# Patient Record
Sex: MALE | Race: WHITE | Employment: FULL TIME | URBAN - METROPOLITAN AREA
[De-identification: names, ages, dates, MRNs, and addresses within clinical notes are randomized per-mention and may not be internally consistent; named-entity substitution may affect disease eponyms.]

---

## 2017-01-10 ENCOUNTER — TELEPHONE (OUTPATIENT)
Dept: INTERNAL MEDICINE CLINIC | Age: 62
End: 2017-01-10

## 2017-01-10 DIAGNOSIS — Z12.11 SCREEN FOR COLON CANCER: Primary | ICD-10-CM

## 2017-07-20 ENCOUNTER — OFFICE VISIT (OUTPATIENT)
Dept: DERMATOLOGY | Age: 62
End: 2017-07-20

## 2017-07-20 DIAGNOSIS — Z85.820 HISTORY OF MALIGNANT MELANOMA: Primary | ICD-10-CM

## 2017-07-20 DIAGNOSIS — Z12.83 SCREENING EXAM FOR SKIN CANCER: ICD-10-CM

## 2017-07-20 PROCEDURE — 99243 OFF/OP CNSLTJ NEW/EST LOW 30: CPT | Performed by: DERMATOLOGY

## 2018-03-16 ENCOUNTER — OFFICE VISIT (OUTPATIENT)
Dept: INTERNAL MEDICINE CLINIC | Age: 63
End: 2018-03-16

## 2018-03-16 VITALS
BODY MASS INDEX: 25.84 KG/M2 | TEMPERATURE: 98.9 F | SYSTOLIC BLOOD PRESSURE: 130 MMHG | WEIGHT: 184.6 LBS | DIASTOLIC BLOOD PRESSURE: 74 MMHG | HEIGHT: 71 IN | OXYGEN SATURATION: 97 % | HEART RATE: 57 BPM

## 2018-03-16 DIAGNOSIS — B96.89 ACUTE BACTERIAL SINUSITIS: Primary | ICD-10-CM

## 2018-03-16 DIAGNOSIS — J01.90 ACUTE BACTERIAL SINUSITIS: Primary | ICD-10-CM

## 2018-03-16 PROCEDURE — 99213 OFFICE O/P EST LOW 20 MIN: CPT | Performed by: FAMILY MEDICINE

## 2018-03-16 RX ORDER — AMOXICILLIN AND CLAVULANATE POTASSIUM 875; 125 MG/1; MG/1
1 TABLET, FILM COATED ORAL 2 TIMES DAILY
Qty: 20 TABLET | Refills: 0 | Status: SHIPPED | OUTPATIENT
Start: 2018-03-16 | End: 2018-03-26

## 2018-03-16 NOTE — PROGRESS NOTES
and time  Psychiatric:  behavior, cognition and memory grossly normal    ASSESSMENT:   Clinical picture is most consistent with a diagnosis of acute sinusitis. PLAN:   · Symptomatic therapy recommended/prescribed: rest, fluids, acetaminophen, NSAID and multi-symptom product- OTC prn  · Oral antibiotic prescribed for 10 days:  Augmentin-  875 bid x10 days  · Patient education materials given in AVS.     Return if symptoms worsen or fail to improve.

## 2018-05-16 ENCOUNTER — HOSPITAL ENCOUNTER (OUTPATIENT)
Dept: GENERAL RADIOLOGY | Age: 63
Discharge: HOME | End: 2018-05-16
Attending: FAMILY MEDICINE | Admitting: FAMILY MEDICINE

## 2018-05-16 ENCOUNTER — OFFICE VISIT (OUTPATIENT)
Dept: INTERNAL MEDICINE CLINIC | Age: 63
End: 2018-05-16

## 2018-05-16 VITALS
SYSTOLIC BLOOD PRESSURE: 118 MMHG | OXYGEN SATURATION: 98 % | WEIGHT: 179 LBS | DIASTOLIC BLOOD PRESSURE: 80 MMHG | BODY MASS INDEX: 24.97 KG/M2 | HEART RATE: 74 BPM

## 2018-05-16 DIAGNOSIS — Z23 NEED FOR VACCINATION FOR STREP PNEUMONIAE: ICD-10-CM

## 2018-05-16 DIAGNOSIS — R73.02 IGT (IMPAIRED GLUCOSE TOLERANCE): ICD-10-CM

## 2018-05-16 DIAGNOSIS — Z12.5 SCREENING FOR PROSTATE CANCER: ICD-10-CM

## 2018-05-16 DIAGNOSIS — Z00.00 ROUTINE PHYSICAL EXAMINATION: ICD-10-CM

## 2018-05-16 DIAGNOSIS — Z85.820 HISTORY OF MALIGNANT MELANOMA: ICD-10-CM

## 2018-05-16 DIAGNOSIS — Z23 NEED FOR DIPHTHERIA-TETANUS-PERTUSSIS (TDAP) VACCINE: ICD-10-CM

## 2018-05-16 DIAGNOSIS — Z11.59 NEED FOR HEPATITIS C SCREENING TEST: ICD-10-CM

## 2018-05-16 DIAGNOSIS — Z76.89 ENCOUNTER TO ESTABLISH CARE: ICD-10-CM

## 2018-05-16 DIAGNOSIS — Z23 NEED FOR SHINGLES VACCINE: ICD-10-CM

## 2018-05-16 DIAGNOSIS — B02.9 HERPES ZOSTER WITHOUT COMPLICATION: Primary | ICD-10-CM

## 2018-05-16 LAB
A/G RATIO: 1.6 (ref 1.1–2.2)
ALBUMIN SERPL-MCNC: 4.6 G/DL (ref 3.4–5)
ALP BLD-CCNC: 64 U/L (ref 40–129)
ALT SERPL-CCNC: 23 U/L (ref 10–40)
ANION GAP SERPL CALCULATED.3IONS-SCNC: 12 MMOL/L (ref 3–16)
AST SERPL-CCNC: 21 U/L (ref 15–37)
BASOPHILS ABSOLUTE: 0 K/UL (ref 0–0.2)
BASOPHILS RELATIVE PERCENT: 0.6 %
BILIRUB SERPL-MCNC: 0.4 MG/DL (ref 0–1)
BUN BLDV-MCNC: 18 MG/DL (ref 7–20)
CALCIUM SERPL-MCNC: 9.6 MG/DL (ref 8.3–10.6)
CHLORIDE BLD-SCNC: 102 MMOL/L (ref 99–110)
CHOLESTEROL, TOTAL: 220 MG/DL (ref 0–199)
CO2: 25 MMOL/L (ref 21–32)
CREAT SERPL-MCNC: 1.2 MG/DL (ref 0.8–1.3)
EOSINOPHILS ABSOLUTE: 0.1 K/UL (ref 0–0.6)
EOSINOPHILS RELATIVE PERCENT: 1.9 %
GFR AFRICAN AMERICAN: >60
GFR NON-AFRICAN AMERICAN: >60
GLOBULIN: 2.9 G/DL
GLUCOSE BLD-MCNC: 96 MG/DL (ref 70–99)
HCT VFR BLD CALC: 43 % (ref 40.5–52.5)
HDLC SERPL-MCNC: 113 MG/DL (ref 40–60)
HEMOGLOBIN: 14.8 G/DL (ref 13.5–17.5)
HEPATITIS C ANTIBODY INTERPRETATION: NORMAL
LDL CHOLESTEROL CALCULATED: 100 MG/DL
LYMPHOCYTES ABSOLUTE: 1.4 K/UL (ref 1–5.1)
LYMPHOCYTES RELATIVE PERCENT: 25.1 %
MCH RBC QN AUTO: 32.1 PG (ref 26–34)
MCHC RBC AUTO-ENTMCNC: 34.5 G/DL (ref 31–36)
MCV RBC AUTO: 92.9 FL (ref 80–100)
MONOCYTES ABSOLUTE: 0.5 K/UL (ref 0–1.3)
MONOCYTES RELATIVE PERCENT: 8 %
NEUTROPHILS ABSOLUTE: 3.7 K/UL (ref 1.7–7.7)
NEUTROPHILS RELATIVE PERCENT: 64.4 %
PDW BLD-RTO: 13.5 % (ref 12.4–15.4)
PLATELET # BLD: 268 K/UL (ref 135–450)
PMV BLD AUTO: 9.8 FL (ref 5–10.5)
POTASSIUM SERPL-SCNC: 5.3 MMOL/L (ref 3.5–5.1)
PROSTATE SPECIFIC ANTIGEN: 1.38 NG/ML (ref 0–4)
RBC # BLD: 4.63 M/UL (ref 4.2–5.9)
SODIUM BLD-SCNC: 139 MMOL/L (ref 136–145)
TOTAL PROTEIN: 7.5 G/DL (ref 6.4–8.2)
TRIGL SERPL-MCNC: 37 MG/DL (ref 0–150)
VLDLC SERPL CALC-MCNC: 7 MG/DL
WBC # BLD: 5.7 K/UL (ref 4–11)

## 2018-05-16 PROCEDURE — 99215 OFFICE O/P EST HI 40 MIN: CPT | Performed by: FAMILY MEDICINE

## 2018-05-16 RX ORDER — PREDNISONE 20 MG/1
50 TABLET ORAL DAILY
Qty: 13 TABLET | Refills: 0 | Status: SHIPPED | OUTPATIENT
Start: 2018-05-16 | End: 2018-05-21

## 2018-05-16 RX ORDER — LIDOCAINE 50 MG/G
OINTMENT TOPICAL
Qty: 50 G | Refills: 0 | Status: SHIPPED | OUTPATIENT
Start: 2018-05-16 | End: 2019-04-01 | Stop reason: CLARIF

## 2018-05-16 ASSESSMENT — ENCOUNTER SYMPTOMS
CONSTIPATION: 0
SHORTNESS OF BREATH: 0
COUGH: 0
VOMITING: 0
WHEEZING: 0
DIARRHEA: 0
BACK PAIN: 0
RHINORRHEA: 0
ABDOMINAL PAIN: 0
NAUSEA: 0
TROUBLE SWALLOWING: 0
SORE THROAT: 0

## 2018-05-16 ASSESSMENT — PATIENT HEALTH QUESTIONNAIRE - PHQ9
SUM OF ALL RESPONSES TO PHQ QUESTIONS 1-9: 0
2. FEELING DOWN, DEPRESSED OR HOPELESS: 0
1. LITTLE INTEREST OR PLEASURE IN DOING THINGS: 0
SUM OF ALL RESPONSES TO PHQ9 QUESTIONS 1 & 2: 0

## 2018-05-17 LAB
ESTIMATED AVERAGE GLUCOSE: 114 MG/DL
HBA1C MFR BLD: 5.6 %

## 2019-04-01 ENCOUNTER — OFFICE VISIT (OUTPATIENT)
Dept: INTERNAL MEDICINE CLINIC | Age: 64
End: 2019-04-01
Payer: MEDICARE

## 2019-04-01 VITALS
WEIGHT: 191 LBS | SYSTOLIC BLOOD PRESSURE: 122 MMHG | OXYGEN SATURATION: 99 % | DIASTOLIC BLOOD PRESSURE: 70 MMHG | BODY MASS INDEX: 26.64 KG/M2 | HEART RATE: 76 BPM

## 2019-04-01 DIAGNOSIS — Z00.00 ROUTINE GENERAL MEDICAL EXAMINATION AT A HEALTH CARE FACILITY: ICD-10-CM

## 2019-04-01 DIAGNOSIS — Z23 NEED FOR PROPHYLACTIC VACCINATION AGAINST STREPTOCOCCUS PNEUMONIAE (PNEUMOCOCCUS): ICD-10-CM

## 2019-04-01 DIAGNOSIS — Z23 NEED FOR PROPHYLACTIC VACCINATION AGAINST DIPHTHERIA-TETANUS-PERTUSSIS (DTP): ICD-10-CM

## 2019-04-01 DIAGNOSIS — Z13.1 SCREENING FOR DIABETES MELLITUS: ICD-10-CM

## 2019-04-01 DIAGNOSIS — R35.1 NOCTURIA: Primary | ICD-10-CM

## 2019-04-01 DIAGNOSIS — E78.5 DYSLIPIDEMIA: ICD-10-CM

## 2019-04-01 PROCEDURE — 90670 PCV13 VACCINE IM: CPT | Performed by: NURSE PRACTITIONER

## 2019-04-01 PROCEDURE — G0438 PPPS, INITIAL VISIT: HCPCS | Performed by: NURSE PRACTITIONER

## 2019-04-01 PROCEDURE — G0009 ADMIN PNEUMOCOCCAL VACCINE: HCPCS | Performed by: NURSE PRACTITIONER

## 2019-04-01 ASSESSMENT — LIFESTYLE VARIABLES
HAS A RELATIVE, FRIEND, DOCTOR, OR ANOTHER HEALTH PROFESSIONAL EXPRESSED CONCERN ABOUT YOUR DRINKING OR SUGGESTED YOU CUT DOWN: 0
HOW OFTEN DURING THE LAST YEAR HAVE YOU HAD A FEELING OF GUILT OR REMORSE AFTER DRINKING: 0
HOW OFTEN DURING THE LAST YEAR HAVE YOU BEEN UNABLE TO REMEMBER WHAT HAPPENED THE NIGHT BEFORE BECAUSE YOU HAD BEEN DRINKING: 0
AUDIT TOTAL SCORE: 5
HOW MANY STANDARD DRINKS CONTAINING ALCOHOL DO YOU HAVE ON A TYPICAL DAY: 1
AUDIT-C TOTAL SCORE: 5
HOW OFTEN DURING THE LAST YEAR HAVE YOU FOUND THAT YOU WERE NOT ABLE TO STOP DRINKING ONCE YOU HAD STARTED: 0
HOW OFTEN DURING THE LAST YEAR HAVE YOU FAILED TO DO WHAT WAS NORMALLY EXPECTED FROM YOU BECAUSE OF DRINKING: 0
HOW OFTEN DURING THE LAST YEAR HAVE YOU NEEDED AN ALCOHOLIC DRINK FIRST THING IN THE MORNING TO GET YOURSELF GOING AFTER A NIGHT OF HEAVY DRINKING: 0
HAVE YOU OR SOMEONE ELSE BEEN INJURED AS A RESULT OF YOUR DRINKING: 0
HOW OFTEN DO YOU HAVE A DRINK CONTAINING ALCOHOL: 4
HOW OFTEN DO YOU HAVE SIX OR MORE DRINKS ON ONE OCCASION: 0

## 2019-04-01 ASSESSMENT — PATIENT HEALTH QUESTIONNAIRE - PHQ9
SUM OF ALL RESPONSES TO PHQ QUESTIONS 1-9: 0
SUM OF ALL RESPONSES TO PHQ QUESTIONS 1-9: 0

## 2019-04-01 ASSESSMENT — ANXIETY QUESTIONNAIRES: GAD7 TOTAL SCORE: 0
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
## 2019-04-01 NOTE — PATIENT INSTRUCTIONS
Update Eye exam.    Update Tetanus vaccine at pharmacy. Discuss Rt throat/neck discomfort with dentist.       Personalized Preventive Plan for Jeffrey Gautam - 4/1/2019  Medicare offers a range of preventive health benefits. Some of the tests and screenings are paid in full while other may be subject to a deductible, co-insurance, and/or copay. Some of these benefits include a comprehensive review of your medical history including lifestyle, illnesses that may run in your family, and various assessments and screenings as appropriate. After reviewing your medical record and screening and assessments performed today your provider may have ordered immunizations, labs, imaging, and/or referrals for you. A list of these orders (if applicable) as well as your Preventive Care list are included within your After Visit Summary for your review. Other Preventive Recommendations:    · A preventive eye exam performed by an eye specialist is recommended every 1-2 years to screen for glaucoma; cataracts, macular degeneration, and other eye disorders. · A preventive dental visit is recommended every 6 months. · Try to get at least 150 minutes of exercise per week or 10,000 steps per day on a pedometer . · Order or download the FREE \"Exercise & Physical Activity: Your Everyday Guide\" from [employer]. Call 1-799.405.2467 or search [employer] online. · You need 9422-7877 mg of calcium and 7350-4968 IU of vitamin D per day. It is possible to meet your calcium requirement with diet alone, but a vitamin D supplement is usually necessary to meet this goal.  · When exposed to the sun, use a sunscreen that protects against both UVA and UVB radiation with an SPF of 30 or greater. Reapply every 2 to 3 hours or after sweating, drying off with a towel, or swimming. · Always wear a seat belt when traveling in a car. Always wear a helmet when riding a bicycle or motorcycle.

## 2020-05-11 ENCOUNTER — TELEMEDICINE (OUTPATIENT)
Dept: INTERNAL MEDICINE CLINIC | Age: 65
End: 2020-05-11
Payer: MEDICARE

## 2020-05-11 PROCEDURE — G0439 PPPS, SUBSEQ VISIT: HCPCS | Performed by: NURSE PRACTITIONER

## 2020-05-11 RX ORDER — SILDENAFIL 100 MG/1
100 TABLET, FILM COATED ORAL PRN
Qty: 8 TABLET | Refills: 0 | Status: SHIPPED | OUTPATIENT
Start: 2020-05-11 | End: 2021-05-06 | Stop reason: ALTCHOICE

## 2020-05-11 ASSESSMENT — LIFESTYLE VARIABLES
HOW OFTEN DURING THE LAST YEAR HAVE YOU HAD A FEELING OF GUILT OR REMORSE AFTER DRINKING: 0
HOW OFTEN DO YOU HAVE A DRINK CONTAINING ALCOHOL: 4
HAVE YOU OR SOMEONE ELSE BEEN INJURED AS A RESULT OF YOUR DRINKING: 0
HOW MANY STANDARD DRINKS CONTAINING ALCOHOL DO YOU HAVE ON A TYPICAL DAY: 0
HOW OFTEN DURING THE LAST YEAR HAVE YOU FAILED TO DO WHAT WAS NORMALLY EXPECTED FROM YOU BECAUSE OF DRINKING: 0
AUDIT-C TOTAL SCORE: 5
HOW OFTEN DO YOU HAVE SIX OR MORE DRINKS ON ONE OCCASION: 1
HAS A RELATIVE, FRIEND, DOCTOR, OR ANOTHER HEALTH PROFESSIONAL EXPRESSED CONCERN ABOUT YOUR DRINKING OR SUGGESTED YOU CUT DOWN: 4
AUDIT TOTAL SCORE: 10
HOW OFTEN DURING THE LAST YEAR HAVE YOU NEEDED AN ALCOHOLIC DRINK FIRST THING IN THE MORNING TO GET YOURSELF GOING AFTER A NIGHT OF HEAVY DRINKING: 0
HOW OFTEN DURING THE LAST YEAR HAVE YOU BEEN UNABLE TO REMEMBER WHAT HAPPENED THE NIGHT BEFORE BECAUSE YOU HAD BEEN DRINKING: 1
HOW OFTEN DURING THE LAST YEAR HAVE YOU FOUND THAT YOU WERE NOT ABLE TO STOP DRINKING ONCE YOU HAD STARTED: 0

## 2020-05-11 ASSESSMENT — PATIENT HEALTH QUESTIONNAIRE - PHQ9
SUM OF ALL RESPONSES TO PHQ QUESTIONS 1-9: 0
SUM OF ALL RESPONSES TO PHQ QUESTIONS 1-9: 0
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
## 2020-05-14 ENCOUNTER — CLINICAL DOCUMENTATION (OUTPATIENT)
Dept: SPIRITUAL SERVICES | Age: 65
End: 2020-05-14

## 2020-05-15 ENCOUNTER — CLINICAL DOCUMENTATION (OUTPATIENT)
Dept: SPIRITUAL SERVICES | Age: 65
End: 2020-05-15

## 2020-05-18 ENCOUNTER — CLINICAL DOCUMENTATION (OUTPATIENT)
Dept: SPIRITUAL SERVICES | Age: 65
End: 2020-05-18

## 2020-05-19 ENCOUNTER — CLINICAL DOCUMENTATION (OUTPATIENT)
Dept: SPIRITUAL SERVICES | Age: 65
End: 2020-05-19

## 2020-05-19 NOTE — FLOWSHEET NOTE
Met with Pt and wife for Advance Directives appointment. Provided copy of AD forms for both and went over forms with them, explaining and answering questions. Pt and wife did not want to complete forms at this time, but stated they would call Spiritual Care office when ready to complete. [address] [facility]       05/19/20 3429   Encounter Summary   Services provided to: Patient and family together   Referral/Consult From: Patient;Physician   Support System Family members   Continue Visiting   (5/19 follow up OP, went over ADs)   Complexity of Encounter Moderate   Length of Encounter 45 minutes   Advance Care Planning Yes   Routine   Type Follow up   Assessment Calm; Approachable   Intervention Active listening   Outcome Expressed gratitude   Advance Directives (For Healthcare)   Healthcare Directive No, patient does not have an advance directive for healthcare treatment   Advance Directives Documents given;Documents explained

## 2020-11-24 ENCOUNTER — OFFICE VISIT (OUTPATIENT)
Dept: PRIMARY CARE CLINIC | Age: 65
End: 2020-11-24
Payer: MEDICARE

## 2020-11-24 PROCEDURE — 99211 OFF/OP EST MAY X REQ PHY/QHP: CPT | Performed by: NURSE PRACTITIONER

## 2020-11-24 NOTE — PATIENT INSTRUCTIONS

## 2020-11-24 NOTE — PROGRESS NOTES
David Rucker received a viral test for COVID-19. They were educated on isolation and quarantine as appropriate. For any symptoms, they were directed to seek care from their PCP, given contact information to establish with a doctor, directed to an urgent care or the emergency room.

## 2020-11-27 LAB — SARS-COV-2, NAA: ABNORMAL

## 2020-12-08 ENCOUNTER — TELEPHONE (OUTPATIENT)
Dept: ADMINISTRATIVE | Age: 65
End: 2020-12-08

## 2021-02-03 ENCOUNTER — OFFICE VISIT (OUTPATIENT)
Dept: INTERNAL MEDICINE CLINIC | Age: 66
End: 2021-02-03
Payer: MEDICARE

## 2021-02-03 VITALS
SYSTOLIC BLOOD PRESSURE: 128 MMHG | WEIGHT: 177 LBS | HEART RATE: 81 BPM | OXYGEN SATURATION: 100 % | DIASTOLIC BLOOD PRESSURE: 72 MMHG | TEMPERATURE: 97.8 F | BODY MASS INDEX: 24.69 KG/M2

## 2021-02-03 DIAGNOSIS — R59.1 LYMPHADENOPATHY: ICD-10-CM

## 2021-02-03 DIAGNOSIS — R51.9 NONINTRACTABLE HEADACHE, UNSPECIFIED CHRONICITY PATTERN, UNSPECIFIED HEADACHE TYPE: ICD-10-CM

## 2021-02-03 DIAGNOSIS — R00.2 PALPITATION: Primary | ICD-10-CM

## 2021-02-03 PROCEDURE — 99214 OFFICE O/P EST MOD 30 MIN: CPT | Performed by: NURSE PRACTITIONER

## 2021-02-03 RX ORDER — AMOXICILLIN 500 MG
CAPSULE ORAL
COMMUNITY
End: 2021-03-24

## 2021-02-03 ASSESSMENT — ENCOUNTER SYMPTOMS
NAUSEA: 0
COUGH: 0
SORE THROAT: 0
VOMITING: 0
SINUS PRESSURE: 0
FACIAL SWELLING: 0
DIARRHEA: 0

## 2021-02-03 ASSESSMENT — PATIENT HEALTH QUESTIONNAIRE - PHQ9
SUM OF ALL RESPONSES TO PHQ9 QUESTIONS 1 & 2: 0
2. FEELING DOWN, DEPRESSED OR HOPELESS: 0
SUM OF ALL RESPONSES TO PHQ QUESTIONS 1-9: 0

## 2021-02-03 NOTE — PROGRESS NOTES
Patrick Boyle  1955      HPI:  Chief Complaint   Patient presents with    Medication Check    Jaw Pain     Right sided. sharp pain. sx for several years. Recent oral work - was not dental related.  Hypertension    Headache       Blood pressure Well controlled today. No medication. Headaches x a few weeks and does not occur daily. Pulsating pain, frontal. Does not stop him from working or any ADLs. Stress is ok per patient. Jaw pain/neck pain under R side of chin - tenderness intermittent. No imaging performed in the past as this has occured for several years. Oral surgeon does not believe related to tooth pain or oral surgery. Pain is getting more severe and more frequent. No sore throat. No difficulty swallowing. Intermittent R ear pain. Palpitation intermittently every few days. Denies CP/SOB/KENNEDY. Last for seconds to minutes. /72 (Site: Left Upper Arm, Position: Sitting, Cuff Size: Large Adult)   Pulse 81   Temp 97.8 °F (36.6 °C) (Temporal)   Wt 177 lb (80.3 kg)   SpO2 100%   BMI 24.69 kg/m²     Prior to Visit Medications    Medication Sig Taking?  Authorizing Provider   Omega-3 Fatty Acids (FISH OIL) 1200 MG CAPS Take by mouth Yes Historical Provider, MD   sildenafil (VIAGRA) 100 MG tablet Take 1 tablet by mouth as needed for Erectile Dysfunction Yes MEI Bo CNP   Probiotic Product (PROBIOTIC DAILY PO) Take by mouth Yes Historical Provider, MD     Family History   Problem Relation Age of Onset    Stroke Mother     Heart Disease Father     Alzheimer's Disease Father      Social History     Socioeconomic History    Marital status:      Spouse name: Not on file    Number of children: Not on file    Years of education: Not on file    Highest education level: Not on file   Occupational History    Not on file   Social Needs    Financial resource strain: Not on file    Food insecurity     Worry: Not on file     Inability: Not on file   Georgianna Olszewski Transportation needs     Medical: Not on file     Non-medical: Not on file   Tobacco Use    Smoking status: Never Smoker    Smokeless tobacco: Never Used   Substance and Sexual Activity    Alcohol use: Yes     Alcohol/week: 2.0 - 3.0 standard drinks     Types: 2 - 3 Cans of beer per week     Comment: every other day    Drug use: No    Sexual activity: Yes     Partners: Female   Lifestyle    Physical activity     Days per week: Not on file     Minutes per session: Not on file    Stress: Not on file   Relationships    Social connections     Talks on phone: Not on file     Gets together: Not on file     Attends Zoroastrian service: Not on file     Active member of club or organization: Not on file     Attends meetings of clubs or organizations: Not on file     Relationship status: Not on file    Intimate partner violence     Fear of current or ex partner: Not on file     Emotionally abused: Not on file     Physically abused: Not on file     Forced sexual activity: Not on file   Other Topics Concern    Not on file   Social History Narrative    Not on file       Review of Systems   Constitutional: Negative for appetite change, chills, fatigue, fever and unexpected weight change. HENT: Negative for congestion, ear discharge, ear pain, facial swelling, hearing loss, sinus pressure, sneezing and sore throat. Respiratory: Negative for cough. Cardiovascular: Positive for palpitations. Negative for chest pain. Gastrointestinal: Negative for diarrhea, nausea and vomiting. Genitourinary: Negative for difficulty urinating, dysuria, hematuria and urgency. Musculoskeletal: Positive for arthralgias (jaw/neck pain). Negative for gait problem. Neurological: Positive for headaches (intermittent). Negative for dizziness and weakness. Hematological: Negative for adenopathy. Psychiatric/Behavioral: Negative for sleep disturbance and suicidal ideas. Physical Exam  Vitals signs reviewed.    Constitutional: Appearance: He is normal weight. HENT:      Head: Normocephalic. Right Ear: Tympanic membrane, ear canal and external ear normal.      Left Ear: Tympanic membrane, ear canal and external ear normal.      Nose: Nose normal.      Mouth/Throat:      Mouth: Mucous membranes are moist.      Pharynx: Oropharynx is clear. Eyes:      Extraocular Movements: Extraocular movements intact. Conjunctiva/sclera: Conjunctivae normal.      Pupils: Pupils are equal, round, and reactive to light. Comments: Corrective lenses   Neck:      Vascular: No carotid bruit. Cardiovascular:      Rate and Rhythm: Normal rate and regular rhythm. Pulses: Normal pulses. Heart sounds: Normal heart sounds. Pulmonary:      Effort: Pulmonary effort is normal.      Breath sounds: Normal breath sounds. Lymphadenopathy:      Cervical: Cervical adenopathy (R < dime size lymphadenopathy, tenderness, no erythema, no drainage) present. Neurological:      General: No focal deficit present. Mental Status: He is alert and oriented to person, place, and time. Psychiatric:         Mood and Affect: Mood normal.         Thought Content: Thought content normal.         Judgment: Judgment normal.         Assessment:     1. Palpitation  Order lab to review. Monitor palpitations and enc to check HR when occur. If any pain, SOB - call office    - TSH with Reflex; Future    2. Lymphadenopathy  Order imaging to evaluate chronic pain and lymphadenopathy palpated. Stable    - CT SOFT TISSUE NECK W WO CONTRAST; Future    3. Nonintractable headache, unspecified chronicity pattern, unspecified headache type  Monitor BP at home, get cuff and check daily BP. Monitor jaw clenching and tooth pain. Plan:    -Pt will return for [id number]  -Call COVID vaccine hotline next week     See above plan.      Return for May, wellness exam.    Gracie Laura, MEI - CNP

## 2021-02-10 ENCOUNTER — TELEPHONE (OUTPATIENT)
Dept: INTERNAL MEDICINE CLINIC | Age: 66
End: 2021-02-10

## 2021-02-10 DIAGNOSIS — M54.50 LEFT LOW BACK PAIN, UNSPECIFIED CHRONICITY, UNSPECIFIED WHETHER SCIATICA PRESENT: Primary | ICD-10-CM

## 2021-02-11 ENCOUNTER — TELEPHONE (OUTPATIENT)
Dept: INTERNAL MEDICINE CLINIC | Age: 66
End: 2021-02-11

## 2021-02-11 ENCOUNTER — HOSPITAL ENCOUNTER (OUTPATIENT)
Age: 66
Discharge: HOME | End: 2021-02-11
Payer: MEDICARE

## 2021-02-11 ENCOUNTER — HOSPITAL ENCOUNTER (OUTPATIENT)
Dept: CT IMAGING | Age: 66
Discharge: HOME | End: 2021-02-11
Payer: MEDICARE

## 2021-02-11 DIAGNOSIS — M54.50 LEFT LOW BACK PAIN, UNSPECIFIED CHRONICITY, UNSPECIFIED WHETHER SCIATICA PRESENT: ICD-10-CM

## 2021-02-11 DIAGNOSIS — R59.1 LYMPHADENOPATHY: ICD-10-CM

## 2021-02-11 DIAGNOSIS — R59.1 LYMPHADENOPATHY: Primary | ICD-10-CM

## 2021-02-11 LAB
A/G RATIO: 1.4 (ref 1.1–2.2)
ALBUMIN SERPL-MCNC: 4.2 G/DL (ref 3.4–5)
ALP BLD-CCNC: 85 U/L (ref 40–129)
ALT SERPL-CCNC: 19 U/L (ref 10–40)
ANION GAP SERPL CALCULATED.3IONS-SCNC: 6 MMOL/L (ref 3–16)
AST SERPL-CCNC: 19 U/L (ref 15–37)
BILIRUB SERPL-MCNC: <0.2 MG/DL (ref 0–1)
BUN BLDV-MCNC: 18 MG/DL (ref 7–20)
CALCIUM SERPL-MCNC: 9.5 MG/DL (ref 8.3–10.6)
CHLORIDE BLD-SCNC: 106 MMOL/L (ref 99–110)
CO2: 29 MMOL/L (ref 21–32)
CREAT SERPL-MCNC: 1.2 MG/DL (ref 0.8–1.3)
GFR AFRICAN AMERICAN: >60
GFR NON-AFRICAN AMERICAN: >60
GLOBULIN: 2.9 G/DL
GLUCOSE BLD-MCNC: 118 MG/DL (ref 70–99)
POTASSIUM SERPL-SCNC: 4.8 MMOL/L (ref 3.5–5.1)
SODIUM BLD-SCNC: 141 MMOL/L (ref 136–145)
TOTAL PROTEIN: 7.1 G/DL (ref 6.4–8.2)

## 2021-02-11 PROCEDURE — 6360000004 HC RX CONTRAST MEDICATION: Performed by: NURSE PRACTITIONER

## 2021-02-11 PROCEDURE — 36415 COLL VENOUS BLD VENIPUNCTURE: CPT

## 2021-02-11 PROCEDURE — 80053 COMPREHEN METABOLIC PANEL: CPT

## 2021-02-11 PROCEDURE — 70491 CT SOFT TISSUE NECK W/DYE: CPT

## 2021-02-11 RX ADMIN — IOPAMIDOL 75 ML: 755 INJECTION, SOLUTION INTRAVENOUS at 08:24

## 2021-02-12 DIAGNOSIS — R93.89 ABNORMAL CT SCAN, NECK: Primary | ICD-10-CM

## 2021-02-12 DIAGNOSIS — E04.1 THYROID NODULE: ICD-10-CM

## 2021-02-23 ENCOUNTER — HOSPITAL ENCOUNTER (OUTPATIENT)
Age: 66
Discharge: HOME | End: 2021-02-23
Payer: MEDICARE

## 2021-02-23 DIAGNOSIS — R00.2 PALPITATION: ICD-10-CM

## 2021-02-23 LAB — TSH REFLEX: 1.27 UIU/ML (ref 0.27–4.2)

## 2021-02-23 PROCEDURE — 36415 COLL VENOUS BLD VENIPUNCTURE: CPT

## 2021-02-23 PROCEDURE — 84443 ASSAY THYROID STIM HORMONE: CPT

## 2021-03-09 ENCOUNTER — TELEPHONE (OUTPATIENT)
Dept: INTERNAL MEDICINE CLINIC | Age: 66
End: 2021-03-09

## 2021-03-09 DIAGNOSIS — R93.89 ABNORMAL CT SCAN: Primary | ICD-10-CM

## 2021-03-09 NOTE — TELEPHONE ENCOUNTER
MRI ordered has to be with & without contrast. Need new order put in the [facility] system.  He is scheduled for 3/11/21

## 2021-03-11 ENCOUNTER — HOSPITAL ENCOUNTER (OUTPATIENT)
Dept: MRI IMAGING | Age: 66
Discharge: HOME | End: 2021-03-11
Payer: MEDICARE

## 2021-03-11 DIAGNOSIS — R93.89 ABNORMAL CT SCAN, NECK: ICD-10-CM

## 2021-03-11 DIAGNOSIS — R93.89 ABNORMAL CT SCAN: ICD-10-CM

## 2021-03-11 PROCEDURE — 70543 MRI ORBT/FAC/NCK W/O &W/DYE: CPT

## 2021-03-11 PROCEDURE — A9579 GAD-BASE MR CONTRAST NOS,1ML: HCPCS | Performed by: NURSE PRACTITIONER

## 2021-03-11 PROCEDURE — 6360000004 HC RX CONTRAST MEDICATION: Performed by: NURSE PRACTITIONER

## 2021-03-11 RX ADMIN — GADOTERIDOL 16 ML: 279.3 INJECTION, SOLUTION INTRAVENOUS at 16:24

## 2021-03-12 ENCOUNTER — PATIENT MESSAGE (OUTPATIENT)
Dept: INTERNAL MEDICINE CLINIC | Age: 66
End: 2021-03-12

## 2021-03-12 DIAGNOSIS — I48.91 ATRIAL FIBRILLATION, UNSPECIFIED TYPE (HCC): Primary | ICD-10-CM

## 2021-03-15 NOTE — TELEPHONE ENCOUNTER
Noted. Danita Gonzalez the MRI results are not finalized yet. Will you watch this and send them to Dr. Melva Briceño office when they are done.

## 2021-03-15 NOTE — TELEPHONE ENCOUNTER
Please have patient follow up with Cardiology (Isaac/Cj) and then follow up with me by end of month (Can be virtually if too many appts). Let him know I reviewed his hospital records.
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
## 2021-03-19 ENCOUNTER — TELEPHONE (OUTPATIENT)
Dept: INTERNAL MEDICINE CLINIC | Age: 66
End: 2021-03-19

## 2021-03-19 NOTE — TELEPHONE ENCOUNTER
Yes! We can do together. Tell him I already have been updated by Dr Jesse Avila. Does pt have any further questions?

## 2021-03-19 NOTE — TELEPHONE ENCOUNTER
----- Message from [address] [facility] sent at 3/19/2021  9:12 AM EDT -----  Subject: Message to Provider    QUESTIONS  Information for Provider? Patient is needing a pre-op physical for surgery   on 4/5/21. He has a f/u appt. scheduled with Aury Cook on 3/24/21 and   wants to know if she can do his pre-op physical that day too. If not   we can schedule something else.   ---------------------------------------------------------------------------  --------------  CALL BACK INFO  What is the best way for the office to contact you? OK to leave message on   voicemail   OK to respond with electronic message via [employer] portal (only for   patients who have registered [employer] account)  Preferred Call Back Phone Number? [phone]  ---------------------------------------------------------------------------  --------------  SCRIPT ANSWERS  Relationship to Patient?  Self

## 2021-03-22 ENCOUNTER — OFFICE VISIT (OUTPATIENT)
Dept: CARDIOLOGY CLINIC | Age: 66
End: 2021-03-22
Payer: MEDICARE

## 2021-03-22 VITALS
HEIGHT: 71 IN | DIASTOLIC BLOOD PRESSURE: 70 MMHG | WEIGHT: 173 LBS | SYSTOLIC BLOOD PRESSURE: 110 MMHG | HEART RATE: 70 BPM | OXYGEN SATURATION: 98 % | BODY MASS INDEX: 24.22 KG/M2

## 2021-03-22 DIAGNOSIS — R07.9 CHEST PAIN, UNSPECIFIED TYPE: Primary | ICD-10-CM

## 2021-03-22 DIAGNOSIS — Z82.3 FAMILY HISTORY OF STROKE OR TRANSIENT ISCHEMIC ATTACK IN MOTHER: ICD-10-CM

## 2021-03-22 DIAGNOSIS — I48.0 PAF (PAROXYSMAL ATRIAL FIBRILLATION) (HCC): ICD-10-CM

## 2021-03-22 DIAGNOSIS — I31.9 PERICARDITIS, UNSPECIFIED CHRONICITY, UNSPECIFIED TYPE: ICD-10-CM

## 2021-03-22 PROCEDURE — 99205 OFFICE O/P NEW HI 60 MIN: CPT | Performed by: INTERNAL MEDICINE

## 2021-03-22 PROCEDURE — 93000 ELECTROCARDIOGRAM COMPLETE: CPT | Performed by: INTERNAL MEDICINE

## 2021-03-22 RX ORDER — DILTIAZEM HYDROCHLORIDE 120 MG/1
120 CAPSULE, COATED, EXTENDED RELEASE ORAL DAILY
Qty: 90 CAPSULE | Refills: 3 | Status: ON HOLD
Start: 2021-03-22 | End: 2021-03-28 | Stop reason: HOSPADM

## 2021-03-22 RX ORDER — ASPIRIN 81 MG/1
81 TABLET ORAL DAILY
Qty: 90 TABLET | Refills: 3 | Status: SHIPPED | OUTPATIENT
Start: 2021-03-22 | End: 2021-04-08 | Stop reason: ALTCHOICE

## 2021-03-22 RX ORDER — COLCHICINE 0.6 MG/1
0.6 TABLET ORAL 2 TIMES DAILY
COMMUNITY
End: 2021-05-06

## 2021-03-22 RX ORDER — DILTIAZEM HYDROCHLORIDE 120 MG/1
120 CAPSULE, COATED, EXTENDED RELEASE ORAL DAILY
COMMUNITY
End: 2021-03-22 | Stop reason: SDUPTHER

## 2021-03-22 RX ORDER — FAMOTIDINE 20 MG/1
20 TABLET, FILM COATED ORAL DAILY
Status: ON HOLD | COMMUNITY
End: 2021-03-28 | Stop reason: HOSPADM

## 2021-03-22 NOTE — LETTER
[address] Cardiology - [address] [facility] [address] [facility]  Phone: [phone]  Fax: [phone]    Annie Núñez MD        March 22, 2021      Samantha José Miguel 1955    The patient is low risk for major adverse cardiac events for noncardiac surgery. May proceed to the operating room at the discretion of the attending surgeon pending records we obtain from hospital.  Please call with any questions or concerns that may arise.       Sincerely,          Annie Núñez MD

## 2021-03-22 NOTE — PATIENT INSTRUCTIONS
Patient Education        Atrial Fibrillation: Care Instructions  Your Care Instructions     Atrial fibrillation is an irregular and often fast heartbeat. Treating this condition is important for several reasons. It can cause blood clots, which can travel from your heart to your brain and cause a stroke. If you have a fast heartbeat, you may feel lightheaded, dizzy, and weak. An irregular heartbeat can also increase your risk for heart failure. Atrial fibrillation is often the result of another heart condition, such as high blood pressure or coronary artery disease. Making changes to improve your heart condition will help you stay healthy and active. Follow-up care is a key part of your treatment and safety. Be sure to make and go to all appointments, and call your doctor if you are having problems. It's also a good idea to know your test results and keep a list of the medicines you take. How can you care for yourself at home? Medicines    · Take your medicines exactly as prescribed. Call your doctor if you think you are having a problem with your medicine. You will get more details on the specific medicines your doctor prescribes.     · If your doctor has given you a blood thinner to prevent a stroke, be sure you get instructions about how to take your medicine safely. Blood thinners can cause serious bleeding problems.     · Do not take any vitamins, over-the-counter drugs, or herbal products without talking to your doctor first.   Lifestyle changes    · Do not smoke. Smoking can increase your chance of a stroke and heart attack. If you need help quitting, talk to your doctor about stop-smoking programs and medicines. These can increase your chances of quitting for good.     · Eat a heart-healthy diet.     · Stay at a healthy weight. Lose weight if you need to.     · Limit alcohol to 2 drinks a day for men and 1 drink a day for women. Too much alcohol can cause health problems.     · Avoid colds and flu.  Get a pneumococcal vaccine shot. If you have had one before, ask your doctor whether you need another dose. Get a flu shot every year. If you must be around people with colds or flu, wash your hands often. Activity    · If your doctor recommends it, get more exercise. Walking is a good choice. Bit by bit, increase the amount you walk every day. Try for at least 30 minutes on most days of the week. You also may want to swim, bike, or do other activities. Your doctor may suggest that you join a cardiac rehabilitation program so that you can have help increasing your physical activity safely.     · Start light exercise if your doctor says it is okay. Even a small amount will help you get stronger, have more energy, and manage stress. Walking is an easy way to get exercise. Start out by walking a little more than you did in the hospital. Gradually increase the amount you walk.     · When you exercise, watch for signs that your heart is working too hard. You are pushing too hard if you cannot talk while you are exercising. If you become short of breath or dizzy or have chest pain, sit down and rest immediately.     · Check your pulse regularly. Place two fingers on the artery at the palm side of your wrist, in line with your thumb. If your heartbeat seems uneven or fast, talk to your doctor. When should you call for help? Call 911 anytime you think you may need emergency care. For example, call if:    · You have symptoms of a heart attack. These may include:  ? Chest pain or pressure, or a strange feeling in the chest.  ? Sweating. ? Shortness of breath. ? Nausea or vomiting. ? Pain, pressure, or a strange feeling in the back, neck, jaw, or upper belly or in one or both shoulders or arms. ? Lightheadedness or sudden weakness. ? A fast or irregular heartbeat. After you call 911, the  may tell you to chew 1 adult-strength or 2 to 4 low-dose aspirin. Wait for an ambulance.  Do not try to drive yourself.     · You have symptoms of a stroke. These may include:  ? Sudden numbness, tingling, weakness, or loss of movement in your face, arm, or leg, especially on only one side of your body. ? Sudden vision changes. ? Sudden trouble speaking. ? Sudden confusion or trouble understanding simple statements. ? Sudden problems with walking or balance. ? A sudden, severe headache that is different from past headaches.     · You passed out (lost consciousness). Call your doctor now or seek immediate medical care if:    · You have new or increased shortness of breath.     · You feel dizzy or lightheaded, or you feel like you may faint.     · Your heart rate becomes irregular.     · You can feel your heart flutter in your chest or skip heartbeats. Tell your doctor if these symptoms are new or worse. Watch closely for changes in your health, and be sure to contact your doctor if you have any problems. Where can you learn more? Go to https://[employer]. org and sign in to your [employer] account. Enter U020 in the [employer] box to learn more about \"Atrial Fibrillation: Care Instructions. \"     If you do not have an account, please click on the \"Sign Up Now\" link. Current as of: August 31, 2020               Content Version: 12.8  © 2006-2021 [employer]. Care instructions adapted under license by [facility] ([facility] [place] If you have questions about a medical condition or this instruction, always ask your healthcare professional. [place] [address] any warranty or liability for your use of this information. Patient Education        Pericarditis: Care Instructions  Your Care Instructions     Pericarditis occurs when the membrane that surrounds the heart and its major blood vessels becomes inflamed. In most cases, the cause is not known. It can be caused by a virus, a heart attack, or a chest injury. It also can be caused by another type of illness.  Pericarditis causes sharp chest pain. This pain gets worse when you lie down or take a deep breath. The pain gets better if you lean forward or sit up. Pericarditis often heals on its own. It usually does not cause any more problems. Most people get better within a couple of weeks. Follow-up care is a key part of your treatment and safety. Be sure to make and go to all appointments, and call your doctor if you are having problems. It's also a good idea to know your test results and keep a list of the medicines you take. How can you care for yourself at home? · Watch for the return of your symptoms. Sometimes pericarditis can come back after it has gone away. · Be safe with medicines. Take your medicines exactly as prescribed. Call your doctor if you think you are having a problem with your medicine. · Ask your doctor if you can take an over-the-counter pain medicine, such as acetaminophen (Tylenol), ibuprofen (Advil, Motrin), or naproxen (Aleve). Read and follow all instructions on the label. · Do not take two or more pain medicines at the same time unless the doctor told you to. Many pain medicines have acetaminophen, which is Tylenol. Too much acetaminophen (Tylenol) can be harmful. · Get plenty of rest until you feel better, especially if you have a fever. · Avoid exercise and strenuous activity that has not been approved by your doctor. Ask your doctor when you can be active again. When should you call for help? Call 911 anytime you think you may need emergency care. For example, call if:    · You have severe trouble breathing.     · You passed out (lost consciousness). Call your doctor now or seek immediate medical care if:    · You are dizzy or lightheaded, or you feel like you may faint.     · You have trouble breathing.     · You have a new or higher fever. Watch closely for changes in your health, and be sure to contact your doctor if:    · You do not get better as expected. Where can you learn more?   Go to

## 2021-03-23 ENCOUNTER — HOSPITAL ENCOUNTER (OUTPATIENT)
Age: 66
Discharge: HOME | End: 2021-03-23
Payer: MEDICARE

## 2021-03-23 DIAGNOSIS — I31.9 PERICARDITIS, UNSPECIFIED CHRONICITY, UNSPECIFIED TYPE: ICD-10-CM

## 2021-03-23 DIAGNOSIS — R07.9 CHEST PAIN, UNSPECIFIED TYPE: ICD-10-CM

## 2021-03-23 DIAGNOSIS — Z82.3 FAMILY HISTORY OF STROKE OR TRANSIENT ISCHEMIC ATTACK IN MOTHER: ICD-10-CM

## 2021-03-23 LAB
CHOLESTEROL, FASTING: 156 MG/DL (ref 0–199)
HDLC SERPL-MCNC: 66 MG/DL (ref 40–60)
LDL CHOLESTEROL CALCULATED: 83 MG/DL
TRIGLYCERIDE, FASTING: 35 MG/DL (ref 0–150)
VLDLC SERPL CALC-MCNC: 7 MG/DL

## 2021-03-23 PROCEDURE — 80061 LIPID PANEL: CPT

## 2021-03-23 PROCEDURE — 36415 COLL VENOUS BLD VENIPUNCTURE: CPT

## 2021-03-24 ENCOUNTER — TELEPHONE (OUTPATIENT)
Dept: INTERNAL MEDICINE CLINIC | Age: 66
End: 2021-03-24

## 2021-03-24 ENCOUNTER — OFFICE VISIT (OUTPATIENT)
Dept: INTERNAL MEDICINE CLINIC | Age: 66
End: 2021-03-24
Payer: MEDICARE

## 2021-03-24 VITALS
BODY MASS INDEX: 23.99 KG/M2 | HEART RATE: 74 BPM | SYSTOLIC BLOOD PRESSURE: 124 MMHG | DIASTOLIC BLOOD PRESSURE: 68 MMHG | TEMPERATURE: 97.4 F | OXYGEN SATURATION: 100 % | WEIGHT: 172 LBS

## 2021-03-24 DIAGNOSIS — R93.89 ABNORMAL CT SCAN, NECK: ICD-10-CM

## 2021-03-24 DIAGNOSIS — I48.91 ATRIAL FIBRILLATION, UNSPECIFIED TYPE (HCC): ICD-10-CM

## 2021-03-24 DIAGNOSIS — R51.9 NONINTRACTABLE HEADACHE, UNSPECIFIED CHRONICITY PATTERN, UNSPECIFIED HEADACHE TYPE: ICD-10-CM

## 2021-03-24 DIAGNOSIS — R93.89 ABNORMAL CT SCAN: ICD-10-CM

## 2021-03-24 DIAGNOSIS — Z01.818 PRE-OP EXAMINATION: Primary | ICD-10-CM

## 2021-03-24 DIAGNOSIS — Z01.812 PRE-OPERATIVE LABORATORY EXAMINATION: ICD-10-CM

## 2021-03-24 DIAGNOSIS — E04.1 THYROID NODULE: ICD-10-CM

## 2021-03-24 PROBLEM — D64.9 ANEMIA: Status: ACTIVE | Noted: 2021-02-28

## 2021-03-24 PROCEDURE — 99214 OFFICE O/P EST MOD 30 MIN: CPT | Performed by: NURSE PRACTITIONER

## 2021-03-24 NOTE — PROGRESS NOTES
Preoperative Consultation      Nando Donohue  YOB: 1955    Date of Service:  3/24/2021    Vitals:    03/24/21 0703   BP: 124/68   Site: Right Upper Arm   Position: Sitting   Cuff Size: Large Adult   Pulse: 74   Temp: 97.4 °F (36.3 °C)   TempSrc: Temporal   SpO2: 100%   Weight: 172 lb (78 kg)      Wt Readings from Last 2 Encounters:   03/28/21 174 lb 14.4 oz (79.3 kg)   03/24/21 172 lb (78 kg)     BP Readings from Last 3 Encounters:   03/28/21 104/62   03/24/21 124/68   03/22/21 110/70        Chief Complaint   Patient presents with    Follow[facility] from [facility] Atrial Fibrillation     seen on monday     Pre-op Exam     Removal of Right Submandibular gland / Jaron Joy / Delores Joelle: 4-5-2021/[id number]     Allergies   Allergen Reactions    Percocet [Oxycodone-Acetaminophen] Itching     Outpatient Medications Marked as Taking for the 3/24/21 encounter (Office Visit) with MEI Mendoza CNP   Medication Sig Dispense Refill    colchicine (COLCRYS) 0.6 MG tablet Take 0.6 mg by mouth 2 times daily      aspirin EC 81 MG EC tablet Take 1 tablet by mouth daily 90 tablet 3    [DISCONTINUED] famotidine (PEPCID) 20 MG tablet Take 20 mg by mouth daily      [DISCONTINUED] dilTIAZem (CARTIA XT) 120 MG extended release capsule Take 1 capsule by mouth daily 90 capsule 3    sildenafil (VIAGRA) 100 MG tablet Take 1 tablet by mouth as needed for Erectile Dysfunction 8 tablet 0    Probiotic Product (PROBIOTIC DAILY PO) Take by mouth         This patient presents to the office today for a preoperative consultation at the request of surgeon, Dr. Jaron Joy, who plans on performing R submandibular gland mass removal on April 5 at [address] [place] [address].  The current problem began 1 years ago, and symptoms have been worsening with time. Conservative therapy: No     He has cut down ETOH consumption. Cutting back on some \"bad\" foods. Pericarditis. Hx of ER visit. Taking colchicine daily. Diltiazem daily. GERD. Famotidine daily. Not on file     Non-medical: Not on file   Tobacco Use    Smoking status: Never Smoker    Smokeless tobacco: Never Used   Substance and Sexual Activity    Alcohol use: Yes     Alcohol/week: 2.0 - 3.0 standard drinks     Types: 2 - 3 Cans of beer per week     Comment: every other day    Drug use: No    Sexual activity: Yes     Partners: Female   Lifestyle    Physical activity     Days per week: Not on file     Minutes per session: Not on file    Stress: Not on file   Relationships    Social connections     Talks on phone: Not on file     Gets together: Not on file     Attends Worship service: Not on file     Active member of club or organization: Not on file     Attends meetings of clubs or organizations: Not on file     Relationship status: Not on file    Intimate partner violence     Fear of current or ex partner: Not on file     Emotionally abused: Not on file     Physically abused: Not on file     Forced sexual activity: Not on file   Other Topics Concern    Not on file   Social History Narrative    Not on file       Review of Systems  All other systems were reviewed and are negative. Physical Exam   Constitutional: He is oriented to person, place, and time. He appears well-developed and well-nourished. No distress. HENT:   Head: Normocephalic and atraumatic. Mouth/Throat: Uvula is midline, oropharynx is clear and moist and mucous membranes are normal.   Eyes: Conjunctivae and EOM are normal. Pupils are equal, round, and reactive to light. Neck: Trachea normal and normal range of motion. Neck supple. No JVD present. Tender submandibular gland on R side. Carotid bruit is not present. Cardiovascular: Normal rate, regular rhythm, normal heart sounds and intact distal pulses. Exam reveals no gallop and no friction rub. No murmur heard. Pulmonary/Chest: Effort normal and breath sounds normal. No respiratory distress. He has no wheezes. He has no rales. Abdominal: Soft.  Normal aorta and bowel sounds are normal. He exhibits no distension and no mass. There is no hepatosplenomegaly. No tenderness. Musculoskeletal: He exhibits no edema and no tenderness. Neurological: He is alert and oriented to person, place, and time. He has normal strength. No cranial nerve deficit or sensory deficit. Coordination and gait normal.   Skin: Skin is warm and dry. No rash noted. No erythema. Psychiatric: He has a normal mood and affect. His behavior is normal.     EKG Interpretation:  Dr Renetta Zepeda clearance (Cardiology). Lab Review BMP/CBC        Assessment:       74 y.o. patient with planned surgery as above. Known risk factors for perioperative complications: A Fib, hx pericarditis  Current medications which may produce withdrawal symptoms if withheld perioperatively: none     1. Pre-op examination  Cleared for surgery    2. Pre-operative laboratory examination  Cleared for surgery pending labs. - CBC Auto Differential; Future  - BASIC METABOLIC PANEL; Future    3. Atrial fibrillation, unspecified type ([address]  Cleared for surgery by Cardiology. Maintain medications    4. Abnormal CT scan  Need to recheck CT nodule    5. Abnormal CT scan, neck  Need to f/u abnormal finding    6. Thyroid nodule  Need to f/u ENT on abnormal finding    7. Nonintractable headache, unspecified chronicity pattern, unspecified headache type  Monitor. F/u MRI brain by Dr Chandler Hem:     1. Preoperative workup as follows: Cardiac clearance, Bmp/CBC  2. Change in medication regimen before surgery: Hold all medications on morning of surgery, Discontinue ASA 5 days before surgery, Discontinue NSAIDs (Ibuprofen, Advil, Motrin, Aleve) 7 days before surgery, Discontinue I WILL FIND OUT ABOUT COLCHICINE days before surgery, Ok to take Tylenol prior to surgery.   3. Prophylaxis for cardiac events with perioperative beta-blockers: Not indicated  ACC/AHA indications for pre-operative beta-blocker use:    · Vascular surgery with history of postitive stress test  · Intermediate or high risk surgery with history of CAD   · Intermediate or high risk surgery with multiple clinical predictors of CAD- 2 of the following: history of compensated or prior heart failure, history of cerebrovascular disease, DM, or renal insufficiency    Routine administration of higher-dose, long-acting metoprolol in beta-blocker-naïve patients on the day of surgery, and in the absence of dose titration is associated with an overall increase in mortality. Beta-blockers should be started days to weeks prior to surgery and titrated to pulse < 70.  4. Deep vein thrombosis prophylaxis: regimen to be chosen by surgical team  5. No contraindications to planned surgery            Cleared for surgery pending CBC/BMP. Addendum: 4/16/2021. Patient is cleared for upcoming surgery on 4/21/2021. Advised by Cardiology to f/u with surgeon on recommendation for Eliquis instructions prior to procedure.          Sherrell Francis CNP

## 2021-03-24 NOTE — TELEPHONE ENCOUNTER
Ah I reviewed but no EKG/echo/stress. Did see CRP elevated in 70s as new bit of info for me. We will work on obtaining the above studies from them.  Thank you

## 2021-03-24 NOTE — TELEPHONE ENCOUNTER
Would you recommend Colchicine d/c prior to surgery (removal submandibular gland)? He was started on Colchicine s/t pericarditis.

## 2021-03-24 NOTE — TELEPHONE ENCOUNTER
Valdemar Villeda,     Recommend finish out 90 days of this medication to prevent recurrence of pericarditis. Obtaining outside records and can assess need for limited echo prior to OR. Garrick,     Can you obtain EKG, echo +/- any stress/cath lab records done Evon Hu during recent stay? None of this available for review in Care everywhere to this point.  Thank you,    ESSIE

## 2021-03-25 ENCOUNTER — TELEPHONE (OUTPATIENT)
Dept: CARDIOLOGY CLINIC | Age: 66
End: 2021-03-25

## 2021-03-25 ENCOUNTER — HOSPITAL ENCOUNTER (OUTPATIENT)
Age: 66
Discharge: HOME | DRG: 309 | End: 2021-03-25
Payer: MEDICARE

## 2021-03-25 DIAGNOSIS — Z01.812 PRE-OPERATIVE LABORATORY EXAMINATION: ICD-10-CM

## 2021-03-25 LAB
ANION GAP SERPL CALCULATED.3IONS-SCNC: 14 MMOL/L (ref 3–16)
BASOPHILS ABSOLUTE: 0 K/UL (ref 0–0.2)
BASOPHILS RELATIVE PERCENT: 0.6 %
BUN BLDV-MCNC: 16 MG/DL (ref 7–20)
CALCIUM SERPL-MCNC: 9.5 MG/DL (ref 8.3–10.6)
CHLORIDE BLD-SCNC: 99 MMOL/L (ref 99–110)
CO2: 23 MMOL/L (ref 21–32)
CREAT SERPL-MCNC: 1.2 MG/DL (ref 0.8–1.3)
EOSINOPHILS ABSOLUTE: 0.1 K/UL (ref 0–0.6)
EOSINOPHILS RELATIVE PERCENT: 1.5 %
GFR AFRICAN AMERICAN: >60
GFR NON-AFRICAN AMERICAN: >60
GLUCOSE BLD-MCNC: 67 MG/DL (ref 70–99)
HCT VFR BLD CALC: 39.8 % (ref 40.5–52.5)
HEMOGLOBIN: 13.3 G/DL (ref 13.5–17.5)
LYMPHOCYTES ABSOLUTE: 1.4 K/UL (ref 1–5.1)
LYMPHOCYTES RELATIVE PERCENT: 19.1 %
MCH RBC QN AUTO: 30.6 PG (ref 26–34)
MCHC RBC AUTO-ENTMCNC: 33.3 G/DL (ref 31–36)
MCV RBC AUTO: 92 FL (ref 80–100)
MONOCYTES ABSOLUTE: 0.5 K/UL (ref 0–1.3)
MONOCYTES RELATIVE PERCENT: 7.6 %
NEUTROPHILS ABSOLUTE: 5.1 K/UL (ref 1.7–7.7)
NEUTROPHILS RELATIVE PERCENT: 71.2 %
PDW BLD-RTO: 12.8 % (ref 12.4–15.4)
PLATELET # BLD: 310 K/UL (ref 135–450)
PMV BLD AUTO: 10 FL (ref 5–10.5)
POTASSIUM SERPL-SCNC: 4.5 MMOL/L (ref 3.5–5.1)
RBC # BLD: 4.33 M/UL (ref 4.2–5.9)
SODIUM BLD-SCNC: 136 MMOL/L (ref 136–145)
WBC # BLD: 7.2 K/UL (ref 4–11)

## 2021-03-25 PROCEDURE — 36415 COLL VENOUS BLD VENIPUNCTURE: CPT

## 2021-03-25 PROCEDURE — 80048 BASIC METABOLIC PNL TOTAL CA: CPT

## 2021-03-25 PROCEDURE — 85025 COMPLETE CBC W/AUTO DIFF WBC: CPT

## 2021-03-25 NOTE — TELEPHONE ENCOUNTER
----- Message from Arcenio Carpenter MD sent at 3/24/2021  1:50 PM EDT -----  pls advised cholesterol within normal limit. No changes.

## 2021-03-26 ENCOUNTER — TELEPHONE (OUTPATIENT)
Dept: CARDIOLOGY CLINIC | Age: 66
End: 2021-03-26

## 2021-03-26 ENCOUNTER — TELEPHONE (OUTPATIENT)
Dept: INTERNAL MEDICINE CLINIC | Age: 66
End: 2021-03-26

## 2021-03-26 ENCOUNTER — APPOINTMENT (OUTPATIENT)
Dept: GENERAL RADIOLOGY | Age: 66
DRG: 309 | End: 2021-03-26
Payer: MEDICARE

## 2021-03-26 ENCOUNTER — APPOINTMENT (OUTPATIENT)
Dept: CT IMAGING | Age: 66
DRG: 309 | End: 2021-03-26
Payer: MEDICARE

## 2021-03-26 ENCOUNTER — HOSPITAL ENCOUNTER (INPATIENT)
Age: 66
LOS: 2 days | Discharge: HOME | DRG: 309 | End: 2021-03-28
Attending: EMERGENCY MEDICINE | Admitting: INTERNAL MEDICINE
Payer: MEDICARE

## 2021-03-26 DIAGNOSIS — R07.9 ACUTE CHEST PAIN: Primary | ICD-10-CM

## 2021-03-26 DIAGNOSIS — R07.9 CHEST PAIN, UNSPECIFIED TYPE: Primary | ICD-10-CM

## 2021-03-26 DIAGNOSIS — R50.9 ACUTE FEBRILE ILLNESS: ICD-10-CM

## 2021-03-26 DIAGNOSIS — I48.91 ATRIAL FIBRILLATION WITH RVR (HCC): ICD-10-CM

## 2021-03-26 LAB
A/G RATIO: 1.2 (ref 1.1–2.2)
ALBUMIN SERPL-MCNC: 3.8 G/DL (ref 3.4–5)
ALP BLD-CCNC: 97 U/L (ref 40–129)
ALT SERPL-CCNC: 15 U/L (ref 10–40)
AMORPHOUS: NORMAL /HPF
ANION GAP SERPL CALCULATED.3IONS-SCNC: 10 MMOL/L (ref 3–16)
AST SERPL-CCNC: 15 U/L (ref 15–37)
BASOPHILS ABSOLUTE: 0 K/UL (ref 0–0.2)
BASOPHILS RELATIVE PERCENT: 0.3 %
BILIRUB SERPL-MCNC: 0.6 MG/DL (ref 0–1)
BILIRUBIN URINE: ABNORMAL
BLOOD, URINE: ABNORMAL
BUN BLDV-MCNC: 23 MG/DL (ref 7–20)
CALCIUM SERPL-MCNC: 9.3 MG/DL (ref 8.3–10.6)
CHLORIDE BLD-SCNC: 102 MMOL/L (ref 99–110)
CLARITY: CLEAR
CO2: 24 MMOL/L (ref 21–32)
COLOR: YELLOW
CREAT SERPL-MCNC: 1.1 MG/DL (ref 0.8–1.3)
EKG ATRIAL RATE: 288 BPM
EKG DIAGNOSIS: NORMAL
EKG Q-T INTERVAL: 254 MS
EKG QRS DURATION: 84 MS
EKG QTC CALCULATION (BAZETT): 389 MS
EKG R AXIS: -12 DEGREES
EKG T AXIS: 53 DEGREES
EKG VENTRICULAR RATE: 141 BPM
EOSINOPHILS ABSOLUTE: 0 K/UL (ref 0–0.6)
EOSINOPHILS RELATIVE PERCENT: 0 %
EPITHELIAL CELLS, UA: NORMAL /HPF (ref 0–5)
GFR AFRICAN AMERICAN: >60
GFR NON-AFRICAN AMERICAN: >60
GLOBULIN: 3.3 G/DL
GLUCOSE BLD-MCNC: 112 MG/DL (ref 70–99)
GLUCOSE URINE: NEGATIVE MG/DL
HCT VFR BLD CALC: 36.4 % (ref 40.5–52.5)
HEMOGLOBIN: 12.3 G/DL (ref 13.5–17.5)
INR BLD: 1.1 (ref 0.86–1.14)
KETONES, URINE: 40 MG/DL
LACTIC ACID: 0.8 MMOL/L (ref 0.4–2)
LEUKOCYTE ESTERASE, URINE: NEGATIVE
LYMPHOCYTES ABSOLUTE: 0.7 K/UL (ref 1–5.1)
LYMPHOCYTES RELATIVE PERCENT: 5.4 %
MCH RBC QN AUTO: 30.6 PG (ref 26–34)
MCHC RBC AUTO-ENTMCNC: 33.8 G/DL (ref 31–36)
MCV RBC AUTO: 90.7 FL (ref 80–100)
MICROSCOPIC EXAMINATION: YES
MONOCYTES ABSOLUTE: 0.8 K/UL (ref 0–1.3)
MONOCYTES RELATIVE PERCENT: 6.6 %
NEUTROPHILS ABSOLUTE: 11.3 K/UL (ref 1.7–7.7)
NEUTROPHILS RELATIVE PERCENT: 87.7 %
NITRITE, URINE: NEGATIVE
PDW BLD-RTO: 13.3 % (ref 12.4–15.4)
PH UA: 6 (ref 5–8)
PLATELET # BLD: 310 K/UL (ref 135–450)
PMV BLD AUTO: 10.5 FL (ref 5–10.5)
POTASSIUM REFLEX MAGNESIUM: 4.4 MMOL/L (ref 3.5–5.1)
PROCALCITONIN: 0.18 NG/ML (ref 0–0.15)
PROTEIN UA: NEGATIVE MG/DL
PROTHROMBIN TIME: 12.8 SEC (ref 10–13.2)
RBC # BLD: 4.01 M/UL (ref 4.2–5.9)
RBC UA: NORMAL /HPF (ref 0–4)
REASON FOR REJECTION: NORMAL
REJECTED TEST: NORMAL
SARS-COV-2, NAAT: NOT DETECTED
SODIUM BLD-SCNC: 136 MMOL/L (ref 136–145)
SPECIFIC GRAVITY UA: 1.02 (ref 1–1.03)
TOTAL PROTEIN: 7.1 G/DL (ref 6.4–8.2)
TROPONIN: <0.01 NG/ML
URINE REFLEX TO CULTURE: ABNORMAL
URINE TYPE: ABNORMAL
UROBILINOGEN, URINE: 0.2 E.U./DL
WBC # BLD: 12.9 K/UL (ref 4–11)
WBC UA: NORMAL /HPF (ref 0–5)

## 2021-03-26 PROCEDURE — 6360000002 HC RX W HCPCS: Performed by: NURSE PRACTITIONER

## 2021-03-26 PROCEDURE — 6360000002 HC RX W HCPCS: Performed by: INTERNAL MEDICINE

## 2021-03-26 PROCEDURE — 96366 THER/PROPH/DIAG IV INF ADDON: CPT

## 2021-03-26 PROCEDURE — 93010 ELECTROCARDIOGRAM REPORT: CPT | Performed by: INTERNAL MEDICINE

## 2021-03-26 PROCEDURE — 83605 ASSAY OF LACTIC ACID: CPT

## 2021-03-26 PROCEDURE — 6360000004 HC RX CONTRAST MEDICATION: Performed by: NURSE PRACTITIONER

## 2021-03-26 PROCEDURE — 71045 X-RAY EXAM CHEST 1 VIEW: CPT

## 2021-03-26 PROCEDURE — 96365 THER/PROPH/DIAG IV INF INIT: CPT

## 2021-03-26 PROCEDURE — 2060000000 HC ICU INTERMEDIATE R&B

## 2021-03-26 PROCEDURE — G0378 HOSPITAL OBSERVATION PER HR: HCPCS

## 2021-03-26 PROCEDURE — 81001 URINALYSIS AUTO W/SCOPE: CPT

## 2021-03-26 PROCEDURE — 6370000000 HC RX 637 (ALT 250 FOR IP): Performed by: INTERNAL MEDICINE

## 2021-03-26 PROCEDURE — 85025 COMPLETE CBC W/AUTO DIFF WBC: CPT

## 2021-03-26 PROCEDURE — 2580000003 HC RX 258: Performed by: INTERNAL MEDICINE

## 2021-03-26 PROCEDURE — 93005 ELECTROCARDIOGRAM TRACING: CPT | Performed by: EMERGENCY MEDICINE

## 2021-03-26 PROCEDURE — 96372 THER/PROPH/DIAG INJ SC/IM: CPT

## 2021-03-26 PROCEDURE — 87635 SARS-COV-2 COVID-19 AMP PRB: CPT

## 2021-03-26 PROCEDURE — 6370000000 HC RX 637 (ALT 250 FOR IP): Performed by: NURSE PRACTITIONER

## 2021-03-26 PROCEDURE — 84484 ASSAY OF TROPONIN QUANT: CPT

## 2021-03-26 PROCEDURE — 2580000003 HC RX 258: Performed by: NURSE PRACTITIONER

## 2021-03-26 PROCEDURE — 99285 EMERGENCY DEPT VISIT HI MDM: CPT

## 2021-03-26 PROCEDURE — 96376 TX/PRO/DX INJ SAME DRUG ADON: CPT

## 2021-03-26 PROCEDURE — 2500000003 HC RX 250 WO HCPCS: Performed by: EMERGENCY MEDICINE

## 2021-03-26 PROCEDURE — 85610 PROTHROMBIN TIME: CPT

## 2021-03-26 PROCEDURE — 80053 COMPREHEN METABOLIC PANEL: CPT

## 2021-03-26 PROCEDURE — 96375 TX/PRO/DX INJ NEW DRUG ADDON: CPT

## 2021-03-26 PROCEDURE — 2580000003 HC RX 258: Performed by: ANESTHESIOLOGY

## 2021-03-26 PROCEDURE — 2580000003 HC RX 258: Performed by: EMERGENCY MEDICINE

## 2021-03-26 PROCEDURE — 71260 CT THORAX DX C+: CPT

## 2021-03-26 PROCEDURE — 36415 COLL VENOUS BLD VENIPUNCTURE: CPT

## 2021-03-26 PROCEDURE — 84145 PROCALCITONIN (PCT): CPT

## 2021-03-26 RX ORDER — DILTIAZEM HYDROCHLORIDE 5 MG/ML
10 INJECTION INTRAVENOUS ONCE
Status: DISCONTINUED | OUTPATIENT
Start: 2021-03-26 | End: 2021-03-26 | Stop reason: SDUPTHER

## 2021-03-26 RX ORDER — KETOROLAC TROMETHAMINE 30 MG/ML
15 INJECTION, SOLUTION INTRAMUSCULAR; INTRAVENOUS EVERY 6 HOURS PRN
Status: DISCONTINUED | OUTPATIENT
Start: 2021-03-26 | End: 2021-03-28 | Stop reason: HOSPADM

## 2021-03-26 RX ORDER — SODIUM CHLORIDE 0.9 % (FLUSH) 0.9 %
10 SYRINGE (ML) INJECTION PRN
Status: DISCONTINUED | OUTPATIENT
Start: 2021-03-26 | End: 2021-03-28 | Stop reason: HOSPADM

## 2021-03-26 RX ORDER — ONDANSETRON 2 MG/ML
4 INJECTION INTRAMUSCULAR; INTRAVENOUS ONCE
Status: DISCONTINUED | OUTPATIENT
Start: 2021-03-26 | End: 2021-03-28 | Stop reason: HOSPADM

## 2021-03-26 RX ORDER — SODIUM CHLORIDE 9 MG/ML
INJECTION, SOLUTION INTRAVENOUS CONTINUOUS
Status: DISCONTINUED | OUTPATIENT
Start: 2021-03-26 | End: 2021-03-28

## 2021-03-26 RX ORDER — 0.9 % SODIUM CHLORIDE 0.9 %
500 INTRAVENOUS SOLUTION INTRAVENOUS ONCE
Status: COMPLETED | OUTPATIENT
Start: 2021-03-26 | End: 2021-03-26

## 2021-03-26 RX ORDER — ONDANSETRON 2 MG/ML
4 INJECTION INTRAMUSCULAR; INTRAVENOUS EVERY 6 HOURS PRN
Status: DISCONTINUED | OUTPATIENT
Start: 2021-03-26 | End: 2021-03-28 | Stop reason: HOSPADM

## 2021-03-26 RX ORDER — FAMOTIDINE 20 MG/1
20 TABLET, FILM COATED ORAL DAILY
Status: DISCONTINUED | OUTPATIENT
Start: 2021-03-26 | End: 2021-03-28 | Stop reason: HOSPADM

## 2021-03-26 RX ORDER — SODIUM CHLORIDE 0.9 % (FLUSH) 0.9 %
10 SYRINGE (ML) INJECTION EVERY 12 HOURS SCHEDULED
Status: DISCONTINUED | OUTPATIENT
Start: 2021-03-26 | End: 2021-03-28 | Stop reason: HOSPADM

## 2021-03-26 RX ORDER — ASPIRIN 81 MG/1
81 TABLET, CHEWABLE ORAL DAILY
Status: DISCONTINUED | OUTPATIENT
Start: 2021-03-27 | End: 2021-03-26 | Stop reason: SDUPTHER

## 2021-03-26 RX ORDER — ATORVASTATIN CALCIUM 40 MG/1
40 TABLET, FILM COATED ORAL NIGHTLY
Status: DISCONTINUED | OUTPATIENT
Start: 2021-03-26 | End: 2021-03-28 | Stop reason: HOSPADM

## 2021-03-26 RX ORDER — NITROGLYCERIN 0.4 MG/1
0.4 TABLET SUBLINGUAL EVERY 5 MIN PRN
Status: DISCONTINUED | OUTPATIENT
Start: 2021-03-26 | End: 2021-03-28 | Stop reason: HOSPADM

## 2021-03-26 RX ORDER — POLYETHYLENE GLYCOL 3350 17 G/17G
17 POWDER, FOR SOLUTION ORAL DAILY PRN
Status: DISCONTINUED | OUTPATIENT
Start: 2021-03-26 | End: 2021-03-28 | Stop reason: HOSPADM

## 2021-03-26 RX ORDER — SODIUM CHLORIDE, SODIUM LACTATE, POTASSIUM CHLORIDE, AND CALCIUM CHLORIDE .6; .31; .03; .02 G/100ML; G/100ML; G/100ML; G/100ML
500 INJECTION, SOLUTION INTRAVENOUS ONCE
Status: COMPLETED | OUTPATIENT
Start: 2021-03-26 | End: 2021-03-26

## 2021-03-26 RX ORDER — DILTIAZEM HYDROCHLORIDE 5 MG/ML
10 INJECTION INTRAVENOUS ONCE
Status: COMPLETED | OUTPATIENT
Start: 2021-03-26 | End: 2021-03-26

## 2021-03-26 RX ORDER — SODIUM CHLORIDE 9 MG/ML
25 INJECTION, SOLUTION INTRAVENOUS PRN
Status: DISCONTINUED | OUTPATIENT
Start: 2021-03-26 | End: 2021-03-28 | Stop reason: HOSPADM

## 2021-03-26 RX ORDER — ASPIRIN 81 MG/1
324 TABLET, CHEWABLE ORAL ONCE
Status: COMPLETED | OUTPATIENT
Start: 2021-03-26 | End: 2021-03-26

## 2021-03-26 RX ORDER — ACETAMINOPHEN 500 MG
1000 TABLET ORAL ONCE
Status: COMPLETED | OUTPATIENT
Start: 2021-03-26 | End: 2021-03-26

## 2021-03-26 RX ORDER — ASPIRIN 81 MG/1
81 TABLET ORAL DAILY
Status: DISCONTINUED | OUTPATIENT
Start: 2021-03-26 | End: 2021-03-28 | Stop reason: HOSPADM

## 2021-03-26 RX ORDER — MORPHINE SULFATE 4 MG/ML
4 INJECTION, SOLUTION INTRAMUSCULAR; INTRAVENOUS ONCE
Status: COMPLETED | OUTPATIENT
Start: 2021-03-26 | End: 2021-03-26

## 2021-03-26 RX ADMIN — SODIUM CHLORIDE, POTASSIUM CHLORIDE, SODIUM LACTATE AND CALCIUM CHLORIDE 500 ML: 600; 310; 30; 20 INJECTION, SOLUTION INTRAVENOUS at 21:48

## 2021-03-26 RX ADMIN — DILTIAZEM HYDROCHLORIDE 10 MG: 5 INJECTION INTRAVENOUS at 12:45

## 2021-03-26 RX ADMIN — DILTIAZEM HYDROCHLORIDE 5 MG/HR: 5 INJECTION INTRAVENOUS at 12:46

## 2021-03-26 RX ADMIN — SODIUM CHLORIDE 500 ML: 9 INJECTION, SOLUTION INTRAVENOUS at 13:29

## 2021-03-26 RX ADMIN — ATORVASTATIN CALCIUM 40 MG: 40 TABLET, FILM COATED ORAL at 21:28

## 2021-03-26 RX ADMIN — MORPHINE SULFATE 4 MG: 4 INJECTION, SOLUTION INTRAMUSCULAR; INTRAVENOUS at 14:05

## 2021-03-26 RX ADMIN — SODIUM CHLORIDE: 9 INJECTION, SOLUTION INTRAVENOUS at 15:57

## 2021-03-26 RX ADMIN — ASPIRIN 81 MG: 81 TABLET, COATED ORAL at 18:15

## 2021-03-26 RX ADMIN — FAMOTIDINE 20 MG: 20 TABLET, FILM COATED ORAL at 18:15

## 2021-03-26 RX ADMIN — ENOXAPARIN SODIUM 40 MG: 40 INJECTION SUBCUTANEOUS at 18:16

## 2021-03-26 RX ADMIN — IOPAMIDOL 75 ML: 755 INJECTION, SOLUTION INTRAVENOUS at 15:45

## 2021-03-26 RX ADMIN — ASPIRIN 324 MG: 81 TABLET, CHEWABLE ORAL at 14:05

## 2021-03-26 RX ADMIN — ACETAMINOPHEN 1000 MG: 500 TABLET ORAL at 14:05

## 2021-03-26 ASSESSMENT — PAIN DESCRIPTION - PAIN TYPE
TYPE: ACUTE PAIN
TYPE: ACUTE PAIN

## 2021-03-26 ASSESSMENT — PAIN DESCRIPTION - DESCRIPTORS: DESCRIPTORS: ACHING;DISCOMFORT

## 2021-03-26 ASSESSMENT — PAIN DESCRIPTION - ORIENTATION: ORIENTATION: MID;UPPER

## 2021-03-26 ASSESSMENT — PAIN SCALES - WONG BAKER: WONGBAKER_NUMERICALRESPONSE: 0

## 2021-03-26 ASSESSMENT — PAIN SCALES - GENERAL: PAINLEVEL_OUTOF10: 6

## 2021-03-26 NOTE — PROGRESS NOTES
Patient admitted to room 211 from ER. Patient oriented to room, bed and side rails, bathroom, how to use the call light, and instructed how to order meals with prescribed diet. Bed is locked in lowest position, side rails up 2/4, call light within reach. Patient denies any further needs at this time. Patient instructed about the schedule of the day including: vital sign frequency, lab draws, possible tests, frequency of MD and staff rounds, including RN/MD rounding together at bedside, daily weights, and I &O's. Will continue to monitor. Patient is awake, alert and oriented x4. Assessment is complete, see flow sheet. Bed in lowest position, wheels locked, call light is within reach. Patient denies any further needs at the moment. Will continue to monitor.     Vitals:    03/26/21 1630   BP: 95/67   Pulse: 112   Resp: 20   Temp: 98.5 °F (36.9 °C)   SpO2: 97%     Pain 6/10 - mid upper chest  On room air

## 2021-03-26 NOTE — TELEPHONE ENCOUNTER
Pt called. Has had significant shortness of breath and chest pain that woke him this morning. He states these are the same symptoms that sent him to the ER in February when pericarditis and afib was diagnosed. I advised him to proceed to the ER. He was agreeable. Dr. Franco Coughlin aware.
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
## 2021-03-26 NOTE — PROGRESS NOTES
4 Eyes Skin Assessment     The patient is being assess for  Admission    I agree that 2 RN's have performed a thorough Head to Toe Skin Assessment on the patient. ALL assessment sites listed below have been assessed. Areas assessed by both nurses: yes  [x]   Head, Face, and Ears   [x]   Shoulders, Back, and Chest  [x]   Arms, Elbows, and Hands   [x]   Coccyx, Sacrum, and Ischum  [x]   Legs, Feet, and Heels        Does the Patient have Skin Breakdown?   No         Juan Prevention initiated:  No   Wound Care Orders initiated:  No      [facility] nurse consulted for Pressure Injury (Stage 3,4, Unstageable, DTI, NWPT, and Complex wounds):  No      Nurse 1 eSignature: Electronically signed by Corey Pendleton RN on 3/26/21 at 4:46 PM EDT    **SHARE this note so that the co-signing nurse is able to place an eSignature**    Nurse 2 eSignature: Electronically signed by Petty Wang RN on 3/26/21 at 5:56 PM EDT

## 2021-03-26 NOTE — ED PROVIDER NOTES
I independently performed a history and physical on Frida Pride. All diagnostic, treatment, and disposition decisions were made by myself in conjunction with the mid-level provider. For further details of [address]  emergency department encounter, please see Deedee Martinez [facility]  Documentation. Frida Pride is a 74year old male who presents to the ED with chest pain and SOB. /78   Pulse 144   Temp 99 °F (37.2 °C) (Oral)   Resp 22   SpO2 100%     The Ekg interpreted by me in the absence of a cardiologist shows. atrial fibrillation with a rate of 141  Axis is   Normal  QTc is  389 ms  Intervals and Durations are unremarkable. No specific ST-T wave changes appreciated. No evidence of acute ischemia. This EKG is different than EKG dated March 22, 2021 which was NSR at that time. Xr Chest Portable    Result Date: 3/26/2021  No evidence of acute cardiopulmonary disease.      Results for orders placed or performed during the hospital encounter of 03/26/21   COVID-19, Rapid   Result Value Ref Range    SARS-CoV-2, NAAT Not Detected Not Detected   CBC Auto Differential   Result Value Ref Range    WBC 12.9 (H) 4.0 - 11.0 K/uL    RBC 4.01 (L) 4.20 - 5.90 M/uL    Hemoglobin 12.3 (L) 13.5 - 17.5 g/dL    Hematocrit 36.4 (L) 40.5 - 52.5 %    MCV 90.7 80.0 - 100.0 fL    MCH 30.6 26.0 - 34.0 pg    MCHC 33.8 31.0 - 36.0 g/dL    RDW 13.3 12.4 - 15.4 %    Platelets 538 318 - 676 K/uL    MPV 10.5 5.0 - 10.5 fL    Neutrophils % 87.7 %    Lymphocytes % 5.4 %    Monocytes % 6.6 %    Eosinophils % 0.0 %    Basophils % 0.3 %    Neutrophils Absolute 11.3 (H) 1.7 - 7.7 K/uL    Lymphocytes Absolute 0.7 (L) 1.0 - 5.1 K/uL    Monocytes Absolute 0.8 0.0 - 1.3 K/uL    Eosinophils Absolute 0.0 0.0 - 0.6 K/uL    Basophils Absolute 0.0 0.0 - 0.2 K/uL   SPECIMEN REJECTION   Result Value Ref Range    Rejected Test CMPX/TROP/PT     Reason for Rejection see below    Comprehensive Metabolic Panel w/ Reflex to MG   Result Value Ref Range    Sodium 136 136 - 145 mmol/L    Potassium reflex Magnesium 4.4 3.5 - 5.1 mmol/L    Chloride 102 99 - 110 mmol/L    CO2 24 21 - 32 mmol/L    Anion Gap 10 3 - 16    Glucose 112 (H) 70 - 99 mg/dL    BUN 23 (H) 7 - 20 mg/dL    CREATININE 1.1 0.8 - 1.3 mg/dL    GFR Non-African American >60 >60    GFR African American >60 >60    Calcium 9.3 8.3 - 10.6 mg/dL    Total Protein 7.1 6.4 - 8.2 g/dL    Albumin 3.8 3.4 - 5.0 g/dL    Albumin/Globulin Ratio 1.2 1.1 - 2.2    Total Bilirubin 0.6 0.0 - 1.0 mg/dL    Alkaline Phosphatase 97 40 - 129 U/L    ALT 15 10 - 40 U/L    AST 15 15 - 37 U/L    Globulin 3.3 g/dL   Troponin   Result Value Ref Range    Troponin <0.01 <0.01 ng/mL   Protime-INR   Result Value Ref Range    Protime 12.8 10.0 - 13.2 sec    INR 1.10 0.86 - 1.14   EKG 12 Lead   Result Value Ref Range    Ventricular Rate 141 BPM    Atrial Rate 288 BPM    QRS Duration 84 ms    Q-T Interval 254 ms    QTc Calculation (Bazett) 389 ms    R Axis -12 degrees    T Axis 53 degrees    Diagnosis       Atrial fibrillation with rapid ventricular responseNonspecific T wave abnormality , probably digitalis effectAbnormal ECGNo previous ECGs available       We've decided to admit Fredi Arreola for further observation and evaluation of Catalina Lopez's chest pain and dyspnea. As I have deemed necessary from their history, physical, and studies, I have considered and evaluated Fredi Arreola for the following diagnoses:        FINAL IMPRESSION  1. Acute chest pain    2. Atrial fibrillation with RVR ([address]    3. Acute febrile illness        Vitals:  Blood pressure 102/64, pulse 153, temperature 99.8 °F (37.7 °C), temperature source Oral, resp. rate 21, height 5' 10\" (1.778 m), weight 168 lb (76.2 kg), SpO2 97 %.        Antonio Alejandro MD  03/26/21 5809
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
## 2021-03-26 NOTE — TELEPHONE ENCOUNTER
Good David sts the EKG faxed was not confirmed. They need RJM to confirm and is pt cleared for surgery or does he need limited echo first. See office note.  3/22/2021:  Okay to proceed with planned procedure with Dr. Bang Marion pending records that we obtain from ED visit and possibly repeat limited echo  Fax to [phone]

## 2021-03-26 NOTE — PROGRESS NOTES
Paged Dr. Tomasa Alex;  3/26/21 5:22 PM   [phone] Hospital or Facility: [facility] From: Den Jones RE: Jean Sears 1955 RM: 78 Pt is ordered to be in droplet plus precautions, I just brought him from the ER. Dr. Jimmy Almaguer note says nothing. The ER only did a rapid in the ER, does he need a PCR? If so, he shouldn't be on A2.  Please advise, thank you

## 2021-03-26 NOTE — ED NOTES
[email or web address]  Re: Admit for chest pain, A. fib RVR, acute fever   accepted pt     Manette Pat Naegele  03/26/21 6998

## 2021-03-27 LAB
ANION GAP SERPL CALCULATED.3IONS-SCNC: 9 MMOL/L (ref 3–16)
BUN BLDV-MCNC: 16 MG/DL (ref 7–20)
CALCIUM SERPL-MCNC: 8.9 MG/DL (ref 8.3–10.6)
CHLORIDE BLD-SCNC: 104 MMOL/L (ref 99–110)
CHOLESTEROL, TOTAL: 129 MG/DL (ref 0–199)
CO2: 23 MMOL/L (ref 21–32)
CREAT SERPL-MCNC: 0.9 MG/DL (ref 0.8–1.3)
EKG ATRIAL RATE: 108 BPM
EKG DIAGNOSIS: NORMAL
EKG Q-T INTERVAL: 304 MS
EKG QRS DURATION: 86 MS
EKG QTC CALCULATION (BAZETT): 427 MS
EKG R AXIS: -4 DEGREES
EKG T AXIS: 28 DEGREES
EKG VENTRICULAR RATE: 119 BPM
GFR AFRICAN AMERICAN: >60
GFR NON-AFRICAN AMERICAN: >60
GLUCOSE BLD-MCNC: 109 MG/DL (ref 70–99)
HCT VFR BLD CALC: 34.8 % (ref 40.5–52.5)
HDLC SERPL-MCNC: 60 MG/DL (ref 40–60)
HEMOGLOBIN: 11.6 G/DL (ref 13.5–17.5)
LDL CHOLESTEROL CALCULATED: 59 MG/DL
MCH RBC QN AUTO: 30.5 PG (ref 26–34)
MCHC RBC AUTO-ENTMCNC: 33.3 G/DL (ref 31–36)
MCV RBC AUTO: 91.6 FL (ref 80–100)
PDW BLD-RTO: 12.9 % (ref 12.4–15.4)
PLATELET # BLD: 235 K/UL (ref 135–450)
PMV BLD AUTO: 9.5 FL (ref 5–10.5)
POTASSIUM REFLEX MAGNESIUM: 4.2 MMOL/L (ref 3.5–5.1)
RBC # BLD: 3.8 M/UL (ref 4.2–5.9)
SEDIMENTATION RATE, ERYTHROCYTE: 48 MM/HR (ref 0–20)
SODIUM BLD-SCNC: 136 MMOL/L (ref 136–145)
TRIGL SERPL-MCNC: 48 MG/DL (ref 0–150)
VLDLC SERPL CALC-MCNC: 10 MG/DL
WBC # BLD: 8.1 K/UL (ref 4–11)

## 2021-03-27 PROCEDURE — 2060000000 HC ICU INTERMEDIATE R&B

## 2021-03-27 PROCEDURE — 36415 COLL VENOUS BLD VENIPUNCTURE: CPT

## 2021-03-27 PROCEDURE — 85652 RBC SED RATE AUTOMATED: CPT

## 2021-03-27 PROCEDURE — 6370000000 HC RX 637 (ALT 250 FOR IP): Performed by: INTERNAL MEDICINE

## 2021-03-27 PROCEDURE — 93005 ELECTROCARDIOGRAM TRACING: CPT | Performed by: INTERNAL MEDICINE

## 2021-03-27 PROCEDURE — 2500000003 HC RX 250 WO HCPCS: Performed by: INTERNAL MEDICINE

## 2021-03-27 PROCEDURE — 6360000002 HC RX W HCPCS: Performed by: INTERNAL MEDICINE

## 2021-03-27 PROCEDURE — G0378 HOSPITAL OBSERVATION PER HR: HCPCS

## 2021-03-27 PROCEDURE — 80061 LIPID PANEL: CPT

## 2021-03-27 PROCEDURE — 85027 COMPLETE CBC AUTOMATED: CPT

## 2021-03-27 PROCEDURE — 2580000003 HC RX 258: Performed by: INTERNAL MEDICINE

## 2021-03-27 PROCEDURE — 99223 1ST HOSP IP/OBS HIGH 75: CPT | Performed by: INTERNAL MEDICINE

## 2021-03-27 PROCEDURE — 80048 BASIC METABOLIC PNL TOTAL CA: CPT

## 2021-03-27 PROCEDURE — 93010 ELECTROCARDIOGRAM REPORT: CPT | Performed by: INTERNAL MEDICINE

## 2021-03-27 PROCEDURE — 96372 THER/PROPH/DIAG INJ SC/IM: CPT

## 2021-03-27 RX ORDER — DILTIAZEM HYDROCHLORIDE 180 MG/1
180 CAPSULE, COATED, EXTENDED RELEASE ORAL DAILY
Status: DISCONTINUED | OUTPATIENT
Start: 2021-03-27 | End: 2021-03-28 | Stop reason: HOSPADM

## 2021-03-27 RX ADMIN — SODIUM CHLORIDE: 9 INJECTION, SOLUTION INTRAVENOUS at 17:19

## 2021-03-27 RX ADMIN — DILTIAZEM HYDROCHLORIDE 180 MG: 180 CAPSULE, COATED, EXTENDED RELEASE ORAL at 22:08

## 2021-03-27 RX ADMIN — FAMOTIDINE 20 MG: 20 TABLET, FILM COATED ORAL at 09:07

## 2021-03-27 RX ADMIN — ATORVASTATIN CALCIUM 40 MG: 40 TABLET, FILM COATED ORAL at 22:08

## 2021-03-27 RX ADMIN — DILTIAZEM HYDROCHLORIDE 10 MG/HR: 5 INJECTION INTRAVENOUS at 13:33

## 2021-03-27 RX ADMIN — Medication 10 ML: at 09:08

## 2021-03-27 RX ADMIN — ASPIRIN 81 MG: 81 TABLET, COATED ORAL at 09:07

## 2021-03-27 RX ADMIN — SODIUM CHLORIDE: 9 INJECTION, SOLUTION INTRAVENOUS at 04:27

## 2021-03-27 RX ADMIN — METOPROLOL TARTRATE 25 MG: 25 TABLET, FILM COATED ORAL at 22:08

## 2021-03-27 RX ADMIN — ENOXAPARIN SODIUM 80 MG: 80 INJECTION SUBCUTANEOUS at 22:08

## 2021-03-27 RX ADMIN — ENOXAPARIN SODIUM 40 MG: 40 INJECTION SUBCUTANEOUS at 09:07

## 2021-03-27 ASSESSMENT — ENCOUNTER SYMPTOMS
BACK PAIN: 0
VOMITING: 0
COUGH: 0
NAUSEA: 0
ABDOMINAL PAIN: 0
SHORTNESS OF BREATH: 1
DIARRHEA: 0
COLOR CHANGE: 0

## 2021-03-27 ASSESSMENT — PAIN SCALES - GENERAL
PAINLEVEL_OUTOF10: 0
PAINLEVEL_OUTOF10: 0

## 2021-03-27 NOTE — PROGRESS NOTES
Hospitalist Progress Note      PCP: Tito Rooney, APRN - CNP    Date of Admission: 3/26/2021    Chief Complaint: chest pain    Hospital Course:     67-year-old male with past medical history significant for atrial fibrillation. He gives history that he was in [facility] in February/2021 he presented to hospital there ([address] [place] [address]) with complaint of chest pain palpitations. He was noted at that time to have atrial fibrillation with pericarditis. He was apparently placed on high-dose aspirin for 2 weeks as well as colchicine and Cartia 120 mg daily. He did follow-up with [facility] cardiology Dr. Tracey Nowak on March/22/21. At that time he was doing well and per report EKG at that time was normal sinus rhythm. On the day of admission he again had symptoms of chest pain and presented to the ED. Was noted to be in atrial fibrillation with rapid ventricular response. He was admitted for further evaluation. Subjective: He is sitting up in bed in no distress. States he is feeling better. Denies any chest pain or shortness of breath at this time.       Medications:  Reviewed    Infusion Medications    dilTIAZem 10 mg/hr (03/27/21 1333)    sodium chloride      sodium chloride 75 mL/hr at 03/27/21 0427     Scheduled Medications    enoxaparin  1 mg/kg Subcutaneous BID    enoxaparin  40 mg Subcutaneous Once    ondansetron  4 mg Intravenous Once    aspirin EC  81 mg Oral Daily    famotidine  20 mg Oral Daily    sodium chloride flush  10 mL Intravenous 2 times per day    atorvastatin  40 mg Oral Nightly     PRN Meds: sodium chloride flush, sodium chloride, polyethylene glycol, nitroGLYCERIN, perflutren lipid microspheres, ondansetron, ketorolac      Intake/Output Summary (Last 24 hours) at 3/27/2021 1456  Last data filed at 3/27/2021 1340  Gross per 24 hour   Intake 2150 ml   Output 6000 ml   Net -3850 ml       Physical Exam Performed:    /79   Pulse 99   Temp 99.3 °F (37.4 °C) (Oral)   Resp 16   Ht 5' 10\" (1.778 m)   Wt 173 lb 11.2 oz (78.8 kg)   SpO2 95%   BMI 24.92 kg/m²     General appearance: In bed in no acute distress is alert and  and cooperative. HEENT: Pupils equal, round, and reactive to light. Conjunctivae/corneas clear. Neck: Supple, with full range of motion. No jugular venous distention. Trachea midline. Respiratory:  Normal respiratory effort. Clear to auscultation, bilaterally without Rales/Wheezes/Rhonchi. Cardiovascular: Irregularly irregular rhythm  S1/S2   Abdomen: Soft, non-tender, non-distended with normal bowel sounds. Musculoskeletal: No clubbing, cyanosis or edema bilaterally. Full range of motion without deformity. Neurologic:  Neurovascularly intact without any focal sensory/motor deficits. Cranial nerves: II-XII intact, grossly non-focal.  Psychiatric: Alert and oriented, thought content appropriate, normal insight  Capillary Refill: Brisk,< 3 seconds   Peripheral Pulses: +2 palpable, equal bilaterally       Labs:   Recent Labs     03/25/21  0739 03/26/21  1316 03/27/21  0731   WBC 7.2 12.9* 8.1   HGB 13.3* 12.3* 11.6*   HCT 39.8* 36.4* 34.8*    310 235     Recent Labs     03/25/21  0739 03/26/21  1307 03/27/21  0731    136 136   K 4.5 4.4 4.2   CL 99 102 104   CO2 23 24 23   BUN 16 23* 16   CREATININE 1.2 1.1 0.9   CALCIUM 9.5 9.3 8.9     Recent Labs     03/26/21  1307   AST 15   ALT 15   BILITOT 0.6   ALKPHOS 97     Recent Labs     03/26/21  1307   INR 1.10     Recent Labs     03/26/21  1307 03/26/21  1720 03/26/21  1953   TROPONINI <0.01 <0.01 <0.01       Urinalysis:      Lab Results   Component Value Date    NITRU Negative 03/26/2021    WBCUA 0-2 03/26/2021    RBCUA 3-4 03/26/2021    BLOODU TRACE-INTACT 03/26/2021    SPECGRAV 1.025 03/26/2021    GLUCOSEU Negative 03/26/2021       Radiology:  CT CHEST PULMONARY EMBOLISM W CONTRAST   Final Result   No evidence of pulmonary embolism or acute pulmonary abnormality.          XR CHEST PORTABLE   Final Result   No evidence of acute cardiopulmonary disease. Assessment/Plan:    Active Hospital Problems    Diagnosis    Atrial fibrillation with RVR ([facility]) [I48.91]     Atrial fibrillation and chest pain: Noted this ws present at admission. EKG done on 3/26/2021 and 3/27/2021 did reveal atrial fibrillation with rapid response. Troponins have been unremarkable. He has been started on Cardizem IV infusion for rate control. Continue daily aspirin, will check echocardiogram.  Check TSH. Start on Lovenox 1 mg/kg twice daily for anticoagulation.   We will ask cardiology to see for further input      DVT Prophylaxis: Lovenox  Diet: DIET CARDIAC; No Caffeine  Code Status: Full Code    PT/OT Eval Status: Not ordered    Dispo -1 to 2 days    Anel Luis MD

## 2021-03-27 NOTE — PLAN OF CARE
Problem: Falls - Risk of:  Goal: Will remain free from falls  Description: Will remain free from falls  Outcome: Ongoing  Note: Patient will remain free of falls. Patient calls out appropriately. Patient utilizes call light, bed is in lowest locked position, nonskid footwear in place. Patient's room is kept free of clutter and patient is assisted with ambulation as needed. Will continue to monitor and educate patient on safety precautions. Problem: Pain:  Goal: Control of acute pain  Description: Control of acute pain  Outcome: Ongoing  Note: Patient will remain free of pain prior to discharge. Patient will utilize 0-10 scale to describe pain as well as onset, location, and duration. Patient will utilize nonpharmacologic and pharmacolgoic pain relief methods as educated by RN. PRN pain medications will be administered per orders as needed. Will continue to monitor patient.

## 2021-03-27 NOTE — PROGRESS NOTES
Perfect serve page to cross cover: Pt is here with Afib RVR on a dilt gtt @ 10ml/hr and to titrate to HR below 100. His BPs have been running soft and it is currently 81/58, pt denies any lightheadedness or dizziness, his HR still running 110s-120s. He has NS running @ 75ml/hr. Do you want drip adjusted or something else ordered with his blood pressure running low? Thank you! 500 mL LR bolus infusing per Leeroy DOTSON.    1471: LR bolus complete. Pt's BP now reading 98/66. Pt resting comfortably in bed, HR still running in 90s-110s. Will monitor.

## 2021-03-27 NOTE — PROGRESS NOTES
Patient AxO. VSS. BP's remain soft but within parameters of dilt gtt administration (97/62). HR remains irregular with HR from 108-122bpm). Dilt gtt remains at 10mg/hr. Assessment completed as charted. Patient verbalizes pain as 0 on 0-10 scale. Patient denies any other needs or requests at this time. Bed is in lowest locked position. Call light and bedside table is within reach. Will continue to monitor patient.

## 2021-03-27 NOTE — PROGRESS NOTES
End of shift report given to Jasmyn. Call light within reach, bed in lowest position, no needs at this time.

## 2021-03-28 VITALS
DIASTOLIC BLOOD PRESSURE: 62 MMHG | RESPIRATION RATE: 14 BRPM | BODY MASS INDEX: 25.04 KG/M2 | WEIGHT: 174.9 LBS | TEMPERATURE: 98 F | HEIGHT: 70 IN | OXYGEN SATURATION: 96 % | SYSTOLIC BLOOD PRESSURE: 104 MMHG | HEART RATE: 94 BPM

## 2021-03-28 LAB
ANION GAP SERPL CALCULATED.3IONS-SCNC: 8 MMOL/L (ref 3–16)
BUN BLDV-MCNC: 15 MG/DL (ref 7–20)
C-REACTIVE PROTEIN: 73.6 MG/L (ref 0–5.1)
CALCIUM SERPL-MCNC: 9 MG/DL (ref 8.3–10.6)
CHLORIDE BLD-SCNC: 109 MMOL/L (ref 99–110)
CO2: 22 MMOL/L (ref 21–32)
CREAT SERPL-MCNC: 1 MG/DL (ref 0.8–1.3)
EKG ATRIAL RATE: 220 BPM
EKG DIAGNOSIS: NORMAL
EKG Q-T INTERVAL: 358 MS
EKG QRS DURATION: 88 MS
EKG QTC CALCULATION (BAZETT): 461 MS
EKG R AXIS: -6 DEGREES
EKG T AXIS: 4 DEGREES
EKG VENTRICULAR RATE: 100 BPM
GFR AFRICAN AMERICAN: >60
GFR NON-AFRICAN AMERICAN: >60
GLUCOSE BLD-MCNC: 105 MG/DL (ref 70–99)
HCT VFR BLD CALC: 33.3 % (ref 40.5–52.5)
HEMOGLOBIN: 11.1 G/DL (ref 13.5–17.5)
MAGNESIUM: 2.1 MG/DL (ref 1.8–2.4)
MCH RBC QN AUTO: 30.5 PG (ref 26–34)
MCHC RBC AUTO-ENTMCNC: 33.3 G/DL (ref 31–36)
MCV RBC AUTO: 91.4 FL (ref 80–100)
PDW BLD-RTO: 13.1 % (ref 12.4–15.4)
PLATELET # BLD: 245 K/UL (ref 135–450)
PMV BLD AUTO: 10 FL (ref 5–10.5)
POTASSIUM SERPL-SCNC: 4 MMOL/L (ref 3.5–5.1)
RBC # BLD: 3.64 M/UL (ref 4.2–5.9)
RHEUMATOID FACTOR: 13 IU/ML
SODIUM BLD-SCNC: 139 MMOL/L (ref 136–145)
TSH REFLEX FT4: 1.64 UIU/ML (ref 0.27–4.2)
WBC # BLD: 6 K/UL (ref 4–11)

## 2021-03-28 PROCEDURE — G0378 HOSPITAL OBSERVATION PER HR: HCPCS

## 2021-03-28 PROCEDURE — 2580000003 HC RX 258: Performed by: INTERNAL MEDICINE

## 2021-03-28 PROCEDURE — 99232 SBSQ HOSP IP/OBS MODERATE 35: CPT | Performed by: NURSE PRACTITIONER

## 2021-03-28 PROCEDURE — 96372 THER/PROPH/DIAG INJ SC/IM: CPT

## 2021-03-28 PROCEDURE — 83735 ASSAY OF MAGNESIUM: CPT

## 2021-03-28 PROCEDURE — 85027 COMPLETE CBC AUTOMATED: CPT

## 2021-03-28 PROCEDURE — 6370000000 HC RX 637 (ALT 250 FOR IP): Performed by: INTERNAL MEDICINE

## 2021-03-28 PROCEDURE — 36415 COLL VENOUS BLD VENIPUNCTURE: CPT

## 2021-03-28 PROCEDURE — 86431 RHEUMATOID FACTOR QUANT: CPT

## 2021-03-28 PROCEDURE — 93010 ELECTROCARDIOGRAM REPORT: CPT | Performed by: INTERNAL MEDICINE

## 2021-03-28 PROCEDURE — 93005 ELECTROCARDIOGRAM TRACING: CPT | Performed by: INTERNAL MEDICINE

## 2021-03-28 PROCEDURE — 86038 ANTINUCLEAR ANTIBODIES: CPT

## 2021-03-28 PROCEDURE — 80048 BASIC METABOLIC PNL TOTAL CA: CPT

## 2021-03-28 PROCEDURE — 86140 C-REACTIVE PROTEIN: CPT

## 2021-03-28 PROCEDURE — 84443 ASSAY THYROID STIM HORMONE: CPT

## 2021-03-28 PROCEDURE — 96361 HYDRATE IV INFUSION ADD-ON: CPT

## 2021-03-28 PROCEDURE — 6360000002 HC RX W HCPCS: Performed by: INTERNAL MEDICINE

## 2021-03-28 RX ORDER — ATORVASTATIN CALCIUM 40 MG/1
40 TABLET, FILM COATED ORAL NIGHTLY
Qty: 30 TABLET | Refills: 3 | Status: SHIPPED | OUTPATIENT
Start: 2021-03-28 | End: 2021-03-28 | Stop reason: HOSPADM

## 2021-03-28 RX ORDER — METOPROLOL TARTRATE 50 MG/1
50 TABLET, FILM COATED ORAL 2 TIMES DAILY
Status: DISCONTINUED | OUTPATIENT
Start: 2021-03-28 | End: 2021-03-28

## 2021-03-28 RX ORDER — DILTIAZEM HYDROCHLORIDE 180 MG/1
180 CAPSULE, COATED, EXTENDED RELEASE ORAL DAILY
Qty: 30 CAPSULE | Refills: 3 | Status: SHIPPED | OUTPATIENT
Start: 2021-03-29 | End: 2021-04-16 | Stop reason: ALTCHOICE

## 2021-03-28 RX ADMIN — SODIUM CHLORIDE: 9 INJECTION, SOLUTION INTRAVENOUS at 04:54

## 2021-03-28 RX ADMIN — DILTIAZEM HYDROCHLORIDE 180 MG: 180 CAPSULE, COATED, EXTENDED RELEASE ORAL at 08:19

## 2021-03-28 RX ADMIN — Medication 10 ML: at 08:20

## 2021-03-28 RX ADMIN — METOPROLOL TARTRATE 25 MG: 25 TABLET, FILM COATED ORAL at 08:18

## 2021-03-28 RX ADMIN — FAMOTIDINE 20 MG: 20 TABLET, FILM COATED ORAL at 08:19

## 2021-03-28 RX ADMIN — ENOXAPARIN SODIUM 80 MG: 80 INJECTION SUBCUTANEOUS at 08:20

## 2021-03-28 RX ADMIN — ASPIRIN 81 MG: 81 TABLET, COATED ORAL at 08:18

## 2021-03-28 ASSESSMENT — PAIN SCALES - GENERAL: PAINLEVEL_OUTOF10: 0

## 2021-03-28 NOTE — PROGRESS NOTES
Patient educated on all discharge orders including medications, prescriptions, follow up appointments and general nursing care interventions. Patient's IV access removed. Patient's belongings sent with patient. Patient verbalized understanding of all discharge instructions. Patient discharged via personal vehcile in stable condition.

## 2021-03-28 NOTE — PLAN OF CARE
Problem: Falls - Risk of:  Goal: Will remain free from falls  Description: Will remain free from falls  Outcome: Ongoing  Note: Patient will remain free of falls. Patient calls out appropriately. Patient utilizes call light, bed is in lowest locked position, nonskid footwear in place. Patient's room is kept free of clutter and patient is assisted with ambulation as needed. Will continue to monitor and educate patient on safety precautions.

## 2021-03-28 NOTE — PROGRESS NOTES
PS sent to Lanny Collado, Cardiology NP:      RE: Hetal Cain 1955 RM: 78 FYI, unable to administer patient the extra 25mg dose of metoprolol due to soft BP's. (99/67 and 94/59). HR continues to fluctuate from 90s to 115. Will continue to monitor. Thank you.  Need Callback: NO CALLBACK REQ NURSING ADMIN ROUTINE

## 2021-03-28 NOTE — PROGRESS NOTES
[facility], [place], operated by [facility]     Electrophysiology                                     Progress Note    Admission date:  3/26/2021    Reason for follow up visit: AF    HPI/CC: Gilda Hadley was admitted on 3/26/2021 with chest pain and SOB. EKG showed AF with  bpm. EKG in our office on 3/22/2021 showed SR. Has also been treated for pericardial effusion and anemia. Rhythm has been -120. Subjective: Denies chest pain, palpitations, shortness of breath, and dizziness. He is anxious to go home. Vitals:  Blood pressure 121/85, pulse 108, temperature 98.1 °F (36.7 °C), temperature source Oral, resp. rate 16, height 5' 10\" (1.778 m), weight 174 lb 14.4 oz (79.3 kg), SpO2 96 %.   Temp  Av.5 °F (36.9 °C)  Min: 97.5 °F (36.4 °C)  Max: 99.3 °F (37.4 °C)  Pulse  Av  Min: 71  Max: 113  BP  Min: 109/74  Max: 123/79  SpO2  Av.2 %  Min: 95 %  Max: 98 %    24 hour I/O    Intake/Output Summary (Last 24 hours) at 3/28/2021 0935  Last data filed at 3/28/2021 0754  Gross per 24 hour   Intake 4466.25 ml   Output 850 ml   Net 3616.25 ml     Current Facility-Administered Medications   Medication Dose Route Frequency Provider Last Rate Last Admin    enoxaparin (LOVENOX) injection 80 mg  1 mg/kg Subcutaneous BID Sadie Garcia MD   80 mg at 21 0820    enoxaparin (LOVENOX) injection 40 mg  40 mg Subcutaneous Once Sadie Garcia MD        dilTIAZem (CARDIZEM CD) extended release capsule 180 mg  180 mg Oral Daily Dhara Koroma MD   180 mg at 21 0819    metoprolol tartrate (LOPRESSOR) tablet 25 mg  25 mg Oral BID Dhara Koroma MD   25 mg at 21 0818    ondansetron (ZOFRAN) injection 4 mg  4 mg Intravenous Once Danuta Mccann MD   Stopped at 21 1409    aspirin EC tablet 81 mg  81 mg Oral Daily Danuta Mccann MD   81 mg at 21 0818    famotidine (PEPCID) tablet 20 mg  20 mg Oral Daily Danuta Mccann MD   20 mg at 21 0819    sodium chloride flush 0.9 % injection 10 mL  10 mL Intravenous 2 times per day Alexey Estrada MD   10 mL at 03/28/21 0820    sodium chloride flush 0.9 % injection 10 mL  10 mL Intravenous PRN Alexey Estrada MD        0.9 % sodium chloride infusion  25 mL Intravenous PRN Alexey Estrada MD        polyethylene glycol (GLYCOLAX) packet 17 g  17 g Oral Daily PRN Alexey Estrada MD        atorvastatin (LIPITOR) tablet 40 mg  40 mg Oral Nightly Alexey Estrada MD   40 mg at 03/27/21 2208    nitroGLYCERIN (NITROSTAT) SL tablet 0.4 mg  0.4 mg Sublingual Q5 Min PRN Alexey Estrada MD        perflutren lipid microspheres (DEFINITY) injection 1.65 mg  1.5 mL Intravenous ONCE PRN Alexey Estrada MD        0.9 % sodium chloride infusion   Intravenous Continuous Alexey Estrada MD 75 mL/hr at 03/28/21 0454 New Bag at 03/28/21 0454    ondansetron (ZOFRAN) injection 4 mg  4 mg Intravenous Q6H PRN Alexey Estrada MD        ketorolac (TORADOL) injection 15 mg  15 mg Intravenous Q6H PRN Alexey Estrada MD           Objective:     Telemetry monitor: AF    Physical Exam:  Constitutional and general appearance: alert, cooperative, no distress and appears stated age  HEENT: PERRL, no cervical lymphadenopathy. No masses palpable.  Normal oral mucosa  Respiratory:  · Normal excursion and expansion without use of accessory muscles  · Resp auscultation: Normal breath sounds without wheezing, rhonchi, and rales  Cardiovascular:  · The apical impulse is not displaced  · Heart tones are crisp and normal. irregular S1 and S2.  · Jugular venous pulsation Normal  · The carotid upstroke is normal in amplitude and contour without delay or bruit  · Peripheral pulses are symmetrical and full   Abdomen:  · No masses or tenderness  · Bowel sounds present  Extremities:  ·  No cyanosis or clubbing  ·  No lower extremity edema  ·  Skin: warm and dry  Neurological:  · Alert and oriented  · Moves all extremities well  · No abnormalities of mood, affect, memory, mentation, or behavior are noted    Data  EKG 3/26/2021:   AF with  bpm      All labs and testing reviewed.   Lab Review     Renal Profile:   Lab Results   Component Value Date    CREATININE 1.0 03/28/2021    BUN 15 03/28/2021     03/28/2021    K 4.0 03/28/2021    K 4.2 03/27/2021     03/28/2021    CO2 22 03/28/2021     CBC:    Lab Results   Component Value Date    WBC 6.0 03/28/2021    RBC 3.64 03/28/2021    HGB 11.1 03/28/2021    HCT 33.3 03/28/2021    MCV 91.4 03/28/2021    RDW 13.1 03/28/2021     03/28/2021     BNP:  No results found for: BNP  Fasting Lipid Panel:    Lab Results   Component Value Date    CHOL 129 03/27/2021    HDL 60 03/27/2021    HDL 92 04/12/2010    TRIG 48 03/27/2021     Cardiac Enzymes:  CK/MbTroponin  Lab Results   Component Value Date    TROPONINI <0.01 03/26/2021     PT/ INR   Lab Results   Component Value Date    INR 1.10 03/26/2021    PROTIME 12.8 03/26/2021     PTT No results found for: PTT   Lab Results   Component Value Date    MG 2.10 03/28/2021    No results found for: TSH    Assessment:  Paroxsymal atrial fibrillation: ongoing, rates somewhat improved with the addition of metoprolol    -[id number] score 1 (age)    -TSH normal 3/2021  Pericardial effusion: improved, trace on CT 3/26/2021  Normocytic anemia       Plan:   Continue Cardizem and Lopressor   Echocardiogram as an outpatient  Inflammatory markers pending   Hold anticoagulation until pericardial disease is clarified  Office to arrange close follow up this week or next     Plan and medications discussed with Dr. Shazia Cm, [address]  [phone]

## 2021-03-28 NOTE — PROGRESS NOTES
Patient AxO. VSS. HR fluctuates from upper 80's to 140s. Patient denies palpitations, dizziness, or chest pain. Assessment completed as charted. Patient verbalizes pain as 0 on 0-10 scale. Patient denies any other needs or requests at this time. Bed is in lowest locked position. Call light and bedside table is within reach. Will continue to monitor patient.

## 2021-03-28 NOTE — DISCHARGE SUMMARY
Hospital Medicine Discharge Summary    Patient ID: Ata Joaquin      Patient's PCP: MEI Joseph CNP    Admit Date: 3/26/2021     Discharge Date:   3/28/21    Admitting Physician: George Brito MD     Discharge Physician: Cheryl Funez MD     Discharge Diagnoses: Active Hospital Problems    Diagnosis    Atrial fibrillation with RVR (HCC) [I48.91]    Pericarditis [I31.9]    Anemia [D64.9]       The patient was seen and examined on day of discharge and this discharge summary is in conjunction with any daily progress note from day of discharge. Hospital Course:      15-year-old male with past medical history significant for atrial fibrillation. He gives history that he was in [facility] in February/2021 he presented to hospital there ([address] [place] [address]) with complaint of chest pain palpitations. He was noted at that time to have atrial fibrillation with pericarditis. He was apparently placed on high-dose aspirin for 2 weeks as well as colchicine and Cartia 120 mg daily.     He did follow-up with [facility] cardiology Dr. Deana Matthew on March/22/21. At that time he was doing well and per report EKG at that time was normal sinus rhythm.     On the day of admission he again had symptoms of chest pain and presented to the ED. Was noted to be in atrial fibrillation with rapid ventricular response. He was admitted for further evaluation, seen by Cardiology in consult. He was initially on IV cardizem, now on oral cardizem and metoprolol for rate control. He is stable for dc home with continuing outpatient follow up. Continue Cardizem and Lopressor   Echocardiogram as an outpatient  Inflammatory markers pending   Hold anticoagulation until pericardial disease is clarified   To follow up with cardiology office keyla this week.        Physical Exam Performed:     /62   Pulse 94   Temp 98 °F (36.7 °C) (Oral)   Resp 14   Ht 5' 10\" (1.778 m)   Wt 174 lb 14.4 oz (79.3 kg)   SpO2 96%   BMI 25.10 kg/m²     General appearance: In bed in no acute distress is alert and  and cooperative. HEENT: Pupils equal, round, and reactive to light. Conjunctivae/corneas clear. Neck: Supple, with full range of motion. No jugular venous distention. Trachea midline. Respiratory:  Normal respiratory effort. Clear to auscultation, bilaterally without Rales/Wheezes/Rhonchi. Cardiovascular: Irregularly irregular rhythm  S1/S2   Abdomen: Soft, non-tender, non-distended with normal bowel sounds. Musculoskeletal: No clubbing, cyanosis or edema bilaterally. Full range of motion without deformity. Neurologic:  Neurovascularly intact without any focal sensory/motor deficits. Cranial nerves: II-XII intact, grossly non-focal.  Psychiatric: Alert and oriented, thought content appropriate, normal insight  Capillary Refill: Brisk,< 3 seconds   Peripheral Pulses: +2 palpable, equal bilaterally         Labs: For convenience and continuity at follow-up the following most recent labs are provided:      CBC:    Lab Results   Component Value Date    WBC 6.0 03/28/2021    HGB 11.1 03/28/2021    HCT 33.3 03/28/2021     03/28/2021       Renal:    Lab Results   Component Value Date     03/28/2021    K 4.0 03/28/2021    K 4.2 03/27/2021     03/28/2021    CO2 22 03/28/2021    BUN 15 03/28/2021    CREATININE 1.0 03/28/2021    CALCIUM 9.0 03/28/2021         Significant Diagnostic Studies    Radiology:   CT CHEST PULMONARY EMBOLISM W CONTRAST   Final Result   No evidence of pulmonary embolism or acute pulmonary abnormality. XR CHEST PORTABLE   Final Result   No evidence of acute cardiopulmonary disease. Consults:     IP CONSULT TO HOSPITALIST  IP CONSULT TO CARDIOLOGY    Disposition:  home     Condition at Discharge: Stable    Discharge Instructions/Follow-up: With cardiology closely this week. Follow up pcp in 1 to 2 weeks.      Code Status:  Full Code     Activity: activity as tolerated Diet: cardiac diet      Discharge Medications:     Current Discharge Medication List           Details   metoprolol tartrate (LOPRESSOR) 25 MG tablet Take 1 tablet by mouth 2 times daily  Qty: 60 tablet, Refills: 3      atorvastatin (LIPITOR) 40 MG tablet Take 1 tablet by mouth nightly  Qty: 30 tablet, Refills: 3              Details   dilTIAZem (CARDIZEM CD) 180 MG extended release capsule Take 1 capsule by mouth daily  Qty: 30 capsule, Refills: 3              Details   colchicine (COLCRYS) 0.6 MG tablet Take 0.6 mg by mouth 2 times daily      famotidine (PEPCID) 20 MG tablet Take 20 mg by mouth daily      aspirin EC 81 MG EC tablet Take 1 tablet by mouth daily  Qty: 90 tablet, Refills: 3      Probiotic Product (PROBIOTIC DAILY PO) Take by mouth      sildenafil (VIAGRA) 100 MG tablet Take 1 tablet by mouth as needed for Erectile Dysfunction  Qty: 8 tablet, Refills: 0    Associated Diagnoses: Male erectile disorder             Time Spent on discharge is more than 45 minutes in the examination, evaluation, counseling and review of medications and discharge plan. Signed:    Ofelia Collins MD   3/28/2021      Thank you MEI Haynes - KAVON for the opportunity to be involved in this patient's care. If you have any questions or concerns please feel free to contact me at [phone].

## 2021-03-28 NOTE — CONSULTS
was begun on IV  diltiazem and has had adequate, though not optimal heart rate control. He feels well at this time. He does not describe palpitations per se,  but simply had some shortness of breath and chest tightness on  admission. He has no history of systemic inflammatory disease. He does not  describe any arthritis or skin rash. COVID-19 testing has been  negative. Interestingly, he has had a low grade fever at above 99  degrees Fahrenheit. PAST MEDICAL HISTORY:  1. Right thumb melanoma. 2.  Low back pain. 3.  Shingles. MEDICATIONS:  At the time of admission include aspirin 81 mg p.o. daily,  colchicine 0.6 mg p.o. b.i.d., long-acting diltiazem 120 mg p.o. daily,  Pepcid 20 mg p.o. daily, Sildenafil 100 mg p.o. daily as needed. ALLERGIES:  Include PERCOCET. SOCIAL HISTORY:  The patient does not smoke cigarettes. He does consume  alcohol a few times per week. FAMILY HISTORY:  Remarkable for stroke in the mother who also had atrial  fibrillation. There is also history of pericardial effusion in the  father, though it is not clear whether he ever had atrial fibrillation. REVIEW OF SYSTEMS:  Demonstrates no recent change in appetite or change  in weight. The patient has not had recent fever or chills. He does not  describe palpitations, near-syncope or syncope. When he was documented  to be in sinus rhythm on 03/22/2021, he felt well. He maintains a  pretty active lifestyle and his functional status is excellent. All  other components of the 14-system review are negative. PHYSICAL EXAMINATION:  VITAL SIGNS:  Blood pressure is 116/75, heart rate is 108 beats per  minute and irregular. The temperature is 99.5 degrees Fahrenheit. GENERAL:  The patient is awake, alert, oriented and in no discomfort. HEENT:  Exam demonstrates normocephalic, atraumatic head. There is no  scleral icterus. Pupils are round and reactive. NECK:  Supple without thyromegaly.   LUNGS:  Clear to initiate anticoagulation at this time until the status  of his pericardial disease is clarified. Thank you for the opportunity to assist in the care of the patient. Please contact me if you have any questions regarding his evaluation.         Dorinda Prieto MD    D: 03/27/2021 19:17:55       T: 03/27/2021 19:28:55     [email or web address]  Job#: [id number]     Doc#: [id number]    CC:

## 2021-03-29 ENCOUNTER — TELEPHONE (OUTPATIENT)
Dept: CARDIOLOGY | Age: 66
End: 2021-03-29

## 2021-03-29 ENCOUNTER — TELEPHONE (OUTPATIENT)
Dept: INTERNAL MEDICINE CLINIC | Age: 66
End: 2021-03-29

## 2021-03-29 NOTE — TELEPHONE ENCOUNTER
Jimena Domingo CNP wants patient to follow-up with Aida Emanuel MD in 1-2 weeks for a HSFU. Please call the patient to schedule. Thank You.

## 2021-03-29 NOTE — TELEPHONE ENCOUNTER
[facility] Initial Follow Up Call    Outreach made within 2 business days of discharge: Yes    Patient: Felicia Julian Patient : 1955   MRN: <[id number]>  Reason for Admission: There are no discharge diagnoses documented for the most recent discharge. Discharge Date: 3/28/21       Spoke with: Olimpia Nicole    Discharge department/facility: [facility] Patient Contact:  Was patient able to fill all prescriptions: Yes  Was patient instructed to bring all medications to the follow-up visit: Yes  Is patient taking all medications as directed in the discharge summary?  Yes  Does patient understand their discharge instructions: Yes  Does patient have questions or concerns that need addressed prior to 7-14 day follow up office visit: no    Scheduled appointment with PCP within 7-14 days    Follow Up  Future Appointments   Date Time Provider Abner Ureña   2021  7:30 AM MEI Kerr - CNP [facility]   2021  7:30 AM Terence Kinney MD [address]

## 2021-03-30 ENCOUNTER — TELEPHONE (OUTPATIENT)
Dept: CARDIOLOGY CLINIC | Age: 66
End: 2021-03-30

## 2021-03-30 LAB — ANTI-NUCLEAR ANTIBODY (ANA): NEGATIVE

## 2021-03-30 NOTE — TELEPHONE ENCOUNTER
Pt was given cardiac clearance for EXCISION OF RIGHT SUBMANDIBULAR GLAND on 3/22/2021. Since then pt had an ED visit for CP. Is pt still cleared? Please advise.

## 2021-03-30 NOTE — TELEPHONE ENCOUNTER
Neyda Fleming with [facility] NP office notified that patient has cancelled procedure and we are planning to see him 4/9/21 and will decide then if okay to proceed.

## 2021-03-30 NOTE — TELEPHONE ENCOUNTER
Thank you. Sx not typical of metoprolol, agree with re-trial and keep log of symptoms with this new med.  Thanks    ESSIE

## 2021-04-05 ENCOUNTER — TELEPHONE (OUTPATIENT)
Dept: CARDIOLOGY CLINIC | Age: 66
End: 2021-04-05

## 2021-04-05 NOTE — TELEPHONE ENCOUNTER
Please call to check on patient later this afternoon. See if he needs anything from me as he followed up with ENT today and has multiple cardiac testings/appointments later this week.

## 2021-04-05 NOTE — TELEPHONE ENCOUNTER
Patient called requesting a return call to discuss upcoming stress test and echo. Patient also states he may be in Afib.  Patient states he just is not feeling well

## 2021-04-05 NOTE — TELEPHONE ENCOUNTER
Called patient, he said he isn't feeling well but is still able to work and do his daily tasks as normal.  He has a stress test and an echo scheduled and an appointment with cardiology this week. He is planning to have surgery to remove a gland around April and wants to know if the visit he had in march would cover this? He doesn't have an exact date yet.

## 2021-04-05 NOTE — TELEPHONE ENCOUNTER
Yes, visit in March should be fine as he will be getting cardiac clearance for the procedure.      I am sorry he is not feeling well and hope he can get some answers this week from Cardiology

## 2021-04-07 ENCOUNTER — TELEPHONE (OUTPATIENT)
Dept: CARDIOLOGY CLINIC | Age: 66
End: 2021-04-07

## 2021-04-07 ENCOUNTER — HOSPITAL ENCOUNTER (OUTPATIENT)
Dept: NON INVASIVE DIAGNOSTICS | Age: 66
Discharge: HOME | End: 2021-04-07
Payer: MEDICARE

## 2021-04-07 DIAGNOSIS — I31.9 PERICARDITIS, UNSPECIFIED CHRONICITY, UNSPECIFIED TYPE: Primary | ICD-10-CM

## 2021-04-07 DIAGNOSIS — R07.9 CHEST PAIN, UNSPECIFIED TYPE: ICD-10-CM

## 2021-04-07 DIAGNOSIS — R06.02 SOB (SHORTNESS OF BREATH): ICD-10-CM

## 2021-04-07 DIAGNOSIS — I30.0 ACUTE IDIOPATHIC PERICARDITIS: Primary | ICD-10-CM

## 2021-04-07 PROCEDURE — 3430000000 HC RX DIAGNOSTIC RADIOPHARMACEUTICAL: Performed by: INTERNAL MEDICINE

## 2021-04-07 PROCEDURE — A9502 TC99M TETROFOSMIN: HCPCS | Performed by: INTERNAL MEDICINE

## 2021-04-07 PROCEDURE — 78452 HT MUSCLE IMAGE SPECT MULT: CPT

## 2021-04-07 RX ADMIN — TETROFOSMIN 10 MILLICURIE: 1.38 INJECTION, POWDER, LYOPHILIZED, FOR SOLUTION INTRAVENOUS at 07:54

## 2021-04-07 NOTE — TELEPHONE ENCOUNTER
Central scheduling called needing new codes for the Echo that has been ordered. Says patients insurance rejects current codes.  New order placed in the system for an Echo with supporting diagnosis codes from patients current problem list.

## 2021-04-07 NOTE — TELEPHONE ENCOUNTER
Spoke to pt. He states he is going to have the second part of stress test on 04/08. He will wait for results. Did not go into detail about his symptoms at this time.

## 2021-04-08 ENCOUNTER — OFFICE VISIT (OUTPATIENT)
Dept: CARDIOLOGY CLINIC | Age: 66
End: 2021-04-08
Payer: MEDICARE

## 2021-04-08 ENCOUNTER — OFFICE VISIT (OUTPATIENT)
Dept: PRIMARY CARE CLINIC | Age: 66
End: 2021-04-08
Payer: MEDICARE

## 2021-04-08 ENCOUNTER — HOSPITAL ENCOUNTER (OUTPATIENT)
Dept: NON INVASIVE DIAGNOSTICS | Age: 66
Discharge: HOME | End: 2021-04-08
Payer: MEDICARE

## 2021-04-08 VITALS
SYSTOLIC BLOOD PRESSURE: 108 MMHG | WEIGHT: 168.2 LBS | HEIGHT: 70 IN | BODY MASS INDEX: 24.08 KG/M2 | DIASTOLIC BLOOD PRESSURE: 72 MMHG | OXYGEN SATURATION: 98 % | HEART RATE: 85 BPM | RESPIRATION RATE: 18 BRPM

## 2021-04-08 DIAGNOSIS — I30.0 ACUTE IDIOPATHIC PERICARDITIS: ICD-10-CM

## 2021-04-08 DIAGNOSIS — R06.02 SOB (SHORTNESS OF BREATH): ICD-10-CM

## 2021-04-08 DIAGNOSIS — I30.0 ACUTE IDIOPATHIC PERICARDITIS: Primary | ICD-10-CM

## 2021-04-08 DIAGNOSIS — Z01.818 PRE-OP TESTING: ICD-10-CM

## 2021-04-08 DIAGNOSIS — Z20.828 EXPOSURE TO SARS-ASSOCIATED CORONAVIRUS: Primary | ICD-10-CM

## 2021-04-08 DIAGNOSIS — I48.91 UNCONTROLLED ATRIAL FIBRILLATION (HCC): ICD-10-CM

## 2021-04-08 LAB
LV EF: 50 %
LVEF MODALITY: NORMAL

## 2021-04-08 PROCEDURE — 93306 TTE W/DOPPLER COMPLETE: CPT

## 2021-04-08 PROCEDURE — 99215 OFFICE O/P EST HI 40 MIN: CPT | Performed by: INTERNAL MEDICINE

## 2021-04-08 PROCEDURE — 99211 OFF/OP EST MAY X REQ PHY/QHP: CPT | Performed by: NURSE PRACTITIONER

## 2021-04-08 RX ORDER — AMIODARONE HYDROCHLORIDE 400 MG/1
400 TABLET ORAL 2 TIMES DAILY
Qty: 180 TABLET | Refills: 1 | Status: SHIPPED | OUTPATIENT
Start: 2021-04-08 | End: 2021-04-16

## 2021-04-08 RX ORDER — OMEPRAZOLE 20 MG/1
20 CAPSULE, DELAYED RELEASE ORAL
Qty: 90 CAPSULE | Refills: 1 | Status: SHIPPED | OUTPATIENT
Start: 2021-04-08 | End: 2022-03-30

## 2021-04-08 NOTE — PATIENT INSTRUCTIONS
Plan:  1. CASSIDY and Cardioversion on 4/12 (Monday). Nothing to eat but take your Eliquis, Amiodarone and Metoprolol in the morning with a sip of water. EP Dr. Mary Erickson. 2.  Start the Amiodarone, and Eliquis TODAY. (Samples gives of Eliquis)   3. Continue all other medications. 4.  Schedule Stress Echo the the  for 4/13 or 4/14. Needs for surgery clearance.

## 2021-04-08 NOTE — PROGRESS NOTES
Arina Chaidez received a viral test for COVID-19. They were educated on isolation and quarantine as appropriate. For any symptoms, they were directed to seek care from their PCP, given contact information to establish with a doctor, directed to an urgent care or the emergency room.
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
## 2021-04-08 NOTE — PATIENT INSTRUCTIONS

## 2021-04-09 LAB — SARS-COV-2: NOT DETECTED

## 2021-04-12 ENCOUNTER — TELEPHONE (OUTPATIENT)
Dept: CARDIOLOGY CLINIC | Age: 66
End: 2021-04-12

## 2021-04-12 ENCOUNTER — NURSE ONLY (OUTPATIENT)
Dept: CARDIOLOGY CLINIC | Age: 66
End: 2021-04-12
Payer: MEDICARE

## 2021-04-12 DIAGNOSIS — R07.9 CHEST PAIN, UNSPECIFIED TYPE: Primary | ICD-10-CM

## 2021-04-12 PROCEDURE — 93000 ELECTROCARDIOGRAM COMPLETE: CPT | Performed by: INTERNAL MEDICINE

## 2021-04-12 NOTE — TELEPHONE ENCOUNTER
Spoke with Dr. Emiliano Still. Updated him on patient's developments today. No need for CASSIDY/cardioversion tomorrow and this has been canceled. He will have stress echo as planned Thursday. Continues on diltiazem and metoprolol     Pls let the patient know the following:    rec from EP:   1. dose reduce amiodarone to 200 mg twice daily starting tomorrow and plan to reduce to 200 mg daily this weekend Saturday as per EP. 2.  Continue metoprolol. 3.  Continue diltiazem for now, with plan to wean from this medication if persistently bradycardic and/or low blood pressures. Dr. Dafne Santiago will likely be able to stop by when he has stress test Thursday he stated    Me know if the patient has questions/concerns about the plan.     ESSIE

## 2021-04-12 NOTE — TELEPHONE ENCOUNTER
Spoke with patient by phone just now. Feels he may be back in normal rhythm by pulse and by his apple watch. Advised him to come get EKG during his lunch hour. He will present to  in 20 to 30 minutes at which point we can bring him back and do a quick EKG. He can leave the clinic following this and I will call him following reviewing the EKG with advice. Suspect he may converted to normal sinus rhythm on amiodarone that was recently started.     ESSIE

## 2021-04-12 NOTE — TELEPHONE ENCOUNTER
Patient called stating he has been in normal rhythm since last Thursday and is inquiring if he should still have cardioversion

## 2021-04-13 ENCOUNTER — TELEPHONE (OUTPATIENT)
Dept: INTERNAL MEDICINE CLINIC | Age: 66
End: 2021-04-13

## 2021-04-13 NOTE — TELEPHONE ENCOUNTER
This can wait until tomorrow. Patient had a pre op on 3/24. He has another upcoming surgery on 4/21, and the surgery center is asking for the previous pre op to be addended to clear for the new surgery.  Please fax to [phone]

## 2021-04-14 NOTE — TELEPHONE ENCOUNTER
LVM with patient to call us back. I'd like to talk to him about Cardiology work-up as this is most important to be cleared for surgery on 4/21.

## 2021-04-15 ENCOUNTER — PROCEDURE VISIT (OUTPATIENT)
Dept: CARDIOLOGY CLINIC | Age: 66
End: 2021-04-15
Payer: MEDICARE

## 2021-04-15 DIAGNOSIS — I30.0 ACUTE IDIOPATHIC PERICARDITIS: ICD-10-CM

## 2021-04-15 DIAGNOSIS — R06.02 SOB (SHORTNESS OF BREATH): ICD-10-CM

## 2021-04-15 DIAGNOSIS — Z01.818 PRE-OP TESTING: ICD-10-CM

## 2021-04-15 DIAGNOSIS — R07.9 CHEST PAIN, UNSPECIFIED TYPE: ICD-10-CM

## 2021-04-15 DIAGNOSIS — I48.91 UNCONTROLLED ATRIAL FIBRILLATION (HCC): ICD-10-CM

## 2021-04-15 DIAGNOSIS — I31.9 PERICARDITIS, UNSPECIFIED CHRONICITY, UNSPECIFIED TYPE: ICD-10-CM

## 2021-04-15 LAB
LV EF: 70 %
LVEF MODALITY: NORMAL

## 2021-04-15 PROCEDURE — 93351 STRESS TTE COMPLETE: CPT | Performed by: INTERNAL MEDICINE
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
## 2021-04-16 ENCOUNTER — OFFICE VISIT (OUTPATIENT)
Dept: CARDIOLOGY CLINIC | Age: 66
End: 2021-04-16
Payer: MEDICARE

## 2021-04-16 ENCOUNTER — TELEPHONE (OUTPATIENT)
Dept: CARDIOLOGY CLINIC | Age: 66
End: 2021-04-16

## 2021-04-16 VITALS
DIASTOLIC BLOOD PRESSURE: 64 MMHG | HEIGHT: 70 IN | OXYGEN SATURATION: 98 % | BODY MASS INDEX: 23.84 KG/M2 | SYSTOLIC BLOOD PRESSURE: 114 MMHG | HEART RATE: 54 BPM | WEIGHT: 166.5 LBS

## 2021-04-16 DIAGNOSIS — F10.10 ALCOHOL ABUSE: ICD-10-CM

## 2021-04-16 DIAGNOSIS — I30.0 ACUTE IDIOPATHIC PERICARDITIS: ICD-10-CM

## 2021-04-16 DIAGNOSIS — I48.0 PAF (PAROXYSMAL ATRIAL FIBRILLATION) (HCC): Primary | ICD-10-CM

## 2021-04-16 PROCEDURE — 99214 OFFICE O/P EST MOD 30 MIN: CPT | Performed by: INTERNAL MEDICINE

## 2021-04-16 RX ORDER — AMIODARONE HYDROCHLORIDE 400 MG/1
200 TABLET ORAL DAILY
Qty: 3 TABLET | Refills: 0
Start: 2021-04-16 | End: 2021-05-06
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
## 2021-04-16 NOTE — PATIENT INSTRUCTIONS
1.  Avoid alcohol  2. Decrease amiodarone to 200 mg daily  3. Stop diltiazem  4. Continue metoprolol  5. Okay to Return to normal activities  4. Keep follow up with    5.  Clearance pending stress test results  7.   Follow up in one month

## 2021-04-16 NOTE — TELEPHONE ENCOUNTER
Verbal message from Dr. Nieves Siddiqui. Pt is okay to proceed with procedure. Letter created and faxed.  LM on VM that this was done

## 2021-04-16 NOTE — TELEPHONE ENCOUNTER
Pt was instructed to call our office if he had not heard back by 1430 stress test results ESSIE briefly discuss at OV this AM.

## 2021-04-16 NOTE — LETTER
[address] [facility] - [address]  Phone: [phone]  Fax: [phone]    Alex Edgar MD        April 16, 2021      Rosie Nowak 1955     The patient is low risk for major adverse cardiac events for noncardiac surgery. May proceed to the operating room at the discretion of the attending surgeon. Please call with any questions or concerns that may arise.     Sincerely,        Alxe Edgar MD

## 2021-04-20 ENCOUNTER — TELEPHONE (OUTPATIENT)
Dept: INTERNAL MEDICINE CLINIC | Age: 66
End: 2021-04-20

## 2021-04-20 NOTE — TELEPHONE ENCOUNTER
----- Message from Richie Modi MA sent at 4/20/2021 10:12 AM EDT -----  Subject: Message to Provider    QUESTIONS  Information for Provider? Patient wants to know if everything was taken   care of for his surgery on 4/21/21. Please call him on mobile number. Just   concerned. ---------------------------------------------------------------------------  --------------  Kyleigh SHETTY  What is the best way for the office to contact you? OK to leave message on   voicemail  Preferred Call Back Phone Number? [phone]  ---------------------------------------------------------------------------  --------------  SCRIPT ANSWERS  Relationship to Patient?  Self

## 2021-04-21 NOTE — TELEPHONE ENCOUNTER
Spoke with patient. He states procedure went well and he is home now. Preliminary report is benign. Will continue to monitor.

## 2021-04-26 NOTE — TELEPHONE ENCOUNTER
Call patient to find out when he does f/u surgeon or ENT. I do not recall from our conversation last week. How's he doing from procedure?

## 2021-05-03 NOTE — TELEPHONE ENCOUNTER
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 Has appointment scheduled with Radiation Oncologist next week in [place]. He would prefer a closer location to [facility] d/t potentially needing radiation for short time daily for several weeks. Can you call Dr Han Quintanilla office and find out if they have radiation oncologist they refer to on this side of [facility]? Are they [facility]? Do they care if pt switches to [facility] Radiation Oncologist in [facility]?

## 2021-05-05 ENCOUNTER — TELEPHONE (OUTPATIENT)
Dept: INTERNAL MEDICINE CLINIC | Age: 66
End: 2021-05-05
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
## 2021-05-05 NOTE — TELEPHONE ENCOUNTER
Dr Janet Antonio says that he can go anywhere for radiation if that is what works best for him. The attached phone number is a direct line to the MA if you have any other questions.

## 2021-05-05 NOTE — TELEPHONE ENCOUNTER
Nellie Freeman MA 11 minutes ago (9:23 AM)        Dr Caleb Mccabe says that he can go anywhere for radiation if that is what works best for him. The attached phone number is a direct line to the MA if you have any other questions.

## 2021-05-05 NOTE — TELEPHONE ENCOUNTER
[facility] radiation : PHONE: [phone]     Spoke with patient , he requested I send him the information through [facility].

## 2021-05-06 ENCOUNTER — OFFICE VISIT (OUTPATIENT)
Dept: CARDIOLOGY CLINIC | Age: 66
End: 2021-05-06
Payer: MEDICARE

## 2021-05-06 VITALS
HEIGHT: 71 IN | DIASTOLIC BLOOD PRESSURE: 64 MMHG | OXYGEN SATURATION: 99 % | BODY MASS INDEX: 23.15 KG/M2 | HEART RATE: 47 BPM | SYSTOLIC BLOOD PRESSURE: 122 MMHG | WEIGHT: 165.4 LBS

## 2021-05-06 DIAGNOSIS — I30.0 ACUTE IDIOPATHIC PERICARDITIS: ICD-10-CM

## 2021-05-06 DIAGNOSIS — R00.1 BRADYCARDIA: ICD-10-CM

## 2021-05-06 DIAGNOSIS — I48.0 PAF (PAROXYSMAL ATRIAL FIBRILLATION) (HCC): Primary | ICD-10-CM

## 2021-05-06 DIAGNOSIS — G62.0 AMIODARONE INDUCED NEUROPATHY (HCC): ICD-10-CM

## 2021-05-06 DIAGNOSIS — T46.2X5A AMIODARONE INDUCED NEUROPATHY (HCC): ICD-10-CM

## 2021-05-06 DIAGNOSIS — Z79.899 LONG TERM CURRENT USE OF AMIODARONE: ICD-10-CM

## 2021-05-06 PROCEDURE — 99214 OFFICE O/P EST MOD 30 MIN: CPT | Performed by: INTERNAL MEDICINE

## 2021-05-06 PROCEDURE — 93000 ELECTROCARDIOGRAM COMPLETE: CPT | Performed by: INTERNAL MEDICINE

## 2021-05-06 RX ORDER — AMIODARONE HYDROCHLORIDE 200 MG/1
100 TABLET ORAL DAILY
Qty: 45 TABLET | Refills: 1 | Status: SHIPPED | OUTPATIENT
Start: 2021-05-06 | End: 2021-06-30 | Stop reason: SDUPTHER

## 2021-05-07 ENCOUNTER — TELEPHONE (OUTPATIENT)
Dept: CARDIOLOGY CLINIC | Age: 66
End: 2021-05-07
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
## 2021-05-20 ENCOUNTER — OFFICE VISIT (OUTPATIENT)
Dept: CARDIOLOGY CLINIC | Age: 66
End: 2021-05-20
Payer: MEDICARE

## 2021-05-20 VITALS
WEIGHT: 164.5 LBS | OXYGEN SATURATION: 98 % | DIASTOLIC BLOOD PRESSURE: 66 MMHG | SYSTOLIC BLOOD PRESSURE: 126 MMHG | HEIGHT: 71 IN | HEART RATE: 97 BPM | BODY MASS INDEX: 23.03 KG/M2

## 2021-05-20 DIAGNOSIS — I48.0 PAF (PAROXYSMAL ATRIAL FIBRILLATION) (HCC): ICD-10-CM

## 2021-05-20 DIAGNOSIS — I30.0 ACUTE IDIOPATHIC PERICARDITIS: Primary | ICD-10-CM

## 2021-05-20 PROCEDURE — 99214 OFFICE O/P EST MOD 30 MIN: CPT | Performed by: INTERNAL MEDICINE
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
## 2021-05-20 NOTE — PATIENT INSTRUCTIONS
1. Monitor for black tarry stools, ie bleeding issues  2. We encouraged modest weight loss through implementing appropriate dietary measures as well as initiation of a graded exercise program with the ultimate goal of 150 minutes of aerobic exercise weekly. 3. Continue to monitor with Kardia device. 4. Goal rate with normal activity is below 110 with afib. 5. Plan to discuss with Dr. Mary Erickson to increase Amiodarone 200 mg due to increase in heart rate. Return to the office with me in 3 months.

## 2021-05-21 DIAGNOSIS — I48.0 PAF (PAROXYSMAL ATRIAL FIBRILLATION) (HCC): Primary | ICD-10-CM

## 2021-06-02 ENCOUNTER — TELEPHONE (OUTPATIENT)
Dept: CARDIOLOGY CLINIC | Age: 66
End: 2021-06-02

## 2021-06-02 NOTE — TELEPHONE ENCOUNTER
Called and spoke with the patient and informed him that we will place the monitor on him at his appt on Set 9th at 9:30. He is fine with it. He thought that he had missed the mail and the monitor was lost or something. He wanted to know if something happened. I told him again that we will place this on him when he comes in.

## 2021-06-02 NOTE — TELEPHONE ENCOUNTER
Pt calling he was told over a week and a half ago that someone was mailing a heart monitor to his home address on file. Pt has not received it yet. Was this mailed out?  Pls call to advise Thank you

## 2021-06-04 ENCOUNTER — TELEPHONE (OUTPATIENT)
Dept: CARDIOLOGY CLINIC | Age: 66
End: 2021-06-04

## 2021-06-24 ENCOUNTER — TELEPHONE (OUTPATIENT)
Dept: CARDIOLOGY CLINIC | Age: 66
End: 2021-06-24

## 2021-06-24 NOTE — TELEPHONE ENCOUNTER
Samples of eliquis 5 mg have been set aside for pt to . Called and left VM for pt informing him that samples are available and ready to be picked up.

## 2021-06-30 ENCOUNTER — TELEPHONE (OUTPATIENT)
Dept: CARDIOLOGY CLINIC | Age: 66
End: 2021-06-30

## 2021-06-30 RX ORDER — AMIODARONE HYDROCHLORIDE 200 MG/1
100 TABLET ORAL DAILY
Qty: 45 TABLET | Refills: 1 | Status: ON HOLD
Start: 2021-06-30 | End: 2021-08-26 | Stop reason: HOSPADM

## 2021-06-30 NOTE — TELEPHONE ENCOUNTER
Medication Refill    Medication needing refilled: amiodarone     Dosage of the medication:200mg     How are you taking this medication (QD, BID, TID, QID, PRN):    30 or 90 day supply called in:    When will you run out of your medication: 4 days     Which Pharmacy are we sending the medication to?:  [facility] (in chart)

## 2021-07-08 ENCOUNTER — TELEPHONE (OUTPATIENT)
Dept: CARDIOLOGY CLINIC | Age: 66
End: 2021-07-08

## 2021-07-08 NOTE — TELEPHONE ENCOUNTER
Called pharmacy and they stated that patient picked up 10 pills a couple of days ago,but they would fill it and notify patient to pick it up.

## 2021-07-08 NOTE — TELEPHONE ENCOUNTER
Medication Refill    Medication needing refilled: amiodarone (CORDARONE) 200 MG tablet     Dosage of the medication:     How are you taking this medication (QD, BID, TID, QID, PRN):     30 or 90 day supply called in:    When will you run out of your medication: pt is out of medication    Which Pharmacy are we sending the medication to?: [address] [place] [address] [address] [facility] [phone]

## 2021-08-12 ENCOUNTER — TELEPHONE (OUTPATIENT)
Dept: CARDIOLOGY CLINIC | Age: 66
End: 2021-08-12

## 2021-08-12 NOTE — TELEPHONE ENCOUNTER
Pt requesting samples of apixaban (ELIQUIS) 5 MG TABS tablet   Take 1 tablet by mouth 2 times daily  Please advise.

## 2021-08-20 ENCOUNTER — APPOINTMENT (OUTPATIENT)
Dept: GENERAL RADIOLOGY | Age: 66
DRG: 242 | End: 2021-08-20
Payer: MEDICARE

## 2021-08-20 ENCOUNTER — HOSPITAL ENCOUNTER (INPATIENT)
Age: 66
LOS: 6 days | Discharge: HOME | DRG: 242 | End: 2021-08-26
Attending: EMERGENCY MEDICINE | Admitting: INTERNAL MEDICINE
Payer: MEDICARE

## 2021-08-20 DIAGNOSIS — N17.9 AKI (ACUTE KIDNEY INJURY) (HCC): ICD-10-CM

## 2021-08-20 DIAGNOSIS — R11.2 NAUSEA VOMITING AND DIARRHEA: Primary | ICD-10-CM

## 2021-08-20 DIAGNOSIS — R19.7 NAUSEA VOMITING AND DIARRHEA: Primary | ICD-10-CM

## 2021-08-20 DIAGNOSIS — R00.1 BRADYCARDIA: ICD-10-CM

## 2021-08-20 PROBLEM — I44.2 COMPLETE HEART BLOCK (HCC): Status: ACTIVE | Noted: 2021-08-20

## 2021-08-20 LAB
A/G RATIO: 1 (ref 1.1–2.2)
ALBUMIN SERPL-MCNC: 4.1 G/DL (ref 3.4–5)
ALP BLD-CCNC: 127 U/L (ref 40–129)
ALT SERPL-CCNC: 78 U/L (ref 10–40)
ANION GAP SERPL CALCULATED.3IONS-SCNC: 24 MMOL/L (ref 3–16)
AST SERPL-CCNC: 83 U/L (ref 15–37)
BASOPHILS ABSOLUTE: 0 K/UL (ref 0–0.2)
BASOPHILS RELATIVE PERCENT: 0.2 %
BILIRUB SERPL-MCNC: 0.5 MG/DL (ref 0–1)
BUN BLDV-MCNC: 29 MG/DL (ref 7–20)
CALCIUM SERPL-MCNC: 9.8 MG/DL (ref 8.3–10.6)
CHLORIDE BLD-SCNC: 96 MMOL/L (ref 99–110)
CO2: 16 MMOL/L (ref 21–32)
CREAT SERPL-MCNC: 2.9 MG/DL (ref 0.8–1.3)
EOSINOPHILS ABSOLUTE: 0 K/UL (ref 0–0.6)
EOSINOPHILS RELATIVE PERCENT: 0.1 %
GFR AFRICAN AMERICAN: 26
GFR NON-AFRICAN AMERICAN: 22
GLOBULIN: 4 G/DL
GLUCOSE BLD-MCNC: 209 MG/DL (ref 70–99)
HCT VFR BLD CALC: 39.7 % (ref 40.5–52.5)
HEMOGLOBIN: 13 G/DL (ref 13.5–17.5)
LIPASE: 20 U/L (ref 13–60)
LYMPHOCYTES ABSOLUTE: 0.5 K/UL (ref 1–5.1)
LYMPHOCYTES RELATIVE PERCENT: 5.9 %
MAGNESIUM: 2.7 MG/DL (ref 1.8–2.4)
MCH RBC QN AUTO: 29.1 PG (ref 26–34)
MCHC RBC AUTO-ENTMCNC: 32.8 G/DL (ref 31–36)
MCV RBC AUTO: 88.8 FL (ref 80–100)
MONOCYTES ABSOLUTE: 0.4 K/UL (ref 0–1.3)
MONOCYTES RELATIVE PERCENT: 5.3 %
NEUTROPHILS ABSOLUTE: 7.2 K/UL (ref 1.7–7.7)
NEUTROPHILS RELATIVE PERCENT: 88.5 %
PDW BLD-RTO: 17.4 % (ref 12.4–15.4)
PLATELET # BLD: 335 K/UL (ref 135–450)
PMV BLD AUTO: 7.5 FL (ref 5–10.5)
POTASSIUM SERPL-SCNC: 5.3 MMOL/L (ref 3.5–5.1)
PRO-BNP: 6152 PG/ML (ref 0–124)
RBC # BLD: 4.48 M/UL (ref 4.2–5.9)
SARS-COV-2, NAAT: NOT DETECTED
SEDIMENTATION RATE, ERYTHROCYTE: 43 MM/HR (ref 0–20)
SODIUM BLD-SCNC: 136 MMOL/L (ref 136–145)
TOTAL PROTEIN: 8.1 G/DL (ref 6.4–8.2)
TROPONIN: 0.02 NG/ML
WBC # BLD: 8.2 K/UL (ref 4–11)

## 2021-08-20 PROCEDURE — 2580000003 HC RX 258: Performed by: INTERNAL MEDICINE

## 2021-08-20 PROCEDURE — 93005 ELECTROCARDIOGRAM TRACING: CPT | Performed by: EMERGENCY MEDICINE

## 2021-08-20 PROCEDURE — 86038 ANTINUCLEAR ANTIBODIES: CPT

## 2021-08-20 PROCEDURE — 99283 EMERGENCY DEPT VISIT LOW MDM: CPT

## 2021-08-20 PROCEDURE — 2709999900 HC NON-CHARGEABLE SUPPLY

## 2021-08-20 PROCEDURE — 2720000010 HC SURG SUPPLY STERILE

## 2021-08-20 PROCEDURE — 36415 COLL VENOUS BLD VENIPUNCTURE: CPT

## 2021-08-20 PROCEDURE — 2580000003 HC RX 258: Performed by: PHYSICIAN ASSISTANT

## 2021-08-20 PROCEDURE — 83880 ASSAY OF NATRIURETIC PEPTIDE: CPT

## 2021-08-20 PROCEDURE — 80074 ACUTE HEPATITIS PANEL: CPT

## 2021-08-20 PROCEDURE — 33210 INSERT ELECTRD/PM CATH SNGL: CPT | Performed by: INTERNAL MEDICINE

## 2021-08-20 PROCEDURE — 80053 COMPREHEN METABOLIC PANEL: CPT

## 2021-08-20 PROCEDURE — 85652 RBC SED RATE AUTOMATED: CPT

## 2021-08-20 PROCEDURE — 71045 X-RAY EXAM CHEST 1 VIEW: CPT

## 2021-08-20 PROCEDURE — 99223 1ST HOSP IP/OBS HIGH 75: CPT | Performed by: INTERNAL MEDICINE

## 2021-08-20 PROCEDURE — 33210 INSERT ELECTRD/PM CATH SNGL: CPT

## 2021-08-20 PROCEDURE — 84484 ASSAY OF TROPONIN QUANT: CPT

## 2021-08-20 PROCEDURE — 84439 ASSAY OF FREE THYROXINE: CPT

## 2021-08-20 PROCEDURE — 99152 MOD SED SAME PHYS/QHP 5/>YRS: CPT

## 2021-08-20 PROCEDURE — 86140 C-REACTIVE PROTEIN: CPT

## 2021-08-20 PROCEDURE — 2000000000 HC ICU R&B

## 2021-08-20 PROCEDURE — 6360000002 HC RX W HCPCS

## 2021-08-20 PROCEDURE — 96376 TX/PRO/DX INJ SAME DRUG ADON: CPT

## 2021-08-20 PROCEDURE — 85025 COMPLETE CBC W/AUTO DIFF WBC: CPT

## 2021-08-20 PROCEDURE — 96365 THER/PROPH/DIAG IV INF INIT: CPT

## 2021-08-20 PROCEDURE — 5A1223Z PERFORMANCE OF CARDIAC PACING, CONTINUOUS: ICD-10-PCS | Performed by: INTERNAL MEDICINE

## 2021-08-20 PROCEDURE — 84443 ASSAY THYROID STIM HORMONE: CPT

## 2021-08-20 PROCEDURE — 87635 SARS-COV-2 COVID-19 AMP PRB: CPT

## 2021-08-20 PROCEDURE — 83690 ASSAY OF LIPASE: CPT

## 2021-08-20 PROCEDURE — 2500000003 HC RX 250 WO HCPCS

## 2021-08-20 PROCEDURE — 96375 TX/PRO/DX INJ NEW DRUG ADDON: CPT

## 2021-08-20 PROCEDURE — 83735 ASSAY OF MAGNESIUM: CPT

## 2021-08-20 PROCEDURE — C1894 INTRO/SHEATH, NON-LASER: HCPCS

## 2021-08-20 PROCEDURE — 6360000002 HC RX W HCPCS: Performed by: EMERGENCY MEDICINE

## 2021-08-20 PROCEDURE — 6360000002 HC RX W HCPCS: Performed by: PHYSICIAN ASSISTANT

## 2021-08-20 RX ORDER — ONDANSETRON 2 MG/ML
4 INJECTION INTRAMUSCULAR; INTRAVENOUS EVERY 6 HOURS PRN
Status: DISCONTINUED | OUTPATIENT
Start: 2021-08-20 | End: 2021-08-23

## 2021-08-20 RX ORDER — SODIUM CHLORIDE 9 MG/ML
INJECTION, SOLUTION INTRAVENOUS CONTINUOUS
Status: DISCONTINUED | OUTPATIENT
Start: 2021-08-20 | End: 2021-08-20

## 2021-08-20 RX ORDER — FENTANYL CITRATE 50 UG/ML
INJECTION, SOLUTION INTRAMUSCULAR; INTRAVENOUS
Status: COMPLETED | OUTPATIENT
Start: 2021-08-20 | End: 2021-08-20

## 2021-08-20 RX ORDER — PANTOPRAZOLE SODIUM 40 MG/1
40 TABLET, DELAYED RELEASE ORAL
Status: DISCONTINUED | OUTPATIENT
Start: 2021-08-21 | End: 2021-08-26 | Stop reason: HOSPADM

## 2021-08-20 RX ORDER — MORPHINE SULFATE 4 MG/ML
4 INJECTION, SOLUTION INTRAMUSCULAR; INTRAVENOUS ONCE
Status: DISCONTINUED | OUTPATIENT
Start: 2021-08-20 | End: 2021-08-20

## 2021-08-20 RX ORDER — SODIUM CHLORIDE, SODIUM LACTATE, POTASSIUM CHLORIDE, CALCIUM CHLORIDE 600; 310; 30; 20 MG/100ML; MG/100ML; MG/100ML; MG/100ML
2000 INJECTION, SOLUTION INTRAVENOUS ONCE
Status: COMPLETED | OUTPATIENT
Start: 2021-08-20 | End: 2021-08-21

## 2021-08-20 RX ORDER — ATROPINE SULFATE 0.1 MG/ML
0.5 INJECTION INTRAVENOUS PRN
Status: COMPLETED | OUTPATIENT
Start: 2021-08-20 | End: 2021-08-20

## 2021-08-20 RX ORDER — HYDROCODONE BITARTRATE AND ACETAMINOPHEN 7.5; 325 MG/1; MG/1
1 TABLET ORAL EVERY 6 HOURS PRN
Status: DISCONTINUED | OUTPATIENT
Start: 2021-08-20 | End: 2021-08-26 | Stop reason: HOSPADM

## 2021-08-20 RX ORDER — EPINEPHRINE 0.1 MG/ML
1 SYRINGE (ML) INJECTION ONCE
Status: COMPLETED | OUTPATIENT
Start: 2021-08-20 | End: 2021-08-20

## 2021-08-20 RX ORDER — 0.9 % SODIUM CHLORIDE 0.9 %
1000 INTRAVENOUS SOLUTION INTRAVENOUS ONCE
Status: COMPLETED | OUTPATIENT
Start: 2021-08-20 | End: 2021-08-20

## 2021-08-20 RX ORDER — SODIUM CHLORIDE 0.9 % (FLUSH) 0.9 %
10 SYRINGE (ML) INJECTION PRN
Status: DISCONTINUED | OUTPATIENT
Start: 2021-08-20 | End: 2021-08-23 | Stop reason: SDUPTHER

## 2021-08-20 RX ORDER — SODIUM CHLORIDE 0.9 % (FLUSH) 0.9 %
5-40 SYRINGE (ML) INJECTION EVERY 12 HOURS SCHEDULED
Status: DISCONTINUED | OUTPATIENT
Start: 2021-08-20 | End: 2021-08-26 | Stop reason: HOSPADM

## 2021-08-20 RX ORDER — MAGNESIUM SULFATE 1 G/100ML
1000 INJECTION INTRAVENOUS PRN
Status: DISCONTINUED | OUTPATIENT
Start: 2021-08-20 | End: 2021-08-20

## 2021-08-20 RX ORDER — SODIUM CHLORIDE 9 MG/ML
25 INJECTION, SOLUTION INTRAVENOUS PRN
Status: DISCONTINUED | OUTPATIENT
Start: 2021-08-20 | End: 2021-08-23

## 2021-08-20 RX ORDER — SODIUM CHLORIDE 0.9 % (FLUSH) 0.9 %
5-40 SYRINGE (ML) INJECTION PRN
Status: DISCONTINUED | OUTPATIENT
Start: 2021-08-20 | End: 2021-08-26 | Stop reason: HOSPADM

## 2021-08-20 RX ORDER — MIDAZOLAM HYDROCHLORIDE 1 MG/ML
INJECTION INTRAMUSCULAR; INTRAVENOUS
Status: COMPLETED | OUTPATIENT
Start: 2021-08-20 | End: 2021-08-20

## 2021-08-20 RX ORDER — SODIUM CHLORIDE 9 MG/ML
25 INJECTION, SOLUTION INTRAVENOUS PRN
Status: DISCONTINUED | OUTPATIENT
Start: 2021-08-20 | End: 2021-08-26 | Stop reason: HOSPADM

## 2021-08-20 RX ORDER — LANOLIN ALCOHOL/MO/W.PET/CERES
3 CREAM (GRAM) TOPICAL NIGHTLY PRN
Status: DISCONTINUED | OUTPATIENT
Start: 2021-08-20 | End: 2021-08-26 | Stop reason: HOSPADM

## 2021-08-20 RX ORDER — DOPAMINE HYDROCHLORIDE 160 MG/100ML
2-20 INJECTION, SOLUTION INTRAVENOUS CONTINUOUS
Status: DISCONTINUED | OUTPATIENT
Start: 2021-08-20 | End: 2021-08-20

## 2021-08-20 RX ORDER — ATROPINE SULFATE 0.1 MG/ML
0.5 INJECTION INTRAVENOUS ONCE
Status: COMPLETED | OUTPATIENT
Start: 2021-08-20 | End: 2021-08-20

## 2021-08-20 RX ORDER — ACETAMINOPHEN 650 MG/1
650 SUPPOSITORY RECTAL EVERY 4 HOURS PRN
Status: DISCONTINUED | OUTPATIENT
Start: 2021-08-20 | End: 2021-08-26 | Stop reason: HOSPADM

## 2021-08-20 RX ORDER — ONDANSETRON 2 MG/ML
4 INJECTION INTRAMUSCULAR; INTRAVENOUS ONCE
Status: COMPLETED | OUTPATIENT
Start: 2021-08-20 | End: 2021-08-20

## 2021-08-20 RX ORDER — ACETAMINOPHEN 325 MG/1
650 TABLET ORAL EVERY 4 HOURS PRN
Status: DISCONTINUED | OUTPATIENT
Start: 2021-08-20 | End: 2021-08-26 | Stop reason: HOSPADM

## 2021-08-20 RX ADMIN — ATROPINE SULFATE 0.5 MG: 0.1 INJECTION INTRAVENOUS at 17:47

## 2021-08-20 RX ADMIN — DOPAMINE HYDROCHLORIDE 10 MCG/KG/MIN: 160 INJECTION, SOLUTION INTRAVENOUS at 17:54

## 2021-08-20 RX ADMIN — SODIUM CHLORIDE 1000 ML: 9 INJECTION, SOLUTION INTRAVENOUS at 17:24

## 2021-08-20 RX ADMIN — SODIUM CHLORIDE: 9 INJECTION, SOLUTION INTRAVENOUS at 19:34

## 2021-08-20 RX ADMIN — EPINEPHRINE 1 MG: 0.1 INJECTION, SOLUTION ENDOTRACHEAL; INTRACARDIAC; INTRAVENOUS at 18:09

## 2021-08-20 RX ADMIN — ATROPINE SULFATE 0.5 MG: 0.1 INJECTION INTRAVENOUS at 17:16

## 2021-08-20 RX ADMIN — SODIUM CHLORIDE, PRESERVATIVE FREE 10 ML: 5 INJECTION INTRAVENOUS at 20:34

## 2021-08-20 RX ADMIN — ONDANSETRON 4 MG: 2 INJECTION INTRAMUSCULAR; INTRAVENOUS at 17:28

## 2021-08-20 RX ADMIN — ATROPINE SULFATE 0.5 MG: 0.1 INJECTION INTRAVENOUS at 17:21

## 2021-08-20 RX ADMIN — ATROPINE SULFATE 0.5 MG: 0.1 INJECTION INTRAVENOUS at 17:26

## 2021-08-20 RX ADMIN — FENTANYL CITRATE 25 MCG: 50 INJECTION, SOLUTION INTRAMUSCULAR; INTRAVENOUS at 18:42

## 2021-08-20 RX ADMIN — MIDAZOLAM HYDROCHLORIDE 0.5 MG: 1 INJECTION INTRAMUSCULAR; INTRAVENOUS at 18:42

## 2021-08-20 RX ADMIN — SODIUM CHLORIDE, SODIUM LACTATE, POTASSIUM CHLORIDE, AND CALCIUM CHLORIDE 2000 ML: .6; .31; .03; .02 INJECTION, SOLUTION INTRAVENOUS at 22:21

## 2021-08-20 ASSESSMENT — ENCOUNTER SYMPTOMS
SHORTNESS OF BREATH: 0
COUGH: 0
PHOTOPHOBIA: 0
ABDOMINAL PAIN: 0
NAUSEA: 1
CONSTIPATION: 0
RHINORRHEA: 0
DIARRHEA: 0
VOMITING: 1
BLOOD IN STOOL: 0
SORE THROAT: 0

## 2021-08-20 ASSESSMENT — PAIN SCALES - GENERAL
PAINLEVEL_OUTOF10: 0
PAINLEVEL_OUTOF10: 0

## 2021-08-20 NOTE — PROCEDURES
CARDIAC CATHETERIZATION REPORT    Date of Procedure: 8/20/2021  : Trey Sheppard DO  Primary Indication: Third-degree AV block    Procedures Performed:  1. Ultrasound-guided right internal jugular vein access  2. Insertion of temporary transvenous pacemaker    Procedural Details: The patient was prepped and draped in the usual sterile fashion. Local anesthetic was administered to the right neck and access was obtained in the right internal jugular vein using a micropuncture technique and ultrasound guidance and a 5F sheath was placed without difficulty. Next, a 5F transvenous pacemaker was inserted through the sheath, advanced under fluoroscopic guidance into the SVC, right atrium, and then right ventricle. The pacemaker tip was gently advanced to the interventricular septum until appropriate capture was obtained. Back-up pacing was set to 60 bpm at 7 mA. The 5F sheath was then sutured in place with the transvenous pacemaker inserted to a depth of 37 cm. The patient tolerated the procedure well and was hemodynamically stable at the end of the case. Technical Factors:  Complications: None. Estimated blood loss: Minimal.  Radiation: Air kerma 11 mGy and 1.3 minutes of fluoroscopy  Sedation: Moderate conscious sedation was administered by qualified nursing personnel under continuous hemodynamic monitoring, starting at 6:39 PM and ending at 6:56 PM.  Medications: 0.5 mg IV Versed, 25 mcg IV Fentanyl  Contrast: 0 cc    Impression:  1. Third-degree AV block. 2. Successful temporary transvenous pacemaker insertion. Plan:  1. Admit to ICU. 2. Keep right IJ transvenous pacemaker in place with back-up pacing set to 60 bpm.  3. Discontinue dopamine infusion. 4. Obtain stat chest x-ray. 5. Will need limited TTE tomorrow. 6. Hold Eliquis and amiodarone. 7. Continue to hydrate with IVF given suspected prerenal JOEL. 8. Repeat BMP tomorrow morning.   9. Will need permanent transvenous pacemaker placed prior to discharge.       Ernestina Thomas, [facility]

## 2021-08-20 NOTE — H&P
Authorizing Provider   amiodarone (CORDARONE) 200 MG tablet Take 0.5 tablets by mouth daily 6/30/21   MEI Hodge - CNP   apixaban (ELIQUIS) 5 MG TABS tablet Take 1 tablet by mouth 2 times daily 5/6/21   Leonidas Young MD   omeprazole (PRILOSEC) 20 MG delayed release capsule Take 1 capsule by mouth every morning (before breakfast) 4/8/21   Rosa Patton MD       Allergies:   Percocet [oxycodone-acetaminophen]    Social:   reports that he has never smoked. He has never used smokeless tobacco.   reports current alcohol use of about 2.0 - 3.0 standard drinks of alcohol per week. Social History     Substance and Sexual Activity   Drug Use No       Family History   Problem Relation Age of Onset    Stroke Mother     Heart Disease Father     Alzheimer's Disease Father        PHYSICAL EXAM:  I performed this physical examination. Vitals:  Patient Vitals for the past 24 hrs:   BP Temp Temp src Pulse Resp SpO2 Height Weight   08/20/21 1824 (!) 115/41 -- -- (!) 37 17 94 % -- --   08/20/21 1819 (!) 259/237 -- -- (!) 47 20 98 % -- --   08/20/21 1801 (!) 86/29 -- -- (!) 24 18 93 % -- --   08/20/21 1750 (!) 86/51 -- -- (!) 46 18 -- -- --   08/20/21 1738 (!) 72/45 -- -- (!) 22 18 94 % -- --   08/20/21 1729 (!) 140/119 -- -- (!) 21 18 92 % -- --   08/20/21 1719 (!) 57/31 -- -- (!) 24 18 -- -- --   08/20/21 1626 -- 96.2 °F (35.7 °C) Oral -- 20 -- 5' 11\" (1.803 m) 155 lb (70.3 kg)     No intake or output data in the 24 hours ending 08/20/21 1919    Room air    GEN:  Appearance:  Age appropriate male in NAD . Level of Consciousness:  alert . Orientation:  full    HEENT: Sclera anicteric.  no conjunctival chemosis. moist mucus membranes. no specific or diagnostic oral lesions. NECK:  no signs of meningismus. Jugular veins not distended. Carotid pulses  2+.  no cervical lymphadenopathy. no thyromegaly. R IJ vein sheath and pacing lead in situ. CV:  regular rhythm. normal S1 & S2.    no murmur.   no rub.  no gallop. PULM:  Chest excursion is symmetric. Breath sounds are vesicular. Adventitious sounds:  none    AB:  Abdominal shape is normal.  Bowel sounds are active. Generally soft to palpation. no tenderness is present. no involuntary guarding. no rebound guarding. EXTR:  Skin is warm. Capillary refill brisk. no specific or pathognomic rash. no clubbing. no pitting edema. no active wound or ulcer. LABS:  Lab Results   Component Value Date    WBC 8.2 08/20/2021    HGB 13.0 (L) 08/20/2021    HCT 39.7 (L) 08/20/2021    MCV 88.8 08/20/2021     08/20/2021     Lab Results   Component Value Date    CREATININE 2.9 (H) 08/20/2021    BUN 29 (H) 08/20/2021     08/20/2021    K 5.3 (H) 08/20/2021    CL 96 (L) 08/20/2021    CO2 16 (L) 08/20/2021     Lab Results   Component Value Date    ALT 78 (H) 08/20/2021    AST 83 (H) 08/20/2021    ALKPHOS 127 08/20/2021    BILITOT 0.5 08/20/2021     Lab Results   Component Value Date    TROPONINI 0.02 (H) 08/20/2021     No results for input(s): PHART, HBC6QIJ, PO2ART in the last 72 hours. IMAGING:  XR CHEST PORTABLE    Result Date: 8/20/2021  EXAMINATION: ONE XRAY VIEW OF THE CHEST 8/20/2021 5:19 pm COMPARISON: None. HISTORY: ORDERING SYSTEM PROVIDED HISTORY: other TECHNOLOGIST PROVIDED HISTORY: Reason for exam:->other Reason for Exam: low hr Acuity: Acute Type of Exam: Initial FINDINGS: The cardiac silhouette and mediastinal contours are normal.  The lungs are clear. No parenchymal lung infiltrate. No pleural effusion. The visualized osseous structures are unremarkable. 1. No acute cardiopulmonary disease. I directly reviewed all recent imaging studies as well as pertinent prior studies. Radiology reports may or may not be available at the time of my review. EKG:  New and pertinent prior tracings were directly reviewed. My interpretation is as follows:  Multiple tracings reviewed.   Underling sinus rhythm with complete heart block. Active Hospital Problems    Diagnosis Date Noted    Chronic anticoagulation [Z79.01] 08/21/2021    Acquired hypothyroidism [E03.9] 08/21/2021    JOEL (acute kidney injury) ([facility] [N17.9] 08/20/2021    [facility] [I44.2]     PAF (paroxysmal atrial fibrillation) ([facility] [I48.0] 03/22/2021       ASSESSMENT & PLAN  Complete heart block, PAF, Anticoagulation  -  Stable s/p right IJ pacing lead insertion. Set for demand pacing w/ back up HR 60bpm.  Doing well. Chronotrope infusions all stopped. -  Limited TTE tomorrow. -  Eliquis and amiodarone on hold per cardiology recommendations. JOEL  -  Likely d/t hypoperfusion.  -  Check U/A and urine creat, sodium, urea, osm.  -  Has received 1L NS. Continue overnight w/ LR @ 250mL/hr for a max of 8 hours (2L). 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 He has also been taking amiodarone. TSH tonight is 10. Taken altogether it appears patient will require long-term treatment with levothyroxine. Will start with 25 mcg daily. He'll need a follow up TSH in 8 weeks.     DVT prophylaxis: SCDs only  Code Status:  Full  Disposition:  Inpatient    Azul Chauhan MD MD
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
## 2021-08-21 PROBLEM — E03.9 ACQUIRED HYPOTHYROIDISM: Status: ACTIVE | Noted: 2021-08-21

## 2021-08-21 PROBLEM — Z79.01 CHRONIC ANTICOAGULATION: Status: ACTIVE | Noted: 2021-08-21

## 2021-08-21 LAB
A/G RATIO: 1.1 (ref 1.1–2.2)
ALBUMIN SERPL-MCNC: 3.3 G/DL (ref 3.4–5)
ALP BLD-CCNC: 102 U/L (ref 40–129)
ALT SERPL-CCNC: 89 U/L (ref 10–40)
AMPHETAMINE SCREEN, URINE: ABNORMAL
ANION GAP SERPL CALCULATED.3IONS-SCNC: 11 MMOL/L (ref 3–16)
ANION GAP SERPL CALCULATED.3IONS-SCNC: 12 MMOL/L (ref 3–16)
AST SERPL-CCNC: 102 U/L (ref 15–37)
BARBITURATE SCREEN URINE: ABNORMAL
BASOPHILS ABSOLUTE: 0 K/UL (ref 0–0.2)
BASOPHILS RELATIVE PERCENT: 0.4 %
BENZODIAZEPINE SCREEN, URINE: POSITIVE
BILIRUB SERPL-MCNC: 0.4 MG/DL (ref 0–1)
BUN BLDV-MCNC: 29 MG/DL (ref 7–20)
BUN BLDV-MCNC: 29 MG/DL (ref 7–20)
C-REACTIVE PROTEIN: 12.4 MG/L (ref 0–5.1)
CALCIUM SERPL-MCNC: 8.8 MG/DL (ref 8.3–10.6)
CALCIUM SERPL-MCNC: 8.9 MG/DL (ref 8.3–10.6)
CANNABINOID SCREEN URINE: ABNORMAL
CHLORIDE BLD-SCNC: 103 MMOL/L (ref 99–110)
CHLORIDE BLD-SCNC: 103 MMOL/L (ref 99–110)
CO2: 21 MMOL/L (ref 21–32)
CO2: 23 MMOL/L (ref 21–32)
COCAINE METABOLITE SCREEN URINE: ABNORMAL
CREAT SERPL-MCNC: 1.7 MG/DL (ref 0.8–1.3)
CREAT SERPL-MCNC: 2 MG/DL (ref 0.8–1.3)
CREATININE URINE: 141.9 MG/DL (ref 39–259)
EOSINOPHILS ABSOLUTE: 0 K/UL (ref 0–0.6)
EOSINOPHILS RELATIVE PERCENT: 0 %
GFR AFRICAN AMERICAN: 40
GFR AFRICAN AMERICAN: 49
GFR NON-AFRICAN AMERICAN: 33
GFR NON-AFRICAN AMERICAN: 40
GLOBULIN: 3.1 G/DL
GLUCOSE BLD-MCNC: 115 MG/DL (ref 70–99)
GLUCOSE BLD-MCNC: 123 MG/DL (ref 70–99)
HCT VFR BLD CALC: 33.3 % (ref 40.5–52.5)
HEMOGLOBIN: 11.2 G/DL (ref 13.5–17.5)
INR BLD: 1.11 (ref 0.88–1.12)
LYMPHOCYTES ABSOLUTE: 0.4 K/UL (ref 1–5.1)
LYMPHOCYTES RELATIVE PERCENT: 4.5 %
Lab: ABNORMAL
MAGNESIUM: 2.1 MG/DL (ref 1.8–2.4)
MCH RBC QN AUTO: 28.8 PG (ref 26–34)
MCHC RBC AUTO-ENTMCNC: 33.6 G/DL (ref 31–36)
MCV RBC AUTO: 85.6 FL (ref 80–100)
METHADONE SCREEN, URINE: ABNORMAL
MONOCYTES ABSOLUTE: 0.7 K/UL (ref 0–1.3)
MONOCYTES RELATIVE PERCENT: 7.5 %
NEUTROPHILS ABSOLUTE: 8.6 K/UL (ref 1.7–7.7)
NEUTROPHILS RELATIVE PERCENT: 87.6 %
OPIATE SCREEN URINE: ABNORMAL
OSMOLALITY URINE: 563 MOSM/KG (ref 390–1070)
OXYCODONE URINE: ABNORMAL
PDW BLD-RTO: 17.4 % (ref 12.4–15.4)
PH UA: 6
PHENCYCLIDINE SCREEN URINE: ABNORMAL
PHOSPHORUS: 4.1 MG/DL (ref 2.5–4.9)
PLATELET # BLD: 262 K/UL (ref 135–450)
PMV BLD AUTO: 8.1 FL (ref 5–10.5)
POTASSIUM SERPL-SCNC: 4.6 MMOL/L (ref 3.5–5.1)
POTASSIUM SERPL-SCNC: 5.7 MMOL/L (ref 3.5–5.1)
PROPOXYPHENE SCREEN: ABNORMAL
PROTHROMBIN TIME: 12.6 SEC (ref 9.9–12.7)
RBC # BLD: 3.89 M/UL (ref 4.2–5.9)
SODIUM BLD-SCNC: 135 MMOL/L (ref 136–145)
SODIUM BLD-SCNC: 138 MMOL/L (ref 136–145)
SODIUM URINE: 49 MMOL/L
T4 FREE: 1.5 NG/DL (ref 0.9–1.8)
TOTAL PROTEIN: 6.4 G/DL (ref 6.4–8.2)
TROPONIN: 0.22 NG/ML
TSH REFLEX: 10.35 UIU/ML (ref 0.27–4.2)
UREA NITROGEN, UR: 661.8 MG/DL (ref 800–1666)
WBC # BLD: 9.8 K/UL (ref 4–11)

## 2021-08-21 PROCEDURE — 93306 TTE W/DOPPLER COMPLETE: CPT

## 2021-08-21 PROCEDURE — 36415 COLL VENOUS BLD VENIPUNCTURE: CPT

## 2021-08-21 PROCEDURE — 84300 ASSAY OF URINE SODIUM: CPT

## 2021-08-21 PROCEDURE — 82570 ASSAY OF URINE CREATININE: CPT

## 2021-08-21 PROCEDURE — 80053 COMPREHEN METABOLIC PANEL: CPT

## 2021-08-21 PROCEDURE — 84540 ASSAY OF URINE/UREA-N: CPT

## 2021-08-21 PROCEDURE — 6370000000 HC RX 637 (ALT 250 FOR IP): Performed by: INTERNAL MEDICINE

## 2021-08-21 PROCEDURE — 80307 DRUG TEST PRSMV CHEM ANLYZR: CPT

## 2021-08-21 PROCEDURE — 85610 PROTHROMBIN TIME: CPT

## 2021-08-21 PROCEDURE — 85025 COMPLETE CBC W/AUTO DIFF WBC: CPT

## 2021-08-21 PROCEDURE — 99291 CRITICAL CARE FIRST HOUR: CPT | Performed by: INTERNAL MEDICINE

## 2021-08-21 PROCEDURE — 83735 ASSAY OF MAGNESIUM: CPT

## 2021-08-21 PROCEDURE — 93308 TTE F-UP OR LMTD: CPT

## 2021-08-21 PROCEDURE — 84100 ASSAY OF PHOSPHORUS: CPT

## 2021-08-21 PROCEDURE — 2000000000 HC ICU R&B

## 2021-08-21 PROCEDURE — 84484 ASSAY OF TROPONIN QUANT: CPT

## 2021-08-21 PROCEDURE — 83935 ASSAY OF URINE OSMOLALITY: CPT

## 2021-08-21 PROCEDURE — 2580000003 HC RX 258: Performed by: INTERNAL MEDICINE

## 2021-08-21 PROCEDURE — 93005 ELECTROCARDIOGRAM TRACING: CPT | Performed by: INTERNAL MEDICINE

## 2021-08-21 RX ORDER — LEVOTHYROXINE SODIUM 0.03 MG/1
25 TABLET ORAL DAILY
Status: DISCONTINUED | OUTPATIENT
Start: 2021-08-21 | End: 2021-08-26 | Stop reason: HOSPADM

## 2021-08-21 RX ADMIN — SODIUM CHLORIDE, PRESERVATIVE FREE 20 ML: 5 INJECTION INTRAVENOUS at 08:48

## 2021-08-21 RX ADMIN — PANTOPRAZOLE SODIUM 40 MG: 40 TABLET, DELAYED RELEASE ORAL at 08:48

## 2021-08-21 RX ADMIN — SODIUM CHLORIDE, PRESERVATIVE FREE 10 ML: 5 INJECTION INTRAVENOUS at 22:17

## 2021-08-21 ASSESSMENT — PAIN SCALES - GENERAL
PAINLEVEL_OUTOF10: 0

## 2021-08-21 NOTE — ED PROVIDER NOTES
I independently examined and evaluated Myla Parker. 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 Patient reports his symptoms started last night. I was called to bedside due to heart rate at 17. Stat EKG obtained which is concerning for third-degree heart block. Patient moved to a critical resus room. He was placed on defibrillator pads. He was given atropine 0.5 mg times total of 4 doses. Heart rate initially improved from 17 to 24. He was started on 2 L IV fluid bolus. Blood pressure improved from 80 systolic initially to [phone]. Cardiology consulted. Awaiting cardiology consult, patient appeared to go into asystole for several seconds before regaining consciousness. Interventional cardiology, Dr. Cirilo Frazier to bedside. Patient started on dopamine drip and isoproterenol per his recommendation. Further EKGs obtained and appears to be third-degree heart block. Cath Lab activated. Patient taken to Cath Lab for pacemaker placement. I provided at least 30 minutes of critical care excluding separately billable procedures. All diagnostic, treatment, and disposition decisions were made by myself in conjunction with the advanced practice provider. For all further details of the patient's emergency department visit, please see the advanced practice provider's documentation. Comment: Please note this report has been produced using speech recognition software and may contain errors related to that system including errors in grammar, punctuation, and spelling, as well as words and phrases that may be inappropriate. If there are any questions or concerns please feel free to contact the dictating provider for clarification.         Brandon Kwon MD  08/20/21 2040

## 2021-08-21 NOTE — PROGRESS NOTES
Hospitalist Progress Note  8/21/2021 9:43 AM  Subjective:   Admit Date: 8/20/2021  PCP: MEI Graham CNP Status: Inpatient [101]  Interval History: Hospital Day: 2, admitted with bradycardia and hypotension 57/31 with third degree AV block requiring emergent insertion of temporary transvenous pacemaker and dopamine infusion. Eliquis and amiodarone held. Acute kidney injury secondary to hypoperfusion. History of present illness:  Patient has a h/o PAF. He also had an episode of pericarditis early this year. He has been following up with Dr. Senthil Ambriz, and Dr. Isrrael Ball. Patient also has a h/o right submandibular gland adenoid cystic carcinoma. He underwent adenoid cystic carcinoma of there right submandibular gland excision and right hemithyroidectomy 4/21/21. This was followed by local radiation therapy in July. ADULT DIET; Regular    Medications:     levothyroxine  25 mcg Oral Daily   pantoprazole  40 mg Oral QAM AC     Recent Labs     08/20/21 1718 08/21/21  0431   WBC 8.2 9.8   HGB 13.0* 11.2*    262   MCV 88.8 85.6     Recent Labs     08/20/21 1718 08/20/21  2342 08/21/21  0431    138 135*   K 5.3* 5.7* 4.6   CL 96* 103 103   CO2 16* 23 21   BUN 29* 29* 29*   CREATININE 2.9* 2.0* 1.7*   GLUCOSE 209* 123* 115*     Recent Labs     08/20/21 1718 08/21/21  0431   AST 83* 102*   ALT 78* 89*   BILITOT 0.5 0.4   ALKPHOS 127 102     Troponin T:   08/20/21 1718 08/21/21  0431   0.02* 0.22*     Pro-BNP:  6,152 pg/mL  INR: 1.11  ESR (8/20) 43 mm/hr  CRP (8/20) 12.4 mg/L  TSH (8/20) 10.35 uIU/mL  Free T4 (8/20) 1.5 ng/dL  Magnesium (8/20) 2.70, (8/21) 2.10 mg/dL    SARS-CoV-2 NAAT not detected     Urine drug screen (8/21) positive benzodiazepine     Portable CXR (8/20) No acute cardiopulmonary disease. Echocardiogram (8/21) Limited echo for LV function and pericardial effusion/EF. Normal left ventricle size and wall thickness. The left ventricular systolic function is normal at 55%. Abnormal (paradoxical) septal motion is present likely due to right ventricular pacing. Grade I diastolic dysfunction with normal filing pressure. The right ventricle is dilated with normal systolic function. Temp Pacer wire is visualized in the right ventricle. Bi-atrial enlargement. There is a well circumscribed echodensity that appears adherent to inter-atrial septum, adjacent to the anterior/septal tricuspid valve leaflets (images 19-22, 39). Findings consistent with intra-cardiac mass. Moderate tricuspid regurgitation. Systolic pulmonary artery pressure (SPAP) is elevated and estimated at 51  mmHg (right atrial pressure mmHg) consistent with moderate pulmonary hypertension. No significant pericardial effusion noted. Compared with the prior study performed 4/8/21, EF appears normalized with septal dyssynchrony in setting of TVP, no significant pericardial effusion, new finding of possible right atrial mass. Further cardiac imaging is  recommended to define intra-cardiac mass. Objective:   Vitals:  /56   Pulse 60   Temp 98.9 °F (oral)   Resp 16   Ht 5' 11\"  Wt 70.3 kg  SpO2 99%   BMI 21.62 kg/m²   General appearance: alert and cooperative with exam  HEENT:  Right IJ in place  Lungs: clear to auscultation bilaterally  Heart:  Regular rate and rhythm, HR 60, no appreciable murmur  Abdomen: soft, non-tender; bowel sounds normal; no masses,  no organomegaly  Extremities:  Right thumb mid amputation for melanoma, no edema  Neurologic: No obvious focal neurologic deficits. Assessment and Plan:   1. Sick sinus syndrome with third-degree AV block s/p right IJ transvenous pacemaker insertion (8/20, Dr Severo Koh) with back-up pacing st for 60 bpm.  Will need permanent transvenous pacemaker placed prior to discharge. 2. Paroxysmal atrial fibrillation:  Eliquis and amiodarone held admission. 3. Troponin T elevation secondary to demand ischemia and acute kidney injury.    4. Hypotension requiring dopamine infusion, resolved. 5. Acute kidney injury secondary to hypotension and hypoperfusion. Volume expansion with lactated ringers. 6. 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 He has also been taking amiodarone. Initiated on low dose levothyroxine 25 mcg daily.      Advance Directive: Full Code  DVT prophylaxis with intermittent compression  Discharge planning: pending transvenous pacemaker placement on Monday      Dmitry Kitchen MD, MD  [facility]

## 2021-08-21 NOTE — PROGRESS NOTES
CARDIOLOGY PROGRESS NOTE      Patient Name: Myla Parker  Date of admission: 8/20/2021  4:24 PM  Admission Dx: Complete heart block ([address] [I44.2]  Reason for Consult:  [facility]  Requesting Physician: Alphonso Winter MD  Primary Care physician: Lul Dickinson, MEI - KAVON    Subjective:     Myla Parker is a 74 y.o. 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 The patient reports that he went to bed feeling fine last night, but then felt very weak and nauseous when he woke up this morning. He also felt lightheaded and dizzy and says that he had multiple episodes of emesis. He denies any chest pain or shortness of breath. He is on amiodarone and Eliquis for his history of atrial fibrillation. He does not take any other AV pallavi blocking agents. He had a stress echocardiogram in 4/2021 that showed normal LV systolic function and no evidence of ischemia.     Patient was evaluated with Dr. Cirilo Frazier of interventional last evening. I did discuss the case with him by phone this morning. On review of chart and from discussion with interventional, on arrival to the ED, the patient's was profoundly bradycardic and hypotensive with BP of 57/31. Initial ECG showed third-degree AV block with ventricular escape rate in the 20s. He was bolused with IV fluids and reportedly received multiple doses of atropine with no improvement in heart rate. He was subsequently started on an IV dopamine infusion at 10 mcg/kg/min, but remained profoundly bradycardic and had multiple  episodes of asystole during which he would briefly lose consciousness. During one of these episodes, he was given 1 mg of epinephrine and his ventricular rate transiently increased to the 120s but this was transient.   Ultimately the patient was taken to the Cath Lab and a TVP was inserted with good result. Hemodynamics recovered. Weaned from dopamine. Discussed with nurse this morning, patient currently pacing at base rate 60. Echo was performed and reviewed. Patient feels well this AM. TVP site c/d/i. No chest pain. No dyspnea. No dizziness/recurrent syncope. No orthopnea/PND/LE edema. Home Medications:  Were reviewed and are listed in nursing record and/or below  Prior to Admission medications    Medication Sig Start Date End Date Taking? Authorizing Provider   amiodarone (CORDARONE) 200 MG tablet Take 0.5 tablets by mouth daily 6/30/21   MEI Boyer - CNP   apixaban (ELIQUIS) 5 MG TABS tablet Take 1 tablet by mouth 2 times daily 5/6/21   Derek Sawyer MD   omeprazole (PRILOSEC) 20 MG delayed release capsule Take 1 capsule by mouth every morning (before breakfast) 4/8/21   Cb Culver MD        CURRENT Medications:  levothyroxine (SYNTHROID) tablet 25 mcg, Daily  sodium chloride flush 0.9 % injection 5-40 mL, 2 times per day  sodium chloride flush 0.9 % injection 5-40 mL, PRN  0.9 % sodium chloride infusion, PRN  pantoprazole (PROTONIX) tablet 40 mg, QAM AC  sodium chloride flush 0.9 % injection 10 mL, PRN  0.9 % sodium chloride infusion, PRN  ondansetron (ZOFRAN) injection 4 mg, Q6H PRN  acetaminophen (TYLENOL) tablet 650 mg, Q4H PRN   Or  acetaminophen (TYLENOL) suppository 650 mg, Q4H PRN  melatonin tablet 3 mg, Nightly PRN  HYDROcodone-acetaminophen (NORCO) 7.5-325 MG per tablet 1 tablet, Q6H PRN  perflutren lipid microspheres (DEFINITY) injection 1.65 mg, ONCE PRN        Allergies:  Percocet [oxycodone-acetaminophen]     Review of Systems:   A 14 point review of symptoms completed. Pertinent positives identified in the HPI, all other review of symptoms negative.        Objective:     Vitals:    08/21/21 0800 08/21/21 0900 08/21/21 1000 08/21/21 1100   BP: 135/85 (!) 119/56 (!) 87/52 100/64   Pulse: 60 60 60 60   Resp: 16      Temp: 98.9 °F (37.2 °C)      TempSrc: Oral      SpO2: 99%   99%   Weight:       Height:            Weight: 155 lb (70.3 kg)       PHYSICAL EXAM:    General:  Alert, cooperative, no distress, appears stated age   Head:  Normocephalic, atraumatic   Eyes:  Conjunctiva/corneas clear, anicteric sclerae    Nose: Nares normal, no drainage or sinus tenderness   Throat: No abnormalities of the lips, oral mucosa or tongue. Neck: Trachea midline. Neck supple with no lymphadenopathy, thyroid not enlarged, symmetric, no tenderness/mass/nodules, TVP RIJ   Lungs:   Clear to auscultation bilaterally, no wheezes, no rales, no respiratory distress   Chest Wall:  No deformity or tenderness to palpation   Heart:  Regular rate and rhythm paced at 60 by monitor, normal S1, normal S2, no murmur, no rub, no S3/S4, PMI non-displaced. Abdomen:   Soft, non-tender, with normoactive bowel sounds. No masses, no hepatosplenomegaly   Extremities: No cyanosis, clubbing or pitting edema. Vascular: 2+ radial,  dorsalis pedis and posterior tibial pulses bilaterally. Brisk carotid upstrokes without carotid bruit. Skin: Skin color, texture, turgor are normal with no rashes or ulceration. Pysch: Euthymic mood, appropriate affect   Neurologic: Oriented to person, place and time. No slurred speech or facial asymmetry. No motor or sensory deficits on gross examination.          Labs:   CBC:   Lab Results   Component Value Date    WBC 9.8 08/21/2021    RBC 3.89 08/21/2021    HGB 11.2 08/21/2021    HCT 33.3 08/21/2021    MCV 85.6 08/21/2021    RDW 17.4 08/21/2021     08/21/2021     CMP:  Lab Results   Component Value Date     08/21/2021    K 4.6 08/21/2021    K 4.2 03/27/2021     08/21/2021    CO2 21 08/21/2021    BUN 29 08/21/2021    CREATININE 1.7 08/21/2021    GFRAA 49 08/21/2021    GFRAA >60 05/16/2011    AGRATIO 1.1 08/21/2021    LABGLOM 40 08/21/2021    GLUCOSE 115 08/21/2021    PROT 6.4 08/21/2021    PROT 7.4 05/16/2011    CALCIUM 8.8 08/21/2021    BILITOT 0.4 08/21/2021    ALKPHOS 102 08/21/2021     08/21/2021    ALT 89 08/21/2021     PT/INR:  No results found for: PTINR  HgBA1c:  Lab Results   Component Value Date    LABA1C 5.6 05/16/2018     Lab Results   Component Value Date    TROPONINI 0.22 (H) 08/21/2021         Interval Testing/Data:     Telemetry personally reviewed: V paced 60. EKG today personally reviewed, my interpretation: ventricular paced rhythm 60 beats per minute. All EKG in emergency department were personally reviewed and are reflective of: Third-degree AV block with ventricular escape. EKG from 1805 particularly worrisome with evidence of sinus tachycardia with failed conduction.     Echo:    TTE 8/21/21:   Summary   Limited echo for LV function and pericardial effusion/EF   Normal left ventricle size and wall thickness. The left ventricular systolic function is normal at 55%. Abnormal (paradoxical) septal motion is present likely due to right   ventricular pacing. Grade I diastolic dysfunction with normal filing pressure. The right   ventricle is dilated with normal systolic function. Temp Pacer wire is visualized in the right ventricle. Bi-atrial enlargement. There is a well circumscribed echodensity that appears adherent to   inter-atrial septum, adjacent to the anterior/septal tricuspid valve   leaflets (images 19-22, 39). Findings consistent with intra-cardiac mass. Moderate tricuspid regurgitation. Systolic pulmonary artery pressure (SPAP) is elevated and estimated at 51   mmHg (right atrial pressure mmHg) consistent with moderate pulmonary   hypertension. No significant pericardial effusion noted. Compared with the prior study performed 4/8/21, EF appears normalized with   septal dyssynchrony in setting of TVP, no significant pericardial effusion,   new finding of possible right atrial mass. Further cardiac imaging is   recommended to define intra-cardiac mass.            TTE 4/8/21:  Conclusions    Summary   -Technically difficult exam due to atrial fibrillation and rapid heart rate.   -Borderline global systolic function with an ejection fraction estimated at   50%.  -Left ventricular function and wall motion assessment was very limited due   to atrial fibrillation and rapid heart rate.   -Mild biatrial enlargement.   -Mild mitral regurgitation.   -The right ventricle is borderline enlarged with borderline function.   -Moderate tricuspid regurgitation with a right ventricular systolic pressure   estimated at 39 mmHg assuming a right atrial pressure of 15 mmHg.   -Mild pulmonic regurgitation present.   -IVC size is dilated (>2.1 cm) and collapses < 50% with respiration   consistent with elevated RA pressure (15 mmHg).  -Small size pericardial effusion.   -Indeterminate diastolic function due to atrial fibrillation.     Stress Test:  Exercise stress echo 4/15/21:  Conclusions      Summary   Normal stress ECHO.      Signature      ------------------------------------------------------------------   Electronically signed by Kendrick Mullins MD, [facility] (Interpreting   physician) on 04/16/2021 at 02:49 PM   ------------------------------------------------------------------      Rest      ECG   Normal sinus rhythm.      Standing HR:49 bpmStanding BP:118/90 mmHg      Stress      Stress Type: Exercise      Rest HR: 49 bpm                 HR Response: Normal   Rest BP: 118/90 mmHg            BP Response: Normal   Stress Peak HR: 114 bpm         HR BP Product: [id number]   Stress Peak BP: 140/60 mmHg   Predicted HR: 152 bpm           Stress EF: 70 %   % of predicted HR: 75   Reason for Termination: Dyspnea      Results      Echo (rest): Normal (LVEF >50%)      Echo (stress): Normal (LVEF >50%)      Echo   Baseline resting echocardiogram shows normal global LV systolic function   with an ejection fraction of 55-60% and uniform myocardial segmental wall   motion.  Following stress there was uniform augmentation of all myocardial   segments with appropriate hyperdynamic LV systolic response to stress and an   ejection fraction of 70%. Impression and Plan      1. Symptomatic third-degree AV block,  2. Syncope  3. Hypotension in setting of #1  4. Paroxysmal atrial fibrillation  5. Normal LV function by echocardiogram with finding of possible right atrial mass. Unclear at the given time how this may be related to #1  6. Acute kidney injury secondary to hypoperfusion  7. Troponin elevation secondary to type 2 demand ischemia and further exacerbated by delayed cardiac enzyme clearance in setting of acute kidney injury  8. History of pericarditis  9. 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 Continue TVP at present settings - allow renal recovery, BP stable, mentating. Noted CXR read, but appears pacing intact/location appropriate. 2. Plan for CASSIDY Monday to further evaluate possible cardiac mass inter-atrial septum, ? Relation to presentation given proximity to AVN potentially  3. Subclinical hypothyroidism in setting of partial thyroidectomy, agree with treatment and did reinforce this with the patient. 4. Continue to hold amiodarone  5. Holding anti-coagulation at this time due to need for permanent pacemaker implantation; plan for eval of possible intra-cardiac mass as above but no AC for now. 6. Oral rehydration       Will follow.      > 30 min crit care

## 2021-08-21 NOTE — PROGRESS NOTES
Physician Progress Note      PATIENT:               Spenser Wynn  CSN #:                  [id number]  :                       1955  ADMIT DATE:       2021 4:24 PM  [facility] DATE:  RESPONDING  PROVIDER #:        Kelle Mcgarry MD          QUERY TEXT:    Pt admitted with complete heart block requiring emergent pacing. Pt noted to   have hypotension on arrival requiring dopamine infusion and resulting in JOEL   and episodes LOC. If possible, please document in the progress notes and   discharge summary if you are evaluating and/or treating any of the following: The medical record reflects the following:  Risk Factors: complete heart block  Clinical Indicators: on arrival bp 59/31, hr 20s. Cardiology notes:    \"remained profoundly bradycardic and had multiple several second episodes of   asystole during which he would briefly lose consciousness. \"  assessment notes   pt is \"pale, diaphoretic, anxious\"  current notes: 1. Hypotension requiring dopamine infusion, resolved. 2. Acute kidney injury secondary to hypotension and hypoperfusion. Treatment: volume expansion, atropine, dopamine, emergent pacing, monitoring,   serial labs, supportive care    Thank you,  Alexi Harvey RN [facility]  [phone]  Options provided:  -- Cardiogenic Shock  -- Hypotension without Shock  -- Other - I will add my own diagnosis  -- Disagree - Not applicable / Not valid  -- Disagree - Clinically unable to determine / Unknown  -- Refer to Clinical Documentation Reviewer    PROVIDER RESPONSE TEXT:    This patient had cardiogenic shock. Query created by:  Mobileye on 2021 11:48 AM      Electronically signed by:  Kelle Mcgarry MD 2021 12:35 PM

## 2021-08-21 NOTE — CONSULTS
cuff repair (Bilateral); Colonoscopy; Thumb amputation; Ankle surgery (Left); hernia repair (Right, 06/18/15); and Dental surgery (2021). Social History:   reports that he has never smoked. He has never used smokeless tobacco. He reports current alcohol use of about 2.0 - 3.0 standard drinks of alcohol per week. He reports that he does not use drugs. Family History:  family history includes Alzheimer's Disease in his father; Heart Disease in his father; Stroke in his mother. Home Medications:  Were reviewed and are listed in nursing record and/or below  Prior to Admission medications    Medication Sig Start Date End Date Taking? Authorizing Provider   amiodarone (CORDARONE) 200 MG tablet Take 0.5 tablets by mouth daily 6/30/21   MEI Capone - CNP   apixaban (ELIQUIS) 5 MG TABS tablet Take 1 tablet by mouth 2 times daily 5/6/21   Lashaun Fuchs MD   omeprazole (PRILOSEC) 20 MG delayed release capsule Take 1 capsule by mouth every morning (before breakfast) 4/8/21   Adebayo Echevarria MD        CURRENT Medications:  sodium chloride flush 0.9 % injection 5-40 mL, 2 times per day  sodium chloride flush 0.9 % injection 5-40 mL, PRN  0.9 % sodium chloride infusion, PRN  [START ON 8/21/2021] pantoprazole (PROTONIX) tablet 40 mg, QAM AC  sodium chloride flush 0.9 % injection 10 mL, PRN  0.9 % sodium chloride infusion, PRN  ondansetron (ZOFRAN) injection 4 mg, Q6H PRN  acetaminophen (TYLENOL) tablet 650 mg, Q4H PRN   Or  acetaminophen (TYLENOL) suppository 650 mg, Q4H PRN  melatonin tablet 3 mg, Nightly PRN  HYDROcodone-acetaminophen (NORCO) 7.5-325 MG per tablet 1 tablet, Q6H PRN  perflutren lipid microspheres (DEFINITY) injection 1.65 mg, ONCE PRN  lactated ringers infusion 2,000 mL, Once        Allergies:  Percocet [oxycodone-acetaminophen]     Review of Systems:   Review of Systems   Constitutional: Negative for chills and fever. HENT: Negative for congestion, rhinorrhea and sore throat.     Eyes: Negative for photophobia and visual disturbance. Respiratory: Negative for cough and shortness of breath. Cardiovascular: Negative for chest pain, palpitations and leg swelling. Gastrointestinal: Positive for nausea and vomiting. Negative for abdominal pain, blood in stool, constipation and diarrhea. Endocrine: Negative for cold intolerance and heat intolerance. Genitourinary: Negative for difficulty urinating, dysuria and hematuria. Musculoskeletal: Negative for arthralgias, joint swelling and myalgias. Skin: Negative for rash and wound. Allergic/Immunologic: Negative for environmental allergies and food allergies. Neurological: Positive for dizziness, syncope and light-headedness. Hematological: Negative for adenopathy. Does not bruise/bleed easily. Psychiatric/Behavioral: Negative for dysphoric mood. The patient is not nervous/anxious. Objective   PHYSICAL EXAM:    Vitals:    08/20/21 1824   BP: (!) 115/41   Pulse: (!) 37   Resp: 17   Temp:    SpO2: 94%    Weight: 155 lb (70.3 kg)       General: Pale, diaphoretic, anxious adult male lying in bed. HEENT: Normocephalic, atraumatic, non-icteric, hearing intact, nares normal, mucous membranes moist.  Neck: Supple, trachea midline. No adenopathy. No thyromegaly. No JVD. Heart: Profound bradycardic with regular rhythm. Normal S1 and S2. No murmurs, gallops or rubs. Lungs: Normal respiratory effort. Clear to auscultation bilaterally. No wheezes, rales, or rhonchi. Abdomen: Soft, non-tender. Normoactive bowel sounds. No masses or organomegaly. Skin: No rashes, wounds, or lesions. Pulses: 2+ and symmetric. Extremities: No clubbing, cyanosis, or edema. Musculoskeletal: 5/5 strength in upper and lower extremities. Psych: Normal mood and affect. Neuro: Alert and oriented to person, place, and time. No focal deficits noted.       Labs   CBC:   Lab Results   Component Value Date    WBC 8.2 08/20/2021    RBC 4.48 08/20/2021    HGB 13.0 08/20/2021    HCT 39.7 08/20/2021    MCV 88.8 08/20/2021    RDW 17.4 08/20/2021     08/20/2021     CMP:  Lab Results   Component Value Date     08/20/2021    K 5.3 08/20/2021    K 4.2 03/27/2021    CL 96 08/20/2021    CO2 16 08/20/2021    BUN 29 08/20/2021    CREATININE 2.9 08/20/2021    GFRAA 26 08/20/2021    GFRAA >60 05/16/2011    AGRATIO 1.0 08/20/2021    LABGLOM 22 08/20/2021    GLUCOSE 209 08/20/2021    PROT 8.1 08/20/2021    PROT 7.4 05/16/2011    CALCIUM 9.8 08/20/2021    BILITOT 0.5 08/20/2021    ALKPHOS 127 08/20/2021    AST 83 08/20/2021    ALT 78 08/20/2021     PT/INR:  No results found for: PTINR  HgBA1c:  Lab Results   Component Value Date    LABA1C 5.6 05/16/2018     Lab Results   Component Value Date    TROPONINI 0.02 (H) 08/20/2021         Cardiac Data     EKG: Third-degree AV block with ventricular escape rate in 20s    Echo:  TTE 4/8/21:  Conclusions    Summary   -Technically difficult exam due to atrial fibrillation and rapid heart rate. -Borderline global systolic function with an ejection fraction estimated at   50%. -Left ventricular function and wall motion assessment was very limited due   to atrial fibrillation and rapid heart rate. -Mild biatrial enlargement. -Mild mitral regurgitation.   -The right ventricle is borderline enlarged with borderline function.   -Moderate tricuspid regurgitation with a right ventricular systolic pressure   estimated at 39 mmHg assuming a right atrial pressure of 15 mmHg. -Mild pulmonic regurgitation present. -IVC size is dilated (>2.1 cm) and collapses < 50% with respiration   consistent with elevated RA pressure (15 mmHg). -Small size pericardial effusion.   -Indeterminate diastolic function due to atrial fibrillation. Stress Test:  Exercise stress echo 4/15/21:  Conclusions      Summary   Normal stress ECHO.       Signature      ------------------------------------------------------------------   Electronically signed by Rona Dowell, Donell Simeon MD, [facility] (Interpreting   physician) on 04/16/2021 at 02:49 PM   ------------------------------------------------------------------      Rest      ECG   Normal sinus rhythm. Standing HR:49 bpmStanding BP:118/90 mmHg      Stress      Stress Type: Exercise      Rest HR: 49 bpm                 HR Response: Normal   Rest BP: 118/90 mmHg            BP Response: Normal   Stress Peak HR: 114 bpm         HR BP Product: [id number]   Stress Peak BP: 140/60 mmHg   Predicted HR: 152 bpm           Stress EF: 70 %   % of predicted HR: 75   Reason for Termination: Dyspnea      Results      Echo (rest): Normal (LVEF >50%)      Echo (stress): Normal (LVEF >50%)      Echo   Baseline resting echocardiogram shows normal global LV systolic function   with an ejection fraction of 55-60% and uniform myocardial segmental wall   motion. Following stress there was uniform augmentation of all myocardial   segments with appropriate hyperdynamic LV systolic response to stress and an   ejection fraction of 70%. Cath: N/A    Other Studies:   Chest X-ray 8/20/2021:  1. No acute cardiopulmonary disease. Assessment:      1. Symptomatic third-degree AV block  2. Sick sinus syndrome  3. Hypotension  4. Paroxysmal atrial fibrillation  5. Acute kidney injury  6. Mild troponin T elevation secondary to type 2 demand ischemia and further exacerbated by delayed cardiac enzyme clearance in setting of acute kidney injury  7. H/o pericarditis  8. 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 Proceed to cardiac cath lab for urgent temporary transvenous pacemaker placement. 2. Continue IV dopamine infusion en route to cath lab. Thank you for allowing us to participate in the care of Anastasia Salamanca. Please call me with any questions [phone].       Paige Paul, [address] [facility]  [phone] [facility]  [phone] [address] [place] [address]  8/20/2021 8:48 PM      I will address the patient's cardiac risk factors and adjusted pharmacologic treatment as needed. In addition, I have reinforced the need for patient directed risk factor modification. All questions and concerns were addressed to the patient/family. Alternatives to my treatment were discussed. The note was completed using EMR. Every effort was made to ensure accuracy; however, inadvertent computerized transcription errors may be present.

## 2021-08-22 LAB
A/G RATIO: 1 (ref 1.1–2.2)
ALBUMIN SERPL-MCNC: 3 G/DL (ref 3.4–5)
ALP BLD-CCNC: 87 U/L (ref 40–129)
ALT SERPL-CCNC: 90 U/L (ref 10–40)
ANION GAP SERPL CALCULATED.3IONS-SCNC: 9 MMOL/L (ref 3–16)
AST SERPL-CCNC: 65 U/L (ref 15–37)
BASOPHILS ABSOLUTE: 0 K/UL (ref 0–0.2)
BASOPHILS RELATIVE PERCENT: 0.5 %
BILIRUB SERPL-MCNC: 0.3 MG/DL (ref 0–1)
BUN BLDV-MCNC: 25 MG/DL (ref 7–20)
CALCIUM SERPL-MCNC: 8.6 MG/DL (ref 8.3–10.6)
CHLORIDE BLD-SCNC: 103 MMOL/L (ref 99–110)
CO2: 24 MMOL/L (ref 21–32)
CREAT SERPL-MCNC: 1.4 MG/DL (ref 0.8–1.3)
EKG ATRIAL RATE: 0 BPM
EKG ATRIAL RATE: 0 BPM
EKG ATRIAL RATE: 107 BPM
EKG ATRIAL RATE: 115 BPM
EKG ATRIAL RATE: 44 BPM
EKG ATRIAL RATE: 48 BPM
EKG ATRIAL RATE: 55 BPM
EKG ATRIAL RATE: 59 BPM
EKG ATRIAL RATE: 62 BPM
EKG ATRIAL RATE: 81 BPM
EKG ATRIAL RATE: 89 BPM
EKG ATRIAL RATE: 96 BPM
EKG DIAGNOSIS: NORMAL
EKG P AXIS: 62 DEGREES
EKG P AXIS: 72 DEGREES
EKG P AXIS: 99 DEGREES
EKG P-R INTERVAL: 106 MS
EKG Q-T INTERVAL: 224 MS
EKG Q-T INTERVAL: 252 MS
EKG Q-T INTERVAL: 304 MS
EKG Q-T INTERVAL: 448 MS
EKG Q-T INTERVAL: 522 MS
EKG Q-T INTERVAL: 548 MS
EKG Q-T INTERVAL: 568 MS
EKG Q-T INTERVAL: 592 MS
EKG Q-T INTERVAL: 600 MS
EKG Q-T INTERVAL: 686 MS
EKG Q-T INTERVAL: 688 MS
EKG Q-T INTERVAL: 694 MS
EKG QRS DURATION: 126 MS
EKG QRS DURATION: 132 MS
EKG QRS DURATION: 150 MS
EKG QRS DURATION: 160 MS
EKG QRS DURATION: 160 MS
EKG QRS DURATION: 170 MS
EKG QRS DURATION: 172 MS
EKG QRS DURATION: 18 MS
EKG QRS DURATION: 180 MS
EKG QRS DURATION: 180 MS
EKG QRS DURATION: 4 MS
EKG QRS DURATION: 4 MS
EKG QTC CALCULATION (BAZETT): 182 MS
EKG QTC CALCULATION (BAZETT): 316 MS
EKG QTC CALCULATION (BAZETT): 413 MS
EKG QTC CALCULATION (BAZETT): 416 MS
EKG QTC CALCULATION (BAZETT): 507 MS
EKG QTC CALCULATION (BAZETT): 524 MS
EKG QTC CALCULATION (BAZETT): 563 MS
EKG QTC CALCULATION (BAZETT): 565 MS
EKG QTC CALCULATION (BAZETT): 574 MS
EKG QTC CALCULATION (BAZETT): 586 MS
EKG QTC CALCULATION (BAZETT): 592 MS
EKG QTC CALCULATION (BAZETT): 619 MS
EKG R AXIS: -48 DEGREES
EKG R AXIS: -59 DEGREES
EKG R AXIS: -67 DEGREES
EKG R AXIS: -82 DEGREES
EKG R AXIS: 0 DEGREES
EKG R AXIS: 0 DEGREES
EKG R AXIS: 103 DEGREES
EKG R AXIS: 105 DEGREES
EKG R AXIS: 105 DEGREES
EKG R AXIS: 75 DEGREES
EKG R AXIS: 98 DEGREES
EKG R AXIS: 98 DEGREES
EKG T AXIS: -42 DEGREES
EKG T AXIS: -62 DEGREES
EKG T AXIS: -68 DEGREES
EKG T AXIS: -72 DEGREES
EKG T AXIS: -86 DEGREES
EKG T AXIS: 119 DEGREES
EKG T AXIS: 211 DEGREES
EKG T AXIS: 244 DEGREES
EKG T AXIS: 253 DEGREES
EKG T AXIS: 33 DEGREES
EKG T AXIS: 75 DEGREES
EKG T AXIS: 91 DEGREES
EKG VENTRICULAR RATE: 111 BPM
EKG VENTRICULAR RATE: 115 BPM
EKG VENTRICULAR RATE: 22 BPM
EKG VENTRICULAR RATE: 40 BPM
EKG VENTRICULAR RATE: 40 BPM
EKG VENTRICULAR RATE: 44 BPM
EKG VENTRICULAR RATE: 48 BPM
EKG VENTRICULAR RATE: 55 BPM
EKG VENTRICULAR RATE: 55 BPM
EKG VENTRICULAR RATE: 60 BPM
EKG VENTRICULAR RATE: 70 BPM
EKG VENTRICULAR RATE: 95 BPM
EOSINOPHILS ABSOLUTE: 0 K/UL (ref 0–0.6)
EOSINOPHILS RELATIVE PERCENT: 0.4 %
GFR AFRICAN AMERICAN: >60
GFR NON-AFRICAN AMERICAN: 50
GLOBULIN: 3.1 G/DL
GLUCOSE BLD-MCNC: 99 MG/DL (ref 70–99)
HCT VFR BLD CALC: 34.6 % (ref 40.5–52.5)
HEMOGLOBIN: 11.6 G/DL (ref 13.5–17.5)
INR BLD: 1.06 (ref 0.88–1.12)
LYMPHOCYTES ABSOLUTE: 0.7 K/UL (ref 1–5.1)
LYMPHOCYTES RELATIVE PERCENT: 8.1 %
MAGNESIUM: 2 MG/DL (ref 1.8–2.4)
MCH RBC QN AUTO: 28.8 PG (ref 26–34)
MCHC RBC AUTO-ENTMCNC: 33.4 G/DL (ref 31–36)
MCV RBC AUTO: 86.1 FL (ref 80–100)
MONOCYTES ABSOLUTE: 0.6 K/UL (ref 0–1.3)
MONOCYTES RELATIVE PERCENT: 6.9 %
NEUTROPHILS ABSOLUTE: 7.5 K/UL (ref 1.7–7.7)
NEUTROPHILS RELATIVE PERCENT: 84.1 %
PDW BLD-RTO: 17.8 % (ref 12.4–15.4)
PHOSPHORUS: 2.5 MG/DL (ref 2.5–4.9)
PLATELET # BLD: 222 K/UL (ref 135–450)
PMV BLD AUTO: 8.6 FL (ref 5–10.5)
POTASSIUM SERPL-SCNC: 4.3 MMOL/L (ref 3.5–5.1)
PROTHROMBIN TIME: 12 SEC (ref 9.9–12.7)
RBC # BLD: 4.02 M/UL (ref 4.2–5.9)
SEDIMENTATION RATE, ERYTHROCYTE: 41 MM/HR (ref 0–20)
SODIUM BLD-SCNC: 136 MMOL/L (ref 136–145)
TOTAL PROTEIN: 6.1 G/DL (ref 6.4–8.2)
WBC # BLD: 8.9 K/UL (ref 4–11)

## 2021-08-22 PROCEDURE — 2000000000 HC ICU R&B

## 2021-08-22 PROCEDURE — 85610 PROTHROMBIN TIME: CPT

## 2021-08-22 PROCEDURE — 86140 C-REACTIVE PROTEIN: CPT

## 2021-08-22 PROCEDURE — 6370000000 HC RX 637 (ALT 250 FOR IP): Performed by: INTERNAL MEDICINE

## 2021-08-22 PROCEDURE — 86431 RHEUMATOID FACTOR QUANT: CPT

## 2021-08-22 PROCEDURE — 99291 CRITICAL CARE FIRST HOUR: CPT | Performed by: INTERNAL MEDICINE

## 2021-08-22 PROCEDURE — 36415 COLL VENOUS BLD VENIPUNCTURE: CPT

## 2021-08-22 PROCEDURE — 2580000003 HC RX 258: Performed by: INTERNAL MEDICINE

## 2021-08-22 PROCEDURE — 80053 COMPREHEN METABOLIC PANEL: CPT

## 2021-08-22 PROCEDURE — 83735 ASSAY OF MAGNESIUM: CPT

## 2021-08-22 PROCEDURE — 85025 COMPLETE CBC W/AUTO DIFF WBC: CPT

## 2021-08-22 PROCEDURE — 84100 ASSAY OF PHOSPHORUS: CPT

## 2021-08-22 PROCEDURE — 85652 RBC SED RATE AUTOMATED: CPT

## 2021-08-22 RX ADMIN — SODIUM CHLORIDE, PRESERVATIVE FREE 10 ML: 5 INJECTION INTRAVENOUS at 08:38

## 2021-08-22 RX ADMIN — PANTOPRAZOLE SODIUM 40 MG: 40 TABLET, DELAYED RELEASE ORAL at 06:03

## 2021-08-22 RX ADMIN — SODIUM CHLORIDE, PRESERVATIVE FREE 10 ML: 5 INJECTION INTRAVENOUS at 20:53

## 2021-08-22 RX ADMIN — ACETAMINOPHEN 650 MG: 325 TABLET ORAL at 11:52

## 2021-08-22 RX ADMIN — LEVOTHYROXINE SODIUM 25 MCG: 0.03 TABLET ORAL at 06:04

## 2021-08-22 ASSESSMENT — PAIN SCALES - GENERAL
PAINLEVEL_OUTOF10: 0
PAINLEVEL_OUTOF10: 0
PAINLEVEL_OUTOF10: 8
PAINLEVEL_OUTOF10: 0
PAINLEVEL_OUTOF10: 8
PAINLEVEL_OUTOF10: 0

## 2021-08-22 NOTE — PROGRESS NOTES
CARDIOLOGY PROGRESS NOTE      Patient Name: Krish Potts  Date of admission: 8/20/2021  4:24 PM  Admission Dx: Complete heart block ([address] [I44.2]  Reason for Consult:  [facility]  Requesting Physician: Alan Flores MD  Primary Care physician: MEI Graham - KAVON    Subjective:     Krish Potts is a 74 y.o. 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 The patient reports that he went to bed feeling fine last night, but then felt very weak and nauseous when he woke up this morning. He also felt lightheaded and dizzy and says that he had multiple episodes of emesis. He denies any chest pain or shortness of breath. He is on amiodarone and Eliquis for his history of atrial fibrillation. He does not take any other AV pallavi blocking agents. He had a stress echocardiogram in 4/2021 that showed normal LV systolic function and no evidence of ischemia.     Patient was evaluated with Dr. Valentina Smith of interventional last evening. I did discuss the case with him by phone this morning. On review of chart and from discussion with interventional, on arrival to the ED, the patient's was profoundly bradycardic and hypotensive with BP of 57/31. Initial ECG showed third-degree AV block with ventricular escape rate in the 20s. He was bolused with IV fluids and reportedly received multiple doses of atropine with no improvement in heart rate. He was subsequently started on an IV dopamine infusion at 10 mcg/kg/min, but remained profoundly bradycardic and had multiple  episodes of asystole during which he would briefly lose consciousness. During one of these episodes, he was given 1 mg of epinephrine and his ventricular rate transiently increased to the 120s but this was transient.   Ultimately the patient was taken to the Cath Lab and a TVP was inserted with good result. Hemodynamics recovered. Weaned from dopamine. Patient feels well this AM. TVP site c/d/i. No chest pain. No dyspnea. No dizziness/recurrent syncope. No orthopnea/PND/LE edema. Continues to ride lower limit 60/paced without recovery. Home Medications:  Were reviewed and are listed in nursing record and/or below  Prior to Admission medications    Medication Sig Start Date End Date Taking? Authorizing Provider   amiodarone (CORDARONE) 200 MG tablet Take 0.5 tablets by mouth daily 6/30/21   MEI Jack - CNP   apixaban (ELIQUIS) 5 MG TABS tablet Take 1 tablet by mouth 2 times daily 5/6/21   Glendy Xavier MD   omeprazole (PRILOSEC) 20 MG delayed release capsule Take 1 capsule by mouth every morning (before breakfast) 4/8/21   Jl Morton MD        CURRENT Medications:  levothyroxine (SYNTHROID) tablet 25 mcg, Daily  sodium chloride flush 0.9 % injection 5-40 mL, 2 times per day  sodium chloride flush 0.9 % injection 5-40 mL, PRN  0.9 % sodium chloride infusion, PRN  pantoprazole (PROTONIX) tablet 40 mg, QAM AC  sodium chloride flush 0.9 % injection 10 mL, PRN  0.9 % sodium chloride infusion, PRN  ondansetron (ZOFRAN) injection 4 mg, Q6H PRN  acetaminophen (TYLENOL) tablet 650 mg, Q4H PRN   Or  acetaminophen (TYLENOL) suppository 650 mg, Q4H PRN  melatonin tablet 3 mg, Nightly PRN  HYDROcodone-acetaminophen (NORCO) 7.5-325 MG per tablet 1 tablet, Q6H PRN  perflutren lipid microspheres (DEFINITY) injection 1.65 mg, ONCE PRN        Allergies:  Percocet [oxycodone-acetaminophen]     Review of Systems:   A 14 point review of symptoms completed. Pertinent positives identified in the HPI, all other review of symptoms negative.        Objective:     Vitals:    08/22/21 0600 08/22/21 0800 08/22/21 0900 08/22/21 1000   BP: (!) 156/84 135/79 (!) 156/81 106/67   Pulse: 60 60 60 60   Resp:  16     Temp:  98.7 °F (37.1 °C)     TempSrc:  Oral     SpO2:  99%     Weight:       Height: Weight: 155 lb (70.3 kg)       PHYSICAL EXAM:    General:  Alert, cooperative, no distress, appears stated age   Head:  Normocephalic, atraumatic   Eyes:  Conjunctiva/corneas clear, anicteric sclerae    Nose: Nares normal, no drainage or sinus tenderness   Throat: No abnormalities of the lips, oral mucosa or tongue. Neck: Trachea midline. Neck supple with no lymphadenopathy, thyroid not enlarged, symmetric, no tenderness/mass/nodules, TVP RIJ   Lungs:   Clear to auscultation bilaterally, no wheezes, no rales, no respiratory distress   Chest Wall:  No deformity or tenderness to palpation   Heart:  Regular rate and rhythm paced at 60 by monitor, normal S1, normal S2, no murmur, no rub, no S3/S4, PMI non-displaced. Abdomen:   Soft, non-tender, with normoactive bowel sounds. No masses, no hepatosplenomegaly   Extremities: No cyanosis, clubbing or pitting edema. Vascular: 2+ radial,  dorsalis pedis and posterior tibial pulses bilaterally. Brisk carotid upstrokes without carotid bruit. Skin: Skin color, texture, turgor are normal with no rashes or ulceration. Pysch: Euthymic mood, appropriate affect   Neurologic: Oriented to person, place and time. No slurred speech or facial asymmetry. No motor or sensory deficits on gross examination.          Labs:   CBC:   Lab Results   Component Value Date    WBC 8.9 08/22/2021    RBC 4.02 08/22/2021    HGB 11.6 08/22/2021    HCT 34.6 08/22/2021    MCV 86.1 08/22/2021    RDW 17.8 08/22/2021     08/22/2021     CMP:  Lab Results   Component Value Date     08/22/2021    K 4.3 08/22/2021    K 4.2 03/27/2021     08/22/2021    CO2 24 08/22/2021    BUN 25 08/22/2021    CREATININE 1.4 08/22/2021    GFRAA >60 08/22/2021    GFRAA >60 05/16/2011    AGRATIO 1.0 08/22/2021    LABGLOM 50 08/22/2021    GLUCOSE 99 08/22/2021    PROT 6.1 08/22/2021    PROT 7.4 05/16/2011    CALCIUM 8.6 08/22/2021    BILITOT 0.3 08/22/2021    ALKPHOS 87 08/22/2021    AST 65 08/22/2021 ALT 90 08/22/2021     PT/INR:  No results found for: PTINR  HgBA1c:  Lab Results   Component Value Date    LABA1C 5.6 05/16/2018     Lab Results   Component Value Date    TROPONINI 0.22 (H) 08/21/2021         Interval Testing/Data:     Telemetry personally reviewed: V paced 60. EKG 8/21 personally reviewed, my interpretation: ventricular paced rhythm 60 beats per minute. All EKG in emergency department were personally reviewed and are reflective of: Third-degree AV block with ventricular escape. EKG from 1805 particularly worrisome with evidence of sinus tachycardia with failed conduction.     Echo:    TTE 8/21/21:   Summary   Limited echo for LV function and pericardial effusion/EF   Normal left ventricle size and wall thickness. The left ventricular systolic function is normal at 55%. Abnormal (paradoxical) septal motion is present likely due to right   ventricular pacing. Grade I diastolic dysfunction with normal filing pressure. The right   ventricle is dilated with normal systolic function. Temp Pacer wire is visualized in the right ventricle. Bi-atrial enlargement. There is a well circumscribed echodensity that appears adherent to   inter-atrial septum, adjacent to the anterior/septal tricuspid valve   leaflets (images 19-22, 39). Findings consistent with intra-cardiac mass. Moderate tricuspid regurgitation. Systolic pulmonary artery pressure (SPAP) is elevated and estimated at 51   mmHg (right atrial pressure mmHg) consistent with moderate pulmonary   hypertension. No significant pericardial effusion noted. Compared with the prior study performed 4/8/21, EF appears normalized with   septal dyssynchrony in setting of TVP, no significant pericardial effusion,   new finding of possible right atrial mass. Further cardiac imaging is   recommended to define intra-cardiac mass.            TTE 4/8/21:  Conclusions    Summary   -Technically difficult exam due to atrial fibrillation and rapid heart rate.   -Borderline global systolic function with an ejection fraction estimated at   50%.  -Left ventricular function and wall motion assessment was very limited due   to atrial fibrillation and rapid heart rate.   -Mild biatrial enlargement.   -Mild mitral regurgitation.   -The right ventricle is borderline enlarged with borderline function.   -Moderate tricuspid regurgitation with a right ventricular systolic pressure   estimated at 39 mmHg assuming a right atrial pressure of 15 mmHg.   -Mild pulmonic regurgitation present.   -IVC size is dilated (>2.1 cm) and collapses < 50% with respiration   consistent with elevated RA pressure (15 mmHg).  -Small size pericardial effusion.   -Indeterminate diastolic function due to atrial fibrillation.     Stress Test:  Exercise stress echo 4/15/21:  Conclusions      Summary   Normal stress ECHO.      Signature      ------------------------------------------------------------------   Electronically signed by Danielle Mccabe MD, [facility] (Interpreting   physician) on 04/16/2021 at 02:49 PM   ------------------------------------------------------------------      Rest      ECG   Normal sinus rhythm.      Standing HR:49 bpmStanding BP:118/90 mmHg      Stress      Stress Type: Exercise      Rest HR: 49 bpm                 HR Response: Normal   Rest BP: 118/90 mmHg            BP Response: Normal   Stress Peak HR: 114 bpm         HR BP Product: 34475   Stress Peak BP: 140/60 mmHg   Predicted HR: 152 bpm           Stress EF: 70 %   % of predicted HR: 75   Reason for Termination: Dyspnea      Results      Echo (rest): Normal (LVEF >50%)      Echo (stress): Normal (LVEF >50%)      Echo   Baseline resting echocardiogram shows normal global LV systolic function   with an ejection fraction of 55-60% and uniform myocardial segmental wall   motion.  Following stress there was uniform augmentation of all myocardial   segments with appropriate hyperdynamic LV systolic response to stress risks/benefits discussed with patient/family . Will follow. > 30 min crit care time spent in consultation with the patient, family, chart review, review of his echo imaging to date and evaluation and management. I will address the patient's cardiac risk factors and adjusted pharmacologic treatment as needed. In addition, I have reinforced the need for patient directed risk factor modification. All questions and concerns were addressed to the patient/family. Alternatives to my treatment were discussed. Thank you for allowing us to participate in the care of Ran Moon. Please call me with any questions [phone].     John Soria MD, [facility]  Cardiovascular Disease  [facility]  [phone] [facility]  [phone] [address] [place] [address]  8/22/2021 11:37 AM

## 2021-08-22 NOTE — PROGRESS NOTES
Hospitalist Progress Note  8/22/2021 8:16 AM  Subjective:   Admit Date: 8/20/2021  PCP: MEI Pond CNP Status: Inpatient [101]  Interval History: Hospital Day: 3     History of present illness:  Admitted with bradycardia and hypotension 57/31 with third degree AV block requiring emergent insertion of temporary transvenous pacemaker and dopamine infusion. Eliquis and amiodarone held. Acute kidney injury secondary to hypoperfusion. History of paroxysmal atrial fibrillation. He also had an episode of pericarditis early this year. Cristiano Newman has been following up with Dr. Kumar Ramires, and Dr. 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  This was followed by local radiation therapy in July 2021 at [facility].      Diet: Regular, NPO after midnight for permanent pacemaker placement  Right antecubital peripheral IV (8/20, day #3)  Medications:     levothyroxine  25 mcg Oral Daily   pantoprazole  40 mg Oral QAM AC     Recent Labs     08/20/21 1718 08/21/21  0431 08/22/21  0419   WBC 8.2 9.8 8.9   HGB 13.0* 11.2* 11.6*    262 222   MCV 88.8 85.6 86.1     Recent Labs     08/20/21  2342 08/21/21  0431 08/22/21  0419    135* 136   K 5.7* 4.6 4.3    103 103   CO2 23 21 24   BUN 29* 29* 25*   CREATININE 2.0* 1.7* 1.4*   GLUCOSE 123* 115* 99     Recent Labs     08/20/21  1718 08/21/21  0431 08/22/21  0419   AST 83* 102* 65*   ALT 78* 89* 90*   BILITOT 0.5 0.4 0.3   ALKPHOS 127 102 87     Troponin T:   08/20/21 1718 08/21/21  0431   0.02* 0.22*      Pro-BNP:  6,152 pg/mL  INR: 1.11  ESR (8/20) 43 mm/hr  CRP (8/20) 12.4 mg/L  TSH (8/20) 10.35 uIU/mL  Free T4 (8/20) 1.5 ng/dL  Magnesium (8/20) 2.70, (8/21) 2.10 mg/dL     SARS-CoV-2 NAAT not detected      Urine drug screen (8/21) positive benzodiazepine      Portable CXR (8/20) No acute cardiopulmonary disease.     Echocardiogram (8/21) Limited echo for LV function and pericardial effusion/EF. Normal left ventricle size and wall thickness. The left ventricular systolic function is normal at 55%. Abnormal (paradoxical) septal motion is present likely due to right ventricular pacing. Grade I diastolic dysfunction with normal filing pressure. The right ventricle is dilated with normal systolic function. Temp Pacer wire is visualized in the right ventricle. Bi-atrial enlargement. There is a well circumscribed echodensity that appears adherent to inter-atrial septum, adjacent to the anterior/septal tricuspid valve leaflets (images 19-22, 39). Findings consistent with intra-cardiac mass. Moderate tricuspid regurgitation. Systolic pulmonary artery pressure (SPAP) is elevated and estimated at 51  mmHg (right atrial pressure mmHg) consistent with moderate pulmonary hypertension. No significant pericardial effusion noted.       Compared with the prior study performed 4/8/21, EF appears normalized with septal dyssynchrony in setting of TVP, no significant pericardial effusion, new finding of possible right atrial mass. Further cardiac imaging is  recommended to define intra-cardiac mass. CASSIDY (8/23) pending to evaluate right atrial mass      Objective:   Vitals:  /84   Pulse 60   Temp 98.5 °F (oral)   Resp 18   Ht 5' 11\"  Wt 70.3 kg  SpO2 98% on RA  BMI 21.62 kg/m²   General appearance: alert and cooperative with exam  HEENT:  Right IJ in place  Lungs: clear to auscultation bilaterally  Heart:  Regular rate and rhythm, HR 60, no appreciable murmur  Abdomen: soft, non-tender; bowel sounds normal; no masses,  no organomegaly  Extremities:  Right thumb mid amputation for melanoma, no edema  Neurologic: No obvious focal neurologic deficits. Assessment and Plan:   1.  Sick sinus syndrome with third-degree AV block s/p right IJ transvenous pacemaker insertion (8/20, Dr Jose Hart) with back-up pacing st for 60 bpm. Will need permanent transvenous pacemaker placed prior to discharge. CASSIDY to evaluate right atrial mass which may require surgical excision. 2. History of pericarditis and pericardial fluid treated with oral colchicine (Feb 2021). Check inflammatory markers. 3. Paroxysmal atrial fibrillation:  Eliquis and amiodarone held admission. 4. Troponin T elevation secondary to demand ischemia and acute kidney injury. 5. Hypotension requiring dopamine infusion, resolved. 6. Elevated liver enzymes:  Check acute hepatitis panel. Minimal to moderate alcohol consumption. 7. Acute kidney injury secondary to hypotension and hypoperfusion. Volume expansion with lactated ringers. 8. 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  He has also been taking amiodarone.   Initiated on low dose levothyroxine 25 mcg daily.      Advance Directive: Full Code  DVT prophylaxis with intermittent compression  Discharge planning: pending CASSIDY and potential tansvenous pacemaker placement      Carl Zabala MD, MD  [facility]

## 2021-08-23 ENCOUNTER — ANESTHESIA EVENT (OUTPATIENT)
Dept: CARDIAC CATH/INVASIVE PROCEDURES | Age: 66
DRG: 242 | End: 2021-08-23
Payer: MEDICARE

## 2021-08-23 ENCOUNTER — APPOINTMENT (OUTPATIENT)
Dept: CT IMAGING | Age: 66
DRG: 242 | End: 2021-08-23
Payer: MEDICARE

## 2021-08-23 ENCOUNTER — ANESTHESIA (OUTPATIENT)
Dept: CARDIAC CATH/INVASIVE PROCEDURES | Age: 66
DRG: 242 | End: 2021-08-23
Payer: MEDICARE

## 2021-08-23 ENCOUNTER — APPOINTMENT (OUTPATIENT)
Dept: CARDIAC CATH/INVASIVE PROCEDURES | Age: 66
DRG: 242 | End: 2021-08-23
Payer: MEDICARE

## 2021-08-23 VITALS — SYSTOLIC BLOOD PRESSURE: 80 MMHG | OXYGEN SATURATION: 98 % | DIASTOLIC BLOOD PRESSURE: 54 MMHG

## 2021-08-23 LAB
A/G RATIO: 0.9 (ref 1.1–2.2)
ALBUMIN SERPL-MCNC: 3.1 G/DL (ref 3.4–5)
ALP BLD-CCNC: 106 U/L (ref 40–129)
ALT SERPL-CCNC: 82 U/L (ref 10–40)
ANION GAP SERPL CALCULATED.3IONS-SCNC: 8 MMOL/L (ref 3–16)
ANTI-NUCLEAR ANTIBODY (ANA): NEGATIVE
APTT: 28.3 SEC (ref 26.2–38.6)
APTT: 46.5 SEC (ref 26.2–38.6)
AST SERPL-CCNC: 43 U/L (ref 15–37)
BASOPHILS ABSOLUTE: 0 K/UL (ref 0–0.2)
BASOPHILS RELATIVE PERCENT: 0.6 %
BILIRUB SERPL-MCNC: 0.4 MG/DL (ref 0–1)
BUN BLDV-MCNC: 17 MG/DL (ref 7–20)
C-REACTIVE PROTEIN: 13.4 MG/L (ref 0–5.1)
CALCIUM SERPL-MCNC: 8.6 MG/DL (ref 8.3–10.6)
CHLORIDE BLD-SCNC: 103 MMOL/L (ref 99–110)
CO2: 24 MMOL/L (ref 21–32)
CREAT SERPL-MCNC: 1.1 MG/DL (ref 0.8–1.3)
EOSINOPHILS ABSOLUTE: 0.1 K/UL (ref 0–0.6)
EOSINOPHILS RELATIVE PERCENT: 1.2 %
GFR AFRICAN AMERICAN: >60
GFR NON-AFRICAN AMERICAN: >60
GLOBULIN: 3.3 G/DL
GLUCOSE BLD-MCNC: 103 MG/DL (ref 70–99)
HAV IGM SER IA-ACNC: NORMAL
HCT VFR BLD CALC: 37 % (ref 40.5–52.5)
HCT VFR BLD CALC: 37.8 % (ref 40.5–52.5)
HEMOGLOBIN: 12.4 G/DL (ref 13.5–17.5)
HEMOGLOBIN: 12.7 G/DL (ref 13.5–17.5)
HEPATITIS B CORE IGM ANTIBODY: NORMAL
HEPATITIS B SURFACE ANTIGEN INTERPRETATION: NORMAL
HEPATITIS C ANTIBODY INTERPRETATION: NORMAL
INR BLD: 1.03 (ref 0.88–1.12)
LV EF: 58 %
LVEF MODALITY: NORMAL
LYMPHOCYTES ABSOLUTE: 0.8 K/UL (ref 1–5.1)
LYMPHOCYTES RELATIVE PERCENT: 9.2 %
MAGNESIUM: 1.9 MG/DL (ref 1.8–2.4)
MCH RBC QN AUTO: 29.3 PG (ref 26–34)
MCH RBC QN AUTO: 29.4 PG (ref 26–34)
MCHC RBC AUTO-ENTMCNC: 33.6 G/DL (ref 31–36)
MCHC RBC AUTO-ENTMCNC: 33.7 G/DL (ref 31–36)
MCV RBC AUTO: 87 FL (ref 80–100)
MCV RBC AUTO: 87.4 FL (ref 80–100)
MONOCYTES ABSOLUTE: 0.8 K/UL (ref 0–1.3)
MONOCYTES RELATIVE PERCENT: 9 %
NEUTROPHILS ABSOLUTE: 7 K/UL (ref 1.7–7.7)
NEUTROPHILS RELATIVE PERCENT: 80 %
PDW BLD-RTO: 16.6 % (ref 12.4–15.4)
PDW BLD-RTO: 17.3 % (ref 12.4–15.4)
PHOSPHORUS: 3.4 MG/DL (ref 2.5–4.9)
PLATELET # BLD: 237 K/UL (ref 135–450)
PLATELET # BLD: 244 K/UL (ref 135–450)
PMV BLD AUTO: 7.8 FL (ref 5–10.5)
PMV BLD AUTO: 8.1 FL (ref 5–10.5)
POTASSIUM SERPL-SCNC: 4.1 MMOL/L (ref 3.5–5.1)
PROTHROMBIN TIME: 11.6 SEC (ref 9.9–12.7)
RBC # BLD: 4.24 M/UL (ref 4.2–5.9)
RBC # BLD: 4.35 M/UL (ref 4.2–5.9)
RHEUMATOID FACTOR: <10 IU/ML
SODIUM BLD-SCNC: 135 MMOL/L (ref 136–145)
TOTAL PROTEIN: 6.4 G/DL (ref 6.4–8.2)
WBC # BLD: 7.4 K/UL (ref 4–11)
WBC # BLD: 8.7 K/UL (ref 4–11)

## 2021-08-23 PROCEDURE — 99999 PR OFFICE/OUTPT VISIT,PROCEDURE ONLY: CPT | Performed by: INTERNAL MEDICINE

## 2021-08-23 PROCEDURE — 84100 ASSAY OF PHOSPHORUS: CPT

## 2021-08-23 PROCEDURE — 2000000000 HC ICU R&B

## 2021-08-23 PROCEDURE — 6360000002 HC RX W HCPCS: Performed by: NURSE ANESTHETIST, CERTIFIED REGISTERED

## 2021-08-23 PROCEDURE — B24BZZ4 ULTRASONOGRAPHY OF HEART WITH AORTA, TRANSESOPHAGEAL: ICD-10-PCS | Performed by: INTERNAL MEDICINE

## 2021-08-23 PROCEDURE — 99233 SBSQ HOSP IP/OBS HIGH 50: CPT | Performed by: INTERNAL MEDICINE

## 2021-08-23 PROCEDURE — 2500000003 HC RX 250 WO HCPCS: Performed by: NURSE ANESTHETIST, CERTIFIED REGISTERED

## 2021-08-23 PROCEDURE — 2580000003 HC RX 258: Performed by: NURSE ANESTHETIST, CERTIFIED REGISTERED

## 2021-08-23 PROCEDURE — 6360000002 HC RX W HCPCS: Performed by: INTERNAL MEDICINE

## 2021-08-23 PROCEDURE — 6360000004 HC RX CONTRAST MEDICATION: Performed by: INTERNAL MEDICINE

## 2021-08-23 PROCEDURE — 6370000000 HC RX 637 (ALT 250 FOR IP): Performed by: INTERNAL MEDICINE

## 2021-08-23 PROCEDURE — 93312 ECHO TRANSESOPHAGEAL: CPT

## 2021-08-23 PROCEDURE — 85610 PROTHROMBIN TIME: CPT

## 2021-08-23 PROCEDURE — 2580000003 HC RX 258: Performed by: INTERNAL MEDICINE

## 2021-08-23 PROCEDURE — 99223 1ST HOSP IP/OBS HIGH 75: CPT | Performed by: NURSE PRACTITIONER

## 2021-08-23 PROCEDURE — 3700000001 HC ADD 15 MINUTES (ANESTHESIA)

## 2021-08-23 PROCEDURE — 80053 COMPREHEN METABOLIC PANEL: CPT

## 2021-08-23 PROCEDURE — 99223 1ST HOSP IP/OBS HIGH 75: CPT | Performed by: INTERNAL MEDICINE

## 2021-08-23 PROCEDURE — 71260 CT THORAX DX C+: CPT

## 2021-08-23 PROCEDURE — 85730 THROMBOPLASTIN TIME PARTIAL: CPT

## 2021-08-23 PROCEDURE — 36415 COLL VENOUS BLD VENIPUNCTURE: CPT

## 2021-08-23 PROCEDURE — 85025 COMPLETE CBC W/AUTO DIFF WBC: CPT

## 2021-08-23 PROCEDURE — 85027 COMPLETE CBC AUTOMATED: CPT

## 2021-08-23 PROCEDURE — 3700000000 HC ANESTHESIA ATTENDED CARE

## 2021-08-23 PROCEDURE — 83735 ASSAY OF MAGNESIUM: CPT

## 2021-08-23 RX ORDER — PROPOFOL 10 MG/ML
INJECTION, EMULSION INTRAVENOUS PRN
Status: DISCONTINUED | OUTPATIENT
Start: 2021-08-23 | End: 2021-08-23 | Stop reason: SDUPTHER

## 2021-08-23 RX ORDER — PHENYLEPHRINE HCL IN 0.9% NACL 1 MG/10 ML
SYRINGE (ML) INTRAVENOUS PRN
Status: DISCONTINUED | OUTPATIENT
Start: 2021-08-23 | End: 2021-08-23 | Stop reason: SDUPTHER

## 2021-08-23 RX ORDER — SODIUM CHLORIDE 9 MG/ML
INJECTION, SOLUTION INTRAVENOUS CONTINUOUS PRN
Status: DISCONTINUED | OUTPATIENT
Start: 2021-08-23 | End: 2021-08-23 | Stop reason: SDUPTHER

## 2021-08-23 RX ORDER — LIDOCAINE HYDROCHLORIDE 20 MG/ML
INJECTION, SOLUTION EPIDURAL; INFILTRATION; INTRACAUDAL; PERINEURAL PRN
Status: DISCONTINUED | OUTPATIENT
Start: 2021-08-23 | End: 2021-08-23 | Stop reason: SDUPTHER

## 2021-08-23 RX ORDER — HEPARIN SODIUM 1000 [USP'U]/ML
4000 INJECTION, SOLUTION INTRAVENOUS; SUBCUTANEOUS ONCE
Status: COMPLETED | OUTPATIENT
Start: 2021-08-23 | End: 2021-08-23

## 2021-08-23 RX ORDER — SODIUM CHLORIDE 0.9 % (FLUSH) 0.9 %
5-40 SYRINGE (ML) INJECTION EVERY 12 HOURS SCHEDULED
Status: DISCONTINUED | OUTPATIENT
Start: 2021-08-23 | End: 2021-08-24 | Stop reason: SDUPTHER

## 2021-08-23 RX ORDER — SODIUM CHLORIDE 0.9 % (FLUSH) 0.9 %
5-40 SYRINGE (ML) INJECTION PRN
Status: DISCONTINUED | OUTPATIENT
Start: 2021-08-23 | End: 2021-08-24 | Stop reason: SDUPTHER

## 2021-08-23 RX ORDER — HEPARIN SODIUM 1000 [USP'U]/ML
2000 INJECTION, SOLUTION INTRAVENOUS; SUBCUTANEOUS PRN
Status: DISCONTINUED | OUTPATIENT
Start: 2021-08-23 | End: 2021-08-25

## 2021-08-23 RX ORDER — HEPARIN SODIUM 1000 [USP'U]/ML
4000 INJECTION, SOLUTION INTRAVENOUS; SUBCUTANEOUS PRN
Status: DISCONTINUED | OUTPATIENT
Start: 2021-08-23 | End: 2021-08-25

## 2021-08-23 RX ORDER — SODIUM CHLORIDE 9 MG/ML
25 INJECTION, SOLUTION INTRAVENOUS PRN
Status: DISCONTINUED | OUTPATIENT
Start: 2021-08-23 | End: 2021-08-24 | Stop reason: SDUPTHER

## 2021-08-23 RX ORDER — HEPARIN SODIUM 10000 [USP'U]/100ML
1120 INJECTION, SOLUTION INTRAVENOUS CONTINUOUS
Status: DISCONTINUED | OUTPATIENT
Start: 2021-08-23 | End: 2021-08-24

## 2021-08-23 RX ORDER — HEPARIN SODIUM 1000 [USP'U]/ML
2000 INJECTION, SOLUTION INTRAVENOUS; SUBCUTANEOUS ONCE
Status: COMPLETED | OUTPATIENT
Start: 2021-08-23 | End: 2021-08-23

## 2021-08-23 RX ADMIN — MUPIROCIN: 20 OINTMENT TOPICAL at 20:01

## 2021-08-23 RX ADMIN — HEPARIN SODIUM 4000 UNITS: 1000 INJECTION INTRAVENOUS; SUBCUTANEOUS at 14:04

## 2021-08-23 RX ADMIN — IOPAMIDOL 75 ML: 755 INJECTION, SOLUTION INTRAVENOUS at 17:37

## 2021-08-23 RX ADMIN — HEPARIN SODIUM 840 UNITS/HR: 10000 INJECTION, SOLUTION INTRAVENOUS at 14:08

## 2021-08-23 RX ADMIN — SODIUM CHLORIDE, PRESERVATIVE FREE 10 ML: 5 INJECTION INTRAVENOUS at 20:01

## 2021-08-23 RX ADMIN — SODIUM CHLORIDE, PRESERVATIVE FREE 10 ML: 5 INJECTION INTRAVENOUS at 14:54

## 2021-08-23 RX ADMIN — LIDOCAINE HYDROCHLORIDE 100 MG: 20 INJECTION, SOLUTION EPIDURAL; INFILTRATION; INTRACAUDAL; PERINEURAL at 12:08

## 2021-08-23 RX ADMIN — IOHEXOL 50 ML: 240 INJECTION, SOLUTION INTRATHECAL; INTRAVASCULAR; INTRAVENOUS; ORAL at 14:51

## 2021-08-23 RX ADMIN — HEPARIN SODIUM 2000 UNITS: 1000 INJECTION INTRAVENOUS; SUBCUTANEOUS at 21:10

## 2021-08-23 RX ADMIN — SODIUM CHLORIDE: 9 INJECTION, SOLUTION INTRAVENOUS at 12:05

## 2021-08-23 RX ADMIN — LEVOTHYROXINE SODIUM 25 MCG: 0.03 TABLET ORAL at 06:31

## 2021-08-23 RX ADMIN — Medication 100 MCG: at 12:42

## 2021-08-23 RX ADMIN — SODIUM CHLORIDE, PRESERVATIVE FREE 10 ML: 5 INJECTION INTRAVENOUS at 20:00

## 2021-08-23 RX ADMIN — PROPOFOL 500 MG: 10 INJECTION, EMULSION INTRAVENOUS at 12:08

## 2021-08-23 RX ADMIN — MUPIROCIN: 20 OINTMENT TOPICAL at 14:53

## 2021-08-23 RX ADMIN — PANTOPRAZOLE SODIUM 40 MG: 40 TABLET, DELAYED RELEASE ORAL at 06:31

## 2021-08-23 ASSESSMENT — PAIN SCALES - GENERAL: PAINLEVEL_OUTOF10: 0
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
## 2021-08-23 NOTE — PLAN OF CARE
Problem: Cardiac:  Goal: Ability to maintain an adequate cardiac output will improve  Description: Ability to maintain an adequate cardiac output will improve  Outcome: Ongoing   Transvenous pacing, CHB underlying rhythm, CASSIDY planned for today, possible PPM

## 2021-08-23 NOTE — PROGRESS NOTES
Hospitalist Progress Note      PCP: MEI Alejandre - CNP    Date of Admission: 8/20/2021    Chief Complaint: Emesis/Sweats. Found to have CHB. Subjective: no new c/o. Medications:  Reviewed    Infusion Medications    sodium chloride       Scheduled Medications    mupirocin   Nasal BID    levothyroxine  25 mcg Oral Daily    sodium chloride flush  5-40 mL Intravenous 2 times per day    pantoprazole  40 mg Oral QAM AC     PRN Meds: sodium chloride flush, sodium chloride, acetaminophen **OR** acetaminophen, melatonin, HYDROcodone-acetaminophen, perflutren lipid microspheres      Intake/Output Summary (Last 24 hours) at 8/23/2021 1048  Last data filed at 8/23/2021 0600  Gross per 24 hour   Intake 480 ml   Output 2725 ml   Net -2245 ml       Physical Exam Performed:    /80   Pulse 50   Temp 98.3 °F (36.8 °C) (Oral)   Resp 16   Ht 5' 11\" (1.803 m)   Wt 155 lb (70.3 kg)   SpO2 100%   BMI 21.62 kg/m²     General appearance: No apparent distress, appears stated age and cooperative. HEENT: Pupils equal, round, and reactive to light. Conjunctivae/corneas clear. Neck: Supple, with full range of motion. No jugular venous distention. Trachea midline. Respiratory:  Normal respiratory effort. Clear to auscultation, bilaterally without Rales/Wheezes/Rhonchi. Cardiovascular: Regular rate and rhythm with normal S1/S2 without murmurs, rubs or gallops. Abdomen: Soft, non-tender, non-distended with normal bowel sounds. Musculoskeletal: No clubbing, cyanosis or edema bilaterally. Full range of motion without deformity. Skin: Skin color, texture, turgor normal.  No rashes or lesions. Neurologic:  Neurovascularly intact without any focal sensory/motor deficits.  Cranial nerves: II-XII intact, grossly non-focal.  Psychiatric: Alert and oriented, thought content appropriate, normal insight  Capillary Refill: Brisk,< 3 seconds   Peripheral Pulses: +2 palpable, equal bilaterally       Labs:   Recent Labs     08/21/21  0431 08/22/21  0419 08/23/21  0456   WBC 9.8 8.9 8.7   HGB 11.2* 11.6* 12.7*   HCT 33.3* 34.6* 37.8*    222 244     Recent Labs     08/21/21  0431 08/22/21  0419 08/23/21  0456   * 136 135*   K 4.6 4.3 4.1    103 103   CO2 21 24 24   BUN 29* 25* 17   CREATININE 1.7* 1.4* 1.1   CALCIUM 8.8 8.6 8.6   PHOS 4.1 2.5 3.4     Recent Labs     08/21/21  0431 08/22/21  0419 08/23/21  0456   * 65* 43*   ALT 89* 90* 82*   BILITOT 0.4 0.3 0.4   ALKPHOS 102 87 106     Recent Labs     08/21/21  0431 08/22/21  0419 08/23/21  0456   INR 1.11 1.06 1.03     Recent Labs     08/20/21  1718 08/21/21  0431   TROPONINI 0.02* 0.22*       Urinalysis:      Lab Results   Component Value Date    NITRU Negative 03/26/2021    WBCUA 0-2 03/26/2021    RBCUA 3-4 03/26/2021    BLOODU TRACE-INTACT 03/26/2021    SPECGRAV 1.025 03/26/2021    GLUCOSEU Negative 03/26/2021       Consults:    IP CONSULT TO CARDIOLOGY  IP CONSULT TO HOSPITALIST      Assessment/Plan:    Active Hospital Problems    Diagnosis     Chronic anticoagulation [Z79.01]     Acquired hypothyroidism [E03.9]     JOEL (acute kidney injury) ([facility] [address] [N17.9]     Third degree AV block (HCC) [I44.2]     PAF (paroxysmal atrial fibrillation) ([facility]) [I48.0]          Sick sinus syndrome with third-degree AV block s/p right IJ transvenous pacemaker insertion (8/20, Dr Lisset Vines) with back-up pacing st for 60 bpm.  Will need permanent transvenous pacemaker placed prior to discharge.  CASSIDY to evaluate right atrial mass which may require surgical excision. History of pericarditis and pericardial fluid treated with oral colchicine (Feb 2021). Check inflammatory markers. Paroxysmal atrial fibrillation:  Eliquis and amiodarone held admission.      Troponin T elevation secondary to demand ischemia and acute kidney injury. Hypotension requiring dopamine infusion, resolved. Elevated liver enzymes:  Check acute hepatitis panel.   Minimal to moderate alcohol consumption. Acute kidney injury secondary to hypotension and hypoperfusion.  Volume expansion with lactated ringers.      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  He has also been taking amiodarone.  Initiated on low dose levothyroxine 25 mcg daily.        DVT Prophylaxis: IPC    Recent Labs     08/21/21  0431 08/22/21  0419 08/23/21  0456    222 244     Diet: Diet NPO  Code Status: Full Code      PT/OT Eval Status: not yet ordered. Dispo - Remains in ICU. Here for the foreseeable future pending clinical course and subspecialty recs.      Catia Elliott MD

## 2021-08-23 NOTE — PROGRESS NOTES
At bedside with Dr. Monae Chiu. MD discussing the risks and benefits with pt for a CASSIDY. Patient agrees to proceed. Consent signed by patient and MD and writer.  Kiran Israel RN

## 2021-08-23 NOTE — CONSULTS
Pharmacy to Manage Heparin Infusion per [facility]    Dx: AFib   Pt wt = 70.3 kg  Baseline aPTT = 28.3 seconds at 1313. Apixaban is home med. Low Dose Heparin Infusion  Heparin 60 units/kg (maximum 4000 units) IVP bolus followed by Heparin infusion at 12 units/kg/hr (recommended initial max dose 1000 units./hr). Recheck aPTT in 6 hours. Goal aPTT = 60-90 seconds. aPTT < 45    Heparin 60 units/kg bolus  Increase infusion by 4 units/kg/hr  aPTT 45.1 - 59.9 Heparin 30 units/kg bolus Increase infusion by 2 units/kg/hr  aPTT 60 - 90    No bolus No change   aPTT 90.1 - 97.5 No bolus Decrease infusion by 1 units/kg/hr  aPTT 97.6 - 105 Hold heparin for 1 hour Decrease infusion by 2 units/kg/hr  aPTT > 105    Hold heparin for 1 hour Decrease infusion by 3 units/kg/hr    Obtain aPTT 6 hours after bolus and 6 hours after any dose change until two consecutive therapeutic aPTT are achieved- then daily. Jesús DallasD, BCPS   8/23/2021 2:42 PM    8/23  2100  Low dose Heparin:  APTT at 1850 is 46.5 seconds. Give a 2,000 unit IV Bolus dose and increase the infusion to 980 units/hr. Recheck the aPTT at 0330 8/24. Desired aPTT range is 60-90 seconds. JOSE EDUARDO [facility] PharmD 8/23/2021 9:03 PM    8/24 0337  aptt = 47.5 sec  2000 bolus; rate = 11.2 ml/hr  Next aptt 1100  Zain Mcqueen Pharm D.8/24/2021 5:24 AM    aPTT 65 seconds at 1108  No change to Heparin at this time, continue Heparin infusion at 1120 units/hr (11.2 mL/hr). Recheck aPTT in 6 hours. Jesús Teran PharmD, BCPS   8/24/2021 at 12:33 PM

## 2021-08-23 NOTE — PROCEDURES
8/23/2021    PROCEDURE PERFORMED:     1. Transesophageal echocardiogram.    INDICATIONS: Cardiac mass     PROCEDURE: The patient was brought to the lab in fasting state. The patient received adequate sedation with the assistance of anesthesia for the case. After ensuring adequate sedation, the esophagus was intubated and a complete transesophageal echocardiogram was completed. There were no complications related to the study. Summary:  1. Presence of a right atrial mass confirmed, see formal report for full details. 2. Initiate systemic anti-coagulation with heparin gtt  3. Engaging with CT surgery for evaluation  4. Will discuss need for pan CT with treating teams to evaluate for extra-cardiac disease given history of melanoma/salivary gland malignancies   5. Oncology consultation   6. EP evaluating - remains stable on TVP    If patient is ultimately offered surgery, will need cardiac catheterization for pre-operative evaluation. Family updated on plan of care.      Marissa Orourke MD, [address] [place] [address]   [phone] [facility] office   [phone] [facility]

## 2021-08-23 NOTE — PLAN OF CARE
Problem: Infection - Central Venous Catheter-Associated Bloodstream Infection:  Goal: Will show no infection signs and symptoms  Description: Will show no infection signs and symptoms  Outcome: Ongoing     Problem: Infection:  Goal: Will remain free from infection  Description: Will remain free from infection  Outcome: Ongoing     Problem: Safety:  Goal: Free from accidental physical injury  Description: Free from accidental physical injury  Outcome: Ongoing  Goal: Free from intentional harm  Description: Free from intentional harm  Outcome: Ongoing     Problem: Daily Care:  Goal: Daily care needs are met  Description: Daily care needs are met  Outcome: Ongoing     Problem: Pain:  Goal: Patient's pain/discomfort is manageable  Description: Patient's pain/discomfort is manageable  Outcome: Ongoing     Problem: Skin Integrity:  Goal: Skin integrity will stabilize  Description: Skin integrity will stabilize  Outcome: Ongoing     Problem: Discharge Planning:  Goal: Patients continuum of care needs are met  Description: Patients continuum of care needs are met  Outcome: Ongoing

## 2021-08-23 NOTE — PROGRESS NOTES
CARDIOLOGY PROGRESS NOTE      Patient Name: Jocy Harris  Date of admission: 8/20/2021  4:24 PM  Admission Dx: Complete heart block ([address] [I44.2]  Reason for Consult:  [facility]  Requesting Physician: Yuly Dickens MD  Primary Care physician: MEI Pond CNP    Subjective:     Jocy Harris is a 74 y.o. 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 The patient reports that he went to bed feeling fine last night, but then felt very weak and nauseous when he woke up this morning. He also felt lightheaded and dizzy and says that he had multiple episodes of emesis. He denies any chest pain or shortness of breath. He is on amiodarone and Eliquis for his history of atrial fibrillation. He does not take any other AV pallavi blocking agents. He had a stress echocardiogram in 4/2021 that showed normal LV systolic function and no evidence of ischemia.     Patient was evaluated with Dr. Fanny Winters of interventional last evening. I did discuss the case with him by phone this morning. On review of chart and from discussion with interventional, on arrival to the ED, the patient's was profoundly bradycardic and hypotensive with BP of 57/31. Initial ECG showed third-degree AV block with ventricular escape rate in the 20s. He was bolused with IV fluids and reportedly received multiple doses of atropine with no improvement in heart rate. He was subsequently started on an IV dopamine infusion at 10 mcg/kg/min, but remained profoundly bradycardic and had multiple  episodes of asystole during which he would briefly lose consciousness. During one of these episodes, he was given 1 mg of epinephrine and his ventricular rate transiently increased to the 120s but this was transient.   Ultimately the patient was taken to the Cath Lab and a TVP was inserted with good result. Hemodynamics recovered. Weaned from dopamine. Patient feels well this AM. TVP site c/d/i. No chest pain. No dyspnea. No dizziness/recurrent syncope. No orthopnea/PND/LE edema. Continues to ride lower limit 50/paced without recovery. NPO since MN for CASSIDY today. Inflammatory markers elevated. Home Medications:  Were reviewed and are listed in nursing record and/or below  Prior to Admission medications    Medication Sig Start Date End Date Taking? Authorizing Provider   amiodarone (CORDARONE) 200 MG tablet Take 0.5 tablets by mouth daily 6/30/21   MEI Michele - CNP   apixaban (ELIQUIS) 5 MG TABS tablet Take 1 tablet by mouth 2 times daily 5/6/21   Judith Neri MD   omeprazole (PRILOSEC) 20 MG delayed release capsule Take 1 capsule by mouth every morning (before breakfast) 4/8/21   Monika Lopez MD        CURRENT Medications:  mupirocin (BACTROBAN) 2 % ointment, BID  levothyroxine (SYNTHROID) tablet 25 mcg, Daily  sodium chloride flush 0.9 % injection 5-40 mL, 2 times per day  sodium chloride flush 0.9 % injection 5-40 mL, PRN  pantoprazole (PROTONIX) tablet 40 mg, QAM AC  0.9 % sodium chloride infusion, PRN  acetaminophen (TYLENOL) tablet 650 mg, Q4H PRN   Or  acetaminophen (TYLENOL) suppository 650 mg, Q4H PRN  melatonin tablet 3 mg, Nightly PRN  HYDROcodone-acetaminophen (NORCO) 7.5-325 MG per tablet 1 tablet, Q6H PRN  perflutren lipid microspheres (DEFINITY) injection 1.65 mg, ONCE PRN        Allergies:  Percocet [oxycodone-acetaminophen]     Review of Systems:   A 14 point review of symptoms completed. Pertinent positives identified in the HPI, all other review of symptoms negative.        Objective:     Vitals:    08/23/21 0600 08/23/21 0700 08/23/21 0800 08/23/21 0842   BP: 136/83 135/78 (!) 140/78 137/80   Pulse: 50 50 50 50   Resp:   16 16   Temp:   98.3 °F (36.8 °C)    TempSrc:   Oral    SpO2:  97% 100% 100%   Weight:       Height:            Weight: 155 lb (70.3 kg) PHYSICAL EXAM:    General:  Alert, cooperative, no distress, appears stated age   Head:  Normocephalic, atraumatic   Eyes:  Conjunctiva/corneas clear, anicteric sclerae    Nose: Nares normal, no drainage or sinus tenderness   Throat: No abnormalities of the lips, oral mucosa or tongue. Neck: Trachea midline. Neck supple with no lymphadenopathy, thyroid not enlarged, symmetric, no tenderness/mass/nodules, TVP RIJ   Lungs:   Clear to auscultation bilaterally, no wheezes, no rales, no respiratory distress   Chest Wall:  No deformity or tenderness to palpation   Heart:  Regular rate and rhythm paced at 50 by monitor, normal S1, normal S2, no murmur, no rub, no S3/S4, PMI non-displaced. Abdomen:   Soft, non-tender, with normoactive bowel sounds. No masses, no hepatosplenomegaly   Extremities: No cyanosis, clubbing or pitting edema. Vascular: 2+ radial,  dorsalis pedis and posterior tibial pulses bilaterally. Brisk carotid upstrokes without carotid bruit. Skin: Skin color, texture, turgor are normal with no rashes or ulceration. Pysch: Euthymic mood, appropriate affect   Neurologic: Oriented to person, place and time. No slurred speech or facial asymmetry. No motor or sensory deficits on gross examination.          Labs:   CBC:   Lab Results   Component Value Date    WBC 8.7 08/23/2021    RBC 4.35 08/23/2021    HGB 12.7 08/23/2021    HCT 37.8 08/23/2021    MCV 87.0 08/23/2021    RDW 17.3 08/23/2021     08/23/2021     CMP:  Lab Results   Component Value Date     08/23/2021    K 4.1 08/23/2021    K 4.2 03/27/2021     08/23/2021    CO2 24 08/23/2021    BUN 17 08/23/2021    CREATININE 1.1 08/23/2021    GFRAA >60 08/23/2021    GFRAA >60 05/16/2011    AGRATIO 0.9 08/23/2021    LABGLOM >60 08/23/2021    GLUCOSE 103 08/23/2021    PROT 6.4 08/23/2021    PROT 7.4 05/16/2011    CALCIUM 8.6 08/23/2021    BILITOT 0.4 08/23/2021    ALKPHOS 106 08/23/2021    AST 43 08/23/2021    ALT 82 08/23/2021 PT/INR:  No results found for: PTINR  HgBA1c:  Lab Results   Component Value Date    LABA1C 5.6 05/16/2018     Lab Results   Component Value Date    TROPONINI 0.22 (H) 08/21/2021         Interval Testing/Data:     Telemetry personally reviewed: V paced 60. EKG 8/21 personally reviewed, my interpretation: ventricular paced rhythm 60 beats per minute. All EKG in emergency department were personally reviewed and are reflective of: Third-degree AV block with ventricular escape. EKG from 1805 particularly worrisome with evidence of sinus tachycardia with failed conduction.     Echo:    TTE 8/21/21:   Summary   Limited echo for LV function and pericardial effusion/EF   Normal left ventricle size and wall thickness. The left ventricular systolic function is normal at 55%. Abnormal (paradoxical) septal motion is present likely due to right   ventricular pacing. Grade I diastolic dysfunction with normal filing pressure. The right   ventricle is dilated with normal systolic function. Temp Pacer wire is visualized in the right ventricle. Bi-atrial enlargement. There is a well circumscribed echodensity that appears adherent to   inter-atrial septum, adjacent to the anterior/septal tricuspid valve   leaflets (images 19-22, 39). Findings consistent with intra-cardiac mass. Moderate tricuspid regurgitation. Systolic pulmonary artery pressure (SPAP) is elevated and estimated at 51   mmHg (right atrial pressure mmHg) consistent with moderate pulmonary   hypertension. No significant pericardial effusion noted. Compared with the prior study performed 4/8/21, EF appears normalized with   septal dyssynchrony in setting of TVP, no significant pericardial effusion,   new finding of possible right atrial mass. Further cardiac imaging is   recommended to define intra-cardiac mass.            TTE 4/8/21:  Conclusions    Summary   -Technically difficult exam due to atrial fibrillation and rapid heart rate.   -Borderline global systolic function with an ejection fraction estimated at   50%.  -Left ventricular function and wall motion assessment was very limited due   to atrial fibrillation and rapid heart rate.   -Mild biatrial enlargement.   -Mild mitral regurgitation.   -The right ventricle is borderline enlarged with borderline function.   -Moderate tricuspid regurgitation with a right ventricular systolic pressure   estimated at 39 mmHg assuming a right atrial pressure of 15 mmHg.   -Mild pulmonic regurgitation present.   -IVC size is dilated (>2.1 cm) and collapses < 50% with respiration   consistent with elevated RA pressure (15 mmHg).  -Small size pericardial effusion.   -Indeterminate diastolic function due to atrial fibrillation.     Stress Test:  Exercise stress echo 4/15/21:  Conclusions      Summary   Normal stress ECHO.      Signature      ------------------------------------------------------------------   Electronically signed by Genna Cai MD, [facility] (Interpreting   physician) on 04/16/2021 at 02:49 PM   ------------------------------------------------------------------      Rest      ECG   Normal sinus rhythm.      Standing HR:49 bpmStanding BP:118/90 mmHg      Stress      Stress Type: Exercise      Rest HR: 49 bpm                 HR Response: Normal   Rest BP: 118/90 mmHg            BP Response: Normal   Stress Peak HR: 114 bpm         HR BP Product: 01928   Stress Peak BP: 140/60 mmHg   Predicted HR: 152 bpm           Stress EF: 70 %   % of predicted HR: 75   Reason for Termination: Dyspnea      Results      Echo (rest): Normal (LVEF >50%)      Echo (stress): Normal (LVEF >50%)      Echo   Baseline resting echocardiogram shows normal global LV systolic function   with an ejection fraction of 55-60% and uniform myocardial segmental wall   motion.  Following stress there was uniform augmentation of all myocardial   segments with appropriate hyperdynamic LV systolic response to stress and Inflammatory markers noted elevated; non-specific. Pending CASSIDY results, further imaging warranted. Will follow. I will address the patient's cardiac risk factors and adjusted pharmacologic treatment as needed. In addition, I have reinforced the need for patient directed risk factor modification. All questions and concerns were addressed to the patient/family. Alternatives to my treatment were discussed. Thank you for allowing us to participate in the care of Reena Mcdonald. Please call me with any questions [phone].     Mukesh De Jesus MD, [facility]  Cardiovascular Disease  [facility]  [phone] [facility]  [phone] [address] [place] [address]  8/23/2021 11:58 AM

## 2021-08-23 NOTE — CARE COORDINATION
CASE MANAGEMENT INITIAL ASSESSMENT      Reviewed chart and completed assessment at bedside with patient    Explained Case Management role/services. Primary contact information as below    Health Care Decision Maker :   Primary Decision Maker: Gi Maravilla Spouse - [phone]          Can this person be reached and be able to respond quickly, such as within a few minutes or hours? Yes     Admit date/status:8/20 IPA    Diagnosis:CHB  Is this a Readmission?:  No      Insurance:[employer]     Precert required for SNF: Yes       3 night stay required: No    Living arrangements, Adls, care needs, prior to admission:IPTA. Still works    [address] at home:  Walker__Cane__RTS__ BSC__Shower Chair__  02__ HHN__ CPAP__  BiPap__  Hospital Bed__ W/C___ Other___none_______    Services in the home and/or outpatient, prior to [facility] Notification (HEN):NA    Barriers to discharge none    Plan/comments:Plans dc home.  Denies needs    ECOC on chart for MD cori Chavez RN
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
## 2021-08-23 NOTE — ANESTHESIA POSTPROCEDURE EVALUATION
Department of Anesthesiology  Postprocedure Note    Patient: Joe Sarabia  MRN: [id number]  YOB: 1955  Date of evaluation: 8/23/2021  Time:  3:25 PM     Procedure Summary     Date: 08/23/21 Room / Location: [facility] Cardiac Cath Lab    Anesthesia Start: 1205 Anesthesia Stop: 6077    Procedure: TRANSESOPHAGEAL ECHO Diagnosis:     Scheduled Providers:  Responsible Provider: Salma Prajapati MD    Anesthesia Type: MAC ASA Status: 3          Anesthesia Type: MAC    Aly Phase I:      Aly Phase II:      Last vitals: Reviewed and per EMR flowsheets.        Anesthesia Post Evaluation    Patient location during evaluation: ICU  Patient participation: complete - patient participated  Level of consciousness: awake and alert  Pain score: 0  Airway patency: patent  Nausea & Vomiting: no nausea and no vomiting  Complications: no  Cardiovascular status: blood pressure returned to baseline  Respiratory status: acceptable  Hydration status: stable

## 2021-08-23 NOTE — PLAN OF CARE
Problem: Infection - Central Venous Catheter-Associated Bloodstream Infection:  Goal: Will show no infection signs and symptoms  Description: Will show no infection signs and symptoms  8/23/2021 1158 by Bang Espino RN  Outcome: Ongoing  8/23/2021 0056 by [place] Wang RN  Outcome: Ongoing     Problem: Infection:  Goal: Will remain free from infection  Description: Will remain free from infection  8/23/2021 1158 by Bang Espino RN  Outcome: Ongoing  8/23/2021 0056 by Florida Wang RN  Outcome: Ongoing     Problem: Safety:  Goal: Free from accidental physical injury  Description: Free from accidental physical injury  8/23/2021 1158 by Bang Espino RN  Outcome: Ongoing  8/23/2021 0056 by Florida Wang RN  Outcome: Ongoing  Goal: Free from intentional harm  Description: Free from intentional harm  8/23/2021 1158 by Bang Espino RN  Outcome: Ongoing  8/23/2021 0056 by Florida Wang RN  Outcome: Ongoing     Problem: Daily Care:  Goal: Daily care needs are met  Description: Daily care needs are met  8/23/2021 1158 by Bang Espino RN  Outcome: Ongoing  8/23/2021 0056 by Florida Wang RN  Outcome: Ongoing     Problem: Pain:  Goal: Patient's pain/discomfort is manageable  Description: Patient's pain/discomfort is manageable  8/23/2021 1158 by Bang Espino RN  Outcome: Ongoing  8/23/2021 0056 by Florida Wang RN  Outcome: Ongoing     Problem: Skin Integrity:  Goal: Skin integrity will stabilize  Description: Skin integrity will stabilize  8/23/2021 1158 by Bang sEpino RN  Outcome: Ongoing  8/23/2021 0056 by Florida Wang RN  Outcome: Ongoing     Problem: Discharge Planning:  Goal: Patients continuum of care needs are met  Description: Patients continuum of care needs are met  8/23/2021 1158 by Bang Espino RN  Outcome: Ongoing  8/23/2021 0056 by Florida Wang RN  Outcome: Ongoing     Problem: Falls - Risk of:  Goal: Will remain free from falls  Description: Will remain free from falls  Outcome: Ongoing  Goal: Absence of physical injury  Description: Absence of physical injury  Outcome: Ongoing

## 2021-08-23 NOTE — PROGRESS NOTES
Dr. Leon Search at bedside for consult. Spoke with patient and family about potential interventions.  Antwan Hampton RN

## 2021-08-24 ENCOUNTER — APPOINTMENT (OUTPATIENT)
Dept: CARDIAC CATH/INVASIVE PROCEDURES | Age: 66
DRG: 242 | End: 2021-08-24
Payer: MEDICARE

## 2021-08-24 PROBLEM — I48.91 ATRIAL FIBRILLATION WITH RVR (HCC): Status: RESOLVED | Noted: 2021-03-26 | Resolved: 2021-08-24

## 2021-08-24 LAB
A/G RATIO: 0.8 (ref 1.1–2.2)
ABO/RH: NORMAL
ALBUMIN SERPL-MCNC: 3 G/DL (ref 3.4–5)
ALP BLD-CCNC: 106 U/L (ref 40–129)
ALT SERPL-CCNC: 62 U/L (ref 10–40)
ANION GAP SERPL CALCULATED.3IONS-SCNC: 9 MMOL/L (ref 3–16)
ANTIBODY SCREEN: NORMAL
APTT: 28.9 SEC (ref 26.2–38.6)
APTT: 47.5 SEC (ref 26.2–38.6)
APTT: 65 SEC (ref 26.2–38.6)
AST SERPL-CCNC: 26 U/L (ref 15–37)
BASOPHILS ABSOLUTE: 0.1 K/UL (ref 0–0.2)
BASOPHILS RELATIVE PERCENT: 0.9 %
BILIRUB SERPL-MCNC: 0.3 MG/DL (ref 0–1)
BUN BLDV-MCNC: 15 MG/DL (ref 7–20)
CALCIUM SERPL-MCNC: 8.9 MG/DL (ref 8.3–10.6)
CHLORIDE BLD-SCNC: 99 MMOL/L (ref 99–110)
CO2: 28 MMOL/L (ref 21–32)
CREAT SERPL-MCNC: 1.3 MG/DL (ref 0.8–1.3)
EOSINOPHILS ABSOLUTE: 0.2 K/UL (ref 0–0.6)
EOSINOPHILS RELATIVE PERCENT: 2.4 %
GFR AFRICAN AMERICAN: >60
GFR NON-AFRICAN AMERICAN: 55
GLOBULIN: 3.8 G/DL
GLUCOSE BLD-MCNC: 105 MG/DL (ref 70–99)
HCT VFR BLD CALC: 40.4 % (ref 40.5–52.5)
HEMOGLOBIN: 13.5 G/DL (ref 13.5–17.5)
INR BLD: 1.03 (ref 0.88–1.12)
LYMPHOCYTES ABSOLUTE: 0.9 K/UL (ref 1–5.1)
LYMPHOCYTES RELATIVE PERCENT: 12.6 %
MAGNESIUM: 2 MG/DL (ref 1.8–2.4)
MCH RBC QN AUTO: 29.2 PG (ref 26–34)
MCHC RBC AUTO-ENTMCNC: 33.3 G/DL (ref 31–36)
MCV RBC AUTO: 87.8 FL (ref 80–100)
MONOCYTES ABSOLUTE: 0.7 K/UL (ref 0–1.3)
MONOCYTES RELATIVE PERCENT: 10.1 %
NEUTROPHILS ABSOLUTE: 5.4 K/UL (ref 1.7–7.7)
NEUTROPHILS RELATIVE PERCENT: 74 %
PDW BLD-RTO: 17.2 % (ref 12.4–15.4)
PHOSPHORUS: 4.1 MG/DL (ref 2.5–4.9)
PLATELET # BLD: 259 K/UL (ref 135–450)
PMV BLD AUTO: 8.3 FL (ref 5–10.5)
POTASSIUM SERPL-SCNC: 4.5 MMOL/L (ref 3.5–5.1)
PROTHROMBIN TIME: 11.6 SEC (ref 9.9–12.7)
RBC # BLD: 4.6 M/UL (ref 4.2–5.9)
SODIUM BLD-SCNC: 136 MMOL/L (ref 136–145)
TOTAL PROTEIN: 6.8 G/DL (ref 6.4–8.2)
WBC # BLD: 7.2 K/UL (ref 4–11)

## 2021-08-24 PROCEDURE — 84100 ASSAY OF PHOSPHORUS: CPT

## 2021-08-24 PROCEDURE — 2709999900 HC NON-CHARGEABLE SUPPLY

## 2021-08-24 PROCEDURE — 33208 INSRT HEART PM ATRIAL & VENT: CPT | Performed by: INTERNAL MEDICINE

## 2021-08-24 PROCEDURE — 2580000003 HC RX 258: Performed by: INTERNAL MEDICINE

## 2021-08-24 PROCEDURE — 99233 SBSQ HOSP IP/OBS HIGH 50: CPT | Performed by: NURSE PRACTITIONER

## 2021-08-24 PROCEDURE — 6360000002 HC RX W HCPCS

## 2021-08-24 PROCEDURE — C1898 LEAD, PMKR, OTHER THAN TRANS: HCPCS

## 2021-08-24 PROCEDURE — 02HK3JZ INSERTION OF PACEMAKER LEAD INTO RIGHT VENTRICLE, PERCUTANEOUS APPROACH: ICD-10-PCS | Performed by: INTERNAL MEDICINE

## 2021-08-24 PROCEDURE — 6370000000 HC RX 637 (ALT 250 FOR IP): Performed by: INTERNAL MEDICINE

## 2021-08-24 PROCEDURE — 99232 SBSQ HOSP IP/OBS MODERATE 35: CPT | Performed by: NURSE PRACTITIONER

## 2021-08-24 PROCEDURE — 85610 PROTHROMBIN TIME: CPT

## 2021-08-24 PROCEDURE — 99152 MOD SED SAME PHYS/QHP 5/>YRS: CPT | Performed by: INTERNAL MEDICINE

## 2021-08-24 PROCEDURE — 6360000002 HC RX W HCPCS: Performed by: INTERNAL MEDICINE

## 2021-08-24 PROCEDURE — 86850 RBC ANTIBODY SCREEN: CPT

## 2021-08-24 PROCEDURE — 2780000010 HC IMPLANT OTHER

## 2021-08-24 PROCEDURE — 02H63JZ INSERTION OF PACEMAKER LEAD INTO RIGHT ATRIUM, PERCUTANEOUS APPROACH: ICD-10-PCS | Performed by: INTERNAL MEDICINE

## 2021-08-24 PROCEDURE — 33208 INSRT HEART PM ATRIAL & VENT: CPT

## 2021-08-24 PROCEDURE — 2500000003 HC RX 250 WO HCPCS

## 2021-08-24 PROCEDURE — 83735 ASSAY OF MAGNESIUM: CPT

## 2021-08-24 PROCEDURE — 36415 COLL VENOUS BLD VENIPUNCTURE: CPT

## 2021-08-24 PROCEDURE — 85730 THROMBOPLASTIN TIME PARTIAL: CPT

## 2021-08-24 PROCEDURE — 2580000003 HC RX 258

## 2021-08-24 PROCEDURE — 85025 COMPLETE CBC W/AUTO DIFF WBC: CPT

## 2021-08-24 PROCEDURE — 86900 BLOOD TYPING SEROLOGIC ABO: CPT

## 2021-08-24 PROCEDURE — 2000000000 HC ICU R&B

## 2021-08-24 PROCEDURE — C1785 PMKR, DUAL, RATE-RESP: HCPCS

## 2021-08-24 PROCEDURE — 86901 BLOOD TYPING SEROLOGIC RH(D): CPT

## 2021-08-24 PROCEDURE — 99233 SBSQ HOSP IP/OBS HIGH 50: CPT | Performed by: INTERNAL MEDICINE

## 2021-08-24 PROCEDURE — 80053 COMPREHEN METABOLIC PANEL: CPT

## 2021-08-24 PROCEDURE — 0JH606Z INSERTION OF PACEMAKER, DUAL CHAMBER INTO CHEST SUBCUTANEOUS TISSUE AND FASCIA, OPEN APPROACH: ICD-10-PCS | Performed by: INTERNAL MEDICINE

## 2021-08-24 RX ORDER — HEPARIN SODIUM 1000 [USP'U]/ML
2000 INJECTION, SOLUTION INTRAVENOUS; SUBCUTANEOUS ONCE
Status: COMPLETED | OUTPATIENT
Start: 2021-08-24 | End: 2021-08-24

## 2021-08-24 RX ORDER — FENTANYL CITRATE 50 UG/ML
INJECTION, SOLUTION INTRAMUSCULAR; INTRAVENOUS
Status: COMPLETED | OUTPATIENT
Start: 2021-08-24 | End: 2021-08-24

## 2021-08-24 RX ORDER — MIDAZOLAM HYDROCHLORIDE 5 MG/ML
INJECTION INTRAMUSCULAR; INTRAVENOUS
Status: COMPLETED | OUTPATIENT
Start: 2021-08-24 | End: 2021-08-24

## 2021-08-24 RX ADMIN — LEVOTHYROXINE SODIUM 25 MCG: 0.03 TABLET ORAL at 06:32

## 2021-08-24 RX ADMIN — HEPARIN SODIUM 1120 UNITS/HR: 10000 INJECTION, SOLUTION INTRAVENOUS at 11:56

## 2021-08-24 RX ADMIN — MIDAZOLAM HYDROCHLORIDE 1 MG: 5 INJECTION, SOLUTION INTRAMUSCULAR; INTRAVENOUS at 17:40

## 2021-08-24 RX ADMIN — HEPARIN SODIUM 2000 UNITS: 1000 INJECTION INTRAVENOUS; SUBCUTANEOUS at 05:41

## 2021-08-24 RX ADMIN — MIDAZOLAM HYDROCHLORIDE 1 MG: 5 INJECTION, SOLUTION INTRAMUSCULAR; INTRAVENOUS at 17:49

## 2021-08-24 RX ADMIN — PANTOPRAZOLE SODIUM 40 MG: 40 TABLET, DELAYED RELEASE ORAL at 07:31

## 2021-08-24 RX ADMIN — MUPIROCIN: 20 OINTMENT TOPICAL at 08:57

## 2021-08-24 RX ADMIN — FENTANYL CITRATE 50 MCG: 50 INJECTION INTRAMUSCULAR; INTRAVENOUS at 17:49

## 2021-08-24 RX ADMIN — SODIUM CHLORIDE, PRESERVATIVE FREE 10 ML: 5 INJECTION INTRAVENOUS at 21:00

## 2021-08-24 RX ADMIN — FENTANYL CITRATE 50 MCG: 50 INJECTION INTRAMUSCULAR; INTRAVENOUS at 17:31

## 2021-08-24 RX ADMIN — VANCOMYCIN HYDROCHLORIDE 1000 MG: 1 INJECTION, POWDER, LYOPHILIZED, FOR SOLUTION INTRAVENOUS at 17:24

## 2021-08-24 RX ADMIN — FENTANYL CITRATE 25 MCG: 50 INJECTION INTRAMUSCULAR; INTRAVENOUS at 17:46

## 2021-08-24 RX ADMIN — MIDAZOLAM HYDROCHLORIDE 2 MG: 5 INJECTION, SOLUTION INTRAMUSCULAR; INTRAVENOUS at 17:31

## 2021-08-24 RX ADMIN — MIDAZOLAM HYDROCHLORIDE 1 MG: 5 INJECTION, SOLUTION INTRAMUSCULAR; INTRAVENOUS at 17:47

## 2021-08-24 RX ADMIN — MUPIROCIN: 20 OINTMENT TOPICAL at 21:00

## 2021-08-24 RX ADMIN — FENTANYL CITRATE 25 MCG: 50 INJECTION INTRAMUSCULAR; INTRAVENOUS at 17:40

## 2021-08-24 RX ADMIN — SODIUM CHLORIDE, PRESERVATIVE FREE 10 ML: 5 INJECTION INTRAVENOUS at 08:57

## 2021-08-24 NOTE — CARE COORDINATION
Care being managed by [address] [place] [address], , [facility]. Work up in progress and plan of care under development for R atrial mass. Pt was IPTA- following for poss needs at [place].   Omid Blair RN

## 2021-08-24 NOTE — PROGRESS NOTES
Hospitalist Progress Note      PCP: Norma Bonner, APRN - CNP    Date of Admission: 8/20/2021    Chief Complaint: Emesis/Sweats. Found to have CHB. Subjective: no new c/o. Medications:  Reviewed    Infusion Medications    heparin (PORCINE) Infusion 1,120 Units/hr (08/24/21 0733)    sodium chloride       Scheduled Medications    mupirocin   Nasal BID    levothyroxine  25 mcg Oral Daily    sodium chloride flush  5-40 mL Intravenous 2 times per day    pantoprazole  40 mg Oral QAM AC     PRN Meds: heparin (porcine), heparin (porcine), sodium chloride flush, sodium chloride, acetaminophen **OR** acetaminophen, melatonin, HYDROcodone-acetaminophen, perflutren lipid microspheres      Intake/Output Summary (Last 24 hours) at 8/24/2021 0928  Last data filed at 8/24/2021 3971  Gross per 24 hour   Intake 1200 ml   Output 4105 ml   Net -2905 ml       Physical Exam Performed:    /83   Pulse 50   Temp 98.4 °F (36.9 °C) (Oral)   Resp 20   Ht 5' 11\" (1.803 m)   Wt 164 lb 0.4 oz (74.4 kg)   SpO2 98%   BMI 22.88 kg/m²     General appearance: No apparent distress, appears stated age and cooperative. HEENT: Pupils equal, round, and reactive to light. Conjunctivae/corneas clear. Neck: Supple, with full range of motion. No jugular venous distention. Trachea midline. Respiratory:  Normal respiratory effort. Clear to auscultation, bilaterally without Rales/Wheezes/Rhonchi. Cardiovascular: Regular rate and rhythm with normal S1/S2 without murmurs, rubs or gallops. Abdomen: Soft, non-tender, non-distended with normal bowel sounds. Musculoskeletal: No clubbing, cyanosis or edema bilaterally. Full range of motion without deformity. Skin: Skin color, texture, turgor normal.  No rashes or lesions. Neurologic:  Neurovascularly intact without any focal sensory/motor deficits.  Cranial nerves: II-XII intact, grossly non-focal.  Psychiatric: Alert and oriented, thought content appropriate, normal insight  Capillary Refill: Brisk,< 3 seconds   Peripheral Pulses: +2 palpable, equal bilaterally       Labs:   Recent Labs     08/23/21  0456 08/23/21  1313 08/24/21  0337   WBC 8.7 7.4 7.2   HGB 12.7* 12.4* 13.5   HCT 37.8* 37.0* 40.4*    237 259     Recent Labs     08/22/21  0419 08/23/21  0456 08/24/21  0337    135* 136   K 4.3 4.1 4.5    103 99   CO2 24 24 28   BUN 25* 17 15   CREATININE 1.4* 1.1 1.3   CALCIUM 8.6 8.6 8.9   PHOS 2.5 3.4 4.1     Recent Labs     08/22/21 0419 08/23/21  0456 08/24/21  0337   AST 65* 43* 26   ALT 90* 82* 62*   BILITOT 0.3 0.4 0.3   ALKPHOS 87 106 106     Recent Labs     08/22/21 0419 08/23/21  0456 08/24/21  0337   INR 1.06 1.03 1.03     No results for input(s): Ilda Mi in the last 72 hours. Urinalysis:      Lab Results   Component Value Date    NITRU Negative 03/26/2021    WBCUA 0-2 03/26/2021    RBCUA 3-4 03/26/2021    BLOODU TRACE-INTACT 03/26/2021    SPECGRAV 1.025 03/26/2021    GLUCOSEU Negative 03/26/2021       Consults:    IP CONSULT TO CARDIOLOGY  IP CONSULT TO HOSPITALIST  IP CONSULT TO ONCOLOGY      Assessment/Plan:    Active Hospital Problems    Diagnosis     Nausea vomiting and diarrhea [R11.2, R19.7]     Chronic anticoagulation [Z79.01]     Acquired hypothyroidism [E03.9]     JOEL (acute kidney injury) ([facility] [address] [N17.9]     Third degree AV block (HCC) [I44.2]     PAF (paroxysmal atrial fibrillation) (HCC) [I48.0]          Sick sinus syndrome with third-degree AV block s/p right IJ transvenous pacemaker insertion (8/20, Dr Jose Hart) with back-up pacing st for 60 bpm.  Will need permanent transvenous pacemaker placed at some point but CASSIDY demonstrated right atrial mass which may require surgical excision - CT Surgery consulted and appreciated. CT scan Chest/Abd/Pelvis c/w possible metastatic disease. Hx of Pericarditis - treated with oral Colchicine (Feb 2021).     Paroxysmal atrial fibrillation:  Eliquis and Amiodarone held admission - currently on Heparin gtt.      Troponin T elevation secondary to demand ischemia and acute kidney injury. Hypotension - POArrival, requiring pressor support - now resolved and weaned off. Elevated liver enzymes:  Likely related to hypotension. Minimal to moderate alcohol consumption. Acute kidney injury -  secondary to hypotension and hypoperfusion.  Volume expansion with IVF and resolved.     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  He has also been taking amiodarone.  Initiated on low dose levothyroxine 25 mcg daily.        DVT Prophylaxis: IPC    Recent Labs     08/23/21  0456 08/23/21  1313 08/24/21  0337    237 259     Diet: Diet NPO  Code Status: Full Code      PT/OT Eval Status: not yet ordered. Dispo - Remains in ICU. Here for the foreseeable future pending clinical course and subspecialty recs.  Wife Juan louie present at bedside when seen 23/24 August.     Isadore Rinne, MD

## 2021-08-24 NOTE — PLAN OF CARE
Discussed case with CT surgery. Open heart surgery is not anticipated. Requesting permanent pacemaker implant today in order to proceed with EBUS. Discussed with Dr. Regulo Ugalde. Will proceed with pacer tonight at 5:30pm. The risks, benefits, and alternative therapies were discussed at length with the patient and his wife.      MEI Sanchez-KAVON  [facility]  [phone]

## 2021-08-24 NOTE — PROGRESS NOTES
CARDIOLOGY PROGRESS NOTE      Patient Name: Leo Herring  Date of admission: 8/20/2021  4:24 PM  Admission Dx: Complete heart block ([address] [I44.2]  Reason for Consult:  [facility]  Requesting Physician: Freddy Milner MD  Primary Care physician: MEI Wright - CNP    Subjective:     Leo Herring is a 74 y.o. 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 The patient reports that he went to bed feeling fine last night, but then felt very weak and nauseous when he woke up this morning. He also felt lightheaded and dizzy and says that he had multiple episodes of emesis. He denies any chest pain or shortness of breath. He is on amiodarone and Eliquis for his history of atrial fibrillation. He does not take any other AV pallavi blocking agents. He had a stress echocardiogram in 4/2021 that showed normal LV systolic function and no evidence of ischemia.     Patient was evaluated with Dr. Prince Samano of interventional last evening. I did discuss the case with him by phone this morning. On review of chart and from discussion with interventional, on arrival to the ED, the patient's was profoundly bradycardic and hypotensive with BP of 57/31. Initial ECG showed third-degree AV block with ventricular escape rate in the 20s. He was bolused with IV fluids and reportedly received multiple doses of atropine with no improvement in heart rate. He was subsequently started on an IV dopamine infusion at 10 mcg/kg/min, but remained profoundly bradycardic and had multiple  episodes of asystole during which he would briefly lose consciousness. During one of these episodes, he was given 1 mg of epinephrine and his ventricular rate transiently increased to the 120s but this was transient.   Ultimately the patient was taken to the Cath Lab and a TVP was inserted with good result. Hemodynamics recovered. Weaned from dopamine. Patient feels well this AM. TVP site c/d/i. No chest pain. No dyspnea. No dizziness/recurrent syncope. No orthopnea/PND/LE edema. Kept in bed since admission. CT shows concern for lung and adrenal masses. Home Medications:  Were reviewed and are listed in nursing record and/or below  Prior to Admission medications    Medication Sig Start Date End Date Taking? Authorizing Provider   amiodarone (CORDARONE) 200 MG tablet Take 0.5 tablets by mouth daily 6/30/21   MEI Akbar - CNP   apixaban (ELIQUIS) 5 MG TABS tablet Take 1 tablet by mouth 2 times daily 5/6/21   Venus Ambrocio MD   omeprazole (PRILOSEC) 20 MG delayed release capsule Take 1 capsule by mouth every morning (before breakfast) 4/8/21   Alireza Dumont MD        CURRENT Medications:  mupirocin (BACTROBAN) 2 % ointment, BID  heparin (porcine) injection 4,000 Units, PRN  heparin (porcine) injection 2,000 Units, PRN  heparin 25,000 units in dextrose 5% 250 mL (premix) infusion, Continuous  levothyroxine (SYNTHROID) tablet 25 mcg, Daily  sodium chloride flush 0.9 % injection 5-40 mL, 2 times per day  sodium chloride flush 0.9 % injection 5-40 mL, PRN  pantoprazole (PROTONIX) tablet 40 mg, QAM AC  0.9 % sodium chloride infusion, PRN  acetaminophen (TYLENOL) tablet 650 mg, Q4H PRN   Or  acetaminophen (TYLENOL) suppository 650 mg, Q4H PRN  melatonin tablet 3 mg, Nightly PRN  HYDROcodone-acetaminophen (NORCO) 7.5-325 MG per tablet 1 tablet, Q6H PRN  perflutren lipid microspheres (DEFINITY) injection 1.65 mg, ONCE PRN        Allergies:  Percocet [oxycodone-acetaminophen]     Review of Systems:   A 14 point review of symptoms completed. Pertinent positives identified in the HPI, all other review of symptoms negative.        Objective:     Vitals:    08/24/21 0902 08/24/21 1002 08/24/21 1102 08/24/21 1202   BP: 130/79 136/85 129/78 134/83   Pulse: 50 50 50 50   Resp: 20 20 20 18 Temp:       TempSrc:       SpO2:  96% 100% 100%   Weight:       Height:            Weight: 164 lb 0.4 oz (74.4 kg)       PHYSICAL EXAM:    General:  Alert, cooperative, no distress, appears stated age   Head:  Normocephalic, atraumatic   Eyes:  Conjunctiva/corneas clear, anicteric sclerae    Nose: Nares normal, no drainage or sinus tenderness   Throat: No abnormalities of the lips, oral mucosa or tongue. Neck: Trachea midline. Neck supple with no lymphadenopathy, thyroid not enlarged, symmetric, no tenderness/mass/nodules, TVP RIJ   Lungs:   Clear to auscultation bilaterally, no wheezes, no rales, no respiratory distress   Chest Wall:  No deformity or tenderness to palpation   Heart:  Regular rate and rhythm paced at 50 by monitor, normal S1, normal S2, no murmur, no rub, no S3/S4, PMI non-displaced. Abdomen:   Soft, non-tender, with normoactive bowel sounds. No masses, no hepatosplenomegaly   Extremities: No cyanosis, clubbing or pitting edema. Vascular: 2+ radial,  dorsalis pedis and posterior tibial pulses bilaterally. Brisk carotid upstrokes without carotid bruit. Skin: Skin color, texture, turgor are normal with no rashes or ulceration. Pysch: Euthymic mood, appropriate affect   Neurologic: Oriented to person, place and time. No slurred speech or facial asymmetry. No motor or sensory deficits on gross examination.          Labs:   CBC:   Lab Results   Component Value Date    WBC 7.2 08/24/2021    RBC 4.60 08/24/2021    HGB 13.5 08/24/2021    HCT 40.4 08/24/2021    MCV 87.8 08/24/2021    RDW 17.2 08/24/2021     08/24/2021     CMP:  Lab Results   Component Value Date     08/24/2021    K 4.5 08/24/2021    K 4.2 03/27/2021    CL 99 08/24/2021    CO2 28 08/24/2021    BUN 15 08/24/2021    CREATININE 1.3 08/24/2021    GFRAA >60 08/24/2021    GFRAA >60 05/16/2011    AGRATIO 0.8 08/24/2021    LABGLOM 55 08/24/2021    GLUCOSE 105 08/24/2021    PROT 6.8 08/24/2021    PROT 7.4 05/16/2011 CALCIUM 8.9 08/24/2021    BILITOT 0.3 08/24/2021    ALKPHOS 106 08/24/2021    AST 26 08/24/2021    ALT 62 08/24/2021     PT/INR:  No results found for: PTINR  HgBA1c:  Lab Results   Component Value Date    LABA1C 5.6 05/16/2018     Lab Results   Component Value Date    TROPONINI 0.22 (H) 08/21/2021         Interval Testing/Data:     Telemetry personally reviewed: V paced 60. EKG 8/21 personally reviewed, my interpretation: ventricular paced rhythm 60 beats per minute. All EKG in emergency department were personally reviewed and are reflective of: Third-degree AV block with ventricular escape. EKG from 1805 particularly worrisome with evidence of sinus tachycardia with failed conduction.     Echo:    TTE 8/21/21:   Summary   Limited echo for LV function and pericardial effusion/EF   Normal left ventricle size and wall thickness. The left ventricular systolic function is normal at 55%. Abnormal (paradoxical) septal motion is present likely due to right   ventricular pacing. Grade I diastolic dysfunction with normal filing pressure. The right   ventricle is dilated with normal systolic function. Temp Pacer wire is visualized in the right ventricle. Bi-atrial enlargement. There is a well circumscribed echodensity that appears adherent to   inter-atrial septum, adjacent to the anterior/septal tricuspid valve   leaflets (images 19-22, 39). Findings consistent with intra-cardiac mass. Moderate tricuspid regurgitation. Systolic pulmonary artery pressure (SPAP) is elevated and estimated at 51   mmHg (right atrial pressure mmHg) consistent with moderate pulmonary   hypertension. No significant pericardial effusion noted. Compared with the prior study performed 4/8/21, EF appears normalized with   septal dyssynchrony in setting of TVP, no significant pericardial effusion,   new finding of possible right atrial mass. Further cardiac imaging is   recommended to define intra-cardiac mass. TTE 4/8/21:  Conclusions    Summary   -Technically difficult exam due to atrial fibrillation and rapid heart rate.   -Borderline global systolic function with an ejection fraction estimated at   50%.  -Left ventricular function and wall motion assessment was very limited due   to atrial fibrillation and rapid heart rate.   -Mild biatrial enlargement.   -Mild mitral regurgitation.   -The right ventricle is borderline enlarged with borderline function.   -Moderate tricuspid regurgitation with a right ventricular systolic pressure   estimated at 39 mmHg assuming a right atrial pressure of 15 mmHg.   -Mild pulmonic regurgitation present.   -IVC size is dilated (>2.1 cm) and collapses < 50% with respiration   consistent with elevated RA pressure (15 mmHg).  -Small size pericardial effusion.   -Indeterminate diastolic function due to atrial fibrillation.     Stress Test:  Exercise stress echo 4/15/21:  Conclusions      Summary   Normal stress ECHO.      Signature      ------------------------------------------------------------------   Electronically signed by Kike Truong MD, [facility] (Interpreting   physician) on 04/16/2021 at 02:49 PM   ------------------------------------------------------------------      Rest      ECG   Normal sinus rhythm.      Standing HR:49 bpmStanding BP:118/90 mmHg      Stress      Stress Type: Exercise      Rest HR: 49 bpm                 HR Response: Normal   Rest BP: 118/90 mmHg            BP Response: Normal   Stress Peak HR: 114 bpm         HR BP Product: 63627   Stress Peak BP: 140/60 mmHg   Predicted HR: 152 bpm           Stress EF: 70 %   % of predicted HR: 75   Reason for Termination: Dyspnea      Results      Echo (rest): Normal (LVEF >50%)      Echo (stress): Normal (LVEF >50%)      Echo   Baseline resting echocardiogram shows normal global LV systolic function   with an ejection fraction of 55-60% and uniform myocardial segmental wall   motion.  Following stress there was uniform augmentation of all myocardial   segments with appropriate hyperdynamic LV systolic response to stress and an   ejection fraction of 70%. CT chest/abd/pel 8/23  Chest-       Left infrahilar peribronchial thickening and adjacent       24 mm nodule in the left lower lobe, suspicious for metastasis in the setting.       Centrilobular nodules in the left lower lobe are also suspicious for   endobronchial spread of tumor. Broncho pneumonia remains a differential   possibility.       Borderline enlargement of mediastinal nodes, nonspecific.       Trace bilateral pleural effusions and mild bibasilar atelectasis.       ---       Abdomen pelvis-       2 cm right adrenal nodule, new in the interval, suspicious for metastasis.       Small amount of free fluid in the pelvis, nonspecific.       Moderate enlargement of the prostate gland. Impression and Plan      1. Symptomatic third-degree AV block manifest with syncope/hypoperfusion, status post TVP and paced at 50 bpm low rate limit  2. Intra-cardiac mass, Right atrium, close proximity to AVN  3. Lung/adrenal masses by CT, concerning for metastasis, unknown primary  4. Paroxysmal atrial fibrillation  5. Normal LV function by echocardiogram with finding of possible right atrial mass. Unclear at the given time how this may be related to #1  6. Troponin elevation secondary to type 2 demand ischemia and further exacerbated by delayed cardiac enzyme clearance in setting of acute kidney injury which has resolved   7. History of pericarditis/pericardial effusion   8. 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 Remote history of melanoma thumb status post resection/amputation 2003, again 2008 for local recurrence.      Patient Active Problem List   Diagnosis    Left low back pain    IGT (impaired glucose tolerance)    Inguinal hernia unilateral, non-recurrent    History of malignant melanoma    Chest pain  PAF (paroxysmal atrial fibrillation) (HCC)    Pericarditis    Anemia    Alcohol abuse    Complete heart block (HCC)    JOEL [facility]acute [facility] injury) ([facility] [address]    Third degree AV block (HCC)    Chronic anticoagulation    Acquired hypothyroidism    Nausea vomiting and diarrhea       PLAN:  1. CT surgery, oncology evaluating for next best steps; possible EBUS for tissue diagnosis  2. EP following; TVP in place, stable   3. Remains anti-coagulated with heparin     Will follow. I will address the patient's cardiac risk factors and adjusted pharmacologic treatment as needed. In addition, I have reinforced the need for patient directed risk factor modification. All questions and concerns were addressed to the patient/family. Alternatives to my treatment were discussed. Thank you for allowing us to participate in the care of Edelmira Rangel. Please call me with any questions [phone].     Vipul Pappas MD, [facility]  Cardiovascular Disease  [facility]  [phone] [facility]  [phone] [address] [place] [address]  8/24/2021 1:30 PM

## 2021-08-24 NOTE — PROGRESS NOTES
CVTS Thoracic Progress Note:          CC: Right atrial mass    Subj: awake, alert and talkative, sitting up in bed. His wife and son are visiting at bedside. Obj:    Blood pressure 129/78, pulse 50, temperature 98.4 °F (36.9 °C), temperature source Oral, resp. rate 20, height 5' 11\" (1.803 m), weight 164 lb 0.4 oz (74.4 kg), SpO2 100 %. Lungs CTAB   Abdomen flat, soft, non-tender   -[id number]= 3225 ml in 24 hrs; Cr 1.3    Diagnostics:   Recent Labs     08/23/21  0456 08/23/21  1313 08/24/21  0337   WBC 8.7 7.4 7.2   HGB 12.7* 12.4* 13.5   HCT 37.8* 37.0* 40.4*    237 259                                                                  Recent Labs     08/22/21  0419 08/23/21  0456 08/24/21  0337    135* 136   K 4.3 4.1 4.5    103 99   CO2 24 24 28   BUN 25* 17 15   CREATININE 1.4* 1.1 1.3   GLUCOSE 99 103* 105*            Recent Labs     08/22/21  0419 08/23/21  0456 08/24/21  0337   MG 2.00 1.90 2.00      Recent Labs     08/22/21  0419 08/23/21  0456 08/24/21  0337   INR 1.06 1.03 1.03     CT Chest: 8/23/21   Impression   Chest-       Left infrahilar peribronchial thickening and adjacent       24 mm nodule in the left lower lobe, suspicious for metastasis in the setting.       Centrilobular nodules in the left lower lobe are also suspicious for   endobronchial spread of tumor. Broncho pneumonia remains a differential   possibility.       Borderline enlargement of mediastinal nodes, nonspecific.       Trace bilateral pleural effusions and mild bibasilar atelectasis.       ---       Abdomen pelvis-       2 cm right adrenal nodule, new in the interval, suspicious for metastasis.       Small amount of free fluid in the pelvis, nonspecific.       Moderate enlargement of the prostate gland. CASSIDY: 8/23/21 with Dr. Sam Rico    Summary:    Normal left ventricular size and function. Ejection fraction is visually estimated at 55-60%. Normal right ventricular size and function.  Temporary Pacemaker wire is visualized in the right ventricle. The right atrium is dilated. Well-circumscribed 2.0 cm x 1.9 cm mass located in the right atrium that appears attached to the low inter-atrial septum. Relatively immobile with no clear discrete stalk is noted. Atrial myxoma as well as secondary tumor remain considerations. Mild mitral regurgitation     Assess/Plan:   Care per primary team, Cardiology and Hem/Onc    Rt atrial mass: Will review CT and CASSIDY with team.   Have consulted Hem/Onc, given pt's history of cancer - appreciate assistance. Discussed with Hem/Onc NP - pt may need biopsy of the lesion in his LLL found on CT.    Surgical intervention still pending metastatic work-up.   ______________________________________________________    Estrella Eller, MEI - CNP  8/24/2021  11:49 AM
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
## 2021-08-24 NOTE — PROCEDURES
Janiya Alex  Electrophysiology Report      Attending MD Marlene Frost MD    Procedures   Dual chamber pacemaker implantation    Indications   Complete heart block    Complication None  EBL  <40YC    Sedation: Fentanyl - 150 mcg and Versed - 5 mg    Mallampati: II  ASA: II    Start: 1128  Stop: 0273    Conclusion   Successful dual chamber pacemaker implantation    Description of Procedure  The patient was brought to the electrophysiology laboratory in the fasting state after informed consent was obtained. The patient was placed in the supine position and the left pectoral area was prepared and draped in a sterile fashion. The electrocardiogram, blood pressure, and oxygenation were monitored intra- and post-procedure. Intravenous antibiotic was given prior to the procedure for general antibiotic prophylaxis. IV sedation was provided by me via nursing staff. After using buffered lidocaine 1% with epinephrine (1/100,000) for local anesthesia to the left pectoral area, venous access was obtained in the axillary vein at the level of the first rib via the modified Seldinger technique under ultrasound guidance x 2. A 4-5 cm incision was made inferior to the left clavicle. The subcutaneous tissues were dissected to the level of the pre-pectoral fascia and a pocket was fashioned via blunt dissection. A 7 F sheath was inserted into the left subclavian over a J-wire. The right ventricular lead was inserted and the lead tip positioned at the RV apex under fluoroscopic guidance. Once appropriate placement was obtained and threshold measurements made to 's specifications, the lead was anchored to the pectoralis fascia using 2-0 silk. A 7 F sheath was then inserted into the left subclavian vein over a J-wire. The right atrial lead was inserted and the lead tip positioned in the RA appendage under fluoroscopic guidance.   Threshold measurements were obtained to the 's specifications, and the lead was anchored to the pectoralis fascia using 2-0 silk. After hemostasis was assured, the pulse generator was connected to the atrial and ventricular leads and appropriate lead pacing/impedance was confirmed. The device was anchored to the pocket using a 2-0 silk and then placed into the pocket with care to ensure the leads were positioned beneath the device. The pocket was irrigated with antibiotic solution. The fascia was closed using interrupted vicryl sutures. The skin and subcutaneous tissues were approximated using running 3-0 Vicryl sutures, and the wound was sealed with poly-cyanoacrylate solution. A sterile dressing was applied to the site. The patient tolerated the procedure well and there were no complications. At the conclusion of the procedure, all sponges and sharps were accounted for by two counts. The attending physician, Beatriz Degroot MD was present for the entire procedure and for interpretation of data. Device and Lead Information  Pulse Generator Model # Manfacturer Serial # Location   [id number] Medtronic [id number] left pectoral, subcutaneous     Lead Model Number Manfacturer Serial Number Lead position   RA 5076 Medtronic [id number] RA appendage   RV 5076 Medtronic [id number] RV apex     Lead Sensing and Thresholds  Lead R/P sensing (mV) Threshold (V) Threshold PW (msec) Impedance (?) Final Voltage (V) Final PW (msec)   RA 2.5 1.0 0.4 551 3.5 0.4   RV 8.9 0.75 0.4 589 3.5 0.4     Bradycardia Settings  Paresh Mode LRL URL Pace AVD (ms) Sense AVD (ms) Mode Switching Mode SW Rate   DDDR 60 130 180 150      Proceduralist Notes:  - Successful DC pacemaker implant.  - TyRx pouch utilized given temporary wire and plan further surgical intervention.  - Doxycycline x 5 days.  - Hold heparin for now.   If atrial fibrillation then start heparin.  - Chest radiograph in am.

## 2021-08-24 NOTE — PROGRESS NOTES
[address]     Electrophysiology                                     Progress Note    Admission date:  2021    Reason for follow up visit: CHB    HPI/CC: Joyc Harris was admitted on 2021 with weakness and N/V. EKG showed CHB and a temporary pacemaker was placed. TTE showed possible RA mass, confirmed on CASSIDY 2021. CT scan showed lung and adrenal nodules. Workup is ongoing. Rhythm has been CHB with ventricular pacing set to 50. Subjective: He denies chest pain, palpitations, shortness of breath, and dizziness. Vitals:  Blood pressure 136/85, pulse 50, temperature 98.4 °F (36.9 °C), temperature source Oral, resp. rate 20, height 5' 11\" (1.803 m), weight 164 lb 0.4 oz (74.4 kg), SpO2 96 %.   Temp  Av.4 °F (36.9 °C)  Min: 98 °F (36.7 °C)  Max: 98.6 °F (37 °C)  Pulse  Av  Min: 50  Max: 50  BP  Min: 80/54  Max: 154/84  SpO2  Av.9 %  Min: 96 %  Max: 100 %    24 hour I/O    Intake/Output Summary (Last 24 hours) at 2021 1116  Last data filed at 2021 4263  Gross per 24 hour   Intake 1200 ml   Output 4105 ml   Net -2905 ml     Current Facility-Administered Medications   Medication Dose Route Frequency Provider Last Rate Last Admin    mupirocin (BACTROBAN) 2 % ointment   Nasal BID Marissa Omer MD   Given at 21 0857    heparin (porcine) injection 4,000 Units  4,000 Units Intravenous PRN Melanie Plascencia MD        heparin (porcine) injection 2,000 Units  2,000 Units Intravenous PRN Melanie Plascencia MD        heparin 25,000 units in dextrose 5% 250 mL (premix) infusion  1,120 Units/hr Intravenous Continuous Melanie Plascencia MD 11.2 mL/hr at 21 0733 1,120 Units/hr at 21 0733    levothyroxine (SYNTHROID) tablet 25 mcg  25 mcg Oral Daily Yuly Dickens MD   25 mcg at 21 4037    sodium chloride flush 0.9 % injection 5-40 mL  5-40 mL Intravenous 2 times per day Yuly Dickens MD   10 mL at 21 0830    sodium chloride flush 0.9 % injection 5-40 mL  5-40 mL Intravenous PRN Jerardo Neff MD        pantoprazole (PROTONIX) tablet 40 mg  40 mg Oral QAM AC Jerardo Neff MD   40 mg at 08/24/21 0731    0.9 % sodium chloride infusion  25 mL Intravenous PRN Jerardo Neff MD        acetaminophen (TYLENOL) tablet 650 mg  650 mg Oral Q4H PRN Jerardo Neff MD   650 mg at 08/22/21 1152    Or    acetaminophen (TYLENOL) suppository 650 mg  650 mg Rectal Q4H PRN Jerardo Neff MD        melatonin tablet 3 mg  3 mg Oral Nightly PRN Jerardo Neff MD        HYDROcodone-acetaminophen (NORCO) 7.5-325 MG per tablet 1 tablet  1 tablet Oral Q6H PRN Jerardo Neff MD        perflutren lipid microspheres (DEFINITY) injection 1.65 mg  1.5 mL Intravenous ONCE PRN Jerardo Neff MD           Objective:     Telemetry monitor: AP-    Physical Exam:  Constitutional and general appearance: alert, cooperative, no distress and appears stated age  HEENT: PERRL, no cervical lymphadenopathy. No masses palpable. Normal oral mucosa  Respiratory:  · Normal excursion and expansion without use of accessory muscles  · Resp auscultation: Normal breath sounds without wheezing, rhonchi, and rales  Cardiovascular:  · The apical impulse is not displaced  · Heart tones are crisp and normal. regular S1 and S2.  · Jugular venous pulsation Normal  · The carotid upstroke is normal in amplitude and contour without delay or bruit  · Peripheral pulses are symmetrical and full   Abdomen:  · No masses or tenderness  · Bowel sounds present  Extremities:  ·  No cyanosis or clubbing  ·  No lower extremity edema  ·  Skin: warm and dry  Neurological:  · Alert and oriented  · Moves all extremities well  · No abnormalities of mood, affect, memory, mentation, or behavior are noted    Data  CASSIDY 8/23/2021:  Normal left ventricular size and function. Ejection fraction is visually estimated at 55-60%. Normal right ventricular size and function.  Temporary Pacemaker wire is visualized in the right ventricle. The right atrium is dilated. Well-circumscribed 2.0 cm x 1.9 cm mass located in the right atrium that appears attached to the low inter-atrial septum. Relatively immobile with no clear discrete stalk is noted. Atrial myxoma as well as secondary tumor remain considerations. Mild mitral regurgitation. Limited echo 8/21/2021:   Limited echo for LV function and pericardial effusion/EF   Normal left ventricle size and wall thickness. The left ventricular systolic function is normal at 55%. Abnormal (paradoxical) septal motion is present likely due to right   ventricular pacing. Grade I diastolic dysfunction with normal filing pressure. The right   ventricle is dilated with normal systolic function. Temp Pacer wire is visualized in the right ventricle. Bi-atrial enlargement. There is a well circumscribed echodensity that appears adherent to   inter-atrial septum, adjacent to the anterior/septal tricuspid valve   leaflets (images 19-22, 39). Findings consistent with intra-cardiac mass. Moderate tricuspid regurgitation. Systolic pulmonary artery pressure (SPAP) is elevated and estimated at 51   mmHg (right atrial pressure mmHg) consistent with moderate pulmonary   hypertension. No significant pericardial effusion noted. Compared with the prior study performed 4/8/21, EF appears normalized with septal dyssynchrony in setting of TVP, no significant pericardial effusion,   new finding of possible right atrial mass. Further cardiac imaging is   recommended to define intra-cardiac mass. Stress test 4/8/2021:  Study cancelled due to rapid afib    Resting perfusion appears normal.           All labs and testing reviewed.   Lab Review     Renal Profile:   Lab Results   Component Value Date    CREATININE 1.3 08/24/2021    BUN 15 08/24/2021     08/24/2021    K 4.5 08/24/2021    K 4.2 03/27/2021    CL 99 08/24/2021    CO2 28 08/24/2021     CBC:    Lab Results Component Value Date    WBC 7.2 08/24/2021    RBC 4.60 08/24/2021    HGB 13.5 08/24/2021    HCT 40.4 08/24/2021    MCV 87.8 08/24/2021    RDW 17.2 08/24/2021     08/24/2021     BNP:  No results found for: BNP  Fasting Lipid Panel:    Lab Results   Component Value Date    CHOL 129 03/27/2021    HDL 60 03/27/2021    HDL 92 04/12/2010    TRIG 48 03/27/2021     Cardiac Enzymes:  CK/MbTroponin  Lab Results   Component Value Date    TROPONINI 0.22 08/21/2021     PT/ INR   Lab Results   Component Value Date    INR 1.03 08/24/2021    INR 1.03 08/23/2021    INR 1.06 08/22/2021    PROTIME 11.6 08/24/2021    PROTIME 11.6 08/23/2021    PROTIME 12.0 08/22/2021     PTT No results found for: PTT   Lab Results   Component Value Date    MG 2.00 08/24/2021    No results found for: TSH    Assessment:   Complete heart block: ongoing but stable                    -s/p temporary pacemaker placement 8/20/2021  Paroxysmal atrial fibrillation: stable               -noted 2/2021               -followed by Dr. 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 Continue temporary pacemaker at current settings   2. Heme-onc evaluation pending   3. CT surgery to make final recommendations after heme-onc evaluation      Discussed plan with Dr. Margret Terry.  Patient to remain on bedrest per MD. Trupti Madsen on 8/25/2021 at 2:25pm: Note from 8/24/2021 was inadvertently addended instead of copied. Was reverted back to original note.       Juli Olivera, APRN-CNP  [address]  [phone]

## 2021-08-25 ENCOUNTER — NURSE ONLY (OUTPATIENT)
Dept: CARDIOLOGY CLINIC | Age: 66
End: 2021-08-25
Payer: MEDICARE

## 2021-08-25 ENCOUNTER — ANESTHESIA (OUTPATIENT)
Dept: OPERATING ROOM | Age: 66
DRG: 242 | End: 2021-08-25
Payer: MEDICARE

## 2021-08-25 ENCOUNTER — ANESTHESIA EVENT (OUTPATIENT)
Dept: OPERATING ROOM | Age: 66
DRG: 242 | End: 2021-08-25
Payer: MEDICARE

## 2021-08-25 ENCOUNTER — APPOINTMENT (OUTPATIENT)
Dept: GENERAL RADIOLOGY | Age: 66
DRG: 242 | End: 2021-08-25
Payer: MEDICARE

## 2021-08-25 VITALS — TEMPERATURE: 98.2 F | SYSTOLIC BLOOD PRESSURE: 107 MMHG | OXYGEN SATURATION: 100 % | DIASTOLIC BLOOD PRESSURE: 74 MMHG

## 2021-08-25 DIAGNOSIS — Z95.0 PACEMAKER: ICD-10-CM

## 2021-08-25 DIAGNOSIS — I44.2 COMPLETE HEART BLOCK (HCC): ICD-10-CM

## 2021-08-25 DIAGNOSIS — Z95.0 PACEMAKER: Primary | ICD-10-CM

## 2021-08-25 LAB
A/G RATIO: 1 (ref 1.1–2.2)
ALBUMIN SERPL-MCNC: 3.3 G/DL (ref 3.4–5)
ALP BLD-CCNC: 98 U/L (ref 40–129)
ALT SERPL-CCNC: 45 U/L (ref 10–40)
ANION GAP SERPL CALCULATED.3IONS-SCNC: 9 MMOL/L (ref 3–16)
AST SERPL-CCNC: 17 U/L (ref 15–37)
BASOPHILS ABSOLUTE: 0 K/UL (ref 0–0.2)
BASOPHILS RELATIVE PERCENT: 0.4 %
BILIRUB SERPL-MCNC: 0.4 MG/DL (ref 0–1)
BUN BLDV-MCNC: 20 MG/DL (ref 7–20)
CALCIUM SERPL-MCNC: 8.6 MG/DL (ref 8.3–10.6)
CHLORIDE BLD-SCNC: 101 MMOL/L (ref 99–110)
CO2: 25 MMOL/L (ref 21–32)
CREAT SERPL-MCNC: 1.2 MG/DL (ref 0.8–1.3)
EOSINOPHILS ABSOLUTE: 0.2 K/UL (ref 0–0.6)
EOSINOPHILS RELATIVE PERCENT: 2 %
GFR AFRICAN AMERICAN: >60
GFR NON-AFRICAN AMERICAN: >60
GLOBULIN: 3.4 G/DL
GLUCOSE BLD-MCNC: 104 MG/DL (ref 70–99)
HCT VFR BLD CALC: 39 % (ref 40.5–52.5)
HEMOGLOBIN: 13 G/DL (ref 13.5–17.5)
INR BLD: 1.03 (ref 0.88–1.12)
LYMPHOCYTES ABSOLUTE: 0.5 K/UL (ref 1–5.1)
LYMPHOCYTES RELATIVE PERCENT: 6.8 %
MAGNESIUM: 1.9 MG/DL (ref 1.8–2.4)
MCH RBC QN AUTO: 28.7 PG (ref 26–34)
MCHC RBC AUTO-ENTMCNC: 33.3 G/DL (ref 31–36)
MCV RBC AUTO: 86.3 FL (ref 80–100)
MONOCYTES ABSOLUTE: 0.8 K/UL (ref 0–1.3)
MONOCYTES RELATIVE PERCENT: 9.8 %
NEUTROPHILS ABSOLUTE: 6.5 K/UL (ref 1.7–7.7)
NEUTROPHILS RELATIVE PERCENT: 81 %
PDW BLD-RTO: 17.2 % (ref 12.4–15.4)
PHOSPHORUS: 4.1 MG/DL (ref 2.5–4.9)
PLATELET # BLD: 236 K/UL (ref 135–450)
PMV BLD AUTO: 8.6 FL (ref 5–10.5)
POTASSIUM SERPL-SCNC: 4.3 MMOL/L (ref 3.5–5.1)
PROSTATE SPECIFIC ANTIGEN: 1.12 NG/ML (ref 0–4)
PROTHROMBIN TIME: 11.7 SEC (ref 9.9–12.7)
RBC # BLD: 4.52 M/UL (ref 4.2–5.9)
SODIUM BLD-SCNC: 135 MMOL/L (ref 136–145)
TOTAL PROTEIN: 6.7 G/DL (ref 6.4–8.2)
WBC # BLD: 8 K/UL (ref 4–11)

## 2021-08-25 PROCEDURE — 07D78ZX EXTRACTION OF THORAX LYMPHATIC, VIA NATURAL OR ARTIFICIAL OPENING ENDOSCOPIC, DIAGNOSTIC: ICD-10-PCS | Performed by: THORACIC SURGERY (CARDIOTHORACIC VASCULAR SURGERY)

## 2021-08-25 PROCEDURE — 6360000002 HC RX W HCPCS: Performed by: NURSE ANESTHETIST, CERTIFIED REGISTERED

## 2021-08-25 PROCEDURE — 88173 CYTOPATH EVAL FNA REPORT: CPT

## 2021-08-25 PROCEDURE — 84100 ASSAY OF PHOSPHORUS: CPT

## 2021-08-25 PROCEDURE — 2580000003 HC RX 258: Performed by: INTERNAL MEDICINE

## 2021-08-25 PROCEDURE — 36415 COLL VENOUS BLD VENIPUNCTURE: CPT

## 2021-08-25 PROCEDURE — 85025 COMPLETE CBC W/AUTO DIFF WBC: CPT

## 2021-08-25 PROCEDURE — 88184 FLOWCYTOMETRY/ TC 1 MARKER: CPT

## 2021-08-25 PROCEDURE — 3600007512: Performed by: THORACIC SURGERY (CARDIOTHORACIC VASCULAR SURGERY)

## 2021-08-25 PROCEDURE — 84153 ASSAY OF PSA TOTAL: CPT

## 2021-08-25 PROCEDURE — 2709999900 HC NON-CHARGEABLE SUPPLY: Performed by: THORACIC SURGERY (CARDIOTHORACIC VASCULAR SURGERY)

## 2021-08-25 PROCEDURE — 31652 BRONCH EBUS SAMPLNG 1/2 NODE: CPT | Performed by: THORACIC SURGERY (CARDIOTHORACIC VASCULAR SURGERY)

## 2021-08-25 PROCEDURE — 6370000000 HC RX 637 (ALT 250 FOR IP): Performed by: INTERNAL MEDICINE

## 2021-08-25 PROCEDURE — 3600007502: Performed by: THORACIC SURGERY (CARDIOTHORACIC VASCULAR SURGERY)

## 2021-08-25 PROCEDURE — 88185 FLOWCYTOMETRY/TC ADD-ON: CPT

## 2021-08-25 PROCEDURE — 3700000001 HC ADD 15 MINUTES (ANESTHESIA): Performed by: THORACIC SURGERY (CARDIOTHORACIC VASCULAR SURGERY)

## 2021-08-25 PROCEDURE — 71045 X-RAY EXAM CHEST 1 VIEW: CPT

## 2021-08-25 PROCEDURE — 85610 PROTHROMBIN TIME: CPT

## 2021-08-25 PROCEDURE — 99232 SBSQ HOSP IP/OBS MODERATE 35: CPT | Performed by: NURSE PRACTITIONER

## 2021-08-25 PROCEDURE — 2580000003 HC RX 258: Performed by: ANESTHESIOLOGY

## 2021-08-25 PROCEDURE — 88172 CYTP DX EVAL FNA 1ST EA SITE: CPT

## 2021-08-25 PROCEDURE — 2000000000 HC ICU R&B

## 2021-08-25 PROCEDURE — 2500000003 HC RX 250 WO HCPCS: Performed by: NURSE ANESTHETIST, CERTIFIED REGISTERED

## 2021-08-25 PROCEDURE — 88341 IMHCHEM/IMCYTCHM EA ADD ANTB: CPT

## 2021-08-25 PROCEDURE — 99233 SBSQ HOSP IP/OBS HIGH 50: CPT | Performed by: NURSE PRACTITIONER

## 2021-08-25 PROCEDURE — 80053 COMPREHEN METABOLIC PANEL: CPT

## 2021-08-25 PROCEDURE — 88305 TISSUE EXAM BY PATHOLOGIST: CPT

## 2021-08-25 PROCEDURE — 2720000010 HC SURG SUPPLY STERILE: Performed by: THORACIC SURGERY (CARDIOTHORACIC VASCULAR SURGERY)

## 2021-08-25 PROCEDURE — 93280 PM DEVICE PROGR EVAL DUAL: CPT | Performed by: INTERNAL MEDICINE

## 2021-08-25 PROCEDURE — 3700000000 HC ANESTHESIA ATTENDED CARE: Performed by: THORACIC SURGERY (CARDIOTHORACIC VASCULAR SURGERY)

## 2021-08-25 PROCEDURE — 83735 ASSAY OF MAGNESIUM: CPT

## 2021-08-25 PROCEDURE — 88342 IMHCHEM/IMCYTCHM 1ST ANTB: CPT

## 2021-08-25 RX ORDER — CEFAZOLIN SODIUM 1 G/3ML
INJECTION, POWDER, FOR SOLUTION INTRAMUSCULAR; INTRAVENOUS PRN
Status: DISCONTINUED | OUTPATIENT
Start: 2021-08-25 | End: 2021-08-25 | Stop reason: SDUPTHER

## 2021-08-25 RX ORDER — HYDROMORPHONE HCL 110MG/55ML
0.25 PATIENT CONTROLLED ANALGESIA SYRINGE INTRAVENOUS EVERY 5 MIN PRN
Status: DISCONTINUED | OUTPATIENT
Start: 2021-08-25 | End: 2021-08-26

## 2021-08-25 RX ORDER — POLYETHYLENE GLYCOL 3350 17 G/17G
17 POWDER, FOR SOLUTION ORAL DAILY
Status: DISCONTINUED | OUTPATIENT
Start: 2021-08-25 | End: 2021-08-26 | Stop reason: HOSPADM

## 2021-08-25 RX ORDER — ONDANSETRON 2 MG/ML
4 INJECTION INTRAMUSCULAR; INTRAVENOUS EVERY 10 MIN PRN
Status: DISCONTINUED | OUTPATIENT
Start: 2021-08-25 | End: 2021-08-26 | Stop reason: HOSPADM

## 2021-08-25 RX ORDER — HYDROMORPHONE HCL 110MG/55ML
0.5 PATIENT CONTROLLED ANALGESIA SYRINGE INTRAVENOUS EVERY 5 MIN PRN
Status: DISCONTINUED | OUTPATIENT
Start: 2021-08-25 | End: 2021-08-26

## 2021-08-25 RX ORDER — PROPOFOL 10 MG/ML
INJECTION, EMULSION INTRAVENOUS CONTINUOUS PRN
Status: DISCONTINUED | OUTPATIENT
Start: 2021-08-25 | End: 2021-08-25 | Stop reason: SDUPTHER

## 2021-08-25 RX ORDER — LABETALOL HYDROCHLORIDE 5 MG/ML
5 INJECTION, SOLUTION INTRAVENOUS EVERY 10 MIN PRN
Status: DISCONTINUED | OUTPATIENT
Start: 2021-08-25 | End: 2021-08-26

## 2021-08-25 RX ORDER — PROPOFOL 10 MG/ML
INJECTION, EMULSION INTRAVENOUS PRN
Status: DISCONTINUED | OUTPATIENT
Start: 2021-08-25 | End: 2021-08-25 | Stop reason: SDUPTHER

## 2021-08-25 RX ORDER — ROCURONIUM BROMIDE 10 MG/ML
INJECTION, SOLUTION INTRAVENOUS PRN
Status: DISCONTINUED | OUTPATIENT
Start: 2021-08-25 | End: 2021-08-25 | Stop reason: SDUPTHER

## 2021-08-25 RX ORDER — SODIUM CHLORIDE 9 MG/ML
25 INJECTION, SOLUTION INTRAVENOUS PRN
Status: DISCONTINUED | OUTPATIENT
Start: 2021-08-25 | End: 2021-08-26

## 2021-08-25 RX ORDER — LIDOCAINE HYDROCHLORIDE 20 MG/ML
INJECTION, SOLUTION INFILTRATION; PERINEURAL PRN
Status: DISCONTINUED | OUTPATIENT
Start: 2021-08-25 | End: 2021-08-25 | Stop reason: SDUPTHER

## 2021-08-25 RX ORDER — DEXAMETHASONE SODIUM PHOSPHATE 4 MG/ML
INJECTION, SOLUTION INTRA-ARTICULAR; INTRALESIONAL; INTRAMUSCULAR; INTRAVENOUS; SOFT TISSUE PRN
Status: DISCONTINUED | OUTPATIENT
Start: 2021-08-25 | End: 2021-08-25 | Stop reason: SDUPTHER

## 2021-08-25 RX ORDER — HYDRALAZINE HYDROCHLORIDE 20 MG/ML
5 INJECTION INTRAMUSCULAR; INTRAVENOUS EVERY 10 MIN PRN
Status: DISCONTINUED | OUTPATIENT
Start: 2021-08-25 | End: 2021-08-26 | Stop reason: HOSPADM

## 2021-08-25 RX ORDER — MEPERIDINE HYDROCHLORIDE 50 MG/ML
12.5 INJECTION INTRAMUSCULAR; INTRAVENOUS; SUBCUTANEOUS EVERY 5 MIN PRN
Status: DISCONTINUED | OUTPATIENT
Start: 2021-08-25 | End: 2021-08-26

## 2021-08-25 RX ORDER — PHENYLEPHRINE HCL IN 0.9% NACL 1 MG/10 ML
SYRINGE (ML) INTRAVENOUS PRN
Status: DISCONTINUED | OUTPATIENT
Start: 2021-08-25 | End: 2021-08-25 | Stop reason: SDUPTHER

## 2021-08-25 RX ORDER — SODIUM CHLORIDE, SODIUM LACTATE, POTASSIUM CHLORIDE, CALCIUM CHLORIDE 600; 310; 30; 20 MG/100ML; MG/100ML; MG/100ML; MG/100ML
INJECTION, SOLUTION INTRAVENOUS CONTINUOUS
Status: DISCONTINUED | OUTPATIENT
Start: 2021-08-25 | End: 2021-08-25

## 2021-08-25 RX ORDER — ONDANSETRON 2 MG/ML
INJECTION INTRAMUSCULAR; INTRAVENOUS PRN
Status: DISCONTINUED | OUTPATIENT
Start: 2021-08-25 | End: 2021-08-25 | Stop reason: SDUPTHER

## 2021-08-25 RX ORDER — LIDOCAINE HYDROCHLORIDE 10 MG/ML
0.3 INJECTION, SOLUTION EPIDURAL; INFILTRATION; INTRACAUDAL; PERINEURAL
Status: ACTIVE | OUTPATIENT
Start: 2021-08-25 | End: 2021-08-25

## 2021-08-25 RX ORDER — DOXYCYCLINE HYCLATE 100 MG
100 TABLET ORAL EVERY 12 HOURS SCHEDULED
Status: DISCONTINUED | OUTPATIENT
Start: 2021-08-25 | End: 2021-08-26 | Stop reason: HOSPADM

## 2021-08-25 RX ORDER — FENTANYL CITRATE 50 UG/ML
INJECTION, SOLUTION INTRAMUSCULAR; INTRAVENOUS PRN
Status: DISCONTINUED | OUTPATIENT
Start: 2021-08-25 | End: 2021-08-25 | Stop reason: SDUPTHER

## 2021-08-25 RX ORDER — SODIUM CHLORIDE 0.9 % (FLUSH) 0.9 %
5-40 SYRINGE (ML) INJECTION PRN
Status: DISCONTINUED | OUTPATIENT
Start: 2021-08-25 | End: 2021-08-26

## 2021-08-25 RX ORDER — SODIUM CHLORIDE 0.9 % (FLUSH) 0.9 %
5-40 SYRINGE (ML) INJECTION EVERY 12 HOURS SCHEDULED
Status: DISCONTINUED | OUTPATIENT
Start: 2021-08-25 | End: 2021-08-26 | Stop reason: HOSPADM

## 2021-08-25 RX ADMIN — Medication 10 ML: at 21:11

## 2021-08-25 RX ADMIN — PANTOPRAZOLE SODIUM 40 MG: 40 TABLET, DELAYED RELEASE ORAL at 05:58

## 2021-08-25 RX ADMIN — CEFAZOLIN 2000 MG: 1 INJECTION, POWDER, FOR SOLUTION INTRAMUSCULAR; INTRAVENOUS at 10:45

## 2021-08-25 RX ADMIN — ROCURONIUM BROMIDE 50 MG: 10 SOLUTION INTRAVENOUS at 10:50

## 2021-08-25 RX ADMIN — PROPOFOL 200 MCG/KG/MIN: 10 INJECTION, EMULSION INTRAVENOUS at 10:50

## 2021-08-25 RX ADMIN — DOXYCYCLINE HYCLATE 100 MG: 100 TABLET, COATED ORAL at 21:10

## 2021-08-25 RX ADMIN — MUPIROCIN: 20 OINTMENT TOPICAL at 21:36

## 2021-08-25 RX ADMIN — FENTANYL CITRATE 100 MCG: 50 INJECTION INTRAMUSCULAR; INTRAVENOUS at 10:50

## 2021-08-25 RX ADMIN — DOXYCYCLINE HYCLATE 100 MG: 100 TABLET, COATED ORAL at 13:23

## 2021-08-25 RX ADMIN — SODIUM CHLORIDE, POTASSIUM CHLORIDE, SODIUM LACTATE AND CALCIUM CHLORIDE: 600; 310; 30; 20 INJECTION, SOLUTION INTRAVENOUS at 12:51

## 2021-08-25 RX ADMIN — DEXAMETHASONE SODIUM PHOSPHATE 8 MG: 4 INJECTION, SOLUTION INTRAMUSCULAR; INTRAVENOUS at 10:55

## 2021-08-25 RX ADMIN — SODIUM CHLORIDE, PRESERVATIVE FREE 10 ML: 5 INJECTION INTRAVENOUS at 21:36

## 2021-08-25 RX ADMIN — HYDROCODONE BITARTRATE AND ACETAMINOPHEN 1 TABLET: 7.5; 325 TABLET ORAL at 04:12

## 2021-08-25 RX ADMIN — POLYETHYLENE GLYCOL 3350 17 G: 17 POWDER, FOR SOLUTION ORAL at 16:23

## 2021-08-25 RX ADMIN — MUPIROCIN: 20 OINTMENT TOPICAL at 09:00

## 2021-08-25 RX ADMIN — LIDOCAINE HYDROCHLORIDE 80 MG: 20 INJECTION, SOLUTION INFILTRATION; PERINEURAL at 10:50

## 2021-08-25 RX ADMIN — LEVOTHYROXINE SODIUM 25 MCG: 0.03 TABLET ORAL at 05:58

## 2021-08-25 RX ADMIN — SODIUM CHLORIDE, POTASSIUM CHLORIDE, SODIUM LACTATE AND CALCIUM CHLORIDE: 600; 310; 30; 20 INJECTION, SOLUTION INTRAVENOUS at 04:05

## 2021-08-25 RX ADMIN — PROPOFOL 200 MG: 10 INJECTION, EMULSION INTRAVENOUS at 10:50

## 2021-08-25 RX ADMIN — Medication 50 MCG: at 11:25

## 2021-08-25 RX ADMIN — SUGAMMADEX 200 MG: 100 INJECTION, SOLUTION INTRAVENOUS at 12:01

## 2021-08-25 RX ADMIN — ONDANSETRON 4 MG: 2 INJECTION INTRAMUSCULAR; INTRAVENOUS at 11:01

## 2021-08-25 ASSESSMENT — PULMONARY FUNCTION TESTS
PIF_VALUE: 21
PIF_VALUE: 1
PIF_VALUE: 20
PIF_VALUE: 3
PIF_VALUE: 30
PIF_VALUE: 3
PIF_VALUE: 18
PIF_VALUE: 18
PIF_VALUE: 30
PIF_VALUE: 25
PIF_VALUE: 22
PIF_VALUE: 25
PIF_VALUE: 21
PIF_VALUE: 40
PIF_VALUE: 20
PIF_VALUE: 40
PIF_VALUE: 20
PIF_VALUE: 20
PIF_VALUE: 30
PIF_VALUE: 22
PIF_VALUE: 17
PIF_VALUE: 20
PIF_VALUE: 30
PIF_VALUE: 29
PIF_VALUE: 18
PIF_VALUE: 20
PIF_VALUE: 30
PIF_VALUE: 16
PIF_VALUE: 22
PIF_VALUE: 2
PIF_VALUE: 1
PIF_VALUE: 22
PIF_VALUE: 30
PIF_VALUE: 20
PIF_VALUE: 30
PIF_VALUE: 16
PIF_VALUE: 30
PIF_VALUE: 30
PIF_VALUE: 28
PIF_VALUE: 30
PIF_VALUE: 30
PIF_VALUE: 25
PIF_VALUE: 20
PIF_VALUE: 18
PIF_VALUE: 29
PIF_VALUE: 30
PIF_VALUE: 20
PIF_VALUE: 20
PIF_VALUE: 22
PIF_VALUE: 1
PIF_VALUE: 20
PIF_VALUE: 3
PIF_VALUE: 7
PIF_VALUE: 30
PIF_VALUE: 1
PIF_VALUE: 29
PIF_VALUE: 30
PIF_VALUE: 14
PIF_VALUE: 18
PIF_VALUE: 20
PIF_VALUE: 18
PIF_VALUE: 30
PIF_VALUE: 2
PIF_VALUE: 1
PIF_VALUE: 30
PIF_VALUE: 18
PIF_VALUE: 2
PIF_VALUE: 6
PIF_VALUE: 30
PIF_VALUE: 20
PIF_VALUE: 5
PIF_VALUE: 14
PIF_VALUE: 30
PIF_VALUE: 30
PIF_VALUE: 18
PIF_VALUE: 30
PIF_VALUE: 25
PIF_VALUE: 3
PIF_VALUE: 16
PIF_VALUE: 18
PIF_VALUE: 30
PIF_VALUE: 20
PIF_VALUE: 20
PIF_VALUE: 30

## 2021-08-25 ASSESSMENT — PAIN SCALES - GENERAL
PAINLEVEL_OUTOF10: 0
PAINLEVEL_OUTOF10: 7

## 2021-08-25 NOTE — PROGRESS NOTES
Device interrogation by company representative. See interrogation for further details. 8/20/2021 - present (5 days)  [facility]    Interrogation and programming evaluation of the device shows normal function, with stable sensing and pacing thresholds. DC PPM implant 8/24/21 for CHB. Hx PAF. Pacing (% of Time Since 24-Aug-2021)   100.0 % (MVP Off)  AP 17.9 %   No  arrhythmias recorded. See PACEART report under Cardiology tab.
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
## 2021-08-25 NOTE — BRIEF OP NOTE
Brief Postoperative Note      Patient: Ana Luisa Eaton  YOB: 1955  MRN: [id number]    Date of Procedure: 8/25/2021    Pre-Op Diagnosis: -    Post-Op Diagnosis: Same       Procedure(s):  ENDOBRONCHIAL ULTRASOUND WITH EUS    Surgeon(s):  Neelam Collins MD    Assistant:  Surgical Assistant: Melanie Chen    Anesthesia: General    Estimated Blood Loss (mL): Minimal    Complications: None    Specimens:   ID Type Source Tests Collected by Time Destination   A : LEVEL 7 LYMPH NODE Tissue Lymph Node SURGICAL PATHOLOGY Neelam Collins MD 8/25/2021 1100    B : LEVEL 10 LEFT LYMPH NODE Tissue Lymph Node SURGICAL PATHOLOGY Neelam Collins MD 8/25/2021 1133        Findings: atypical cell.     Electronically signed by Neelam Collins MD on 8/25/2021 at 12:43 PM

## 2021-08-25 NOTE — PROGRESS NOTES
[facility]     Electrophysiology                                     Progress Note    Admission date:  2021    Reason for follow up visit: CHB    HPI/CC: Andres Sanford was admitted on 2021 with weakness and N/V. EKG showed CHB and a temporary pacemaker was placed. TTE showed possible RA mass, confirmed on CASSIDY 2021. CT scan showed lung and adrenal nodules. Workup is ongoing (EBUS done ). On 2021 he had a dual chamber pacemaker implanted. Rhythm has been AP-. Subjective: He denies chest pain, palpitations, shortness of breath, and dizziness. Vitals:  Blood pressure (!) 140/82, pulse 60, temperature 98.6 °F (37 °C), temperature source Oral, resp. rate 16, height 5' 11\" (1.803 m), weight 157 lb 9.6 oz (71.5 kg), SpO2 100 %.   Temp  Av.3 °F (36.8 °C)  Min: 98.1 °F (36.7 °C)  Max: 98.6 °F (37 °C)  Pulse  Av.4  Min: 50  Max: 69  BP  Min: 83/61  Max: 166/99  SpO2  Av.7 %  Min: 97 %  Max: 100 %  FiO2   Av.5 %  Min: 95 %  Max: 99 %    24 hour I/O    Intake/Output Summary (Last 24 hours) at 2021 1420  Last data filed at 2021 1221  Gross per 24 hour   Intake 240 ml   Output 480 ml   Net -240 ml     Current Facility-Administered Medications   Medication Dose Route Frequency Provider Last Rate Last Admin    lactated ringers infusion   IntraVENous Continuous Marcelle Hill  mL/hr at 21 1251 New Bag at 21 1251    sodium chloride flush 0.9 % injection 5-40 mL  5-40 mL IntraVENous 2 times per day Marcelle Hill MD        sodium chloride flush 0.9 % injection 5-40 mL  5-40 mL IntraVENous PRN Marcelle Hill MD        0.9 % sodium chloride infusion  25 mL IntraVENous PRN Marcelle Hill MD        lidocaine PF 1 % injection 0.3 mL  0.3 mL Intradermal Once PRN Marcelle Hill MD        doxycycline hyclate (VIBRA-TABS) tablet 100 mg  100 mg Oral 2 times per day Mary Ann Pickard MD   100 mg at 21 1327    meperidine (DEMEROL) injection 12.5 mg  12.5 mg IntraVENous Q5 Min PRN Roc Andrade MD        HYDROmorphone (DILAUDID) injection 0.25 mg  0.25 mg IntraVENous Q5 Min PRN Roc Andrade MD        HYDROmorphone (DILAUDID) injection 0.5 mg  0.5 mg IntraVENous Q5 Min PRN Roc Andrade MD        HYDROmorphone (DILAUDID) injection 0.25 mg  0.25 mg IntraVENous Q5 Min PRN Roc Andrade MD        HYDROmorphone (DILAUDID) injection 0.5 mg  0.5 mg IntraVENous Q5 Min PRN Roc Andrade MD        ondansetron [facility]) injection 4 mg  4 mg IntraVENous Q10 Min PRN Roc Andrade MD        labetalol (NORMODYNE;TRANDATE) injection 5 mg  5 mg IntraVENous Q10 Min PRN Roc Andrade MD        hydrALAZINE (APRESOLINE) injection 5 mg  5 mg IntraVENous Q10 Min PRN Roc Andrade MD        [facility]) 2 % ointment   Nasal BID Astrid Samuels MD   Given at 08/25/21 0900    levothyroxine (SYNTHROID) tablet 25 mcg  25 mcg Oral Daily Steve Lainez MD   25 mcg at 08/25/21 0558    sodium chloride flush 0.9 % injection 5-40 mL  5-40 mL IntraVENous 2 times per day Steve Lainez MD   10 mL at 08/24/21 2100    sodium chloride flush 0.9 % injection 5-40 mL  5-40 mL IntraVENous PRN Steve Lainez MD        pantoprazole (PROTONIX) tablet 40 mg  40 mg Oral QAM AC Steve Lainez MD   40 mg at 08/25/21 0558    0.9 % sodium chloride infusion  25 mL IntraVENous PRN Steve Lainez MD        acetaminophen (TYLENOL) tablet 650 mg  650 mg Oral Q4H PRN Steve Lainez MD   650 mg at 08/22/21 1152    Or    acetaminophen (TYLENOL) suppository 650 mg  650 mg Rectal Q4H PRN Steve Lainez MD        melatonin tablet 3 mg  3 mg Oral Nightly PRN Steve Lainez MD        HYDROcodone-acetaminophen [facility]) 7.5-325 MG per tablet 1 tablet  1 tablet Oral Q6H PRN Steve Lainez MD   1 tablet at 08/25/21 0412    perflutren lipid microspheres (DEFINITY) injection 1.65 mg  1.5 mL IntraVENous ONCE PRN Gianluca Rubalcava MD           Objective:     Telemetry monitor: AP-    Physical Exam:  Constitutional and general appearance: alert, cooperative, no distress and appears stated age  HEENT: PERRL, no cervical lymphadenopathy. No masses palpable. Normal oral mucosa  Respiratory:  · Normal excursion and expansion without use of accessory muscles  · Resp auscultation: Normal breath sounds without wheezing, rhonchi, and rales  Cardiovascular:  · The apical impulse is not displaced  · Heart tones are crisp and normal. regular S1 and S2.  · Jugular venous pulsation Normal  · The carotid upstroke is normal in amplitude and contour without delay or bruit  · Peripheral pulses are symmetrical and full   Abdomen:  · No masses or tenderness  · Bowel sounds present  Extremities:  ·  No cyanosis or clubbing  ·  No lower extremity edema  ·  Skin: warm and dry; left upper chest incision is dry and intact; small ecchymosis, no hematoma   Neurological:  · Alert and oriented  · Moves all extremities well  · No abnormalities of mood, affect, memory, mentation, or behavior are noted    Data  CASSIDY 8/23/2021:  Normal left ventricular size and function. Ejection fraction is visually estimated at 55-60%. Normal right ventricular size and function. Temporary Pacemaker wire is visualized in the right ventricle. The right atrium is dilated. Well-circumscribed 2.0 cm x 1.9 cm mass located in the right atrium that appears attached to the low inter-atrial septum. Relatively immobile with no clear discrete stalk is noted. Atrial myxoma as well as secondary tumor remain considerations. Mild mitral regurgitation. Limited echo 8/21/2021:   Limited echo for LV function and pericardial effusion/EF   Normal left ventricle size and wall thickness. The left ventricular systolic function is normal at 55%. Abnormal (paradoxical) septal motion is present likely due to right   ventricular pacing.    Grade I diastolic dysfunction with normal filing results found for: PTT   Lab Results   Component Value Date    MG 1.90 08/25/2021    No results found for: TSH    Assessment:  Complete heart block: ongoing but stable     -s/p temporary pacemaker placement 8/20/2021   -s/p dual chamber pacemaker implant 8/24/2021 ([employer])   -device check today shows normal function   Paroxysmal atrial fibrillation: stable    -noted 2/2021    -followed by Dr. 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 Restart anticoagulation (IV Heparin or Eliquis) when OK with CT surgery   2. Doxycycline 100mg po BID x5 days (stop 8/30)  3. CXR recommended by Dr. Elijio Claude. Will defer to CT surgery. 4. Post procedure instructions reviewed     EP will sign off but remains available if needed. Device check is scheduled for 9/2/2021 (please call if he remains in house)  Follow up in office on 10/5/2021. Patient was seen outside of global device window for afib.      MEI Sanderson-CNP  [facility]  [phone]

## 2021-08-25 NOTE — ANESTHESIA POSTPROCEDURE EVALUATION
Department of Anesthesiology  Postprocedure Note    Patient: Juan Pablo Pemberton  MRN: [id number]  YOB: 1955  Date of evaluation: 8/25/2021  Time:  2:49 PM     Procedure Summary     Date: 08/25/21 Room / Location: [address]    Anesthesia Start: 8408 Anesthesia Stop: 1226    Procedure: ENDOBRONCHIAL ULTRASOUND WITH EUS (N/A ) Diagnosis: (-)    Surgeons: Renetta Santos MD Responsible Provider: Karime Bowles MD    Anesthesia Type: general ASA Status: 3          Anesthesia Type: general    Aly Phase I:      Aly Phase II:      Last vitals: Reviewed and per EMR flowsheets.        Anesthesia Post Evaluation    Patient location during evaluation: PACU  Level of consciousness: awake  Airway patency: patent  Nausea & Vomiting: no nausea  Complications: no  Cardiovascular status: blood pressure returned to baseline  Respiratory status: acceptable  Hydration status: euvolemic

## 2021-08-25 NOTE — ONCOLOGY
ONCOLOGY HEMATOLOGY CARE PROGRESS NOTE      SUBJECTIVE:     Afebrile and on O2 by NC.    NPO.    ROS:   The remaining 10 point review of symptoms is unremarkable. OBJECTIVE        Physical    VITALS:  BP (!) 141/82   Pulse 64   Temp 98.4 °F (36.9 °C) (Oral)   Resp 16   Ht 5' 11\" (1.803 m)   Wt 157 lb 9.6 oz (71.5 kg)   SpO2 100%   BMI 21.98 kg/m²   TEMPERATURE:  Current - Temp: 98.4 °F (36.9 °C); Max - Temp  Av.3 °F (36.8 °C)  Min: 98.1 °F (36.7 °C)  Max: 98.4 °F (36.9 °C)  PULSE OXIMETRY RANGE: SpO2  Av.3 %  Min: 96 %  Max: 100 %  24HR INTAKE/OUTPUT:    Intake/Output Summary (Last 24 hours) at 2021 0701  Last data filed at 2021 1942  Gross per 24 hour   Intake 477.9 ml   Output 1640 ml   Net -1162.1 ml       CONSTITUTIONAL:  awake, alert, cooperative, no apparent distress, HEENT oral pharynx , no scleral icterus  HEMATOLOGIC/LYMPHATICS:  no cervical lymphadenopathy, no supraclavicular lymphadenopathy, no axillary lymphadenopathy and no inguinal lymphadenopathy  LUNGS:  No increased work of breathing, good air exchange, clear to auscultation bilaterally, no crackles or wheezing  CARDIOVASCULAR:  , regular rate and rhythm, normal S1 and S2, no S3 or S4, and no murmur noted  ABDOMEN:  No scars, normal bowel sounds, soft, non-distended, non-tender, no masses palpated, no hepatosplenomegally  MUSCULOSKELETAL:  There is no redness, warmth, or swelling of the joints. EXTREMETIES: No clubbing cynosis or edema  NEUROLOGIC:  Awake, alert, oriented to name, place and time. Cranial nerves II-XII are grossly intact. Motor is 5 out of 5 bilaterally.    SKIN:  no bruising or bleeding      Data      Recent Labs     21  1313 21  0337 21  0417   WBC 7.4 7.2 8.0   HGB 12.4* 13.5 13.0*   HCT 37.0* 40.4* 39.0*    259 236   MCV 87.4 87.8 86.3        Recent Labs     21  0456 21  0337 21  0417   * 136 135*   K 4.1 4.5 4.3   CL 103 99 101   CO2 24 28 25   PHOS 3.4 4.1 4.1   BUN 17 15 20   CREATININE 1.1 1.3 1.2     Recent Labs     08/23/21  0456 08/24/21  0337 08/25/21  0417   AST 43* 26 17   ALT 82* 62* 45*   BILITOT 0.4 0.3 0.4   ALKPHOS 106 106 98       Magnesium:    Lab Results   Component Value Date    MG 1.90 08/25/2021    MG 2.00 08/24/2021    MG 1.90 08/23/2021         Problem List  Patient Active Problem List   Diagnosis    Left low back pain    IGT (impaired glucose tolerance)    Inguinal hernia unilateral, non-recurrent    History of malignant melanoma    Chest pain    PAF (paroxysmal atrial fibrillation) (HCC)    Pericarditis    Anemia    Alcohol abuse    Complete heart block (HCC)    [facility]) [facility] [address]    Third degree AV block (HCC)    Chronic anticoagulation    Acquired hypothyroidism    Nausea vomiting and diarrhea    Pacemaker-medtronic       ASSESSMENT AND PLAN    Atrial Mass/ AFIBB and heart block   - potential for benign myxoma - defer to CT surgery; will likely need eval and removal due to structural empedement on the electrical system; pace maker to be placed this admit as well   - Dual chamber pacer placed, 8/24/2021 with Dr. Mikaela Henderson.     Hx of melanoma 2008   - R thumb amputation      Hx of submandibular CA  - S/P excision in 4/2021. 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   - treated at [facility] with adjuvant XRT- completed July 2021  - PET scan ordered for October   - tends to be more locally aggressive and less likely to metastasize (but can occur)     LLL 2.4 cm mass  - needs EBUS with CT surgery for eval and possible bx- Dr. Lauren Becerril to perform today.     Adrenal mass  - await work up above to determine if this is a metastatic process      Enlarged Prostate  - get PSA         ONCOLOGIC DISPOSITION:    Michael Dawkins MD  Please contact through [facility]

## 2021-08-25 NOTE — PROGRESS NOTES
[facility]  Cardiology  Progress Note    Admission date:  2021    Reason for follow up visit: RA mass    HPI/CC: Sandi Aragon is a 74 y.o. male who was admitted 2021 for weakness. EKG showed complete heart block and he had temporary pacemaker placed. Echo showed EF 55%, possible right atrial mass. CASSIDY  confirmed RA mass. CT showed lung and adrenal nodules. He had dual chamber ppm placed 2021. He had EBUS today. Rhythm has been paced. Subjective: Denies chest pain, shortness of breath, palpitations and dizziness. Vitals:  Blood pressure (!) 140/82, pulse 60, temperature 98.6 °F (37 °C), temperature source Oral, resp. rate 16, height 5' 11\" (1.803 m), weight 157 lb 9.6 oz (71.5 kg), SpO2 97 %.   Temp  Av.3 °F (36.8 °C)  Min: 98.1 °F (36.7 °C)  Max: 98.6 °F (37 °C)  Pulse  Av.3  Min: 50  Max: 69  BP  Min: 83/61  Max: 166/99  SpO2  Av.7 %  Min: 97 %  Max: 100 %  FiO2   Av.5 %  Min: 95 %  Max: 99 %    24 hour I/O    Intake/Output Summary (Last 24 hours) at 2021 1510  Last data filed at 2021 1458  Gross per 24 hour   Intake 240 ml   Output 1480 ml   Net -1240 ml     Current Facility-Administered Medications   Medication Dose Route Frequency Provider Last Rate Last Admin    lactated ringers infusion   IntraVENous Continuous Marv Cleaning  mL/hr at 21 1251 New Bag at 21 1251    sodium chloride flush 0.9 % injection 5-40 mL  5-40 mL IntraVENous 2 times per day Marv Cleaning MD        sodium chloride flush 0.9 % injection 5-40 mL  5-40 mL IntraVENous PRN Marv Cleaning MD        0.9 % sodium chloride infusion  25 mL IntraVENous PRN Marv Cleaning MD        lidocaine PF 1 % injection 0.3 mL  0.3 mL Intradermal Once PRN Marv Cleaning MD        doxycycline hyclate (VIBRA-TABS) tablet 100 mg  100 mg Oral 2 times per day Sharmila Lugo MD   100 mg at 21 1323    meperidine (DEMEROL) injection 12.5 mg  12.5 mg IntraVENous Q5 Min PRN Romi Aceves MD        HYDROmorphone (DILAUDID) injection 0.25 mg  0.25 mg IntraVENous Q5 Min PRN Romi Aceves MD        HYDROmorphone (DILAUDID) injection 0.5 mg  0.5 mg IntraVENous Q5 Min PRN Romi Aceves MD        HYDROmorphone (DILAUDID) injection 0.25 mg  0.25 mg IntraVENous Q5 Min PRN Romi Aceves MD        HYDROmorphone (DILAUDID) injection 0.5 mg  0.5 mg IntraVENous Q5 Min PRN Romi Aceves MD        ondansetron [facility]) injection 4 mg  4 mg IntraVENous Q10 Min PRN Romi Aceves MD        labetalol (NORMODYNE;TRANDATE) injection 5 mg  5 mg IntraVENous Q10 Min PRN Romi Aceves MD        hydrALAZINE (APRESOLINE) injection 5 mg  5 mg IntraVENous Q10 Min PRN Romi Aceves MD        mupirocin [facility]) 2 % ointment   Nasal BID Isadore Rinne, MD   Given at 08/25/21 0900    levothyroxine (SYNTHROID) tablet 25 mcg  25 mcg Oral Daily Wilson Gonzalez MD   25 mcg at 08/25/21 0558    sodium chloride flush 0.9 % injection 5-40 mL  5-40 mL IntraVENous 2 times per day Wilson Gonzalez MD   10 mL at 08/24/21 2100    sodium chloride flush 0.9 % injection 5-40 mL  5-40 mL IntraVENous PRN Wilson Gonzalez MD        pantoprazole (PROTONIX) tablet 40 mg  40 mg Oral QAM AC Wilson Gonzalez MD   40 mg at 08/25/21 0558    0.9 % sodium chloride infusion  25 mL IntraVENous PRN Wilson Gonzalez MD        acetaminophen (TYLENOL) tablet 650 mg  650 mg Oral Q4H PRN Wilson Gonzalez MD   650 mg at 08/22/21 1152    Or    acetaminophen (TYLENOL) suppository 650 mg  650 mg Rectal Q4H PRN Wilson Gonzalez MD        melatonin tablet 3 mg  3 mg Oral Nightly PRN Wilson Gonzalez MD        HYDROcodone-acetaminophen [facility]) 7.5-325 MG per tablet 1 tablet  1 tablet Oral Q6H PRN Wilson Gonzalez MD   1 tablet at 08/25/21 0743    perflutren lipid microspheres (DEFINITY) injection 1.65 mg  1.5 mL IntraVENous ONCE PRN Wilson Gonzalez MD           Review of Systems    Objective:     Telemetry monitor:     Physical Exam:  Constitutional:  Comfortable and alert, NAD, appears stated age  Eyes: PERRL, sclera nonicteric  Neck:  Supple, no masses, no thyroidmegaly, no JVD  Skin:  Warm and dry; no rash or lesions; left chest site without erythema, drainage or hematoma  Heart: Regular, normal apex, S1 and S2 normal, no M/G/R  Lungs:  Normal respiratory effort; clear; no wheezing/rhonchi/rales  Abdomen: soft, non tender, + bowel sounds  Extremities:  No edema or cyanosis; no clubbing  Neuro: alert and oriented, moves legs and arms equally, normal mood and affect    Data Reviewed:    CASSIDY 8/23/2021:  1. Transesophageal echocardiogram.  INDICATIONS: Cardiac mass   PROCEDURE: The patient was brought to the lab in fasting state. The patient received adequate sedation with the assistance of anesthesia for the case. After ensuring adequate sedation, the esophagus was intubated and a complete transesophageal echocardiogram was completed. There were no complications related to the study. Summary:  1. Presence of a right atrial mass confirmed, see formal report for full details. 2. Initiate systemic anti-coagulation with heparin gtt  3. Engaging with CT surgery for evaluation  4. Will discuss need for pan CT with treating teams to evaluate for extra-cardiac disease given history of melanoma/salivary gland malignancies   5. Oncology consultation   6. EP evaluating - remains stable on TVP    Echo 8/21/2021:  Limited echo for LV function and pericardial effusion/EF   Normal left ventricle size and wall thickness.   The left ventricular systolic function is normal at 55%.   Abnormal (paradoxical) septal motion is present likely due to right   ventricular pacing.   Grade I diastolic dysfunction with normal filing pressure. The right   ventricle is dilated with normal systolic function.   Temp Pacer wire is visualized in the right ventricle.   Bi-atrial enlargement.  Juan Ramírez is a well circumscribed echodensity that appears adherent to   inter-atrial septum, adjacent to the anterior/septal tricuspid valve   leaflets (images 19-22, 39). Findings consistent with intra-cardiac mass.   Moderate tricuspid regurgitation.   Systolic pulmonary artery pressure (SPAP) is elevated and estimated at 51   mmHg (right atrial pressure mmHg) consistent with moderate pulmonary   hypertension.   No significant pericardial effusion noted. Stress echo 4/2021:   Normal stress ECHO.   Rest   ECG   Normal sinus rhythm.   Standing HR:49 bpmStanding BP:118/90 mmHg   Stress   Stress Type: Exercise   Rest HR: 49 bpm                 HR Response: Normal   Rest BP: 118/90 mmHg            BP Response: Normal   Stress Peak HR: 114 bpm         HR BP Product: [id number]   Stress Peak BP: 140/60 mmHg   Predicted HR: 152 bpm           Stress EF: 70 %   % of predicted HR: 75   Reason for Termination: Dyspnea   Results   Echo (rest): Normal (LVEF >50%)   Echo (stress): Normal (LVEF >50%)   Echo   Baseline resting echocardiogram shows normal global LV systolic function   with an ejection fraction of 55-60% and uniform myocardial segmental wall   motion. Following stress there was uniform augmentation of all myocardial   segments with appropriate hyperdynamic LV systolic response to stress and an   ejection fraction of 70%.     Lab Reviewed:     Renal Profile:  Lab Results   Component Value Date    CREATININE 1.2 08/25/2021    BUN 20 08/25/2021     08/25/2021    K 4.3 08/25/2021    K 4.2 03/27/2021     08/25/2021    CO2 25 08/25/2021     CBC:    Lab Results   Component Value Date    WBC 8.0 08/25/2021    RBC 4.52 08/25/2021    HGB 13.0 08/25/2021    HCT 39.0 08/25/2021    MCV 86.3 08/25/2021    RDW 17.2 08/25/2021     08/25/2021     BNP:    Lab Results   Component Value Date    PROBNP 6,152 08/20/2021     Fasting Lipid Panel:    Lab Results   Component Value Date    CHOL 129 03/27/2021    HDL 60 03/27/2021    HDL 92 04/12/2010    TRIG 48 03/27/2021     Cardiac Enzymes:  CK/MbTroponin  Lab Results   Component Value Date    TROPONINI 0.22 08/21/2021     PT/ INR   Lab Results   Component Value Date    INR 1.03 08/25/2021    INR 1.03 08/24/2021    INR 1.03 08/23/2021    PROTIME 11.7 08/25/2021    PROTIME 11.6 08/24/2021    PROTIME 11.6 08/23/2021     PTT No results found for: PTT   Lab Results   Component Value Date    MG 1.90 08/25/2021    No results found for: TSH    All labs and imaging reviewed today    Assessment:  Right atrial mass: evaluated by CT surgery, no surgery plans until metastatic disease workup complete  Complete heart block: s/p dual chamber ppm 8/24/2021  Paroxysmal atrial fibrillation: stable   - [id number] score 1  Lung and adrenal nodule: noted on CT   - s/p EBUS 8/25/2021  History of melanoma  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 Await pathology from biopsy todat  2. Restart anticoagulation when ok with CT surgery  3.  Possible discharge tomorrow    Dk Christie, APRN-CNP  [address]  [phone]

## 2021-08-25 NOTE — PROGRESS NOTES
Hospitalist Progress Note      PCP: Kerrie Doran APRN - CNP    Date of Admission: 8/20/2021    Chief Complaint: Emesis/Sweats. Found to have CHB. Subjective: no new c/o. Seen shortly after EBUS. Medications:  Reviewed    Infusion Medications    lactated ringers 125 mL/hr at 08/25/21 0405    sodium chloride      sodium chloride       Scheduled Medications    sodium chloride flush  5-40 mL IntraVENous 2 times per day    doxycycline hyclate  100 mg Oral 2 times per day    mupirocin   Nasal BID    levothyroxine  25 mcg Oral Daily    sodium chloride flush  5-40 mL IntraVENous 2 times per day    pantoprazole  40 mg Oral QAM AC     PRN Meds: sodium chloride flush, sodium chloride, lidocaine PF, sodium chloride flush, sodium chloride, acetaminophen **OR** acetaminophen, melatonin, HYDROcodone-acetaminophen, perflutren lipid microspheres      Intake/Output Summary (Last 24 hours) at 8/25/2021 0859  Last data filed at 8/25/2021 0758  Gross per 24 hour   Intake 477.9 ml   Output 1010 ml   Net -532.1 ml       Physical Exam Performed:    BP (!) 140/82   Pulse 60   Temp 98.6 °F (37 °C) (Oral)   Resp 16   Ht 5' 11\" (1.803 m)   Wt 157 lb 9.6 oz (71.5 kg)   SpO2 100%   BMI 21.98 kg/m²     General appearance: No apparent distress, appears stated age and cooperative. HEENT: Pupils equal, round, and reactive to light. Conjunctivae/corneas clear. Neck: Supple, with full range of motion. No jugular venous distention. Trachea midline. Respiratory:  Normal respiratory effort. Clear to auscultation, bilaterally without Rales/Wheezes/Rhonchi. Cardiovascular: Regular rate and rhythm with normal S1/S2 without murmurs, rubs or gallops. Abdomen: Soft, non-tender, non-distended with normal bowel sounds. Musculoskeletal: No clubbing, cyanosis or edema bilaterally. Full range of motion without deformity. Skin: Skin color, texture, turgor normal.  No rashes or lesions.   Neurologic:  Neurovascularly intact without any focal sensory/motor deficits. Cranial nerves: II-XII intact, grossly non-focal.  Psychiatric: Alert and oriented, thought content appropriate, normal insight  Capillary Refill: Brisk,< 3 seconds   Peripheral Pulses: +2 palpable, equal bilaterally       Labs:   Recent Labs     08/23/21  1313 08/24/21  0337 08/25/21  0417   WBC 7.4 7.2 8.0   HGB 12.4* 13.5 13.0*   HCT 37.0* 40.4* 39.0*    259 236     Recent Labs     08/23/21  0456 08/24/21  0337 08/25/21  0417   * 136 135*   K 4.1 4.5 4.3    99 101   CO2 24 28 25   BUN 17 15 20   CREATININE 1.1 1.3 1.2   CALCIUM 8.6 8.9 8.6   PHOS 3.4 4.1 4.1     Recent Labs     08/23/21  0456 08/24/21  0337 08/25/21  0417   AST 43* 26 17   ALT 82* 62* 45*   BILITOT 0.4 0.3 0.4   ALKPHOS 106 106 98     Recent Labs     08/23/21  0456 08/24/21  0337 08/25/21  0417   INR 1.03 1.03 1.03     No results for input(s): Alyse Knox in the last 72 hours. Urinalysis:      Lab Results   Component Value Date    NITRU Negative 03/26/2021    WBCUA 0-2 03/26/2021    RBCUA 3-4 03/26/2021    BLOODU TRACE-INTACT 03/26/2021    SPECGRAV 1.025 03/26/2021    GLUCOSEU Negative 03/26/2021       Consults:    IP CONSULT TO CARDIOLOGY  IP CONSULT TO HOSPITALIST  IP CONSULT TO ONCOLOGY      Assessment/Plan:    Active Hospital Problems    Diagnosis     Nausea vomiting and diarrhea [R11.2, R19.7]     Chronic anticoagulation [Z79.01]     Acquired hypothyroidism [E03.9]     JOEL (acute kidney injury) ([facility] [address] [N17.9]     Complete heart block (HCC) [I44.2]     Third degree AV block (HCC) [I44.2]     PAF (paroxysmal atrial fibrillation) (HCC) [I48.0]          Sick sinus syndrome with third-degree AV block s/p right IJ transvenous pacemaker insertion (8/20, Dr Myer Gilford) with back-up pacing st for 60 bpm.  Pacer placed 24 August.  CASSIDY demonstrated right atrial mass which may require surgical excision - CT Surgery consulted and appreciated.   CT scan Chest/Abd/Pelvis c/w possible metastatic disease - Heme/Onc consulted and appreciated. Hx of Pericarditis - treated with oral Colchicine (Feb 2021). Paroxysmal atrial fibrillation:  Eliquis and Amiodarone held admission. Was on Heparin gtt - now d/c'd.      Troponin Elevation - likely  secondary to demand ischemia and acute kidney injury. Hypotension - POArrival, requiring pressor support - now resolved and weaned off. Elevated liver enzymes:  Likely related to hypotension. Minimal to moderate alcohol consumption. Acute kidney injury -  secondary to hypotension and hypoperfusion.  Volume expansion with IVF and resolved.     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  He has also been taking Amiodarone.  Initiated on low dose Levothyroxine 25 mcg daily.        DVT Prophylaxis: IPC    Recent Labs     08/23/21  1313 08/24/21  0337 08/25/21  0417    259 236     Diet: Diet NPO Exceptions are: Sips of Water with Meds  Code Status: Full Code      PT/OT Eval Status: not yet ordered. Dispo - Remains in ICU. Here for the foreseeable future pending clinical course and subspecialty recs.  Wife Catalina Mantilla present at bedside when seen 23/24/25 August.     Leon Wilson MD

## 2021-08-26 VITALS
HEIGHT: 71 IN | SYSTOLIC BLOOD PRESSURE: 154 MMHG | RESPIRATION RATE: 18 BRPM | DIASTOLIC BLOOD PRESSURE: 90 MMHG | WEIGHT: 157.6 LBS | BODY MASS INDEX: 22.06 KG/M2 | TEMPERATURE: 97.5 F | OXYGEN SATURATION: 97 % | HEART RATE: 70 BPM

## 2021-08-26 LAB
A/G RATIO: 0.8 (ref 1.1–2.2)
ALBUMIN SERPL-MCNC: 3.8 G/DL (ref 3.4–5)
ALP BLD-CCNC: 120 U/L (ref 40–129)
ALT SERPL-CCNC: 40 U/L (ref 10–40)
ANION GAP SERPL CALCULATED.3IONS-SCNC: 13 MMOL/L (ref 3–16)
AST SERPL-CCNC: 20 U/L (ref 15–37)
BASOPHILS ABSOLUTE: 0 K/UL (ref 0–0.2)
BASOPHILS RELATIVE PERCENT: 0.2 %
BILIRUB SERPL-MCNC: 0.5 MG/DL (ref 0–1)
BUN BLDV-MCNC: 18 MG/DL (ref 7–20)
CALCIUM SERPL-MCNC: 9.7 MG/DL (ref 8.3–10.6)
CHLORIDE BLD-SCNC: 98 MMOL/L (ref 99–110)
CO2: 26 MMOL/L (ref 21–32)
CREAT SERPL-MCNC: 0.9 MG/DL (ref 0.8–1.3)
EOSINOPHILS ABSOLUTE: 0 K/UL (ref 0–0.6)
EOSINOPHILS RELATIVE PERCENT: 0 %
GFR AFRICAN AMERICAN: >60
GFR NON-AFRICAN AMERICAN: >60
GLOBULIN: 4.7 G/DL
GLUCOSE BLD-MCNC: 128 MG/DL (ref 70–99)
HCT VFR BLD CALC: 44.5 % (ref 40.5–52.5)
HEMOGLOBIN: 14.8 G/DL (ref 13.5–17.5)
INR BLD: 0.97 (ref 0.88–1.12)
LYMPHOCYTES ABSOLUTE: 0.4 K/UL (ref 1–5.1)
LYMPHOCYTES RELATIVE PERCENT: 4.1 %
MAGNESIUM: 2.1 MG/DL (ref 1.8–2.4)
MCH RBC QN AUTO: 29.3 PG (ref 26–34)
MCHC RBC AUTO-ENTMCNC: 33.3 G/DL (ref 31–36)
MCV RBC AUTO: 87.8 FL (ref 80–100)
MONOCYTES ABSOLUTE: 0.5 K/UL (ref 0–1.3)
MONOCYTES RELATIVE PERCENT: 4.9 %
NEUTROPHILS ABSOLUTE: 9.6 K/UL (ref 1.7–7.7)
NEUTROPHILS RELATIVE PERCENT: 90.8 %
PDW BLD-RTO: 17.1 % (ref 12.4–15.4)
PHOSPHORUS: 3 MG/DL (ref 2.5–4.9)
PLATELET # BLD: 271 K/UL (ref 135–450)
PMV BLD AUTO: 8.7 FL (ref 5–10.5)
POTASSIUM SERPL-SCNC: 4.2 MMOL/L (ref 3.5–5.1)
PROTHROMBIN TIME: 10.9 SEC (ref 9.9–12.7)
RBC # BLD: 5.07 M/UL (ref 4.2–5.9)
SODIUM BLD-SCNC: 137 MMOL/L (ref 136–145)
TOTAL PROTEIN: 8.5 G/DL (ref 6.4–8.2)
WBC # BLD: 10.6 K/UL (ref 4–11)

## 2021-08-26 PROCEDURE — 85025 COMPLETE CBC W/AUTO DIFF WBC: CPT

## 2021-08-26 PROCEDURE — 36415 COLL VENOUS BLD VENIPUNCTURE: CPT

## 2021-08-26 PROCEDURE — 6370000000 HC RX 637 (ALT 250 FOR IP): Performed by: INTERNAL MEDICINE

## 2021-08-26 PROCEDURE — 80053 COMPREHEN METABOLIC PANEL: CPT

## 2021-08-26 PROCEDURE — 99024 POSTOP FOLLOW-UP VISIT: CPT | Performed by: THORACIC SURGERY (CARDIOTHORACIC VASCULAR SURGERY)

## 2021-08-26 PROCEDURE — 2580000003 HC RX 258: Performed by: ANESTHESIOLOGY

## 2021-08-26 PROCEDURE — 84100 ASSAY OF PHOSPHORUS: CPT

## 2021-08-26 PROCEDURE — 83735 ASSAY OF MAGNESIUM: CPT

## 2021-08-26 PROCEDURE — 99024 POSTOP FOLLOW-UP VISIT: CPT | Performed by: INTERNAL MEDICINE

## 2021-08-26 PROCEDURE — 85610 PROTHROMBIN TIME: CPT

## 2021-08-26 RX ORDER — DOXYCYCLINE HYCLATE 100 MG
100 TABLET ORAL 2 TIMES DAILY
Qty: 10 TABLET | Refills: 0 | Status: SHIPPED | OUTPATIENT
Start: 2021-08-26 | End: 2021-08-31

## 2021-08-26 RX ORDER — LEVOTHYROXINE SODIUM 0.03 MG/1
25 TABLET ORAL DAILY
Qty: 30 TABLET | Refills: 0 | Status: SHIPPED | OUTPATIENT
Start: 2021-08-27 | End: 2021-10-08 | Stop reason: SDUPTHER

## 2021-08-26 RX ADMIN — Medication 10 ML: at 08:49

## 2021-08-26 RX ADMIN — DOXYCYCLINE HYCLATE 100 MG: 100 TABLET, COATED ORAL at 08:49

## 2021-08-26 RX ADMIN — LEVOTHYROXINE SODIUM 25 MCG: 0.03 TABLET ORAL at 05:59

## 2021-08-26 RX ADMIN — POLYETHYLENE GLYCOL 3350 17 G: 17 POWDER, FOR SOLUTION ORAL at 08:49

## 2021-08-26 RX ADMIN — PANTOPRAZOLE SODIUM 40 MG: 40 TABLET, DELAYED RELEASE ORAL at 05:59

## 2021-08-26 ASSESSMENT — PAIN SCALES - GENERAL
PAINLEVEL_OUTOF10: 0

## 2021-08-26 NOTE — PROGRESS NOTES
Hospitalist Progress Note      PCP: Michael Nowak APRN - CNP    Date of Admission: 8/20/2021    Chief Complaint: Emesis/Sweats. Found to have CHB. Subjective: no new c/o. Medications:  Reviewed    Infusion Medications    sodium chloride       Scheduled Medications    sodium chloride flush  5-40 mL IntraVENous 2 times per day    doxycycline hyclate  100 mg Oral 2 times per day    polyethylene glycol  17 g Oral Daily    levothyroxine  25 mcg Oral Daily    sodium chloride flush  5-40 mL IntraVENous 2 times per day    pantoprazole  40 mg Oral QAM AC     PRN Meds: ondansetron, hydrALAZINE, sodium chloride flush, sodium chloride, acetaminophen **OR** acetaminophen, melatonin, HYDROcodone-acetaminophen, perflutren lipid microspheres      Intake/Output Summary (Last 24 hours) at 8/26/2021 1001  Last data filed at 8/26/2021 0849  Gross per 24 hour   Intake 1305.82 ml   Output 1010 ml   Net 295.82 ml       Physical Exam Performed:    BP (!) 156/88   Pulse 66   Temp 97.6 °F (36.4 °C) (Oral)   Resp 18   Ht 5' 11\" (1.803 m)   Wt 157 lb 9.6 oz (71.5 kg)   SpO2 98%   BMI 21.98 kg/m²     General appearance: No apparent distress, appears stated age and cooperative. HEENT: Pupils equal, round, and reactive to light. Conjunctivae/corneas clear. Neck: Supple, with full range of motion. No jugular venous distention. Trachea midline. Respiratory:  Normal respiratory effort. Clear to auscultation, bilaterally without Rales/Wheezes/Rhonchi. Cardiovascular: Regular rate and rhythm with normal S1/S2 without murmurs, rubs or gallops. Abdomen: Soft, non-tender, non-distended with normal bowel sounds. Musculoskeletal: No clubbing, cyanosis or edema bilaterally. Full range of motion without deformity. Skin: Skin color, texture, turgor normal.  No rashes or lesions. Neurologic:  Neurovascularly intact without any focal sensory/motor deficits.  Cranial nerves: II-XII intact, grossly non-focal.  Psychiatric: Alert and oriented, thought content appropriate, normal insight  Capillary Refill: Brisk,< 3 seconds   Peripheral Pulses: +2 palpable, equal bilaterally       Labs:   Recent Labs     08/24/21 0337 08/25/21 0417 08/26/21  0630   WBC 7.2 8.0 10.6   HGB 13.5 13.0* 14.8   HCT 40.4* 39.0* 44.5    236 271     Recent Labs     08/24/21 0337 08/25/21 0417 08/26/21  0630    135* 137   K 4.5 4.3 4.2   CL 99 101 98*   CO2 28 25 26   BUN 15 20 18   CREATININE 1.3 1.2 0.9   CALCIUM 8.9 8.6 9.7   PHOS 4.1 4.1 3.0     Recent Labs     08/24/21 0337 08/25/21 0417 08/26/21  0630   AST 26 17 20   ALT 62* 45* 40   BILITOT 0.3 0.4 0.5   ALKPHOS 106 98 120     Recent Labs     08/24/21 0337 08/25/21 0417 08/26/21  0630   INR 1.03 1.03 0.97     No results for input(s): Miguelangel Olvera in the last 72 hours. Urinalysis:      Lab Results   Component Value Date    NITRU Negative 03/26/2021    WBCUA 0-2 03/26/2021    RBCUA 3-4 03/26/2021    BLOODU TRACE-INTACT 03/26/2021    SPECGRAV 1.025 03/26/2021    GLUCOSEU Negative 03/26/2021       Consults:    IP CONSULT TO CARDIOLOGY  IP CONSULT TO HOSPITALIST  IP CONSULT TO ONCOLOGY      Assessment/Plan:    Active Hospital Problems    Diagnosis     Nausea vomiting and diarrhea [R11.2, R19.7]     Chronic anticoagulation [Z79.01]     Acquired hypothyroidism [E03.9]     JOEL (acute kidney injury) ([facility] [address] [N17.9]     Complete heart block (HCC) [I44.2]     Third degree AV block (HCC) [I44.2]     PAF (paroxysmal atrial fibrillation) (HCC) [I48.0]          Sick sinus syndrome with third-degree AV block s/p right IJ transvenous pacemaker insertion (8/20, Dr Fanny Winters) with back-up pacing st for 60 bpm.  Pacer placed 24 August.  CASSIDY demonstrated right atrial mass which may require surgical excision - CT Surgery consulted and appreciated. CT scan Chest/Abd/Pelvis c/w possible metastatic disease - Heme/Onc consulted and appreciated.   S/P EBUS 25 August w/out complications    Hx of Pericarditis - treated with oral Colchicine (Feb 2021). Paroxysmal atrial fibrillation:  Eliquis and Amiodarone held admission. Was on Heparin gtt - now d/c'd.      Troponin Elevation - likely  secondary to demand ischemia and acute kidney injury. Hypotension - POArrival, requiring pressor support - now resolved and weaned off. Elevated liver enzymes:  Likely related to hypotension. Minimal to moderate alcohol consumption. Acute kidney injury -  secondary to hypotension and hypoperfusion.  Volume expansion with IVF and resolved.     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  He has also been taking Amiodarone.  Initiated on low dose Levothyroxine 25 mcg daily.        DVT Prophylaxis: IPC    Recent Labs     08/24/21  0337 08/25/21  0417 08/26/21  0630    236 271     Diet: ADULT DIET; Regular  Code Status: Full Code      PT/OT Eval Status: not yet ordered. Dispo - Possibly to home Thurs/Friday 26/27 August pending clinical course and subspecialty recs.  Wife Bernice Starkey present at bedside when seen 23/24/25/26 August.     George Carreno MD

## 2021-08-26 NOTE — PROGRESS NOTES
ONCOLOGY HEMATOLOGY CARE PROGRESS NOTE      SUBJECTIVE:       Feels better, wants to go home today. ROS:     Constitutional:  No weight loss, No fever, No chills, No night sweats. Energy level good. Eyes:  No impairment or change in vision  ENT / Mouth:  No pain, abnormal ulceration, bleeding, nasal drip or change in voice or hearing  Cardiovascular:  No chest pain, palpitations, new edema, or calf discomfort  Respiratory:  No pain, hemoptysis, change to breathing  Breast:  No pain, discharge, change in appearance or texture  Gastrointestinal:  No pain, cramping, jaundice, change to eating and bowel habits  Urinary:  No pain, bleeding or change in continence  Genitalia: No pain, bleeding or discharge  Musculoskeletal:  No redness, pain, edema or weakness  Skin:  No pruritus, rash, change to nodules or lesions  Neurologic:  No discomfort, change in mental status, speech, sensory or motor activity  Psychiatric:  No change in concentration or change to affect or mood  Endocrine:  No hot flashes, increased thirst, or change to urine production  Hematologic: No petechiae, ecchymosis or bleeding  Lymphatic:  No lymphadenopathy or lymphedema  Allergy / Immunologic:  No eczema, hives, frequent or recurrent infections    OBJECTIVE        Physical    VITALS:  BP (!) 154/90   Pulse 70   Temp 97.5 °F (36.4 °C) (Oral)   Resp 18   Ht 5' 11\" (1.803 m)   Wt 157 lb 9.6 oz (71.5 kg)   SpO2 97%   BMI 21.98 kg/m²   TEMPERATURE:  Current - Temp: 97.5 °F (36.4 °C);  Max - Temp  Av.9 °F (36.6 °C)  Min: 97.5 °F (36.4 °C)  Max: 98.4 °F (36.9 °C)  PULSE OXIMETRY RANGE: SpO2  Av.8 %  Min: 94 %  Max: 100 %  24HR INTAKE/OUTPUT:    Intake/Output Summary (Last 24 hours) at 2021 1147  Last data filed at 2021 1038  Gross per 24 hour   Intake 1545.82 ml   Output 1010 ml   Net 535.82 ml       CONSTITUTIONAL: awake, alert, cooperative, no apparent distress   EYES: pupils equal, round and reactive to light, sclera clear and conjunctiva normal  ENT: Normocephalic, without obvious abnormality, atraumatic  NECK: supple, symmetrical, no jugular venous distension and no carotid bruits   HEMATOLOGIC/LYMPHATIC: no cervical, supraclavicular or axillary lymphadenopathy   LUNGS: no increased work of breathing and clear to auscultation   CARDIOVASCULAR: regular rate and rhythm, normal S1 and S2, no murmur noted  ABDOMEN: normal bowel sounds x 4, soft, non-distended, non-tender, no masses palpated, no hepatosplenomgaly   MUSCULOSKELETAL: full range of motion noted, tone is normal  NEUROLOGIC: awake, alert, oriented to name, place and time. Motor skills grossly intact. SKIN: Normal skin color, texture, turgor and no jaundice.  appears intact   EXTREMITIES: no LE edema       Data      Recent Labs     08/24/21 0337 08/25/21 0417 08/26/21  0630   WBC 7.2 8.0 10.6   HGB 13.5 13.0* 14.8   HCT 40.4* 39.0* 44.5    236 271   MCV 87.8 86.3 87.8        Recent Labs     08/24/21 0337 08/25/21  0417 08/26/21  0630    135* 137   K 4.5 4.3 4.2   CL 99 101 98*   CO2 28 25 26   PHOS 4.1 4.1 3.0   BUN 15 20 18   CREATININE 1.3 1.2 0.9     Recent Labs     08/24/21 0337 08/25/21  0417 08/26/21  0630   AST 26 17 20   ALT 62* 45* 40   BILITOT 0.3 0.4 0.5   ALKPHOS 106 98 120       Magnesium:    Lab Results   Component Value Date    MG 2.10 08/26/2021    MG 1.90 08/25/2021    MG 2.00 08/24/2021         Problem List  Patient Active Problem List   Diagnosis    Left low back pain    IGT (impaired glucose tolerance)    Inguinal hernia unilateral, non-recurrent    History of malignant melanoma    Chest pain    PAF (paroxysmal atrial fibrillation) (HCC)    Pericarditis    Anemia    Alcohol abuse    Complete heart block (HCC)    JOEL (acute kidney injury) ([address]    Third degree AV block (HCC)    Chronic anticoagulation    Acquired hypothyroidism    Nausea vomiting and diarrhea    Pacemaker-medtronic ASSESSMENT AND PLAN:    Atrial Mass/ AFIBB and heart block   - potential for benign myxoma - defer to CT surgery; will likely need eval and removal due to structural empedement on the electrical system; pace maker placed. - Dual chamber pacer placed, 8/24/2021 with Dr. Ariadna Lee. - s/p EBUS 8/25 path pending      Hx of melanoma 2008   - R thumb amputation      Hx of submandibular CA  - S/P excision in 4/2021. 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 - treated at [facility] with adjuvant XRT- completed July 2021  - PET scan ordered for October   - tends to be more locally aggressive and less likely to metastasize (but can occur)     LLL 2.4 cm mass  - needs EBUS with CT surgery for eval and possible bx- Dr. Coffey See completed 8/25 with path pending      Adrenal mass  - await work up above to determine if this is a metastatic process      Enlarged Prostate  - PSA normal at 1.12              ONCOLOGIC DISPOSITION: can dc today and follow up with [facility] next week for path results.  Will have [facility] office arrange follow up       Cleve Chino CNP   Please contact through [address]

## 2021-08-26 NOTE — PROGRESS NOTES
Comprehensive Nutrition Assessment    Type and Reason for Visit:  Initial, RD Nutrition Re-Screen/LOS    Nutrition Recommendations/Plan:   1. Continue general diet to promote adequate PO intakes   2. Added Ensure to dinner this evening, if pt not discharged, will order tomorrow   3. Monitor further diet education needs   4. Monitor nutrition adequacy, pertinent labs, bowel habits, wt changes, and clinical progress    Nutrition Assessment:  73 yo male admitted with atrial mass. S/p endobrachial ultrasound with EUS, awaiting pathology. Pt nutritionally compromised AEB weight loss over 5 months. Per EMR 9.8% weight loss in 5 months. Pt reports good appetite today. Previously not eating well 2/2 radiation therapy. Encouraged continued good PO intakes. Provided pt with higher protein and healthy snacking information to increase calories to prevent further weight loss. No fruther nutrition questions or concerns at this time. Malnutrition Assessment:  Malnutrition Status: At risk for malnutrition (Comment)    Context:  Chronic Illness       Estimated Daily Nutrient Needs:  Energy (kcal):  6605-5300 kcal; Weight Used for Energy Requirements:  Ideal (78 kg)     Protein (g):   g; Weight Used for Protein Requirements:  Ideal (1.2-1.5 g/kg)        Fluid (ml/day):   ; Method Used for Fluid Requirements:  1 ml/kcal      Nutrition Related Findings:  Labs reviewed. Wounds:  Surgical Incision       Current Nutrition Therapies:    ADULT DIET;  Regular    Anthropometric Measures:  · Height: 5' 11\" (180.3 cm)  · Current Body Weight: 157 lb (71.2 kg)   · Usual Body Weight: 174 lb (78.9 kg)     · Ideal Body Weight: 172 lbs; % Ideal Body Weight 91.3 %   · BMI: 21.9  · BMI Categories: Underweight (BMI less than 22) age over 72       Nutrition Diagnosis:   · Increased nutrient needs related to increase demand for energy/nutrients, catabolic illness as evidenced by weight loss      Nutrition Interventions:   Food and/or Nutrient Delivery:  Continue Current Diet, Start Oral Nutrition Supplement  Nutrition Education/Counseling:  Education completed   Coordination of Nutrition Care:  Continue to monitor while inpatient    Goals:  Pt will consume greater than 50% of meals and ONS without further weight loss       Nutrition Monitoring and Evaluation:   Behavioral-Environmental Outcomes:  None Identified   Food/Nutrient Intake Outcomes:  Food and Nutrient Intake, Supplement Intake  Physical Signs/Symptoms Outcomes:  Biochemical Data, Nutrition Focused Physical Findings, Weight     Discharge Planning:    Continue current diet     Electronically signed by Cherylene Juba, MS, RD, LD on 8/26/21 at 2:02 PM EDT    Contact: [phone]

## 2021-08-26 NOTE — CARE COORDINATION
Discharge order noted. Per nursing, pt does not have needs at discharge. Family providing transport home.

## 2021-08-26 NOTE — PROGRESS NOTES
CVTS Thoracic Progress Note:          CC: Right atrial mass    Subj: Awake, and alert. Sitting up in bed comfortably. No complaints at this time. Obj:    Blood pressure (!) 156/88, pulse 66, temperature 97.6 °F (36.4 °C), temperature source Oral, resp. rate 18, height 5' 11\" (1.803 m), weight 157 lb 9.6 oz (71.5 kg), SpO2 98 %. Lungs CTAB   Abdomen soft, non-tender   UOP 1,250 mL/24 hrs + 1 x occurrence; Cr 0.9     Diagnostics:   Recent Labs     08/24/21  0337 08/25/21  0417 08/26/21  0630   WBC 7.2 8.0 10.6   HGB 13.5 13.0* 14.8   HCT 40.4* 39.0* 44.5    236 271                                                                  Recent Labs     08/24/21  0337 08/25/21  0417 08/26/21  0630    135* 137   K 4.5 4.3 4.2   CL 99 101 98*   CO2 28 25 26   BUN 15 20 18   CREATININE 1.3 1.2 0.9   GLUCOSE 105* 104* 128*            Recent Labs     08/24/21  0337 08/25/21  0417 08/26/21  0630   MG 2.00 1.90 2.10      Recent Labs     08/24/21  0337 08/25/21  0417 08/26/21  0630   INR 1.03 1.03 0.97     CT Chest: 8/23/21   Impression   Chest-       Left infrahilar peribronchial thickening and adjacent       24 mm nodule in the left lower lobe, suspicious for metastasis in the setting.       Centrilobular nodules in the left lower lobe are also suspicious for   endobronchial spread of tumor. Broncho pneumonia remains a differential   possibility.       Borderline enlargement of mediastinal nodes, nonspecific.       Trace bilateral pleural effusions and mild bibasilar atelectasis.       ---       Abdomen pelvis-       2 cm right adrenal nodule, new in the interval, suspicious for metastasis.       Small amount of free fluid in the pelvis, nonspecific.       Moderate enlargement of the prostate gland. CASSIDY: 8/23/21 with Dr. Elias Arroyo    Summary:    Normal left ventricular size and function. Ejection fraction is visually estimated at 55-60%. Normal right ventricular size and function.  Temporary Pacemaker wire is visualized in the right ventricle. The right atrium is dilated. Well-circumscribed 2.0 cm x 1.9 cm mass located in the right atrium that appears attached to the low inter-atrial septum. Relatively immobile with no clear discrete stalk is noted. Atrial myxoma as well as secondary tumor remain considerations. Mild mitral regurgitation     CXR: 8/25/21  Impression   Transvenous pacer in place, no pneumothorax       Probable subsegmental atelectasis left lung base       Assess/Plan:   Care per primary team, Cardiology, and Hem/Onc    1. Right atrial mass and LLL lung nodule   - S/p EBUS yesterday with biopsy of LLL lesion; surgical intervention still pending path results for metastatic workup   - Pacemaker placed by Dr. Danie Redding 8/24/21 for complete heart block  - Consulted Hem/Onc, given hx of cancer. Appreciate recs. Erick Kaur PA-C  8/26/2021  11:12 AM   Note reviewed, events of last 24 hours reviewed along with vital signs and I/Os and patient examined. Assessment and plans discussed and are as outlined above.      Penelope Phillip MD, FACS, [facility], FACCP, [place]

## 2021-08-26 NOTE — PROGRESS NOTES
Assumed care of pt. [facility] [place] by [facility], RN. VSS. Pt resting comfortably in the chair with no needs at this time. 4 eye skin assessment complete. Left upper chest surgical incision clean, dry, and intact. Lines verified. MAR handoff complete, no continuous infusions. Shift assessment to follow. Will continue to monitor.

## 2021-08-26 NOTE — PROGRESS NOTES
Patient transferred to room 354. Call light and belongings are within reach. Denies any needs at this time.

## 2021-08-26 NOTE — PROGRESS NOTES
CARDIOLOGY PROGRESS NOTE      Patient Name: Maria Eugenia Ortega  Date of admission: 8/20/2021  4:24 PM  Admission Dx: Complete heart block ([address] [I44.2]  Reason for Consult:  [facility]  Requesting Physician: Maged Ramirez MD  Primary Care physician: MEI Ellsworth CNP    Subjective:     Maria Eugenia Ortega is a 74 y.o. 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 The patient reports that he went to bed feeling fine last night, but then felt very weak and nauseous when he woke up this morning. He also felt lightheaded and dizzy and says that he had multiple episodes of emesis. He denies any chest pain or shortness of breath. He is on amiodarone and Eliquis for his history of atrial fibrillation. He does not take any other AV pallavi blocking agents. He had a stress echocardiogram in 4/2021 that showed normal LV systolic function and no evidence of ischemia.     Patient was found to be in complete heart block on presentation. TVP was placed. Found to have intracardiac mass by echocardiogram and confirmed by CASSIDY. Subsequently found to have lung and adrenal masses. Oncology and CT surgery consulted. Patient underwent EBUS/bronchoscopy yesterday with samples and biopsy taken. Awaiting path. Patient to be discharged today. States pocket site feels good. Clean/dry/intact. No fevers. Still has raspy voice status post bron. No longer coughing up blood-tinged sputum. No chest pain or palpitations. Back on blood thinner. Home Medications:  Were reviewed and are listed in nursing record and/or below  Prior to Admission medications    Medication Sig Start Date End Date Taking?  Authorizing Provider   amiodarone (CORDARONE) 200 MG tablet Take 0.5 tablets by mouth daily 6/30/21   MEI Lynch CNP   apixaban (ELIQUIS) 5 MG TABS tablet Take 1 tablet by mouth 2 times daily 5/6/21   Yves Jeans, MD   omeprazole (PRILOSEC) 20 MG delayed release capsule Take 1 capsule by mouth every morning (before breakfast) 4/8/21   Vangie Eldridge MD        CURRENT Medications:  sodium chloride flush 0.9 % injection 5-40 mL, 2 times per day  doxycycline hyclate (VIBRA-TABS) tablet 100 mg, 2 times per day  ondansetron (ZOFRAN) injection 4 mg, Q10 Min PRN  hydrALAZINE (APRESOLINE) injection 5 mg, Q10 Min PRN  polyethylene glycol (GLYCOLAX) packet 17 g, Daily  levothyroxine (SYNTHROID) tablet 25 mcg, Daily  sodium chloride flush 0.9 % injection 5-40 mL, 2 times per day  sodium chloride flush 0.9 % injection 5-40 mL, PRN  pantoprazole (PROTONIX) tablet 40 mg, QAM AC  0.9 % sodium chloride infusion, PRN  acetaminophen (TYLENOL) tablet 650 mg, Q4H PRN   Or  acetaminophen (TYLENOL) suppository 650 mg, Q4H PRN  melatonin tablet 3 mg, Nightly PRN  HYDROcodone-acetaminophen (NORCO) 7.5-325 MG per tablet 1 tablet, Q6H PRN  perflutren lipid microspheres (DEFINITY) injection 1.65 mg, ONCE PRN        Allergies:  Percocet [oxycodone-acetaminophen]     Review of Systems:   A 14 point review of symptoms completed. Pertinent positives identified in the HPI, all other review of symptoms negative. Objective:     Vitals:    08/26/21 0200 08/26/21 0245 08/26/21 0845 08/26/21 1126   BP: 129/89 (!) 157/94 (!) 156/88 (!) 154/90   Pulse: 60 75 66 70   Resp:  18 18 18   Temp:  97.5 °F (36.4 °C) 97.6 °F (36.4 °C) 97.5 °F (36.4 °C)   TempSrc:  Oral Oral Oral   SpO2: 95% 98% 98% 97%   Weight:       Height:            Weight: 157 lb 9.6 oz (71.5 kg)       PHYSICAL EXAM:    General:  Alert, cooperative, no distress, appears stated age   Head:  Normocephalic, atraumatic   Eyes:  Conjunctiva/corneas clear, anicteric sclerae    Nose: Nares normal, no drainage or sinus tenderness   Throat: No abnormalities of the lips, oral mucosa or tongue. Neck: Trachea midline.  Neck supple with no lymphadenopathy, thyroid not enlarged, symmetric, no tenderness/mass/nodules    Lungs:   Clear to auscultation bilaterally, no wheezes, no rales, no respiratory distress   Chest Wall:   Pocket site C/D/I with no erythema, hematoma or drainage. Heart:  Regular rate and rhythm, normal S1, normal S2, no murmur, no rub, no S3/S4, PMI non-displaced. Abdomen:   Soft, non-tender, with normoactive bowel sounds. No masses, no hepatosplenomegaly   Extremities: No cyanosis, clubbing or pitting edema. Vascular: 2+ radial,  dorsalis pedis and posterior tibial pulses bilaterally. Brisk carotid upstrokes without carotid bruit. Skin: Skin color, texture, turgor are normal with no rashes or ulceration. Pysch: Euthymic mood, appropriate affect   Neurologic: Oriented to person, place and time. No slurred speech or facial asymmetry. No motor or sensory deficits on gross examination. Labs:   CBC:   Lab Results   Component Value Date    WBC 10.6 08/26/2021    RBC 5.07 08/26/2021    HGB 14.8 08/26/2021    HCT 44.5 08/26/2021    MCV 87.8 08/26/2021    RDW 17.1 08/26/2021     08/26/2021     CMP:  Lab Results   Component Value Date     08/26/2021    K 4.2 08/26/2021    K 4.2 03/27/2021    CL 98 08/26/2021    CO2 26 08/26/2021    BUN 18 08/26/2021    CREATININE 0.9 08/26/2021    GFRAA >60 08/26/2021    GFRAA >60 05/16/2011    AGRATIO 0.8 08/26/2021    LABGLOM >60 08/26/2021    GLUCOSE 128 08/26/2021    PROT 8.5 08/26/2021    PROT 7.4 05/16/2011    CALCIUM 9.7 08/26/2021    BILITOT 0.5 08/26/2021    ALKPHOS 120 08/26/2021    AST 20 08/26/2021    ALT 40 08/26/2021     PT/INR:  No results found for: PTINR  HgBA1c:  Lab Results   Component Value Date    LABA1C 5.6 05/16/2018     Lab Results   Component Value Date    TROPONINI 0.22 (H) 08/21/2021         Interval Testing/Data:     Telemetry personally reviewed.     EKG 8/21 personally reviewed, my interpretation: ventricular paced rhythm 60 beats per tricuspid regurgitation with a right ventricular systolic pressure   estimated at 39 mmHg assuming a right atrial pressure of 15 mmHg.   -Mild pulmonic regurgitation present.   -IVC size is dilated (>2.1 cm) and collapses < 50% with respiration   consistent with elevated RA pressure (15 mmHg).  -Small size pericardial effusion.   -Indeterminate diastolic function due to atrial fibrillation.     Stress Test:  Exercise stress echo 4/15/21:  Conclusions      Summary   Normal stress ECHO.      Signature      ------------------------------------------------------------------   Electronically signed by Lisa Roberto MD, [facility] (Interpreting   physician) on 04/16/2021 at 02:49 PM   ------------------------------------------------------------------      Rest      ECG   Normal sinus rhythm.      Standing HR:49 bpmStanding BP:118/90 mmHg      Stress      Stress Type: Exercise      Rest HR: 49 bpm                 HR Response: Normal   Rest BP: 118/90 mmHg            BP Response: Normal   Stress Peak HR: 114 bpm         HR BP Product: 15904   Stress Peak BP: 140/60 mmHg   Predicted HR: 152 bpm           Stress EF: 70 %   % of predicted HR: 75   Reason for Termination: Dyspnea      Results      Echo (rest): Normal (LVEF >50%)      Echo (stress): Normal (LVEF >50%)      Echo   Baseline resting echocardiogram shows normal global LV systolic function   with an ejection fraction of 55-60% and uniform myocardial segmental wall   motion. Following stress there was uniform augmentation of all myocardial   segments with appropriate hyperdynamic LV systolic response to stress and an   ejection fraction of 70%. CT chest/abd/pel 8/23  Chest-       Left infrahilar peribronchial thickening and adjacent       24 mm nodule in the left lower lobe, suspicious for metastasis in the setting.       Centrilobular nodules in the left lower lobe are also suspicious for   endobronchial spread of tumor.  Broncho pneumonia remains a differential possibility.       Borderline enlargement of mediastinal nodes, nonspecific.       Trace bilateral pleural effusions and mild bibasilar atelectasis.       ---       Abdomen pelvis-       2 cm right adrenal nodule, new in the interval, suspicious for metastasis.       Small amount of free fluid in the pelvis, nonspecific.       Moderate enlargement of the prostate gland. Impression and Plan      1. Symptomatic third-degree AV block manifest with syncope/hypoperfusion, status post permanent pacemaker placement  2. Intra-cardiac mass, Right atrium, close proximity to AVN  3. Lung/adrenal masses by CT, concerning for metastasis, unknown primary  4. Paroxysmal atrial fibrillation  5. Troponin elevation secondary to type 2 demand ischemia and further exacerbated by delayed cardiac enzyme clearance in setting of acute kidney injury which has resolved   6. History of pericarditis/pericardial effusion   7. 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 Remote history of melanoma thumb status post resection/amputation 2003, again 2008 for local recurrence. Patient Active Problem List   Diagnosis    Left low back pain    IGT (impaired glucose tolerance)    Inguinal hernia unilateral, non-recurrent    History of malignant melanoma    Chest pain    PAF (paroxysmal atrial fibrillation) (HCC)    Pericarditis    Anemia    Alcohol abuse    Complete heart block (HCC)    JOEL (acute kidney injury) ([address]    Third degree AV block (HCC)    Chronic anticoagulation    Acquired hypothyroidism    Nausea vomiting and diarrhea    Pacemaker-medtronic       PLAN:  1. Patient has follow-up established with electrophysiology and for device check in 1 week and will continue follow-up in [facility]. 2.  Follow-up with me as scheduled  3. Continue Eliquis twice daily given intracardiac mass and atrial fibrillation history is.   Presently off his amiodarone with thyroid complication and will have EP follow-up. 4.  We will follow path and he is scheduled to see oncology on an outpatient basis moving forward with further evaluation and treatment as appropriate. Patient stable for discharge today. I will address the patient's cardiac risk factors and adjusted pharmacologic treatment as needed. In addition, I have reinforced the need for patient directed risk factor modification. All questions and concerns were addressed to the patient/family. Alternatives to my treatment were discussed. Thank you for allowing us to participate in the care of David Rucker. Please call me with any questions [phone].     Levi Hawthorne MD, [facility]  Cardiovascular Disease  [address]  [phone] [facility]  [phone] [address]  8/26/2021 1:29 PM

## 2021-08-27 NOTE — DISCHARGE SUMMARY
Hospital Medicine Discharge Summary    Patient ID: Leo Herring      Patient's PCP: Marija Landeros, APRN - CNP    Admit Date: 8/20/2021     Discharge Date: 8/26/2021      Admitting Physician: Freddy Milner MD     Discharge Physician: Catia Elliott MD     Discharge Diagnoses: Active Hospital Problems    Diagnosis     Nausea vomiting and diarrhea [R11.2, R19.7]     Chronic anticoagulation [Z79.01]     Acquired hypothyroidism [E03.9]     JOEL (acute kidney injury) ([facility] [address] [N17.9]     Complete heart block (HCC) [I44.2]     Third degree AV block (HCC) [I44.2]     PAF (paroxysmal atrial fibrillation) ([facility] [address] [I48.0]        The patient was seen and examined on day of discharge and this discharge summary is in conjunction with any daily progress note from day of discharge. Hospital Course:       Sick sinus syndrome with third-degree AV block s/p right IJ transvenous pacemaker insertion (8/20, Dr Prince Samano) with back-up pacing st for 60 bpm.  Pacer placed 24 August.  CASSIDY demonstrated right atrial mass which may require surgical excision - CT Surgery consulted and appreciated. CT scan Chest/Abd/Pelvis c/w possible metastatic disease - Heme/Onc consulted and appreciated. S/P EBUS 25 August w/out complications - to f/u Path outpt.      Hx of Pericarditis - treated with oral Colchicine (Feb 2021).    Paroxysmal atrial fibrillation:  Eliquis and Amiodarone held admission.   Was on Heparin gtt - now d/c'd.  Eliquis resumed.      Troponin Elevation - likely  secondary to demand ischemia and acute kidney injury.     Hypotension - POArrival, requiring pressor support - now resolved and weaned off.      Elevated liver enzymes:  Likely related to hypotension.  Minimal to moderate alcohol consumption.       Acute kidney injury -  secondary to hypotension and hypoperfusion.  Volume expansion with IVF and resolved.      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  He has also been taking Amiodarone - now d/c'd.  Initiated on low dose Levothyroxine 25 mcg daily.        Labs: For convenience and continuity at follow-up the following most recent labs are provided:      CBC:    Lab Results   Component Value Date    WBC 10.6 08/26/2021    HGB 14.8 08/26/2021    HCT 44.5 08/26/2021     08/26/2021       Renal:    Lab Results   Component Value Date     08/26/2021    K 4.2 08/26/2021    K 4.2 03/27/2021    CL 98 08/26/2021    CO2 26 08/26/2021    BUN 18 08/26/2021    CREATININE 0.9 08/26/2021    CALCIUM 9.7 08/26/2021    PHOS 3.0 08/26/2021         Significant Diagnostic Studies    Radiology:   XR CHEST PORTABLE   Final Result   Transvenous pacer in place, no pneumothorax      Probable subsegmental atelectasis left lung base         CT CHEST ABDOMEN PELVIS W CONTRAST   Final Result   Chest-      Left infrahilar peribronchial thickening and adjacent      24 mm nodule in the left lower lobe, suspicious for metastasis in the setting. Centrilobular nodules in the left lower lobe are also suspicious for   endobronchial spread of tumor. Broncho pneumonia remains a differential   possibility. Borderline enlargement of mediastinal nodes, nonspecific. Trace bilateral pleural effusions and mild bibasilar atelectasis. ---      Abdomen pelvis-      2 cm right adrenal nodule, new in the interval, suspicious for metastasis. Small amount of free fluid in the pelvis, nonspecific. Moderate enlargement of the prostate gland. XR CHEST PORTABLE   Final Result   Temporary transvenous pacer lead extends into the right atrium but the distal   tip is difficult to confirm due to several overlying leads external to the   patient. There is no pneumothorax. XR CHEST PORTABLE   Final Result   1. No acute cardiopulmonary disease.                 Consults:     IP CONSULT TO CARDIOLOGY  IP CONSULT TO HOSPITALIST  IP

## 2021-08-30 ENCOUNTER — TELEPHONE (OUTPATIENT)
Dept: INTERNAL MEDICINE CLINIC | Age: 66
End: 2021-08-30

## 2021-08-30 NOTE — TELEPHONE ENCOUNTER
[facility] Transitions Initial Follow Up Call    Outreach made within 2 business days of discharge: Yes    Patient: Teddy Geiger Patient : 1955   MRN: [id number]  Reason for Admission: There are no discharge diagnoses documented for the most recent discharge. Discharge Date: 21       Spoke with: patient    Discharge department/facility: [facility] Interactive Patient Contact:  Was patient able to fill all prescriptions:   Was patient instructed to bring all medications to the follow-up visit:   Is patient taking all medications as directed in the discharge summary?    Does patient understand their discharge instructions:   Does patient have questions or concerns that need addressed prior to 7-14 day follow up office visit:     Scheduled appointment with PCP within 7-14 days    Follow Up  Future Appointments   Date Time Provider Abner Ureña   2021  8:00 AM SCHEDULE, [facility] Antonino Koenig [facility]   2021  9:45 AM MD Antonino Rucker [facility]   10/5/2021  7:00 AM SCHEDULE, Irvin Rodney [facility] Eveo [facility]   10/8/2021  9:15 AM MEI Ambrosio - CNP Jon[place]an Arya [facility]   2021  9:30 AM SCHEDULE, [facility] Antonino Koenig [facility]   2021  9:30 AM Otilia Halsted, APRN - CNP [place] Edouard [facility]       [place] [address], [address]

## 2021-08-30 NOTE — OP NOTE
Operative Note      Patient: Ashu Britton  YOB: 1955  MRN: [id number]    Date of Procedure: 8/25/2021    Pre-Op Diagnosis: -Mediastinal lymphadenopathy. Post-Op Diagnosis: Same   Procedure  #1 bronchoscopy with endobronchial ultrasound with transbronchial lymph node sampling x2  #2 endoscopic ultrasound      Surgeon(s):  Bassam Dueñas MD    Assistant:   Surgical Assistant: Ness Moore    Anesthesia: General    Estimated Blood Loss (mL): less than 563     Complications: None    Specimens:   ID Type Source Tests Collected by Time Destination   A : LEVEL 7 LYMPH NODE Tissue Lymph Node SURGICAL PATHOLOGY Bassam Dueñas MD 8/25/2021 1100    B : LEVEL 10 LEFT LYMPH NODE Tissue Lymph Node SURGICAL PATHOLOGY Bassam Dueñas MD 8/25/2021 1133      Findings: Mediastinal lymph node soft lost structure    Detailed Description of Procedure:   Patient was taken to the operating room after informative consent was obtained lying supine under general esthesia ET tube was placed with no difficulty. Endobronchial ultrasound was inserted level 7 and 10 lymph node was identified. Transbronchial lymph node aspirate was obtained from level 10 and level 7 multiple passes atypical cell with the present of lymphocytes was seen. Scope was inserted through the esophagus advanced we spent about 30 minutes looking for the lesion to correspond with the CT scan we could not identify it clearly. Scope was removed reinserted through the ET tube advance to the thickening lesion around the main left lower lobe bronchus multiple passes was obtained sent for pathology. At this time I elected to wait for the permanent.     Electronically signed by Bassam Dueñas MD on 8/30/2021 at 8:54 AM

## 2021-09-07 ENCOUNTER — TELEPHONE (OUTPATIENT)
Dept: CARDIOTHORACIC SURGERY | Age: 66
End: 2021-09-07

## 2021-09-07 ENCOUNTER — NURSE ONLY (OUTPATIENT)
Dept: CARDIOLOGY CLINIC | Age: 66
End: 2021-09-07
Payer: MEDICARE

## 2021-09-07 DIAGNOSIS — I44.2 COMPLETE HEART BLOCK (HCC): ICD-10-CM

## 2021-09-07 DIAGNOSIS — Z95.0 PACEMAKER: ICD-10-CM

## 2021-09-07 PROCEDURE — 93280 PM DEVICE PROGR EVAL DUAL: CPT | Performed by: INTERNAL MEDICINE

## 2021-09-07 NOTE — PROGRESS NOTES
Patient presents to the device clinic today for a programming evaluation for his pacemaker. Patient has a history of pAF and CHB. Takes Eliquis. Patient's device was implanted by Dr. Laney Bains on 8/24. Since then, no arrhythmias recorded. AP 37.1%  100%    All sensing and pacing parameters are within normal range. No changes need to be made at this time. Incision is closed, clean, and dry with all dressings/steri strips removed. Site left open to the air. Incision well approximated. No s/s of infection. Patient education was provided about site care, device functionality, in home monitoring, and any other patient questions and/or concerns were addressed. Aftercare and remote monitoring literature was provided. Patient voices understanding. Please see interrogation for more detail. Patient will follow up in 3 months in office or remotely. See Paceart report under the Cardiology tab.

## 2021-09-10 ENCOUNTER — TELEPHONE (OUTPATIENT)
Dept: CARDIOTHORACIC SURGERY | Age: 66
End: 2021-09-10

## 2021-09-10 ENCOUNTER — TELEPHONE (OUTPATIENT)
Dept: INTERVENTIONAL RADIOLOGY/VASCULAR | Age: 66
End: 2021-09-10

## 2021-09-10 NOTE — TELEPHONE ENCOUNTER
Called and spoke to patient about upcoming procedure. Date of procedure: 9/17/21  Arrival time of procedure: 0800  Time of procedure: 0930  Check in at main entrance and will go to same day surgery to prior to procedure. On any blood thinners? Yes. If yes: Eliquis  Instructed to hold thinners for 5 days prior to test    Blood pressure medication? Yes. If yes need to make sure take morning of procedure. Any issues with sedation in the past? no  Will receive versed and fentanyl for sedation will need a  day of procedure. After procedure will be here 2-4 hours after procedure for monitoring. Nothing to eat or drink at midnight.      Need COVID testing prior: No

## 2021-09-10 NOTE — TELEPHONE ENCOUNTER
Called patient again to f/u after recent office visit with Dr. Renata Bocanegra on 9/7. Patient was going to call our office after to let us know if he wants to be seen this upcoming Monday 9/13 based on what Dr. Renata Bocanegra told him. No answer, left VM to call back. Update 9/10/21 1050:    Patient returned call. Asked patient if he would like to come in Monday to be seen. Patient declining at this time. He states he has an appointment next Friday with Dr. London Trivedi for another biopsy procedure. He then plans to f/u again with Dr. Renata Bocanegra on 9/22 in office. Patient to call our office if he wants to be seen, otherwise we will follow along with ANNETIM BONDS office notes as well as biopsy results in the meantime.

## 2021-09-17 ENCOUNTER — HOSPITAL ENCOUNTER (OUTPATIENT)
Dept: GENERAL RADIOLOGY | Age: 66
Discharge: HOME | End: 2021-09-17
Payer: MEDICARE

## 2021-09-17 ENCOUNTER — HOSPITAL ENCOUNTER (OUTPATIENT)
Dept: CT IMAGING | Age: 66
Discharge: HOME | End: 2021-09-17
Payer: MEDICARE

## 2021-09-17 VITALS
SYSTOLIC BLOOD PRESSURE: 104 MMHG | TEMPERATURE: 97.3 F | DIASTOLIC BLOOD PRESSURE: 79 MMHG | OXYGEN SATURATION: 97 % | HEART RATE: 65 BPM | RESPIRATION RATE: 16 BRPM

## 2021-09-17 DIAGNOSIS — E27.8 ADRENAL MASS (HCC): ICD-10-CM

## 2021-09-17 DIAGNOSIS — C08.0 MALIGNANT NEOPLASM OF SUBMANDIBULAR GLAND (HCC): ICD-10-CM

## 2021-09-17 LAB
HCT VFR BLD CALC: 38.7 % (ref 40.5–52.5)
HEMOGLOBIN: 12.9 G/DL (ref 13.5–17.5)
INR BLD: 0.99 (ref 0.88–1.12)
MCH RBC QN AUTO: 29.2 PG (ref 26–34)
MCHC RBC AUTO-ENTMCNC: 33.3 G/DL (ref 31–36)
MCV RBC AUTO: 87.6 FL (ref 80–100)
PDW BLD-RTO: 16.5 % (ref 12.4–15.4)
PLATELET # BLD: 329 K/UL (ref 135–450)
PMV BLD AUTO: 7.8 FL (ref 5–10.5)
PROTHROMBIN TIME: 11.2 SEC (ref 9.9–12.7)
RBC # BLD: 4.41 M/UL (ref 4.2–5.9)
WBC # BLD: 7.4 K/UL (ref 4–11)

## 2021-09-17 PROCEDURE — 36415 COLL VENOUS BLD VENIPUNCTURE: CPT

## 2021-09-17 PROCEDURE — 85610 PROTHROMBIN TIME: CPT

## 2021-09-17 PROCEDURE — 99152 MOD SED SAME PHYS/QHP 5/>YRS: CPT

## 2021-09-17 PROCEDURE — 85027 COMPLETE CBC AUTOMATED: CPT

## 2021-09-17 PROCEDURE — 88341 IMHCHEM/IMCYTCHM EA ADD ANTB: CPT

## 2021-09-17 PROCEDURE — 7100000011 HC PHASE II RECOVERY - ADDTL 15 MIN

## 2021-09-17 PROCEDURE — 71045 X-RAY EXAM CHEST 1 VIEW: CPT

## 2021-09-17 PROCEDURE — 2709999900 CT BIOPSY ABDOMEN RETROPERITONEUM

## 2021-09-17 PROCEDURE — 77012 CT SCAN FOR NEEDLE BIOPSY: CPT

## 2021-09-17 PROCEDURE — 99153 MOD SED SAME PHYS/QHP EA: CPT

## 2021-09-17 PROCEDURE — 7100000010 HC PHASE II RECOVERY - FIRST 15 MIN

## 2021-09-17 PROCEDURE — 88305 TISSUE EXAM BY PATHOLOGIST: CPT

## 2021-09-17 PROCEDURE — 88342 IMHCHEM/IMCYTCHM 1ST ANTB: CPT

## 2021-09-17 RX ORDER — ONDANSETRON 2 MG/ML
4 INJECTION INTRAMUSCULAR; INTRAVENOUS EVERY 8 HOURS PRN
Status: DISCONTINUED | OUTPATIENT
Start: 2021-09-17 | End: 2021-09-18 | Stop reason: HOSPADM

## 2021-09-17 ASSESSMENT — PAIN DESCRIPTION - PAIN TYPE: TYPE: ACUTE PAIN

## 2021-09-17 ASSESSMENT — PAIN SCALES - GENERAL
PAINLEVEL_OUTOF10: 4
PAINLEVEL_OUTOF10: 0

## 2021-09-17 NOTE — SEDATION DOCUMENTATION
Image guided adrenal gland biopsy completed by Dr. Vamsi Gonzalez. Pt tolerated procedure without any signs or symptoms of distress. Vital signs stable. Report given  to [facility] RN. Pt transported back to [facility] in stable condition via stretcher. Total Biopsy: 3  Received: Versed: 1 mg       Fentanyl: 50 mcg  Bandage to middle back that is clean dry and intact. Patient to lay on flat for 1 hour. Chest xray in 1 hour can discharge in 2 hours.      Vital Signs  Vitals:    09/17/21 1136   BP: 120/66   Pulse: 66   Resp: 17   Temp:    SpO2: 100%    (vital signs in table format)    Post Aly  2 - Able to move 4 extremities voluntarily on command  2 - BP+/- 20mmHg of normal  2 - Fully awake  2 - Able to maintain oxygen saturation >92% on room air  2 - Able to breathe deeply and cough freely

## 2021-09-17 NOTE — PROGRESS NOTES
D: pt back to hospitals from  Interventional radiology department, VSS, denies pain or nausea at this time. Mid back dressing clean, dry and intact.

## 2021-09-17 NOTE — BRIEF OP NOTE
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 Post-operative Diagnosis: Same    Procedure: CT guided right adrenal mass biopsy.     Anesthesia: Moderate Sedation    Surgeons/Assistants: Dr. Joann Matson    Estimated Blood Loss: less than 5ml     Complications: None    Specimens: Was Obtained: right adrenal mass    Findings: three 18G core biopsies    Electronically signed by Huber Bess MD on 9/17/2021 at 11:41 AM
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
## 2021-09-17 NOTE — PROGRESS NOTES
D: Discharge instructions given to pt and family. Verbalized understanding. PIV removed. Pt dressed and wheeled out and discharged to the care of their family in stable condition.

## 2021-09-17 NOTE — SEDATION DOCUMENTATION
Pt arrived for image guided adrenal gland biopsy right. Procedure explained including the risk and benefits of the procedure. All questions answered. Pt verbalizes understanding of the of procedure and states no more questions. Consent signed. Vital signs stable, labs, allergies, medications, and code status reviewed. No contraindications noted. Time out completed prior to procedure. Pt was place right side on the CT table. Pt was placed on all cardiac monitoring. Pt placed on 2 L NC for sedation.        Vitals:    09/17/21 1121   BP: 120/83   Pulse: 69   Resp: 14   Temp:    SpO2: 100%    (vital signs in table format)    Aly Score  2 - Able to move 4 extremities voluntarily on command  2 - BP+/- 20mmHg of normal  2 - Fully awake  2 - Able to maintain oxygen saturation >92% on room air  2 - Able to breathe deeply and cough freely    Allergies  Percocet [oxycodone-acetaminophen]    Labs  Lab Results   Component Value Date    INR 0.99 09/17/2021    PROTIME 11.2 09/17/2021       Lab Results   Component Value Date    CREATININE 0.9 08/26/2021    BUN 18 08/26/2021    GFR >60 05/16/2011     08/26/2021    K 4.2 08/26/2021    CL 98 (L) 08/26/2021    CO2 26 08/26/2021       Lab Results   Component Value Date    WBC 7.4 09/17/2021    HGB 12.9 (L) 09/17/2021    HCT 38.7 (L) 09/17/2021    MCV 87.6 09/17/2021     09/17/2021

## 2021-09-23 ENCOUNTER — HOSPITAL ENCOUNTER (OUTPATIENT)
Age: 66
Discharge: HOME | End: 2021-09-23
Payer: MEDICARE

## 2021-09-23 ENCOUNTER — OFFICE VISIT (OUTPATIENT)
Dept: INTERNAL MEDICINE CLINIC | Age: 66
End: 2021-09-23
Payer: MEDICARE

## 2021-09-23 VITALS
SYSTOLIC BLOOD PRESSURE: 118 MMHG | DIASTOLIC BLOOD PRESSURE: 68 MMHG | TEMPERATURE: 97.6 F | BODY MASS INDEX: 22.32 KG/M2 | OXYGEN SATURATION: 98 % | WEIGHT: 160 LBS | HEART RATE: 83 BPM

## 2021-09-23 DIAGNOSIS — R10.13 EPIGASTRIC PAIN: ICD-10-CM

## 2021-09-23 DIAGNOSIS — K40.90 NON-RECURRENT UNILATERAL INGUINAL HERNIA WITHOUT OBSTRUCTION OR GANGRENE: ICD-10-CM

## 2021-09-23 DIAGNOSIS — R10.13 EPIGASTRIC PAIN: Primary | ICD-10-CM

## 2021-09-23 LAB
A/G RATIO: 1.2 (ref 1.1–2.2)
ALBUMIN SERPL-MCNC: 3.7 G/DL (ref 3.4–5)
ALP BLD-CCNC: 102 U/L (ref 40–129)
ALT SERPL-CCNC: 16 U/L (ref 10–40)
AMYLASE: 52 U/L (ref 25–115)
ANION GAP SERPL CALCULATED.3IONS-SCNC: 13 MMOL/L (ref 3–16)
AST SERPL-CCNC: 18 U/L (ref 15–37)
BILIRUB SERPL-MCNC: 0.3 MG/DL (ref 0–1)
BUN BLDV-MCNC: 15 MG/DL (ref 7–20)
CALCIUM SERPL-MCNC: 9.4 MG/DL (ref 8.3–10.6)
CHLORIDE BLD-SCNC: 101 MMOL/L (ref 99–110)
CO2: 26 MMOL/L (ref 21–32)
CREAT SERPL-MCNC: 1.1 MG/DL (ref 0.8–1.3)
GFR AFRICAN AMERICAN: >60
GFR NON-AFRICAN AMERICAN: >60
GLOBULIN: 3.1 G/DL
GLUCOSE BLD-MCNC: 85 MG/DL (ref 70–99)
LIPASE: 38 U/L (ref 13–60)
POTASSIUM SERPL-SCNC: 4.5 MMOL/L (ref 3.5–5.1)
SODIUM BLD-SCNC: 140 MMOL/L (ref 136–145)
TOTAL PROTEIN: 6.8 G/DL (ref 6.4–8.2)

## 2021-09-23 PROCEDURE — 83690 ASSAY OF LIPASE: CPT

## 2021-09-23 PROCEDURE — 80053 COMPREHEN METABOLIC PANEL: CPT

## 2021-09-23 PROCEDURE — 99214 OFFICE O/P EST MOD 30 MIN: CPT | Performed by: NURSE PRACTITIONER

## 2021-09-23 PROCEDURE — 36415 COLL VENOUS BLD VENIPUNCTURE: CPT

## 2021-09-23 PROCEDURE — 82150 ASSAY OF AMYLASE: CPT

## 2021-09-23 SDOH — ECONOMIC STABILITY: FOOD INSECURITY: WITHIN THE PAST 12 MONTHS, YOU WORRIED THAT YOUR FOOD WOULD RUN OUT BEFORE YOU GOT MONEY TO BUY MORE.: NEVER TRUE

## 2021-09-23 SDOH — ECONOMIC STABILITY: FOOD INSECURITY: WITHIN THE PAST 12 MONTHS, THE FOOD YOU BOUGHT JUST DIDN'T LAST AND YOU DIDN'T HAVE MONEY TO GET MORE.: NEVER TRUE

## 2021-09-23 ASSESSMENT — ENCOUNTER SYMPTOMS
CONSTIPATION: 0
SHORTNESS OF BREATH: 0
DIARRHEA: 0
WHEEZING: 0
ABDOMINAL PAIN: 1
NAUSEA: 0
VOMITING: 0
COUGH: 0
BLOOD IN STOOL: 0
EYE DISCHARGE: 0

## 2021-09-23 ASSESSMENT — SOCIAL DETERMINANTS OF HEALTH (SDOH): HOW HARD IS IT FOR YOU TO PAY FOR THE VERY BASICS LIKE FOOD, HOUSING, MEDICAL CARE, AND HEATING?: NOT HARD AT ALL
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
## 2021-09-23 NOTE — PATIENT INSTRUCTIONS
Update labs  Schedule CT of abdomen  Begin prilosec twice a day  Use altoids for comfort as needed  If hernia becomes larger, can not be reduced go to the ER

## 2021-09-28 ENCOUNTER — TELEPHONE (OUTPATIENT)
Dept: INTERNAL MEDICINE CLINIC | Age: 66
End: 2021-09-28

## 2021-09-28 ENCOUNTER — OFFICE VISIT (OUTPATIENT)
Dept: CARDIOLOGY CLINIC | Age: 66
End: 2021-09-28
Payer: MEDICARE

## 2021-09-28 ENCOUNTER — TELEPHONE (OUTPATIENT)
Dept: CARDIOLOGY | Age: 66
End: 2021-09-28

## 2021-09-28 VITALS
OXYGEN SATURATION: 99 % | HEIGHT: 71 IN | DIASTOLIC BLOOD PRESSURE: 58 MMHG | BODY MASS INDEX: 22.12 KG/M2 | SYSTOLIC BLOOD PRESSURE: 126 MMHG | WEIGHT: 158 LBS | HEART RATE: 83 BPM

## 2021-09-28 DIAGNOSIS — I44.2 COMPLETE HEART BLOCK (HCC): ICD-10-CM

## 2021-09-28 DIAGNOSIS — I51.89 CARDIAC MASS: Primary | ICD-10-CM

## 2021-09-28 DIAGNOSIS — I48.0 PAROXYSMAL ATRIAL FIBRILLATION (HCC): ICD-10-CM

## 2021-09-28 PROCEDURE — 99214 OFFICE O/P EST MOD 30 MIN: CPT | Performed by: INTERNAL MEDICINE

## 2021-09-28 NOTE — TELEPHONE ENCOUNTER
Patient saw Jhonny Shanks on 9/23/21 and he was given a CT abd/pelvis. He was given the number to schedule. He said his abdominal pain is worse and he needs something for pain.

## 2021-09-28 NOTE — TELEPHONE ENCOUNTER
Spoke with electrophysiology/Dr. Yenifer Ascencio who states we may do cardiac MRI with mass protocol at 6 weeks post device implant. Will order. Please give patient number to schedule. Device protocol will be taken care of through EP.     ESSIE

## 2021-09-28 NOTE — TELEPHONE ENCOUNTER
Would refer him back to Dr. Maggy Spear for treatment of pain and FU of CT scan.     Fatuma Julien, ANP-BC
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
## 2021-09-28 NOTE — PATIENT INSTRUCTIONS
Follow up with EP 10/8/21 RODDY Ledezma   Will plan future Cardiac MRI once device healed and pending PET results  Please forward results PET scan to me  Continue anti-coagulation with eliquis
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
## 2021-09-29 NOTE — TELEPHONE ENCOUNTER
Spoke with pt, he was driving and could not take the number for central scheduling for cardiac MRI, the number to schedule is [phone], the order is placed, it needs to be done on a Thursday or Friday at the [address] so dr. Rip Hernandez can read it.  PT states he will call back when he is not driving

## 2021-09-30 ENCOUNTER — TELEPHONE (OUTPATIENT)
Dept: CARDIOTHORACIC SURGERY | Age: 66
End: 2021-09-30

## 2021-09-30 NOTE — TELEPHONE ENCOUNTER
Our office , Sarah Jones., attempted to reach patient earlier this week to schedule a procedure with Dr. Marshall Kyle. Patient declined because he had some questions. After reviewing patient's recent CT guided biopsy results from 9/17 ordered by Dr. Steph Santiago, Dr. Marshall Kyel wants to proceed with mediastinoscopy since results were inconclusive. Called patient to inform him of this. Patient requested that I call his wife to go over it more in depth. Called patient's spouse, Irma Jj. She reported that patient has a PET scan scheduled on 10/12. She wants to know if we should plan to schedule the mediastinoscopy until after the PET scan, or if we should attempt to have the PET scan moved up. Informed her that I will discuss with Dr. Marshall Kyle on Monday 10/4. She is agreeable to wait to see what he decides.

## 2021-10-01 ENCOUNTER — HOSPITAL ENCOUNTER (OUTPATIENT)
Dept: CT IMAGING | Age: 66
Discharge: HOME | End: 2021-10-01
Payer: MEDICARE

## 2021-10-01 ENCOUNTER — TELEPHONE (OUTPATIENT)
Dept: INTERNAL MEDICINE CLINIC | Age: 66
End: 2021-10-01

## 2021-10-01 ENCOUNTER — NURSE TRIAGE (OUTPATIENT)
Dept: OTHER | Facility: CLINIC | Age: 66
End: 2021-10-01

## 2021-10-01 DIAGNOSIS — R10.13 EPIGASTRIC PAIN: ICD-10-CM

## 2021-10-01 PROCEDURE — 6360000004 HC RX CONTRAST MEDICATION: Performed by: NURSE PRACTITIONER

## 2021-10-01 PROCEDURE — 74177 CT ABD & PELVIS W/CONTRAST: CPT

## 2021-10-01 RX ADMIN — IOHEXOL 50 ML: 240 INJECTION, SOLUTION INTRATHECAL; INTRAVASCULAR; INTRAVENOUS; ORAL at 08:54

## 2021-10-01 RX ADMIN — IOPAMIDOL 75 ML: 755 INJECTION, SOLUTION INTRAVENOUS at 08:54

## 2021-10-01 NOTE — TELEPHONE ENCOUNTER
Reason for Disposition   Nursing judgment    Answer Assessment - Initial Assessment Questions  1. REASON FOR CALL or QUESTION: \"What is your reason for calling today? \" or \"How can I best help you? \" or \"What question do you have that I can help answer? \"      Calling to get his CT results. Protocols used: INFORMATION ONLY CALL - NO TRIAGE-ADULT-[place]    Received call from Lucy Carreon at [facility] with Red Flag Complaint. Brief description of triage: calling to get CT results and advice on how to treat the pain. Triage indicates for patient to speak with office. I connected caller with the office. Care advice provided, patient verbalizes understanding; denies any other questions or concerns; instructed to call back for any new or worsening symptoms. Attention Provider: Thank you for allowing me to participate in the care of your patient. The patient was connected to triage in response to information provided to the [facility]. Please do not respond through this encounter as the response is not directed to a shared pool.

## 2021-10-05 ENCOUNTER — TELEPHONE (OUTPATIENT)
Dept: CARDIOTHORACIC SURGERY | Age: 66
End: 2021-10-05

## 2021-10-05 NOTE — TELEPHONE ENCOUNTER
Spoke with patient's wife regarding schedule of out patient procedure on 10/6/2021 at 12:30 pm with arrival time of 10:30 am at [facility] with Dr. Lance Del Castillo to include: mediastinoscopy with lymph node biopsy. No pre-op labs are required and patient will complete a rapid COVID swab upon admission. Patient instructed not to eat or drink 8 hours prior to the procedure and to call our office with any questions or concerns.

## 2021-10-05 NOTE — PROGRESS NOTES
Preoperative Screening for Elective Surgery/Invasive Procedures While COVID-19 present in the community     Have you had any of the following symptoms?none  o Fever, chills  o Cough  o Shortness of breath  o Muscle aches/pain  o Diarrhea  o Abdominal pain, nausea, vomiting  o Loss or decrease in taste and / or smell   Risk of Exposure  o Have you recently been hospitalized for COVID-19 or flu-like illness, if so when?no  o Recently diagnosed with COVID-19, if so when?no  o Recently tested for COVID-19, if so when?no  o Have you been in close contact with a person or family member who currently has or recently had COVID-19? If yes, when and in what context?no  o Do you live with anybody who in the last 14 days has had fever, chills, shortness of breath, muscle aches, flu-like illness?no  o Do you have any close contacts or family members who are currently in the hospital for COVID-19 or flu-like illness? If yes, assess recent close contact with this person. no    Indicate if the patient has a positive screen by answering yes to one or more of the above questions. Patients who test positive or screen positive prior to surgery or on the day of surgery should be evaluated in conjunction with the surgeon/proceduralist/anesthesiologist to determine the urgency of the procedure.

## 2021-10-05 NOTE — PROGRESS NOTES
Dante Curlemuel    Age 74 y.o.    male    1955    MRN [id number]    10/6/2021  Arrival Time_____________  OR Time____________[address]     Procedure(s):  MEDIASTINOSCOPY [facility] NODE BIOPSY                      General     Surgeon(s):  Brionna Antonio, MD      DAY ADMIT ___  SDS/OP ___  OUTPT IN BED ___         Phone [phone] (home)    PCP _____________________ Phone_________________ Epic ( ) Epic CE ( ) Appt ________    ADDITIONAL INFO __________________________________ Cardio/Consult _____________    NOTES _____________________________________________________________________    ____________________________________________________________________________    PAT APPT DATE:________ TIME: ________  FAXED QAD: _______  (__) H&P w/ hospitalist  ____________________________________________________________________________    COVID TEST: Date/Location______________        NURSING HISTORY COMPLETE: _______  (__) CBC       (__) W/ DIFF ___________  (__)  ECHO    __________  (__) Hgb A1C    ___________  (__) CHEST X RAY   __________  (__) LIPID PROFILE  ___________  (__) EKG   __________  (__) PT/PTT   ___________  (__) PFT's   __________  (__) BMP   ___________  (__) CAROTIDS  __________  (__) CMP   ___________  (__) VEIN MAPPING  __________  (__) U/A   ___________  (__) HISTORY & PHYSICAL __________  (__) URINE C & S  ___________  (__) CARDIAC CLEARANCE __________  (__) U/A W/ FLEX  ___________  (__) PULM.  CLEARANCE __________  (__) SERUM PREGNANCY ___________  (__) Check Epic DOS orders __________  (__) TYPE & SCREEN ________ repeat ( ) (__)  __________________ __________  (__) ALBUMIN   ___________  (__)  __________________ __________  (__) TRANSFERRIN  ___________  (__)  __________________ __________  (__) LIVER PROFILE  ___________  (__)  __________________ __________  (__) CARBOXY HGB  ___________  (__) URINE PREG DOS __________  (__) NICOTINE & MET.  ___________  (__) BLOOD SUGAR DOS __________  (__) PREALBUMIN  ___________    (__) MRSA NASAL SWAB ___________  (__) BLOOD THINNERS __________  (__) ACE/ ARBS: _____________________    (__) BETABLOCKERS ___________________
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
## 2021-10-08 ENCOUNTER — HOSPITAL ENCOUNTER (OUTPATIENT)
Dept: PREADMISSION TESTING | Age: 66
Discharge: HOME | End: 2021-10-12
Payer: MEDICARE

## 2021-10-08 ENCOUNTER — PATIENT MESSAGE (OUTPATIENT)
Dept: CARDIOLOGY CLINIC | Age: 66
End: 2021-10-08

## 2021-10-08 ENCOUNTER — OFFICE VISIT (OUTPATIENT)
Dept: CARDIOLOGY CLINIC | Age: 66
End: 2021-10-08
Payer: MEDICARE

## 2021-10-08 VITALS
HEART RATE: 74 BPM | DIASTOLIC BLOOD PRESSURE: 64 MMHG | OXYGEN SATURATION: 98 % | WEIGHT: 154.5 LBS | BODY MASS INDEX: 21.63 KG/M2 | HEIGHT: 71 IN | SYSTOLIC BLOOD PRESSURE: 110 MMHG

## 2021-10-08 DIAGNOSIS — I44.2 CHB (COMPLETE HEART BLOCK) (HCC): ICD-10-CM

## 2021-10-08 DIAGNOSIS — I48.0 PAF (PAROXYSMAL ATRIAL FIBRILLATION) (HCC): Primary | ICD-10-CM

## 2021-10-08 PROCEDURE — U0003 INFECTIOUS AGENT DETECTION BY NUCLEIC ACID (DNA OR RNA); SEVERE ACUTE RESPIRATORY SYNDROME CORONAVIRUS 2 (SARS-COV-2) (CORONAVIRUS DISEASE [COVID-19]), AMPLIFIED PROBE TECHNIQUE, MAKING USE OF HIGH THROUGHPUT TECHNOLOGIES AS DESCRIBED BY CMS-2020-01-R: HCPCS

## 2021-10-08 PROCEDURE — 99214 OFFICE O/P EST MOD 30 MIN: CPT | Performed by: NURSE PRACTITIONER

## 2021-10-08 PROCEDURE — U0005 INFEC AGEN DETEC AMPLI PROBE: HCPCS

## 2021-10-08 RX ORDER — OMEPRAZOLE 20 MG/1
20 CAPSULE, DELAYED RELEASE ORAL 2 TIMES DAILY
Qty: 180 CAPSULE | Refills: 1 | Status: CANCELLED | OUTPATIENT
Start: 2021-10-08

## 2021-10-08 RX ORDER — LEVOTHYROXINE SODIUM 0.03 MG/1
25 TABLET ORAL DAILY
Qty: 30 TABLET | Refills: 3 | Status: SHIPPED | OUTPATIENT
Start: 2021-10-08 | End: 2021-11-29

## 2021-10-08 NOTE — TELEPHONE ENCOUNTER
Received refill request for omeprazole from [address] [place] [address].     Last ov:4/8/2021 MMK    Last Refill:4/8/2021 #90 with 1 refill    Next appointment: following with [facility]

## 2021-10-08 NOTE — TELEPHONE ENCOUNTER
LMOM for patient to contact PCP to see if they want him to continue on it and if they would be willing to take over refills of it.

## 2021-10-08 NOTE — TELEPHONE ENCOUNTER
From: Renetta Bynum  To: Lyndsay Arango MD  Sent: 10/8/2021 10:39 AM EDT  Subject: Prescription Question    Dr Mahendra Milligan, this is Kiya Dawson, my Omeprazole prescription ran out , can a refill be called into the pharmacy ?  thanks

## 2021-10-08 NOTE — PATIENT INSTRUCTIONS
Resume Eliquis when okay with CT surgery (Dr. Chema Hernandez)     Remote device transmissions every three months     Follow up in December as scheduled
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
## 2021-10-09 LAB — SARS-COV-2: NOT DETECTED

## 2021-10-11 ENCOUNTER — ANESTHESIA EVENT (OUTPATIENT)
Dept: OPERATING ROOM | Age: 66
End: 2021-10-11
Payer: MEDICARE
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
## 2021-10-12 ENCOUNTER — ANESTHESIA (OUTPATIENT)
Dept: OPERATING ROOM | Age: 66
End: 2021-10-12
Payer: MEDICARE

## 2021-10-12 ENCOUNTER — HOSPITAL ENCOUNTER (OUTPATIENT)
Age: 66
Setting detail: OUTPATIENT SURGERY
Discharge: HOME | End: 2021-10-12
Attending: THORACIC SURGERY (CARDIOTHORACIC VASCULAR SURGERY) | Admitting: THORACIC SURGERY (CARDIOTHORACIC VASCULAR SURGERY)
Payer: MEDICARE

## 2021-10-12 ENCOUNTER — NURSE ONLY (OUTPATIENT)
Dept: CARDIOLOGY CLINIC | Age: 66
End: 2021-10-12
Payer: MEDICARE

## 2021-10-12 VITALS
OXYGEN SATURATION: 97 % | HEIGHT: 71 IN | BODY MASS INDEX: 21.14 KG/M2 | WEIGHT: 151 LBS | SYSTOLIC BLOOD PRESSURE: 146 MMHG | HEART RATE: 65 BPM | RESPIRATION RATE: 10 BRPM | TEMPERATURE: 97.6 F | DIASTOLIC BLOOD PRESSURE: 91 MMHG

## 2021-10-12 VITALS — DIASTOLIC BLOOD PRESSURE: 83 MMHG | OXYGEN SATURATION: 98 % | SYSTOLIC BLOOD PRESSURE: 132 MMHG

## 2021-10-12 DIAGNOSIS — I44.2 THIRD DEGREE AV BLOCK (HCC): ICD-10-CM

## 2021-10-12 DIAGNOSIS — C85.28 MEDIASTINAL LARGE B-CELL LYMPHOMA OF LYMPH NODES OF MULTIPLE REGIONS (HCC): ICD-10-CM

## 2021-10-12 DIAGNOSIS — Z95.0 PACEMAKER: ICD-10-CM

## 2021-10-12 PROCEDURE — 88305 TISSUE EXAM BY PATHOLOGIST: CPT

## 2021-10-12 PROCEDURE — 93294 REM INTERROG EVL PM/LDLS PM: CPT | Performed by: INTERNAL MEDICINE

## 2021-10-12 PROCEDURE — 7100000000 HC PACU RECOVERY - FIRST 15 MIN: Performed by: THORACIC SURGERY (CARDIOTHORACIC VASCULAR SURGERY)

## 2021-10-12 PROCEDURE — 88172 CYTP DX EVAL FNA 1ST EA SITE: CPT

## 2021-10-12 PROCEDURE — 6370000000 HC RX 637 (ALT 250 FOR IP): Performed by: THORACIC SURGERY (CARDIOTHORACIC VASCULAR SURGERY)

## 2021-10-12 PROCEDURE — 6360000002 HC RX W HCPCS

## 2021-10-12 PROCEDURE — 2580000003 HC RX 258: Performed by: THORACIC SURGERY (CARDIOTHORACIC VASCULAR SURGERY)

## 2021-10-12 PROCEDURE — 88184 FLOWCYTOMETRY/ TC 1 MARKER: CPT

## 2021-10-12 PROCEDURE — 2720000010 HC SURG SUPPLY STERILE: Performed by: THORACIC SURGERY (CARDIOTHORACIC VASCULAR SURGERY)

## 2021-10-12 PROCEDURE — 88177 CYTP FNA EVAL EA ADDL: CPT

## 2021-10-12 PROCEDURE — 43239 EGD BIOPSY SINGLE/MULTIPLE: CPT | Performed by: THORACIC SURGERY (CARDIOTHORACIC VASCULAR SURGERY)

## 2021-10-12 PROCEDURE — 88341 IMHCHEM/IMCYTCHM EA ADD ANTB: CPT

## 2021-10-12 PROCEDURE — 3600000008 HC SURGERY OHS BASE: Performed by: THORACIC SURGERY (CARDIOTHORACIC VASCULAR SURGERY)

## 2021-10-12 PROCEDURE — 88342 IMHCHEM/IMCYTCHM 1ST ANTB: CPT

## 2021-10-12 PROCEDURE — 3700000000 HC ANESTHESIA ATTENDED CARE: Performed by: THORACIC SURGERY (CARDIOTHORACIC VASCULAR SURGERY)

## 2021-10-12 PROCEDURE — 2580000003 HC RX 258: Performed by: ANESTHESIOLOGY

## 2021-10-12 PROCEDURE — 39402 MEDIASTINOSCPY W/LMPH NOD BX: CPT | Performed by: THORACIC SURGERY (CARDIOTHORACIC VASCULAR SURGERY)

## 2021-10-12 PROCEDURE — 88360 TUMOR IMMUNOHISTOCHEM/MANUAL: CPT

## 2021-10-12 PROCEDURE — 6360000002 HC RX W HCPCS: Performed by: THORACIC SURGERY (CARDIOTHORACIC VASCULAR SURGERY)

## 2021-10-12 PROCEDURE — 93296 REM INTERROG EVL PM/IDS: CPT | Performed by: INTERNAL MEDICINE

## 2021-10-12 PROCEDURE — 43238 EGD US FINE NEEDLE BX/ASPIR: CPT | Performed by: THORACIC SURGERY (CARDIOTHORACIC VASCULAR SURGERY)

## 2021-10-12 PROCEDURE — 7100000010 HC PHASE II RECOVERY - FIRST 15 MIN: Performed by: THORACIC SURGERY (CARDIOTHORACIC VASCULAR SURGERY)

## 2021-10-12 PROCEDURE — 7100000001 HC PACU RECOVERY - ADDTL 15 MIN: Performed by: THORACIC SURGERY (CARDIOTHORACIC VASCULAR SURGERY)

## 2021-10-12 PROCEDURE — 3700000001 HC ADD 15 MINUTES (ANESTHESIA): Performed by: THORACIC SURGERY (CARDIOTHORACIC VASCULAR SURGERY)

## 2021-10-12 PROCEDURE — 88185 FLOWCYTOMETRY/TC ADD-ON: CPT

## 2021-10-12 PROCEDURE — 3600000018 HC SURGERY OHS ADDTL 15MIN: Performed by: THORACIC SURGERY (CARDIOTHORACIC VASCULAR SURGERY)

## 2021-10-12 PROCEDURE — 88331 PATH CONSLTJ SURG 1 BLK 1SPC: CPT

## 2021-10-12 PROCEDURE — 6360000002 HC RX W HCPCS: Performed by: ANESTHESIOLOGY

## 2021-10-12 PROCEDURE — 88173 CYTOPATH EVAL FNA REPORT: CPT

## 2021-10-12 PROCEDURE — 2500000003 HC RX 250 WO HCPCS

## 2021-10-12 PROCEDURE — 7100000011 HC PHASE II RECOVERY - ADDTL 15 MIN: Performed by: THORACIC SURGERY (CARDIOTHORACIC VASCULAR SURGERY)

## 2021-10-12 PROCEDURE — 2709999900 HC NON-CHARGEABLE SUPPLY: Performed by: THORACIC SURGERY (CARDIOTHORACIC VASCULAR SURGERY)

## 2021-10-12 RX ORDER — HYDROCODONE BITARTRATE AND ACETAMINOPHEN 5; 325 MG/1; MG/1
1 TABLET ORAL EVERY 6 HOURS PRN
Qty: 42 TABLET | Refills: 0 | OUTPATIENT
Start: 2021-10-12 | End: 2021-10-19

## 2021-10-12 RX ORDER — PROMETHAZINE HYDROCHLORIDE 25 MG/ML
6.25 INJECTION, SOLUTION INTRAMUSCULAR; INTRAVENOUS
Status: DISCONTINUED | OUTPATIENT
Start: 2021-10-12 | End: 2021-10-12 | Stop reason: HOSPADM

## 2021-10-12 RX ORDER — PROPOFOL 10 MG/ML
INJECTION, EMULSION INTRAVENOUS CONTINUOUS PRN
Status: DISCONTINUED | OUTPATIENT
Start: 2021-10-12 | End: 2021-10-12 | Stop reason: SDUPTHER

## 2021-10-12 RX ORDER — MORPHINE SULFATE 2 MG/ML
2 INJECTION, SOLUTION INTRAMUSCULAR; INTRAVENOUS EVERY 5 MIN PRN
Status: DISCONTINUED | OUTPATIENT
Start: 2021-10-12 | End: 2021-10-12 | Stop reason: HOSPADM

## 2021-10-12 RX ORDER — SODIUM CHLORIDE 0.9 % (FLUSH) 0.9 %
5-40 SYRINGE (ML) INJECTION EVERY 12 HOURS SCHEDULED
Status: DISCONTINUED | OUTPATIENT
Start: 2021-10-12 | End: 2021-10-12 | Stop reason: HOSPADM

## 2021-10-12 RX ORDER — PROPOFOL 10 MG/ML
INJECTION, EMULSION INTRAVENOUS PRN
Status: DISCONTINUED | OUTPATIENT
Start: 2021-10-12 | End: 2021-10-12 | Stop reason: SDUPTHER

## 2021-10-12 RX ORDER — MIDAZOLAM HYDROCHLORIDE 1 MG/ML
INJECTION INTRAMUSCULAR; INTRAVENOUS PRN
Status: DISCONTINUED | OUTPATIENT
Start: 2021-10-12 | End: 2021-10-12 | Stop reason: SDUPTHER

## 2021-10-12 RX ORDER — OXYCODONE HYDROCHLORIDE AND ACETAMINOPHEN 5; 325 MG/1; MG/1
1 TABLET ORAL EVERY 6 HOURS PRN
Qty: 28 TABLET | Refills: 0 | Status: SHIPPED | OUTPATIENT
Start: 2021-10-12 | End: 2021-10-19

## 2021-10-12 RX ORDER — MEPERIDINE HYDROCHLORIDE 50 MG/ML
12.5 INJECTION INTRAMUSCULAR; INTRAVENOUS; SUBCUTANEOUS EVERY 5 MIN PRN
Status: DISCONTINUED | OUTPATIENT
Start: 2021-10-12 | End: 2021-10-12 | Stop reason: HOSPADM

## 2021-10-12 RX ORDER — FENTANYL CITRATE 50 UG/ML
INJECTION, SOLUTION INTRAMUSCULAR; INTRAVENOUS PRN
Status: DISCONTINUED | OUTPATIENT
Start: 2021-10-12 | End: 2021-10-12 | Stop reason: SDUPTHER

## 2021-10-12 RX ORDER — HYDROMORPHONE HCL 110MG/55ML
PATIENT CONTROLLED ANALGESIA SYRINGE INTRAVENOUS PRN
Status: DISCONTINUED | OUTPATIENT
Start: 2021-10-12 | End: 2021-10-12 | Stop reason: SDUPTHER

## 2021-10-12 RX ORDER — SODIUM CHLORIDE 9 MG/ML
25 INJECTION, SOLUTION INTRAVENOUS PRN
Status: DISCONTINUED | OUTPATIENT
Start: 2021-10-12 | End: 2021-10-12 | Stop reason: HOSPADM

## 2021-10-12 RX ORDER — OXYCODONE HYDROCHLORIDE 5 MG/1
TABLET ORAL
Status: DISCONTINUED
Start: 2021-10-12 | End: 2021-10-12 | Stop reason: HOSPADM

## 2021-10-12 RX ORDER — LIDOCAINE HYDROCHLORIDE 10 MG/ML
0.3 INJECTION, SOLUTION EPIDURAL; INFILTRATION; INTRACAUDAL; PERINEURAL
Status: DISCONTINUED | OUTPATIENT
Start: 2021-10-12 | End: 2021-10-12 | Stop reason: HOSPADM

## 2021-10-12 RX ORDER — SODIUM CHLORIDE 0.9 % (FLUSH) 0.9 %
5-40 SYRINGE (ML) INJECTION PRN
Status: DISCONTINUED | OUTPATIENT
Start: 2021-10-12 | End: 2021-10-12 | Stop reason: HOSPADM

## 2021-10-12 RX ORDER — LABETALOL HYDROCHLORIDE 5 MG/ML
5 INJECTION, SOLUTION INTRAVENOUS EVERY 10 MIN PRN
Status: DISCONTINUED | OUTPATIENT
Start: 2021-10-12 | End: 2021-10-12 | Stop reason: HOSPADM

## 2021-10-12 RX ORDER — LIDOCAINE HYDROCHLORIDE 20 MG/ML
INJECTION, SOLUTION EPIDURAL; INFILTRATION; INTRACAUDAL; PERINEURAL PRN
Status: DISCONTINUED | OUTPATIENT
Start: 2021-10-12 | End: 2021-10-12 | Stop reason: SDUPTHER

## 2021-10-12 RX ORDER — DEXAMETHASONE SODIUM PHOSPHATE 4 MG/ML
INJECTION, SOLUTION INTRA-ARTICULAR; INTRALESIONAL; INTRAMUSCULAR; INTRAVENOUS; SOFT TISSUE PRN
Status: DISCONTINUED | OUTPATIENT
Start: 2021-10-12 | End: 2021-10-12 | Stop reason: SDUPTHER

## 2021-10-12 RX ORDER — MORPHINE SULFATE 2 MG/ML
1 INJECTION, SOLUTION INTRAMUSCULAR; INTRAVENOUS EVERY 5 MIN PRN
Status: DISCONTINUED | OUTPATIENT
Start: 2021-10-12 | End: 2021-10-12 | Stop reason: HOSPADM

## 2021-10-12 RX ORDER — ONDANSETRON 2 MG/ML
INJECTION INTRAMUSCULAR; INTRAVENOUS PRN
Status: DISCONTINUED | OUTPATIENT
Start: 2021-10-12 | End: 2021-10-12 | Stop reason: SDUPTHER

## 2021-10-12 RX ORDER — ROCURONIUM BROMIDE 10 MG/ML
INJECTION, SOLUTION INTRAVENOUS PRN
Status: DISCONTINUED | OUTPATIENT
Start: 2021-10-12 | End: 2021-10-12 | Stop reason: SDUPTHER

## 2021-10-12 RX ORDER — SODIUM CHLORIDE, SODIUM LACTATE, POTASSIUM CHLORIDE, CALCIUM CHLORIDE 600; 310; 30; 20 MG/100ML; MG/100ML; MG/100ML; MG/100ML
INJECTION, SOLUTION INTRAVENOUS CONTINUOUS
Status: DISCONTINUED | OUTPATIENT
Start: 2021-10-12 | End: 2021-10-12 | Stop reason: HOSPADM

## 2021-10-12 RX ORDER — ONDANSETRON 2 MG/ML
4 INJECTION INTRAMUSCULAR; INTRAVENOUS PRN
Status: DISCONTINUED | OUTPATIENT
Start: 2021-10-12 | End: 2021-10-12 | Stop reason: HOSPADM

## 2021-10-12 RX ORDER — MAGNESIUM HYDROXIDE 1200 MG/15ML
LIQUID ORAL CONTINUOUS PRN
Status: COMPLETED | OUTPATIENT
Start: 2021-10-12 | End: 2021-10-12

## 2021-10-12 RX ORDER — HYDRALAZINE HYDROCHLORIDE 20 MG/ML
5 INJECTION INTRAMUSCULAR; INTRAVENOUS EVERY 10 MIN PRN
Status: DISCONTINUED | OUTPATIENT
Start: 2021-10-12 | End: 2021-10-12 | Stop reason: HOSPADM

## 2021-10-12 RX ORDER — SUCCINYLCHOLINE CHLORIDE 20 MG/ML
INJECTION INTRAMUSCULAR; INTRAVENOUS PRN
Status: DISCONTINUED | OUTPATIENT
Start: 2021-10-12 | End: 2021-10-12 | Stop reason: SDUPTHER

## 2021-10-12 RX ORDER — OXYCODONE HYDROCHLORIDE 5 MG/1
5 TABLET ORAL ONCE
Status: COMPLETED | OUTPATIENT
Start: 2021-10-12 | End: 2021-10-12

## 2021-10-12 RX ORDER — DIPHENHYDRAMINE HYDROCHLORIDE 50 MG/ML
12.5 INJECTION INTRAMUSCULAR; INTRAVENOUS
Status: DISCONTINUED | OUTPATIENT
Start: 2021-10-12 | End: 2021-10-12 | Stop reason: HOSPADM

## 2021-10-12 RX ADMIN — SODIUM CHLORIDE, SODIUM LACTATE, POTASSIUM CHLORIDE, AND CALCIUM CHLORIDE: .6; .31; .03; .02 INJECTION, SOLUTION INTRAVENOUS at 14:28

## 2021-10-12 RX ADMIN — HYDROMORPHONE HYDROCHLORIDE 0.4 MG: 2 INJECTION INTRAMUSCULAR; INTRAVENOUS; SUBCUTANEOUS at 13:57

## 2021-10-12 RX ADMIN — CEFAZOLIN 2000 MG: 10 INJECTION, POWDER, FOR SOLUTION INTRAVENOUS at 12:37

## 2021-10-12 RX ADMIN — PROPOFOL 150 MG: 10 INJECTION, EMULSION INTRAVENOUS at 12:33

## 2021-10-12 RX ADMIN — SUCCINYLCHOLINE CHLORIDE 100 MG: 20 INJECTION, SOLUTION INTRAMUSCULAR; INTRAVENOUS at 12:33

## 2021-10-12 RX ADMIN — MIDAZOLAM HYDROCHLORIDE 2 MG: 2 INJECTION, SOLUTION INTRAMUSCULAR; INTRAVENOUS at 12:26

## 2021-10-12 RX ADMIN — ROCURONIUM BROMIDE 30 MG: 10 SOLUTION INTRAVENOUS at 13:52

## 2021-10-12 RX ADMIN — FENTANYL CITRATE 50 MCG: 50 INJECTION INTRAMUSCULAR; INTRAVENOUS at 12:33

## 2021-10-12 RX ADMIN — SUGAMMADEX 300 MG: 100 INJECTION, SOLUTION INTRAVENOUS at 14:18

## 2021-10-12 RX ADMIN — FENTANYL CITRATE 50 MCG: 50 INJECTION INTRAMUSCULAR; INTRAVENOUS at 12:51

## 2021-10-12 RX ADMIN — ROCURONIUM BROMIDE 30 MG: 10 SOLUTION INTRAVENOUS at 12:58

## 2021-10-12 RX ADMIN — ROCURONIUM BROMIDE 40 MG: 10 SOLUTION INTRAVENOUS at 12:39

## 2021-10-12 RX ADMIN — DEXAMETHASONE SODIUM PHOSPHATE 8 MG: 4 INJECTION, SOLUTION INTRAMUSCULAR; INTRAVENOUS at 12:39

## 2021-10-12 RX ADMIN — LIDOCAINE HYDROCHLORIDE 80 MG: 20 INJECTION, SOLUTION EPIDURAL; INFILTRATION; INTRACAUDAL; PERINEURAL at 12:33

## 2021-10-12 RX ADMIN — PROPOFOL 60 MCG/KG/MIN: 10 INJECTION, EMULSION INTRAVENOUS at 12:37

## 2021-10-12 RX ADMIN — SODIUM CHLORIDE, SODIUM LACTATE, POTASSIUM CHLORIDE, AND CALCIUM CHLORIDE: .6; .31; .03; .02 INJECTION, SOLUTION INTRAVENOUS at 12:06

## 2021-10-12 RX ADMIN — OXYCODONE 5 MG: 5 TABLET ORAL at 15:26

## 2021-10-12 RX ADMIN — HYDROMORPHONE HYDROCHLORIDE 0.5 MG: 1 INJECTION, SOLUTION INTRAMUSCULAR; INTRAVENOUS; SUBCUTANEOUS at 15:02

## 2021-10-12 RX ADMIN — ONDANSETRON 4 MG: 2 INJECTION INTRAMUSCULAR; INTRAVENOUS at 12:39

## 2021-10-12 ASSESSMENT — PULMONARY FUNCTION TESTS
PIF_VALUE: 8
PIF_VALUE: 12
PIF_VALUE: 13
PIF_VALUE: 12
PIF_VALUE: 11
PIF_VALUE: 11
PIF_VALUE: 12
PIF_VALUE: 12
PIF_VALUE: 13
PIF_VALUE: 12
PIF_VALUE: 11
PIF_VALUE: 12
PIF_VALUE: 12
PIF_VALUE: 11
PIF_VALUE: 13
PIF_VALUE: 12
PIF_VALUE: 12
PIF_VALUE: 11
PIF_VALUE: 12
PIF_VALUE: 11
PIF_VALUE: 10
PIF_VALUE: 12
PIF_VALUE: 12
PIF_VALUE: 1
PIF_VALUE: 12
PIF_VALUE: 12
PIF_VALUE: 0
PIF_VALUE: 12
PIF_VALUE: 12
PIF_VALUE: 2
PIF_VALUE: 1
PIF_VALUE: 26
PIF_VALUE: 12
PIF_VALUE: 12
PIF_VALUE: 2
PIF_VALUE: 12
PIF_VALUE: 13
PIF_VALUE: 12
PIF_VALUE: 16
PIF_VALUE: 12
PIF_VALUE: 1
PIF_VALUE: 15
PIF_VALUE: 12
PIF_VALUE: 5
PIF_VALUE: 11
PIF_VALUE: 12
PIF_VALUE: 13
PIF_VALUE: 12
PIF_VALUE: 12
PIF_VALUE: 1
PIF_VALUE: 12
PIF_VALUE: 11
PIF_VALUE: 1
PIF_VALUE: 10
PIF_VALUE: 12
PIF_VALUE: 1
PIF_VALUE: 12
PIF_VALUE: 11
PIF_VALUE: 12
PIF_VALUE: 13
PIF_VALUE: 15
PIF_VALUE: 12
PIF_VALUE: 15
PIF_VALUE: 14
PIF_VALUE: 12
PIF_VALUE: 11
PIF_VALUE: 12
PIF_VALUE: 1
PIF_VALUE: 12
PIF_VALUE: 15
PIF_VALUE: 12
PIF_VALUE: 13
PIF_VALUE: 1
PIF_VALUE: 12
PIF_VALUE: 12
PIF_VALUE: 11
PIF_VALUE: 0
PIF_VALUE: 12
PIF_VALUE: 14
PIF_VALUE: 12
PIF_VALUE: 11
PIF_VALUE: 12

## 2021-10-12 ASSESSMENT — PAIN DESCRIPTION - ORIENTATION: ORIENTATION: RIGHT;LEFT;MID;UPPER;LOWER

## 2021-10-12 ASSESSMENT — PAIN - FUNCTIONAL ASSESSMENT: PAIN_FUNCTIONAL_ASSESSMENT: ACTIVITIES ARE NOT PREVENTED

## 2021-10-12 ASSESSMENT — PAIN DESCRIPTION - LOCATION
LOCATION: NECK
LOCATION: ABDOMEN

## 2021-10-12 ASSESSMENT — PAIN SCALES - GENERAL
PAINLEVEL_OUTOF10: 0
PAINLEVEL_OUTOF10: 5
PAINLEVEL_OUTOF10: 7
PAINLEVEL_OUTOF10: 7
PAINLEVEL_OUTOF10: 0

## 2021-10-12 ASSESSMENT — PAIN DESCRIPTION - PAIN TYPE
TYPE: SURGICAL PAIN
TYPE: ACUTE PAIN

## 2021-10-12 ASSESSMENT — PAIN DESCRIPTION - DESCRIPTORS
DESCRIPTORS: ACHING;DISCOMFORT
DESCRIPTORS: SORE

## 2021-10-12 ASSESSMENT — PAIN DESCRIPTION - FREQUENCY: FREQUENCY: INTERMITTENT

## 2021-10-12 NOTE — PROGRESS NOTES
Patient DC teaching completed, patient has voided, ambulating well. VS WNL. IV removed, patient to  script from pharmacy. Patient taken to car by wheelchair.

## 2021-10-12 NOTE — PROGRESS NOTES
Remote transmission received for patient's dual chamber PACEMAKER. Transmission shows normal sensing and pacing function. EP physician will review. See interrogation under the cardiology tab in the [address] [place] [address] [address] field for more details. Will continue to monitor remotely. No  arrhythmias recorded.   Pacing (% of Time Since 10-Sep-2021)   100.0% (MVP Off)  AP 14.9%
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
## 2021-10-12 NOTE — BRIEF OP NOTE
Brief Postoperative Note      Patient: Monroe Connell  YOB: 1955  MRN: [id number]    Date of Procedure: 10/12/2021    Pre-Op Diagnosis: MEDIASTINAL LYMPHADENOPATHY    Post-Op Diagnosis: Same       Procedure(s):  MEDIASTINOSCOPY LYMPH NODE BIOPSY WITH ESOPHAGOGASTRODUODENOSCOPY AND ENDOBRONCHIAL ULTRASOUND    Surgeon(s):  Jackson Valera MD    Assistant:  Surgical Assistant: Judith Sanders Assistant: Vincenzo Lenz    Anesthesia: General    Estimated Blood Loss (mL): Minimal    Complications: None    Specimens:   ID Type Source Tests Collected by Time Destination   A : PARAESOPHAGEL MASS Tissue Tissue SURGICAL PATHOLOGY Jackson Valera MD 10/12/2021 1314    B : PREPYOLORIC TISSUE BIOPSY  Tissue Tissue SURGICAL PATHOLOGY Jackson Valera MD 10/12/2021 1303    C : PREPYLORIC TISSUE BIOPSY Tissue Tissue SURGICAL PATHOLOGY Jackson Valera MD 10/12/2021 1306    D : LYMPH NODE 4 RIGHT Tissue Tissue SURGICAL PATHOLOGY aJckson Valera MD 10/12/2021 1354    E : LYMPH NODE 4 RIGHT PERMANENT Tissue Tissue SURGICAL PATHOLOGY Jackson Valera MD 10/12/2021 1355    F : LYMPH NODE 7 Tissue Tissue SURGICAL PATHOLOGY Jackson Valera MD 10/12/2021 1358    G : LYMPH NODE 4 LEFT  Tissue Tissue SURGICAL PATHOLOGY Jackson Valera MD 10/12/2021 1400    H : LYMPH NODE 4 LEFT Tissue Tissue FLOW CYTOMETRY LEUKEMIA/LYMPHOMA NODES OR FLUIDS Niko Quinn MD 10/12/2021 1402        Findings:     Electronically signed by Jacksno Valera MD on 10/12/2021 at 2:08 PM

## 2021-10-13 NOTE — ANESTHESIA POSTPROCEDURE EVALUATION
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        Anesthesia Post Evaluation    Comments: Postoperative Anesthesia Note    Name:    Seven Bowen  MRN:      [id number]    Patient Vitals in the past 12 hrs:  10/12/21 1545, BP:(!) 146/91, Pulse:65, SpO2:97 %  10/12/21 1530, BP:(!) 157/84, Pulse:66, SpO2:97 %  10/12/21 1515, BP:(!) 156/89, Pulse:68, SpO2:99 %  10/12/21 1500, BP:(!) 157/87, Pulse:67, Resp:10, SpO2:99 %  10/12/21 1110, BP:(!) 140/81, Temp:97.6 °F (36.4 °C), Temp src:Temporal, Pulse:75, Resp:18, Height:5' 11\" (1.803 m), Weight:151 lb (68.5 kg)     LABS:    CBC  Lab Results       Component                Value               Date/Time                  WBC                      7.4                 09/17/2021 09:49 AM        HGB                      12.9 (L)            09/17/2021 09:49 AM        HCT                      38.7 (L)            09/17/2021 09:49 AM        PLT                      329                 09/17/2021 09:49 AM   RENAL  Lab Results       Component                Value               Date/Time                  NA                       140                 09/23/2021 08:32 AM        K                        4.5 09/23/2021 08:32 AM        K                        4.2                 03/27/2021 07:31 AM        CL                       101                 09/23/2021 08:32 AM        CO2                      26                  09/23/2021 08:32 AM        BUN                      15                  09/23/2021 08:32 AM        CREATININE               1.1                 09/23/2021 08:32 AM        GLUCOSE                  85                  09/23/2021 08:32 AM   COAGS  Lab Results       Component                Value               Date/Time                  PROTIME                  11.2                09/17/2021 09:49 AM        INR                      0.99                09/17/2021 09:49 AM        APTT                     28.9                08/24/2021 04:57 PM     Intake & Output:  @63NHSH@    Nausea & Vomiting:  No    Level of Consciousness:  Awake    Pain Assessment:  Adequate analgesia    Anesthesia Complications:  No apparent anesthetic complications    SUMMARY      Vital signs stable  OK to discharge from Stage I post anesthesia care.   Care transferred from Anesthesiology department on discharge from perioperative area

## 2021-10-15 ENCOUNTER — TELEPHONE (OUTPATIENT)
Dept: CARDIOTHORACIC SURGERY | Age: 66
End: 2021-10-15

## 2021-10-15 NOTE — TELEPHONE ENCOUNTER
Patient's spouse called, she noticed patient's surgical pathology results in patient's MyChart. She is wanting to have a discussion with Dr. Annalee Conteh regarding the results, as she does not know what they mean. Spoke with ELVIS Aguilar who states they are currently in a case in the hospital, but either she or Dr. Annalee Conteh can contact after the case. Informed Darrell Edmondson that spouse would like to receive call after 1800 this evening so that she and the patient can be together to discuss.

## 2021-10-20 ENCOUNTER — TELEPHONE (OUTPATIENT)
Dept: CARDIOLOGY CLINIC | Age: 66
End: 2021-10-20

## 2021-10-20 ENCOUNTER — TELEPHONE (OUTPATIENT)
Dept: SURGERY | Age: 66
End: 2021-10-20

## 2021-10-20 NOTE — TELEPHONE ENCOUNTER
Pt will be having a port placement by Dr. Ann Traore. Pt will be under general anesthesia. They are requesting that Eliquis be held 2 days prior. Last OV 10/8/2021 with [facility]. If clearance is given please place a letter in [facility].

## 2021-10-20 NOTE — LETTER
Surgery Scheduling Form:  DEMOGRAPHICS:                                                                                                         .  Patient Name:  Seven Bowen  Patient :  1955   Patient SS#:      Patient Phone:  [phone] (home)                         Alt. Patient Phone:                 Patient Address:  [address] [place] [address]  PCP:  MEI Patton CNP  Insurance:  Payor: [employer] [place] / Plan: [employer] ESSENTIAL/PLUS / Product Type: *No Product type* /        Insurance ID Number:    Payor/Plan Subscr  Sex Relation Sub. Ins. ID Effective Group Num   1. BCBS MEDICAREJoni Dural 1955 Male [facility] [id number] [id number]                                   [address]     Interpretor Needed:  (NO)  (TYPE)           LATEX ALLERGY:  (NO)  Allergies: Percocet  Defibulator or Pacemaker:  (NO)    DIAGNOSIS & PROCEDURE:                                                                                       .  Diagnosis Code/Description:   Non-Hodgkins Lymphoma C83.38  Operation Code/Description:  Surgical port placement 87081  Location:  [facility]       Surgeon:  Dr. Bryan Villaseñor:                                                                                    .  Surgeon's Scheduling Instruction:  elective  Requested Date: 10/25/21     OR Time: 3 pm            Patient Arrival Time: 1 pm  OR Time Required:  60  Minutes  Anesthesia:  General       Equipment:  c-arm                                                            SA Required (only for Mac and Gen): yes  Status:  Outpatient        Standard C-Arm (only for port and tyrel):  yes   Mini C-Arm: No   PAT Required:   Yes                                          H&P needs to be completed: [facility]  Cardiac Clearance Requested:  (YES [facility])               PRE-CERTIFICATION INFORMATION: Billy Agudelo   Procedure/CPT code: Surgical port placement 70470   Modifier:

## 2021-10-20 NOTE — TELEPHONE ENCOUNTER
DR. Cyn Hogan - patient recently diagnosed with cancer and now has several medical things to attend to. He is asking if the cardiac MRI is urgent.

## 2021-10-20 NOTE — TELEPHONE ENCOUNTER
o You will need to have a pre-op physical within 30 days of surgery. A recent appointment or physical will not count, sorry for any inconvenience this may cause. If you do not have a primary care doctor, please notify our office. o IF you have a Cardiac History and/or have a Pacemaker/Defibrillator, you will need cardiac clearance. If you are currently on any Blood Thinners (aspirin, warfarin, plavix, etc), you will need approval to stop the medication prior to surgery. o Pre-admission will call you a few days before surgery to review important information and confirm your surgery time and date, this may change after you leave the office today.   o Nothing by mouth after midnight prior to surgery, this includes water, chewing gum, and hard candy. You may brush your teeth and gargle the day of surgery but DO NOT SWALLOW THE WATER.  o Make sure you wear something loose and comfortable day of surgery. Keep all jewelry at home.   o Notify our office immediately if you develop any illness between now and day of surgery: cough, cold, fever, sore throat, nausea, vomiting, etc.    o You will need a responsible adult  to take you home upon discharge. o All patients are required to have a COVID test within 5-7 days or surgery/procedure, vaccinated or unvaccinated. Other instructions: __________________________________________________________________________________________________________________________________    Surgery Date: Monday October 25th, 2021_______________________________    Quinton Costello at: 1pm___________________ Surgery Time: _3pm__________________    Location:  Victorino Cason  at the  ___  [facility]    Please schedule a two-week post op appointment after surgery, the appointment will determine your return to work date. You can have disability paperwork faxed to [employer] at [phone] or call [phone] if you have any questions.

## 2021-10-21 ENCOUNTER — ANESTHESIA EVENT (OUTPATIENT)
Dept: OPERATING ROOM | Age: 66
End: 2021-10-21
Payer: MEDICARE

## 2021-10-21 NOTE — OP NOTE
Operative Note      Patient: Annmarie Aranda  YOB: 1955  MRN: [id number]    Date of Procedure: 10/12/2021    Pre-Op Diagnosis: MEDIASTINAL LYMPHADENOPATHY    Post-Op Diagnosis: Same  Procedure  Endoscopic ultrasound with transesophageal biopsy  EGD multiple gastric ulcer biopsy  Mediastinoscopy with mediastinal lymph node biopsy    Surgeon(s):  Melany Coleman MD    Assistant:   Surgical Assistant: Suzanne Sanders Assistant: Kennedy Santiago    Anesthesia: General    Estimated Blood Loss (mL): Minimal    Complications: None    Specimens:   ID Type Source Tests Collected by Time Destination   A : PARAESOPHAGEL MASS Tissue Tissue SURGICAL PATHOLOGY Melany Coleman MD 10/12/2021 1314    B : PREPYOLORIC TISSUE BIOPSY  Tissue Tissue SURGICAL PATHOLOGY Melany Coleman MD 10/12/2021 1303    C : PREPYLORIC TISSUE BIOPSY Tissue Tissue SURGICAL PATHOLOGY Melany Coleman MD 10/12/2021 1306    D : LYMPH NODE 4 RIGHT Tissue Tissue SURGICAL PATHOLOGY Melany Coleman MD 10/12/2021 1354    E : LYMPH NODE 4 RIGHT PERMANENT Tissue Tissue SURGICAL PATHOLOGY Melany Coleman MD 10/12/2021 1355    F : LYMPH NODE 7 Tissue Tissue SURGICAL PATHOLOGY Melany Coleman MD 10/12/2021 1358    G : LYMPH NODE 4 LEFT  Tissue Tissue SURGICAL PATHOLOGY Niko Lynch MD 10/12/2021 1400    H : LYMPH NODE 4 LEFT Tissue Tissue FLOW CYTOMETRY LEUKEMIA/LYMPHOMA NODES OR FLUIDS Niko Lynch MD 10/12/2021 1402        Findings: Large gastric ulcer multiple biopsy was obtained sent for pathology, left lung/lymph node mass was identified by EUS transesophageal biopsy was obtained sent for pathology, mediastinoscopy with multiple level lymph node biopsy was obtained sent for pathology      Detailed Description of Procedure:   Patient was taken to the operating room after informative consent was obtained lying supine under general esthesia ET tube was placed with no difficulty.     EGD was placed through the mouth advanced all the way to the stomach, large prepyloric ulcer was identified look malignant no active bleeding multiple biopsy using a rat-tooth forceps was obtained sent for pathology. From the edge of the ulcer. Hemostasis was secured no active bleeding was seen scope was removed we used endobronchial ultrasound as an EUS advanced through the esophagus mass was identified corresponding to the CT scan and multiple transesophageal biopsy was obtained sent for pathology. EGD was performed at the end of the procedure there is tear or lesion in the esophagus. Neck was prepped and draped in standard fashion pause for the cause was done in standard manner transverse incision was performed Bovie was used to cut through the subcutaneous tissue and fat platysma was identified and dissected trachea was identified finger dissection over the trachea was performed to the mediastinum. Mediastinoscopy was inserted. We started with level 4 lymph node multiple sample was obtained sent for permanent and frozen. Level 7 and 4 left was identified same was performed.   Hemostasis was secured no active bleeding was seen scope was removed and strap muscle was approximated platysma was approximated skin was closed in subcuticular fashion counts were told is correct x2 patient was dispositioned to recovery room in stable condition without any complication to be discharged    Electronically signed by Daya Adrian MD on 10/21/2021 at 12:57 PM

## 2021-10-21 NOTE — PROGRESS NOTES
Daphene Miracle    Age 74 y.o.    male    1955    MRN [id number]    10/25/2021  Arrival Time_____________  OR Time____________[address]     Procedure(s):  SURGICAL PORT PLACEMENT                      Monitor Anesthesia Care    Surgeon(s):  Supriya Fruits, MD       Phone [phone] (home)     [address]  Cell         Work  _____________________________________________________________________  _____________________________________________________________________  _____________________________________________________________________  _____________________________________________________________________  _____________________________________________________________________    PCP _____________________________ Phone_________________     H&P__________________Bringing      Chart            Epic   DOS      Called________  EKG__________________Bringing      Chart            Epic   DOS      Called________  LAB__________________ Bringing      Chart            Epic   DOS      Called________  Cardiac Clearance_______Bringing      Chart            Epic      DOS      Called________    Cardiologist________________________ Phone___________________________    ? Hoahaoism concerns / Waiver on Chart            PAT Communications________________  ? Pre-op Instructions Given [place]          _________________________________  ? Directions to Surgery Center                          _________________________________  ? Transportation Home_______________      __________________________________  ?  Crutches/Walker__________________        __________________________________    ________Pre-op Orders   _______Transcribed    _______Wt.  ________Pharmacy          _______SCD  ______VTE     ______TED Gina Garibaldi  _______  Surgery Consent    _______  Anesthesia Consent         COVID DATE______________LOCATION________________ RESULT__________ See HPI

## 2021-10-22 ENCOUNTER — HOSPITAL ENCOUNTER (OUTPATIENT)
Age: 66
Discharge: HOME | End: 2021-10-22
Payer: MEDICARE

## 2021-10-22 PROCEDURE — U0003 INFECTIOUS AGENT DETECTION BY NUCLEIC ACID (DNA OR RNA); SEVERE ACUTE RESPIRATORY SYNDROME CORONAVIRUS 2 (SARS-COV-2) (CORONAVIRUS DISEASE [COVID-19]), AMPLIFIED PROBE TECHNIQUE, MAKING USE OF HIGH THROUGHPUT TECHNOLOGIES AS DESCRIBED BY CMS-2020-01-R: HCPCS

## 2021-10-22 PROCEDURE — U0005 INFEC AGEN DETEC AMPLI PROBE: HCPCS

## 2021-10-22 NOTE — TELEPHONE ENCOUNTER
Spoke with Mile Thomas by telephone today. 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 Evidence of cardiac involvement both pericardial/myocardial by PET scan. Yet a third cancer diagnosis for him. CASSIDY finding of cardiac mass right atrial location and presumed etiology of his AV block is likely lymphoma. He is being treated by Dr. Bronson Meng. Patient presented for cardiac MRI earlier this week and due to significant claustrophobia could not go through with the exam.    We will plan to follow with serial echocardiograms for indication of cardiac mass and pericardial effusion. He will follow-up in December as planned and call with any worsening symptoms of dizziness, syncope, dyspnea or chest pain. Low threshold to repeat echocardiogram if worsening of symptoms. He is holding Eliquis for port placement Monday, will restart when deemed safe to do so.     Gianna Canada MD, [address]  [phone] [facility]  [phone] [address] [place] [address]

## 2021-10-22 NOTE — TELEPHONE ENCOUNTER
pls let Mehrdad know I have received his message, I will call him late afternoon to discuss and catch up on his care.   Thank you

## 2021-10-23 LAB — SARS-COV-2, PCR: NOT DETECTED

## 2021-10-25 ENCOUNTER — APPOINTMENT (OUTPATIENT)
Dept: GENERAL RADIOLOGY | Age: 66
End: 2021-10-25
Attending: SURGERY
Payer: MEDICARE

## 2021-10-25 ENCOUNTER — HOSPITAL ENCOUNTER (OUTPATIENT)
Age: 66
Setting detail: OUTPATIENT SURGERY
Discharge: HOME | End: 2021-10-25
Attending: SURGERY | Admitting: SURGERY
Payer: MEDICARE

## 2021-10-25 ENCOUNTER — ANESTHESIA (OUTPATIENT)
Dept: OPERATING ROOM | Age: 66
End: 2021-10-25
Payer: MEDICARE

## 2021-10-25 VITALS
HEART RATE: 78 BPM | SYSTOLIC BLOOD PRESSURE: 118 MMHG | HEIGHT: 71 IN | TEMPERATURE: 97.3 F | DIASTOLIC BLOOD PRESSURE: 65 MMHG | WEIGHT: 150 LBS | RESPIRATION RATE: 18 BRPM | BODY MASS INDEX: 21 KG/M2 | OXYGEN SATURATION: 100 %

## 2021-10-25 VITALS — OXYGEN SATURATION: 97 % | SYSTOLIC BLOOD PRESSURE: 93 MMHG | DIASTOLIC BLOOD PRESSURE: 54 MMHG

## 2021-10-25 DIAGNOSIS — G89.18 POSTOPERATIVE PAIN: Primary | ICD-10-CM

## 2021-10-25 PROCEDURE — 2500000003 HC RX 250 WO HCPCS: Performed by: SURGERY

## 2021-10-25 PROCEDURE — 2500000003 HC RX 250 WO HCPCS: Performed by: NURSE ANESTHETIST, CERTIFIED REGISTERED

## 2021-10-25 PROCEDURE — C1788 PORT, INDWELLING, IMP: HCPCS | Performed by: SURGERY

## 2021-10-25 PROCEDURE — 2709999900 HC NON-CHARGEABLE SUPPLY: Performed by: SURGERY

## 2021-10-25 PROCEDURE — 77001 FLUOROGUIDE FOR VEIN DEVICE: CPT

## 2021-10-25 PROCEDURE — 2580000003 HC RX 258: Performed by: ANESTHESIOLOGY

## 2021-10-25 PROCEDURE — 2580000003 HC RX 258: Performed by: SURGERY

## 2021-10-25 PROCEDURE — 6360000002 HC RX W HCPCS: Performed by: NURSE ANESTHETIST, CERTIFIED REGISTERED

## 2021-10-25 PROCEDURE — 3600000014 HC SURGERY LEVEL 4 ADDTL 15MIN: Performed by: SURGERY

## 2021-10-25 PROCEDURE — 36561 INSERT TUNNELED CV CATH: CPT | Performed by: SURGERY

## 2021-10-25 PROCEDURE — 7100000010 HC PHASE II RECOVERY - FIRST 15 MIN: Performed by: SURGERY

## 2021-10-25 PROCEDURE — 3600000004 HC SURGERY LEVEL 4 BASE: Performed by: SURGERY

## 2021-10-25 PROCEDURE — 76937 US GUIDE VASCULAR ACCESS: CPT | Performed by: SURGERY

## 2021-10-25 PROCEDURE — 7100000011 HC PHASE II RECOVERY - ADDTL 15 MIN: Performed by: SURGERY

## 2021-10-25 PROCEDURE — 3209999900 FLUORO FOR SURGICAL PROCEDURES

## 2021-10-25 PROCEDURE — 77001 FLUOROGUIDE FOR VEIN DEVICE: CPT | Performed by: SURGERY

## 2021-10-25 PROCEDURE — 3700000001 HC ADD 15 MINUTES (ANESTHESIA): Performed by: SURGERY

## 2021-10-25 PROCEDURE — 3700000000 HC ANESTHESIA ATTENDED CARE: Performed by: SURGERY

## 2021-10-25 PROCEDURE — 6360000002 HC RX W HCPCS: Performed by: SURGERY

## 2021-10-25 DEVICE — PORT INFUS OD2.7MM ID1.5MM INTRO 8FR TI POLYUR CATH DETACH CT80STPD] ANGIODYNAMICS INC]: Type: IMPLANTABLE DEVICE | Site: CHEST | Status: FUNCTIONAL

## 2021-10-25 RX ORDER — SODIUM CHLORIDE, SODIUM LACTATE, POTASSIUM CHLORIDE, CALCIUM CHLORIDE 600; 310; 30; 20 MG/100ML; MG/100ML; MG/100ML; MG/100ML
INJECTION, SOLUTION INTRAVENOUS CONTINUOUS
Status: DISCONTINUED | OUTPATIENT
Start: 2021-10-25 | End: 2021-10-25 | Stop reason: HOSPADM

## 2021-10-25 RX ORDER — LIDOCAINE HYDROCHLORIDE 10 MG/ML
1 INJECTION, SOLUTION EPIDURAL; INFILTRATION; INTRACAUDAL; PERINEURAL
Status: DISCONTINUED | OUTPATIENT
Start: 2021-10-25 | End: 2021-10-25 | Stop reason: HOSPADM

## 2021-10-25 RX ORDER — LIDOCAINE HYDROCHLORIDE 20 MG/ML
INJECTION, SOLUTION EPIDURAL; INFILTRATION; INTRACAUDAL; PERINEURAL PRN
Status: DISCONTINUED | OUTPATIENT
Start: 2021-10-25 | End: 2021-10-25 | Stop reason: SDUPTHER

## 2021-10-25 RX ORDER — FENTANYL CITRATE 50 UG/ML
INJECTION, SOLUTION INTRAMUSCULAR; INTRAVENOUS PRN
Status: DISCONTINUED | OUTPATIENT
Start: 2021-10-25 | End: 2021-10-25 | Stop reason: SDUPTHER

## 2021-10-25 RX ORDER — PROPOFOL 10 MG/ML
INJECTION, EMULSION INTRAVENOUS PRN
Status: DISCONTINUED | OUTPATIENT
Start: 2021-10-25 | End: 2021-10-25 | Stop reason: SDUPTHER

## 2021-10-25 RX ORDER — MEPERIDINE HYDROCHLORIDE 50 MG/ML
12.5 INJECTION INTRAMUSCULAR; INTRAVENOUS; SUBCUTANEOUS EVERY 5 MIN PRN
Status: DISCONTINUED | OUTPATIENT
Start: 2021-10-25 | End: 2021-10-25 | Stop reason: HOSPADM

## 2021-10-25 RX ORDER — MORPHINE SULFATE 2 MG/ML
1 INJECTION, SOLUTION INTRAMUSCULAR; INTRAVENOUS EVERY 5 MIN PRN
Status: DISCONTINUED | OUTPATIENT
Start: 2021-10-25 | End: 2021-10-25 | Stop reason: HOSPADM

## 2021-10-25 RX ORDER — MORPHINE SULFATE 2 MG/ML
2 INJECTION, SOLUTION INTRAMUSCULAR; INTRAVENOUS EVERY 5 MIN PRN
Status: DISCONTINUED | OUTPATIENT
Start: 2021-10-25 | End: 2021-10-25 | Stop reason: HOSPADM

## 2021-10-25 RX ORDER — MIDAZOLAM HYDROCHLORIDE 1 MG/ML
INJECTION INTRAMUSCULAR; INTRAVENOUS PRN
Status: DISCONTINUED | OUTPATIENT
Start: 2021-10-25 | End: 2021-10-25 | Stop reason: SDUPTHER

## 2021-10-25 RX ORDER — OXYCODONE HYDROCHLORIDE AND ACETAMINOPHEN 5; 325 MG/1; MG/1
2 TABLET ORAL PRN
Status: DISCONTINUED | OUTPATIENT
Start: 2021-10-25 | End: 2021-10-25 | Stop reason: HOSPADM

## 2021-10-25 RX ORDER — SODIUM CHLORIDE 0.9 % (FLUSH) 0.9 %
10 SYRINGE (ML) INJECTION PRN
Status: DISCONTINUED | OUTPATIENT
Start: 2021-10-25 | End: 2021-10-25 | Stop reason: HOSPADM

## 2021-10-25 RX ORDER — ONDANSETRON 2 MG/ML
4 INJECTION INTRAMUSCULAR; INTRAVENOUS
Status: DISCONTINUED | OUTPATIENT
Start: 2021-10-25 | End: 2021-10-25 | Stop reason: HOSPADM

## 2021-10-25 RX ORDER — OXYCODONE HYDROCHLORIDE 5 MG/1
5 TABLET ORAL EVERY 6 HOURS PRN
Qty: 8 TABLET | Refills: 0 | Status: SHIPPED | OUTPATIENT
Start: 2021-10-25 | End: 2021-10-27

## 2021-10-25 RX ORDER — SODIUM CHLORIDE 9 MG/ML
25 INJECTION, SOLUTION INTRAVENOUS PRN
Status: DISCONTINUED | OUTPATIENT
Start: 2021-10-25 | End: 2021-10-25 | Stop reason: HOSPADM

## 2021-10-25 RX ORDER — SODIUM CHLORIDE 0.9 % (FLUSH) 0.9 %
10 SYRINGE (ML) INJECTION EVERY 12 HOURS SCHEDULED
Status: DISCONTINUED | OUTPATIENT
Start: 2021-10-25 | End: 2021-10-25 | Stop reason: HOSPADM

## 2021-10-25 RX ORDER — OXYCODONE HYDROCHLORIDE AND ACETAMINOPHEN 5; 325 MG/1; MG/1
1 TABLET ORAL PRN
Status: DISCONTINUED | OUTPATIENT
Start: 2021-10-25 | End: 2021-10-25 | Stop reason: HOSPADM

## 2021-10-25 RX ORDER — BUPIVACAINE HYDROCHLORIDE AND EPINEPHRINE 2.5; 5 MG/ML; UG/ML
INJECTION, SOLUTION EPIDURAL; INFILTRATION; INTRACAUDAL; PERINEURAL PRN
Status: DISCONTINUED | OUTPATIENT
Start: 2021-10-25 | End: 2021-10-25 | Stop reason: ALTCHOICE

## 2021-10-25 RX ADMIN — LIDOCAINE HYDROCHLORIDE 60 MG: 20 INJECTION, SOLUTION EPIDURAL; INFILTRATION; INTRACAUDAL; PERINEURAL at 15:55

## 2021-10-25 RX ADMIN — FENTANYL CITRATE 50 MCG: 50 INJECTION INTRAMUSCULAR; INTRAVENOUS at 16:23

## 2021-10-25 RX ADMIN — MIDAZOLAM HYDROCHLORIDE 2 MG: 2 INJECTION, SOLUTION INTRAMUSCULAR; INTRAVENOUS at 15:52

## 2021-10-25 RX ADMIN — PROPOFOL 20 MG: 10 INJECTION, EMULSION INTRAVENOUS at 16:08

## 2021-10-25 RX ADMIN — SODIUM CHLORIDE, POTASSIUM CHLORIDE, SODIUM LACTATE AND CALCIUM CHLORIDE: 600; 310; 30; 20 INJECTION, SOLUTION INTRAVENOUS at 13:37

## 2021-10-25 RX ADMIN — CEFAZOLIN 2 G: 10 INJECTION, POWDER, FOR SOLUTION INTRAVENOUS at 16:05

## 2021-10-25 RX ADMIN — PROPOFOL 20 MG: 10 INJECTION, EMULSION INTRAVENOUS at 15:58

## 2021-10-25 RX ADMIN — PROPOFOL 20 MG: 10 INJECTION, EMULSION INTRAVENOUS at 16:06

## 2021-10-25 RX ADMIN — FENTANYL CITRATE 50 MCG: 50 INJECTION INTRAMUSCULAR; INTRAVENOUS at 15:58

## 2021-10-25 RX ADMIN — PROPOFOL 20 MG: 10 INJECTION, EMULSION INTRAVENOUS at 16:04

## 2021-10-25 ASSESSMENT — PULMONARY FUNCTION TESTS
PIF_VALUE: 1
PIF_VALUE: 0
PIF_VALUE: 0
PIF_VALUE: 1

## 2021-10-25 ASSESSMENT — PAIN SCALES - GENERAL
PAINLEVEL_OUTOF10: 0

## 2021-10-25 ASSESSMENT — PAIN DESCRIPTION - DESCRIPTORS: DESCRIPTORS: NAGGING

## 2021-10-25 ASSESSMENT — PAIN - FUNCTIONAL ASSESSMENT: PAIN_FUNCTIONAL_ASSESSMENT: 0-10

## 2021-10-25 ASSESSMENT — ENCOUNTER SYMPTOMS: SHORTNESS OF BREATH: 0

## 2021-10-25 ASSESSMENT — LIFESTYLE VARIABLES: SMOKING_STATUS: 0
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
## 2021-10-25 NOTE — H&P
I have reviewed the history and physical and examined the patient. I find no relevant changes. I have reviewed with the patient and/or family members, during the preoperative office visit the risks, benefits, and alternatives to the procedure.     Afsaneh Lui MD

## 2021-10-26 NOTE — OP NOTE
Date of Surgery: 10/25/21    Preop Dx:  B Cell Lymphoma    Postop Dx:  Same    Procedure: Insertion of right internal jugular Power Portacath    Surgeon:  Shari Bains    Assistant:  Noemi    Anesthesia:  MAc, local    EBL:   <50ml    Specimen:  none    Complications: none    Drains/Lines:  As above    Indications:  73 yo with need for access for chemotherapy    Description:  Patient was given adequate description of the risks and rewards of the procedure, including bleeding, infection, injury to structures such as the lung and freely consented. They were given appropriate antibiotics and brought to the OR where MAC anesthesia was induced. Pt was placed in supine position. Prepped and draped in usual sterile fashion. Patient placed in slight Trendelenberg position. Local anesthetic used to numb along right neck. Using ultrasound guidance the right internal jugular vein accessed with needle. Through this the wire was placed and under fluoroscopy found to be in SVC. Needle removed. Using local anesthetic, site of port pocket anesthetized and then created. Catheter tunneled from port pocket up to wire insertion site. Under fluoroscopic guidance the introducer/dilator was inserted over the wire and then the wire and dilator were removed. Through the introducer the catheter was inserted and the introducer stripped away. Under fluoroscopy the catheter was found to be in the distal SVC. It was cut at the appropriate length and connected to the port. The port was secured to the clavicopectoral fascia using 3-0 prolene suture. It was tested with heparinized saline and found to aspirate and flush freely. The port pocket was closed with interrupted 3-0 vicryl and running subcuticular 4-0 monocryl suture. Wire insertion site closed with 4-0 monocryl. Sterile dressing placed. All suture, sponge and instrument count correct times two at end of case. Transferred to PACU in stable condition.     Dane Cordova

## 2021-11-12 ENCOUNTER — PATIENT MESSAGE (OUTPATIENT)
Dept: INTERNAL MEDICINE CLINIC | Age: 66
End: 2021-11-12

## 2021-11-12 DIAGNOSIS — R30.0 DYSURIA: Primary | ICD-10-CM

## 2021-11-12 RX ORDER — TAMSULOSIN HYDROCHLORIDE 0.4 MG/1
0.4 CAPSULE ORAL DAILY
Qty: 30 CAPSULE | Refills: 2 | Status: SHIPPED
Start: 2021-11-12 | End: 2021-12-01 | Stop reason: CLARIF

## 2021-11-12 NOTE — TELEPHONE ENCOUNTER
From: Dez Hadley  To: Patsy Castillo  Sent: 11/12/2021 9:51 AM EST  Subject: Non-Urgent Medical Question    Joi, between my heart and cancer issues I believe I have from all symptoms an enlarged prostate. Is this something I should set up an appointment with you or medication, or maybe it fixes itself. To top that off I have a hernia that will need to be taken care of. My [facility] Dr. Olivia Ellis says to hold off. This is all crazy stuff I'm dealing with in 2021.  Thanks, Mariana Boo

## 2021-11-15 ENCOUNTER — HOSPITAL ENCOUNTER (OUTPATIENT)
Age: 66
Setting detail: SPECIMEN
Discharge: HOME | End: 2021-11-15
Payer: MEDICARE

## 2021-11-15 DIAGNOSIS — R30.0 DYSURIA: ICD-10-CM

## 2021-11-15 LAB
BILIRUBIN URINE: NEGATIVE
BLOOD, URINE: NEGATIVE
CLARITY: CLEAR
COLOR: YELLOW
GLUCOSE URINE: NEGATIVE MG/DL
KETONES, URINE: NEGATIVE MG/DL
LEUKOCYTE ESTERASE, URINE: NEGATIVE
MICROSCOPIC EXAMINATION: NORMAL
NITRITE, URINE: NEGATIVE
PH UA: 5.5 (ref 5–8)
PROTEIN UA: NEGATIVE MG/DL
SPECIFIC GRAVITY UA: >=1.03 (ref 1–1.03)
URINE TYPE: NORMAL
UROBILINOGEN, URINE: 0.2 E.U./DL

## 2021-11-15 PROCEDURE — 87086 URINE CULTURE/COLONY COUNT: CPT

## 2021-11-15 PROCEDURE — 81003 URINALYSIS AUTO W/O SCOPE: CPT

## 2021-11-16 LAB — URINE CULTURE, ROUTINE: NORMAL

## 2021-11-22 ENCOUNTER — PATIENT MESSAGE (OUTPATIENT)
Dept: INTERNAL MEDICINE CLINIC | Age: 66
End: 2021-11-22

## 2021-11-22 ENCOUNTER — TELEPHONE (OUTPATIENT)
Dept: INTERNAL MEDICINE CLINIC | Age: 66
End: 2021-11-22

## 2021-11-22 NOTE — TELEPHONE ENCOUNTER
From: Gricelda Salamanca  To: Jonathan Rolle  Sent: 11/22/2021 8:03 AM EST  Subject: Hernia    Ronal, I'm so sorry that I keep being your problem patient. This morning my hernia was very painful and noticeable. I don't know what to do, as they do not recommend surgery at this time and my chemo won't be completed until Feb. May I please have you thoughts or suggestions.  Niki Carrasco

## 2021-11-23 ENCOUNTER — OFFICE VISIT (OUTPATIENT)
Dept: SURGERY | Age: 66
End: 2021-11-23
Payer: MEDICARE

## 2021-11-23 VITALS
OXYGEN SATURATION: 98 % | TEMPERATURE: 97.2 F | WEIGHT: 148.8 LBS | HEART RATE: 82 BPM | DIASTOLIC BLOOD PRESSURE: 61 MMHG | HEIGHT: 71 IN | SYSTOLIC BLOOD PRESSURE: 118 MMHG | BODY MASS INDEX: 20.83 KG/M2

## 2021-11-23 DIAGNOSIS — K40.90 NON-RECURRENT UNILATERAL INGUINAL HERNIA WITHOUT OBSTRUCTION OR GANGRENE: Primary | ICD-10-CM

## 2021-11-23 PROCEDURE — 99214 OFFICE O/P EST MOD 30 MIN: CPT | Performed by: SURGERY

## 2021-11-23 RX ORDER — SODIUM CHLORIDE 0.9 % (FLUSH) 0.9 %
5-40 SYRINGE (ML) INJECTION EVERY 12 HOURS SCHEDULED
Status: CANCELLED | OUTPATIENT
Start: 2021-11-23

## 2021-11-23 RX ORDER — SODIUM CHLORIDE 9 MG/ML
25 INJECTION, SOLUTION INTRAVENOUS PRN
Status: CANCELLED | OUTPATIENT
Start: 2021-11-23

## 2021-11-23 RX ORDER — SODIUM CHLORIDE 0.9 % (FLUSH) 0.9 %
5-40 SYRINGE (ML) INJECTION PRN
Status: CANCELLED | OUTPATIENT
Start: 2021-11-23

## 2021-11-23 NOTE — PROGRESS NOTES
Cory Hollandudsen    Age 74 y.o.    male    1955    MRN [id number]    12/2/2021  Arrival Time_____________  OR Time____________[address]     Procedure(s):  ROBOTIC LEFT INGUINAL HERNIA REPAIR WITH MESH, POSSIBLE OPEN PROCEDURE                      General     Surgeon(s):  Quita Huertas, MD      DAY ADMIT ___  SDS/OP ___  OUTPT IN BED ___         Phone [phone] (home)    PCP _____________________ Phone_________________ Epic ( ) Epic CE ( ) Appt ________    ADDITIONAL INFO __________________________________ Cardio/Consult _____________    NOTES _____________________________________________________________________    ____________________________________________________________________________    PAT APPT DATE:________ TIME: ________  FAXED QAD: _______  (__) H&P w/ hospitalist  ____________________________________________________________________________    COVID TEST: Date/Location______________        NURSING HISTORY COMPLETE: _______  (__) CBC       (__) W/ DIFF ___________  (__)  ECHO    __________  (__) Hgb A1C    ___________  (__) CHEST X RAY   __________  (__) LIPID PROFILE  ___________  (__) EKG   __________  (__) PT/PTT   ___________  (__) PFT's   __________  (__) BMP   ___________  (__) CAROTIDS  __________  (__) CMP   ___________  (__) VEIN MAPPING  __________  (__) U/A   ___________  (__) HISTORY & PHYSICAL __________  (__) URINE C & S  ___________  (__) CARDIAC CLEARANCE __________  (__) U/A W/ FLEX  ___________  (__) PULM.  CLEARANCE __________  (__) SERUM PREGNANCY ___________  (__) Check Epic DOS orders __________  (__) TYPE & SCREEN ________ repeat ( ) (__)  __________________ __________  (__) ALBUMIN   ___________  (__)  __________________ __________  (__) TRANSFERRIN  ___________  (__)  __________________ __________  (__) LIVER PROFILE  ___________  (__)  __________________ __________  (__) CARBOXY HGB  ___________  (__) URINE PREG DOS __________  (__) NICOTINE & MET.  ___________  (__) BLOOD SUGAR DOS __________  (__) PREALBUMIN  ___________    (__) MRSA NASAL SWAB ___________  (__) BLOOD THINNERS __________  (__) ACE/ ARBS: _____________________    (__) BETABLOCKERS ___________________
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
## 2021-11-23 NOTE — PATIENT INSTRUCTIONS
[address] [address]  Phone: [phone]  Fax: [phone] will be scheduled for surgery with Dr. Matt Alba. · The office will call you with the date and time that surgery is scheduled. · Please take note of these instructions for surgery:  · You should have nothing by mouth after midnight the night before your surgery - this includes no food or water. · Your surgery will be cancelled if you have taken anything by mouth after midnight, NO exceptions. · You will need to have a history and physical prior to your surgery. This will need to be completed up to 30 days before your surgery. This H/P can be completed by your family doctor or the hospital.   · IF you take coumadin (warfarin), please stop taking this medication 5 days prior to your surgery. · IF you take plavix, please stop taking this 7 days prior to your surgery. · Please contact our office if you have a pacemaker or defibrillator. · IF you are allergic to latex, please tell our office prior to your surgery. This is important in know before scheduling your surgery. · IF you are having an out patient surgery, you will need someone available to drive you home after your surgery, and to also stay with you for the rest of the day. · IF you are having a surgery requiring an inpatient stay in the hospital, you will need someone to drive you home upon discharge from the hospital.  · Please contact Dr. Zain Celestin assistant Sandie Delgado if you have any questions or concerns. · Please call the office with any changes in your symptoms or further questions/concerns.  [phone]

## 2021-11-24 ENCOUNTER — TELEPHONE (OUTPATIENT)
Dept: SURGERY | Age: 66
End: 2021-11-24

## 2021-11-24 ENCOUNTER — TELEPHONE (OUTPATIENT)
Dept: CARDIOLOGY CLINIC | Age: 66
End: 2021-11-24

## 2021-11-24 ENCOUNTER — PATIENT MESSAGE (OUTPATIENT)
Dept: INTERNAL MEDICINE CLINIC | Age: 66
End: 2021-11-24

## 2021-11-24 DIAGNOSIS — I51.89 CARDIAC MASS: Primary | ICD-10-CM

## 2021-11-24 NOTE — TELEPHONE ENCOUNTER
Spoke with Cardiology  Echo moved to 11-30 Tuesday @ 7:30 . Spoke with pt will send information through [employer].

## 2021-11-24 NOTE — TELEPHONE ENCOUNTER
Patient is having problems with left sided hernia in the groin area. Its very plainful to touch, and he can't get it taken care of until February, due to his chemo treatments. He said he can't continue going on with this pain.   Please advise  [phone]
Patient was seen, has been scheduled for surgery
Please see zerobound message, have these symptoms changed from earlier today or this is an old message? If it's new and symptoms are severe, I will send message to Dr Shital Soares immediately.
Spoke with Dr Ryan Chiu who will get patient into schedule tomorrow. Spoke with patient, he will rest until he sees Dr Ryan Chiu tomorrow and will go to ER if any symptoms worsen. He states he was constipated and passed large bowel movement this morning after taking laxative as recommended by oncology.      Ovidio Mayers, please make sure he sees Ryan Chiu tomorrow)
This message was from a call this morning when his pain was excruciating. He had a noticeable bulge, and felt he couldn't go on with the pain as bad as it was     I just spoke with the patient and he indicated that he has been lying down, and the pain has eased up, and is not excruciating like it was this morning. The bulge of the hernia has gone down, and not noticeable right now. He is always aware its there because its still painful to touch. He can move around better now, and no longer has to lie down to ease the pain.
(2) assistive person

## 2021-11-24 NOTE — TELEPHONE ENCOUNTER
Called and spoke with pt and let him know that ESSIE, is not comfortable clearing him for surgery without having an echo done first to check on mass. Pt v/u and was given number to central scheduling.  Pt is aware he may have to push surgery back in order to get echo done first

## 2021-11-24 NOTE — TELEPHONE ENCOUNTER
Patient's course reviewed. Patient has cardiac mass diagnosed by transthoracic echocardiogram confirmed by CASSIDY in August.  May have been affecting AV node function, presented with complete heart block status post permanent pacemaker. Due to permanent pacemaker cardiac MRI was deferred, safe to proceed now months following pacemaker placement but we have deferred at patient request.  EP follows for atrial fibrillation and permanent pacemaker placement in our dictating his anticoagulation at this point. Most recent EKG shows ventricular pacing with underlying sinus rhythm. Hay score 1. It is reasonable to hold blood thinner x48 hours prior to surgery without bridging and with resumption of anticoagulant post procedure as soon as possible. I would request limited transthoracic echocardiogram be performed prior to the OR to ensure intracardiac mass has not grown in size and is posing no obstructive risk at this time that we would need to make anesthesia aware of. Pending results of this, will send letter to general surgery/Dr. Hickman. Given the holiday, is unclear to me whether limited echo can be done in time for 12/2 OR date. Would advise the patient to call straightaway and update Dr. Deejay Green on this. Limited echocardiogram ordered. Have the patient call to schedule.   May be performed anywhere within the [facility] system that we can fit in MD Liborio, [address] [facility]  [phone] [facility]  [phone] [facility]

## 2021-11-24 NOTE — TELEPHONE ENCOUNTER
Pt needs cardiac clearance for upcoming surgery on 12/02/21 , DV LIH Repair. Pt will be under general anesthesia. Requested that pt stop eliquis.  Cardiac clearance can be faxed to [phone], [email or web address]

## 2021-11-24 NOTE — TELEPHONE ENCOUNTER
Pt saw Dr Salomón Mercedes in the office 11/23/21 and was given surgery instructions for a Robotic Left Inguinal Hernia Repair with Mesh, Possible Open Procedure (General Anes) scheduled 12/2/21 @ 12:45pm arrival 10:45am [facility] - NPO after midnight quinn b/f surgery - Pt will need  day of surgery - Sent cardiac clearance request to Dr Bernell Heritage Charmayne Malta, CNP to complete pre-op physical - Pt instructed to get COVID testing 6-7 days prior to surgery - Pt understood and agreed w/ above noted

## 2021-11-24 NOTE — TELEPHONE ENCOUNTER
From: Lizbeth Stewart  To: Martita Farias  Sent: 11/24/2021 2:07 PM EST  Subject: Hernia    Ronal, I'm going to have cancel everything Dr Cuauhtemoc Cobian wants an Echo Cardiogram, I can't get any apps prior to surgery, looks like things will be delayed.

## 2021-11-29 ENCOUNTER — HOSPITAL ENCOUNTER (OUTPATIENT)
Age: 66
Discharge: HOME | End: 2021-11-29
Payer: MEDICARE

## 2021-11-29 LAB — SARS-COV-2: NOT DETECTED

## 2021-11-29 PROCEDURE — U0003 INFECTIOUS AGENT DETECTION BY NUCLEIC ACID (DNA OR RNA); SEVERE ACUTE RESPIRATORY SYNDROME CORONAVIRUS 2 (SARS-COV-2) (CORONAVIRUS DISEASE [COVID-19]), AMPLIFIED PROBE TECHNIQUE, MAKING USE OF HIGH THROUGHPUT TECHNOLOGIES AS DESCRIBED BY CMS-2020-01-R: HCPCS

## 2021-11-29 PROCEDURE — U0005 INFEC AGEN DETEC AMPLI PROBE: HCPCS

## 2021-11-29 NOTE — PROGRESS NOTES
Obstructive Sleep Apnea (CALIN) Screening     Patient:  Maureen Last    YOB: 1955      Medical Record #:  [id number]                     Date:  11/29/2021     1. Are you a loud and/or regular snorer? []  Yes       [x] No    2. Have you been observed to gasp or stop breathing during sleep? []  Yes       [x] No    3. Do you feel tired or groggy upon awakening or do you awaken with a headache?           []  Yes       [x] No    4. Are you often tired or fatigued during the wake time hours? [x]  Yes       [] No    5. Do you fall asleep sitting, reading, watching TV or driving? []  Yes       [x] No    6. Do you often have problems with memory or concentration? []  Yes       [x] No    If patient's CALIN score if greater than or equal to 3, they are considered high risk for CALIN. An anesthesia provider will evaluate the patient and develop a plan of care the day of surgery. Note:  If the patient's BMI is more than 35 kg m¯² , has neck circumference > 40 cm, and/or high blood pressure the risk is greater (© American Sleep Apnea Association, 2006).

## 2021-11-30 ENCOUNTER — HOSPITAL ENCOUNTER (OUTPATIENT)
Dept: CARDIOLOGY | Age: 66
Discharge: HOME | End: 2021-11-30
Payer: MEDICARE

## 2021-11-30 DIAGNOSIS — I51.89 CARDIAC MASS: ICD-10-CM

## 2021-11-30 PROCEDURE — 93308 TTE F-UP OR LMTD: CPT

## 2021-12-01 ENCOUNTER — TELEPHONE (OUTPATIENT)
Dept: CARDIOLOGY CLINIC | Age: 66
End: 2021-12-01

## 2021-12-01 ENCOUNTER — OFFICE VISIT (OUTPATIENT)
Dept: INTERNAL MEDICINE CLINIC | Age: 66
End: 2021-12-01
Payer: MEDICARE

## 2021-12-01 VITALS
BODY MASS INDEX: 21.76 KG/M2 | OXYGEN SATURATION: 98 % | DIASTOLIC BLOOD PRESSURE: 70 MMHG | HEART RATE: 92 BPM | WEIGHT: 156 LBS | SYSTOLIC BLOOD PRESSURE: 128 MMHG | TEMPERATURE: 97 F

## 2021-12-01 DIAGNOSIS — K40.90 UNILATERAL INGUINAL HERNIA WITHOUT OBSTRUCTION OR GANGRENE, RECURRENCE NOT SPECIFIED: Primary | ICD-10-CM

## 2021-12-01 DIAGNOSIS — Z01.818 PREOP EXAMINATION: ICD-10-CM

## 2021-12-01 PROCEDURE — 99214 OFFICE O/P EST MOD 30 MIN: CPT | Performed by: NURSE PRACTITIONER
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
## 2021-12-01 NOTE — PATIENT INSTRUCTIONS
Hold aspirin, ibuprofen, aleve, naprosyn, other NSAIDS, or products containing these medications for 7 days before surgery. Hold fish oil, and vitamin E  OK to take multiple vitamin  OK to take tylenol  Nothing to eat of drink after midnight before surgery.   Continue to hold eliquis

## 2021-12-02 ENCOUNTER — ANESTHESIA (OUTPATIENT)
Dept: OPERATING ROOM | Age: 66
End: 2021-12-02
Payer: MEDICARE

## 2021-12-02 ENCOUNTER — ANESTHESIA EVENT (OUTPATIENT)
Dept: OPERATING ROOM | Age: 66
End: 2021-12-02
Payer: MEDICARE

## 2021-12-02 ENCOUNTER — HOSPITAL ENCOUNTER (OUTPATIENT)
Age: 66
Setting detail: OUTPATIENT SURGERY
Discharge: HOME | End: 2021-12-02
Attending: SURGERY | Admitting: SURGERY
Payer: MEDICARE

## 2021-12-02 VITALS
WEIGHT: 150 LBS | RESPIRATION RATE: 16 BRPM | DIASTOLIC BLOOD PRESSURE: 77 MMHG | OXYGEN SATURATION: 99 % | TEMPERATURE: 96.2 F | SYSTOLIC BLOOD PRESSURE: 143 MMHG | HEART RATE: 80 BPM | HEIGHT: 71 IN | BODY MASS INDEX: 21 KG/M2

## 2021-12-02 VITALS
RESPIRATION RATE: 9 BRPM | OXYGEN SATURATION: 100 % | SYSTOLIC BLOOD PRESSURE: 127 MMHG | DIASTOLIC BLOOD PRESSURE: 63 MMHG | TEMPERATURE: 95.9 F

## 2021-12-02 DIAGNOSIS — G89.18 POSTOPERATIVE PAIN: Primary | ICD-10-CM

## 2021-12-02 LAB
ANION GAP SERPL CALCULATED.3IONS-SCNC: 10 MMOL/L (ref 3–16)
BUN BLDV-MCNC: 16 MG/DL (ref 7–20)
CALCIUM SERPL-MCNC: 9.1 MG/DL (ref 8.3–10.6)
CHLORIDE BLD-SCNC: 107 MMOL/L (ref 99–110)
CO2: 24 MMOL/L (ref 21–32)
CREAT SERPL-MCNC: 0.9 MG/DL (ref 0.8–1.3)
GFR AFRICAN AMERICAN: >60
GFR NON-AFRICAN AMERICAN: >60
GLUCOSE BLD-MCNC: 94 MG/DL (ref 70–99)
POTASSIUM REFLEX MAGNESIUM: 4.1 MMOL/L (ref 3.5–5.1)
SODIUM BLD-SCNC: 141 MMOL/L (ref 136–145)

## 2021-12-02 PROCEDURE — 2500000003 HC RX 250 WO HCPCS: Performed by: SURGERY

## 2021-12-02 PROCEDURE — 6360000002 HC RX W HCPCS: Performed by: ANESTHESIOLOGY

## 2021-12-02 PROCEDURE — 3700000001 HC ADD 15 MINUTES (ANESTHESIA): Performed by: SURGERY

## 2021-12-02 PROCEDURE — 3600000009 HC SURGERY ROBOT BASE: Performed by: SURGERY

## 2021-12-02 PROCEDURE — 7100000001 HC PACU RECOVERY - ADDTL 15 MIN: Performed by: SURGERY

## 2021-12-02 PROCEDURE — S2900 ROBOTIC SURGICAL SYSTEM: HCPCS | Performed by: SURGERY

## 2021-12-02 PROCEDURE — 2709999900 HC NON-CHARGEABLE SUPPLY: Performed by: SURGERY

## 2021-12-02 PROCEDURE — 3600000019 HC SURGERY ROBOT ADDTL 15MIN: Performed by: SURGERY

## 2021-12-02 PROCEDURE — 2500000003 HC RX 250 WO HCPCS: Performed by: NURSE ANESTHETIST, CERTIFIED REGISTERED

## 2021-12-02 PROCEDURE — 3700000000 HC ANESTHESIA ATTENDED CARE: Performed by: SURGERY

## 2021-12-02 PROCEDURE — 49650 LAP ING HERNIA REPAIR INIT: CPT | Performed by: SURGERY

## 2021-12-02 PROCEDURE — 80048 BASIC METABOLIC PNL TOTAL CA: CPT

## 2021-12-02 PROCEDURE — C1781 MESH (IMPLANTABLE): HCPCS | Performed by: SURGERY

## 2021-12-02 PROCEDURE — 6360000002 HC RX W HCPCS: Performed by: NURSE ANESTHETIST, CERTIFIED REGISTERED

## 2021-12-02 PROCEDURE — 6360000002 HC RX W HCPCS: Performed by: SURGERY

## 2021-12-02 PROCEDURE — 6370000000 HC RX 637 (ALT 250 FOR IP): Performed by: ANESTHESIOLOGY

## 2021-12-02 PROCEDURE — 2580000003 HC RX 258: Performed by: SURGERY

## 2021-12-02 PROCEDURE — 7100000010 HC PHASE II RECOVERY - FIRST 15 MIN: Performed by: SURGERY

## 2021-12-02 PROCEDURE — 7100000011 HC PHASE II RECOVERY - ADDTL 15 MIN: Performed by: SURGERY

## 2021-12-02 PROCEDURE — 7100000000 HC PACU RECOVERY - FIRST 15 MIN: Performed by: SURGERY

## 2021-12-02 DEVICE — 3DMAX MESH, 12.4 CM X 17.3 CM (4.9" X 6.8"), LEFT, EXTRA LARGE
Type: IMPLANTABLE DEVICE | Site: ABDOMEN | Status: FUNCTIONAL
Brand: 3DMAX

## 2021-12-02 RX ORDER — MIDAZOLAM HYDROCHLORIDE 1 MG/ML
INJECTION INTRAMUSCULAR; INTRAVENOUS PRN
Status: DISCONTINUED | OUTPATIENT
Start: 2021-12-02 | End: 2021-12-02 | Stop reason: SDUPTHER

## 2021-12-02 RX ORDER — DEXAMETHASONE SODIUM PHOSPHATE 4 MG/ML
INJECTION, SOLUTION INTRA-ARTICULAR; INTRALESIONAL; INTRAMUSCULAR; INTRAVENOUS; SOFT TISSUE PRN
Status: DISCONTINUED | OUTPATIENT
Start: 2021-12-02 | End: 2021-12-02 | Stop reason: SDUPTHER

## 2021-12-02 RX ORDER — ONDANSETRON 2 MG/ML
4 INJECTION INTRAMUSCULAR; INTRAVENOUS PRN
Status: DISCONTINUED | OUTPATIENT
Start: 2021-12-02 | End: 2021-12-02 | Stop reason: HOSPADM

## 2021-12-02 RX ORDER — DIPHENHYDRAMINE HYDROCHLORIDE 50 MG/ML
12.5 INJECTION INTRAMUSCULAR; INTRAVENOUS
Status: DISCONTINUED | OUTPATIENT
Start: 2021-12-02 | End: 2021-12-02 | Stop reason: HOSPADM

## 2021-12-02 RX ORDER — HYDROCODONE BITARTRATE AND ACETAMINOPHEN 5; 325 MG/1; MG/1
1 TABLET ORAL EVERY 6 HOURS PRN
Qty: 12 TABLET | Refills: 0 | Status: SHIPPED | OUTPATIENT
Start: 2021-12-02 | End: 2021-12-09

## 2021-12-02 RX ORDER — SODIUM CHLORIDE 0.9 % (FLUSH) 0.9 %
5-40 SYRINGE (ML) INJECTION EVERY 12 HOURS SCHEDULED
Status: DISCONTINUED | OUTPATIENT
Start: 2021-12-02 | End: 2021-12-02 | Stop reason: HOSPADM

## 2021-12-02 RX ORDER — LABETALOL HYDROCHLORIDE 5 MG/ML
5 INJECTION, SOLUTION INTRAVENOUS EVERY 10 MIN PRN
Status: DISCONTINUED | OUTPATIENT
Start: 2021-12-02 | End: 2021-12-02 | Stop reason: HOSPADM

## 2021-12-02 RX ORDER — HYDROCODONE BITARTRATE AND ACETAMINOPHEN 5; 325 MG/1; MG/1
2 TABLET ORAL PRN
Status: COMPLETED | OUTPATIENT
Start: 2021-12-02 | End: 2021-12-02

## 2021-12-02 RX ORDER — PROMETHAZINE HYDROCHLORIDE 25 MG/ML
6.25 INJECTION, SOLUTION INTRAMUSCULAR; INTRAVENOUS
Status: DISCONTINUED | OUTPATIENT
Start: 2021-12-02 | End: 2021-12-02 | Stop reason: HOSPADM

## 2021-12-02 RX ORDER — ONDANSETRON 2 MG/ML
INJECTION INTRAMUSCULAR; INTRAVENOUS PRN
Status: DISCONTINUED | OUTPATIENT
Start: 2021-12-02 | End: 2021-12-02 | Stop reason: SDUPTHER

## 2021-12-02 RX ORDER — HYDRALAZINE HYDROCHLORIDE 20 MG/ML
5 INJECTION INTRAMUSCULAR; INTRAVENOUS EVERY 10 MIN PRN
Status: DISCONTINUED | OUTPATIENT
Start: 2021-12-02 | End: 2021-12-02 | Stop reason: HOSPADM

## 2021-12-02 RX ORDER — MEPERIDINE HYDROCHLORIDE 50 MG/ML
12.5 INJECTION INTRAMUSCULAR; INTRAVENOUS; SUBCUTANEOUS EVERY 5 MIN PRN
Status: DISCONTINUED | OUTPATIENT
Start: 2021-12-02 | End: 2021-12-02 | Stop reason: HOSPADM

## 2021-12-02 RX ORDER — SODIUM CHLORIDE 0.9 % (FLUSH) 0.9 %
5-40 SYRINGE (ML) INJECTION PRN
Status: DISCONTINUED | OUTPATIENT
Start: 2021-12-02 | End: 2021-12-02 | Stop reason: HOSPADM

## 2021-12-02 RX ORDER — ROCURONIUM BROMIDE 10 MG/ML
INJECTION, SOLUTION INTRAVENOUS PRN
Status: DISCONTINUED | OUTPATIENT
Start: 2021-12-02 | End: 2021-12-02 | Stop reason: SDUPTHER

## 2021-12-02 RX ORDER — MORPHINE SULFATE 2 MG/ML
1 INJECTION, SOLUTION INTRAMUSCULAR; INTRAVENOUS EVERY 5 MIN PRN
Status: DISCONTINUED | OUTPATIENT
Start: 2021-12-02 | End: 2021-12-02 | Stop reason: HOSPADM

## 2021-12-02 RX ORDER — FENTANYL CITRATE 50 UG/ML
INJECTION, SOLUTION INTRAMUSCULAR; INTRAVENOUS PRN
Status: DISCONTINUED | OUTPATIENT
Start: 2021-12-02 | End: 2021-12-02 | Stop reason: SDUPTHER

## 2021-12-02 RX ORDER — LIDOCAINE HYDROCHLORIDE 20 MG/ML
INJECTION, SOLUTION INFILTRATION; PERINEURAL PRN
Status: DISCONTINUED | OUTPATIENT
Start: 2021-12-02 | End: 2021-12-02 | Stop reason: SDUPTHER

## 2021-12-02 RX ORDER — PHENYLEPHRINE HCL IN 0.9% NACL 1 MG/10 ML
SYRINGE (ML) INTRAVENOUS PRN
Status: DISCONTINUED | OUTPATIENT
Start: 2021-12-02 | End: 2021-12-02 | Stop reason: SDUPTHER

## 2021-12-02 RX ORDER — PROPOFOL 10 MG/ML
INJECTION, EMULSION INTRAVENOUS PRN
Status: DISCONTINUED | OUTPATIENT
Start: 2021-12-02 | End: 2021-12-02 | Stop reason: SDUPTHER

## 2021-12-02 RX ORDER — MORPHINE SULFATE 2 MG/ML
2 INJECTION, SOLUTION INTRAMUSCULAR; INTRAVENOUS EVERY 5 MIN PRN
Status: DISCONTINUED | OUTPATIENT
Start: 2021-12-02 | End: 2021-12-02 | Stop reason: HOSPADM

## 2021-12-02 RX ORDER — BUPIVACAINE HYDROCHLORIDE 5 MG/ML
INJECTION, SOLUTION EPIDURAL; INTRACAUDAL PRN
Status: DISCONTINUED | OUTPATIENT
Start: 2021-12-02 | End: 2021-12-02 | Stop reason: ALTCHOICE

## 2021-12-02 RX ORDER — SODIUM CHLORIDE 9 MG/ML
25 INJECTION, SOLUTION INTRAVENOUS PRN
Status: DISCONTINUED | OUTPATIENT
Start: 2021-12-02 | End: 2021-12-02 | Stop reason: HOSPADM

## 2021-12-02 RX ORDER — HYDROCODONE BITARTRATE AND ACETAMINOPHEN 5; 325 MG/1; MG/1
1 TABLET ORAL PRN
Status: COMPLETED | OUTPATIENT
Start: 2021-12-02 | End: 2021-12-02

## 2021-12-02 RX ADMIN — HYDROMORPHONE HYDROCHLORIDE 0.5 MG: 1 INJECTION, SOLUTION INTRAMUSCULAR; INTRAVENOUS; SUBCUTANEOUS at 14:00

## 2021-12-02 RX ADMIN — FENTANYL CITRATE 50 MCG: 50 INJECTION INTRAMUSCULAR; INTRAVENOUS at 12:11

## 2021-12-02 RX ADMIN — SUGAMMADEX 200 MG: 100 INJECTION, SOLUTION INTRAVENOUS at 13:02

## 2021-12-02 RX ADMIN — DEXAMETHASONE SODIUM PHOSPHATE 4 MG: 4 INJECTION, SOLUTION INTRAMUSCULAR; INTRAVENOUS at 11:35

## 2021-12-02 RX ADMIN — Medication 120 MCG: at 11:35

## 2021-12-02 RX ADMIN — SODIUM CHLORIDE: 9 INJECTION, SOLUTION INTRAVENOUS at 13:15

## 2021-12-02 RX ADMIN — ROCURONIUM BROMIDE 50 MG: 10 SOLUTION INTRAVENOUS at 11:30

## 2021-12-02 RX ADMIN — ROCURONIUM BROMIDE 10 MG: 10 SOLUTION INTRAVENOUS at 12:23

## 2021-12-02 RX ADMIN — FENTANYL CITRATE 100 MCG: 50 INJECTION INTRAMUSCULAR; INTRAVENOUS at 11:30

## 2021-12-02 RX ADMIN — SODIUM CHLORIDE: 9 INJECTION, SOLUTION INTRAVENOUS at 11:09

## 2021-12-02 RX ADMIN — HYDROMORPHONE HYDROCHLORIDE 0.5 MG: 1 INJECTION, SOLUTION INTRAMUSCULAR; INTRAVENOUS; SUBCUTANEOUS at 14:13

## 2021-12-02 RX ADMIN — HYDROMORPHONE HYDROCHLORIDE 0.5 MG: 1 INJECTION, SOLUTION INTRAMUSCULAR; INTRAVENOUS; SUBCUTANEOUS at 14:46

## 2021-12-02 RX ADMIN — MORPHINE SULFATE 2 MG: 2 INJECTION, SOLUTION INTRAMUSCULAR; INTRAVENOUS at 15:00

## 2021-12-02 RX ADMIN — Medication 100 MCG: at 11:51

## 2021-12-02 RX ADMIN — HYDROCODONE BITARTRATE AND ACETAMINOPHEN 2 TABLET: 5; 325 TABLET ORAL at 15:29

## 2021-12-02 RX ADMIN — HYDROMORPHONE HYDROCHLORIDE 0.5 MG: 1 INJECTION, SOLUTION INTRAMUSCULAR; INTRAVENOUS; SUBCUTANEOUS at 14:27

## 2021-12-02 RX ADMIN — PROPOFOL 120 MG: 10 INJECTION, EMULSION INTRAVENOUS at 11:30

## 2021-12-02 RX ADMIN — CEFAZOLIN 2000 MG: 10 INJECTION, POWDER, FOR SOLUTION INTRAVENOUS at 11:38

## 2021-12-02 RX ADMIN — FENTANYL CITRATE 50 MCG: 50 INJECTION INTRAMUSCULAR; INTRAVENOUS at 11:59

## 2021-12-02 RX ADMIN — LIDOCAINE HYDROCHLORIDE 40 MG: 20 INJECTION, SOLUTION INFILTRATION; PERINEURAL at 11:30

## 2021-12-02 RX ADMIN — ONDANSETRON 4 MG: 2 INJECTION INTRAMUSCULAR; INTRAVENOUS at 12:57

## 2021-12-02 RX ADMIN — MIDAZOLAM HYDROCHLORIDE 2 MG: 2 INJECTION, SOLUTION INTRAMUSCULAR; INTRAVENOUS at 11:18

## 2021-12-02 RX ADMIN — Medication 160 MCG: at 11:38

## 2021-12-02 RX ADMIN — Medication 40 MCG: at 11:30

## 2021-12-02 ASSESSMENT — PULMONARY FUNCTION TESTS
PIF_VALUE: 5
PIF_VALUE: 12
PIF_VALUE: 15
PIF_VALUE: 3
PIF_VALUE: 15
PIF_VALUE: 1
PIF_VALUE: 22
PIF_VALUE: 15
PIF_VALUE: 12
PIF_VALUE: 21
PIF_VALUE: 21
PIF_VALUE: 22
PIF_VALUE: 26
PIF_VALUE: 20
PIF_VALUE: 14
PIF_VALUE: 21
PIF_VALUE: 11
PIF_VALUE: 2
PIF_VALUE: 15
PIF_VALUE: 13
PIF_VALUE: 21
PIF_VALUE: 12
PIF_VALUE: 11
PIF_VALUE: 22
PIF_VALUE: 25
PIF_VALUE: 12
PIF_VALUE: 22
PIF_VALUE: 20
PIF_VALUE: 4
PIF_VALUE: 0
PIF_VALUE: 26
PIF_VALUE: 22
PIF_VALUE: 26
PIF_VALUE: 11
PIF_VALUE: 2
PIF_VALUE: 21
PIF_VALUE: 15
PIF_VALUE: 21
PIF_VALUE: 12
PIF_VALUE: 21
PIF_VALUE: 2
PIF_VALUE: 21
PIF_VALUE: 25
PIF_VALUE: 22
PIF_VALUE: 21
PIF_VALUE: 26
PIF_VALUE: 26
PIF_VALUE: 2
PIF_VALUE: 22
PIF_VALUE: 2
PIF_VALUE: 21
PIF_VALUE: 11
PIF_VALUE: 21
PIF_VALUE: 22
PIF_VALUE: 24
PIF_VALUE: 22
PIF_VALUE: 21
PIF_VALUE: 11
PIF_VALUE: 1
PIF_VALUE: 22
PIF_VALUE: 21
PIF_VALUE: 2
PIF_VALUE: 3
PIF_VALUE: 3
PIF_VALUE: 20
PIF_VALUE: 18
PIF_VALUE: 11
PIF_VALUE: 26
PIF_VALUE: 15
PIF_VALUE: 17
PIF_VALUE: 15
PIF_VALUE: 21
PIF_VALUE: 22
PIF_VALUE: 19
PIF_VALUE: 22
PIF_VALUE: 22
PIF_VALUE: 27
PIF_VALUE: 12
PIF_VALUE: 0
PIF_VALUE: 20
PIF_VALUE: 1
PIF_VALUE: 15
PIF_VALUE: 22
PIF_VALUE: 1
PIF_VALUE: 11
PIF_VALUE: 11
PIF_VALUE: 15
PIF_VALUE: 20
PIF_VALUE: 20
PIF_VALUE: 11
PIF_VALUE: 15
PIF_VALUE: 12
PIF_VALUE: 22
PIF_VALUE: 15
PIF_VALUE: 11
PIF_VALUE: 11
PIF_VALUE: 0
PIF_VALUE: 15
PIF_VALUE: 11
PIF_VALUE: 21
PIF_VALUE: 24
PIF_VALUE: 21
PIF_VALUE: 22
PIF_VALUE: 26
PIF_VALUE: 20
PIF_VALUE: 3
PIF_VALUE: 18
PIF_VALUE: 25
PIF_VALUE: 22
PIF_VALUE: 25
PIF_VALUE: 11
PIF_VALUE: 19
PIF_VALUE: 21

## 2021-12-02 ASSESSMENT — PAIN SCALES - GENERAL
PAINLEVEL_OUTOF10: 8
PAINLEVEL_OUTOF10: 8
PAINLEVEL_OUTOF10: 4
PAINLEVEL_OUTOF10: 7
PAINLEVEL_OUTOF10: 10
PAINLEVEL_OUTOF10: 4
PAINLEVEL_OUTOF10: 8
PAINLEVEL_OUTOF10: 7
PAINLEVEL_OUTOF10: 10

## 2021-12-02 ASSESSMENT — PAIN SCALES - WONG BAKER: WONGBAKER_NUMERICALRESPONSE: 0

## 2021-12-02 ASSESSMENT — PAIN - FUNCTIONAL ASSESSMENT: PAIN_FUNCTIONAL_ASSESSMENT: 0-10

## 2021-12-02 ASSESSMENT — PAIN DESCRIPTION - LOCATION
LOCATION: ABDOMEN

## 2021-12-02 ASSESSMENT — PAIN DESCRIPTION - PAIN TYPE
TYPE: SURGICAL PAIN
TYPE: SURGICAL PAIN
TYPE: ACUTE PAIN;SURGICAL PAIN
TYPE: SURGICAL PAIN
TYPE: SURGICAL PAIN

## 2021-12-02 NOTE — PROGRESS NOTES
Pt arrives in PACU bed 4, drowsy, but arousable to voice. VSS. Report received from OR team.  Will continue to monitor.
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
## 2021-12-02 NOTE — H&P
I have reviewed the history and physical and examined the patient. Also noted the assessment from Cardiology of his being intermediate risk. We will proceed with the plan for now. I find no relevant changes. I have reviewed with the patient and/or family members, during the preoperative office visit the risks, benefits, and alternatives to the procedure.     Osmar Santiago MD

## 2021-12-03 NOTE — ANESTHESIA POSTPROCEDURE EVALUATION
Department of Anesthesiology  Postprocedure Note    Patient: Tegan Lee  MRN: [id number]  YOB: 1955  Date of evaluation: 12/2/2021  Time:  7:30 PM     Procedure Summary     Date: 12/02/21 Room / Location: [place] [address] / Trishae Antoinette    Anesthesia Start: 6674 Anesthesia Stop: 4692    Procedure: ROBOTIC LEFT INGUINAL HERNIA REPAIR WITH MESH (Left Abdomen) Diagnosis:       Non-recurrent unilateral inguinal hernia without obstruction or gangrene      (NON RECURRENT UNILATERAL INGUINAL HERNIA WITHOUT OBSTRUCTION OR GANGRENE)    Surgeons: Archie Colbert MD Responsible Provider: Caprice Prabhakar MD    Anesthesia Type: general ASA Status: 3          Anesthesia Type: general    Aly Phase I: Aly Score: 9    Aly Phase II: Aly Score: 10    Last vitals: Reviewed and per EMR flowsheets.        Anesthesia Post Evaluation    Comments: Postoperative Anesthesia Note    Name:    Tegan Lee  MRN:      [id number]    Patient Vitals in the past 12 hrs:  12/02/21 1600, BP:(!) 143/77, Temp:96.2 °F (35.7 °C), Temp src:Temporal, Pulse:80, Resp:16, SpO2:99 %  12/02/21 1515, BP:137/77, Pulse:71, Resp:(!) 7, SpO2:94 %  12/02/21 1435, BP:136/77, Pulse:68, Resp:(!) 7, SpO2:94 %  12/02/21 1430, BP:130/73, Pulse:69, Resp:12, SpO2:96 %  12/02/21 1425, BP:135/70, SpO2:93 %  12/02/21 1420, BP:133/70, Pulse:70, Resp:10, SpO2:96 %  12/02/21 1415, BP:133/74, Pulse:71, Resp:14, SpO2:95 %  12/02/21 1410, BP:134/73, Pulse:70, Resp:9, SpO2:94 %  12/02/21 1350, BP:123/76, Pulse:70, Resp:11, SpO2:94 %  12/02/21 1345, BP:126/75, Pulse:73, Resp:13, SpO2:95 %  12/02/21 1330, BP:132/74, Pulse:73, Resp:16, SpO2:94 %  12/02/21 1325, Pulse:72  12/02/21 1321, BP:131/72, Temp src:Temporal, Pulse:73, Resp:12, SpO2:95 %  12/02/21 1104, BP:129/75, Temp:97.7 °F (36.5 °C), Temp src:Temporal, Pulse:80, Resp:16, SpO2:100 %     LABS:    CBC  Lab Results       Component                Value               Date/Time                  WBC 7.4                 09/17/2021 09:49 AM        HGB                      12.9 (L)            09/17/2021 09:49 AM        HCT                      38.7 (L)            09/17/2021 09:49 AM        PLT                      329                 09/17/2021 09:49 AM   RENAL  Lab Results       Component                Value               Date/Time                  NA                       141                 12/02/2021 11:15 AM        K                        4.1                 12/02/2021 11:15 AM        CL                       107                 12/02/2021 11:15 AM        CO2                      24                  12/02/2021 11:15 AM        BUN                      16                  12/02/2021 11:15 AM        CREATININE               0.9                 12/02/2021 11:15 AM        GLUCOSE                  94                  12/02/2021 11:15 AM   COAGS  Lab Results       Component                Value               Date/Time                  PROTIME                  11.2                09/17/2021 09:49 AM        INR                      0.99                09/17/2021 09:49 AM        APTT                     28.9                08/24/2021 04:57 PM     Intake & Output: In: 1100 (I.V.:1000)  Out: -     Nausea & Vomiting:  No    Level of Consciousness:  Awake    Pain Assessment:  Adequate analgesia    Anesthesia Complications:  No apparent anesthetic complications    SUMMARY      Vital signs stable  OK to discharge from Stage I post anesthesia care.   Care transferred from Anesthesiology department on discharge from perioperative area

## 2021-12-03 NOTE — OP NOTE
[address], [place] [address]                                OPERATIVE REPORT    PATIENT NAME: Belia Ugalde                     :        1955  MED REC NO:   [id number]                          ROOM:  ACCOUNT NO:   [de-identified]                           ADMIT DATE: 2021  PROVIDER:     Vinny Griffin MD    DATE OF PROCEDURE:  2021    PREOPERATIVE DIAGNOSIS:  Left inguinal hernia. POSTOPERATIVE DIAGNOSIS:  Left inguinal hernia. PROCEDURE PERFORMED:  Left inguinal hernia repair with mesh. SURGEON:  Vinny Griffin MD    ANESTHESIA:  General.    ESTIMATED BLOOD LOSS:  Less than 50 mL. SPECIMEN:  None. COUNTS:  Sponge and needle counts were correct. INDICATIONS:  The patient is a 68-year-old male with a symptomatic left  inguinal hernia. The patient is in the midst of receiving courses of  chemotherapy every two weeks and this surgery had been scheduled just  prior to the next scheduled treatment to ensure adequate immune levels  for healing. FINDINGS:  Moderate to large indirect left inguinal hernia, reduced and  repaired with a preperitoneal extra large 3DMax mesh. DESCRIPTION OF THE PROCEDURE:  After informed consent was obtained, the  patient was taken to the operating room and placed in the supine  position. Preoperative antibiotics were initiated in the holding area. Athrombic pumps were placed in the legs. General anesthesia was  administered without difficulty. The abdomen was prepped and draped in  a sterile fashion. We began with a trocar incision in the left  mid-abdomen after infiltrating with local anesthesia. The trocar was  then guided under direct vision into the abdominal cavity. Insufflation  was initiated. A contralateral trocar was placed under direct vision,  followed by a 64-PN supraumbilical trocar.   The bed was positioned to  expose the pelvis and the robot was brought in and docked. There was a  clearly visible indirect hernia on the left side. We began by incising  the peritoneum above the inguinal region in a transverse direction to  create a flap. The peritoneal flap was then dissected downwards without  difficulty, dissecting first medially to expose the pubic ramus. The  pubic ramus and tubercle were identified and dissected clear. We  dissected down below the ramus into the space of Retzius. Laterally,  the flap was dissected off the sidewall around the iliopubic tract and  then down onto the psoas muscles inferiorly. We now approached the  large indirect hernia sac. This was gradually mobilized out of the  inguinal canal and then gently peeled and dissected away from the cord  structures. Ultimately, the hernia sac was fully mobilized and released  from the cord structures down to the level of the psoas muscles as well. With the inguinal space adequately cleared, an extra large 3DMax mesh  patch was positioned into the preperitoneal space. It was secured in a  standard fashion to the pubic ramus and to its upper, inner, and outer  quadrants with 2-0 Vicryl sutures. This provided good coverage of the  entire inguinal region. The peritoneal flap was then closed with a  running 3-0 barbed suture. An Angiocath was passed by the assistant  through the abdominal wall into the preperitoneal space before closure  of the flap to allow for evacuation of the space when the flap was  closed. At this point, I was satisfied with the repair. Looking to the right  side, I noticed that there was in fact a very small approximately 1-cm  break in the peritoneum overlying the inguinal ring suggesting the early  development of an indirect hernia. I chose at this point to use  a figure-of-eight suture to reapproximate the peritoneum across the  inguinal ring. We then proceeded to undock the robot and the trocars  were all removed and the insufflation was released.   The umbilical  fascial defect was closed with a figure-of-eight 0 Vicryl suture. The  skin incisions were closed with 4-0 Monocryl subcuticular sutures and  Dermabond. The patient was then extubated and taken to the recovery  room in stable condition.         Yazan Luther MD    D: 12/02/2021 22:53:52       T: 12/03/2021 2:25:02     [id number]  Job#: [id number]     Doc#: [id number]    CC:

## 2022-01-11 ENCOUNTER — NURSE ONLY (OUTPATIENT)
Dept: CARDIOLOGY CLINIC | Age: 67
End: 2022-01-11
Payer: MEDICARE

## 2022-01-11 DIAGNOSIS — I44.2 THIRD DEGREE AV BLOCK (HCC): ICD-10-CM

## 2022-01-11 DIAGNOSIS — Z95.0 PACEMAKER: ICD-10-CM

## 2022-01-11 PROCEDURE — 93296 REM INTERROG EVL PM/IDS: CPT | Performed by: INTERNAL MEDICINE

## 2022-01-11 PROCEDURE — 93294 REM INTERROG EVL PM/LDLS PM: CPT | Performed by: INTERNAL MEDICINE

## 2022-01-11 NOTE — Clinical Note
Episodes Since: 12-Oct-2021  1 Monitored VT and 5 Non-sustained VT-EGMS show NSVT -longest 11 sec. VT-NS 2 15-Nov-2021 11:32 -? Termination-see egm. HE IS NOT ON AMIO AS OF 8/26/21.    8/26/21-Continue Eliquis twice daily given intracardiac mass and atrial fibrillation history is. Presently off his amiodarone with thyroid complication and will have EP follow-up.

## 2022-01-13 NOTE — PROGRESS NOTES
Remote transmission received for patient's dual chamber PACEMAKER. Transmission shows normal sensing and pacing function. EP physician will review. See interrogation under the cardiology tab in the [facility] [address] field for more details. Will continue to monitor remotely. Pacing (% of Time Since 12-Oct-2021)   98.9% (MVP Off)  AP 11.3%  Episodes Since: 12-Oct-2021  1 Monitored VT and 5 Non-sustained VT-EGMS show NSVT -longest 11 sec. VT-NS 2 15-Nov-2021 11:32 -? Termination-see egm. HE IS NOT ON AMIO AS OF 8/26/21.    8/26/21-Continue Eliquis twice daily given intracardiac mass and atrial fibrillation history is. Presently off his amiodarone with thyroid complication and will have EP follow-up. Echo:     TTE 8/21/21:   Summary   Limited echo for LV function and pericardial effusion/EF   Normal left ventricle size and wall thickness.   The left ventricular systolic function is normal at 55%.   Abnormal (paradoxical) septal motion is present likely due to right   ventricular pacing.   . Intra-cardiac mass, Right atrium, close proximity to AVN  Stress Test:  Exercise stress echo 4/15/21:  Conclusions      Summary   Normal stress ECHO.

## 2022-01-14 ENCOUNTER — HOSPITAL ENCOUNTER (OUTPATIENT)
Dept: CT IMAGING | Age: 67
Discharge: HOME | End: 2022-01-14
Payer: MEDICARE

## 2022-01-14 DIAGNOSIS — C83.38 DIFFUSE LARGE B-CELL LYMPHOMA OF LYMPH NODES OF MULTIPLE SITES (HCC): ICD-10-CM

## 2022-01-14 PROCEDURE — 74177 CT ABD & PELVIS W/CONTRAST: CPT

## 2022-01-14 PROCEDURE — 6360000004 HC RX CONTRAST MEDICATION: Performed by: INTERNAL MEDICINE

## 2022-01-14 RX ADMIN — IOPAMIDOL 75 ML: 755 INJECTION, SOLUTION INTRAVENOUS at 08:15

## 2022-01-14 RX ADMIN — IOHEXOL 50 ML: 240 INJECTION, SOLUTION INTRATHECAL; INTRAVASCULAR; INTRAVENOUS; ORAL at 08:14

## 2022-02-10 ENCOUNTER — NURSE ONLY (OUTPATIENT)
Dept: CARDIOLOGY CLINIC | Age: 67
End: 2022-02-10

## 2022-02-10 DIAGNOSIS — Z95.0 PACEMAKER: ICD-10-CM

## 2022-02-11 ENCOUNTER — OFFICE VISIT (OUTPATIENT)
Dept: CARDIOLOGY CLINIC | Age: 67
End: 2022-02-11
Payer: MEDICARE

## 2022-02-11 VITALS
DIASTOLIC BLOOD PRESSURE: 70 MMHG | SYSTOLIC BLOOD PRESSURE: 130 MMHG | BODY MASS INDEX: 23.52 KG/M2 | HEART RATE: 78 BPM | HEIGHT: 71 IN | OXYGEN SATURATION: 99 % | WEIGHT: 168 LBS

## 2022-02-11 DIAGNOSIS — I48.0 PAF (PAROXYSMAL ATRIAL FIBRILLATION) (HCC): Primary | ICD-10-CM

## 2022-02-11 DIAGNOSIS — I47.29 NSVT (NONSUSTAINED VENTRICULAR TACHYCARDIA): ICD-10-CM

## 2022-02-11 DIAGNOSIS — I44.2 CHB (COMPLETE HEART BLOCK) (HCC): ICD-10-CM

## 2022-02-11 PROCEDURE — 99214 OFFICE O/P EST MOD 30 MIN: CPT | Performed by: NURSE PRACTITIONER

## 2022-02-11 RX ORDER — METOPROLOL SUCCINATE 25 MG/1
25 TABLET, EXTENDED RELEASE ORAL DAILY
Qty: 30 TABLET | Refills: 3 | Status: SHIPPED | OUTPATIENT
Start: 2022-02-11 | End: 2022-07-18

## 2022-02-11 NOTE — PATIENT INSTRUCTIONS
Continue Eliquis     Start metoprolol     Remote device transmissions every three months      Follow up in 6 months
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
## 2022-02-21 ENCOUNTER — PATIENT MESSAGE (OUTPATIENT)
Dept: INTERNAL MEDICINE CLINIC | Age: 67
End: 2022-02-21

## 2022-02-21 RX ORDER — TAMSULOSIN HYDROCHLORIDE 0.4 MG/1
0.4 CAPSULE ORAL DAILY
Qty: 30 CAPSULE | Refills: 2 | Status: SHIPPED | OUTPATIENT
Start: 2022-02-21 | End: 2022-03-30

## 2022-02-21 NOTE — TELEPHONE ENCOUNTER
Refill request for flomax  medication. [facility]       Last visit - 12-1-2021     Pending visit - none     Last refill -11-      Medication Contract signed -   Last Oarrs ran-         Additional Comments   From: Gilda Hadley  To: Subhash Olvera  Sent: 2/21/2022  9:43 AM EST  Subject: Prostate pill prescription     Ronal, back in November you prescribed  this med. Can I get a refill ? Seems to work ok.   Thanks , Mile Thomas

## 2022-03-07 RX ORDER — OMEPRAZOLE 20 MG/1
20 CAPSULE, DELAYED RELEASE ORAL
Qty: 90 CAPSULE | Refills: 1 | OUTPATIENT
Start: 2022-03-07

## 2022-03-07 NOTE — TELEPHONE ENCOUNTER
Received refill request for Omeprazole from [facility].     Last ov:02/11/2022 NPAM    Last Refill:04/08/2021 #90 w/ 1 refill    Next appointment:none

## 2022-03-17 ENCOUNTER — HOSPITAL ENCOUNTER (OUTPATIENT)
Dept: PET IMAGING | Age: 67
Discharge: HOME | End: 2022-03-17
Payer: MEDICARE

## 2022-03-17 ENCOUNTER — HOSPITAL ENCOUNTER (OUTPATIENT)
Age: 67
Discharge: HOME | End: 2022-03-17
Payer: MEDICARE

## 2022-03-17 DIAGNOSIS — C83.38 DIFFUSE LARGE B-CELL LYMPHOMA OF LYMPH NODES OF MULTIPLE SITES (HCC): ICD-10-CM

## 2022-03-17 PROCEDURE — 78815 PET IMAGE W/CT SKULL-THIGH: CPT

## 2022-03-17 PROCEDURE — 3430000000 HC RX DIAGNOSTIC RADIOPHARMACEUTICAL: Performed by: INTERNAL MEDICINE

## 2022-03-17 PROCEDURE — A9552 F18 FDG: HCPCS | Performed by: INTERNAL MEDICINE

## 2022-03-17 RX ORDER — FLUDEOXYGLUCOSE F 18 200 MCI/ML
15 INJECTION, SOLUTION INTRAVENOUS
Status: COMPLETED | OUTPATIENT
Start: 2022-03-17 | End: 2022-03-17

## 2022-03-17 RX ADMIN — FLUDEOXYGLUCOSE F 18 15 MILLICURIE: 200 INJECTION, SOLUTION INTRAVENOUS at 15:52

## 2022-03-21 ENCOUNTER — TELEPHONE (OUTPATIENT)
Dept: PULMONOLOGY | Age: 67
End: 2022-03-21

## 2022-03-22 NOTE — TELEPHONE ENCOUNTER
Patient needs to be seen by a provider and may need bronchoscopy with EBUS -unfortunately I am on vacation -if Dr Stephanie Jang cannot see -may need to see if patient can be seen at [facility] and do the needful

## 2022-03-23 NOTE — TELEPHONE ENCOUNTER
Called pt. To schedule. He is not sure that he can take off of work. He will call me back. Tomorrow. If not we will have to have Dr. Demarcus Edward  send him to [facility] as we have no one else avil. Will keep Dr. Demarcus Edward in the loop.

## 2022-03-23 NOTE — TELEPHONE ENCOUNTER
I can see the patient on April 4 Monday afternoon at 1 PM however please have a bronchoscopy for EBUS set up for Tuesday at 10 AM with anesthesiology    However he cannot wait that long I agree that he could go to the [facility], [facility]. or one of the other [address] [place] [address] facilities

## 2022-03-25 NOTE — TELEPHONE ENCOUNTER
Spoke with patient and scheduled for 04/04/2022 1:00 pm with Dr. Aly Putnam. Patient not pleased with being told when the appointment would be as he states that he has a 40 minute drive from work. Advised patient to speak with Dr. Marce Peter regarding possible referral to another provider. Patient will keep this appointment and call back to cancel if decides to be seen elsewhere.     Message left at the [facility] to hold time for 04/05/2022 10:00 am

## 2022-03-30 NOTE — PROGRESS NOTES
Obstructive Sleep Apnea (CALIN) Screening     Patient:  Edward Harvey    YOB: 1955      Medical Record #:  [id number]                     Date:  3/30/2022     1. Are you a loud and/or regular snorer? []  Yes       [x] No    2. Have you been observed to gasp or stop breathing during sleep? []  Yes       [x] No    3. Do you feel tired or groggy upon awakening or do you awaken with a headache?           []  Yes       [] No    4. Are you often tired or fatigued during the wake time hours? []  Yes       [] No    5. Do you fall asleep sitting, reading, watching TV or driving? []  Yes       [] No    6. Do you often have problems with memory or concentration? []  Yes       [] No    **If patient's score is ? 3 they are considered high risk for CALIN. An Anesthesia provider will evaluate the patient and develop a plan of care the day of surgery. Note:  If the patient's BMI is more than 35 kg m¯² , has neck circumference > 40 cm, and/or high blood pressure the risk is greater (© American Sleep Apnea Association, 2006).

## 2022-04-04 ENCOUNTER — OFFICE VISIT (OUTPATIENT)
Dept: PULMONOLOGY | Age: 67
End: 2022-04-04
Payer: MEDICARE

## 2022-04-04 VITALS
BODY MASS INDEX: 23.94 KG/M2 | TEMPERATURE: 97.1 F | HEIGHT: 71 IN | HEART RATE: 81 BPM | RESPIRATION RATE: 16 BRPM | DIASTOLIC BLOOD PRESSURE: 79 MMHG | WEIGHT: 171 LBS | SYSTOLIC BLOOD PRESSURE: 133 MMHG | OXYGEN SATURATION: 98 %

## 2022-04-04 DIAGNOSIS — I30.0 ACUTE IDIOPATHIC PERICARDITIS: ICD-10-CM

## 2022-04-04 DIAGNOSIS — I48.0 PAF (PAROXYSMAL ATRIAL FIBRILLATION) (HCC): ICD-10-CM

## 2022-04-04 DIAGNOSIS — C85.28 MEDIASTINAL LARGE B-CELL LYMPHOMA OF LYMPH NODES OF MULTIPLE REGIONS (HCC): Primary | ICD-10-CM

## 2022-04-04 PROCEDURE — 99205 OFFICE O/P NEW HI 60 MIN: CPT | Performed by: INTERNAL MEDICINE

## 2022-04-04 ASSESSMENT — ENCOUNTER SYMPTOMS
RESPIRATORY NEGATIVE: 1
GASTROINTESTINAL NEGATIVE: 1
EYES NEGATIVE: 1
ALLERGIC/IMMUNOLOGIC NEGATIVE: 1

## 2022-04-04 NOTE — PROGRESS NOTES
Roxana Newberry (: 1955 ) is a 69 y.o. male here for an evaluation of   Chief Complaint   Patient presents with    New Patient     need bronch r/b Mumtaz Washburn          ASSESSMENT/PLAN:   Diagnosis Orders   1. 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 PAF (paroxysmal atrial fibrillation) ([facility] [address]     3. Acute idiopathic pericarditis         Biopsy taken 10/12/2021 by thoracic surgeon  FINAL DIAGNOSIS:     Paraesophageal Tissue, Endobronchial Ultrasound-Guided Fine Needle   Aspiration:   - Large B-cell lymphoma. PET scan done 3/17/2022    Impression   Focal hypermetabolism at bilateral marychuy and within a pretracheal lymph node,   for which lymphoma could be considered given patient history.  Granulomatous   or reactive lymph nodes can also have this distribution.       Resolution of previously demonstrated left lower lobe pulmonary nodule since   prior CT.  Additional smaller right-sided pulmonary nodules are not   significantly changed.       Relatively diffuse gastric activity is seen, which can be physiologic or post   treatment related.  No intense focal activity is seen with regard to the   previous history of gastric mass.  Continued attention on follow-up suggested.       RECOMMENDATIONS:   Unavailable     This is 4R                  Will need to stop the eliquis none from today and tomorrow am    Set up for 10 am EBUS          No follow-ups on file. SUBJECTIVE/OBJECTIVE:    Consult from Dr. Nadia June  For mediastinal adenopathy  First seen 2022        Afib for 1 year and on eliquis      Had work up for B Cell lymphoma this was 10/2021  And was on chemo and     xrt prior to chemo  Mass on salivary gland of 2021          No fevers, chills and night sweats    No hemoptysis        Review of Systems   Constitutional: Negative. HENT: Negative. Eyes: Negative. Respiratory: Negative. Cardiovascular: Negative. Gastrointestinal: Negative. Endocrine: Negative. Genitourinary: Negative. Musculoskeletal: Negative. Skin: Negative. Allergic/Immunologic: Negative. Neurological: Negative. Hematological: Negative. Psychiatric/Behavioral: Negative. Vitals:    04/04/22 1306   BP: 133/79   Pulse: 81   Resp: 16   Temp: 97.1 °F (36.2 °C)   TempSrc: Temporal   SpO2: 98%   Weight: 171 lb (77.6 kg)   Height: 5' 11\" (1.803 m)        Physical Exam  Vitals and nursing note reviewed. Constitutional:       General: He is not in acute distress. Appearance: Normal appearance. He is not ill-appearing. HENT:      Head: Normocephalic and atraumatic. Right Ear: External ear normal.      Left Ear: External ear normal.      Nose: Nose normal.      Mouth/Throat:      Mouth: Mucous membranes are moist.      Pharynx: Oropharynx is clear. Comments: Mallampati 2  Eyes:      General: No scleral icterus. Extraocular Movements: Extraocular movements intact. Conjunctiva/sclera: Conjunctivae normal.      Pupils: Pupils are equal, round, and reactive to light. Cardiovascular:      Rate and Rhythm: Normal rate and regular rhythm. Pulses: Normal pulses. Heart sounds: Normal heart sounds. No murmur heard. No friction rub. Pulmonary:      Effort: No respiratory distress. Breath sounds: No wheezing or rhonchi. Abdominal:      General: Abdomen is flat. Bowel sounds are normal. There is no distension. Tenderness: There is no abdominal tenderness. There is no guarding. Musculoskeletal:         General: No swelling or tenderness. Normal range of motion. Cervical back: Normal range of motion and neck supple. No rigidity. Skin:     General: Skin is warm and dry. Coloration: Skin is not jaundiced. Neurological:      General: No focal deficit present. Mental Status: He is alert and oriented to person, place, and time. Mental status is at baseline. Cranial Nerves: No cranial nerve deficit. Sensory: No sensory deficit. Motor: No weakness. Gait: Gait normal.   Psychiatric:         Mood and Affect: Mood normal.         Thought Content:  Thought content normal.         Judgment: Judgment normal.              Ying Tanner MD

## 2022-04-04 NOTE — LETTER
4/4/22        Shayy Ochoa      I have seen this patient in the office today and wanted to communicate my findings and recommendations. Patient Instructions     ASSESSMENT/PLAN:   Diagnosis Orders   1. 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 PAF (paroxysmal atrial fibrillation) ([facility] [address]     3. Acute idiopathic pericarditis         Biopsy taken 10/12/2021 by thoracic surgeon  FINAL DIAGNOSIS:     Paraesophageal Tissue, Endobronchial Ultrasound-Guided Fine Needle   Aspiration:   - Large B-cell lymphoma.      PET scan done 3/17/2022    Impression   Focal hypermetabolism at bilateral marychuy and within a pretracheal lymph node,   for which lymphoma could be considered given patient history.  Granulomatous   or reactive lymph nodes can also have this distribution.       Resolution of previously demonstrated left lower lobe pulmonary nodule since   prior CT.  Additional smaller right-sided pulmonary nodules are not   significantly changed.       Relatively diffuse gastric activity is seen, which can be physiologic or post   treatment related.  No intense focal activity is seen with regard to the   previous history of gastric mass.  Continued attention on follow-up suggested.       RECOMMENDATIONS:   Unavailable     This is 4R                  Will need to stop the eliquis none from today and tomorrow am    Set up for 10 am EBUS                       Thank you for allowing me to assist in the care of the Brandon Abad MD

## 2022-04-04 NOTE — PROGRESS NOTES
MA Communication:   The following orders are received by verbal communication from Torres Basurto MD    Orders include:  Bronch scheduled for tomorrow 4/5/22       FU depending on results from Gio Gandhi

## 2022-04-04 NOTE — PATIENT INSTRUCTIONS
ASSESSMENT/PLAN:   Diagnosis Orders   1. 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 PAF (paroxysmal atrial fibrillation) ([address]     3. Acute idiopathic pericarditis         Biopsy taken 10/12/2021 by thoracic surgeon  FINAL DIAGNOSIS:     Paraesophageal Tissue, Endobronchial Ultrasound-Guided Fine Needle   Aspiration:   - Large B-cell lymphoma.      PET scan done 3/17/2022    Impression   Focal hypermetabolism at bilateral marychuy and within a pretracheal lymph node,   for which lymphoma could be considered given patient history.  Granulomatous   or reactive lymph nodes can also have this distribution.       Resolution of previously demonstrated left lower lobe pulmonary nodule since   prior CT.  Additional smaller right-sided pulmonary nodules are not   significantly changed.       Relatively diffuse gastric activity is seen, which can be physiologic or post   treatment related.  No intense focal activity is seen with regard to the   previous history of gastric mass.  Continued attention on follow-up suggested.       RECOMMENDATIONS:   Unavailable     This is 4R                  Will need to stop the eliquis none from today and tomorrow am    Set up for 10 am EBUS

## 2022-04-05 ENCOUNTER — ANESTHESIA EVENT (OUTPATIENT)
Dept: ENDOSCOPY | Age: 67
End: 2022-04-05
Payer: MEDICARE

## 2022-04-05 ENCOUNTER — HOSPITAL ENCOUNTER (OUTPATIENT)
Age: 67
Setting detail: OUTPATIENT SURGERY
Discharge: HOME | End: 2022-04-05
Attending: INTERNAL MEDICINE | Admitting: INTERNAL MEDICINE
Payer: MEDICARE

## 2022-04-05 ENCOUNTER — ANESTHESIA (OUTPATIENT)
Dept: ENDOSCOPY | Age: 67
End: 2022-04-05
Payer: MEDICARE

## 2022-04-05 VITALS
DIASTOLIC BLOOD PRESSURE: 76 MMHG | TEMPERATURE: 97.6 F | WEIGHT: 165 LBS | HEART RATE: 64 BPM | SYSTOLIC BLOOD PRESSURE: 123 MMHG | HEIGHT: 71 IN | BODY MASS INDEX: 23.1 KG/M2 | RESPIRATION RATE: 20 BRPM | OXYGEN SATURATION: 97 %

## 2022-04-05 VITALS — OXYGEN SATURATION: 97 % | DIASTOLIC BLOOD PRESSURE: 79 MMHG | SYSTOLIC BLOOD PRESSURE: 158 MMHG

## 2022-04-05 PROBLEM — I25.10 CAD IN NATIVE ARTERY: Status: ACTIVE | Noted: 2022-04-05

## 2022-04-05 PROCEDURE — 2709999900 HC NON-CHARGEABLE SUPPLY: Performed by: INTERNAL MEDICINE

## 2022-04-05 PROCEDURE — 7100000000 HC PACU RECOVERY - FIRST 15 MIN: Performed by: INTERNAL MEDICINE

## 2022-04-05 PROCEDURE — 88312 SPECIAL STAINS GROUP 1: CPT

## 2022-04-05 PROCEDURE — 2500000003 HC RX 250 WO HCPCS: Performed by: NURSE ANESTHETIST, CERTIFIED REGISTERED

## 2022-04-05 PROCEDURE — 88177 CYTP FNA EVAL EA ADDL: CPT

## 2022-04-05 PROCEDURE — 6360000002 HC RX W HCPCS: Performed by: NURSE ANESTHETIST, CERTIFIED REGISTERED

## 2022-04-05 PROCEDURE — 3609011900 HC BRONCHOSCOPY NEEDLE BX TRACHEA MAIN STEM&/BRON: Performed by: INTERNAL MEDICINE

## 2022-04-05 PROCEDURE — 31629 BRONCHOSCOPY/NEEDLE BX EACH: CPT | Performed by: INTERNAL MEDICINE

## 2022-04-05 PROCEDURE — 88305 TISSUE EXAM BY PATHOLOGIST: CPT

## 2022-04-05 PROCEDURE — 31625 BRONCHOSCOPY W/BIOPSY(S): CPT | Performed by: INTERNAL MEDICINE

## 2022-04-05 PROCEDURE — 88185 FLOWCYTOMETRY/TC ADD-ON: CPT

## 2022-04-05 PROCEDURE — 7100000001 HC PACU RECOVERY - ADDTL 15 MIN: Performed by: INTERNAL MEDICINE

## 2022-04-05 PROCEDURE — 7100000010 HC PHASE II RECOVERY - FIRST 15 MIN: Performed by: INTERNAL MEDICINE

## 2022-04-05 PROCEDURE — 88184 FLOWCYTOMETRY/ TC 1 MARKER: CPT

## 2022-04-05 PROCEDURE — 2720000010 HC SURG SUPPLY STERILE: Performed by: INTERNAL MEDICINE

## 2022-04-05 PROCEDURE — 3700000000 HC ANESTHESIA ATTENDED CARE: Performed by: INTERNAL MEDICINE

## 2022-04-05 PROCEDURE — 88173 CYTOPATH EVAL FNA REPORT: CPT

## 2022-04-05 PROCEDURE — 2580000003 HC RX 258: Performed by: INTERNAL MEDICINE

## 2022-04-05 PROCEDURE — 31624 DX BRONCHOSCOPE/LAVAGE: CPT | Performed by: INTERNAL MEDICINE

## 2022-04-05 PROCEDURE — 88172 CYTP DX EVAL FNA 1ST EA SITE: CPT

## 2022-04-05 PROCEDURE — 3700000001 HC ADD 15 MINUTES (ANESTHESIA): Performed by: INTERNAL MEDICINE

## 2022-04-05 PROCEDURE — 2580000003 HC RX 258: Performed by: NURSE ANESTHETIST, CERTIFIED REGISTERED

## 2022-04-05 PROCEDURE — 88112 CYTOPATH CELL ENHANCE TECH: CPT

## 2022-04-05 PROCEDURE — 3609027000 HC BRONCHOSCOPY: Performed by: INTERNAL MEDICINE

## 2022-04-05 PROCEDURE — 3603165200 HC BRNCHSC EBUS GUIDED SAMPL 1/2 NODE STATION/STRUX: Performed by: INTERNAL MEDICINE

## 2022-04-05 PROCEDURE — 3609011200 HC BRONCHOSCOPY W/TRANSBRONCL NDL ASPIR BX EA ADDL LOBE: Performed by: INTERNAL MEDICINE

## 2022-04-05 RX ORDER — ONDANSETRON 2 MG/ML
INJECTION INTRAMUSCULAR; INTRAVENOUS PRN
Status: DISCONTINUED | OUTPATIENT
Start: 2022-04-05 | End: 2022-04-05 | Stop reason: SDUPTHER

## 2022-04-05 RX ORDER — LABETALOL HYDROCHLORIDE 5 MG/ML
5 INJECTION, SOLUTION INTRAVENOUS EVERY 10 MIN PRN
Status: DISCONTINUED | OUTPATIENT
Start: 2022-04-05 | End: 2022-04-05 | Stop reason: HOSPADM

## 2022-04-05 RX ORDER — SODIUM CHLORIDE, SODIUM LACTATE, POTASSIUM CHLORIDE, CALCIUM CHLORIDE 600; 310; 30; 20 MG/100ML; MG/100ML; MG/100ML; MG/100ML
INJECTION, SOLUTION INTRAVENOUS CONTINUOUS PRN
Status: DISCONTINUED | OUTPATIENT
Start: 2022-04-05 | End: 2022-04-05 | Stop reason: SDUPTHER

## 2022-04-05 RX ORDER — SODIUM CHLORIDE 0.9 % (FLUSH) 0.9 %
5-40 SYRINGE (ML) INJECTION EVERY 12 HOURS SCHEDULED
Status: DISCONTINUED | OUTPATIENT
Start: 2022-04-05 | End: 2022-04-05 | Stop reason: HOSPADM

## 2022-04-05 RX ORDER — DIPHENHYDRAMINE HYDROCHLORIDE 50 MG/ML
12.5 INJECTION INTRAMUSCULAR; INTRAVENOUS
Status: DISCONTINUED | OUTPATIENT
Start: 2022-04-05 | End: 2022-04-05 | Stop reason: HOSPADM

## 2022-04-05 RX ORDER — PROPOFOL 10 MG/ML
INJECTION, EMULSION INTRAVENOUS PRN
Status: DISCONTINUED | OUTPATIENT
Start: 2022-04-05 | End: 2022-04-05 | Stop reason: SDUPTHER

## 2022-04-05 RX ORDER — LIDOCAINE HYDROCHLORIDE 10 MG/ML
0.3 INJECTION, SOLUTION EPIDURAL; INFILTRATION; INTRACAUDAL; PERINEURAL
Status: DISCONTINUED | OUTPATIENT
Start: 2022-04-05 | End: 2022-04-05 | Stop reason: HOSPADM

## 2022-04-05 RX ORDER — ROCURONIUM BROMIDE 10 MG/ML
INJECTION, SOLUTION INTRAVENOUS PRN
Status: DISCONTINUED | OUTPATIENT
Start: 2022-04-05 | End: 2022-04-05 | Stop reason: SDUPTHER

## 2022-04-05 RX ORDER — SODIUM CHLORIDE, SODIUM LACTATE, POTASSIUM CHLORIDE, CALCIUM CHLORIDE 600; 310; 30; 20 MG/100ML; MG/100ML; MG/100ML; MG/100ML
INJECTION, SOLUTION INTRAVENOUS CONTINUOUS
Status: DISCONTINUED | OUTPATIENT
Start: 2022-04-05 | End: 2022-04-05 | Stop reason: HOSPADM

## 2022-04-05 RX ORDER — SODIUM CHLORIDE, SODIUM LACTATE, POTASSIUM CHLORIDE, CALCIUM CHLORIDE 600; 310; 30; 20 MG/100ML; MG/100ML; MG/100ML; MG/100ML
INJECTION, SOLUTION INTRAVENOUS ONCE
Status: COMPLETED | OUTPATIENT
Start: 2022-04-05 | End: 2022-04-05

## 2022-04-05 RX ORDER — MEPERIDINE HYDROCHLORIDE 50 MG/ML
12.5 INJECTION INTRAMUSCULAR; INTRAVENOUS; SUBCUTANEOUS EVERY 5 MIN PRN
Status: DISCONTINUED | OUTPATIENT
Start: 2022-04-05 | End: 2022-04-05 | Stop reason: HOSPADM

## 2022-04-05 RX ORDER — SODIUM CHLORIDE 0.9 % (FLUSH) 0.9 %
5-40 SYRINGE (ML) INJECTION PRN
Status: DISCONTINUED | OUTPATIENT
Start: 2022-04-05 | End: 2022-04-05 | Stop reason: HOSPADM

## 2022-04-05 RX ORDER — DEXAMETHASONE SODIUM PHOSPHATE 10 MG/ML
INJECTION INTRAMUSCULAR; INTRAVENOUS PRN
Status: DISCONTINUED | OUTPATIENT
Start: 2022-04-05 | End: 2022-04-05 | Stop reason: SDUPTHER

## 2022-04-05 RX ORDER — FENTANYL CITRATE 50 UG/ML
INJECTION, SOLUTION INTRAMUSCULAR; INTRAVENOUS PRN
Status: DISCONTINUED | OUTPATIENT
Start: 2022-04-05 | End: 2022-04-05 | Stop reason: SDUPTHER

## 2022-04-05 RX ORDER — ONDANSETRON 2 MG/ML
4 INJECTION INTRAMUSCULAR; INTRAVENOUS
Status: DISCONTINUED | OUTPATIENT
Start: 2022-04-05 | End: 2022-04-05 | Stop reason: HOSPADM

## 2022-04-05 RX ORDER — LIDOCAINE HYDROCHLORIDE 20 MG/ML
INJECTION, SOLUTION EPIDURAL; INFILTRATION; INTRACAUDAL; PERINEURAL PRN
Status: DISCONTINUED | OUTPATIENT
Start: 2022-04-05 | End: 2022-04-05 | Stop reason: SDUPTHER

## 2022-04-05 RX ORDER — SODIUM CHLORIDE 9 MG/ML
INJECTION, SOLUTION INTRAVENOUS PRN
Status: DISCONTINUED | OUTPATIENT
Start: 2022-04-05 | End: 2022-04-05 | Stop reason: HOSPADM

## 2022-04-05 RX ORDER — SODIUM CHLORIDE 9 MG/ML
25 INJECTION, SOLUTION INTRAVENOUS PRN
Status: DISCONTINUED | OUTPATIENT
Start: 2022-04-05 | End: 2022-04-05 | Stop reason: HOSPADM

## 2022-04-05 RX ADMIN — SODIUM CHLORIDE, SODIUM LACTATE, POTASSIUM CHLORIDE, AND CALCIUM CHLORIDE: .6; .31; .03; .02 INJECTION, SOLUTION INTRAVENOUS at 10:02

## 2022-04-05 RX ADMIN — SODIUM CHLORIDE, POTASSIUM CHLORIDE, SODIUM LACTATE AND CALCIUM CHLORIDE: 600; 310; 30; 20 INJECTION, SOLUTION INTRAVENOUS at 09:41

## 2022-04-05 RX ADMIN — PROPOFOL 200 MG: 10 INJECTION, EMULSION INTRAVENOUS at 10:05

## 2022-04-05 RX ADMIN — FENTANYL CITRATE 100 MCG: 50 INJECTION INTRAMUSCULAR; INTRAVENOUS at 10:02

## 2022-04-05 RX ADMIN — SUGAMMADEX 200 MG: 100 INJECTION, SOLUTION INTRAVENOUS at 10:43

## 2022-04-05 RX ADMIN — ROCURONIUM BROMIDE 50 MG: 10 SOLUTION INTRAVENOUS at 10:05

## 2022-04-05 RX ADMIN — DEXAMETHASONE SODIUM PHOSPHATE 10 MG: 10 INJECTION INTRAMUSCULAR; INTRAVENOUS at 10:05

## 2022-04-05 RX ADMIN — LIDOCAINE HYDROCHLORIDE 60 MG: 20 INJECTION, SOLUTION EPIDURAL; INFILTRATION; INTRACAUDAL; PERINEURAL at 10:05

## 2022-04-05 RX ADMIN — ONDANSETRON 4 MG: 2 INJECTION INTRAMUSCULAR; INTRAVENOUS at 10:02

## 2022-04-05 ASSESSMENT — PAIN SCALES - GENERAL
PAINLEVEL_OUTOF10: 0

## 2022-04-05 ASSESSMENT — ENCOUNTER SYMPTOMS
GASTROINTESTINAL NEGATIVE: 1
ALLERGIC/IMMUNOLOGIC NEGATIVE: 1
RESPIRATORY NEGATIVE: 1
EYES NEGATIVE: 1

## 2022-04-05 NOTE — PROGRESS NOTES
POCT      Blood Glucose:      (Normal Range 70-99)    PT:12.1      (Normal Range 9.8 - 13)    INR:1.0      (Normal Range 0.86-1.14)
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
## 2022-04-05 NOTE — OP NOTE
Operative Note      Patient: Roxana Newberry  YOB: 1955  MRN: [id number]    Date of Procedure: 4/5/2022    Pre-Op Diagnosis: Mediastinal adenopathy [R59.0]    Post-Op Diagnosis: Same       Procedure(s):  BRONCHOSCOPY,  ENDOBRONCHIAL ULTRASOUND WITH ANESTHESIA AND PATHOLOGY  BRONCHOSCOPY/TRANSBRONCHIAL NEEDLE BIOPSY  BRONCHOSCOPY/TRANSBRONCHIAL NEEDLE BIOPSY ADDL LOBE  BRONCHOSCOPY DIAGNOSTIC OR CELL [address] ONLY    Surgeon(s):  Killian Nelson MD    Assistant:   * No surgical staff found *    Anesthesia: General    Estimated Blood Loss (mL): Minimal    Complications: None    Specimens:   * No specimens in log *    Implants:  * No implants in log *      Drains: * No LDAs found *    Findings: Mediastinal enlargement at the 4R, 7, 11 L and the 11 R    Detailed Description of Procedure:   Consent was signed. Timeout was taken. Patient was intubated by anesthesiology. Once patient was intubated bronchoscope was introduced through a T-tube adapter right into the airways. Evaluated the airways preliminary there is no endobronchial lesions no mucosal abnormalities some widening of the of the main vance but most of the vance is sharp. Once this was evaluated bronchoscope was pulled back and ET tube was pulled back in order to access the 4R position. This then secured the ET tube. Then cut the ET tube at the [address] [place] [address]. Once this was done we established vent support. EBUS was introduced. Went to the 4R position. 3 biopsies were taken there. Large lymph node. Went to the 7 position 2 biopsies were taken there. Went to the 10 R position lymph node was identified but was behind to the blood vessels to do a safe biopsy. Went to the 11 R position was able to get 2 biopsies there. Then went to the 4L position cannot get a clear biopsy. Went to the 10 L position again no lymph node that was significant to biopsy. Went to the 11 L position large lymph node seen there. This was biopsied 3 times.   Sharath Suarez onsite was able to give any pulmonary that there was no site seen in this for more than 7 pallavi positions. Went back to the 4R position and did flow cytometry biopsy. Patient tolerate procedure well    Preliminary path report negative for malignancy. However the lymphocytes were seen. Flow was taken. Pathology pending  Discussed with wife.     No immediate complications    Electronically signed by Hector Arellano MD on 4/5/2022 at 10:51 AM
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
## 2022-04-05 NOTE — ANESTHESIA POSTPROCEDURE EVALUATION
Department of Anesthesiology  Postprocedure Note    Patient: Krish Potts  MRN: [id number]  YOB: 1955  Date of evaluation: 4/5/2022  Time:  1:19 PM     Procedure Summary     Date: 04/05/22 Room / Location: [address] [place] [address] / [facility]    Anesthesia Start: 1002 Anesthesia Stop: 3044    Procedures:       BRONCHOSCOPY,  ENDOBRONCHIAL ULTRASOUND WITH ANESTHESIA AND PATHOLOGY (N/A )      BRONCHOSCOPY/TRANSBRONCHIAL NEEDLE BIOPSY      BRONCHOSCOPY/TRANSBRONCHIAL NEEDLE BIOPSY ADDL LOBE      BRONCHOSCOPY DIAGNOSTIC OR CELL [address] ONLY Diagnosis:       Mediastinal adenopathy      (Mediastinal adenopathy [R59.0])    Surgeons: Tiffanie Bell MD Responsible Provider: Karolina Rosa MD    Anesthesia Type: general ASA Status: 3          Anesthesia Type: general    Aly Phase I: Aly Score: 9    Aly Phase II:      Last vitals: Reviewed and per EMR flowsheets.        Anesthesia Post Evaluation    Comments: Postoperative Anesthesia Note    Name:    Krish Potts  MRN:      [id number]    Patient Vitals in the past 12 hrs:  04/05/22 1130, BP:123/76, Temp:97.6 °F (36.4 °C), Temp src:Temporal, Pulse:64, Resp:20, SpO2:97 %  04/05/22 1115, BP:123/75, Pulse:64, Resp:20, SpO2:97 %  04/05/22 1101, BP:135/73, Pulse:64, Resp:22, SpO2:92 %  04/05/22 1056, BP:134/76, Pulse:68, Resp:22, SpO2:92 %  04/05/22 1051, BP:139/72, Temp:97.4 °F (36.3 °C), Temp src:Temporal, Pulse:69, Resp:22, SpO2:94 %  04/05/22 0933, BP:(!) 144/85, Temp:97.6 °F (36.4 °C), Pulse:81, Resp:16, SpO2:98 %     LABS:    CBC  Lab Results       Component                Value               Date/Time                  WBC                      7.4                 09/17/2021 09:49 AM        HGB                      12.9 (L)            09/17/2021 09:49 AM        HCT                      38.7 (L)            09/17/2021 09:49 AM        PLT                      329                 09/17/2021 09:49 AM   RENAL  Lab Results       Component Value               Date/Time                  NA                       141                 12/02/2021 11:15 AM        K                        4.1                 12/02/2021 11:15 AM        CL                       107                 12/02/2021 11:15 AM        CO2                      24                  12/02/2021 11:15 AM        BUN                      16                  12/02/2021 11:15 AM        CREATININE               0.9                 12/02/2021 11:15 AM        GLUCOSE                  94                  12/02/2021 11:15 AM   COAGS  Lab Results       Component                Value               Date/Time                  PROTIME                  11.2                09/17/2021 09:49 AM        INR                      0.99                09/17/2021 09:49 AM        APTT                     28.9                08/24/2021 04:57 PM     Intake & Output:  @[facility]@    Nausea & Vomiting:  No    Level of Consciousness:  Awake    Pain Assessment:  Adequate analgesia    Anesthesia Complications:  No apparent anesthetic complications    SUMMARY      Vital signs stable  OK to discharge from Stage I post anesthesia care.   Care transferred from Anesthesiology department on discharge from perioperative area

## 2022-04-11 ENCOUNTER — TELEPHONE (OUTPATIENT)
Dept: PULMONOLOGY | Age: 67
End: 2022-04-11

## 2022-04-11 NOTE — TELEPHONE ENCOUNTER
FINAL DIAGNOSIS:     A.  Lymph node, 4R, transbronchial ultrasound guided fine needle   aspiration:   - Granulomas present.   - No malignant cells identified. B. Lymph node, Station 7, transbronchial ultrasound guided fine needle   aspiration:   - Granulomas present.   - No malignant cells identified. C. Lymph node, 11R, transbronchial ultrasound guided fine needle   aspiration:   - Granulomas present.   - No malignant cells identified. D. Lymph node, 11L, transbronchial ultrasound guided fine needle   aspiration:   - Granulomas present.   - No malignant cells identified. E. Bronchial washing:   - No malignant cells identified. nonnecrotizing granulomas includes   sarcoidosis and infection (although the stains for microorganisms are   negative), among others, and clinical correlation is recommended. ,          Lymphocytes   No B-cell population identified.      ANTIBODIES USED/RESULTS:   CD5 [*], CD10 [*], CD19 [*], CD20 [*], Kappa light chain [*], Lambda   light chain [*]   * = No abnormal population identified           Called the patient over the phone  Went over the results that this is most likely consistent with sarcoid  Nothing really to do at this point  Can follow with periodic CT scans at a 6 to 12-month interval  I only suggested that the patient have a ophthalmology goal exam for sarcoid  Otherwise he is relatively asymptomatic  I can follow as needed

## 2022-04-12 ENCOUNTER — NURSE ONLY (OUTPATIENT)
Dept: CARDIOLOGY CLINIC | Age: 67
End: 2022-04-12
Payer: MEDICARE

## 2022-04-12 DIAGNOSIS — I44.2 COMPLETE HEART BLOCK (HCC): ICD-10-CM

## 2022-04-12 DIAGNOSIS — Z95.0 PACEMAKER: ICD-10-CM

## 2022-04-12 DIAGNOSIS — I44.2 THIRD DEGREE AV BLOCK (HCC): ICD-10-CM

## 2022-04-12 NOTE — PROGRESS NOTES
Remote transmission received for patient's dual chamber PACEMAKER. Transmission shows normal sensing and pacing function. EP physician will review. See interrogation under the cardiology tab in the [address] [facility] [address] field for more details. Will continue to monitor remotely. Hx NSVT/PAF/INTRACARDIAC MASS (OAC, TOPROL XL). Fouzia Bass HE IS NOT ON AMIO AS OF 8/26/21. Episodes Since: 10-Feb-2022  1 Non-sustained VT. Pacing (% of Time Since 10-Feb-2022)   100.0% (MVP Off)  AP 33.5%    8/26/21-Continue Eliquis twice daily given intracardiac mass and atrial fibrillation history is. Presently off his amiodarone with thyroid complication and will have EP follow-up. Echo:     TTE 8/21/21:   Summary   Limited echo for LV function and pericardial effusion/EF   Normal left ventricle size and wall thickness.   The left ventricular systolic function is normal at 55%.   Abnormal (paradoxical) septal motion is present likely due to right   ventricular pacing.   . Intra-cardiac mass, Right atrium, close proximity to AVN  Stress Test:  Exercise stress echo 4/15/21:  Conclusions     Summary   Normal stress ECHO.

## 2022-04-15 PROCEDURE — 93296 REM INTERROG EVL PM/IDS: CPT | Performed by: INTERNAL MEDICINE

## 2022-04-15 PROCEDURE — 93294 REM INTERROG EVL PM/LDLS PM: CPT | Performed by: INTERNAL MEDICINE

## 2022-05-31 NOTE — TELEPHONE ENCOUNTER
Refill request for tamsulosin  medication.      Name of [facility]      Last visit - 5-7-2021     Pending visit - none     Last refill -2-21-22      Medication Contract signed -   Mati arceo-         Additional Comments

## 2022-06-01 RX ORDER — TAMSULOSIN HYDROCHLORIDE 0.4 MG/1
CAPSULE ORAL
Qty: 30 CAPSULE | Refills: 0 | Status: SHIPPED | OUTPATIENT
Start: 2022-06-01 | End: 2022-07-28 | Stop reason: SDUPTHER

## 2022-06-03 ENCOUNTER — OFFICE VISIT (OUTPATIENT)
Dept: INTERNAL MEDICINE CLINIC | Age: 67
End: 2022-06-03
Payer: MEDICARE

## 2022-06-03 ENCOUNTER — TELEPHONE (OUTPATIENT)
Dept: INTERNAL MEDICINE CLINIC | Age: 67
End: 2022-06-03

## 2022-06-03 VITALS
OXYGEN SATURATION: 98 % | HEART RATE: 68 BPM | WEIGHT: 176 LBS | DIASTOLIC BLOOD PRESSURE: 74 MMHG | SYSTOLIC BLOOD PRESSURE: 128 MMHG | BODY MASS INDEX: 24.55 KG/M2

## 2022-06-03 DIAGNOSIS — I44.2 COMPLETE HEART BLOCK (HCC): ICD-10-CM

## 2022-06-03 DIAGNOSIS — Z23 NEED FOR PNEUMOCOCCAL VACCINATION: ICD-10-CM

## 2022-06-03 DIAGNOSIS — I48.0 PAF (PAROXYSMAL ATRIAL FIBRILLATION) (HCC): ICD-10-CM

## 2022-06-03 DIAGNOSIS — C85.28 MEDIASTINAL LARGE B-CELL LYMPHOMA OF LYMPH NODES OF MULTIPLE REGIONS (HCC): Primary | ICD-10-CM

## 2022-06-03 DIAGNOSIS — R73.02 IGT (IMPAIRED GLUCOSE TOLERANCE): ICD-10-CM

## 2022-06-03 DIAGNOSIS — C83.38: ICD-10-CM

## 2022-06-03 DIAGNOSIS — F12.90 MARIJUANA USE: ICD-10-CM

## 2022-06-03 PROBLEM — F10.10 ALCOHOL ABUSE: Status: RESOLVED | Noted: 2021-04-16 | Resolved: 2022-06-03

## 2022-06-03 PROBLEM — I31.9 PERICARDITIS: Status: RESOLVED | Noted: 2021-03-22 | Resolved: 2022-06-03

## 2022-06-03 PROBLEM — K40.90 NON-RECURRENT UNILATERAL INGUINAL HERNIA WITHOUT OBSTRUCTION OR GANGRENE: Status: RESOLVED | Noted: 2021-11-23 | Resolved: 2022-06-03

## 2022-06-03 PROBLEM — N17.9 AKI (ACUTE KIDNEY INJURY) (HCC): Status: RESOLVED | Noted: 2021-08-20 | Resolved: 2022-06-03

## 2022-06-03 PROCEDURE — 1123F ACP DISCUSS/DSCN MKR DOCD: CPT | Performed by: NURSE PRACTITIONER

## 2022-06-03 PROCEDURE — G0009 ADMIN PNEUMOCOCCAL VACCINE: HCPCS | Performed by: NURSE PRACTITIONER

## 2022-06-03 PROCEDURE — 90732 PPSV23 VACC 2 YRS+ SUBQ/IM: CPT | Performed by: NURSE PRACTITIONER

## 2022-06-03 PROCEDURE — 99214 OFFICE O/P EST MOD 30 MIN: CPT | Performed by: NURSE PRACTITIONER

## 2022-06-03 ASSESSMENT — ENCOUNTER SYMPTOMS
SINUS PRESSURE: 0
SORE THROAT: 0
VOMITING: 0
NAUSEA: 0
FACIAL SWELLING: 0
DIARRHEA: 0
COUGH: 0

## 2022-06-03 NOTE — TELEPHONE ENCOUNTER
Patient asked for Viagra s/t ED. Is there any contraindication from his cardiac standpoint for this medication?

## 2022-06-03 NOTE — PROGRESS NOTES
Maco Agee  1955        Chief Complaint   Patient presents with    Medication Check       Assessment/Plan:     1. 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 PAF (paroxysmal atrial fibrillation) (HCC)  Stable, f/u Cardiology. Maintain Eliquis. 3. 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 Schedule eye appt this summer. 4. Marijuana use  Stable. 5. IGT (impaired glucose tolerance)  Monitor    6. Complete heart block [facility])  Cardiology manages. 7. Need for pneumococcal vaccination  Pt tolerated well    - Pneumococcal, PPSV23, PNEUMOVAX 23, (age 2 yrs+), SC/IM    Enc COVID booster and Tdap at local pharmacy. Consider PCV20 in future    Return in about 6 months (around 12/3/2022) for Medicare wellness. HPI:      Dx Diffuse non Hodgkin's lymphoma, large cell. CT scan 8/2022, 6 month f/u scan. Dr Brijesh Howard manages. Finished radiation and chemo in 2021. Setting up appt for eye doctor. Dr Abbie Tripp recommended with sarcoid noted on EBUS. PAF. Complete heart block. Pacemaker placed. [facility] Cardiology manages. Eliquis monitor. Working full time. Energy level is good. Returned to cycling. Good family support. Traveling this summer. Due COVID booster #2. /74 (Site: Right Upper Arm, Position: Sitting, Cuff Size: Medium Adult)   Pulse 68   Wt 176 lb (79.8 kg)   SpO2 98%   BMI 24.55 kg/m²     Prior to Visit Medications    Medication Sig Taking?  Authorizing Provider   tamsulosin (FLOMAX) 0.4 mg capsule TAKE ONE CAPSULE BY MOUTH DAILY Yes Ronal Wahl APRN - CNP   metoprolol succinate (TOPROL XL) 25 MG extended release tablet Take 1 tablet by mouth daily Yes MEI Contreras - CNP   apixaban (ELIQUIS) 5 MG TABS tablet Take 1 tablet by mouth 2 times daily Yes El Maher MD     Family History   Problem Relation Age of Onset    Stroke Mother    [facility] Heart Disease Father     Alzheimer's Disease Father      Social History     Socioeconomic History    Marital status:      Spouse name: Not on file    Number of children: Not on file    Years of education: Not on file    Highest education level: Not on file   Occupational History    Not on file   Tobacco Use    Smoking status: Never Smoker    Smokeless tobacco: Never Used   Vaping Use    Vaping Use: Never used   Substance and Sexual Activity    Alcohol use: Yes     Alcohol/week: 7.0 standard drinks     Types: 7 Cans of beer per week    Drug use: No    Sexual activity: Yes     Partners: Female   Other Topics Concern    Not on file   Social History Narrative    Not on file     Social Determinants of Health     Financial Resource Strain: Low Risk     Difficulty of Paying Living Expenses: Not hard at all   Food Insecurity: No Food Insecurity    Worried About [address] in the Last Year: Never true    [address] [employer] in the Last Year: Never true   Transportation Needs:     Lack of Transportation (Medical): Not on file    Lack of Transportation (Non-Medical):  Not on file   Physical Activity:     Days of Exercise per Week: Not on file    Minutes of Exercise per Session: Not on file   Stress:     Feeling of Stress : Not on file   Social Connections:     Frequency of Communication with Friends and Family: Not on file    Frequency of Social Gatherings with Friends and Family: Not on file    Attends Oriental orthodox Services: Not on file    Active Member of Clubs or Organizations: Not on file    Attends Club or Organization Meetings: Not on file    Marital Status: Not on file   Intimate Partner Violence:     Fear of Current or Ex-Partner: Not on file    Emotionally Abused: Not on file    Physically Abused: Not on file    Sexually Abused: Not on file   Housing Stability:     Unable to Pay for Housing in the Last Year: Not on file    Number of [place] in the Last Year: Not on file  Unstable Housing in the Last Year: Not on file       Review of Systems   Constitutional: Negative for appetite change, chills, fatigue, fever and unexpected weight change. HENT: Negative for congestion, ear discharge, ear pain, facial swelling, hearing loss, sinus pressure, sneezing and sore throat. Respiratory: Negative for cough. Cardiovascular: Negative for chest pain. Gastrointestinal: Negative for diarrhea, nausea and vomiting. Genitourinary: Negative for difficulty urinating, dysuria, hematuria and urgency. Musculoskeletal: Negative for arthralgias and gait problem. Neurological: Negative for dizziness, weakness and headaches. Hematological: Negative for adenopathy. Psychiatric/Behavioral: Negative for sleep disturbance and suicidal ideas. Physical Exam  Vitals reviewed. Constitutional:       Appearance: He is normal weight. HENT:      Head: Normocephalic. Neck:      Vascular: No carotid bruit. Cardiovascular:      Rate and Rhythm: Normal rate and regular rhythm. Heart sounds: Normal heart sounds. Pulmonary:      Effort: Pulmonary effort is normal.      Breath sounds: Normal breath sounds. Musculoskeletal:      Right lower leg: No edema. Left lower leg: No edema. Neurological:      General: No focal deficit present. Mental Status: He is alert and oriented to person, place, and time. Psychiatric:         Mood and Affect: Mood normal.         Thought Content: Thought content normal.         Judgment: Judgment normal.             See above plan.          Kerrie Doran, MEI - CNP

## 2022-06-06 RX ORDER — SILDENAFIL 100 MG/1
100 TABLET, FILM COATED ORAL DAILY PRN
Qty: 10 TABLET | Refills: 0 | Status: SHIPPED | OUTPATIENT
Start: 2022-06-06

## 2022-07-12 ENCOUNTER — NURSE ONLY (OUTPATIENT)
Dept: CARDIOLOGY CLINIC | Age: 67
End: 2022-07-12
Payer: MEDICARE

## 2022-07-12 DIAGNOSIS — I44.2 COMPLETE HEART BLOCK (HCC): ICD-10-CM

## 2022-07-12 DIAGNOSIS — Z95.0 PACEMAKER: ICD-10-CM

## 2022-07-12 PROCEDURE — 93296 REM INTERROG EVL PM/IDS: CPT | Performed by: INTERNAL MEDICINE

## 2022-07-12 PROCEDURE — 93294 REM INTERROG EVL PM/LDLS PM: CPT | Performed by: INTERNAL MEDICINE

## 2022-07-13 NOTE — PROGRESS NOTES
Remote transmission received for patient's dual chamber PACEMAKER. Transmission shows normal sensing and pacing function. EP physician will review. See interrogation under the cardiology tab in the [address] [place] [address] [address] field for more details. Will continue to monitor remotely. No arrhythmia recordings. End of 91-day monitoring period 7-12-22.

## 2022-07-15 DIAGNOSIS — I48.0 PAF (PAROXYSMAL ATRIAL FIBRILLATION) (HCC): Primary | ICD-10-CM

## 2022-07-18 RX ORDER — METOPROLOL SUCCINATE 25 MG/1
TABLET, EXTENDED RELEASE ORAL
Qty: 30 TABLET | Refills: 3 | Status: SHIPPED | OUTPATIENT
Start: 2022-07-18

## 2022-10-07 ENCOUNTER — HOSPITAL ENCOUNTER (OUTPATIENT)
Dept: PET IMAGING | Age: 67
Discharge: HOME | End: 2022-10-07
Payer: MEDICARE

## 2022-10-07 DIAGNOSIS — C83.30 DIFFUSE LARGE B-CELL LYMPHOMA, UNSPECIFIED BODY REGION (HCC): ICD-10-CM

## 2022-10-07 PROCEDURE — A9552 F18 FDG: HCPCS | Performed by: INTERNAL MEDICINE

## 2022-10-07 PROCEDURE — 78815 PET IMAGE W/CT SKULL-THIGH: CPT

## 2022-10-07 PROCEDURE — 3430000000 HC RX DIAGNOSTIC RADIOPHARMACEUTICAL: Performed by: INTERNAL MEDICINE

## 2022-10-07 RX ORDER — FLUDEOXYGLUCOSE F 18 200 MCI/ML
15.43 INJECTION, SOLUTION INTRAVENOUS
Status: COMPLETED | OUTPATIENT
Start: 2022-10-07 | End: 2022-10-07

## 2022-10-07 RX ADMIN — FLUDEOXYGLUCOSE F 18 15.43 MILLICURIE: 200 INJECTION, SOLUTION INTRAVENOUS at 07:48

## 2022-10-11 ENCOUNTER — NURSE ONLY (OUTPATIENT)
Dept: CARDIOLOGY CLINIC | Age: 67
End: 2022-10-11
Payer: MEDICARE

## 2022-10-11 DIAGNOSIS — Z95.0 PACEMAKER: ICD-10-CM

## 2022-10-11 DIAGNOSIS — I44.2 COMPLETE HEART BLOCK (HCC): Primary | ICD-10-CM

## 2022-10-11 PROCEDURE — 93296 REM INTERROG EVL PM/IDS: CPT | Performed by: INTERNAL MEDICINE

## 2022-10-11 PROCEDURE — 93294 REM INTERROG EVL PM/LDLS PM: CPT | Performed by: INTERNAL MEDICINE

## 2022-10-18 NOTE — PROGRESS NOTES
Remote transmission received for patient's dual chamber PACEMAKER. Transmission shows normal sensing and pacing function. EP physician will review. See interrogation under the cardiology tab in the [address] [place] [address] [address] field for more details. Will continue to monitor remotely. AP 49.9%   100% (MVP off)  NSVT noted (toprol)    (End of 91-day monitoring period 10/11/22).

## 2022-10-31 ENCOUNTER — TELEMEDICINE (OUTPATIENT)
Dept: INTERNAL MEDICINE CLINIC | Age: 67
End: 2022-10-31
Payer: MEDICARE

## 2022-10-31 DIAGNOSIS — R05.1 ACUTE COUGH: Primary | ICD-10-CM

## 2022-10-31 DIAGNOSIS — R50.9 FEVER, UNSPECIFIED FEVER CAUSE: ICD-10-CM

## 2022-10-31 PROCEDURE — 99214 OFFICE O/P EST MOD 30 MIN: CPT | Performed by: STUDENT IN AN ORGANIZED HEALTH CARE EDUCATION/TRAINING PROGRAM

## 2022-10-31 PROCEDURE — 1123F ACP DISCUSS/DSCN MKR DOCD: CPT | Performed by: STUDENT IN AN ORGANIZED HEALTH CARE EDUCATION/TRAINING PROGRAM

## 2022-10-31 RX ORDER — AMOXICILLIN AND CLAVULANATE POTASSIUM 875; 125 MG/1; MG/1
1 TABLET, FILM COATED ORAL 2 TIMES DAILY
Qty: 10 TABLET | Refills: 0 | Status: SHIPPED | OUTPATIENT
Start: 2022-10-31 | End: 2022-11-05

## 2022-10-31 RX ORDER — AZITHROMYCIN 500 MG/1
500 TABLET, FILM COATED ORAL DAILY
Qty: 3 TABLET | Refills: 0 | Status: SHIPPED | OUTPATIENT
Start: 2022-10-31 | End: 2022-11-03

## 2022-10-31 SDOH — ECONOMIC STABILITY: FOOD INSECURITY: WITHIN THE PAST 12 MONTHS, YOU WORRIED THAT YOUR FOOD WOULD RUN OUT BEFORE YOU GOT MONEY TO BUY MORE.: NEVER TRUE

## 2022-10-31 SDOH — ECONOMIC STABILITY: FOOD INSECURITY: WITHIN THE PAST 12 MONTHS, THE FOOD YOU BOUGHT JUST DIDN'T LAST AND YOU DIDN'T HAVE MONEY TO GET MORE.: NEVER TRUE

## 2022-10-31 ASSESSMENT — PATIENT HEALTH QUESTIONNAIRE - PHQ9
1. LITTLE INTEREST OR PLEASURE IN DOING THINGS: 0
SUM OF ALL RESPONSES TO PHQ QUESTIONS 1-9: 0
2. FEELING DOWN, DEPRESSED OR HOPELESS: 0
SUM OF ALL RESPONSES TO PHQ QUESTIONS 1-9: 0
SUM OF ALL RESPONSES TO PHQ9 QUESTIONS 1 & 2: 0

## 2022-10-31 ASSESSMENT — SOCIAL DETERMINANTS OF HEALTH (SDOH): HOW HARD IS IT FOR YOU TO PAY FOR THE VERY BASICS LIKE FOOD, HOUSING, MEDICAL CARE, AND HEATING?: NOT HARD AT ALL

## 2022-10-31 NOTE — PROGRESS NOTES
Tyrese French (:  1955) is a Established patient, here for evaluation of the following:    Assessment & Plan   Below is the assessment and plan developed based on review of pertinent history, physical exam, labs, studies, and medications. 1. Acute cough  -     amoxicillin-clavulanate (AUGMENTIN) 875-125 MG per tablet; Take 1 tablet by mouth 2 times daily for 5 days, Disp-10 tablet, R-0Normal  -     azithromycin (ZITHROMAX) 500 MG tablet; Take 1 tablet by mouth daily for 3 days, Disp-3 tablet, R-0Normal  -Cough with fever of 101 °F and occasional shortness of breath likely bacterial pneumonia. Will start on Augmentin and Zithromax. Advised patient to get to the emergency room if he felt severely short of breath or if high fever continues. Also advised to get a home COVID test.  Advised plenty of warm liquids. Advised to take Tylenol for fever. 2. Fever, unspecified fever cause  -Likely secondary to bacterial pneumonia. Advised patient to seek immediate medical attention if he continues to have high fevers and shortness of breath. Antibiotics for now       Subjective   HPI  Patient is a 49-year-old male presenting with cough, fever for the last 2 to 3 days. He noted that his wife had similar symptoms and needed antibiotics last week. Does endorse occasional shortness of breath. Described the cough as dry with occasional expectoration of clear to yellowish sputum. He measured his temp temperature just before this call and was found to have a fever of 101 °F.  Denies any chest pain. Denies any history of recent travel. Review of Systems     14 point ROS negative except as above    Objective   Patient-Reported Vitals  No data recorded     Physical Exam             Tyrese French was evaluated through a synchronous (real-time) audio encounter. The patient (or guardian if applicable) is aware that this is a billable service, which includes applicable co-pays.  This Virtual Visit was conducted with patient's (and/or legal guardian's) consent. The visit was conducted pursuant to the emergency declaration under the [facility] [address] authority and the [employer] Act. Patient identification was verified, and a caregiver was present when appropriate. The patient was located at Home: [place] [address]. Provider was located at [facility] ([address] [place], [place] [address]t): [address] [address].         --Juan Jose Whitley MD

## 2022-11-04 ENCOUNTER — PATIENT MESSAGE (OUTPATIENT)
Dept: INTERNAL MEDICINE CLINIC | Age: 67
End: 2022-11-04

## 2022-11-04 RX ORDER — TAMSULOSIN HYDROCHLORIDE 0.4 MG/1
CAPSULE ORAL
Qty: 30 CAPSULE | Refills: 1 | Status: SHIPPED | OUTPATIENT
Start: 2022-11-04

## 2022-11-04 NOTE — TELEPHONE ENCOUNTER
Refill request for tamsulosin  medication.      Name of [facility]acy- [facility]      Last visit - 10-31-22     Pending visit - 2-    Last refill -7-      Medication Contract signed -   Last Ian arceo-         Additional Comments

## 2022-11-04 NOTE — TELEPHONE ENCOUNTER
From: Arabella Santos  To: Ashley Muñiz  Sent: 11/4/2022 12:02 PM EDT  Subject: Refill    Can I get a refill of Tamsulosin called into my pharmacy?  Thanks, Mile Thomas

## 2022-12-03 DIAGNOSIS — U07.1 COVID-19: Primary | ICD-10-CM

## 2022-12-03 NOTE — PROGRESS NOTES
Received page regarding positive covid test this AM. States he is experiencing mild symptoms including aches, fatigue however is doing fine.  Will send paxlovid for symptoms as have just begun

## 2023-01-10 ENCOUNTER — NURSE ONLY (OUTPATIENT)
Dept: CARDIOLOGY CLINIC | Age: 68
End: 2023-01-10
Payer: MEDICARE

## 2023-01-10 DIAGNOSIS — I44.2 THIRD DEGREE AV BLOCK (HCC): Primary | ICD-10-CM

## 2023-01-10 DIAGNOSIS — Z95.0 PACEMAKER: ICD-10-CM

## 2023-01-10 PROCEDURE — 93294 REM INTERROG EVL PM/LDLS PM: CPT | Performed by: INTERNAL MEDICINE

## 2023-01-10 PROCEDURE — 93296 REM INTERROG EVL PM/IDS: CPT | Performed by: INTERNAL MEDICINE

## 2023-01-18 NOTE — PROGRESS NOTES
We received remote transmission from patient's monitor at home. Transmission shows normal sensing and pacing function. Noted NSVT. Pt is on Toprol. EP physician will review. See interrogation under cardiology tab in the [address] [place] [address] [address] field for more details.  100.0% (MVP Off)  AP 41.7%    End of 91-day monitoring period 1-10-23.

## 2023-02-09 ENCOUNTER — HOSPITAL ENCOUNTER (OUTPATIENT)
Dept: MRI IMAGING | Age: 68
Discharge: HOME | End: 2023-02-09
Payer: MEDICARE

## 2023-02-09 DIAGNOSIS — M79.642 PAIN IN LEFT HAND: ICD-10-CM

## 2023-02-09 PROCEDURE — 73218 MRI UPPER EXTREMITY W/O DYE: CPT

## 2023-02-20 ENCOUNTER — OFFICE VISIT (OUTPATIENT)
Dept: INTERNAL MEDICINE CLINIC | Age: 68
End: 2023-02-20

## 2023-02-20 ENCOUNTER — HOSPITAL ENCOUNTER (OUTPATIENT)
Age: 68
Discharge: HOME | End: 2023-02-20
Payer: MEDICARE

## 2023-02-20 VITALS
TEMPERATURE: 97.5 F | SYSTOLIC BLOOD PRESSURE: 122 MMHG | WEIGHT: 172.2 LBS | HEIGHT: 70 IN | OXYGEN SATURATION: 97 % | BODY MASS INDEX: 24.65 KG/M2 | DIASTOLIC BLOOD PRESSURE: 80 MMHG | HEART RATE: 76 BPM

## 2023-02-20 DIAGNOSIS — C85.28 MEDIASTINAL LARGE B-CELL LYMPHOMA OF LYMPH NODES OF MULTIPLE REGIONS (HCC): ICD-10-CM

## 2023-02-20 DIAGNOSIS — F10.10 ETOH ABUSE: ICD-10-CM

## 2023-02-20 DIAGNOSIS — I25.10 CAD IN NATIVE ARTERY: ICD-10-CM

## 2023-02-20 DIAGNOSIS — E03.9 ACQUIRED HYPOTHYROIDISM: ICD-10-CM

## 2023-02-20 DIAGNOSIS — C83.38: ICD-10-CM

## 2023-02-20 DIAGNOSIS — N40.1 BENIGN PROSTATIC HYPERPLASIA WITH URINARY HESITANCY: ICD-10-CM

## 2023-02-20 DIAGNOSIS — R73.02 IGT (IMPAIRED GLUCOSE TOLERANCE): ICD-10-CM

## 2023-02-20 DIAGNOSIS — Z00.00 MEDICARE ANNUAL WELLNESS VISIT, SUBSEQUENT: Primary | ICD-10-CM

## 2023-02-20 DIAGNOSIS — Z85.820 HISTORY OF MALIGNANT MELANOMA: ICD-10-CM

## 2023-02-20 DIAGNOSIS — I44.2 COMPLETE HEART BLOCK (HCC): ICD-10-CM

## 2023-02-20 DIAGNOSIS — R39.11 BENIGN PROSTATIC HYPERPLASIA WITH URINARY HESITANCY: ICD-10-CM

## 2023-02-20 DIAGNOSIS — N52.9 ERECTILE DYSFUNCTION, UNSPECIFIED ERECTILE DYSFUNCTION TYPE: ICD-10-CM

## 2023-02-20 DIAGNOSIS — I48.0 PAF (PAROXYSMAL ATRIAL FIBRILLATION) (HCC): ICD-10-CM

## 2023-02-20 DIAGNOSIS — R53.83 OTHER FATIGUE: ICD-10-CM

## 2023-02-20 PROBLEM — R05.1 ACUTE COUGH: Status: RESOLVED | Noted: 2022-10-31 | Resolved: 2023-02-20

## 2023-02-20 LAB
A/G RATIO: 1.8 (ref 1.1–2.2)
ALBUMIN SERPL-MCNC: 4.4 G/DL (ref 3.4–5)
ALP BLD-CCNC: 76 U/L (ref 40–129)
ALT SERPL-CCNC: 21 U/L (ref 10–40)
ANION GAP SERPL CALCULATED.3IONS-SCNC: 14 MMOL/L (ref 3–16)
AST SERPL-CCNC: 24 U/L (ref 15–37)
BASOPHILS ABSOLUTE: 0 K/UL (ref 0–0.2)
BASOPHILS RELATIVE PERCENT: 0.9 %
BILIRUB SERPL-MCNC: 0.4 MG/DL (ref 0–1)
BUN BLDV-MCNC: 18 MG/DL (ref 7–20)
CALCIUM SERPL-MCNC: 9.5 MG/DL (ref 8.3–10.6)
CHLORIDE BLD-SCNC: 103 MMOL/L (ref 99–110)
CHOLESTEROL, TOTAL: 230 MG/DL (ref 0–199)
CO2: 23 MMOL/L (ref 21–32)
CREAT SERPL-MCNC: 1.1 MG/DL (ref 0.8–1.3)
EOSINOPHILS ABSOLUTE: 0.1 K/UL (ref 0–0.6)
EOSINOPHILS RELATIVE PERCENT: 2.9 %
GFR SERPL CREATININE-BSD FRML MDRD: >60 ML/MIN/{1.73_M2}
GLUCOSE BLD-MCNC: 96 MG/DL (ref 70–99)
HCT VFR BLD CALC: 42.4 % (ref 40.5–52.5)
HDLC SERPL-MCNC: 99 MG/DL (ref 40–60)
HEMOGLOBIN: 14.2 G/DL (ref 13.5–17.5)
LDL CHOLESTEROL CALCULATED: 121 MG/DL
LYMPHOCYTES ABSOLUTE: 0.6 K/UL (ref 1–5.1)
LYMPHOCYTES RELATIVE PERCENT: 13.8 %
MCH RBC QN AUTO: 31.4 PG (ref 26–34)
MCHC RBC AUTO-ENTMCNC: 33.4 G/DL (ref 31–36)
MCV RBC AUTO: 93.8 FL (ref 80–100)
MONOCYTES ABSOLUTE: 0.3 K/UL (ref 0–1.3)
MONOCYTES RELATIVE PERCENT: 7.6 %
NEUTROPHILS ABSOLUTE: 3.2 K/UL (ref 1.7–7.7)
NEUTROPHILS RELATIVE PERCENT: 74.8 %
PDW BLD-RTO: 14.1 % (ref 12.4–15.4)
PLATELET # BLD: 230 K/UL (ref 135–450)
PMV BLD AUTO: 9.5 FL (ref 5–10.5)
POTASSIUM SERPL-SCNC: 4.8 MMOL/L (ref 3.5–5.1)
PROSTATE SPECIFIC ANTIGEN: 2.08 NG/ML (ref 0–4)
RBC # BLD: 4.52 M/UL (ref 4.2–5.9)
SODIUM BLD-SCNC: 140 MMOL/L (ref 136–145)
TOTAL PROTEIN: 6.9 G/DL (ref 6.4–8.2)
TRIGL SERPL-MCNC: 51 MG/DL (ref 0–150)
TSH REFLEX: 3.09 UIU/ML (ref 0.27–4.2)
VLDLC SERPL CALC-MCNC: 10 MG/DL
WBC # BLD: 4.2 K/UL (ref 4–11)

## 2023-02-20 PROCEDURE — 84153 ASSAY OF PSA TOTAL: CPT

## 2023-02-20 PROCEDURE — 83036 HEMOGLOBIN GLYCOSYLATED A1C: CPT

## 2023-02-20 PROCEDURE — 80061 LIPID PANEL: CPT

## 2023-02-20 PROCEDURE — 36415 COLL VENOUS BLD VENIPUNCTURE: CPT

## 2023-02-20 PROCEDURE — 80053 COMPREHEN METABOLIC PANEL: CPT

## 2023-02-20 PROCEDURE — 85025 COMPLETE CBC W/AUTO DIFF WBC: CPT

## 2023-02-20 PROCEDURE — 84443 ASSAY THYROID STIM HORMONE: CPT

## 2023-02-20 RX ORDER — TAMSULOSIN HYDROCHLORIDE 0.4 MG/1
CAPSULE ORAL
Qty: 90 CAPSULE | Refills: 1 | Status: SHIPPED | OUTPATIENT
Start: 2023-02-20

## 2023-02-20 RX ORDER — TADALAFIL 20 MG/1
20 TABLET ORAL DAILY PRN
Qty: 10 TABLET | Refills: 0 | Status: SHIPPED | OUTPATIENT
Start: 2023-02-20

## 2023-02-20 SDOH — ECONOMIC STABILITY: FOOD INSECURITY: WITHIN THE PAST 12 MONTHS, YOU WORRIED THAT YOUR FOOD WOULD RUN OUT BEFORE YOU GOT MONEY TO BUY MORE.: NEVER TRUE

## 2023-02-20 SDOH — ECONOMIC STABILITY: INCOME INSECURITY: HOW HARD IS IT FOR YOU TO PAY FOR THE VERY BASICS LIKE FOOD, HOUSING, MEDICAL CARE, AND HEATING?: NOT HARD AT ALL

## 2023-02-20 SDOH — ECONOMIC STABILITY: HOUSING INSECURITY
IN THE LAST 12 MONTHS, WAS THERE A TIME WHEN YOU DID NOT HAVE A STEADY PLACE TO SLEEP OR SLEPT IN A SHELTER (INCLUDING NOW)?: NO

## 2023-02-20 SDOH — ECONOMIC STABILITY: FOOD INSECURITY: WITHIN THE PAST 12 MONTHS, THE FOOD YOU BOUGHT JUST DIDN'T LAST AND YOU DIDN'T HAVE MONEY TO GET MORE.: NEVER TRUE

## 2023-02-20 ASSESSMENT — LIFESTYLE VARIABLES
HOW OFTEN DURING THE LAST YEAR HAVE YOU HAD A FEELING OF GUILT OR REMORSE AFTER DRINKING: 1
HAS A RELATIVE, FRIEND, DOCTOR, OR ANOTHER HEALTH PROFESSIONAL EXPRESSED CONCERN ABOUT YOUR DRINKING OR SUGGESTED YOU CUT DOWN: 0
HOW OFTEN DURING THE LAST YEAR HAVE YOU FAILED TO DO WHAT WAS NORMALLY EXPECTED FROM YOU BECAUSE OF DRINKING: 0
HOW OFTEN DURING THE LAST YEAR HAVE YOU FOUND THAT YOU WERE NOT ABLE TO STOP DRINKING ONCE YOU HAD STARTED: 0
HOW OFTEN DURING THE LAST YEAR HAVE YOU BEEN UNABLE TO REMEMBER WHAT HAPPENED THE NIGHT BEFORE BECAUSE YOU HAD BEEN DRINKING: 0
HAVE YOU OR SOMEONE ELSE BEEN INJURED AS A RESULT OF YOUR DRINKING: 0
HOW MANY STANDARD DRINKS CONTAINING ALCOHOL DO YOU HAVE ON A TYPICAL DAY: 3 OR 4
HOW OFTEN DURING THE LAST YEAR HAVE YOU NEEDED AN ALCOHOLIC DRINK FIRST THING IN THE MORNING TO GET YOURSELF GOING AFTER A NIGHT OF HEAVY DRINKING: 0
HOW OFTEN DO YOU HAVE A DRINK CONTAINING ALCOHOL: 2-3 TIMES A WEEK

## 2023-02-20 ASSESSMENT — PATIENT HEALTH QUESTIONNAIRE - PHQ9
2. FEELING DOWN, DEPRESSED OR HOPELESS: 0
SUM OF ALL RESPONSES TO PHQ QUESTIONS 1-9: 0
SUM OF ALL RESPONSES TO PHQ9 QUESTIONS 1 & 2: 0
SUM OF ALL RESPONSES TO PHQ QUESTIONS 1-9: 0
1. LITTLE INTEREST OR PLEASURE IN DOING THINGS: 0
SUM OF ALL RESPONSES TO PHQ QUESTIONS 1-9: 0
SUM OF ALL RESPONSES TO PHQ QUESTIONS 1-9: 0

## 2023-02-20 NOTE — PATIENT INSTRUCTIONS
Fatigue: Care Instructions  Your Care Instructions     Fatigue is a feeling of tiredness, exhaustion, or lack of energy. You may feel fatigue because of too much or not enough activity. It can also come from stress, lack of sleep, boredom, and poor diet. Many medical problems, such as viral infections, can cause fatigue. Emotional problems, especially depression, are often the cause of fatigue. Fatigue is most often a symptom of another problem. Treatment for fatigue depends on the cause. For example, if you have fatigue because you have a certain health problem, treating this problem also treats your fatigue. If depression or anxiety is the cause, treatment may help. Follow-up care is a key part of your treatment and safety. Be sure to make and go to all appointments, and call your doctor if you are having problems. It's also a good idea to know your test results and keep a list of the medicines you take. How can you care for yourself at home? Get regular exercise. But don't overdo it. Go back and forth between rest and exercise. Get plenty of rest.  Eat a healthy diet. Do not skip meals, especially breakfast.  Reduce your use of caffeine, tobacco, and alcohol. Caffeine is most often found in coffee, tea, cola drinks, and chocolate. Limit medicines that can cause fatigue. This includes tranquilizers and cold and allergy medicines. When should you call for help? Watch closely for changes in your health, and be sure to contact your doctor if:    You have new symptoms such as fever or a rash. Your fatigue gets worse. You have been feeling down, depressed, or hopeless. Or you may have lost interest in things that you usually enjoy. You are not getting better as expected. Where can you learn more? Go to http://www.woods.com/ and enter T451 to learn more about \"Fatigue: Care Instructions. \"  Current as of: February 9, 2022               Content Version: 13.5  © 4507-4980 Healthwise, Incorporated. Care instructions adapted under license by [address] If you have questions about a medical condition or this instruction, always ask your healthcare professional. [place] [address] any warranty or liability for your use of this information. Learning About Vision Tests  What are vision tests? The four most common vision tests are visual acuity tests, refraction, visual field tests, and color vision tests. Visual acuity (sharpness) tests  These tests are used: To see if you need glasses or contact lenses. To monitor an eye problem. To check an eye injury. Visual acuity tests are done as part of routine exams. You may also have this test when you get your 's license or apply for some types of jobs. Visual field tests  These tests are used: To check for vision loss in any area of your range of vision. To screen for certain eye diseases. To look for nerve damage after a stroke, head injury, or other problem that could reduce blood flow to the brain. Refraction and color tests  A refraction test is done to find the right prescription for glasses and contact lenses. A color vision test is done to check for color blindness. Color vision is often tested as part of a routine exam. You may also have this test when you apply for a job where recognizing different colors is important, such as , electronics, or the [employer]. How are vision tests done? Visual acuity test   You cover one eye at a time. You read aloud from a wall chart across the room. You read aloud from a small card that you hold in your hand. Refraction   You look into a special device. The device puts lenses of different strengths in front of each eye to see how strong your glasses or contact lenses need to be. Visual field tests   Your doctor may have you look through special machines.   Or your doctor may simply have you stare straight ahead while they move a finger into and out of your field of vision. Color vision test   You look at pieces of printed test patterns in various colors. You say what number or symbol you see. Your doctor may have you trace the number or symbol using a pointer. How do these tests feel? There is very little chance of having a problem from this test. If dilating drops are used for a vision test, they may make the eyes sting and cause a medicine taste in the mouth. Follow-up care is a key part of your treatment and safety. Be sure to make and go to all appointments, and call your doctor if you are having problems. It's also a good idea to know your test results and keep a list of the medicines you take. Where can you learn more? Go to http://www.sinclair.com/ and enter G551 to learn more about \"Learning About Vision Tests. \"  Current as of: October 12, 2022               Content Version: 13.5  © 2006-2022 [employer]. Care instructions adapted under license by [facility] ([facility]). If you have questions about a medical condition or this instruction, always ask your healthcare professional. [place] [address] any warranty or liability for your use of this information. Advance Directives: Care Instructions  Overview  An advance directive is a legal way to state your wishes at the end of your life. It tells your family and your doctor what to do if you can't say what you want. There are two main types of advance directives. You can change them any time your wishes change. Living will. This form tells your family and your doctor your wishes about life support and other treatment. The form is also called a declaration. Medical power of . This form lets you name a person to make treatment decisions for you when you can't speak for yourself. This person is called a health care agent (health care proxy, health care surrogate).  The form is also called a durable power of  for health care.  If you do not have an advance directive, decisions about your medical care may be made by a family member, or by a doctor or a  who doesn't know you. It may help to think of an advance directive as a gift to the people who care for you. If you have one, they won't have to make tough decisions by themselves. For more information, including forms for your state, see the [address] website (www.caringinfo.org/planning/advance-directives/). Follow-up care is a key part of your treatment and safety. Be sure to make and go to all appointments, and call your doctor if you are having problems. It's also a good idea to know your test results and keep a list of the medicines you take. What should you include in an advance directive? Many states have a unique advance directive form. (It may ask you to address specific issues.) Or you might use a universal form that's approved by many states. If your form doesn't tell you what to address, it may be hard to know what to include in your advance directive. Use the questions below to help you get started. Who do you want to make decisions about your medical care if you are not able to? What life-support measures do you want if you have a serious illness that gets worse over time or can't be cured? What are you most afraid of that might happen? (Maybe you're afraid of having pain, losing your independence, or being kept alive by machines.)  Where would you prefer to die? (Your home? A hospital? A nursing home?)  Do you want to donate your organs when you die? Do you want certain Muslim practices performed before you die? When should you call for help? Be sure to contact your doctor if you have any questions. Where can you learn more? Go to http://www.CloudCover.com/ and enter R264 to learn more about \"Advance Directives: Care Instructions. \"  Current as of: June 16, 2022               Content Version: 13.5  © 2022-3730 Healthwise, Incorporated. Care instructions adapted under license by [facility] ([facility]). If you have questions about a medical condition or this instruction, always ask your healthcare professional. [address] any warranty or liability for your use of this information. A Healthy Heart: Care Instructions  Your Care Instructions     Coronary artery disease, also called heart disease, occurs when a substance called plaque builds up in the vessels that supply oxygen-rich blood to your heart muscle. This can narrow the blood vessels and reduce blood flow. A heart attack happens when blood flow is completely blocked. A high-fat diet, smoking, and other factors increase the risk of heart disease. Your doctor has found that you have a chance of having heart disease. You can do lots of things to keep your heart healthy. It may not be easy, but you can change your diet, exercise more, and quit smoking. These steps really work to lower your chance of heart disease. Follow-up care is a key part of your treatment and safety. Be sure to make and go to all appointments, and call your doctor if you are having problems. It's also a good idea to know your test results and keep a list of the medicines you take. How can you care for yourself at home? Diet    Use less salt when you cook and eat. This helps lower your blood pressure. Taste food before salting. Add only a little salt when you think you need it. With time, your taste buds will adjust to less salt. Eat fewer snack items, fast foods, canned soups, and other high-salt, high-fat, processed foods. Read food labels and try to avoid saturated and trans fats. They increase your risk of heart disease by raising cholesterol levels. Limit the amount of solid fat-butter, margarine, and shortening-you eat. Use olive, peanut, or canola oil when you cook. Bake, broil, and steam foods instead of frying them. Eat a variety of fruit and vegetables every day.  Dark green, deep orange, red, or yellow fruits and vegetables are especially good for you. Examples include spinach, carrots, peaches, and berries. Foods high in fiber can reduce your cholesterol and provide important vitamins and minerals. High-fiber foods include whole-grain cereals and breads, oatmeal, beans, brown rice, citrus fruits, and apples. Eat lean proteins. Heart-healthy proteins include seafood, lean meats and poultry, eggs, beans, peas, nuts, seeds, and soy products. Limit drinks and foods with added sugar. These include candy, desserts, and soda pop. Lifestyle changes    If your doctor recommends it, get more exercise. Walking is a good choice. Bit by bit, increase the amount you walk every day. Try for at least 30 minutes on most days of the week. You also may want to swim, bike, or do other activities. Do not smoke. If you need help quitting, talk to your doctor about stop-smoking programs and medicines. These can increase your chances of quitting for good. Quitting smoking may be the most important step you can take to protect your heart. It is never too late to quit. Limit alcohol to 2 drinks a day for men and 1 drink a day for women. Too much alcohol can cause health problems. Manage other health problems such as diabetes, high blood pressure, and high cholesterol. If you think you may have a problem with alcohol or drug use, talk to your doctor. Medicines    Take your medicines exactly as prescribed. Call your doctor if you think you are having a problem with your medicine. If your doctor recommends aspirin, take the amount directed each day. Make sure you take aspirin and not another kind of pain reliever, such as acetaminophen (Tylenol). When should you call for help? Call 911 if you have symptoms of a heart attack. These may include:    Chest pain or pressure, or a strange feeling in the chest.     Sweating. Shortness of breath.      Pain, pressure, or a strange feeling in the back, neck, jaw, or upper belly or in one or both shoulders or arms. Lightheadedness or sudden weakness. A fast or irregular heartbeat. After you call 911, the  may tell you to chew 1 adult-strength or 2 to 4 low-dose aspirin. Wait for an ambulance. Do not try to drive yourself. Watch closely for changes in your health, and be sure to contact your doctor if you have any problems. Where can you learn more? Go to http://www.sinclair.com/ and enter F075 to learn more about \"A Healthy Heart: Care Instructions. \"  Current as of: September 7, 2022               Content Version: 13.5  © 4310-7135 Healthwise, [employer]. Care instructions adapted under license by [facility] ([facility]). If you have questions about a medical condition or this instruction, always ask your healthcare professional. [place] [address] any warranty or liability for your use of this information. Personalized Preventive Plan for Heriberto Vernon - 2/20/2023  Medicare offers a range of preventive health benefits. Some of the tests and screenings are paid in full while other may be subject to a deductible, co-insurance, and/or copay. Some of these benefits include a comprehensive review of your medical history including lifestyle, illnesses that may run in your family, and various assessments and screenings as appropriate. After reviewing your medical record and screening and assessments performed today your provider may have ordered immunizations, labs, imaging, and/or referrals for you. A list of these orders (if applicable) as well as your Preventive Care list are included within your After Visit Summary for your review. Other Preventive Recommendations:    A preventive eye exam performed by an eye specialist is recommended every 1-2 years to screen for glaucoma; cataracts, macular degeneration, and other eye disorders.   A preventive dental visit is recommended every 6 months.  Try to get at least 150 minutes of exercise per week or 10,000 steps per day on a pedometer .  Order or download the FREE \"Exercise & Physical Activity: Your Everyday Guide\" from [employer] [place] [employer]. Call 1-100.747.1967 or search [employer] [place] [employer] online.  You need 2834-9335 mg of calcium and 1542-0209 IU of vitamin D per day. It is possible to meet your calcium requirement with diet alone, but a vitamin D supplement is usually necessary to meet this goal.  When exposed to the sun, use a sunscreen that protects against both UVA and UVB radiation with an SPF of 30 or greater. Reapply every 2 to 3 hours or after sweating, drying off with a towel, or swimming.  Always wear a seat belt when traveling in a car. Always wear a helmet when riding a bicycle or motorcycle.            - Perform tetanus vaccine at local pharmacy    - Schedule Eye exam    - Update Living Will and POA    - Perform blood test today

## 2023-02-20 NOTE — PROGRESS NOTES
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 - Perform blood test   - Consider sleep study in future    ETOH abuse    Benign prostatic hyperplasia with urinary hesitancy  -     PSA, Prostatic Specific Antigen; Future  -     tamsulosin (FLOMAX) 0.4 MG capsule; TAKE ONE CAPSULE BY MOUTH DAILY, Disp-90 capsule, R-1Normal    Erectile dysfunction, unspecified erectile dysfunction type  -     tadalafil (CIALIS) 20 MG tablet; Take 1 tablet by mouth daily as needed for Erectile Dysfunction, Disp-10 tablet, R-0Normal  - Consider Urology referral in future. - Enc decrease ETOH intake    History of malignant melanoma  - Maintain dermatology fu    Acquired hypothyroidism  -     TSH with Reflex; Future    CAD in native artery  -     Comprehensive Metabolic Panel; Future  -     Lipid Panel; Future      Recommendations for Preventive Services Due: see orders and patient instructions/AVS.  Recommended screening schedule for the next 5-10 years is provided to the patient in written form: see Patient Instructions/AVS.     Return for 6 month f/u 30 min appt, Medicare Annual Wellness Visit in 1 year. Subjective       B Cell Lymphoma - Has all [facility], Dr Pepe Amaya. Every 6 months. CT scans annually. Has been doing well weight mgmt. Working full-time. Changed diet. CAD. Pacemaker.   Ingris Buchanan. Has noticed he cannot \"drink\" the same amount of ETOH as in the past.     He is still working [employer] days\" for work. Thinks he will retire in ?1 year. Finds himself falling asleep on the couch at nighttime. Naps most weekends. Fatigue. BPH. Tamsulosin - works very well. No dysuria. ED. Viagra - works 80%. Patient's complete Health Risk Assessment and screening values have been reviewed and are found in Flowsheets. The following problems were reviewed today and where indicated follow up appointments were made and/or referrals ordered. Positive Risk Factor Screenings with Interventions:       Cognitive: Words recalled: 2 Words Recalled   Clock Drawing Test (CDT): (!) Abnormal   Total Score: (!) 2   Total Score Interpretation: Abnormal Mini-Cog      Interventions:  Reviewed importance of decreasing ETOH intake. Will consider nonalcoholic beverages     Alcohol Screening:  Alcohol Use: Heavy Drinker    Frequency of Alcohol Consumption: 2-3 times a week    Average Number of Drinks: 3 or 4    Frequency of Binge Drinking: Never      AUDIT-C Score: 4   AUDIT Total Score: 5    Interpretation of AUDIT-C score:   3-7 indicates potential alcohol risk. 8 or more is associated with harmful or hazardous drinking. 15 or more in women, and 15 or more in men, is likely to indicate alcohol dependence. General HRA Questions:  Select all that apply: (!) New or Increased Fatigue    Fatigue Interventions:  Perform blood test - consider sleep study in future. Decrease ETOH intake. Vision Screen:  Do you have difficulty driving, watching TV, or doing any of your daily activities because of your eyesight?: No  Have you had an eye exam within the past year?: (!) No  No results found.     Interventions:   Patient encouraged to make appointment with their eye specialist      Advanced Directives:  Do you have a Living Will?: (!) No    Intervention:  has NO advanced directive - information provided    Advance Care Planning   Advanced Care Planning: Discussed the patients choices for care and treatment in case of a health event that adversely affects decision-making abilities. Also discussed the patients long-term treatment options. Reviewed with the patient the appropriate state-specific advance directive documents. Reviewed the process of designating a competent adult as an Agent (or -in-fact) that could take make health care decisions for the patient if incompetent. Patient was asked to complete the declaration forms, if they have not already, either acknowledge the forms by a public notary or an eligible witness and provide a signed copy to the practice office. Time spent (minutes): 10 minutes                      Objective   Vitals:    02/20/23 0707   BP: 122/80   Site: Right Upper Arm   Position: Sitting   Cuff Size: Medium Adult   Pulse: 76   Temp: 97.5 °F (36.4 °C)   TempSrc: Temporal   SpO2: 97%   Weight: 172 lb 3.2 oz (78.1 kg)   Height: 5' 10.08\" (1.78 m)      Body mass index is 24.65 kg/m².       General Appearance: alert and oriented to person, place and time, well developed and well- nourished, in no acute distress  Skin: warm and dry, no rash or erythema  Head: normocephalic and atraumatic  Eyes: pupils equal, round, and reactive to light, extraocular eye movements intact, conjunctivae normal  ENT: tympanic membrane, external ear and ear canal normal bilaterally, nose without deformity, nasal mucosa and turbinates normal without polyps  Neck: supple and non-tender without mass, no thyromegaly or thyroid nodules, no cervical lymphadenopathy  Pulmonary/Chest: clear to auscultation bilaterally- no wheezes, rales or rhonchi, normal air movement, no respiratory distress  Cardiovascular: normal rate, regular rhythm, normal S1 and S2, no murmurs, rubs, clicks, or gallops, distal pulses intact, no carotid bruits  Abdomen: soft, non-tender, non-distended, normal bowel sounds, no masses or organomegaly  Extremities: no cyanosis, clubbing or edema  Musculoskeletal: normal range of motion, no joint swelling, deformity or tenderness  Neurologic: reflexes normal and symmetric, no cranial nerve deficit, gait, coordination and speech normal       Allergies   Allergen Reactions    Percocet [Oxycodone-Acetaminophen] Itching     Prior to Visit Medications    Medication Sig Taking?  Authorizing Provider   tamsulosin (FLOMAX) 0.4 MG capsule TAKE ONE CAPSULE BY MOUTH DAILY Yes MEI Duque CNP   tadalafil (CIALIS) 20 MG tablet Take 1 tablet by mouth daily as needed for Erectile Dysfunction Yes MEI Bangura CNP       CareTeam (Including outside providers/suppliers regularly involved in providing care):   Patient Care Team:  MEI Bangura CNP as PCP - General (Family Nurse Practitioner)  MEI Bangura CNP as PCP - Empaneled Provider     Reviewed and updated this visit:  Tobacco  Allergies  Meds  Problems  Med Hx  Surg Hx  Soc Hx  Fam Hx               MEI Bangura CNP

## 2023-02-21 LAB
ESTIMATED AVERAGE GLUCOSE: 111.2 MG/DL
HBA1C MFR BLD: 5.5 %

## 2023-02-27 ENCOUNTER — PATIENT MESSAGE (OUTPATIENT)
Dept: INTERNAL MEDICINE CLINIC | Age: 68
End: 2023-02-27

## 2023-02-27 DIAGNOSIS — E78.5 DYSLIPIDEMIA: Primary | ICD-10-CM

## 2023-02-27 RX ORDER — ROSUVASTATIN CALCIUM 10 MG/1
10 TABLET, COATED ORAL NIGHTLY
Qty: 90 TABLET | Refills: 1 | Status: SHIPPED | OUTPATIENT
Start: 2023-02-27

## 2023-02-27 NOTE — TELEPHONE ENCOUNTER
From: Vamsi   To: Darell Vail  Sent: 2/27/2023 8:55 AM EST  Subject: High cholesterol     Im good with starting Crestor or Lipitor.

## 2023-03-27 ENCOUNTER — PATIENT MESSAGE (OUTPATIENT)
Dept: INTERNAL MEDICINE CLINIC | Age: 68
End: 2023-03-27

## 2023-03-28 NOTE — TELEPHONE ENCOUNTER
Per request of PCP, Ivan Bello contacted the patient to make him aware that Dr. Carla Anderson office will be calling him to schedule a brain MRI. Patient said that he had just received a call and that he is working with them to schedule.

## 2023-04-21 ENCOUNTER — HOSPITAL ENCOUNTER (OUTPATIENT)
Dept: GENERAL RADIOLOGY | Age: 68
Discharge: HOME | End: 2023-04-21
Payer: MEDICARE

## 2023-04-21 DIAGNOSIS — C83.38 DIFFUSE LARGE B-CELL LYMPHOMA OF LYMPH NODES OF MULTIPLE REGIONS (HCC): ICD-10-CM

## 2023-04-21 LAB
APPEARANCE CSF: CLEAR
CLOT EVALUATION CSF: ABNORMAL
COLOR CSF: COLORLESS
GLUCOSE CSF-MCNC: 56 MG/DL (ref 40–80)
LYMPHOCYTES NFR CSF: 92 % (ref 40–80)
MANUAL DIF COMMENT CSF-IMP: ABNORMAL
MONONUC CELLS NFR CSF: 8 % (ref 15–45)
NUC CELL # FLD MANUAL: 4 /CUMM (ref 0–5)
PROT CSF-MCNC: 41 MG/DL (ref 15–45)
RBC # FLD MANUAL: 4 /CUMM
TOTAL CELLS COUNTED CSF: 100
TUBE # CSF: ABNORMAL

## 2023-04-21 PROCEDURE — 62328 DX LMBR SPI PNXR W/FLUOR/CT: CPT

## 2023-04-21 PROCEDURE — 2500000003 HC RX 250 WO HCPCS: Performed by: STUDENT IN AN ORGANIZED HEALTH CARE EDUCATION/TRAINING PROGRAM

## 2023-04-21 RX ORDER — LIDOCAINE HYDROCHLORIDE 10 MG/ML
5 INJECTION, SOLUTION EPIDURAL; INFILTRATION; INTRACAUDAL; PERINEURAL ONCE
Status: COMPLETED | OUTPATIENT
Start: 2023-04-21 | End: 2023-04-21

## 2023-04-21 RX ADMIN — LIDOCAINE HYDROCHLORIDE 5 ML: 10 INJECTION, SOLUTION EPIDURAL; INFILTRATION; INTRACAUDAL; PERINEURAL at 11:18

## 2023-04-25 ENCOUNTER — TELEPHONE (OUTPATIENT)
Dept: INTERNAL MEDICINE CLINIC | Age: 68
End: 2023-04-25

## 2023-04-25 NOTE — TELEPHONE ENCOUNTER
Patient called and scheduled a pre op for this Friday for upcoming brain surgery next week. Patient would like for Jonathon Poe to call him before Friday to discuss.  [phone] She's here today for 2.1 x 1.2 cm Mohs defect of the right medial canthal area. The defect extended just onto the lateral nasal sidewall at the level of the medial canthus, extended or is oculi approximately 1.2 cm just under the medial puncta and was most commonly involving the infraorbital area. We elected to use 2 flaps reconstruction to prevent significant tension on the area. I used a note flap from the nose and I used a sliding advancement flap from the eyelid skin. This somewhat caused her created 020 repair but allowed less tension to be placed on the thin eyelid skin as a portion of the defect extended onto the more vertical section of the lateral nasal sidewall of the right upper lateral nose.    The dimensions of the nasal flap area approximately 1.2 x 2.0 cm and the sliding advancement flap of the eyelid was 2.6 x 1.8 cm.    I explained the necessity to preserve her eyelid function and the need for 2 flaps, she said informed consent, she was injected with lidocaine with epinephrine and prepped and draped sterilely.    Using a #15 blade I first made the note flap on the lateral nasal sidewall. I made this slightly longer and new that I would need to build elevate over the lateral nasal sidewall to about the mid nose. Once I had elevated this, this allowed me to advance this over the lateral nasal sidewall portion the defect and the more inferomedial portion of the actual defect. This would give more support and provide more tissue directly under the medial puncta region. I used bipolar cautery for hemostasis.    I then made a subciliary incision and extended the flap and elevated this carefully over the thin orbicularis oculi muscle. Once this flap was elevated and bipolar cautery had been used on the muscle, I advanced this across to the most superior medial portion of the defect. I then approximated this to the nasal flap. I trim the flaps were appropriate or small dogears were needed. I try to preserve  as much skin as necessary though to that there is no shortening of the tissue and expected contraction. I inset the flap with 5-0 Monocryl sutures and a close the skin with running locking 6-0 fast absorbing gut sutures throughout all areas. Ointment and a dressing applied.    All questions were answered. Supplies were given.  All postoperative directions were reviewed.  I instructed the patient to call me with any questions and/or concerns.

## 2023-04-27 ENCOUNTER — OFFICE VISIT (OUTPATIENT)
Dept: INTERNAL MEDICINE CLINIC | Age: 68
End: 2023-04-27
Payer: MEDICARE

## 2023-04-27 VITALS
OXYGEN SATURATION: 98 % | RESPIRATION RATE: 12 BRPM | TEMPERATURE: 97.2 F | SYSTOLIC BLOOD PRESSURE: 124 MMHG | HEART RATE: 78 BPM | HEIGHT: 70 IN | BODY MASS INDEX: 23.48 KG/M2 | DIASTOLIC BLOOD PRESSURE: 74 MMHG | WEIGHT: 164 LBS

## 2023-04-27 DIAGNOSIS — C85.28 MEDIASTINAL LARGE B-CELL LYMPHOMA OF LYMPH NODES OF MULTIPLE REGIONS (HCC): ICD-10-CM

## 2023-04-27 DIAGNOSIS — Z01.818 PREOP EXAMINATION: ICD-10-CM

## 2023-04-27 DIAGNOSIS — G93.89 BRAIN MASS: Primary | ICD-10-CM

## 2023-04-27 PROCEDURE — 1123F ACP DISCUSS/DSCN MKR DOCD: CPT | Performed by: NURSE PRACTITIONER

## 2023-04-27 PROCEDURE — 93000 ELECTROCARDIOGRAM COMPLETE: CPT | Performed by: NURSE PRACTITIONER

## 2023-04-27 PROCEDURE — 99214 OFFICE O/P EST MOD 30 MIN: CPT | Performed by: NURSE PRACTITIONER

## 2023-04-27 RX ORDER — MAGNESIUM 30 MG
30 TABLET ORAL 2 TIMES DAILY
COMMUNITY

## 2023-04-27 RX ORDER — LEVETIRACETAM 500 MG/1
500 TABLET ORAL 2 TIMES DAILY
COMMUNITY

## 2023-04-27 RX ORDER — M-VIT,TX,IRON,MINS/CALC/FOLIC 27MG-0.4MG
1 TABLET ORAL DAILY
Status: ON HOLD | COMMUNITY
End: 2023-04-28 | Stop reason: SDUPTHER

## 2023-04-27 SDOH — ECONOMIC STABILITY: FOOD INSECURITY: WITHIN THE PAST 12 MONTHS, THE FOOD YOU BOUGHT JUST DIDN'T LAST AND YOU DIDN'T HAVE MONEY TO GET MORE.: NEVER TRUE

## 2023-04-27 SDOH — ECONOMIC STABILITY: INCOME INSECURITY: HOW HARD IS IT FOR YOU TO PAY FOR THE VERY BASICS LIKE FOOD, HOUSING, MEDICAL CARE, AND HEATING?: NOT HARD AT ALL

## 2023-04-27 SDOH — ECONOMIC STABILITY: FOOD INSECURITY: WITHIN THE PAST 12 MONTHS, YOU WORRIED THAT YOUR FOOD WOULD RUN OUT BEFORE YOU GOT MONEY TO BUY MORE.: NEVER TRUE

## 2023-04-27 ASSESSMENT — ENCOUNTER SYMPTOMS
VOMITING: 0
NAUSEA: 0
SINUS PAIN: 0
COUGH: 0
SHORTNESS OF BREATH: 0
CHEST TIGHTNESS: 0
SORE THROAT: 0
DIARRHEA: 0
CONSTIPATION: 0

## 2023-04-27 NOTE — PROGRESS NOTES
[facility]  [address] [address]  Dept: [phone]      Preoperative Consultation    Geoff Jones  YOB: 1955    Date of Service:  4/27/2023    Vitals:    04/27/23 0751   BP: 124/74   Site: Right Upper Arm   Position: Sitting   Cuff Size: Medium Adult   Pulse: 78   Resp: 12   Temp: 97.2 °F (36.2 °C)   TempSrc: Temporal   SpO2: 98%   Weight: 164 lb (74.4 kg)   Height: 5' 9.5\" (1.765 m)      Wt Readings from Last 2 Encounters:   04/27/23 164 lb (74.4 kg)   03/28/23 172 lb (78 kg)     BP Readings from Last 3 Encounters:   04/27/23 124/74   02/20/23 122/80   06/03/22 128/74        Chief Complaint   Patient presents with    Pre-op Exam     Brain Biopsy/[facility]/Sx. 4-/[facility]     Allergies   Allergen Reactions    Percocet [Oxycodone-Acetaminophen] Itching     Outpatient Medications Marked as Taking for the 4/27/23 encounter (Office Visit) with MEI Rodriguez CNP   Medication Sig Dispense Refill    rosuvastatin (CRESTOR) 10 MG tablet Take 1 tablet by mouth nightly 90 tablet 1    tadalafil (CIALIS) 20 MG tablet Take 1 tablet by mouth daily as needed for Erectile Dysfunction 10 tablet 0     This patient presents to the office today for a preoperative consultation at the request of surgeon, Dr. Jimena Pham, who plans on performing Brain Biopsy on April 28 at [place] [address].  The current problem began recently, and symptoms have been unchanged with time. Conservative therapy: N/A. Planned anesthesia: General   Known anesthesia problems: None   Bleeding risk: No recent or remote history of abnormal bleeding  Personal or FH of DVT/PE: No    Patient objection to receiving blood products: No     Revised Cardiac Risk Index +1  +0    1.) High-Risk Surgery                                                               ? Intraperitoneal   ? Intrathoracic   ?  Suprainguinal vascular      2.) History of ischemic heart disease

## 2023-04-27 NOTE — TELEPHONE ENCOUNTER
Spoke with patient. Aware of biopsy that is being performed on Friday. Pt is apprehensive and mood fluctuates but has good support with son and wife. Will call next week to check on patient.

## 2023-04-28 ENCOUNTER — APPOINTMENT (OUTPATIENT)
Dept: CT IMAGING | Age: 68
DRG: 027 | End: 2023-04-28
Attending: NEUROLOGICAL SURGERY
Payer: MEDICARE

## 2023-04-28 ENCOUNTER — ANESTHESIA EVENT (OUTPATIENT)
Dept: OPERATING ROOM | Age: 68
End: 2023-04-28
Payer: MEDICARE

## 2023-04-28 ENCOUNTER — ANESTHESIA (OUTPATIENT)
Dept: OPERATING ROOM | Age: 68
End: 2023-04-28
Payer: MEDICARE

## 2023-04-28 ENCOUNTER — HOSPITAL ENCOUNTER (INPATIENT)
Age: 68
LOS: 1 days | Discharge: HOME | DRG: 027 | End: 2023-04-29
Attending: NEUROLOGICAL SURGERY | Admitting: NEUROLOGICAL SURGERY
Payer: MEDICARE

## 2023-04-28 DIAGNOSIS — D49.6 NEOPLASM, BRAIN (HCC): ICD-10-CM

## 2023-04-28 DIAGNOSIS — Z98.890 S/P CRANIOTOMY: Primary | ICD-10-CM

## 2023-04-28 PROBLEM — C85.89 PRIMARY CNS LYMPHOMA (HCC): Status: ACTIVE | Noted: 2023-04-28

## 2023-04-28 LAB
ABO + RH BLD: NORMAL
APTT BLD: 27.2 SEC (ref 22.7–35.9)
BLD GP AB SCN SERPL QL: NORMAL
INR PPP: 0.98 (ref 0.84–1.16)
PROTHROMBIN TIME: 13 SEC (ref 11.5–14.8)

## 2023-04-28 PROCEDURE — 88331 PATH CONSLTJ SURG 1 BLK 1SPC: CPT

## 2023-04-28 PROCEDURE — 2580000003 HC RX 258: Performed by: NEUROLOGICAL SURGERY

## 2023-04-28 PROCEDURE — 85610 PROTHROMBIN TIME: CPT

## 2023-04-28 PROCEDURE — 2709999900 HC NON-CHARGEABLE SUPPLY: Performed by: NEUROLOGICAL SURGERY

## 2023-04-28 PROCEDURE — 3600000014 HC SURGERY LEVEL 4 ADDTL 15MIN: Performed by: NEUROLOGICAL SURGERY

## 2023-04-28 PROCEDURE — 2580000003 HC RX 258: Performed by: NURSE ANESTHETIST, CERTIFIED REGISTERED

## 2023-04-28 PROCEDURE — 88342 IMHCHEM/IMCYTCHM 1ST ANTB: CPT

## 2023-04-28 PROCEDURE — 86850 RBC ANTIBODY SCREEN: CPT

## 2023-04-28 PROCEDURE — 3600000004 HC SURGERY LEVEL 4 BASE: Performed by: NEUROLOGICAL SURGERY

## 2023-04-28 PROCEDURE — 6370000000 HC RX 637 (ALT 250 FOR IP): Performed by: NURSE PRACTITIONER

## 2023-04-28 PROCEDURE — 86901 BLOOD TYPING SEROLOGIC RH(D): CPT

## 2023-04-28 PROCEDURE — 70450 CT HEAD/BRAIN W/O DYE: CPT

## 2023-04-28 PROCEDURE — 1200000000 HC SEMI PRIVATE

## 2023-04-28 PROCEDURE — 6360000002 HC RX W HCPCS: Performed by: NEUROLOGICAL SURGERY

## 2023-04-28 PROCEDURE — 7100000001 HC PACU RECOVERY - ADDTL 15 MIN: Performed by: NEUROLOGICAL SURGERY

## 2023-04-28 PROCEDURE — 00B73ZX EXCISION OF CEREBRAL HEMISPHERE, PERCUTANEOUS APPROACH, DIAGNOSTIC: ICD-10-PCS | Performed by: NEUROLOGICAL SURGERY

## 2023-04-28 PROCEDURE — 2720000010 HC SURG SUPPLY STERILE: Performed by: NEUROLOGICAL SURGERY

## 2023-04-28 PROCEDURE — 7100000000 HC PACU RECOVERY - FIRST 15 MIN: Performed by: NEUROLOGICAL SURGERY

## 2023-04-28 PROCEDURE — 3700000000 HC ANESTHESIA ATTENDED CARE: Performed by: NEUROLOGICAL SURGERY

## 2023-04-28 PROCEDURE — 2500000003 HC RX 250 WO HCPCS: Performed by: NURSE ANESTHETIST, CERTIFIED REGISTERED

## 2023-04-28 PROCEDURE — 2500000003 HC RX 250 WO HCPCS: Performed by: NEUROLOGICAL SURGERY

## 2023-04-28 PROCEDURE — 85730 THROMBOPLASTIN TIME PARTIAL: CPT

## 2023-04-28 PROCEDURE — 88360 TUMOR IMMUNOHISTOCHEM/MANUAL: CPT

## 2023-04-28 PROCEDURE — 2580000003 HC RX 258: Performed by: ANESTHESIOLOGY

## 2023-04-28 PROCEDURE — 3700000001 HC ADD 15 MINUTES (ANESTHESIA): Performed by: NEUROLOGICAL SURGERY

## 2023-04-28 PROCEDURE — C1713 ANCHOR/SCREW BN/BN,TIS/BN: HCPCS | Performed by: NEUROLOGICAL SURGERY

## 2023-04-28 PROCEDURE — 88307 TISSUE EXAM BY PATHOLOGIST: CPT

## 2023-04-28 PROCEDURE — 88341 IMHCHEM/IMCYTCHM EA ADD ANTB: CPT

## 2023-04-28 PROCEDURE — 6360000002 HC RX W HCPCS: Performed by: NURSE ANESTHETIST, CERTIFIED REGISTERED

## 2023-04-28 PROCEDURE — 6360000002 HC RX W HCPCS: Performed by: ANESTHESIOLOGY

## 2023-04-28 PROCEDURE — 2580000003 HC RX 258: Performed by: FAMILY MEDICINE

## 2023-04-28 PROCEDURE — 6370000000 HC RX 637 (ALT 250 FOR IP): Performed by: NEUROLOGICAL SURGERY

## 2023-04-28 PROCEDURE — 86900 BLOOD TYPING SEROLOGIC ABO: CPT

## 2023-04-28 DEVICE — SCREW UN3 SLFTP 1.5X4MM: Type: IMPLANTABLE DEVICE | Site: BRAIN | Status: FUNCTIONAL

## 2023-04-28 DEVICE — LOW PROFILE BURR HOLE COVER, W/TAB, 10MM
Type: IMPLANTABLE DEVICE | Site: BRAIN | Status: FUNCTIONAL
Brand: UNIVERSAL NEURO 2

## 2023-04-28 RX ORDER — SODIUM CHLORIDE 9 MG/ML
INJECTION, SOLUTION INTRAVENOUS PRN
Status: DISCONTINUED | OUTPATIENT
Start: 2023-04-28 | End: 2023-04-28 | Stop reason: HOSPADM

## 2023-04-28 RX ORDER — PANTOPRAZOLE SODIUM 40 MG/1
40 TABLET, DELAYED RELEASE ORAL
Status: DISCONTINUED | OUTPATIENT
Start: 2023-04-29 | End: 2023-04-29 | Stop reason: HOSPADM

## 2023-04-28 RX ORDER — M-VIT,TX,IRON,MINS/CALC/FOLIC 27MG-0.4MG
1 TABLET ORAL DAILY
COMMUNITY
Start: 2023-05-08

## 2023-04-28 RX ORDER — LEVETIRACETAM 500 MG/5ML
1000 INJECTION, SOLUTION, CONCENTRATE INTRAVENOUS ONCE
Status: DISCONTINUED | OUTPATIENT
Start: 2023-04-28 | End: 2023-04-28

## 2023-04-28 RX ORDER — MAGNESIUM 30 MG
30 TABLET ORAL 2 TIMES DAILY
Status: DISCONTINUED | OUTPATIENT
Start: 2023-04-28 | End: 2023-04-28 | Stop reason: RX

## 2023-04-28 RX ORDER — DEXAMETHASONE SODIUM PHOSPHATE 4 MG/ML
INJECTION, SOLUTION INTRA-ARTICULAR; INTRALESIONAL; INTRAMUSCULAR; INTRAVENOUS; SOFT TISSUE PRN
Status: DISCONTINUED | OUTPATIENT
Start: 2023-04-28 | End: 2023-04-28 | Stop reason: SDUPTHER

## 2023-04-28 RX ORDER — LEVETIRACETAM 500 MG/1
500 TABLET ORAL 2 TIMES DAILY
Status: DISCONTINUED | OUTPATIENT
Start: 2023-04-28 | End: 2023-04-29 | Stop reason: HOSPADM

## 2023-04-28 RX ORDER — LEVETIRACETAM 500 MG/5ML
1000 INJECTION, SOLUTION, CONCENTRATE INTRAVENOUS ONCE
Status: COMPLETED | OUTPATIENT
Start: 2023-04-28 | End: 2023-04-28

## 2023-04-28 RX ORDER — FENTANYL CITRATE 50 UG/ML
INJECTION, SOLUTION INTRAMUSCULAR; INTRAVENOUS PRN
Status: DISCONTINUED | OUTPATIENT
Start: 2023-04-28 | End: 2023-04-28 | Stop reason: SDUPTHER

## 2023-04-28 RX ORDER — MAGNESIUM HYDROXIDE/ALUMINUM HYDROXICE/SIMETHICONE 120; 1200; 1200 MG/30ML; MG/30ML; MG/30ML
15 SUSPENSION ORAL EVERY 6 HOURS PRN
Status: DISCONTINUED | OUTPATIENT
Start: 2023-04-28 | End: 2023-04-29 | Stop reason: HOSPADM

## 2023-04-28 RX ORDER — POLYETHYLENE GLYCOL 3350 17 G/17G
17 POWDER, FOR SOLUTION ORAL DAILY
Status: DISCONTINUED | OUTPATIENT
Start: 2023-04-28 | End: 2023-04-29 | Stop reason: HOSPADM

## 2023-04-28 RX ORDER — LABETALOL HYDROCHLORIDE 5 MG/ML
10 INJECTION, SOLUTION INTRAVENOUS
Status: DISCONTINUED | OUTPATIENT
Start: 2023-04-28 | End: 2023-04-28 | Stop reason: HOSPADM

## 2023-04-28 RX ORDER — MEPERIDINE HYDROCHLORIDE 25 MG/ML
12.5 INJECTION INTRAMUSCULAR; INTRAVENOUS; SUBCUTANEOUS EVERY 5 MIN PRN
Status: DISCONTINUED | OUTPATIENT
Start: 2023-04-28 | End: 2023-04-28 | Stop reason: HOSPADM

## 2023-04-28 RX ORDER — MIDAZOLAM HYDROCHLORIDE 1 MG/ML
INJECTION INTRAMUSCULAR; INTRAVENOUS PRN
Status: DISCONTINUED | OUTPATIENT
Start: 2023-04-28 | End: 2023-04-28 | Stop reason: SDUPTHER

## 2023-04-28 RX ORDER — BUPIVACAINE HYDROCHLORIDE AND EPINEPHRINE 5; 5 MG/ML; UG/ML
INJECTION, SOLUTION EPIDURAL; INTRACAUDAL; PERINEURAL PRN
Status: DISCONTINUED | OUTPATIENT
Start: 2023-04-28 | End: 2023-04-28 | Stop reason: HOSPADM

## 2023-04-28 RX ORDER — PROMETHAZINE HYDROCHLORIDE 12.5 MG/1
12.5 TABLET ORAL EVERY 6 HOURS PRN
Status: DISCONTINUED | OUTPATIENT
Start: 2023-04-28 | End: 2023-04-29 | Stop reason: HOSPADM

## 2023-04-28 RX ORDER — PROPOFOL 10 MG/ML
INJECTION, EMULSION INTRAVENOUS PRN
Status: DISCONTINUED | OUTPATIENT
Start: 2023-04-28 | End: 2023-04-28 | Stop reason: SDUPTHER

## 2023-04-28 RX ORDER — ONDANSETRON 2 MG/ML
4 INJECTION INTRAMUSCULAR; INTRAVENOUS
Status: DISCONTINUED | OUTPATIENT
Start: 2023-04-28 | End: 2023-04-28 | Stop reason: HOSPADM

## 2023-04-28 RX ORDER — HEPARIN SODIUM 5000 [USP'U]/ML
5000 INJECTION, SOLUTION INTRAVENOUS; SUBCUTANEOUS EVERY 8 HOURS SCHEDULED
Status: DISCONTINUED | OUTPATIENT
Start: 2023-04-29 | End: 2023-04-29 | Stop reason: HOSPADM

## 2023-04-28 RX ORDER — SODIUM CHLORIDE 9 MG/ML
INJECTION, SOLUTION INTRAVENOUS PRN
Status: DISCONTINUED | OUTPATIENT
Start: 2023-04-28 | End: 2023-04-29 | Stop reason: HOSPADM

## 2023-04-28 RX ORDER — SODIUM CHLORIDE, SODIUM LACTATE, POTASSIUM CHLORIDE, CALCIUM CHLORIDE 600; 310; 30; 20 MG/100ML; MG/100ML; MG/100ML; MG/100ML
INJECTION, SOLUTION INTRAVENOUS CONTINUOUS
Status: DISCONTINUED | OUTPATIENT
Start: 2023-04-28 | End: 2023-04-28 | Stop reason: HOSPADM

## 2023-04-28 RX ORDER — DEXAMETHASONE SODIUM PHOSPHATE 4 MG/ML
4 INJECTION, SOLUTION INTRA-ARTICULAR; INTRALESIONAL; INTRAMUSCULAR; INTRAVENOUS; SOFT TISSUE EVERY 6 HOURS
Status: DISCONTINUED | OUTPATIENT
Start: 2023-04-28 | End: 2023-04-29 | Stop reason: HOSPADM

## 2023-04-28 RX ORDER — TAMSULOSIN HYDROCHLORIDE 0.4 MG/1
0.4 CAPSULE ORAL DAILY
COMMUNITY

## 2023-04-28 RX ORDER — LIDOCAINE HYDROCHLORIDE 20 MG/ML
INJECTION, SOLUTION EPIDURAL; INFILTRATION; INTRACAUDAL; PERINEURAL PRN
Status: DISCONTINUED | OUTPATIENT
Start: 2023-04-28 | End: 2023-04-28 | Stop reason: SDUPTHER

## 2023-04-28 RX ORDER — TRAMADOL HYDROCHLORIDE 50 MG/1
100 TABLET ORAL EVERY 6 HOURS PRN
Status: DISCONTINUED | OUTPATIENT
Start: 2023-04-28 | End: 2023-04-29 | Stop reason: HOSPADM

## 2023-04-28 RX ORDER — BISACODYL 10 MG
10 SUPPOSITORY, RECTAL RECTAL DAILY PRN
Status: DISCONTINUED | OUTPATIENT
Start: 2023-04-28 | End: 2023-04-29 | Stop reason: HOSPADM

## 2023-04-28 RX ORDER — TRAMADOL HYDROCHLORIDE 50 MG/1
50 TABLET ORAL EVERY 6 HOURS PRN
Qty: 28 TABLET | Refills: 0 | Status: SHIPPED | OUTPATIENT
Start: 2023-04-28 | End: 2023-05-05

## 2023-04-28 RX ORDER — SODIUM CHLORIDE 9 MG/ML
INJECTION, SOLUTION INTRAVENOUS CONTINUOUS
Status: DISCONTINUED | OUTPATIENT
Start: 2023-04-28 | End: 2023-04-29 | Stop reason: HOSPADM

## 2023-04-28 RX ORDER — SODIUM CHLORIDE 0.9 % (FLUSH) 0.9 %
5-40 SYRINGE (ML) INJECTION EVERY 12 HOURS SCHEDULED
Status: DISCONTINUED | OUTPATIENT
Start: 2023-04-28 | End: 2023-04-29 | Stop reason: HOSPADM

## 2023-04-28 RX ORDER — SODIUM CHLORIDE 0.9 % (FLUSH) 0.9 %
5-40 SYRINGE (ML) INJECTION EVERY 12 HOURS SCHEDULED
Status: DISCONTINUED | OUTPATIENT
Start: 2023-04-28 | End: 2023-04-28 | Stop reason: HOSPADM

## 2023-04-28 RX ORDER — TRAMADOL HYDROCHLORIDE 50 MG/1
50 TABLET ORAL EVERY 6 HOURS PRN
Status: DISCONTINUED | OUTPATIENT
Start: 2023-04-28 | End: 2023-04-29 | Stop reason: HOSPADM

## 2023-04-28 RX ORDER — ROCURONIUM BROMIDE 10 MG/ML
INJECTION, SOLUTION INTRAVENOUS PRN
Status: DISCONTINUED | OUTPATIENT
Start: 2023-04-28 | End: 2023-04-28 | Stop reason: SDUPTHER

## 2023-04-28 RX ORDER — SENNA AND DOCUSATE SODIUM 50; 8.6 MG/1; MG/1
1 TABLET, FILM COATED ORAL 2 TIMES DAILY
Qty: 20 TABLET | Refills: 0 | Status: SHIPPED | OUTPATIENT
Start: 2023-04-28 | End: 2023-05-08

## 2023-04-28 RX ORDER — PANTOPRAZOLE SODIUM 40 MG/1
40 TABLET, DELAYED RELEASE ORAL
Qty: 16 TABLET | Refills: 0 | Status: SHIPPED | OUTPATIENT
Start: 2023-04-29 | End: 2023-05-15

## 2023-04-28 RX ORDER — PROCHLORPERAZINE EDISYLATE 5 MG/ML
5 INJECTION INTRAMUSCULAR; INTRAVENOUS
Status: DISCONTINUED | OUTPATIENT
Start: 2023-04-28 | End: 2023-04-28 | Stop reason: HOSPADM

## 2023-04-28 RX ORDER — SODIUM CHLORIDE 0.9 % (FLUSH) 0.9 %
5-40 SYRINGE (ML) INJECTION PRN
Status: DISCONTINUED | OUTPATIENT
Start: 2023-04-28 | End: 2023-04-29 | Stop reason: HOSPADM

## 2023-04-28 RX ORDER — DEXAMETHASONE 4 MG/1
TABLET ORAL
Qty: 40 TABLET | Refills: 0 | Status: SHIPPED | OUTPATIENT
Start: 2023-04-28 | End: 2023-05-14

## 2023-04-28 RX ORDER — SODIUM CHLORIDE 0.9 % (FLUSH) 0.9 %
5-40 SYRINGE (ML) INJECTION PRN
Status: DISCONTINUED | OUTPATIENT
Start: 2023-04-28 | End: 2023-04-28 | Stop reason: HOSPADM

## 2023-04-28 RX ORDER — NALOXONE HYDROCHLORIDE 0.4 MG/ML
0.2 INJECTION, SOLUTION INTRAMUSCULAR; INTRAVENOUS; SUBCUTANEOUS PRN
Status: DISCONTINUED | OUTPATIENT
Start: 2023-04-28 | End: 2023-04-29 | Stop reason: HOSPADM

## 2023-04-28 RX ORDER — TAMSULOSIN HYDROCHLORIDE 0.4 MG/1
0.4 CAPSULE ORAL DAILY
Status: DISCONTINUED | OUTPATIENT
Start: 2023-04-28 | End: 2023-04-29 | Stop reason: HOSPADM

## 2023-04-28 RX ORDER — HYDRALAZINE HYDROCHLORIDE 20 MG/ML
10 INJECTION INTRAMUSCULAR; INTRAVENOUS
Status: DISCONTINUED | OUTPATIENT
Start: 2023-04-28 | End: 2023-04-28 | Stop reason: HOSPADM

## 2023-04-28 RX ORDER — SENNA AND DOCUSATE SODIUM 50; 8.6 MG/1; MG/1
1 TABLET, FILM COATED ORAL 2 TIMES DAILY
Status: DISCONTINUED | OUTPATIENT
Start: 2023-04-28 | End: 2023-04-29 | Stop reason: HOSPADM

## 2023-04-28 RX ORDER — ONDANSETRON 2 MG/ML
4 INJECTION INTRAMUSCULAR; INTRAVENOUS EVERY 6 HOURS PRN
Status: DISCONTINUED | OUTPATIENT
Start: 2023-04-28 | End: 2023-04-29 | Stop reason: HOSPADM

## 2023-04-28 RX ORDER — ONDANSETRON 2 MG/ML
INJECTION INTRAMUSCULAR; INTRAVENOUS PRN
Status: DISCONTINUED | OUTPATIENT
Start: 2023-04-28 | End: 2023-04-28 | Stop reason: SDUPTHER

## 2023-04-28 RX ORDER — SODIUM CHLORIDE, SODIUM LACTATE, POTASSIUM CHLORIDE, CALCIUM CHLORIDE 600; 310; 30; 20 MG/100ML; MG/100ML; MG/100ML; MG/100ML
INJECTION, SOLUTION INTRAVENOUS CONTINUOUS PRN
Status: DISCONTINUED | OUTPATIENT
Start: 2023-04-28 | End: 2023-04-28 | Stop reason: SDUPTHER

## 2023-04-28 RX ADMIN — DEXAMETHASONE SODIUM PHOSPHATE 4 MG: 4 INJECTION, SOLUTION INTRAMUSCULAR; INTRAVENOUS at 16:56

## 2023-04-28 RX ADMIN — FENTANYL CITRATE 50 MCG: 50 INJECTION, SOLUTION INTRAMUSCULAR; INTRAVENOUS at 07:51

## 2023-04-28 RX ADMIN — LIDOCAINE HYDROCHLORIDE 80 MG: 20 INJECTION, SOLUTION EPIDURAL; INFILTRATION; INTRACAUDAL; PERINEURAL at 07:23

## 2023-04-28 RX ADMIN — PROPOFOL 160 MG: 10 INJECTION, EMULSION INTRAVENOUS at 07:23

## 2023-04-28 RX ADMIN — ONDANSETRON 4 MG: 2 INJECTION INTRAMUSCULAR; INTRAVENOUS at 07:15

## 2023-04-28 RX ADMIN — CEFAZOLIN 2000 MG: 2 INJECTION, POWDER, FOR SOLUTION INTRAMUSCULAR; INTRAVENOUS at 17:10

## 2023-04-28 RX ADMIN — TAMSULOSIN HYDROCHLORIDE 0.4 MG: 0.4 CAPSULE ORAL at 16:56

## 2023-04-28 RX ADMIN — HYDROMORPHONE HYDROCHLORIDE 0.5 MG: 1 INJECTION, SOLUTION INTRAMUSCULAR; INTRAVENOUS; SUBCUTANEOUS at 09:45

## 2023-04-28 RX ADMIN — MIDAZOLAM HYDROCHLORIDE 2 MG: 2 INJECTION, SOLUTION INTRAMUSCULAR; INTRAVENOUS at 07:15

## 2023-04-28 RX ADMIN — LEVETIRACETAM 500 MG: 500 TABLET, FILM COATED ORAL at 20:32

## 2023-04-28 RX ADMIN — SODIUM CHLORIDE, SODIUM LACTATE, POTASSIUM CHLORIDE, AND CALCIUM CHLORIDE: .6; .31; .03; .02 INJECTION, SOLUTION INTRAVENOUS at 07:15

## 2023-04-28 RX ADMIN — FENTANYL CITRATE 50 MCG: 50 INJECTION, SOLUTION INTRAMUSCULAR; INTRAVENOUS at 07:23

## 2023-04-28 RX ADMIN — DEXAMETHASONE SODIUM PHOSPHATE 4 MG: 4 INJECTION, SOLUTION INTRAMUSCULAR; INTRAVENOUS at 20:32

## 2023-04-28 RX ADMIN — CEFAZOLIN 2000 MG: 2 INJECTION, POWDER, FOR SOLUTION INTRAMUSCULAR; INTRAVENOUS at 23:29

## 2023-04-28 RX ADMIN — ONDANSETRON 4 MG: 2 INJECTION INTRAMUSCULAR; INTRAVENOUS at 08:34

## 2023-04-28 RX ADMIN — SODIUM CHLORIDE: 9 INJECTION, SOLUTION INTRAVENOUS at 09:10

## 2023-04-28 RX ADMIN — TRAMADOL HYDROCHLORIDE 100 MG: 50 TABLET, COATED ORAL at 16:58

## 2023-04-28 RX ADMIN — SODIUM CHLORIDE, POTASSIUM CHLORIDE, SODIUM LACTATE AND CALCIUM CHLORIDE: 600; 310; 30; 20 INJECTION, SOLUTION INTRAVENOUS at 06:29

## 2023-04-28 RX ADMIN — SUGAMMADEX 200 MG: 100 INJECTION, SOLUTION INTRAVENOUS at 08:45

## 2023-04-28 RX ADMIN — METHOCARBAMOL 1000 MG: 100 INJECTION, SOLUTION INTRAMUSCULAR; INTRAVENOUS at 10:30

## 2023-04-28 RX ADMIN — HYDROMORPHONE HYDROCHLORIDE 0.5 MG: 1 INJECTION, SOLUTION INTRAMUSCULAR; INTRAVENOUS; SUBCUTANEOUS at 09:09

## 2023-04-28 RX ADMIN — CEFAZOLIN 2000 MG: 2 INJECTION, POWDER, FOR SOLUTION INTRAMUSCULAR; INTRAVENOUS at 07:25

## 2023-04-28 RX ADMIN — SODIUM CHLORIDE: 9 INJECTION, SOLUTION INTRAVENOUS at 17:09

## 2023-04-28 RX ADMIN — LEVETIRACETAM 500 MG: 500 INJECTION INTRAVENOUS at 07:25

## 2023-04-28 RX ADMIN — DEXAMETHASONE SODIUM PHOSPHATE 10 MG: 4 INJECTION, SOLUTION INTRAMUSCULAR; INTRAVENOUS at 07:25

## 2023-04-28 RX ADMIN — DOCUSATE SODIUM 50 MG AND SENNOSIDES 8.6 MG 1 TABLET: 8.6; 5 TABLET, FILM COATED ORAL at 20:32

## 2023-04-28 RX ADMIN — ROCURONIUM BROMIDE 100 MG: 10 INJECTION INTRAVENOUS at 07:24

## 2023-04-28 RX ADMIN — SODIUM CHLORIDE, PRESERVATIVE FREE 5 ML: 5 INJECTION INTRAVENOUS at 20:34

## 2023-04-28 ASSESSMENT — PAIN SCALES - GENERAL
PAINLEVEL_OUTOF10: 4
PAINLEVEL_OUTOF10: 7
PAINLEVEL_OUTOF10: 2
PAINLEVEL_OUTOF10: 0
PAINLEVEL_OUTOF10: 4
PAINLEVEL_OUTOF10: 2
PAINLEVEL_OUTOF10: 2
PAINLEVEL_OUTOF10: 0
PAINLEVEL_OUTOF10: 8

## 2023-04-28 ASSESSMENT — PAIN DESCRIPTION - DESCRIPTORS
DESCRIPTORS: OTHER (COMMENT)
DESCRIPTORS: ACHING;DISCOMFORT

## 2023-04-28 ASSESSMENT — PAIN DESCRIPTION - ORIENTATION
ORIENTATION: ANTERIOR;POSTERIOR
ORIENTATION: ANTERIOR

## 2023-04-28 ASSESSMENT — PAIN SCALES - WONG BAKER
WONGBAKER_NUMERICALRESPONSE: 2

## 2023-04-28 ASSESSMENT — PAIN - FUNCTIONAL ASSESSMENT
PAIN_FUNCTIONAL_ASSESSMENT: PREVENTS OR INTERFERES WITH MANY ACTIVE NOT PASSIVE ACTIVITIES
PAIN_FUNCTIONAL_ASSESSMENT: 0-10

## 2023-04-28 ASSESSMENT — PAIN DESCRIPTION - ONSET: ONSET: AWAKENED FROM SLEEP

## 2023-04-28 ASSESSMENT — PAIN DESCRIPTION - FREQUENCY: FREQUENCY: CONTINUOUS

## 2023-04-28 ASSESSMENT — PAIN DESCRIPTION - PAIN TYPE: TYPE: SURGICAL PAIN

## 2023-04-28 ASSESSMENT — PAIN DESCRIPTION - LOCATION
LOCATION: HEAD
LOCATION: HEAD

## 2023-04-28 NOTE — ANESTHESIA POSTPROCEDURE EVALUATION
Department of Anesthesiology  Postprocedure Note    Patient: Nica Amaya  MRN: [id number]  YOB: 1955  Date of evaluation: 4/28/2023      Procedure Summary     Date: 04/28/23 Room / Location: [facility]    Anesthesia Start: [facility] Anesthesia Stop: 4670    Procedure: RIGHT PARIETAL STEREOTACTIC BIOPSY OF BRAIN MASS (Right) Diagnosis:       Neoplasm, brain ([address]      (Neoplasm, brain ([address] [D49.6])    Surgeons: Mele Zimmerman MD Responsible Provider:     Anesthesia Type: general ASA Status: 3          Anesthesia Type: No value filed.     Aly Phase I: Aly Score: 7    Aly Phase II:        Anesthesia Post Evaluation    Patient location during evaluation: PACU  Patient participation: complete - patient participated  Level of consciousness: awake and alert  Airway patency: patent  Nausea & Vomiting: no nausea and no vomiting  Complications: no  Cardiovascular status: hemodynamically stable  Respiratory status: acceptable  Hydration status: euvolemic

## 2023-04-28 NOTE — BRIEF OP NOTE
Brief Postoperative Note      Patient: Lonny Christianson  YOB: 1955  MRN: [id number]    Date of Procedure: 4/28/2023    Pre-Op Diagnosis Codes:      * Neoplasm, brain ([facility] [D49.6]    Post-Op Diagnosis: Same       Procedure(s):  RIGHT PARIETAL STEREOTACTIC BIOPSY OF BRAIN MASS    Surgeon(s):  Edwin Ring MD    Assistant:  Physician Assistant: Dafne Reina PA-C    Anesthesia: General    Estimated Blood Loss (mL): Minimal    Complications: None    Specimens:   ID Type Source Tests Collected by Time Destination   A : RIGHT PERIVENTRICULAR MASS Tissue Brain Bright Gandhi MD 4/28/2023 2579    B : RIGHT PERIVENTRICULAR MASS Tissue Brain SURGICAL PATHOLOGY Edwin Ring MD 4/28/2023 0827        Implants:  * No implants in log *      Drains: * No LDAs found *    Findings: Lesional tissue concerning for lymphoma      Electronically signed by Edwin Ring MD on 4/28/2023 at 8:41 AM

## 2023-04-28 NOTE — ANESTHESIA PRE PROCEDURE
Department of Anesthesiology  Preprocedure Note       Name:  Charles Galeano   Age:  77 y.o.  :  1955                                          MRN:  [id number]         Date:  2023      Surgeon: Kia Pinzon):  Brown Mcmahon MD    Procedure: Procedure(s):  RIGHT PARIETAL STEREOTACTIC BIOPSY OF BRAIN MASS    Medications prior to admission:   Prior to Admission medications    Medication Sig Start Date End Date Taking? Authorizing Provider   levETIRAcetam (KEPPRA) 500 MG tablet Take 1 tablet by mouth 2 times daily   Yes Historical Provider, MD   Multiple Vitamins-Minerals (THERAPEUTIC MULTIVITAMIN-MINERALS) tablet Take 1 tablet by mouth daily   Yes Historical Provider, MD   magnesium 30 MG tablet Take 1 tablet by mouth 2 times daily   Yes Historical Provider, MD   tadalafil (CIALIS) 20 MG tablet Take 1 tablet by mouth daily as needed for Erectile Dysfunction  Patient not taking: Reported on 2023   Marga Luciano, APRN - CNP       Current medications:    Current Facility-Administered Medications   Medication Dose Route Frequency Provider Last Rate Last Admin    lactated ringers IV soln infusion   IntraVENous Continuous Kamran Brantley MD 50 mL/hr at 23 0629 New Bag at 23 0629    ceFAZolin (ANCEF) 2,000 mg in sodium chloride 0.9 % 50 mL IVPB (mini-bag)  2,000 mg IntraVENous Once Brown Mcmahon MD        levETIRAcetam (KEPPRA) injection 1,000 mg  1,000 mg IntraVENous Once Brown Mcmahon MD           Allergies:     Allergies   Allergen Reactions    Percocet [Oxycodone-Acetaminophen] Itching       Problem List:    Patient Active Problem List   Diagnosis Code    IGT (impaired glucose tolerance) R73.02    History of malignant melanoma Z85.820    PAF (paroxysmal atrial fibrillation) (HCC) I48.0    Anemia D64.9    Complete heart block (HCC) I44.2    Third degree AV block (HCC) I44.2    Acquired hypothyroidism E03.9    Pacemaker-medtronic Z95.0    Mediastinal large B-cell lymphoma of

## 2023-04-28 NOTE — H&P
Date of Surgery Update    Heath Roman was seen and examined. There have been no significant clinical changes since the completion of the History and Physical.    Patient identified by surgeon. Surgical site was confirmed by patient and surgeon. Surgical site marked.     Yeison Cee MD, PhD  [address], [address], [address] [phone] (c), [phone] (o)

## 2023-04-28 NOTE — DISCHARGE SUMMARY
Discharge Summary    Date of Admission: 4/28/2023  5:45 AM  Date of Discharge: 4/29/2023  Admission Diagnosis: Neoplasm, brain [facility]) [D49.6]  Discharge Diagnosis: Same   Condition on Discharge: good  Attending for Admission: Cristi De Jesus MD  Procedures: Procedure(s) (LRB):  RIGHT PARIETAL STEREOTACTIC BIOPSY OF BRAIN MASS (Right)  Consults: None    Reason for Admission:  Silvia Phelan is a 77 y.o. male patient with history of DLBCL in 2021, melanoma (15 years ago), and adenoid cystic carcinoma (2021) who presented with progressive confusion. Imaging demonstrated an enhancing abnormality in the corpus callosum and periventricular region. Given the absence of a diagnosis and his symptoms, he elected to proceed with a stereotactic biopsy . He underwent the procedure listed above on 4/28/2023. Hospital Course:  After surgery, he had mild incisional pain. Post op CT head was stable, pathology results were pending. The pain was well-controlled on oral medications. The incision was clean, dry and intact. There was no erythema or edema around the surgical site. Prior to discharge He was eating well, urinating and ambulating with a steady gait.     Discharge Vitals/Labs:  /80   Pulse 66   Temp 97.4 °F (36.3 °C) (Oral)   Resp 18   Ht 5' 10\" (1.778 m)   Wt 163 lb (73.9 kg)   SpO2 98%   BMI 23.39 kg/m²   CBC with Differential:    Lab Results   Component Value Date/Time    WBC 4.2 02/20/2023 08:04 AM    RBC 4.52 02/20/2023 08:04 AM    HGB 14.2 02/20/2023 08:04 AM    HCT 42.4 02/20/2023 08:04 AM     02/20/2023 08:04 AM    MCV 93.8 02/20/2023 08:04 AM    MCH 31.4 02/20/2023 08:04 AM    MCHC 33.4 02/20/2023 08:04 AM    RDW 14.1 02/20/2023 08:04 AM    SEGSPCT 70.0 05/16/2011 10:38 AM    LYMPHOPCT 13.8 02/20/2023 08:04 AM    MONOPCT 7.6 02/20/2023 08:04 AM    EOSPCT 1.1 05/16/2011 10:38 AM    BASOPCT 0.9 02/20/2023 08:04 AM    MONOSABS 0.3 02/20/2023 08:04 AM    LYMPHSABS 0.6 02/20/2023 08:04 AM

## 2023-04-28 NOTE — OP NOTE
OPERATIVE NOTE    Patient Name: Kiya Stevens  MRN: [id number]  : 1955  DOS: 2023     PREOPERATIVE DIAGNOSIS: Corpus callosum and periventricular lesion     POSTOPERATIVE DIAGNOSIS: Corpus callosum and periventricular lesion    PROCEDURE PERFORMED:    1. Right parietal bur hole placement  2. Needle biopsy of right parietal periventricular lesion   3. Use of Intraoperative neuronavigation and Varioguide arm      SURGEON: Chantale Puente MD, PhD     ASSISTANT: ANDREW Fitch    Ms. Magdiel Lambert served as assistant on this surgery due to the complex nature of the procedure and the lack of a resident or fellow assistant. She provided assistance with opening, manipulation of critical neural elements, and closing. ANESTHESIA: General endotracheal anesthesia. ESTIMATED BLOOD LOSS: 10 cc    DRAINS: None    IMPLANTS:   1. Ileana craniofacial plating system     SPECIMEN:   1. Right periventricular mass for frozen and permanent histopathology     INDICATIONS:   Mr. Jair Angelo is a 77year old man with a histroy of DLBCL in , melanoma (15 years ago), and adenoid cystic carcinoma () who presented with progressive confusion. Imaging demonstrated an enhancing abnormality in the corpus callosum and periventricular region. Given the absence of a diagnosis and his symptoms, he elected to proceed with a stereotactic biopsy. The patient understands the risks and benefits of the procedure including but not limited to bleeding, infection, worsened cognitive function, aphasia, vascular injury, cerebral spinal fluid leak, errant or insufficient biopsy, stroke, anesthesia risks, and death. OPERATIVE PROCEDURE:      Under universal protocol and informed consent, the patient was brought to the operating room. General anesthesia was induced and the patient was intubated. The patient was placed in the supine position with a large gel roll under the right shoulder. All pressure points were appropriately padded.  The patient

## 2023-04-28 NOTE — PLAN OF CARE
Problem: Discharge Planning  Goal: Discharge to home or other facility with appropriate resources  Outcome: Progressing  Flowsheets (Taken 4/28/2023 1842)  Discharge to home or other facility with appropriate resources:   Identify barriers to discharge with patient and caregiver   Arrange for needed discharge resources and transportation as appropriate   Identify discharge learning needs (meds, wound care, etc)   Arrange for interpreters to assist at discharge as needed   Refer to discharge planning if patient needs post-hospital services based on physician order or complex needs related to functional status, cognitive ability or social support system     Problem: Pain  Goal: Verbalizes/displays adequate comfort level or baseline comfort level  Outcome: Progressing  Flowsheets (Taken 4/28/2023 1842)  Verbalizes/displays adequate comfort level or baseline comfort level:   Encourage patient to monitor pain and request assistance   Administer analgesics based on type and severity of pain and evaluate response   Implement non-pharmacological measures as appropriate and evaluate response   Consider cultural and social influences on pain and pain management   Notify Licensed Independent Practitioner if interventions unsuccessful or patient reports new pain

## 2023-04-29 VITALS
BODY MASS INDEX: 23.34 KG/M2 | SYSTOLIC BLOOD PRESSURE: 104 MMHG | RESPIRATION RATE: 18 BRPM | OXYGEN SATURATION: 99 % | HEART RATE: 66 BPM | WEIGHT: 163 LBS | HEIGHT: 70 IN | DIASTOLIC BLOOD PRESSURE: 60 MMHG | TEMPERATURE: 97.6 F

## 2023-04-29 LAB
ANION GAP SERPL CALCULATED.3IONS-SCNC: 7 MMOL/L (ref 3–16)
BUN SERPL-MCNC: 15 MG/DL (ref 7–20)
CALCIUM SERPL-MCNC: 9.1 MG/DL (ref 8.3–10.6)
CHLORIDE SERPL-SCNC: 100 MMOL/L (ref 99–110)
CO2 SERPL-SCNC: 25 MMOL/L (ref 21–32)
CREAT SERPL-MCNC: 1 MG/DL (ref 0.8–1.3)
DEPRECATED RDW RBC AUTO: 13.4 % (ref 12.4–15.4)
GFR SERPLBLD CREATININE-BSD FMLA CKD-EPI: >60 ML/MIN/{1.73_M2}
GLUCOSE SERPL-MCNC: 142 MG/DL (ref 70–99)
HCT VFR BLD AUTO: 42.7 % (ref 40.5–52.5)
HGB BLD-MCNC: 14.1 G/DL (ref 13.5–17.5)
MCH RBC QN AUTO: 31.2 PG (ref 26–34)
MCHC RBC AUTO-ENTMCNC: 33 G/DL (ref 31–36)
MCV RBC AUTO: 94.7 FL (ref 80–100)
PLATELET # BLD AUTO: 221 K/UL (ref 135–450)
PMV BLD AUTO: 9.6 FL (ref 5–10.5)
POTASSIUM SERPL-SCNC: 3.9 MMOL/L (ref 3.5–5.1)
RBC # BLD AUTO: 4.51 M/UL (ref 4.2–5.9)
SODIUM SERPL-SCNC: 132 MMOL/L (ref 136–145)
WBC # BLD AUTO: 13.8 K/UL (ref 4–11)

## 2023-04-29 PROCEDURE — 80048 BASIC METABOLIC PNL TOTAL CA: CPT

## 2023-04-29 PROCEDURE — 6370000000 HC RX 637 (ALT 250 FOR IP): Performed by: NURSE PRACTITIONER

## 2023-04-29 PROCEDURE — 6370000000 HC RX 637 (ALT 250 FOR IP): Performed by: NEUROLOGICAL SURGERY

## 2023-04-29 PROCEDURE — 85027 COMPLETE CBC AUTOMATED: CPT

## 2023-04-29 PROCEDURE — 6360000002 HC RX W HCPCS: Performed by: NEUROLOGICAL SURGERY

## 2023-04-29 PROCEDURE — 36415 COLL VENOUS BLD VENIPUNCTURE: CPT

## 2023-04-29 PROCEDURE — 2580000003 HC RX 258: Performed by: NEUROLOGICAL SURGERY

## 2023-04-29 RX ADMIN — CEFAZOLIN 2000 MG: 2 INJECTION, POWDER, FOR SOLUTION INTRAMUSCULAR; INTRAVENOUS at 08:31

## 2023-04-29 RX ADMIN — SODIUM CHLORIDE, PRESERVATIVE FREE 10 ML: 5 INJECTION INTRAVENOUS at 08:33

## 2023-04-29 RX ADMIN — DOCUSATE SODIUM 50 MG AND SENNOSIDES 8.6 MG 1 TABLET: 8.6; 5 TABLET, FILM COATED ORAL at 08:32

## 2023-04-29 RX ADMIN — POLYETHYLENE GLYCOL 3350 17 G: 17 POWDER, FOR SOLUTION ORAL at 08:32

## 2023-04-29 RX ADMIN — LEVETIRACETAM 500 MG: 500 TABLET, FILM COATED ORAL at 08:33

## 2023-04-29 RX ADMIN — TAMSULOSIN HYDROCHLORIDE 0.4 MG: 0.4 CAPSULE ORAL at 08:31

## 2023-04-29 RX ADMIN — PANTOPRAZOLE SODIUM 40 MG: 40 TABLET, DELAYED RELEASE ORAL at 06:40

## 2023-04-29 RX ADMIN — DEXAMETHASONE SODIUM PHOSPHATE 4 MG: 4 INJECTION, SOLUTION INTRAMUSCULAR; INTRAVENOUS at 10:26

## 2023-04-29 RX ADMIN — DEXAMETHASONE SODIUM PHOSPHATE 4 MG: 4 INJECTION, SOLUTION INTRAMUSCULAR; INTRAVENOUS at 03:15

## 2023-04-29 ASSESSMENT — PAIN SCALES - GENERAL
PAINLEVEL_OUTOF10: 0
PAINLEVEL_OUTOF10: 0

## 2023-04-29 NOTE — PLAN OF CARE
Problem: Discharge Planning  Goal: Discharge to home or other facility with appropriate resources  4/29/2023 0330 by Amanda Allen RN  Outcome: Progressing  4/28/2023 1842 by Seth Mendez RN  Outcome: Progressing  Flowsheets (Taken 4/28/2023 1842)  Discharge to home or other facility with appropriate resources:   Identify barriers to discharge with patient and caregiver   Arrange for needed discharge resources and transportation as appropriate   Identify discharge learning needs (meds, wound care, etc)   Arrange for interpreters to assist at discharge as needed   Refer to discharge planning if patient needs post-hospital services based on physician order or complex needs related to functional status, cognitive ability or social support system     Problem: Pain  Goal: Verbalizes/displays adequate comfort level or baseline comfort level  4/29/2023 0330 by Amanda Allen RN  Outcome: Progressing  4/28/2023 1842 by Seth Mendez RN  Outcome: Progressing  Flowsheets (Taken 4/28/2023 1842)  Verbalizes/displays adequate comfort level or baseline comfort level:   Encourage patient to monitor pain and request assistance   Administer analgesics based on type and severity of pain and evaluate response   Implement non-pharmacological measures as appropriate and evaluate response   Consider cultural and social influences on pain and pain management   Notify Licensed Independent Practitioner if interventions unsuccessful or patient reports new pain     Problem: ABCDS Injury Assessment  Goal: Absence of physical injury  Outcome: Progressing

## 2023-04-29 NOTE — CARE COORDINATION
CM Note: Chart review conducted and CM spoke with nurse on duty. Pt is from home with spouse and will return home with family to transport. No HHC, DME or CM needs identified for discharge. Pt is not at high risk for readmission, has not recently been admitted, and there is no active consult for Case Management services.  will sign off on this pt at this time. If needs arise, please consult Case Management services.     Risk of Readmission Score 6%  Electronically signed by GIUSEPPE Shi on 4/29/2023 at 10:15 AM  [phone]

## 2023-04-29 NOTE — PLAN OF CARE
Problem: Discharge Planning  Goal: Discharge to home or other facility with appropriate resources  4/29/2023 1210 by Ellen Johnson RN  Outcome: Progressing  Note: Pt will be discharging home. Went over discharge instructions with pt and did not verbalize any questions. Wife will be transporting pt home. Problem: Pain  Goal: Verbalizes/displays adequate comfort level or baseline comfort level  4/29/2023 1210 by Ellen Johnson RN  Outcome: Progressing  Note: Pt is able to verbalize pain on 0-10 scale. Has not verbalized any pain. No need for any PRN medications      Problem: ABCDS Injury Assessment  Goal: Absence of physical injury  4/29/2023 1210 by Ellen Johnson RN  Outcome: Progressing  Note: Pt appropriately calls out     Problem: Safety - Adult  Goal: Free from fall injury  Outcome: Progressing  Note: Safety precautions in place. Bed locked and in lowest position with call light, bedside table, and belongings within reach. Pt is a standby assist when ambulating. Floor free of clutter.

## 2023-04-29 NOTE — PROGRESS NOTES
4 Eyes Skin Assessment       NAME:  Roly Parnell  YOB: 1955  MEDICAL RECORD NUMBER:  [id number]       The patient is being assessed for   Admission       I agree that One RN has performed a thorough Head to Toe Skin Assessment on the patient. ALL assessment sites listed below have been assessed. Areas assessed by both nurses:       Head, Face, Ears, Shoulders, Back, Chest, Arms, Elbows, Hands, Sacrum. Buttock, Coccyx, Ischium, Legs. Feet and Heels, Under Medical Devices , and Other                                  Does the Patient have a Wound?  No noted wound(s)        Juan Prevention initiated by RN: No   Wound Care Orders initiated by RN: No       Pressure Injury (Stage 3,4, Unstageable, DTI, NWPT, and Complex wounds) if present, place referral order by RN under : No       New and Established Ostomies, if present place, referral order under : No        Nurse 1 eSignature: Electronically signed by Ryan Mark RN on 4/28/23 at 4:24 PM EDT       **SHARE this note so that the co-signing nurse can place an eSignature**       Nurse 2 eSignature: Electronically signed by Nancy Duong RN on 4/28/23 at 7:01 PM EDT
Anesthesia notified patient took keppra this morning.      Will send Keppra and ask back in OR
[facility] PRE-SURGICAL TESTING INSTRUCTIONS                      PRIOR TO PROCEDURE DATE:    1. PLEASE FOLLOW ANY INSTRUCTIONS GIVEN TO YOU PER YOUR SURGEON. 2. Arrange for someone to drive you home and be with you for the first 24 hours after discharge for your safety after your procedure for which you received sedation. Ensure it is someone we can share information with regarding your discharge. NOTE: At this time ONLY 2 ADULTS may accompany you   One person ENCOURAGED to stay at hospital entire time if outpatient surgery      3. You must contact your surgeon for instructions IF:  You are taking any blood thinners, aspirin, anti-inflammatory or vitamins. There is a change in your physical condition such as a cold, fever, rash, cuts, sores, or any other infection, especially near your surgical site. 4. Do not drink alcohol the day before or day of your procedure. Do not use any recreational marijuana at least 24 hours or street drugs (heroin, cocaine) at minimum 5 days prior to your procedure. 5. A Pre-Surgical History and Physical MUST be completed WITHIN 30 DAYS OR LESS prior to your procedure. by your Physician or an Urgent Care        THE DAY OF YOUR PROCEDURE:  1. Follow instructions for ARRIVAL TIME as DIRECTED BY YOUR SURGEON. 2. Enter the MAIN entrance from [facility] and follow the signs to the free Parking [employer] (offered free of charge 7 am-5pm). 3. Enter the Main Entrance of the hospital (do not enter from the lower level of the parking garage). Upon entrance, check in with the  at the surgical information desk on your LEFT. Bring your insurance card and photo ID to register      4. DO NOT EAT ANYTHING 8 hours prior to arrival for surgery. You may have up to 8 ounces of water 4 hours prior to your arrival for surgery.    NOTE: ALL Gastric, Bariatric & Bowel surgery patients - you MUST follow your surgeon's instructions regarding
ENDOSCOPY PREOP INSTRUCTIONS      You are scheduled for a procedure at [facility] on 4/28 @ 07:15. You will need to arrival by: 5:30 (at least an hour & a half prior to planned start time)  Report to the MAIN entrance on [employer] and register at the surgery center on the left-hand side of the lobby  You will need your insurance card and photo id and a list of all medications taken on a regular basis. Please include the dose/frequency. For your procedure:     PLEASE FOLLOW ALL INSTRUCTIONS & PREPS GIVEN TO YOU BY YOUR DOCTOR'S OFFICE. If you have not received these instructions yet, please call the office immediately. Make sure to read them as soon as received. If you are taking blood thinners, Aspirin or diabetic medication, make sure to call your doctor as soon as possible for instructions prior to your procedure. Please dress comfortably and do not wear any lotion, powders or jewelry  Arrange for someone to be with you and sign you out & drive you home after your procedure. THIS PERSON MUST WAIT AT HOSPITAL THE ENTIRE TIME. We allow 2 adult visitors with you in the hospital & masks are strongly recommended.     WOMEN ONLY OF CHILDBEARING AGE: Please make sure to be able to give a urine sample on arrival      If you have further questions, you may contact your Endoscopist's office or Pre Admission Testing staff at [phone]
Left PACU for CT of head.
Oral airway removed @ this time.
PACU Transfer to Floor Note    Procedure(s):  RIGHT PARIETAL STEREOTACTIC BIOPSY OF BRAIN MASS    Current Allergies: Percocet [oxycodone-acetaminophen]    Pt meets criteria as per Erica Score and ASPAN Standards to transfer to next phase of care. No results for input(s): POCGLU in the last 72 hours. Vitals:    04/28/23 1500   BP: 137/76   Pulse: 61   Resp: 23   Temp: 97.2 °F (36.2 °C)   SpO2: 100%     Vitals within 20% of pt's admission vitals as per ERICA SCORE    SpO2: 100 %    O2 Flow Rate (L/min): 3 L/min      Intake/Output Summary (Last 24 hours) at 4/28/2023 1508  Last data filed at 4/28/2023 1408  Gross per 24 hour   Intake 1770 ml   Output 855 ml   Net 915 ml       Pain assessment:  none    Pain Level: 4    Patient was assessed for alterations to skin integrity. There were not alterations observed. Is patient incontinent: no    Handoff report given at bedside.    Family updated and directed to pt room      4/28/2023 3:08 PM
Patient A/O. VSS    IV X2    Ancef to OR    Labs sent    CHG to head
Patient arrived from OR to PACU # 13 s/p RIGHT PARIETAL STEREOTACTIC BIOPSY OF BRAIN MASS (Right) per . Attached to PACU monitoring device, report received from CRNA who stated no problems intraoperatively. Arrived sedated with oral airway.  VSS
Patient confirmed procedure on 4/27 @ 10:15
Patient reports he takes Flomax daily and hasn't had for few days because he had to stop everything. NP called made aware and ordered. Wife called and updated again, informed room is being cleaned # [id number].
Placed in clinical discharge, waiting for bed.
Pt A/O x4. VSS. Pt denied pain this shift. Right head incision CDI and open to air. No drainage noted. Ambulates independently in room from bed to toilet. Tolerated meals and fluids appropriately. Voiding without any complications. IV removed without any complications. Went over discharge instructions with pt and did not have any questions at the time. Wife transported pt back home.
Pt A/Ox4, VSS. Pt on portable tele box reading A/V paced. Head to toe assessment completed. Meds given according to [facility]. Pt denies pain or HA during shift. Pt ambulates independently in room from toilet to bed. Pt tolerated PO and on IV hydration. Safe environment maintained. Call light in reach and instructed on how to use.
Pt. Received from PACU at 1530 pm. He is alert and oriented X4. VSS. 4 eyes skin assessment and head to toe assessment was completed. He had a chief complain of headache which was managed with PRN medicine as per [facility]. Medication done as per [facility]. Patient is ambulating independently to the restroom. Voiding adequately and tolerating PO diet well. Standard safety precautions in place and call light within reach. Will continue to monitor and reassess.
Returned from CT of head.
Wife Nelly Fabry called and updated. Aware we're waiting for bed, stated she'll go home and wait for call when room is cleaned and ready. Patient c/o headache additional dilaudid 0.5 mg IV given, drifted back to sleep.
Wife called again and updated.
request that the above-named physician, his/her assistants or his/her designees, perform procedures as necessary and desirable if deemed to be in my (our) best interest.     Revised 8/2/2021                                                                          Page 1 of 2               I acknowledge that health care personnel may be observing this procedure for the purpose of medical education or other specified purposes as may be necessary as requested and/or approved by my (our) physician. I (we) consent to the disposal by the hospital Pathologist of the removed tissue, parts or organs in accordance with hospital policy. I do ____ do not ____ consent to the use of a local infiltration pain blocking agent that will be used by my provider/surgical provider to help alleviate pain during my procedure. I do ____ do not ____ consent to an emergent blood transfusion in the case of a life-threatening situation that requires blood components to be administered. This consent is valid for 24 hours from the beginning of the procedure. This patient does ____ or does not ____ currently have a DNR status/order. If DNR order is in place, obtain Addendum to the Surgical Consent for ALL Patients with a DNR Order to address arlette-operative status for limited intervention or DNR suspension.      I have read and fully understand the above Consent for Operation/Procedure and that all blanks were completed before I signed the consent.   _____________________________       _____________________      ____/____am/pm  Signature of Patient or legal representative      Printed Name / Relationship            Date / Time   ____________________________       _____________________      ____/____am/pm  Witness to Signature                                    Printed Name                    Date / Time    If patient is unable to sign or is a minor, complete the following)  Patient is a minor, ____ years of age, or unable to sign

## 2023-05-01 ENCOUNTER — TELEPHONE (OUTPATIENT)
Dept: INTERNAL MEDICINE CLINIC | Age: 68
End: 2023-05-01

## 2023-05-08 LAB
Lab: NORMAL
REPORT: NORMAL
THIS TEST SENT TO: NORMAL

## 2023-05-17 LAB
Lab: NORMAL
REPORT: NORMAL
THIS TEST SENT TO: NORMAL

## 2023-06-01 ENCOUNTER — HOSPITAL ENCOUNTER (OUTPATIENT)
Dept: MRI IMAGING | Age: 68
Discharge: HOME | End: 2023-06-01
Attending: STUDENT IN AN ORGANIZED HEALTH CARE EDUCATION/TRAINING PROGRAM
Payer: MEDICARE

## 2023-06-01 DIAGNOSIS — C83.38 DIFFUSE LARGE B-CELL LYMPHOMA, LYMPH NODES OF MULTIPLE SITES (HCC): ICD-10-CM

## 2023-06-01 PROCEDURE — A9579 GAD-BASE MR CONTRAST NOS,1ML: HCPCS | Performed by: STUDENT IN AN ORGANIZED HEALTH CARE EDUCATION/TRAINING PROGRAM

## 2023-06-01 PROCEDURE — 6360000004 HC RX CONTRAST MEDICATION: Performed by: STUDENT IN AN ORGANIZED HEALTH CARE EDUCATION/TRAINING PROGRAM

## 2023-06-01 PROCEDURE — 70553 MRI BRAIN STEM W/O & W/DYE: CPT

## 2023-06-01 RX ADMIN — GADOTERIDOL 15 ML: 279.3 INJECTION, SOLUTION INTRAVENOUS at 11:28

## 2023-06-02 DIAGNOSIS — C85.89 CNS LYMPHOMA (HCC): Primary | ICD-10-CM

## 2023-06-02 NOTE — PROGRESS NOTES
MRI Brain w/wo ordered for 8 weeks from now. MRI T and C spine ordered to be completed anytime.     Ministerio Louie MD  Neurology

## 2023-06-22 LAB
Lab: NORMAL
REPORT: NORMAL
THIS TEST SENT TO: NORMAL

## 2023-06-27 ENCOUNTER — TELEPHONE (OUTPATIENT)
Dept: INTERNAL MEDICINE CLINIC | Age: 68
End: 2023-06-27

## 2023-06-27 ENCOUNTER — HOSPITAL ENCOUNTER (OUTPATIENT)
Age: 68
Discharge: HOME | End: 2023-06-27
Payer: MEDICARE

## 2023-06-27 DIAGNOSIS — R41.0 CONFUSION: Primary | ICD-10-CM

## 2023-06-27 DIAGNOSIS — R41.0 CONFUSION: ICD-10-CM

## 2023-06-27 LAB
BILIRUB UR QL STRIP.AUTO: NEGATIVE
CLARITY UR: CLEAR
COLOR UR: NORMAL
GLUCOSE UR STRIP.AUTO-MCNC: NEGATIVE MG/DL
HGB UR QL STRIP.AUTO: NEGATIVE
KETONES UR STRIP.AUTO-MCNC: NEGATIVE MG/DL
LEUKOCYTE ESTERASE UR QL STRIP.AUTO: NEGATIVE
NITRITE UR QL STRIP.AUTO: NEGATIVE
PH UR STRIP.AUTO: 6 [PH] (ref 5–8)
PROT UR STRIP.AUTO-MCNC: NEGATIVE MG/DL
SP GR UR STRIP.AUTO: 1.01 (ref 1–1.03)
UA COMPLETE W REFLEX CULTURE PNL UR: NORMAL
UA DIPSTICK W REFLEX MICRO PNL UR: NORMAL
URN SPEC COLLECT METH UR: NORMAL
UROBILINOGEN UR STRIP-ACNC: 0.2 E.U./DL

## 2023-06-27 PROCEDURE — 81003 URINALYSIS AUTO W/O SCOPE: CPT

## 2023-06-28 ENCOUNTER — OFFICE VISIT (OUTPATIENT)
Dept: INTERNAL MEDICINE CLINIC | Age: 68
End: 2023-06-28
Payer: MEDICARE

## 2023-06-28 ENCOUNTER — APPOINTMENT (OUTPATIENT)
Dept: GENERAL RADIOLOGY | Age: 68
DRG: 820 | End: 2023-06-28
Attending: STUDENT IN AN ORGANIZED HEALTH CARE EDUCATION/TRAINING PROGRAM
Payer: MEDICARE

## 2023-06-28 ENCOUNTER — HOSPITAL ENCOUNTER (INPATIENT)
Age: 68
LOS: 13 days | Discharge: HOME | DRG: 820 | End: 2023-07-11
Attending: STUDENT IN AN ORGANIZED HEALTH CARE EDUCATION/TRAINING PROGRAM | Admitting: STUDENT IN AN ORGANIZED HEALTH CARE EDUCATION/TRAINING PROGRAM
Payer: MEDICARE

## 2023-06-28 ENCOUNTER — APPOINTMENT (OUTPATIENT)
Dept: MRI IMAGING | Age: 68
DRG: 820 | End: 2023-06-28
Attending: STUDENT IN AN ORGANIZED HEALTH CARE EDUCATION/TRAINING PROGRAM
Payer: MEDICARE

## 2023-06-28 VITALS
OXYGEN SATURATION: 97 % | TEMPERATURE: 98.4 F | WEIGHT: 158.4 LBS | BODY MASS INDEX: 22.73 KG/M2 | SYSTOLIC BLOOD PRESSURE: 110 MMHG | DIASTOLIC BLOOD PRESSURE: 68 MMHG | HEART RATE: 71 BPM

## 2023-06-28 DIAGNOSIS — R41.0 DELIRIUM: Primary | ICD-10-CM

## 2023-06-28 DIAGNOSIS — I48.0 PAF (PAROXYSMAL ATRIAL FIBRILLATION) (HCC): ICD-10-CM

## 2023-06-28 DIAGNOSIS — I25.10 CAD IN NATIVE ARTERY: ICD-10-CM

## 2023-06-28 DIAGNOSIS — R73.02 IGT (IMPAIRED GLUCOSE TOLERANCE): ICD-10-CM

## 2023-06-28 DIAGNOSIS — C83.38 DIFFUSE LARGE B-CELL LYMPHOMA OF LYMPH NODES OF MULTIPLE REGIONS (HCC): ICD-10-CM

## 2023-06-28 DIAGNOSIS — E03.9 ACQUIRED HYPOTHYROIDISM: ICD-10-CM

## 2023-06-28 DIAGNOSIS — D49.6 NEOPLASM, BRAIN (HCC): ICD-10-CM

## 2023-06-28 DIAGNOSIS — D49.6 NEOPLASM OF UNSPECIFIED BEHAVIOR OF BRAIN (HCC): ICD-10-CM

## 2023-06-28 DIAGNOSIS — C83.38: ICD-10-CM

## 2023-06-28 DIAGNOSIS — C85.89 PRIMARY CNS LYMPHOMA (HCC): ICD-10-CM

## 2023-06-28 PROBLEM — G93.89 BRAIN MASS: Status: ACTIVE | Noted: 2023-06-28

## 2023-06-28 LAB
ALBUMIN SERPL-MCNC: 4.1 G/DL (ref 3.4–5)
ALP SERPL-CCNC: 62 U/L (ref 40–129)
ALT SERPL-CCNC: 15 U/L (ref 10–40)
AMMONIA PLAS-SCNC: 23 UMOL/L (ref 16–60)
ANION GAP SERPL CALCULATED.3IONS-SCNC: 10 MMOL/L (ref 3–16)
ANION GAP SERPL CALCULATED.3IONS-SCNC: 11 MMOL/L (ref 3–16)
APTT BLD: 66.3 SEC (ref 22.7–35.9)
AST SERPL-CCNC: 16 U/L (ref 15–37)
BASOPHILS # BLD: 0 K/UL (ref 0–0.2)
BASOPHILS # BLD: 0 K/UL (ref 0–0.2)
BASOPHILS NFR BLD: 0.6 %
BASOPHILS NFR BLD: 0.9 %
BILIRUB DIRECT SERPL-MCNC: <0.2 MG/DL (ref 0–0.3)
BILIRUB INDIRECT SERPL-MCNC: ABNORMAL MG/DL (ref 0–1)
BILIRUB SERPL-MCNC: 0.5 MG/DL (ref 0–1)
BILIRUB UR QL STRIP.AUTO: NEGATIVE
BUN SERPL-MCNC: 15 MG/DL (ref 7–20)
BUN SERPL-MCNC: 16 MG/DL (ref 7–20)
CALCIUM SERPL-MCNC: 8.8 MG/DL (ref 8.3–10.6)
CALCIUM SERPL-MCNC: 9.1 MG/DL (ref 8.3–10.6)
CHLORIDE SERPL-SCNC: 100 MMOL/L (ref 99–110)
CHLORIDE SERPL-SCNC: 98 MMOL/L (ref 99–110)
CLARITY UR: CLEAR
CO2 SERPL-SCNC: 22 MMOL/L (ref 21–32)
CO2 SERPL-SCNC: 25 MMOL/L (ref 21–32)
COLOR UR: YELLOW
CREAT SERPL-MCNC: 1 MG/DL (ref 0.8–1.3)
CREAT SERPL-MCNC: 1 MG/DL (ref 0.8–1.3)
DEPRECATED RDW RBC AUTO: 13.2 % (ref 12.4–15.4)
DEPRECATED RDW RBC AUTO: 13.5 % (ref 12.4–15.4)
EOSINOPHIL # BLD: 0.1 K/UL (ref 0–0.6)
EOSINOPHIL # BLD: 0.1 K/UL (ref 0–0.6)
EOSINOPHIL NFR BLD: 1.7 %
EOSINOPHIL NFR BLD: 2.1 %
GFR SERPLBLD CREATININE-BSD FMLA CKD-EPI: >60 ML/MIN/{1.73_M2}
GFR SERPLBLD CREATININE-BSD FMLA CKD-EPI: >60 ML/MIN/{1.73_M2}
GLUCOSE SERPL-MCNC: 126 MG/DL (ref 70–99)
GLUCOSE SERPL-MCNC: 82 MG/DL (ref 70–99)
GLUCOSE UR STRIP.AUTO-MCNC: NEGATIVE MG/DL
HCT VFR BLD AUTO: 38.7 % (ref 40.5–52.5)
HCT VFR BLD AUTO: 40.6 % (ref 40.5–52.5)
HGB BLD-MCNC: 13.1 G/DL (ref 13.5–17.5)
HGB BLD-MCNC: 13.6 G/DL (ref 13.5–17.5)
HGB UR QL STRIP.AUTO: NEGATIVE
INR PPP: 1.02 (ref 0.84–1.16)
KETONES UR STRIP.AUTO-MCNC: ABNORMAL MG/DL
LDH SERPL L TO P-CCNC: 199 U/L (ref 100–190)
LEUKOCYTE ESTERASE UR QL STRIP.AUTO: NEGATIVE
LYMPHOCYTES # BLD: 0.7 K/UL (ref 1–5.1)
LYMPHOCYTES # BLD: 0.8 K/UL (ref 1–5.1)
LYMPHOCYTES NFR BLD: 12.9 %
LYMPHOCYTES NFR BLD: 13.4 %
MAGNESIUM SERPL-MCNC: 2 MG/DL (ref 1.8–2.4)
MCH RBC QN AUTO: 31.9 PG (ref 26–34)
MCH RBC QN AUTO: 31.9 PG (ref 26–34)
MCHC RBC AUTO-ENTMCNC: 33.6 G/DL (ref 31–36)
MCHC RBC AUTO-ENTMCNC: 33.9 G/DL (ref 31–36)
MCV RBC AUTO: 94.1 FL (ref 80–100)
MCV RBC AUTO: 95.1 FL (ref 80–100)
MONOCYTES # BLD: 0.4 K/UL (ref 0–1.3)
MONOCYTES # BLD: 0.5 K/UL (ref 0–1.3)
MONOCYTES NFR BLD: 7.7 %
MONOCYTES NFR BLD: 8 %
NEUTROPHILS # BLD: 3.8 K/UL (ref 1.7–7.7)
NEUTROPHILS # BLD: 4.8 K/UL (ref 1.7–7.7)
NEUTROPHILS NFR BLD: 75.9 %
NEUTROPHILS NFR BLD: 76.8 %
NITRITE UR QL STRIP.AUTO: NEGATIVE
PH UR STRIP.AUTO: 6 [PH] (ref 5–8)
PHOSPHATE SERPL-MCNC: 2.9 MG/DL (ref 2.5–4.9)
PHOSPHATE SERPL-MCNC: 3.4 MG/DL (ref 2.5–4.9)
PLATELET # BLD AUTO: 212 K/UL (ref 135–450)
PLATELET # BLD AUTO: 221 K/UL (ref 135–450)
PMV BLD AUTO: 9 FL (ref 5–10.5)
PMV BLD AUTO: 9.2 FL (ref 5–10.5)
POTASSIUM SERPL-SCNC: 4 MMOL/L (ref 3.5–5.1)
POTASSIUM SERPL-SCNC: 4.3 MMOL/L (ref 3.5–5.1)
PROT SERPL-MCNC: 6.2 G/DL (ref 6.4–8.2)
PROT UR STRIP.AUTO-MCNC: NEGATIVE MG/DL
PROTHROMBIN TIME: 13.5 SEC (ref 11.5–14.8)
PTH-INTACT SERPL-MCNC: 114 PG/ML (ref 14–72)
RBC # BLD AUTO: 4.11 M/UL (ref 4.2–5.9)
RBC # BLD AUTO: 4.27 M/UL (ref 4.2–5.9)
SODIUM SERPL-SCNC: 132 MMOL/L (ref 136–145)
SODIUM SERPL-SCNC: 134 MMOL/L (ref 136–145)
SP GR UR STRIP.AUTO: 1.02 (ref 1–1.03)
UA DIPSTICK W REFLEX MICRO PNL UR: ABNORMAL
URATE SERPL-MCNC: 4.2 MG/DL (ref 3.5–7.2)
URATE SERPL-MCNC: 4.4 MG/DL (ref 3.5–7.2)
URN SPEC COLLECT METH UR: ABNORMAL
UROBILINOGEN UR STRIP-ACNC: 0.2 E.U./DL
WBC # BLD AUTO: 5.1 K/UL (ref 4–11)
WBC # BLD AUTO: 6.3 K/UL (ref 4–11)

## 2023-06-28 PROCEDURE — 83615 LACTATE (LD) (LDH) ENZYME: CPT

## 2023-06-28 PROCEDURE — 85730 THROMBOPLASTIN TIME PARTIAL: CPT

## 2023-06-28 PROCEDURE — 86376 MICROSOMAL ANTIBODY EACH: CPT

## 2023-06-28 PROCEDURE — 82140 ASSAY OF AMMONIA: CPT

## 2023-06-28 PROCEDURE — 81003 URINALYSIS AUTO W/O SCOPE: CPT

## 2023-06-28 PROCEDURE — 1124F ACP DISCUSS-NO DSCNMKR DOCD: CPT | Performed by: NURSE PRACTITIONER

## 2023-06-28 PROCEDURE — 84100 ASSAY OF PHOSPHORUS: CPT

## 2023-06-28 PROCEDURE — 6370000000 HC RX 637 (ALT 250 FOR IP): Performed by: PHYSICIAN ASSISTANT

## 2023-06-28 PROCEDURE — A9576 INJ PROHANCE MULTIPACK: HCPCS | Performed by: PHYSICIAN ASSISTANT

## 2023-06-28 PROCEDURE — 93005 ELECTROCARDIOGRAM TRACING: CPT | Performed by: PHYSICIAN ASSISTANT

## 2023-06-28 PROCEDURE — 84443 ASSAY THYROID STIM HORMONE: CPT

## 2023-06-28 PROCEDURE — 86800 THYROGLOBULIN ANTIBODY: CPT

## 2023-06-28 PROCEDURE — 99214 OFFICE O/P EST MOD 30 MIN: CPT | Performed by: NURSE PRACTITIONER

## 2023-06-28 PROCEDURE — 6360000004 HC RX CONTRAST MEDICATION: Performed by: PHYSICIAN ASSISTANT

## 2023-06-28 PROCEDURE — 2580000003 HC RX 258: Performed by: PHYSICIAN ASSISTANT

## 2023-06-28 PROCEDURE — 80048 BASIC METABOLIC PNL TOTAL CA: CPT

## 2023-06-28 PROCEDURE — 2060000000 HC ICU INTERMEDIATE R&B

## 2023-06-28 PROCEDURE — 80076 HEPATIC FUNCTION PANEL: CPT

## 2023-06-28 PROCEDURE — 95819 EEG AWAKE AND ASLEEP: CPT

## 2023-06-28 PROCEDURE — 83970 ASSAY OF PARATHORMONE: CPT

## 2023-06-28 PROCEDURE — 71046 X-RAY EXAM CHEST 2 VIEWS: CPT

## 2023-06-28 PROCEDURE — 70553 MRI BRAIN STEM W/O & W/DYE: CPT

## 2023-06-28 PROCEDURE — 83735 ASSAY OF MAGNESIUM: CPT

## 2023-06-28 PROCEDURE — 84550 ASSAY OF BLOOD/URIC ACID: CPT

## 2023-06-28 PROCEDURE — 85025 COMPLETE CBC W/AUTO DIFF WBC: CPT

## 2023-06-28 PROCEDURE — 99223 1ST HOSP IP/OBS HIGH 75: CPT | Performed by: NURSE PRACTITIONER

## 2023-06-28 PROCEDURE — 85610 PROTHROMBIN TIME: CPT

## 2023-06-28 RX ORDER — LANOLIN ALCOHOL/MO/W.PET/CERES
400 CREAM (GRAM) TOPICAL DAILY
Status: DISCONTINUED | OUTPATIENT
Start: 2023-06-28 | End: 2023-07-11 | Stop reason: HOSPADM

## 2023-06-28 RX ORDER — POTASSIUM CHLORIDE 29.8 MG/ML
20 INJECTION INTRAVENOUS PRN
Status: DISCONTINUED | OUTPATIENT
Start: 2023-06-28 | End: 2023-07-11 | Stop reason: HOSPADM

## 2023-06-28 RX ORDER — FLUCONAZOLE 200 MG/1
400 TABLET ORAL DAILY
Status: DISCONTINUED | OUTPATIENT
Start: 2023-06-28 | End: 2023-06-28

## 2023-06-28 RX ORDER — PROCHLORPERAZINE EDISYLATE 5 MG/ML
10 INJECTION INTRAMUSCULAR; INTRAVENOUS EVERY 6 HOURS PRN
Status: CANCELLED | OUTPATIENT
Start: 2023-06-28

## 2023-06-28 RX ORDER — TAMSULOSIN HYDROCHLORIDE 0.4 MG/1
0.4 CAPSULE ORAL DAILY
Status: DISCONTINUED | OUTPATIENT
Start: 2023-06-28 | End: 2023-07-11 | Stop reason: HOSPADM

## 2023-06-28 RX ORDER — MAGNESIUM SULFATE IN WATER 40 MG/ML
4000 INJECTION, SOLUTION INTRAVENOUS PRN
Status: DISCONTINUED | OUTPATIENT
Start: 2023-06-28 | End: 2023-07-11 | Stop reason: HOSPADM

## 2023-06-28 RX ORDER — LEVETIRACETAM 500 MG/1
500 TABLET ORAL 2 TIMES DAILY
Status: DISCONTINUED | OUTPATIENT
Start: 2023-06-28 | End: 2023-07-11 | Stop reason: HOSPADM

## 2023-06-28 RX ORDER — SODIUM CHLORIDE 0.9 % (FLUSH) 0.9 %
5-40 SYRINGE (ML) INJECTION PRN
Status: DISCONTINUED | OUTPATIENT
Start: 2023-06-28 | End: 2023-07-03

## 2023-06-28 RX ORDER — SODIUM CHLORIDE 0.9 % (FLUSH) 0.9 %
5-40 SYRINGE (ML) INJECTION EVERY 12 HOURS SCHEDULED
Status: DISCONTINUED | OUTPATIENT
Start: 2023-06-28 | End: 2023-07-03

## 2023-06-28 RX ORDER — SODIUM CHLORIDE 9 MG/ML
INJECTION, SOLUTION INTRAVENOUS PRN
Status: DISCONTINUED | OUTPATIENT
Start: 2023-06-28 | End: 2023-07-03

## 2023-06-28 RX ORDER — ONDANSETRON HYDROCHLORIDE 8 MG/1
8 TABLET, FILM COATED ORAL EVERY 8 HOURS PRN
Status: DISCONTINUED | OUTPATIENT
Start: 2023-06-28 | End: 2023-07-07

## 2023-06-28 RX ORDER — SENNA AND DOCUSATE SODIUM 50; 8.6 MG/1; MG/1
2 TABLET, FILM COATED ORAL DAILY PRN
Status: DISCONTINUED | OUTPATIENT
Start: 2023-06-28 | End: 2023-07-03

## 2023-06-28 RX ORDER — SODIUM CHLORIDE 9 MG/ML
INJECTION, SOLUTION INTRAVENOUS CONTINUOUS PRN
Status: DISCONTINUED | OUTPATIENT
Start: 2023-06-28 | End: 2023-07-03

## 2023-06-28 RX ADMIN — LEVETIRACETAM 500 MG: 500 TABLET, FILM COATED ORAL at 20:05

## 2023-06-28 RX ADMIN — Medication 400 MG: at 14:53

## 2023-06-28 RX ADMIN — GADOTERIDOL 16 ML: 279.3 INJECTION, SOLUTION INTRAVENOUS at 15:59

## 2023-06-28 RX ADMIN — LEVETIRACETAM 500 MG: 500 TABLET, FILM COATED ORAL at 14:53

## 2023-06-28 RX ADMIN — TAMSULOSIN HYDROCHLORIDE 0.4 MG: 0.4 CAPSULE ORAL at 14:53

## 2023-06-28 RX ADMIN — FLUCONAZOLE 400 MG: 200 TABLET ORAL at 14:53

## 2023-06-28 RX ADMIN — SODIUM CHLORIDE, PRESERVATIVE FREE 10 ML: 5 INJECTION INTRAVENOUS at 20:05

## 2023-06-28 ASSESSMENT — ENCOUNTER SYMPTOMS
SORE THROAT: 0
FACIAL SWELLING: 0
SINUS PRESSURE: 0
NAUSEA: 0
VOMITING: 0
COUGH: 0
DIARRHEA: 0

## 2023-06-28 NOTE — TELEPHONE ENCOUNTER
[phone], Phone call to Dr. Leslie Ryan office. Spoke with: Luis A Barrios     Re: Pt was recommended to contact his pcp re: possible UTI. Sol Jo has serious concerns  after an in office assessment today and is requesting that Dr. Rg Nogueira call her today at [phone].

## 2023-06-29 PROBLEM — R41.89 COGNITIVE IMPAIRMENT: Status: ACTIVE | Noted: 2023-06-29

## 2023-06-29 LAB
ANION GAP SERPL CALCULATED.3IONS-SCNC: 11 MMOL/L (ref 3–16)
ANTI-THYROGLOB ABS: 14 IU/ML
APTT BLD: 24.9 SEC (ref 22.7–35.9)
BASOPHILS # BLD: 0 K/UL (ref 0–0.2)
BASOPHILS NFR BLD: 0.5 %
BUN SERPL-MCNC: 14 MG/DL (ref 7–20)
CALCIUM SERPL-MCNC: 8.7 MG/DL (ref 8.3–10.6)
CHLORIDE SERPL-SCNC: 100 MMOL/L (ref 99–110)
CO2 SERPL-SCNC: 25 MMOL/L (ref 21–32)
CREAT SERPL-MCNC: 1.1 MG/DL (ref 0.8–1.3)
DEPRECATED RDW RBC AUTO: 13.5 % (ref 12.4–15.4)
EKG ATRIAL RATE: 61 BPM
EKG DIAGNOSIS: NORMAL
EKG P AXIS: 33 DEGREES
EKG P-R INTERVAL: 172 MS
EKG Q-T INTERVAL: 512 MS
EKG QRS DURATION: 154 MS
EKG QTC CALCULATION (BAZETT): 515 MS
EKG R AXIS: 254 DEGREES
EKG T AXIS: 39 DEGREES
EKG VENTRICULAR RATE: 61 BPM
EOSINOPHIL # BLD: 0.2 K/UL (ref 0–0.6)
EOSINOPHIL NFR BLD: 3.9 %
GFR SERPLBLD CREATININE-BSD FMLA CKD-EPI: >60 ML/MIN/{1.73_M2}
GLUCOSE SERPL-MCNC: 95 MG/DL (ref 70–99)
HCT VFR BLD AUTO: 40.2 % (ref 40.5–52.5)
HGB BLD-MCNC: 13.5 G/DL (ref 13.5–17.5)
INR PPP: 1.04 (ref 0.84–1.16)
LYMPHOCYTES # BLD: 0.7 K/UL (ref 1–5.1)
LYMPHOCYTES NFR BLD: 13.4 %
MCH RBC QN AUTO: 31.4 PG (ref 26–34)
MCHC RBC AUTO-ENTMCNC: 33.6 G/DL (ref 31–36)
MCV RBC AUTO: 93.2 FL (ref 80–100)
MONOCYTES # BLD: 0.5 K/UL (ref 0–1.3)
MONOCYTES NFR BLD: 9.8 %
NEUTROPHILS # BLD: 4 K/UL (ref 1.7–7.7)
NEUTROPHILS NFR BLD: 72.4 %
PHOSPHATE SERPL-MCNC: 4 MG/DL (ref 2.5–4.9)
PLATELET # BLD AUTO: 218 K/UL (ref 135–450)
PMV BLD AUTO: 8.9 FL (ref 5–10.5)
POTASSIUM SERPL-SCNC: 4.3 MMOL/L (ref 3.5–5.1)
PROTHROMBIN TIME: 13.6 SEC (ref 11.5–14.8)
RBC # BLD AUTO: 4.31 M/UL (ref 4.2–5.9)
SODIUM SERPL-SCNC: 136 MMOL/L (ref 136–145)
THYROPEROXIDASE AB SERPL IA-ACNC: <5 IU/ML
TSH SERPL DL<=0.005 MIU/L-ACNC: 2.43 UIU/ML (ref 0.27–4.2)
URATE SERPL-MCNC: 4.3 MG/DL (ref 3.5–7.2)
WBC # BLD AUTO: 5.5 K/UL (ref 4–11)

## 2023-06-29 PROCEDURE — 99233 SBSQ HOSP IP/OBS HIGH 50: CPT | Performed by: PSYCHIATRY & NEUROLOGY

## 2023-06-29 PROCEDURE — 97535 SELF CARE MNGMENT TRAINING: CPT

## 2023-06-29 PROCEDURE — 99221 1ST HOSP IP/OBS SF/LOW 40: CPT | Performed by: NURSE PRACTITIONER

## 2023-06-29 PROCEDURE — 85610 PROTHROMBIN TIME: CPT

## 2023-06-29 PROCEDURE — 84100 ASSAY OF PHOSPHORUS: CPT

## 2023-06-29 PROCEDURE — 85025 COMPLETE CBC W/AUTO DIFF WBC: CPT

## 2023-06-29 PROCEDURE — 97163 PT EVAL HIGH COMPLEX 45 MIN: CPT

## 2023-06-29 PROCEDURE — 36591 DRAW BLOOD OFF VENOUS DEVICE: CPT

## 2023-06-29 PROCEDURE — 2060000000 HC ICU INTERMEDIATE R&B

## 2023-06-29 PROCEDURE — 92610 EVALUATE SWALLOWING FUNCTION: CPT

## 2023-06-29 PROCEDURE — 97116 GAIT TRAINING THERAPY: CPT

## 2023-06-29 PROCEDURE — 6360000002 HC RX W HCPCS: Performed by: PHYSICIAN ASSISTANT

## 2023-06-29 PROCEDURE — 6370000000 HC RX 637 (ALT 250 FOR IP): Performed by: PHYSICIAN ASSISTANT

## 2023-06-29 PROCEDURE — 97166 OT EVAL MOD COMPLEX 45 MIN: CPT

## 2023-06-29 PROCEDURE — 85730 THROMBOPLASTIN TIME PARTIAL: CPT

## 2023-06-29 PROCEDURE — 80048 BASIC METABOLIC PNL TOTAL CA: CPT

## 2023-06-29 PROCEDURE — 2580000003 HC RX 258: Performed by: PHYSICIAN ASSISTANT

## 2023-06-29 PROCEDURE — 97530 THERAPEUTIC ACTIVITIES: CPT

## 2023-06-29 PROCEDURE — 84550 ASSAY OF BLOOD/URIC ACID: CPT

## 2023-06-29 PROCEDURE — 93010 ELECTROCARDIOGRAM REPORT: CPT | Performed by: INTERNAL MEDICINE

## 2023-06-29 PROCEDURE — 92523 SPEECH SOUND LANG COMPREHEN: CPT

## 2023-06-29 RX ADMIN — TAMSULOSIN HYDROCHLORIDE 0.4 MG: 0.4 CAPSULE ORAL at 08:50

## 2023-06-29 RX ADMIN — Medication 400 MG: at 08:51

## 2023-06-29 RX ADMIN — WATER 2 MG: 1 INJECTION INTRAMUSCULAR; INTRAVENOUS; SUBCUTANEOUS at 03:30

## 2023-06-29 RX ADMIN — LEVETIRACETAM 500 MG: 500 TABLET, FILM COATED ORAL at 20:05

## 2023-06-29 RX ADMIN — SODIUM CHLORIDE, PRESERVATIVE FREE 10 ML: 5 INJECTION INTRAVENOUS at 08:50

## 2023-06-29 RX ADMIN — SODIUM CHLORIDE, PRESERVATIVE FREE 10 ML: 5 INJECTION INTRAVENOUS at 20:05

## 2023-06-29 RX ADMIN — LEVETIRACETAM 500 MG: 500 TABLET, FILM COATED ORAL at 08:51

## 2023-06-30 ENCOUNTER — ANESTHESIA EVENT (OUTPATIENT)
Dept: OPERATING ROOM | Age: 68
End: 2023-06-30
Payer: MEDICARE

## 2023-06-30 ENCOUNTER — APPOINTMENT (OUTPATIENT)
Dept: CT IMAGING | Age: 68
DRG: 820 | End: 2023-06-30
Attending: STUDENT IN AN ORGANIZED HEALTH CARE EDUCATION/TRAINING PROGRAM
Payer: MEDICARE

## 2023-06-30 LAB
ALBUMIN SERPL-MCNC: 3.6 G/DL (ref 3.4–5)
ALP SERPL-CCNC: 57 U/L (ref 40–129)
ALT SERPL-CCNC: 14 U/L (ref 10–40)
ANION GAP SERPL CALCULATED.3IONS-SCNC: 10 MMOL/L (ref 3–16)
AST SERPL-CCNC: 16 U/L (ref 15–37)
BASOPHILS # BLD: 0 K/UL (ref 0–0.2)
BASOPHILS NFR BLD: 0.3 %
BILIRUB DIRECT SERPL-MCNC: <0.2 MG/DL (ref 0–0.3)
BILIRUB INDIRECT SERPL-MCNC: ABNORMAL MG/DL (ref 0–1)
BILIRUB SERPL-MCNC: 0.4 MG/DL (ref 0–1)
BUN SERPL-MCNC: 14 MG/DL (ref 7–20)
CALCIUM SERPL-MCNC: 9 MG/DL (ref 8.3–10.6)
CHLORIDE SERPL-SCNC: 101 MMOL/L (ref 99–110)
CO2 SERPL-SCNC: 22 MMOL/L (ref 21–32)
CREAT SERPL-MCNC: 0.9 MG/DL (ref 0.8–1.3)
DEPRECATED RDW RBC AUTO: 12.9 % (ref 12.4–15.4)
EOSINOPHIL # BLD: 0.2 K/UL (ref 0–0.6)
EOSINOPHIL NFR BLD: 3.3 %
GFR SERPLBLD CREATININE-BSD FMLA CKD-EPI: >60 ML/MIN/{1.73_M2}
GLUCOSE SERPL-MCNC: 94 MG/DL (ref 70–99)
HCT VFR BLD AUTO: 40.2 % (ref 40.5–52.5)
HGB BLD-MCNC: 13.9 G/DL (ref 13.5–17.5)
LDH SERPL L TO P-CCNC: 187 U/L (ref 100–190)
LYMPHOCYTES # BLD: 0.7 K/UL (ref 1–5.1)
LYMPHOCYTES NFR BLD: 14.1 %
MAGNESIUM SERPL-MCNC: 2 MG/DL (ref 1.8–2.4)
MCH RBC QN AUTO: 32.6 PG (ref 26–34)
MCHC RBC AUTO-ENTMCNC: 34.7 G/DL (ref 31–36)
MCV RBC AUTO: 93.9 FL (ref 80–100)
MONOCYTES # BLD: 0.5 K/UL (ref 0–1.3)
MONOCYTES NFR BLD: 9.4 %
NEUTROPHILS # BLD: 3.8 K/UL (ref 1.7–7.7)
NEUTROPHILS NFR BLD: 72.9 %
PHOSPHATE SERPL-MCNC: 3.7 MG/DL (ref 2.5–4.9)
PLATELET # BLD AUTO: 210 K/UL (ref 135–450)
PMV BLD AUTO: 8.7 FL (ref 5–10.5)
POTASSIUM SERPL-SCNC: 4.2 MMOL/L (ref 3.5–5.1)
PROT SERPL-MCNC: 5.7 G/DL (ref 6.4–8.2)
RBC # BLD AUTO: 4.28 M/UL (ref 4.2–5.9)
SODIUM SERPL-SCNC: 133 MMOL/L (ref 136–145)
URATE SERPL-MCNC: 3.8 MG/DL (ref 3.5–7.2)
WBC # BLD AUTO: 5.2 K/UL (ref 4–11)

## 2023-06-30 PROCEDURE — 97535 SELF CARE MNGMENT TRAINING: CPT

## 2023-06-30 PROCEDURE — 92507 TX SP LANG VOICE COMM INDIV: CPT

## 2023-06-30 PROCEDURE — 80076 HEPATIC FUNCTION PANEL: CPT

## 2023-06-30 PROCEDURE — 6370000000 HC RX 637 (ALT 250 FOR IP): Performed by: PHYSICIAN ASSISTANT

## 2023-06-30 PROCEDURE — 2060000000 HC ICU INTERMEDIATE R&B

## 2023-06-30 PROCEDURE — APPNB30 APP NON BILLABLE TIME 0-30 MINS: Performed by: NURSE PRACTITIONER

## 2023-06-30 PROCEDURE — 6360000002 HC RX W HCPCS: Performed by: STUDENT IN AN ORGANIZED HEALTH CARE EDUCATION/TRAINING PROGRAM

## 2023-06-30 PROCEDURE — 70450 CT HEAD/BRAIN W/O DYE: CPT

## 2023-06-30 PROCEDURE — 85025 COMPLETE CBC W/AUTO DIFF WBC: CPT

## 2023-06-30 PROCEDURE — 84100 ASSAY OF PHOSPHORUS: CPT

## 2023-06-30 PROCEDURE — 84550 ASSAY OF BLOOD/URIC ACID: CPT

## 2023-06-30 PROCEDURE — 83735 ASSAY OF MAGNESIUM: CPT

## 2023-06-30 PROCEDURE — 2580000003 HC RX 258: Performed by: NURSE PRACTITIONER

## 2023-06-30 PROCEDURE — 80048 BASIC METABOLIC PNL TOTAL CA: CPT

## 2023-06-30 PROCEDURE — 36591 DRAW BLOOD OFF VENOUS DEVICE: CPT

## 2023-06-30 PROCEDURE — 2580000003 HC RX 258: Performed by: PHYSICIAN ASSISTANT

## 2023-06-30 PROCEDURE — 83615 LACTATE (LD) (LDH) ENZYME: CPT

## 2023-06-30 PROCEDURE — 6370000000 HC RX 637 (ALT 250 FOR IP): Performed by: NURSE PRACTITIONER

## 2023-06-30 PROCEDURE — 97110 THERAPEUTIC EXERCISES: CPT

## 2023-06-30 RX ORDER — SODIUM CHLORIDE 0.9 % (FLUSH) 0.9 %
5-40 SYRINGE (ML) INJECTION EVERY 12 HOURS SCHEDULED
Status: DISCONTINUED | OUTPATIENT
Start: 2023-06-30 | End: 2023-07-03 | Stop reason: HOSPADM

## 2023-06-30 RX ORDER — ACETAMINOPHEN 325 MG/1
650 TABLET ORAL EVERY 4 HOURS PRN
Status: DISCONTINUED | OUTPATIENT
Start: 2023-06-30 | End: 2023-07-06 | Stop reason: SDUPTHER

## 2023-06-30 RX ORDER — ENOXAPARIN SODIUM 100 MG/ML
40 INJECTION SUBCUTANEOUS EVERY 24 HOURS
Status: COMPLETED | OUTPATIENT
Start: 2023-06-30 | End: 2023-07-01

## 2023-06-30 RX ORDER — SODIUM CHLORIDE 9 MG/ML
INJECTION, SOLUTION INTRAVENOUS CONTINUOUS
Status: DISCONTINUED | OUTPATIENT
Start: 2023-07-03 | End: 2023-07-03

## 2023-06-30 RX ADMIN — ACETAMINOPHEN 650 MG: 325 TABLET ORAL at 14:52

## 2023-06-30 RX ADMIN — LEVETIRACETAM 500 MG: 500 TABLET, FILM COATED ORAL at 07:53

## 2023-06-30 RX ADMIN — Medication 400 MG: at 07:53

## 2023-06-30 RX ADMIN — ACETAMINOPHEN 650 MG: 325 TABLET ORAL at 10:18

## 2023-06-30 RX ADMIN — SODIUM CHLORIDE, PRESERVATIVE FREE 10 ML: 5 INJECTION INTRAVENOUS at 21:42

## 2023-06-30 RX ADMIN — ENOXAPARIN SODIUM 40 MG: 100 INJECTION SUBCUTANEOUS at 18:07

## 2023-06-30 RX ADMIN — SODIUM CHLORIDE, PRESERVATIVE FREE 10 ML: 5 INJECTION INTRAVENOUS at 07:53

## 2023-06-30 RX ADMIN — TAMSULOSIN HYDROCHLORIDE 0.4 MG: 0.4 CAPSULE ORAL at 07:53

## 2023-06-30 RX ADMIN — LEVETIRACETAM 500 MG: 500 TABLET, FILM COATED ORAL at 21:41

## 2023-06-30 RX ADMIN — SODIUM CHLORIDE, PRESERVATIVE FREE 10 ML: 5 INJECTION INTRAVENOUS at 21:41

## 2023-06-30 ASSESSMENT — PAIN DESCRIPTION - LOCATION
LOCATION: HEAD
LOCATION: HEAD

## 2023-06-30 ASSESSMENT — PAIN DESCRIPTION - ORIENTATION
ORIENTATION: INNER
ORIENTATION: INNER

## 2023-06-30 ASSESSMENT — PAIN SCALES - GENERAL
PAINLEVEL_OUTOF10: 6
PAINLEVEL_OUTOF10: 5

## 2023-06-30 ASSESSMENT — PAIN DESCRIPTION - ONSET
ONSET: GRADUAL
ONSET: ON-GOING

## 2023-06-30 ASSESSMENT — PAIN DESCRIPTION - PAIN TYPE
TYPE: ACUTE PAIN
TYPE: ACUTE PAIN

## 2023-06-30 ASSESSMENT — PAIN DESCRIPTION - DESCRIPTORS
DESCRIPTORS: ACHING
DESCRIPTORS: ACHING

## 2023-06-30 ASSESSMENT — PAIN - FUNCTIONAL ASSESSMENT
PAIN_FUNCTIONAL_ASSESSMENT: ACTIVITIES ARE NOT PREVENTED
PAIN_FUNCTIONAL_ASSESSMENT: ACTIVITIES ARE NOT PREVENTED

## 2023-06-30 ASSESSMENT — PAIN DESCRIPTION - FREQUENCY
FREQUENCY: CONTINUOUS
FREQUENCY: INTERMITTENT

## 2023-07-01 ENCOUNTER — APPOINTMENT (OUTPATIENT)
Dept: CT IMAGING | Age: 68
DRG: 820 | End: 2023-07-01
Attending: STUDENT IN AN ORGANIZED HEALTH CARE EDUCATION/TRAINING PROGRAM
Payer: MEDICARE

## 2023-07-01 LAB
ANION GAP SERPL CALCULATED.3IONS-SCNC: 11 MMOL/L (ref 3–16)
BASOPHILS # BLD: 0 K/UL (ref 0–0.2)
BASOPHILS NFR BLD: 0.6 %
BUN SERPL-MCNC: 14 MG/DL (ref 7–20)
CALCIUM SERPL-MCNC: 9 MG/DL (ref 8.3–10.6)
CHLORIDE SERPL-SCNC: 102 MMOL/L (ref 99–110)
CO2 SERPL-SCNC: 24 MMOL/L (ref 21–32)
CREAT SERPL-MCNC: 1 MG/DL (ref 0.8–1.3)
DEPRECATED RDW RBC AUTO: 13.5 % (ref 12.4–15.4)
EOSINOPHIL # BLD: 0.2 K/UL (ref 0–0.6)
EOSINOPHIL NFR BLD: 3.4 %
GFR SERPLBLD CREATININE-BSD FMLA CKD-EPI: >60 ML/MIN/{1.73_M2}
GLUCOSE SERPL-MCNC: 88 MG/DL (ref 70–99)
HCT VFR BLD AUTO: 40.3 % (ref 40.5–52.5)
HGB BLD-MCNC: 13.9 G/DL (ref 13.5–17.5)
LYMPHOCYTES # BLD: 0.7 K/UL (ref 1–5.1)
LYMPHOCYTES NFR BLD: 16 %
MCH RBC QN AUTO: 32.2 PG (ref 26–34)
MCHC RBC AUTO-ENTMCNC: 34.5 G/DL (ref 31–36)
MCV RBC AUTO: 93.5 FL (ref 80–100)
MONOCYTES # BLD: 0.4 K/UL (ref 0–1.3)
MONOCYTES NFR BLD: 9 %
NEUTROPHILS # BLD: 3.3 K/UL (ref 1.7–7.7)
NEUTROPHILS NFR BLD: 71 %
PHOSPHATE SERPL-MCNC: 3.8 MG/DL (ref 2.5–4.9)
PLATELET # BLD AUTO: 228 K/UL (ref 135–450)
PMV BLD AUTO: 9.3 FL (ref 5–10.5)
POTASSIUM SERPL-SCNC: 3.9 MMOL/L (ref 3.5–5.1)
RBC # BLD AUTO: 4.31 M/UL (ref 4.2–5.9)
SODIUM SERPL-SCNC: 137 MMOL/L (ref 136–145)
URATE SERPL-MCNC: 3.8 MG/DL (ref 3.5–7.2)
WBC # BLD AUTO: 4.6 K/UL (ref 4–11)

## 2023-07-01 PROCEDURE — 6370000000 HC RX 637 (ALT 250 FOR IP): Performed by: NURSE PRACTITIONER

## 2023-07-01 PROCEDURE — 84100 ASSAY OF PHOSPHORUS: CPT

## 2023-07-01 PROCEDURE — 80048 BASIC METABOLIC PNL TOTAL CA: CPT

## 2023-07-01 PROCEDURE — 70491 CT SOFT TISSUE NECK W/DYE: CPT

## 2023-07-01 PROCEDURE — 84550 ASSAY OF BLOOD/URIC ACID: CPT

## 2023-07-01 PROCEDURE — 6360000004 HC RX CONTRAST MEDICATION: Performed by: STUDENT IN AN ORGANIZED HEALTH CARE EDUCATION/TRAINING PROGRAM

## 2023-07-01 PROCEDURE — 2580000003 HC RX 258: Performed by: PHYSICIAN ASSISTANT

## 2023-07-01 PROCEDURE — 2060000000 HC ICU INTERMEDIATE R&B

## 2023-07-01 PROCEDURE — 6360000002 HC RX W HCPCS: Performed by: STUDENT IN AN ORGANIZED HEALTH CARE EDUCATION/TRAINING PROGRAM

## 2023-07-01 PROCEDURE — 85025 COMPLETE CBC W/AUTO DIFF WBC: CPT

## 2023-07-01 PROCEDURE — 71260 CT THORAX DX C+: CPT

## 2023-07-01 PROCEDURE — 2580000003 HC RX 258: Performed by: NURSE PRACTITIONER

## 2023-07-01 PROCEDURE — 36591 DRAW BLOOD OFF VENOUS DEVICE: CPT

## 2023-07-01 PROCEDURE — 6370000000 HC RX 637 (ALT 250 FOR IP): Performed by: PHYSICIAN ASSISTANT

## 2023-07-01 RX ADMIN — Medication 400 MG: at 08:16

## 2023-07-01 RX ADMIN — TAMSULOSIN HYDROCHLORIDE 0.4 MG: 0.4 CAPSULE ORAL at 08:16

## 2023-07-01 RX ADMIN — SODIUM CHLORIDE, PRESERVATIVE FREE 10 ML: 5 INJECTION INTRAVENOUS at 20:55

## 2023-07-01 RX ADMIN — IOPAMIDOL 100 ML: 755 INJECTION, SOLUTION INTRAVENOUS at 10:23

## 2023-07-01 RX ADMIN — SODIUM CHLORIDE, PRESERVATIVE FREE 10 ML: 5 INJECTION INTRAVENOUS at 08:18

## 2023-07-01 RX ADMIN — LEVETIRACETAM 500 MG: 500 TABLET, FILM COATED ORAL at 08:16

## 2023-07-01 RX ADMIN — ENOXAPARIN SODIUM 40 MG: 100 INJECTION SUBCUTANEOUS at 18:23

## 2023-07-01 RX ADMIN — LEVETIRACETAM 500 MG: 500 TABLET, FILM COATED ORAL at 20:55

## 2023-07-01 RX ADMIN — ACETAMINOPHEN 650 MG: 325 TABLET ORAL at 08:18

## 2023-07-01 ASSESSMENT — PAIN DESCRIPTION - ORIENTATION
ORIENTATION: INNER
ORIENTATION: ANTERIOR

## 2023-07-01 ASSESSMENT — PAIN DESCRIPTION - DESCRIPTORS
DESCRIPTORS: ACHING
DESCRIPTORS: ACHING

## 2023-07-01 ASSESSMENT — PAIN SCALES - WONG BAKER
WONGBAKER_NUMERICALRESPONSE: 0
WONGBAKER_NUMERICALRESPONSE: 0

## 2023-07-01 ASSESSMENT — PAIN SCALES - GENERAL
PAINLEVEL_OUTOF10: 0
PAINLEVEL_OUTOF10: 6
PAINLEVEL_OUTOF10: 2
PAINLEVEL_OUTOF10: 0

## 2023-07-01 ASSESSMENT — PAIN DESCRIPTION - LOCATION
LOCATION: HEAD
LOCATION: HEAD

## 2023-07-01 ASSESSMENT — PAIN DESCRIPTION - FREQUENCY
FREQUENCY: CONTINUOUS
FREQUENCY: CONTINUOUS

## 2023-07-01 ASSESSMENT — PAIN DESCRIPTION - PAIN TYPE
TYPE: ACUTE PAIN
TYPE: ACUTE PAIN

## 2023-07-01 ASSESSMENT — PAIN DESCRIPTION - ONSET
ONSET: ON-GOING
ONSET: ON-GOING

## 2023-07-02 LAB
ANION GAP SERPL CALCULATED.3IONS-SCNC: 13 MMOL/L (ref 3–16)
BASOPHILS # BLD: 0 K/UL (ref 0–0.2)
BASOPHILS NFR BLD: 0.9 %
BUN SERPL-MCNC: 15 MG/DL (ref 7–20)
CALCIUM SERPL-MCNC: 8.9 MG/DL (ref 8.3–10.6)
CHLORIDE SERPL-SCNC: 102 MMOL/L (ref 99–110)
CO2 SERPL-SCNC: 21 MMOL/L (ref 21–32)
CREAT SERPL-MCNC: 1.1 MG/DL (ref 0.8–1.3)
DEPRECATED RDW RBC AUTO: 13.5 % (ref 12.4–15.4)
EOSINOPHIL # BLD: 0.2 K/UL (ref 0–0.6)
EOSINOPHIL NFR BLD: 3.4 %
GFR SERPLBLD CREATININE-BSD FMLA CKD-EPI: >60 ML/MIN/{1.73_M2}
GLUCOSE SERPL-MCNC: 90 MG/DL (ref 70–99)
HCT VFR BLD AUTO: 40 % (ref 40.5–52.5)
HGB BLD-MCNC: 13.7 G/DL (ref 13.5–17.5)
LYMPHOCYTES # BLD: 0.8 K/UL (ref 1–5.1)
LYMPHOCYTES NFR BLD: 16.2 %
MCH RBC QN AUTO: 32.2 PG (ref 26–34)
MCHC RBC AUTO-ENTMCNC: 34.2 G/DL (ref 31–36)
MCV RBC AUTO: 94.3 FL (ref 80–100)
MONOCYTES # BLD: 0.4 K/UL (ref 0–1.3)
MONOCYTES NFR BLD: 9.3 %
NEUTROPHILS # BLD: 3.4 K/UL (ref 1.7–7.7)
NEUTROPHILS NFR BLD: 70.2 %
PHOSPHATE SERPL-MCNC: 3.9 MG/DL (ref 2.5–4.9)
PLATELET # BLD AUTO: 229 K/UL (ref 135–450)
PMV BLD AUTO: 8.8 FL (ref 5–10.5)
POTASSIUM SERPL-SCNC: 4 MMOL/L (ref 3.5–5.1)
RBC # BLD AUTO: 4.24 M/UL (ref 4.2–5.9)
SODIUM SERPL-SCNC: 136 MMOL/L (ref 136–145)
URATE SERPL-MCNC: 4.1 MG/DL (ref 3.5–7.2)
WBC # BLD AUTO: 4.8 K/UL (ref 4–11)

## 2023-07-02 PROCEDURE — 6370000000 HC RX 637 (ALT 250 FOR IP): Performed by: PHYSICIAN ASSISTANT

## 2023-07-02 PROCEDURE — 80048 BASIC METABOLIC PNL TOTAL CA: CPT

## 2023-07-02 PROCEDURE — 6360000002 HC RX W HCPCS: Performed by: PHYSICIAN ASSISTANT

## 2023-07-02 PROCEDURE — 84550 ASSAY OF BLOOD/URIC ACID: CPT

## 2023-07-02 PROCEDURE — 36591 DRAW BLOOD OFF VENOUS DEVICE: CPT

## 2023-07-02 PROCEDURE — 84100 ASSAY OF PHOSPHORUS: CPT

## 2023-07-02 PROCEDURE — 2580000003 HC RX 258: Performed by: PHYSICIAN ASSISTANT

## 2023-07-02 PROCEDURE — 2060000000 HC ICU INTERMEDIATE R&B

## 2023-07-02 PROCEDURE — 85025 COMPLETE CBC W/AUTO DIFF WBC: CPT

## 2023-07-02 PROCEDURE — 2580000003 HC RX 258: Performed by: NURSE PRACTITIONER

## 2023-07-02 RX ADMIN — WATER 2 MG: 1 INJECTION INTRAMUSCULAR; INTRAVENOUS; SUBCUTANEOUS at 18:47

## 2023-07-02 RX ADMIN — SODIUM CHLORIDE, PRESERVATIVE FREE 10 ML: 5 INJECTION INTRAVENOUS at 19:39

## 2023-07-02 RX ADMIN — LEVETIRACETAM 500 MG: 500 TABLET, FILM COATED ORAL at 09:24

## 2023-07-02 RX ADMIN — LEVETIRACETAM 500 MG: 500 TABLET, FILM COATED ORAL at 19:39

## 2023-07-02 RX ADMIN — SODIUM CHLORIDE, PRESERVATIVE FREE 10 ML: 5 INJECTION INTRAVENOUS at 09:24

## 2023-07-02 RX ADMIN — TAMSULOSIN HYDROCHLORIDE 0.4 MG: 0.4 CAPSULE ORAL at 09:24

## 2023-07-02 RX ADMIN — Medication 400 MG: at 09:24

## 2023-07-03 ENCOUNTER — APPOINTMENT (OUTPATIENT)
Dept: GENERAL RADIOLOGY | Age: 68
DRG: 820 | End: 2023-07-03
Attending: STUDENT IN AN ORGANIZED HEALTH CARE EDUCATION/TRAINING PROGRAM
Payer: MEDICARE

## 2023-07-03 ENCOUNTER — ANESTHESIA (OUTPATIENT)
Dept: OPERATING ROOM | Age: 68
End: 2023-07-03
Payer: MEDICARE

## 2023-07-03 LAB
ALBUMIN SERPL-MCNC: 3.5 G/DL (ref 3.4–5)
ALP SERPL-CCNC: 58 U/L (ref 40–129)
ALT SERPL-CCNC: 15 U/L (ref 10–40)
ANION GAP SERPL CALCULATED.3IONS-SCNC: 10 MMOL/L (ref 3–16)
APTT BLD: 26.4 SEC (ref 22.7–35.9)
AST SERPL-CCNC: 16 U/L (ref 15–37)
BASOPHILS # BLD: 0 K/UL (ref 0–0.2)
BASOPHILS NFR BLD: 0.5 %
BILIRUB DIRECT SERPL-MCNC: <0.2 MG/DL (ref 0–0.3)
BILIRUB INDIRECT SERPL-MCNC: ABNORMAL MG/DL (ref 0–1)
BILIRUB SERPL-MCNC: 0.3 MG/DL (ref 0–1)
BILIRUB UR QL STRIP.AUTO: NEGATIVE
BUN SERPL-MCNC: 18 MG/DL (ref 7–20)
CALCIUM SERPL-MCNC: 9 MG/DL (ref 8.3–10.6)
CHLORIDE SERPL-SCNC: 102 MMOL/L (ref 99–110)
CLARITY UR: CLEAR
CO2 SERPL-SCNC: 24 MMOL/L (ref 21–32)
COLOR UR: YELLOW
CREAT SERPL-MCNC: 1 MG/DL (ref 0.8–1.3)
DEPRECATED RDW RBC AUTO: 13.5 % (ref 12.4–15.4)
EOSINOPHIL # BLD: 0.2 K/UL (ref 0–0.6)
EOSINOPHIL NFR BLD: 3.6 %
GFR SERPLBLD CREATININE-BSD FMLA CKD-EPI: >60 ML/MIN/{1.73_M2}
GLUCOSE SERPL-MCNC: 92 MG/DL (ref 70–99)
GLUCOSE UR STRIP.AUTO-MCNC: NEGATIVE MG/DL
HCT VFR BLD AUTO: 38.5 % (ref 40.5–52.5)
HGB BLD-MCNC: 13.2 G/DL (ref 13.5–17.5)
HGB UR QL STRIP.AUTO: NEGATIVE
INR PPP: 1 (ref 0.84–1.16)
KETONES UR STRIP.AUTO-MCNC: NEGATIVE MG/DL
LDH SERPL L TO P-CCNC: 156 U/L (ref 100–190)
LEUKOCYTE ESTERASE UR QL STRIP.AUTO: NEGATIVE
LYMPHOCYTES # BLD: 0.7 K/UL (ref 1–5.1)
LYMPHOCYTES NFR BLD: 14.6 %
MAGNESIUM SERPL-MCNC: 1.9 MG/DL (ref 1.8–2.4)
MCH RBC QN AUTO: 32.2 PG (ref 26–34)
MCHC RBC AUTO-ENTMCNC: 34.4 G/DL (ref 31–36)
MCV RBC AUTO: 93.6 FL (ref 80–100)
MONOCYTES # BLD: 0.4 K/UL (ref 0–1.3)
MONOCYTES NFR BLD: 8.7 %
NEUTROPHILS # BLD: 3.5 K/UL (ref 1.7–7.7)
NEUTROPHILS NFR BLD: 72.6 %
NITRITE UR QL STRIP.AUTO: NEGATIVE
PH UR STRIP.AUTO: 6.5 [PH] (ref 5–8)
PHOSPHATE SERPL-MCNC: 3.9 MG/DL (ref 2.5–4.9)
PLATELET # BLD AUTO: 218 K/UL (ref 135–450)
PMV BLD AUTO: 9.1 FL (ref 5–10.5)
POTASSIUM SERPL-SCNC: 4.2 MMOL/L (ref 3.5–5.1)
PROT SERPL-MCNC: 5.5 G/DL (ref 6.4–8.2)
PROT UR STRIP.AUTO-MCNC: NEGATIVE MG/DL
PROTHROMBIN TIME: 13.2 SEC (ref 11.5–14.8)
RBC # BLD AUTO: 4.11 M/UL (ref 4.2–5.9)
SODIUM SERPL-SCNC: 136 MMOL/L (ref 136–145)
SP GR UR STRIP.AUTO: 1.01 (ref 1–1.03)
UA DIPSTICK W REFLEX MICRO PNL UR: NORMAL
URATE SERPL-MCNC: 3.4 MG/DL (ref 3.5–7.2)
URN SPEC COLLECT METH UR: NORMAL
UROBILINOGEN UR STRIP-ACNC: 0.2 E.U./DL
WBC # BLD AUTO: 4.8 K/UL (ref 4–11)

## 2023-07-03 PROCEDURE — 80048 BASIC METABOLIC PNL TOTAL CA: CPT

## 2023-07-03 PROCEDURE — 84100 ASSAY OF PHOSPHORUS: CPT

## 2023-07-03 PROCEDURE — 2580000003 HC RX 258: Performed by: NEUROLOGICAL SURGERY

## 2023-07-03 PROCEDURE — A4217 STERILE WATER/SALINE, 500 ML: HCPCS | Performed by: NEUROLOGICAL SURGERY

## 2023-07-03 PROCEDURE — 88341 IMHCHEM/IMCYTCHM EA ADD ANTB: CPT

## 2023-07-03 PROCEDURE — 2500000003 HC RX 250 WO HCPCS: Performed by: NURSE ANESTHETIST, CERTIFIED REGISTERED

## 2023-07-03 PROCEDURE — APPNB30 APP NON BILLABLE TIME 0-30 MINS: Performed by: NURSE PRACTITIONER

## 2023-07-03 PROCEDURE — 2060000000 HC ICU INTERMEDIATE R&B

## 2023-07-03 PROCEDURE — 6360000002 HC RX W HCPCS: Performed by: NURSE ANESTHETIST, CERTIFIED REGISTERED

## 2023-07-03 PROCEDURE — 2709999900 HC NON-CHARGEABLE SUPPLY: Performed by: NEUROLOGICAL SURGERY

## 2023-07-03 PROCEDURE — 3600000004 HC SURGERY LEVEL 4 BASE: Performed by: NEUROLOGICAL SURGERY

## 2023-07-03 PROCEDURE — 83615 LACTATE (LD) (LDH) ENZYME: CPT

## 2023-07-03 PROCEDURE — 6360000002 HC RX W HCPCS: Performed by: NEUROLOGICAL SURGERY

## 2023-07-03 PROCEDURE — 88360 TUMOR IMMUNOHISTOCHEM/MANUAL: CPT

## 2023-07-03 PROCEDURE — 88342 IMHCHEM/IMCYTCHM 1ST ANTB: CPT

## 2023-07-03 PROCEDURE — 97535 SELF CARE MNGMENT TRAINING: CPT

## 2023-07-03 PROCEDURE — 3700000000 HC ANESTHESIA ATTENDED CARE: Performed by: NEUROLOGICAL SURGERY

## 2023-07-03 PROCEDURE — 3600000014 HC SURGERY LEVEL 4 ADDTL 15MIN: Performed by: NEUROLOGICAL SURGERY

## 2023-07-03 PROCEDURE — 00B70ZX EXCISION OF CEREBRAL HEMISPHERE, OPEN APPROACH, DIAGNOSTIC: ICD-10-PCS | Performed by: NEUROLOGICAL SURGERY

## 2023-07-03 PROCEDURE — 71045 X-RAY EXAM CHEST 1 VIEW: CPT

## 2023-07-03 PROCEDURE — 97116 GAIT TRAINING THERAPY: CPT

## 2023-07-03 PROCEDURE — 7100000001 HC PACU RECOVERY - ADDTL 15 MIN: Performed by: NEUROLOGICAL SURGERY

## 2023-07-03 PROCEDURE — 00K70ZZ MAP CEREBRAL HEMISPHERE, OPEN APPROACH: ICD-10-PCS | Performed by: NEUROLOGICAL SURGERY

## 2023-07-03 PROCEDURE — 2580000003 HC RX 258: Performed by: NURSE PRACTITIONER

## 2023-07-03 PROCEDURE — C1713 ANCHOR/SCREW BN/BN,TIS/BN: HCPCS | Performed by: NEUROLOGICAL SURGERY

## 2023-07-03 PROCEDURE — 6370000000 HC RX 637 (ALT 250 FOR IP): Performed by: PHYSICIAN ASSISTANT

## 2023-07-03 PROCEDURE — 2720000010 HC SURG SUPPLY STERILE: Performed by: NEUROLOGICAL SURGERY

## 2023-07-03 PROCEDURE — 3700000001 HC ADD 15 MINUTES (ANESTHESIA): Performed by: NEUROLOGICAL SURGERY

## 2023-07-03 PROCEDURE — 8E09XBG COMPUTER ASSISTED PROCEDURE OF HEAD AND NECK REGION, WITH COMPUTERIZED TOMOGRAPHY: ICD-10-PCS | Performed by: NEUROLOGICAL SURGERY

## 2023-07-03 PROCEDURE — 3E03305 INTRODUCTION OF OTHER ANTINEOPLASTIC INTO PERIPHERAL VEIN, PERCUTANEOUS APPROACH: ICD-10-PCS | Performed by: NEUROLOGICAL SURGERY

## 2023-07-03 PROCEDURE — 97530 THERAPEUTIC ACTIVITIES: CPT

## 2023-07-03 PROCEDURE — 85610 PROTHROMBIN TIME: CPT

## 2023-07-03 PROCEDURE — 83735 ASSAY OF MAGNESIUM: CPT

## 2023-07-03 PROCEDURE — 85730 THROMBOPLASTIN TIME PARTIAL: CPT

## 2023-07-03 PROCEDURE — 85025 COMPLETE CBC W/AUTO DIFF WBC: CPT

## 2023-07-03 PROCEDURE — 6360000002 HC RX W HCPCS: Performed by: NURSE PRACTITIONER

## 2023-07-03 PROCEDURE — 84550 ASSAY OF BLOOD/URIC ACID: CPT

## 2023-07-03 PROCEDURE — 2500000003 HC RX 250 WO HCPCS: Performed by: NEUROLOGICAL SURGERY

## 2023-07-03 PROCEDURE — 7100000000 HC PACU RECOVERY - FIRST 15 MIN: Performed by: NEUROLOGICAL SURGERY

## 2023-07-03 PROCEDURE — 80076 HEPATIC FUNCTION PANEL: CPT

## 2023-07-03 PROCEDURE — 81003 URINALYSIS AUTO W/O SCOPE: CPT

## 2023-07-03 PROCEDURE — 88307 TISSUE EXAM BY PATHOLOGIST: CPT

## 2023-07-03 PROCEDURE — 6370000000 HC RX 637 (ALT 250 FOR IP): Performed by: NURSE PRACTITIONER

## 2023-07-03 DEVICE — LOW PROFILE BURR HOLE COVER, W/TAB, 10MM
Type: IMPLANTABLE DEVICE | Site: CRANIAL | Status: FUNCTIONAL
Brand: UNIVERSAL NEURO 2

## 2023-07-03 DEVICE — SCREW, AXS, SELF-TAPPING
Type: IMPLANTABLE DEVICE | Site: CRANIAL | Status: FUNCTIONAL
Brand: UNIVERSAL NEURO 3

## 2023-07-03 DEVICE — STRAIGHT PLATE, 12MM BAR
Type: IMPLANTABLE DEVICE | Site: CRANIAL | Status: FUNCTIONAL
Brand: UNIVERSAL NEURO 3

## 2023-07-03 RX ORDER — PROPOFOL 10 MG/ML
INJECTION, EMULSION INTRAVENOUS CONTINUOUS PRN
Status: DISCONTINUED | OUTPATIENT
Start: 2023-07-03 | End: 2023-07-03 | Stop reason: SDUPTHER

## 2023-07-03 RX ORDER — ONDANSETRON 2 MG/ML
4 INJECTION INTRAMUSCULAR; INTRAVENOUS EVERY 6 HOURS PRN
Status: DISCONTINUED | OUTPATIENT
Start: 2023-07-03 | End: 2023-07-07

## 2023-07-03 RX ORDER — EPHEDRINE SULFATE 50 MG/ML
INJECTION INTRAVENOUS PRN
Status: DISCONTINUED | OUTPATIENT
Start: 2023-07-03 | End: 2023-07-03 | Stop reason: SDUPTHER

## 2023-07-03 RX ORDER — ONDANSETRON 2 MG/ML
4 INJECTION INTRAMUSCULAR; INTRAVENOUS
Status: DISCONTINUED | OUTPATIENT
Start: 2023-07-03 | End: 2023-07-03 | Stop reason: HOSPADM

## 2023-07-03 RX ORDER — FENTANYL CITRATE 50 UG/ML
INJECTION, SOLUTION INTRAMUSCULAR; INTRAVENOUS PRN
Status: DISCONTINUED | OUTPATIENT
Start: 2023-07-03 | End: 2023-07-03 | Stop reason: SDUPTHER

## 2023-07-03 RX ORDER — SODIUM CHLORIDE 9 MG/ML
INJECTION, SOLUTION INTRAVENOUS CONTINUOUS
Status: DISCONTINUED | OUTPATIENT
Start: 2023-07-03 | End: 2023-07-03 | Stop reason: SDUPTHER

## 2023-07-03 RX ORDER — HYDROMORPHONE HYDROCHLORIDE 1 MG/ML
0.5 INJECTION, SOLUTION INTRAMUSCULAR; INTRAVENOUS; SUBCUTANEOUS EVERY 5 MIN PRN
Status: DISCONTINUED | OUTPATIENT
Start: 2023-07-03 | End: 2023-07-03 | Stop reason: HOSPADM

## 2023-07-03 RX ORDER — LEVETIRACETAM 500 MG/5ML
500 INJECTION, SOLUTION, CONCENTRATE INTRAVENOUS EVERY 12 HOURS
Status: DISCONTINUED | OUTPATIENT
Start: 2023-07-03 | End: 2023-07-06

## 2023-07-03 RX ORDER — HYDROMORPHONE HYDROCHLORIDE 1 MG/ML
0.5 INJECTION, SOLUTION INTRAMUSCULAR; INTRAVENOUS; SUBCUTANEOUS
Status: DISCONTINUED | OUTPATIENT
Start: 2023-07-03 | End: 2023-07-07

## 2023-07-03 RX ORDER — LABETALOL HYDROCHLORIDE 5 MG/ML
10 INJECTION, SOLUTION INTRAVENOUS
Status: DISCONTINUED | OUTPATIENT
Start: 2023-07-03 | End: 2023-07-03 | Stop reason: HOSPADM

## 2023-07-03 RX ORDER — DEXAMETHASONE SODIUM PHOSPHATE 4 MG/ML
INJECTION, SOLUTION INTRA-ARTICULAR; INTRALESIONAL; INTRAMUSCULAR; INTRAVENOUS; SOFT TISSUE PRN
Status: DISCONTINUED | OUTPATIENT
Start: 2023-07-03 | End: 2023-07-03 | Stop reason: SDUPTHER

## 2023-07-03 RX ORDER — OXYCODONE HYDROCHLORIDE 5 MG/1
10 TABLET ORAL EVERY 4 HOURS PRN
Status: DISCONTINUED | OUTPATIENT
Start: 2023-07-03 | End: 2023-07-07

## 2023-07-03 RX ORDER — MAGNESIUM HYDROXIDE/ALUMINUM HYDROXICE/SIMETHICONE 120; 1200; 1200 MG/30ML; MG/30ML; MG/30ML
15 SUSPENSION ORAL EVERY 6 HOURS PRN
Status: DISCONTINUED | OUTPATIENT
Start: 2023-07-03 | End: 2023-07-11 | Stop reason: HOSPADM

## 2023-07-03 RX ORDER — DEXAMETHASONE SODIUM PHOSPHATE 4 MG/ML
10 INJECTION, SOLUTION INTRA-ARTICULAR; INTRALESIONAL; INTRAMUSCULAR; INTRAVENOUS; SOFT TISSUE ONCE
Status: DISCONTINUED | OUTPATIENT
Start: 2023-07-03 | End: 2023-07-04

## 2023-07-03 RX ORDER — NALOXONE HYDROCHLORIDE 0.4 MG/ML
0.2 INJECTION, SOLUTION INTRAMUSCULAR; INTRAVENOUS; SUBCUTANEOUS PRN
Status: DISCONTINUED | OUTPATIENT
Start: 2023-07-03 | End: 2023-07-11 | Stop reason: HOSPADM

## 2023-07-03 RX ORDER — ACETAMINOPHEN 325 MG/1
650 TABLET ORAL EVERY 4 HOURS PRN
Status: DISCONTINUED | OUTPATIENT
Start: 2023-07-03 | End: 2023-07-07

## 2023-07-03 RX ORDER — SUCCINYLCHOLINE/SOD CL,ISO/PF 200MG/10ML
SYRINGE (ML) INTRAVENOUS PRN
Status: DISCONTINUED | OUTPATIENT
Start: 2023-07-03 | End: 2023-07-03 | Stop reason: SDUPTHER

## 2023-07-03 RX ORDER — SODIUM CHLORIDE 9 MG/ML
INJECTION, SOLUTION INTRAVENOUS PRN
Status: DISCONTINUED | OUTPATIENT
Start: 2023-07-03 | End: 2023-07-11 | Stop reason: HOSPADM

## 2023-07-03 RX ORDER — DEXAMETHASONE SODIUM PHOSPHATE 4 MG/ML
6 INJECTION, SOLUTION INTRA-ARTICULAR; INTRALESIONAL; INTRAMUSCULAR; INTRAVENOUS; SOFT TISSUE EVERY 6 HOURS
Status: DISCONTINUED | OUTPATIENT
Start: 2023-07-03 | End: 2023-07-04

## 2023-07-03 RX ORDER — SODIUM CHLORIDE 0.9 % (FLUSH) 0.9 %
5-40 SYRINGE (ML) INJECTION PRN
Status: DISCONTINUED | OUTPATIENT
Start: 2023-07-03 | End: 2023-07-03 | Stop reason: HOSPADM

## 2023-07-03 RX ORDER — SODIUM CHLORIDE 9 MG/ML
INJECTION, SOLUTION INTRAVENOUS PRN
Status: DISCONTINUED | OUTPATIENT
Start: 2023-07-03 | End: 2023-07-03 | Stop reason: HOSPADM

## 2023-07-03 RX ORDER — HYDROMORPHONE HYDROCHLORIDE 1 MG/ML
1 INJECTION, SOLUTION INTRAMUSCULAR; INTRAVENOUS; SUBCUTANEOUS
Status: DISCONTINUED | OUTPATIENT
Start: 2023-07-03 | End: 2023-07-07

## 2023-07-03 RX ORDER — PROCHLORPERAZINE EDISYLATE 5 MG/ML
5 INJECTION INTRAMUSCULAR; INTRAVENOUS
Status: DISCONTINUED | OUTPATIENT
Start: 2023-07-03 | End: 2023-07-03 | Stop reason: HOSPADM

## 2023-07-03 RX ORDER — IPRATROPIUM BROMIDE AND ALBUTEROL SULFATE 2.5; .5 MG/3ML; MG/3ML
1 SOLUTION RESPIRATORY (INHALATION)
Status: DISCONTINUED | OUTPATIENT
Start: 2023-07-03 | End: 2023-07-03 | Stop reason: HOSPADM

## 2023-07-03 RX ORDER — REMIFENTANIL HYDROCHLORIDE 1 MG/ML
INJECTION, POWDER, LYOPHILIZED, FOR SOLUTION INTRAVENOUS PRN
Status: DISCONTINUED | OUTPATIENT
Start: 2023-07-03 | End: 2023-07-03 | Stop reason: SDUPTHER

## 2023-07-03 RX ORDER — SODIUM CHLORIDE 0.9 % (FLUSH) 0.9 %
5-40 SYRINGE (ML) INJECTION EVERY 12 HOURS SCHEDULED
Status: DISCONTINUED | OUTPATIENT
Start: 2023-07-03 | End: 2023-07-03 | Stop reason: HOSPADM

## 2023-07-03 RX ORDER — PANTOPRAZOLE SODIUM 40 MG/1
40 TABLET, DELAYED RELEASE ORAL
Status: DISCONTINUED | OUTPATIENT
Start: 2023-07-03 | End: 2023-07-10

## 2023-07-03 RX ORDER — CEFAZOLIN SODIUM 1 G/3ML
INJECTION, POWDER, FOR SOLUTION INTRAMUSCULAR; INTRAVENOUS PRN
Status: DISCONTINUED | OUTPATIENT
Start: 2023-07-03 | End: 2023-07-03 | Stop reason: SDUPTHER

## 2023-07-03 RX ORDER — LIDOCAINE HYDROCHLORIDE 20 MG/ML
INJECTION, SOLUTION INTRAVENOUS PRN
Status: DISCONTINUED | OUTPATIENT
Start: 2023-07-03 | End: 2023-07-03 | Stop reason: SDUPTHER

## 2023-07-03 RX ORDER — SODIUM CHLORIDE 0.9 % (FLUSH) 0.9 %
5-40 SYRINGE (ML) INJECTION PRN
Status: DISCONTINUED | OUTPATIENT
Start: 2023-07-03 | End: 2023-07-11 | Stop reason: HOSPADM

## 2023-07-03 RX ORDER — FENTANYL CITRATE 50 UG/ML
25 INJECTION, SOLUTION INTRAMUSCULAR; INTRAVENOUS EVERY 5 MIN PRN
Status: DISCONTINUED | OUTPATIENT
Start: 2023-07-03 | End: 2023-07-03 | Stop reason: HOSPADM

## 2023-07-03 RX ORDER — POLYETHYLENE GLYCOL 3350 17 G/17G
17 POWDER, FOR SOLUTION ORAL DAILY
Status: DISCONTINUED | OUTPATIENT
Start: 2023-07-04 | End: 2023-07-11 | Stop reason: HOSPADM

## 2023-07-03 RX ORDER — LEVETIRACETAM 500 MG/5ML
INJECTION, SOLUTION, CONCENTRATE INTRAVENOUS PRN
Status: DISCONTINUED | OUTPATIENT
Start: 2023-07-03 | End: 2023-07-03 | Stop reason: SDUPTHER

## 2023-07-03 RX ORDER — OXYCODONE HYDROCHLORIDE 5 MG/1
5 TABLET ORAL EVERY 4 HOURS PRN
Status: DISCONTINUED | OUTPATIENT
Start: 2023-07-03 | End: 2023-07-07

## 2023-07-03 RX ORDER — SENNA AND DOCUSATE SODIUM 50; 8.6 MG/1; MG/1
1 TABLET, FILM COATED ORAL 2 TIMES DAILY
Status: DISCONTINUED | OUTPATIENT
Start: 2023-07-03 | End: 2023-07-10

## 2023-07-03 RX ORDER — VASOPRESSIN 20 U/ML
INJECTION PARENTERAL PRN
Status: DISCONTINUED | OUTPATIENT
Start: 2023-07-03 | End: 2023-07-03 | Stop reason: SDUPTHER

## 2023-07-03 RX ORDER — BUPIVACAINE HYDROCHLORIDE AND EPINEPHRINE 5; 5 MG/ML; UG/ML
INJECTION, SOLUTION EPIDURAL; INTRACAUDAL; PERINEURAL PRN
Status: DISCONTINUED | OUTPATIENT
Start: 2023-07-03 | End: 2023-07-03 | Stop reason: HOSPADM

## 2023-07-03 RX ORDER — ONDANSETRON 2 MG/ML
INJECTION INTRAMUSCULAR; INTRAVENOUS PRN
Status: DISCONTINUED | OUTPATIENT
Start: 2023-07-03 | End: 2023-07-03 | Stop reason: SDUPTHER

## 2023-07-03 RX ORDER — PROMETHAZINE HYDROCHLORIDE 12.5 MG/1
12.5 TABLET ORAL EVERY 6 HOURS PRN
Status: DISCONTINUED | OUTPATIENT
Start: 2023-07-03 | End: 2023-07-07

## 2023-07-03 RX ORDER — BISACODYL 10 MG
10 SUPPOSITORY, RECTAL RECTAL DAILY PRN
Status: DISCONTINUED | OUTPATIENT
Start: 2023-07-03 | End: 2023-07-11 | Stop reason: HOSPADM

## 2023-07-03 RX ORDER — SODIUM CHLORIDE 0.9 % (FLUSH) 0.9 %
5-40 SYRINGE (ML) INJECTION EVERY 12 HOURS SCHEDULED
Status: DISCONTINUED | OUTPATIENT
Start: 2023-07-03 | End: 2023-07-11 | Stop reason: HOSPADM

## 2023-07-03 RX ADMIN — LIDOCAINE HYDROCHLORIDE 50 MG: 20 INJECTION, SOLUTION INTRAVENOUS at 17:22

## 2023-07-03 RX ADMIN — DEXAMETHASONE SODIUM PHOSPHATE 8 MG: 4 INJECTION, SOLUTION INTRAMUSCULAR; INTRAVENOUS at 18:06

## 2023-07-03 RX ADMIN — PROPOFOL 150 MCG/KG/MIN: 10 INJECTION, EMULSION INTRAVENOUS at 17:20

## 2023-07-03 RX ADMIN — LEVETIRACETAM 1 G: 100 INJECTION, SOLUTION INTRAVENOUS at 18:14

## 2023-07-03 RX ADMIN — PANTOPRAZOLE SODIUM 40 MG: 40 TABLET, DELAYED RELEASE ORAL at 12:01

## 2023-07-03 RX ADMIN — EPHEDRINE SULFATE 10 MG: 50 INJECTION INTRAVENOUS at 17:43

## 2023-07-03 RX ADMIN — SODIUM CHLORIDE: 9 INJECTION, SOLUTION INTRAVENOUS at 08:14

## 2023-07-03 RX ADMIN — TAMSULOSIN HYDROCHLORIDE 0.4 MG: 0.4 CAPSULE ORAL at 08:04

## 2023-07-03 RX ADMIN — PHENYLEPHRINE HYDROCHLORIDE 200 MCG: 10 INJECTION, SOLUTION INTRAMUSCULAR; INTRAVENOUS; SUBCUTANEOUS at 17:31

## 2023-07-03 RX ADMIN — REMIFENTANIL HYDROCHLORIDE 0.15 MCG/KG/MIN: 1 INJECTION, POWDER, LYOPHILIZED, FOR SOLUTION INTRAVENOUS at 17:20

## 2023-07-03 RX ADMIN — SODIUM CHLORIDE, PRESERVATIVE FREE 10 ML: 5 INJECTION INTRAVENOUS at 22:23

## 2023-07-03 RX ADMIN — ONDANSETRON 4 MG: 2 INJECTION INTRAMUSCULAR; INTRAVENOUS at 19:34

## 2023-07-03 RX ADMIN — Medication 140 MG: at 17:22

## 2023-07-03 RX ADMIN — SODIUM CHLORIDE: 9 INJECTION, SOLUTION INTRAVENOUS at 00:01

## 2023-07-03 RX ADMIN — VASOPRESSIN 1 UNITS: 20 INJECTION INTRAVENOUS at 18:25

## 2023-07-03 RX ADMIN — SODIUM CHLORIDE: 9 INJECTION, SOLUTION INTRAVENOUS at 19:00

## 2023-07-03 RX ADMIN — VASOPRESSIN 1 UNITS: 20 INJECTION INTRAVENOUS at 17:54

## 2023-07-03 RX ADMIN — Medication 400 MG: at 08:04

## 2023-07-03 RX ADMIN — PHENYLEPHRINE HYDROCHLORIDE 200 MCG: 10 INJECTION, SOLUTION INTRAMUSCULAR; INTRAVENOUS; SUBCUTANEOUS at 17:47

## 2023-07-03 RX ADMIN — LEVETIRACETAM 500 MG: 500 INJECTION INTRAVENOUS at 22:24

## 2023-07-03 RX ADMIN — DEXAMETHASONE SODIUM PHOSPHATE 6 MG: 4 INJECTION, SOLUTION INTRAMUSCULAR; INTRAVENOUS at 22:24

## 2023-07-03 RX ADMIN — CEFAZOLIN SODIUM 2 G: 1 POWDER, FOR SOLUTION INTRAMUSCULAR; INTRAVENOUS at 17:29

## 2023-07-03 RX ADMIN — FENTANYL CITRATE 100 MCG: 50 INJECTION, SOLUTION INTRAMUSCULAR; INTRAVENOUS at 18:20

## 2023-07-03 RX ADMIN — LEVETIRACETAM 500 MG: 500 TABLET, FILM COATED ORAL at 08:04

## 2023-07-03 RX ADMIN — SODIUM CHLORIDE, PRESERVATIVE FREE 10 ML: 5 INJECTION INTRAVENOUS at 08:12

## 2023-07-03 ASSESSMENT — PAIN SCALES - WONG BAKER
WONGBAKER_NUMERICALRESPONSE: 0

## 2023-07-04 ENCOUNTER — APPOINTMENT (OUTPATIENT)
Dept: CT IMAGING | Age: 68
DRG: 820 | End: 2023-07-04
Attending: STUDENT IN AN ORGANIZED HEALTH CARE EDUCATION/TRAINING PROGRAM
Payer: MEDICARE

## 2023-07-04 PROBLEM — D49.6 NEOPLASM OF UNSPECIFIED BEHAVIOR OF BRAIN (HCC): Status: ACTIVE | Noted: 2023-07-04

## 2023-07-04 LAB
ANION GAP SERPL CALCULATED.3IONS-SCNC: 12 MMOL/L (ref 3–16)
BUN SERPL-MCNC: 13 MG/DL (ref 7–20)
CALCIUM SERPL-MCNC: 9.1 MG/DL (ref 8.3–10.6)
CHLORIDE SERPL-SCNC: 104 MMOL/L (ref 99–110)
CO2 SERPL-SCNC: 20 MMOL/L (ref 21–32)
CREAT SERPL-MCNC: 0.9 MG/DL (ref 0.8–1.3)
DEPRECATED RDW RBC AUTO: 13.1 % (ref 12.4–15.4)
GFR SERPLBLD CREATININE-BSD FMLA CKD-EPI: >60 ML/MIN/{1.73_M2}
GLUCOSE SERPL-MCNC: 147 MG/DL (ref 70–99)
HCT VFR BLD AUTO: 40.6 % (ref 40.5–52.5)
HGB BLD-MCNC: 13.9 G/DL (ref 13.5–17.5)
MCH RBC QN AUTO: 32 PG (ref 26–34)
MCHC RBC AUTO-ENTMCNC: 34.3 G/DL (ref 31–36)
MCV RBC AUTO: 93.3 FL (ref 80–100)
PLATELET # BLD AUTO: 198 K/UL (ref 135–450)
PMV BLD AUTO: 8.9 FL (ref 5–10.5)
POTASSIUM SERPL-SCNC: 4.4 MMOL/L (ref 3.5–5.1)
RBC # BLD AUTO: 4.36 M/UL (ref 4.2–5.9)
SODIUM SERPL-SCNC: 136 MMOL/L (ref 136–145)
URATE SERPL-MCNC: 3 MG/DL (ref 3.5–7.2)
WBC # BLD AUTO: 7.1 K/UL (ref 4–11)

## 2023-07-04 PROCEDURE — 99231 SBSQ HOSP IP/OBS SF/LOW 25: CPT

## 2023-07-04 PROCEDURE — 80048 BASIC METABOLIC PNL TOTAL CA: CPT

## 2023-07-04 PROCEDURE — 2580000003 HC RX 258: Performed by: NEUROLOGICAL SURGERY

## 2023-07-04 PROCEDURE — 70450 CT HEAD/BRAIN W/O DYE: CPT

## 2023-07-04 PROCEDURE — 97530 THERAPEUTIC ACTIVITIES: CPT

## 2023-07-04 PROCEDURE — 6370000000 HC RX 637 (ALT 250 FOR IP): Performed by: NEUROLOGICAL SURGERY

## 2023-07-04 PROCEDURE — 97535 SELF CARE MNGMENT TRAINING: CPT

## 2023-07-04 PROCEDURE — 6360000002 HC RX W HCPCS: Performed by: NEUROLOGICAL SURGERY

## 2023-07-04 PROCEDURE — 6360000002 HC RX W HCPCS: Performed by: STUDENT IN AN ORGANIZED HEALTH CARE EDUCATION/TRAINING PROGRAM

## 2023-07-04 PROCEDURE — 85027 COMPLETE CBC AUTOMATED: CPT

## 2023-07-04 PROCEDURE — 97164 PT RE-EVAL EST PLAN CARE: CPT

## 2023-07-04 PROCEDURE — 97168 OT RE-EVAL EST PLAN CARE: CPT

## 2023-07-04 PROCEDURE — 84550 ASSAY OF BLOOD/URIC ACID: CPT

## 2023-07-04 PROCEDURE — 92507 TX SP LANG VOICE COMM INDIV: CPT

## 2023-07-04 PROCEDURE — 97116 GAIT TRAINING THERAPY: CPT

## 2023-07-04 PROCEDURE — 2580000003 HC RX 258: Performed by: STUDENT IN AN ORGANIZED HEALTH CARE EDUCATION/TRAINING PROGRAM

## 2023-07-04 PROCEDURE — 6360000002 HC RX W HCPCS: Performed by: NURSE PRACTITIONER

## 2023-07-04 PROCEDURE — 36591 DRAW BLOOD OFF VENOUS DEVICE: CPT

## 2023-07-04 PROCEDURE — 2060000000 HC ICU INTERMEDIATE R&B

## 2023-07-04 RX ORDER — 0.9 % SODIUM CHLORIDE 0.9 %
1000 INTRAVENOUS SOLUTION INTRAVENOUS ONCE
Status: COMPLETED | OUTPATIENT
Start: 2023-07-04 | End: 2023-07-04

## 2023-07-04 RX ORDER — DEXAMETHASONE SODIUM PHOSPHATE 4 MG/ML
6 INJECTION, SOLUTION INTRA-ARTICULAR; INTRALESIONAL; INTRAMUSCULAR; INTRAVENOUS; SOFT TISSUE EVERY 6 HOURS
Status: DISCONTINUED | OUTPATIENT
Start: 2023-07-04 | End: 2023-07-06

## 2023-07-04 RX ADMIN — CEFAZOLIN 2000 MG: 2 INJECTION, POWDER, FOR SOLUTION INTRAMUSCULAR; INTRAVENOUS at 03:12

## 2023-07-04 RX ADMIN — SODIUM CHLORIDE 1000 ML: 9 INJECTION, SOLUTION INTRAVENOUS at 12:53

## 2023-07-04 RX ADMIN — LEVETIRACETAM 500 MG: 500 INJECTION INTRAVENOUS at 09:02

## 2023-07-04 RX ADMIN — TAMSULOSIN HYDROCHLORIDE 0.4 MG: 0.4 CAPSULE ORAL at 09:02

## 2023-07-04 RX ADMIN — DEXAMETHASONE SODIUM PHOSPHATE 6 MG: 4 INJECTION, SOLUTION INTRAMUSCULAR; INTRAVENOUS at 21:57

## 2023-07-04 RX ADMIN — PANTOPRAZOLE SODIUM 40 MG: 40 TABLET, DELAYED RELEASE ORAL at 07:45

## 2023-07-04 RX ADMIN — DEXAMETHASONE SODIUM PHOSPHATE 6 MG: 4 INJECTION, SOLUTION INTRAMUSCULAR; INTRAVENOUS at 12:18

## 2023-07-04 RX ADMIN — SODIUM CHLORIDE, PRESERVATIVE FREE 10 ML: 5 INJECTION INTRAVENOUS at 06:00

## 2023-07-04 RX ADMIN — SODIUM CHLORIDE, PRESERVATIVE FREE 10 ML: 5 INJECTION INTRAVENOUS at 21:57

## 2023-07-04 RX ADMIN — SODIUM CHLORIDE: 9 INJECTION, SOLUTION INTRAVENOUS at 03:12

## 2023-07-04 RX ADMIN — Medication 400 MG: at 09:02

## 2023-07-04 RX ADMIN — CEFAZOLIN 2000 MG: 2 INJECTION, POWDER, FOR SOLUTION INTRAMUSCULAR; INTRAVENOUS at 11:53

## 2023-07-04 RX ADMIN — DEXAMETHASONE SODIUM PHOSPHATE 6 MG: 4 INJECTION, SOLUTION INTRAMUSCULAR; INTRAVENOUS at 17:14

## 2023-07-04 RX ADMIN — ACETAMINOPHEN 650 MG: 325 TABLET ORAL at 09:02

## 2023-07-04 RX ADMIN — CEFAZOLIN 2000 MG: 2 INJECTION, POWDER, FOR SOLUTION INTRAMUSCULAR; INTRAVENOUS at 19:11

## 2023-07-04 RX ADMIN — DEXAMETHASONE SODIUM PHOSPHATE 6 MG: 4 INJECTION, SOLUTION INTRAMUSCULAR; INTRAVENOUS at 05:23

## 2023-07-04 RX ADMIN — LEVETIRACETAM 500 MG: 500 INJECTION INTRAVENOUS at 21:57

## 2023-07-04 ASSESSMENT — PAIN SCALES - GENERAL: PAINLEVEL_OUTOF10: 0

## 2023-07-04 NOTE — ANESTHESIA POSTPROCEDURE EVALUATION
Department of Anesthesiology  Postprocedure Note    Patient: Brandie Berkowitz  MRN: [id number]  YOB: 1955  Date of evaluation: 7/3/2023      Procedure Summary     Date: 07/03/23 Room / Location: [facility] OR 06 / The [address] [facility]    Anesthesia Start: [facility] Anesthesia Stop: 1959    Procedure: LEFT PARIETAL CRANIOTOMY FOR OPEN BIOPSY OF BRAIN MASS (Left: Head) Diagnosis:       Neoplasm of unspecified behavior of brain ([address])      (Neoplasm of unspecified behavior of brain ([address]) [D49.6])    Surgeons: Eduard Yee MD Responsible Provider: Subhash Terry MD    Anesthesia Type: general ASA Status: 3          Anesthesia Type: No value filed.     Aly Phase I: Aly Score: 9    Aly Phase II:        Anesthesia Post Evaluation    Patient location during evaluation: PACU  Patient participation: complete - patient participated  Level of consciousness: awake  Pain score: 0  Nausea & Vomiting: no nausea and no vomiting  Complications: no  Cardiovascular status: blood pressure returned to baseline  Respiratory status: acceptable  Hydration status: euvolemic

## 2023-07-05 LAB
ALBUMIN SERPL-MCNC: 3.6 G/DL (ref 3.4–5)
ALP SERPL-CCNC: 65 U/L (ref 40–129)
ALT SERPL-CCNC: 10 U/L (ref 10–40)
AST SERPL-CCNC: 14 U/L (ref 15–37)
BILIRUB DIRECT SERPL-MCNC: <0.2 MG/DL (ref 0–0.3)
BILIRUB INDIRECT SERPL-MCNC: ABNORMAL MG/DL (ref 0–1)
BILIRUB SERPL-MCNC: <0.2 MG/DL (ref 0–1)
GLUCOSE BLD-MCNC: 126 MG/DL (ref 70–99)
GLUCOSE BLD-MCNC: 126 MG/DL (ref 70–99)
GLUCOSE BLD-MCNC: 153 MG/DL (ref 70–99)
LDH SERPL L TO P-CCNC: 208 U/L (ref 100–190)
MAGNESIUM SERPL-MCNC: 1.9 MG/DL (ref 1.8–2.4)
PERFORMED ON: ABNORMAL
PROT SERPL-MCNC: 5.5 G/DL (ref 6.4–8.2)
URATE SERPL-MCNC: 2.6 MG/DL (ref 3.5–7.2)

## 2023-07-05 PROCEDURE — 6370000000 HC RX 637 (ALT 250 FOR IP): Performed by: NEUROLOGICAL SURGERY

## 2023-07-05 PROCEDURE — 84550 ASSAY OF BLOOD/URIC ACID: CPT

## 2023-07-05 PROCEDURE — 2580000003 HC RX 258: Performed by: NEUROLOGICAL SURGERY

## 2023-07-05 PROCEDURE — 80076 HEPATIC FUNCTION PANEL: CPT

## 2023-07-05 PROCEDURE — 99024 POSTOP FOLLOW-UP VISIT: CPT | Performed by: NURSE PRACTITIONER

## 2023-07-05 PROCEDURE — 92507 TX SP LANG VOICE COMM INDIV: CPT

## 2023-07-05 PROCEDURE — 2060000000 HC ICU INTERMEDIATE R&B

## 2023-07-05 PROCEDURE — APPNB30 APP NON BILLABLE TIME 0-30 MINS: Performed by: NURSE PRACTITIONER

## 2023-07-05 PROCEDURE — 6360000002 HC RX W HCPCS: Performed by: NEUROLOGICAL SURGERY

## 2023-07-05 PROCEDURE — 83735 ASSAY OF MAGNESIUM: CPT

## 2023-07-05 PROCEDURE — 6360000002 HC RX W HCPCS: Performed by: STUDENT IN AN ORGANIZED HEALTH CARE EDUCATION/TRAINING PROGRAM

## 2023-07-05 PROCEDURE — 83615 LACTATE (LD) (LDH) ENZYME: CPT

## 2023-07-05 PROCEDURE — 6360000002 HC RX W HCPCS: Performed by: NURSE PRACTITIONER

## 2023-07-05 RX ADMIN — DEXAMETHASONE SODIUM PHOSPHATE 6 MG: 4 INJECTION, SOLUTION INTRAMUSCULAR; INTRAVENOUS at 09:47

## 2023-07-05 RX ADMIN — SODIUM CHLORIDE, PRESERVATIVE FREE 10 ML: 5 INJECTION INTRAVENOUS at 10:00

## 2023-07-05 RX ADMIN — CEFAZOLIN 2000 MG: 2 INJECTION, POWDER, FOR SOLUTION INTRAMUSCULAR; INTRAVENOUS at 12:02

## 2023-07-05 RX ADMIN — PANTOPRAZOLE SODIUM 40 MG: 40 TABLET, DELAYED RELEASE ORAL at 09:59

## 2023-07-05 RX ADMIN — SENNOSIDES AND DOCUSATE SODIUM 1 TABLET: 50; 8.6 TABLET ORAL at 09:44

## 2023-07-05 RX ADMIN — DEXAMETHASONE SODIUM PHOSPHATE 6 MG: 4 INJECTION, SOLUTION INTRAMUSCULAR; INTRAVENOUS at 22:33

## 2023-07-05 RX ADMIN — CEFAZOLIN 2000 MG: 2 INJECTION, POWDER, FOR SOLUTION INTRAMUSCULAR; INTRAVENOUS at 04:54

## 2023-07-05 RX ADMIN — TAMSULOSIN HYDROCHLORIDE 0.4 MG: 0.4 CAPSULE ORAL at 09:44

## 2023-07-05 RX ADMIN — LEVETIRACETAM 500 MG: 500 INJECTION INTRAVENOUS at 22:32

## 2023-07-05 RX ADMIN — LEVETIRACETAM 500 MG: 500 INJECTION INTRAVENOUS at 09:48

## 2023-07-05 RX ADMIN — DEXAMETHASONE SODIUM PHOSPHATE 6 MG: 4 INJECTION, SOLUTION INTRAMUSCULAR; INTRAVENOUS at 04:50

## 2023-07-05 RX ADMIN — SENNOSIDES AND DOCUSATE SODIUM 1 TABLET: 50; 8.6 TABLET ORAL at 21:14

## 2023-07-05 RX ADMIN — SODIUM CHLORIDE, PRESERVATIVE FREE 10 ML: 5 INJECTION INTRAVENOUS at 21:14

## 2023-07-05 RX ADMIN — DEXAMETHASONE SODIUM PHOSPHATE 6 MG: 4 INJECTION, SOLUTION INTRAMUSCULAR; INTRAVENOUS at 16:56

## 2023-07-05 RX ADMIN — POLYETHYLENE GLYCOL 3350 17 G: 17 POWDER, FOR SOLUTION ORAL at 10:00

## 2023-07-05 RX ADMIN — Medication 400 MG: at 09:44

## 2023-07-05 ASSESSMENT — PAIN SCALES - GENERAL
PAINLEVEL_OUTOF10: 0
PAINLEVEL_OUTOF10: 0

## 2023-07-06 ENCOUNTER — APPOINTMENT (OUTPATIENT)
Dept: CT IMAGING | Age: 68
DRG: 820 | End: 2023-07-06
Attending: STUDENT IN AN ORGANIZED HEALTH CARE EDUCATION/TRAINING PROGRAM
Payer: MEDICARE

## 2023-07-06 LAB
ALBUMIN SERPL-MCNC: 3.8 G/DL (ref 3.4–5)
ANION GAP SERPL CALCULATED.3IONS-SCNC: 10 MMOL/L (ref 3–16)
BASOPHILS # BLD: 0 K/UL (ref 0–0.2)
BASOPHILS NFR BLD: 0.1 %
BUN SERPL-MCNC: 19 MG/DL (ref 7–20)
CALCIUM SERPL-MCNC: 9.1 MG/DL (ref 8.3–10.6)
CHLORIDE SERPL-SCNC: 106 MMOL/L (ref 99–110)
CO2 SERPL-SCNC: 27 MMOL/L (ref 21–32)
CREAT SERPL-MCNC: 0.9 MG/DL (ref 0.8–1.3)
DEPRECATED RDW RBC AUTO: 13.3 % (ref 12.4–15.4)
EOSINOPHIL # BLD: 0 K/UL (ref 0–0.6)
EOSINOPHIL NFR BLD: 0 %
GFR SERPLBLD CREATININE-BSD FMLA CKD-EPI: >60 ML/MIN/{1.73_M2}
GLUCOSE BLD-MCNC: 124 MG/DL (ref 70–99)
GLUCOSE BLD-MCNC: 137 MG/DL (ref 70–99)
GLUCOSE BLD-MCNC: 175 MG/DL (ref 70–99)
GLUCOSE BLD-MCNC: >600 MG/DL (ref 70–99)
GLUCOSE SERPL-MCNC: 135 MG/DL (ref 70–99)
HCT VFR BLD AUTO: 39 % (ref 40.5–52.5)
HGB BLD-MCNC: 13.2 G/DL (ref 13.5–17.5)
LYMPHOCYTES # BLD: 0.3 K/UL (ref 1–5.1)
LYMPHOCYTES NFR BLD: 1.7 %
MCH RBC QN AUTO: 31.9 PG (ref 26–34)
MCHC RBC AUTO-ENTMCNC: 33.9 G/DL (ref 31–36)
MCV RBC AUTO: 94.1 FL (ref 80–100)
MONOCYTES # BLD: 0.4 K/UL (ref 0–1.3)
MONOCYTES NFR BLD: 2.4 %
NEUTROPHILS # BLD: 15.2 K/UL (ref 1.7–7.7)
NEUTROPHILS NFR BLD: 95.8 %
PERFORMED ON: ABNORMAL
PH UR STRIP.AUTO: 8 [PH] (ref 5–8)
PH UR STRIP.AUTO: 8 [PH] (ref 5–8)
PHOSPHATE SERPL-MCNC: 3.2 MG/DL (ref 2.5–4.9)
PLATELET # BLD AUTO: 225 K/UL (ref 135–450)
PMV BLD AUTO: 9.8 FL (ref 5–10.5)
POTASSIUM SERPL-SCNC: 3.9 MMOL/L (ref 3.5–5.1)
RBC # BLD AUTO: 4.15 M/UL (ref 4.2–5.9)
SODIUM SERPL-SCNC: 143 MMOL/L (ref 136–145)
URATE SERPL-MCNC: 2.4 MG/DL (ref 3.5–7.2)
WBC # BLD AUTO: 15.8 K/UL (ref 4–11)

## 2023-07-06 PROCEDURE — 2580000003 HC RX 258: Performed by: STUDENT IN AN ORGANIZED HEALTH CARE EDUCATION/TRAINING PROGRAM

## 2023-07-06 PROCEDURE — 6360000002 HC RX W HCPCS: Performed by: NURSE PRACTITIONER

## 2023-07-06 PROCEDURE — 80069 RENAL FUNCTION PANEL: CPT

## 2023-07-06 PROCEDURE — 84550 ASSAY OF BLOOD/URIC ACID: CPT

## 2023-07-06 PROCEDURE — 99024 POSTOP FOLLOW-UP VISIT: CPT | Performed by: NURSE PRACTITIONER

## 2023-07-06 PROCEDURE — 96415 CHEMO IV INFUSION ADDL HR: CPT

## 2023-07-06 PROCEDURE — 6370000000 HC RX 637 (ALT 250 FOR IP): Performed by: STUDENT IN AN ORGANIZED HEALTH CARE EDUCATION/TRAINING PROGRAM

## 2023-07-06 PROCEDURE — 83986 ASSAY PH BODY FLUID NOS: CPT

## 2023-07-06 PROCEDURE — 6360000002 HC RX W HCPCS: Performed by: STUDENT IN AN ORGANIZED HEALTH CARE EDUCATION/TRAINING PROGRAM

## 2023-07-06 PROCEDURE — 6370000000 HC RX 637 (ALT 250 FOR IP): Performed by: NEUROLOGICAL SURGERY

## 2023-07-06 PROCEDURE — 85025 COMPLETE CBC W/AUTO DIFF WBC: CPT

## 2023-07-06 PROCEDURE — APPNB45 APP NON BILLABLE 31-45 MINUTES

## 2023-07-06 PROCEDURE — 97530 THERAPEUTIC ACTIVITIES: CPT

## 2023-07-06 PROCEDURE — 2580000003 HC RX 258: Performed by: NEUROLOGICAL SURGERY

## 2023-07-06 PROCEDURE — 96417 CHEMO IV INFUS EACH ADDL SEQ: CPT

## 2023-07-06 PROCEDURE — 70450 CT HEAD/BRAIN W/O DYE: CPT

## 2023-07-06 PROCEDURE — 36591 DRAW BLOOD OFF VENOUS DEVICE: CPT

## 2023-07-06 PROCEDURE — 2500000003 HC RX 250 WO HCPCS: Performed by: STUDENT IN AN ORGANIZED HEALTH CARE EDUCATION/TRAINING PROGRAM

## 2023-07-06 PROCEDURE — 93005 ELECTROCARDIOGRAM TRACING: CPT | Performed by: NURSE PRACTITIONER

## 2023-07-06 PROCEDURE — 6360000002 HC RX W HCPCS

## 2023-07-06 PROCEDURE — APPNB15 APP NON BILLABLE TIME 0-15 MINS: Performed by: NURSE PRACTITIONER

## 2023-07-06 PROCEDURE — 2060000000 HC ICU INTERMEDIATE R&B

## 2023-07-06 RX ORDER — HYDRALAZINE HYDROCHLORIDE 20 MG/ML
10 INJECTION INTRAMUSCULAR; INTRAVENOUS EVERY 6 HOURS PRN
Status: DISCONTINUED | OUTPATIENT
Start: 2023-07-06 | End: 2023-07-11 | Stop reason: HOSPADM

## 2023-07-06 RX ORDER — 0.9 % SODIUM CHLORIDE 0.9 %
500 INTRAVENOUS SOLUTION INTRAVENOUS ONCE
Status: DISCONTINUED | OUTPATIENT
Start: 2023-07-06 | End: 2023-07-10

## 2023-07-06 RX ORDER — FUROSEMIDE 10 MG/ML
40 INJECTION INTRAMUSCULAR; INTRAVENOUS PRN
Status: DISCONTINUED | OUTPATIENT
Start: 2023-07-07 | End: 2023-07-07

## 2023-07-06 RX ORDER — DEXAMETHASONE 4 MG/1
20 TABLET ORAL ONCE
Status: COMPLETED | OUTPATIENT
Start: 2023-07-06 | End: 2023-07-06

## 2023-07-06 RX ORDER — LEUCOVORIN CALCIUM 50 MG/5ML
25 INJECTION, POWDER, LYOPHILIZED, FOR SOLUTION INTRAMUSCULAR; INTRAVENOUS EVERY 6 HOURS
Status: DISCONTINUED | OUTPATIENT
Start: 2023-07-08 | End: 2023-07-11

## 2023-07-06 RX ORDER — ONDANSETRON HYDROCHLORIDE 8 MG/1
24 TABLET, FILM COATED ORAL ONCE
Status: COMPLETED | OUTPATIENT
Start: 2023-07-06 | End: 2023-07-06

## 2023-07-06 RX ORDER — PROCHLORPERAZINE EDISYLATE 5 MG/ML
10 INJECTION INTRAMUSCULAR; INTRAVENOUS EVERY 4 HOURS PRN
Status: DISCONTINUED | OUTPATIENT
Start: 2023-07-06 | End: 2023-07-11 | Stop reason: HOSPADM

## 2023-07-06 RX ORDER — LORAZEPAM 0.5 MG/1
0.5 TABLET ORAL EVERY 4 HOURS PRN
Status: DISCONTINUED | OUTPATIENT
Start: 2023-07-06 | End: 2023-07-11 | Stop reason: HOSPADM

## 2023-07-06 RX ORDER — LEUCOVORIN CALCIUM 25 MG/1
25 TABLET ORAL EVERY 6 HOURS
Status: DISCONTINUED | OUTPATIENT
Start: 2023-07-08 | End: 2023-07-11

## 2023-07-06 RX ORDER — LEUCOVORIN CALCIUM 50 MG/5ML
150 INJECTION, POWDER, LYOPHILIZED, FOR SOLUTION INTRAMUSCULAR; INTRAVENOUS PRN
Status: DISCONTINUED | OUTPATIENT
Start: 2023-07-07 | End: 2023-07-11

## 2023-07-06 RX ORDER — LORAZEPAM 2 MG/ML
0.5 INJECTION INTRAMUSCULAR EVERY 4 HOURS PRN
Status: DISCONTINUED | OUTPATIENT
Start: 2023-07-06 | End: 2023-07-11 | Stop reason: HOSPADM

## 2023-07-06 RX ORDER — FUROSEMIDE 10 MG/ML
20 INJECTION INTRAMUSCULAR; INTRAVENOUS PRN
Status: ACTIVE | OUTPATIENT
Start: 2023-07-06 | End: 2023-07-07

## 2023-07-06 RX ORDER — DEXAMETHASONE SODIUM PHOSPHATE 4 MG/ML
4 INJECTION, SOLUTION INTRA-ARTICULAR; INTRALESIONAL; INTRAMUSCULAR; INTRAVENOUS; SOFT TISSUE EVERY 8 HOURS
Status: DISCONTINUED | OUTPATIENT
Start: 2023-07-06 | End: 2023-07-07

## 2023-07-06 RX ORDER — PROCHLORPERAZINE MALEATE 10 MG
10 TABLET ORAL EVERY 4 HOURS PRN
Status: DISCONTINUED | OUTPATIENT
Start: 2023-07-06 | End: 2023-07-11 | Stop reason: HOSPADM

## 2023-07-06 RX ADMIN — TAMSULOSIN HYDROCHLORIDE 0.4 MG: 0.4 CAPSULE ORAL at 08:56

## 2023-07-06 RX ADMIN — SODIUM BICARBONATE: 84 INJECTION, SOLUTION INTRAVENOUS at 11:13

## 2023-07-06 RX ADMIN — VINCRISTINE SULFATE 2.6 MG: 1 INJECTION, SOLUTION INTRAVENOUS at 21:12

## 2023-07-06 RX ADMIN — METHOTREXATE: 25 INJECTION, SOLUTION INTRAMUSCULAR; INTRATHECAL; INTRAVENOUS; SUBCUTANEOUS at 22:41

## 2023-07-06 RX ADMIN — LEVETIRACETAM 500 MG: 500 TABLET, FILM COATED ORAL at 08:56

## 2023-07-06 RX ADMIN — SODIUM CHLORIDE, PRESERVATIVE FREE 10 ML: 5 INJECTION INTRAVENOUS at 08:56

## 2023-07-06 RX ADMIN — ONDANSETRON HYDROCHLORIDE 24 MG: 8 TABLET, FILM COATED ORAL at 20:09

## 2023-07-06 RX ADMIN — HYDRALAZINE HYDROCHLORIDE 10 MG: 20 INJECTION INTRAMUSCULAR; INTRAVENOUS at 23:50

## 2023-07-06 RX ADMIN — SENNOSIDES AND DOCUSATE SODIUM 1 TABLET: 50; 8.6 TABLET ORAL at 21:08

## 2023-07-06 RX ADMIN — POLYETHYLENE GLYCOL 3350 17 G: 17 POWDER, FOR SOLUTION ORAL at 08:56

## 2023-07-06 RX ADMIN — DEXAMETHASONE SODIUM PHOSPHATE 4 MG: 4 INJECTION, SOLUTION INTRAMUSCULAR; INTRAVENOUS at 12:40

## 2023-07-06 RX ADMIN — LEVETIRACETAM 500 MG: 500 TABLET, FILM COATED ORAL at 21:08

## 2023-07-06 RX ADMIN — SENNOSIDES AND DOCUSATE SODIUM 1 TABLET: 50; 8.6 TABLET ORAL at 08:56

## 2023-07-06 RX ADMIN — DEXAMETHASONE SODIUM PHOSPHATE 6 MG: 4 INJECTION, SOLUTION INTRAMUSCULAR; INTRAVENOUS at 05:20

## 2023-07-06 RX ADMIN — DEXAMETHASONE 20 MG: 4 TABLET ORAL at 20:09

## 2023-07-06 RX ADMIN — PANTOPRAZOLE SODIUM 40 MG: 40 TABLET, DELAYED RELEASE ORAL at 06:36

## 2023-07-06 RX ADMIN — SODIUM CHLORIDE, PRESERVATIVE FREE 30 ML: 5 INJECTION INTRAVENOUS at 21:00

## 2023-07-06 RX ADMIN — SODIUM BICARBONATE: 84 INJECTION, SOLUTION INTRAVENOUS at 15:36

## 2023-07-06 RX ADMIN — Medication 400 MG: at 08:56

## 2023-07-07 ENCOUNTER — APPOINTMENT (OUTPATIENT)
Dept: GENERAL RADIOLOGY | Age: 68
DRG: 820 | End: 2023-07-07
Attending: STUDENT IN AN ORGANIZED HEALTH CARE EDUCATION/TRAINING PROGRAM
Payer: MEDICARE

## 2023-07-07 LAB
ALBUMIN SERPL-MCNC: 3.6 G/DL (ref 3.4–5)
ALBUMIN SERPL-MCNC: 3.7 G/DL (ref 3.4–5)
ALP SERPL-CCNC: 59 U/L (ref 40–129)
ALT SERPL-CCNC: 50 U/L (ref 10–40)
ANION GAP SERPL CALCULATED.3IONS-SCNC: 9 MMOL/L (ref 3–16)
AST SERPL-CCNC: 61 U/L (ref 15–37)
BASOPHILS # BLD: 0 K/UL (ref 0–0.2)
BASOPHILS NFR BLD: 0 %
BILIRUB DIRECT SERPL-MCNC: <0.2 MG/DL (ref 0–0.3)
BILIRUB INDIRECT SERPL-MCNC: ABNORMAL MG/DL (ref 0–1)
BILIRUB SERPL-MCNC: 0.5 MG/DL (ref 0–1)
BUN SERPL-MCNC: 16 MG/DL (ref 7–20)
CALCIUM SERPL-MCNC: 8.6 MG/DL (ref 8.3–10.6)
CHLORIDE SERPL-SCNC: 105 MMOL/L (ref 99–110)
CO2 SERPL-SCNC: 29 MMOL/L (ref 21–32)
CREAT SERPL-MCNC: 0.9 MG/DL (ref 0.8–1.3)
DEPRECATED RDW RBC AUTO: 13 % (ref 12.4–15.4)
EOSINOPHIL # BLD: 0 K/UL (ref 0–0.6)
EOSINOPHIL NFR BLD: 0 %
GFR SERPLBLD CREATININE-BSD FMLA CKD-EPI: >60 ML/MIN/{1.73_M2}
GLUCOSE BLD-MCNC: 107 MG/DL (ref 70–99)
GLUCOSE BLD-MCNC: 120 MG/DL (ref 70–99)
GLUCOSE BLD-MCNC: 124 MG/DL (ref 70–99)
GLUCOSE BLD-MCNC: 134 MG/DL (ref 70–99)
GLUCOSE SERPL-MCNC: 183 MG/DL (ref 70–99)
HCT VFR BLD AUTO: 40.4 % (ref 40.5–52.5)
HGB BLD-MCNC: 14 G/DL (ref 13.5–17.5)
LDH SERPL L TO P-CCNC: 199 U/L (ref 100–190)
LYMPHOCYTES # BLD: 0.2 K/UL (ref 1–5.1)
LYMPHOCYTES NFR BLD: 1.9 %
MAGNESIUM SERPL-MCNC: 2 MG/DL (ref 1.8–2.4)
MCH RBC QN AUTO: 32.5 PG (ref 26–34)
MCHC RBC AUTO-ENTMCNC: 34.7 G/DL (ref 31–36)
MCV RBC AUTO: 93.7 FL (ref 80–100)
MONOCYTES # BLD: 0.4 K/UL (ref 0–1.3)
MONOCYTES NFR BLD: 3.4 %
NEUTROPHILS # BLD: 10.7 K/UL (ref 1.7–7.7)
NEUTROPHILS NFR BLD: 94.7 %
PERFORMED ON: ABNORMAL
PH UR STRIP.AUTO: 7.5 [PH] (ref 5–8)
PH UR STRIP.AUTO: 7.5 [PH] (ref 5–8)
PH UR STRIP.AUTO: 8 [PH] (ref 5–8)
PH UR STRIP.AUTO: 8.5 [PH] (ref 5–8)
PHOSPHATE SERPL-MCNC: 2.8 MG/DL (ref 2.5–4.9)
PLATELET # BLD AUTO: 208 K/UL (ref 135–450)
PMV BLD AUTO: 9.7 FL (ref 5–10.5)
POTASSIUM SERPL-SCNC: 3.3 MMOL/L (ref 3.5–5.1)
PROT SERPL-MCNC: 5.9 G/DL (ref 6.4–8.2)
RBC # BLD AUTO: 4.31 M/UL (ref 4.2–5.9)
SODIUM SERPL-SCNC: 143 MMOL/L (ref 136–145)
URATE SERPL-MCNC: 2.4 MG/DL (ref 3.5–7.2)
WBC # BLD AUTO: 11.3 K/UL (ref 4–11)

## 2023-07-07 PROCEDURE — 2580000003 HC RX 258: Performed by: STUDENT IN AN ORGANIZED HEALTH CARE EDUCATION/TRAINING PROGRAM

## 2023-07-07 PROCEDURE — 93005 ELECTROCARDIOGRAM TRACING: CPT | Performed by: NURSE PRACTITIONER

## 2023-07-07 PROCEDURE — 6370000000 HC RX 637 (ALT 250 FOR IP): Performed by: NEUROLOGICAL SURGERY

## 2023-07-07 PROCEDURE — 83986 ASSAY PH BODY FLUID NOS: CPT

## 2023-07-07 PROCEDURE — 83735 ASSAY OF MAGNESIUM: CPT

## 2023-07-07 PROCEDURE — 36591 DRAW BLOOD OFF VENOUS DEVICE: CPT

## 2023-07-07 PROCEDURE — 6360000002 HC RX W HCPCS: Performed by: NURSE PRACTITIONER

## 2023-07-07 PROCEDURE — 2580000003 HC RX 258: Performed by: NEUROLOGICAL SURGERY

## 2023-07-07 PROCEDURE — 83615 LACTATE (LD) (LDH) ENZYME: CPT

## 2023-07-07 PROCEDURE — 6370000000 HC RX 637 (ALT 250 FOR IP): Performed by: NURSE PRACTITIONER

## 2023-07-07 PROCEDURE — 6360000002 HC RX W HCPCS: Performed by: STUDENT IN AN ORGANIZED HEALTH CARE EDUCATION/TRAINING PROGRAM

## 2023-07-07 PROCEDURE — 99024 POSTOP FOLLOW-UP VISIT: CPT | Performed by: NURSE PRACTITIONER

## 2023-07-07 PROCEDURE — 2060000000 HC ICU INTERMEDIATE R&B

## 2023-07-07 PROCEDURE — 85025 COMPLETE CBC W/AUTO DIFF WBC: CPT

## 2023-07-07 PROCEDURE — 6360000002 HC RX W HCPCS: Performed by: NEUROLOGICAL SURGERY

## 2023-07-07 PROCEDURE — 84550 ASSAY OF BLOOD/URIC ACID: CPT

## 2023-07-07 PROCEDURE — APPNB30 APP NON BILLABLE TIME 0-30 MINS: Performed by: NURSE PRACTITIONER

## 2023-07-07 PROCEDURE — 80069 RENAL FUNCTION PANEL: CPT

## 2023-07-07 PROCEDURE — 2500000003 HC RX 250 WO HCPCS: Performed by: STUDENT IN AN ORGANIZED HEALTH CARE EDUCATION/TRAINING PROGRAM

## 2023-07-07 PROCEDURE — 80076 HEPATIC FUNCTION PANEL: CPT

## 2023-07-07 PROCEDURE — 74018 RADEX ABDOMEN 1 VIEW: CPT

## 2023-07-07 RX ORDER — FUROSEMIDE 10 MG/ML
40 INJECTION INTRAMUSCULAR; INTRAVENOUS EVERY 12 HOURS
Status: DISCONTINUED | OUTPATIENT
Start: 2023-07-07 | End: 2023-07-11

## 2023-07-07 RX ORDER — OXYCODONE HYDROCHLORIDE 5 MG/1
5 TABLET ORAL EVERY 4 HOURS PRN
Status: DISCONTINUED | OUTPATIENT
Start: 2023-07-07 | End: 2023-07-07

## 2023-07-07 RX ORDER — FLUORIDE TOOTHPASTE
15 TOOTHPASTE DENTAL 3 TIMES DAILY PRN
Status: DISCONTINUED | OUTPATIENT
Start: 2023-07-07 | End: 2023-07-11 | Stop reason: HOSPADM

## 2023-07-07 RX ORDER — DEXAMETHASONE SODIUM PHOSPHATE 4 MG/ML
4 INJECTION, SOLUTION INTRA-ARTICULAR; INTRALESIONAL; INTRAMUSCULAR; INTRAVENOUS; SOFT TISSUE EVERY 12 HOURS
Status: DISCONTINUED | OUTPATIENT
Start: 2023-07-07 | End: 2023-07-10

## 2023-07-07 RX ORDER — MORPHINE SULFATE 15 MG/1
7.5 TABLET ORAL EVERY 4 HOURS PRN
Status: DISCONTINUED | OUTPATIENT
Start: 2023-07-07 | End: 2023-07-11 | Stop reason: HOSPADM

## 2023-07-07 RX ORDER — ACETAMINOPHEN 325 MG/1
650 TABLET ORAL EVERY 4 HOURS PRN
Status: DISCONTINUED | OUTPATIENT
Start: 2023-07-07 | End: 2023-07-11 | Stop reason: HOSPADM

## 2023-07-07 RX ORDER — DICYCLOMINE HCL 20 MG
20 TABLET ORAL
Status: DISCONTINUED | OUTPATIENT
Start: 2023-07-07 | End: 2023-07-11 | Stop reason: HOSPADM

## 2023-07-07 RX ADMIN — TAMSULOSIN HYDROCHLORIDE 0.4 MG: 0.4 CAPSULE ORAL at 10:03

## 2023-07-07 RX ADMIN — PROCHLORPERAZINE EDISYLATE 10 MG: 5 INJECTION, SOLUTION INTRAMUSCULAR; INTRAVENOUS at 10:08

## 2023-07-07 RX ADMIN — SODIUM BICARBONATE: 84 INJECTION, SOLUTION INTRAVENOUS at 13:38

## 2023-07-07 RX ADMIN — DICYCLOMINE HYDROCHLORIDE 20 MG: 20 TABLET ORAL at 20:57

## 2023-07-07 RX ADMIN — LEVETIRACETAM 500 MG: 500 TABLET, FILM COATED ORAL at 20:57

## 2023-07-07 RX ADMIN — Medication 400 MG: at 10:03

## 2023-07-07 RX ADMIN — MORPHINE SULFATE 7.5 MG: 15 TABLET ORAL at 07:38

## 2023-07-07 RX ADMIN — SODIUM BICARBONATE: 84 INJECTION, SOLUTION INTRAVENOUS at 00:10

## 2023-07-07 RX ADMIN — DICYCLOMINE HYDROCHLORIDE 20 MG: 20 TABLET ORAL at 16:22

## 2023-07-07 RX ADMIN — POTASSIUM CHLORIDE 20 MEQ: 400 INJECTION, SOLUTION INTRAVENOUS at 12:11

## 2023-07-07 RX ADMIN — POTASSIUM CHLORIDE 20 MEQ: 400 INJECTION, SOLUTION INTRAVENOUS at 06:03

## 2023-07-07 RX ADMIN — SODIUM CHLORIDE, PRESERVATIVE FREE 10 ML: 5 INJECTION INTRAVENOUS at 20:57

## 2023-07-07 RX ADMIN — SODIUM CHLORIDE, PRESERVATIVE FREE 10 ML: 5 INJECTION INTRAVENOUS at 10:03

## 2023-07-07 RX ADMIN — DEXAMETHASONE SODIUM PHOSPHATE 4 MG: 4 INJECTION, SOLUTION INTRAMUSCULAR; INTRAVENOUS at 05:57

## 2023-07-07 RX ADMIN — POTASSIUM CHLORIDE 20 MEQ: 400 INJECTION, SOLUTION INTRAVENOUS at 07:33

## 2023-07-07 RX ADMIN — ACETAMINOPHEN 650 MG: 325 TABLET ORAL at 06:58

## 2023-07-07 RX ADMIN — MORPHINE SULFATE 7.5 MG: 15 TABLET ORAL at 16:39

## 2023-07-07 RX ADMIN — DICYCLOMINE HYDROCHLORIDE 20 MG: 20 TABLET ORAL at 12:08

## 2023-07-07 RX ADMIN — POTASSIUM CHLORIDE 20 MEQ: 400 INJECTION, SOLUTION INTRAVENOUS at 10:15

## 2023-07-07 RX ADMIN — SODIUM BICARBONATE: 84 INJECTION, SOLUTION INTRAVENOUS at 21:49

## 2023-07-07 RX ADMIN — DEXAMETHASONE SODIUM PHOSPHATE 4 MG: 4 INJECTION, SOLUTION INTRAMUSCULAR; INTRAVENOUS at 18:03

## 2023-07-07 RX ADMIN — PANTOPRAZOLE SODIUM 40 MG: 40 TABLET, DELAYED RELEASE ORAL at 05:57

## 2023-07-07 RX ADMIN — LEVETIRACETAM 500 MG: 500 TABLET, FILM COATED ORAL at 10:03

## 2023-07-07 ASSESSMENT — PAIN DESCRIPTION - DESCRIPTORS
DESCRIPTORS: CRAMPING
DESCRIPTORS: CRUSHING
DESCRIPTORS: ACHING
DESCRIPTORS: CRAMPING;ACHING
DESCRIPTORS: CRAMPING

## 2023-07-07 ASSESSMENT — PAIN SCALES - GENERAL
PAINLEVEL_OUTOF10: 9
PAINLEVEL_OUTOF10: 10

## 2023-07-07 ASSESSMENT — PAIN DESCRIPTION - LOCATION
LOCATION: HEAD
LOCATION: HEAD
LOCATION: ABDOMEN

## 2023-07-07 ASSESSMENT — PAIN - FUNCTIONAL ASSESSMENT
PAIN_FUNCTIONAL_ASSESSMENT: ACTIVITIES ARE NOT PREVENTED
PAIN_FUNCTIONAL_ASSESSMENT: PREVENTS OR INTERFERES SOME ACTIVE ACTIVITIES AND ADLS

## 2023-07-07 ASSESSMENT — PAIN DESCRIPTION - ORIENTATION
ORIENTATION: MID

## 2023-07-07 ASSESSMENT — PAIN SCALES - WONG BAKER
WONGBAKER_NUMERICALRESPONSE: 8
WONGBAKER_NUMERICALRESPONSE: 8
WONGBAKER_NUMERICALRESPONSE: 6
WONGBAKER_NUMERICALRESPONSE: 8

## 2023-07-08 LAB
ALBUMIN SERPL-MCNC: 3.2 G/DL (ref 3.4–5)
ALBUMIN SERPL-MCNC: 3.2 G/DL (ref 3.4–5)
ALP SERPL-CCNC: 53 U/L (ref 40–129)
ALT SERPL-CCNC: 44 U/L (ref 10–40)
ANION GAP SERPL CALCULATED.3IONS-SCNC: 9 MMOL/L (ref 3–16)
AST SERPL-CCNC: 25 U/L (ref 15–37)
BASOPHILS # BLD: 0 K/UL (ref 0–0.2)
BASOPHILS NFR BLD: 0.2 %
BILIRUB DIRECT SERPL-MCNC: <0.2 MG/DL (ref 0–0.3)
BILIRUB INDIRECT SERPL-MCNC: ABNORMAL MG/DL (ref 0–1)
BILIRUB SERPL-MCNC: 0.4 MG/DL (ref 0–1)
BUN SERPL-MCNC: 14 MG/DL (ref 7–20)
CALCIUM SERPL-MCNC: 8.4 MG/DL (ref 8.3–10.6)
CHLORIDE SERPL-SCNC: 103 MMOL/L (ref 99–110)
CO2 SERPL-SCNC: 26 MMOL/L (ref 21–32)
CREAT SERPL-MCNC: 0.9 MG/DL (ref 0.8–1.3)
DEPRECATED RDW RBC AUTO: 12.8 % (ref 12.4–15.4)
EOSINOPHIL # BLD: 0 K/UL (ref 0–0.6)
EOSINOPHIL NFR BLD: 0 %
GFR SERPLBLD CREATININE-BSD FMLA CKD-EPI: >60 ML/MIN/{1.73_M2}
GLUCOSE BLD-MCNC: 106 MG/DL (ref 70–99)
GLUCOSE BLD-MCNC: 111 MG/DL (ref 70–99)
GLUCOSE BLD-MCNC: 113 MG/DL (ref 70–99)
GLUCOSE BLD-MCNC: 131 MG/DL (ref 70–99)
GLUCOSE SERPL-MCNC: 139 MG/DL (ref 70–99)
HCT VFR BLD AUTO: 39.8 % (ref 40.5–52.5)
HGB BLD-MCNC: 13.7 G/DL (ref 13.5–17.5)
LYMPHOCYTES # BLD: 0.3 K/UL (ref 1–5.1)
LYMPHOCYTES NFR BLD: 2.6 %
MCH RBC QN AUTO: 31.7 PG (ref 26–34)
MCHC RBC AUTO-ENTMCNC: 34.3 G/DL (ref 31–36)
MCV RBC AUTO: 92.4 FL (ref 80–100)
METHOTREXATE LEVEL: 1.1 UMOL/L
MONOCYTES # BLD: 0.4 K/UL (ref 0–1.3)
MONOCYTES NFR BLD: 3.2 %
NEUTROPHILS # BLD: 11.1 K/UL (ref 1.7–7.7)
NEUTROPHILS NFR BLD: 94 %
PERFORMED ON: ABNORMAL
PH UR STRIP.AUTO: 7 [PH] (ref 5–8)
PH UR STRIP.AUTO: 7.5 [PH] (ref 5–8)
PHOSPHATE SERPL-MCNC: 3.2 MG/DL (ref 2.5–4.9)
PLATELET # BLD AUTO: 209 K/UL (ref 135–450)
PMV BLD AUTO: 9.4 FL (ref 5–10.5)
POTASSIUM SERPL-SCNC: 3.7 MMOL/L (ref 3.5–5.1)
PROT SERPL-MCNC: 5.2 G/DL (ref 6.4–8.2)
RBC # BLD AUTO: 4.31 M/UL (ref 4.2–5.9)
SODIUM SERPL-SCNC: 138 MMOL/L (ref 136–145)
URATE SERPL-MCNC: 2.4 MG/DL (ref 3.5–7.2)
WBC # BLD AUTO: 11.8 K/UL (ref 4–11)

## 2023-07-08 PROCEDURE — 83520 IMMUNOASSAY QUANT NOS NONAB: CPT

## 2023-07-08 PROCEDURE — 2580000003 HC RX 258: Performed by: NURSE PRACTITIONER

## 2023-07-08 PROCEDURE — 6370000000 HC RX 637 (ALT 250 FOR IP): Performed by: NEUROLOGICAL SURGERY

## 2023-07-08 PROCEDURE — 6370000000 HC RX 637 (ALT 250 FOR IP): Performed by: STUDENT IN AN ORGANIZED HEALTH CARE EDUCATION/TRAINING PROGRAM

## 2023-07-08 PROCEDURE — 85025 COMPLETE CBC W/AUTO DIFF WBC: CPT

## 2023-07-08 PROCEDURE — 6370000000 HC RX 637 (ALT 250 FOR IP): Performed by: NURSE PRACTITIONER

## 2023-07-08 PROCEDURE — 51798 US URINE CAPACITY MEASURE: CPT

## 2023-07-08 PROCEDURE — 80069 RENAL FUNCTION PANEL: CPT

## 2023-07-08 PROCEDURE — 6360000002 HC RX W HCPCS: Performed by: NURSE PRACTITIONER

## 2023-07-08 PROCEDURE — 2500000003 HC RX 250 WO HCPCS: Performed by: STUDENT IN AN ORGANIZED HEALTH CARE EDUCATION/TRAINING PROGRAM

## 2023-07-08 PROCEDURE — 2060000000 HC ICU INTERMEDIATE R&B

## 2023-07-08 PROCEDURE — 84550 ASSAY OF BLOOD/URIC ACID: CPT

## 2023-07-08 PROCEDURE — 2580000003 HC RX 258: Performed by: NEUROLOGICAL SURGERY

## 2023-07-08 PROCEDURE — 80076 HEPATIC FUNCTION PANEL: CPT

## 2023-07-08 PROCEDURE — 2580000003 HC RX 258: Performed by: STUDENT IN AN ORGANIZED HEALTH CARE EDUCATION/TRAINING PROGRAM

## 2023-07-08 PROCEDURE — 83986 ASSAY PH BODY FLUID NOS: CPT

## 2023-07-08 PROCEDURE — 36591 DRAW BLOOD OFF VENOUS DEVICE: CPT

## 2023-07-08 RX ORDER — 0.9 % SODIUM CHLORIDE 0.9 %
1000 INTRAVENOUS SOLUTION INTRAVENOUS ONCE
Status: COMPLETED | OUTPATIENT
Start: 2023-07-08 | End: 2023-07-08

## 2023-07-08 RX ADMIN — DICYCLOMINE HYDROCHLORIDE 20 MG: 20 TABLET ORAL at 06:30

## 2023-07-08 RX ADMIN — Medication 400 MG: at 10:14

## 2023-07-08 RX ADMIN — SODIUM CHLORIDE, PRESERVATIVE FREE 10 ML: 5 INJECTION INTRAVENOUS at 10:15

## 2023-07-08 RX ADMIN — LEUCOVORIN CALCIUM 25 MG: 25 TABLET ORAL at 07:17

## 2023-07-08 RX ADMIN — SODIUM BICARBONATE: 84 INJECTION, SOLUTION INTRAVENOUS at 04:28

## 2023-07-08 RX ADMIN — LEUCOVORIN CALCIUM 25 MG: 25 TABLET ORAL at 13:20

## 2023-07-08 RX ADMIN — DICYCLOMINE HYDROCHLORIDE 20 MG: 20 TABLET ORAL at 19:22

## 2023-07-08 RX ADMIN — PANTOPRAZOLE SODIUM 40 MG: 40 TABLET, DELAYED RELEASE ORAL at 06:29

## 2023-07-08 RX ADMIN — LEUCOVORIN CALCIUM 25 MG: 25 TABLET ORAL at 19:22

## 2023-07-08 RX ADMIN — DICYCLOMINE HYDROCHLORIDE 20 MG: 20 TABLET ORAL at 10:28

## 2023-07-08 RX ADMIN — DEXAMETHASONE SODIUM PHOSPHATE 4 MG: 4 INJECTION, SOLUTION INTRAMUSCULAR; INTRAVENOUS at 06:30

## 2023-07-08 RX ADMIN — SODIUM BICARBONATE: 84 INJECTION, SOLUTION INTRAVENOUS at 13:20

## 2023-07-08 RX ADMIN — LEVETIRACETAM 500 MG: 500 TABLET, FILM COATED ORAL at 21:20

## 2023-07-08 RX ADMIN — DEXAMETHASONE SODIUM PHOSPHATE 4 MG: 4 INJECTION, SOLUTION INTRAMUSCULAR; INTRAVENOUS at 19:23

## 2023-07-08 RX ADMIN — SODIUM CHLORIDE, PRESERVATIVE FREE 10 ML: 5 INJECTION INTRAVENOUS at 21:20

## 2023-07-08 RX ADMIN — LEUCOVORIN CALCIUM 25 MG: 25 TABLET ORAL at 01:29

## 2023-07-08 RX ADMIN — SODIUM CHLORIDE 1000 ML: 0.9 INJECTION, SOLUTION INTRAVENOUS at 10:32

## 2023-07-08 RX ADMIN — LEVETIRACETAM 500 MG: 500 TABLET, FILM COATED ORAL at 10:14

## 2023-07-08 RX ADMIN — SODIUM BICARBONATE: 84 INJECTION, SOLUTION INTRAVENOUS at 21:11

## 2023-07-08 RX ADMIN — TAMSULOSIN HYDROCHLORIDE 0.4 MG: 0.4 CAPSULE ORAL at 10:13

## 2023-07-08 RX ADMIN — DICYCLOMINE HYDROCHLORIDE 20 MG: 20 TABLET ORAL at 21:20

## 2023-07-08 ASSESSMENT — PAIN SCALES - GENERAL: PAINLEVEL_OUTOF10: 0

## 2023-07-09 LAB
ALBUMIN SERPL-MCNC: 3.3 G/DL (ref 3.4–5)
ALBUMIN SERPL-MCNC: 3.5 G/DL (ref 3.4–5)
ALP SERPL-CCNC: 55 U/L (ref 40–129)
ALT SERPL-CCNC: 40 U/L (ref 10–40)
ANION GAP SERPL CALCULATED.3IONS-SCNC: 11 MMOL/L (ref 3–16)
AST SERPL-CCNC: 23 U/L (ref 15–37)
BASOPHILS # BLD: 0 K/UL (ref 0–0.2)
BASOPHILS NFR BLD: 0.4 %
BILIRUB DIRECT SERPL-MCNC: <0.2 MG/DL (ref 0–0.3)
BILIRUB INDIRECT SERPL-MCNC: ABNORMAL MG/DL (ref 0–1)
BILIRUB SERPL-MCNC: 0.4 MG/DL (ref 0–1)
BUN SERPL-MCNC: 15 MG/DL (ref 7–20)
CALCIUM SERPL-MCNC: 8.9 MG/DL (ref 8.3–10.6)
CHLORIDE SERPL-SCNC: 104 MMOL/L (ref 99–110)
CO2 SERPL-SCNC: 28 MMOL/L (ref 21–32)
CREAT SERPL-MCNC: 0.8 MG/DL (ref 0.8–1.3)
DEPRECATED RDW RBC AUTO: 12.8 % (ref 12.4–15.4)
EKG ATRIAL RATE: 61 BPM
EKG ATRIAL RATE: 65 BPM
EKG DIAGNOSIS: NORMAL
EKG DIAGNOSIS: NORMAL
EKG P AXIS: 40 DEGREES
EKG P AXIS: 68 DEGREES
EKG P-R INTERVAL: 172 MS
EKG P-R INTERVAL: 172 MS
EKG Q-T INTERVAL: 482 MS
EKG Q-T INTERVAL: 496 MS
EKG QRS DURATION: 158 MS
EKG QRS DURATION: 158 MS
EKG QTC CALCULATION (BAZETT): 499 MS
EKG QTC CALCULATION (BAZETT): 501 MS
EKG R AXIS: -80 DEGREES
EKG R AXIS: -85 DEGREES
EKG T AXIS: 98 DEGREES
EKG T AXIS: 98 DEGREES
EKG VENTRICULAR RATE: 61 BPM
EKG VENTRICULAR RATE: 65 BPM
EOSINOPHIL # BLD: 0 K/UL (ref 0–0.6)
EOSINOPHIL NFR BLD: 0 %
GFR SERPLBLD CREATININE-BSD FMLA CKD-EPI: >60 ML/MIN/{1.73_M2}
GLUCOSE BLD-MCNC: 130 MG/DL (ref 70–99)
GLUCOSE SERPL-MCNC: 135 MG/DL (ref 70–99)
HCT VFR BLD AUTO: 40.1 % (ref 40.5–52.5)
HGB BLD-MCNC: 14 G/DL (ref 13.5–17.5)
LYMPHOCYTES # BLD: 0.3 K/UL (ref 1–5.1)
LYMPHOCYTES NFR BLD: 2.8 %
MCH RBC QN AUTO: 32.2 PG (ref 26–34)
MCHC RBC AUTO-ENTMCNC: 34.9 G/DL (ref 31–36)
MCV RBC AUTO: 92.4 FL (ref 80–100)
METHOTREXATE LEVEL: 0.86 UMOL/L
MONOCYTES # BLD: 0.1 K/UL (ref 0–1.3)
MONOCYTES NFR BLD: 1.3 %
NEUTROPHILS # BLD: 9.8 K/UL (ref 1.7–7.7)
NEUTROPHILS NFR BLD: 95.5 %
PERFORMED ON: ABNORMAL
PH UR STRIP.AUTO: 6 [PH] (ref 5–8)
PH UR STRIP.AUTO: 7 [PH] (ref 5–8)
PH UR STRIP.AUTO: 7.5 [PH] (ref 5–8)
PH UR STRIP.AUTO: 8 [PH] (ref 5–8)
PH UR STRIP.AUTO: 8 [PH] (ref 5–8)
PHOSPHATE SERPL-MCNC: 3 MG/DL (ref 2.5–4.9)
PLATELET # BLD AUTO: 207 K/UL (ref 135–450)
PMV BLD AUTO: 9.5 FL (ref 5–10.5)
POTASSIUM SERPL-SCNC: 3.6 MMOL/L (ref 3.5–5.1)
PROT SERPL-MCNC: 5.5 G/DL (ref 6.4–8.2)
RBC # BLD AUTO: 4.35 M/UL (ref 4.2–5.9)
SODIUM SERPL-SCNC: 143 MMOL/L (ref 136–145)
URATE SERPL-MCNC: 2.6 MG/DL (ref 3.5–7.2)
WBC # BLD AUTO: 10.2 K/UL (ref 4–11)

## 2023-07-09 PROCEDURE — 2580000003 HC RX 258: Performed by: STUDENT IN AN ORGANIZED HEALTH CARE EDUCATION/TRAINING PROGRAM

## 2023-07-09 PROCEDURE — 6370000000 HC RX 637 (ALT 250 FOR IP): Performed by: STUDENT IN AN ORGANIZED HEALTH CARE EDUCATION/TRAINING PROGRAM

## 2023-07-09 PROCEDURE — 83986 ASSAY PH BODY FLUID NOS: CPT

## 2023-07-09 PROCEDURE — 6360000002 HC RX W HCPCS: Performed by: STUDENT IN AN ORGANIZED HEALTH CARE EDUCATION/TRAINING PROGRAM

## 2023-07-09 PROCEDURE — 6370000000 HC RX 637 (ALT 250 FOR IP): Performed by: NEUROLOGICAL SURGERY

## 2023-07-09 PROCEDURE — 80069 RENAL FUNCTION PANEL: CPT

## 2023-07-09 PROCEDURE — 6360000002 HC RX W HCPCS: Performed by: NURSE PRACTITIONER

## 2023-07-09 PROCEDURE — 2500000003 HC RX 250 WO HCPCS: Performed by: STUDENT IN AN ORGANIZED HEALTH CARE EDUCATION/TRAINING PROGRAM

## 2023-07-09 PROCEDURE — 93010 ELECTROCARDIOGRAM REPORT: CPT | Performed by: INTERNAL MEDICINE

## 2023-07-09 PROCEDURE — 36591 DRAW BLOOD OFF VENOUS DEVICE: CPT

## 2023-07-09 PROCEDURE — 85025 COMPLETE CBC W/AUTO DIFF WBC: CPT

## 2023-07-09 PROCEDURE — 6360000002 HC RX W HCPCS

## 2023-07-09 PROCEDURE — 6370000000 HC RX 637 (ALT 250 FOR IP): Performed by: NURSE PRACTITIONER

## 2023-07-09 PROCEDURE — 83520 IMMUNOASSAY QUANT NOS NONAB: CPT

## 2023-07-09 PROCEDURE — 2060000000 HC ICU INTERMEDIATE R&B

## 2023-07-09 PROCEDURE — 84550 ASSAY OF BLOOD/URIC ACID: CPT

## 2023-07-09 PROCEDURE — 2580000003 HC RX 258: Performed by: NEUROLOGICAL SURGERY

## 2023-07-09 PROCEDURE — 80076 HEPATIC FUNCTION PANEL: CPT

## 2023-07-09 RX ADMIN — Medication 400 MG: at 08:51

## 2023-07-09 RX ADMIN — LEUCOVORIN CALCIUM 25 MG: 25 TABLET ORAL at 13:43

## 2023-07-09 RX ADMIN — LEUCOVORIN CALCIUM 25 MG: 25 TABLET ORAL at 07:20

## 2023-07-09 RX ADMIN — SODIUM CHLORIDE, PRESERVATIVE FREE 10 ML: 5 INJECTION INTRAVENOUS at 09:00

## 2023-07-09 RX ADMIN — SODIUM BICARBONATE: 84 INJECTION, SOLUTION INTRAVENOUS at 04:15

## 2023-07-09 RX ADMIN — LEUCOVORIN CALCIUM 25 MG: 25 TABLET ORAL at 02:46

## 2023-07-09 RX ADMIN — PANTOPRAZOLE SODIUM 40 MG: 40 TABLET, DELAYED RELEASE ORAL at 05:30

## 2023-07-09 RX ADMIN — TAMSULOSIN HYDROCHLORIDE 0.4 MG: 0.4 CAPSULE ORAL at 08:51

## 2023-07-09 RX ADMIN — DICYCLOMINE HYDROCHLORIDE 20 MG: 20 TABLET ORAL at 16:55

## 2023-07-09 RX ADMIN — DEXAMETHASONE SODIUM PHOSPHATE 4 MG: 4 INJECTION, SOLUTION INTRAMUSCULAR; INTRAVENOUS at 16:56

## 2023-07-09 RX ADMIN — LEUCOVORIN CALCIUM 25 MG: 25 TABLET ORAL at 17:40

## 2023-07-09 RX ADMIN — DEXAMETHASONE SODIUM PHOSPHATE 4 MG: 4 INJECTION, SOLUTION INTRAMUSCULAR; INTRAVENOUS at 05:30

## 2023-07-09 RX ADMIN — LEVETIRACETAM 500 MG: 500 TABLET, FILM COATED ORAL at 08:51

## 2023-07-09 RX ADMIN — SODIUM CHLORIDE, PRESERVATIVE FREE 10 ML: 5 INJECTION INTRAVENOUS at 22:06

## 2023-07-09 RX ADMIN — HYDRALAZINE HYDROCHLORIDE 10 MG: 20 INJECTION INTRAMUSCULAR; INTRAVENOUS at 02:53

## 2023-07-09 RX ADMIN — POLYETHYLENE GLYCOL 3350 17 G: 17 POWDER, FOR SOLUTION ORAL at 13:58

## 2023-07-09 RX ADMIN — SODIUM BICARBONATE: 84 INJECTION, SOLUTION INTRAVENOUS at 12:58

## 2023-07-09 RX ADMIN — DICYCLOMINE HYDROCHLORIDE 20 MG: 20 TABLET ORAL at 05:30

## 2023-07-09 RX ADMIN — LEVETIRACETAM 500 MG: 500 TABLET, FILM COATED ORAL at 22:06

## 2023-07-09 RX ADMIN — DICYCLOMINE HYDROCHLORIDE 20 MG: 20 TABLET ORAL at 11:34

## 2023-07-09 RX ADMIN — DICYCLOMINE HYDROCHLORIDE 20 MG: 20 TABLET ORAL at 22:06

## 2023-07-09 RX ADMIN — FUROSEMIDE 40 MG: 10 INJECTION, SOLUTION INTRAMUSCULAR; INTRAVENOUS at 05:30

## 2023-07-09 RX ADMIN — SODIUM BICARBONATE: 84 INJECTION, SOLUTION INTRAVENOUS at 20:50

## 2023-07-09 RX ADMIN — SODIUM BICARBONATE 50 MEQ: 84 INJECTION, SOLUTION INTRAVENOUS at 13:43

## 2023-07-09 ASSESSMENT — PAIN SCALES - GENERAL
PAINLEVEL_OUTOF10: 0

## 2023-07-10 LAB
ALBUMIN SERPL-MCNC: 3.2 G/DL (ref 3.4–5)
ALBUMIN SERPL-MCNC: 3.2 G/DL (ref 3.4–5)
ALP SERPL-CCNC: 52 U/L (ref 40–129)
ALT SERPL-CCNC: 49 U/L (ref 10–40)
ANION GAP SERPL CALCULATED.3IONS-SCNC: 11 MMOL/L (ref 3–16)
AST SERPL-CCNC: 32 U/L (ref 15–37)
BASOPHILS # BLD: 0 K/UL (ref 0–0.2)
BASOPHILS NFR BLD: 0 %
BILIRUB DIRECT SERPL-MCNC: <0.2 MG/DL (ref 0–0.3)
BILIRUB INDIRECT SERPL-MCNC: ABNORMAL MG/DL (ref 0–1)
BILIRUB SERPL-MCNC: 0.7 MG/DL (ref 0–1)
BILIRUB UR QL STRIP.AUTO: NEGATIVE
BUN SERPL-MCNC: 21 MG/DL (ref 7–20)
CALCIUM SERPL-MCNC: 8.7 MG/DL (ref 8.3–10.6)
CHLORIDE SERPL-SCNC: 101 MMOL/L (ref 99–110)
CLARITY UR: CLEAR
CO2 SERPL-SCNC: 29 MMOL/L (ref 21–32)
COLOR UR: YELLOW
CREAT SERPL-MCNC: 0.9 MG/DL (ref 0.8–1.3)
DEPRECATED RDW RBC AUTO: 13 % (ref 12.4–15.4)
EOSINOPHIL # BLD: 0 K/UL (ref 0–0.6)
EOSINOPHIL NFR BLD: 0.1 %
GFR SERPLBLD CREATININE-BSD FMLA CKD-EPI: >60 ML/MIN/{1.73_M2}
GLUCOSE BLD-MCNC: 116 MG/DL (ref 70–99)
GLUCOSE SERPL-MCNC: 140 MG/DL (ref 70–99)
GLUCOSE UR STRIP.AUTO-MCNC: 100 MG/DL
HCT VFR BLD AUTO: 37.9 % (ref 40.5–52.5)
HGB BLD-MCNC: 13.2 G/DL (ref 13.5–17.5)
HGB UR QL STRIP.AUTO: NEGATIVE
KETONES UR STRIP.AUTO-MCNC: NEGATIVE MG/DL
LDH SERPL L TO P-CCNC: 188 U/L (ref 100–190)
LEUKOCYTE ESTERASE UR QL STRIP.AUTO: NEGATIVE
LYMPHOCYTES # BLD: 0.3 K/UL (ref 1–5.1)
LYMPHOCYTES NFR BLD: 3 %
MAGNESIUM SERPL-MCNC: 2 MG/DL (ref 1.8–2.4)
MCH RBC QN AUTO: 32.1 PG (ref 26–34)
MCHC RBC AUTO-ENTMCNC: 34.9 G/DL (ref 31–36)
MCV RBC AUTO: 92 FL (ref 80–100)
METHOTREXATE LEVEL: 0.37 UMOL/L
MONOCYTES # BLD: 0.1 K/UL (ref 0–1.3)
MONOCYTES NFR BLD: 0.7 %
NEUTROPHILS # BLD: 10.3 K/UL (ref 1.7–7.7)
NEUTROPHILS NFR BLD: 96.2 %
NITRITE UR QL STRIP.AUTO: NEGATIVE
PERFORMED ON: ABNORMAL
PH UR STRIP.AUTO: 7.5 [PH] (ref 5–8)
PH UR STRIP.AUTO: 8 [PH] (ref 5–8)
PHOSPHATE SERPL-MCNC: 3.6 MG/DL (ref 2.5–4.9)
PLATELET # BLD AUTO: 195 K/UL (ref 135–450)
PMV BLD AUTO: 9.7 FL (ref 5–10.5)
POTASSIUM SERPL-SCNC: 3.3 MMOL/L (ref 3.5–5.1)
POTASSIUM SERPL-SCNC: 3.9 MMOL/L (ref 3.5–5.1)
PROT SERPL-MCNC: 5.4 G/DL (ref 6.4–8.2)
PROT UR STRIP.AUTO-MCNC: NEGATIVE MG/DL
RBC # BLD AUTO: 4.12 M/UL (ref 4.2–5.9)
SODIUM SERPL-SCNC: 141 MMOL/L (ref 136–145)
SP GR UR STRIP.AUTO: 1.01 (ref 1–1.03)
UA DIPSTICK W REFLEX MICRO PNL UR: ABNORMAL
URATE SERPL-MCNC: 2.9 MG/DL (ref 3.5–7.2)
URN SPEC COLLECT METH UR: ABNORMAL
UROBILINOGEN UR STRIP-ACNC: 0.2 E.U./DL
WBC # BLD AUTO: 10.8 K/UL (ref 4–11)

## 2023-07-10 PROCEDURE — 36591 DRAW BLOOD OFF VENOUS DEVICE: CPT

## 2023-07-10 PROCEDURE — 84550 ASSAY OF BLOOD/URIC ACID: CPT

## 2023-07-10 PROCEDURE — 6370000000 HC RX 637 (ALT 250 FOR IP): Performed by: STUDENT IN AN ORGANIZED HEALTH CARE EDUCATION/TRAINING PROGRAM

## 2023-07-10 PROCEDURE — 6370000000 HC RX 637 (ALT 250 FOR IP): Performed by: NURSE PRACTITIONER

## 2023-07-10 PROCEDURE — 2500000003 HC RX 250 WO HCPCS: Performed by: STUDENT IN AN ORGANIZED HEALTH CARE EDUCATION/TRAINING PROGRAM

## 2023-07-10 PROCEDURE — 6360000002 HC RX W HCPCS: Performed by: NURSE PRACTITIONER

## 2023-07-10 PROCEDURE — 6360000002 HC RX W HCPCS: Performed by: STUDENT IN AN ORGANIZED HEALTH CARE EDUCATION/TRAINING PROGRAM

## 2023-07-10 PROCEDURE — 2580000003 HC RX 258: Performed by: STUDENT IN AN ORGANIZED HEALTH CARE EDUCATION/TRAINING PROGRAM

## 2023-07-10 PROCEDURE — 6360000002 HC RX W HCPCS: Performed by: NEUROLOGICAL SURGERY

## 2023-07-10 PROCEDURE — 97116 GAIT TRAINING THERAPY: CPT

## 2023-07-10 PROCEDURE — 6370000000 HC RX 637 (ALT 250 FOR IP): Performed by: NEUROLOGICAL SURGERY

## 2023-07-10 PROCEDURE — 80069 RENAL FUNCTION PANEL: CPT

## 2023-07-10 PROCEDURE — 84132 ASSAY OF SERUM POTASSIUM: CPT

## 2023-07-10 PROCEDURE — 97530 THERAPEUTIC ACTIVITIES: CPT

## 2023-07-10 PROCEDURE — 2060000000 HC ICU INTERMEDIATE R&B

## 2023-07-10 PROCEDURE — 97110 THERAPEUTIC EXERCISES: CPT

## 2023-07-10 PROCEDURE — 97535 SELF CARE MNGMENT TRAINING: CPT

## 2023-07-10 PROCEDURE — 83986 ASSAY PH BODY FLUID NOS: CPT

## 2023-07-10 PROCEDURE — 85025 COMPLETE CBC W/AUTO DIFF WBC: CPT

## 2023-07-10 PROCEDURE — 80076 HEPATIC FUNCTION PANEL: CPT

## 2023-07-10 PROCEDURE — 92507 TX SP LANG VOICE COMM INDIV: CPT

## 2023-07-10 PROCEDURE — 81003 URINALYSIS AUTO W/O SCOPE: CPT

## 2023-07-10 PROCEDURE — 83520 IMMUNOASSAY QUANT NOS NONAB: CPT

## 2023-07-10 PROCEDURE — 2580000003 HC RX 258: Performed by: NURSE PRACTITIONER

## 2023-07-10 PROCEDURE — 83615 LACTATE (LD) (LDH) ENZYME: CPT

## 2023-07-10 PROCEDURE — 83735 ASSAY OF MAGNESIUM: CPT

## 2023-07-10 PROCEDURE — 2580000003 HC RX 258: Performed by: NEUROLOGICAL SURGERY

## 2023-07-10 RX ORDER — DEXAMETHASONE 4 MG/1
4 TABLET ORAL DAILY
Status: DISCONTINUED | OUTPATIENT
Start: 2023-07-11 | End: 2023-07-11

## 2023-07-10 RX ORDER — SENNA AND DOCUSATE SODIUM 50; 8.6 MG/1; MG/1
1 TABLET, FILM COATED ORAL 2 TIMES DAILY PRN
Status: DISCONTINUED | OUTPATIENT
Start: 2023-07-10 | End: 2023-07-11 | Stop reason: HOSPADM

## 2023-07-10 RX ORDER — 0.9 % SODIUM CHLORIDE 0.9 %
500 INTRAVENOUS SOLUTION INTRAVENOUS ONCE
Status: COMPLETED | OUTPATIENT
Start: 2023-07-10 | End: 2023-07-10

## 2023-07-10 RX ORDER — FAMOTIDINE 20 MG/1
20 TABLET, FILM COATED ORAL 2 TIMES DAILY
Status: DISCONTINUED | OUTPATIENT
Start: 2023-07-10 | End: 2023-07-11 | Stop reason: HOSPADM

## 2023-07-10 RX ORDER — FUROSEMIDE 10 MG/ML
20 INJECTION INTRAMUSCULAR; INTRAVENOUS ONCE
Status: COMPLETED | OUTPATIENT
Start: 2023-07-10 | End: 2023-07-10

## 2023-07-10 RX ADMIN — LEUCOVORIN CALCIUM 25 MG: 25 TABLET ORAL at 20:09

## 2023-07-10 RX ADMIN — LEUCOVORIN CALCIUM 25 MG: 25 TABLET ORAL at 13:23

## 2023-07-10 RX ADMIN — Medication 400 MG: at 08:33

## 2023-07-10 RX ADMIN — FUROSEMIDE 20 MG: 10 INJECTION, SOLUTION INTRAMUSCULAR; INTRAVENOUS at 20:30

## 2023-07-10 RX ADMIN — POTASSIUM CHLORIDE 20 MEQ: 400 INJECTION, SOLUTION INTRAVENOUS at 05:41

## 2023-07-10 RX ADMIN — FAMOTIDINE 20 MG: 20 TABLET ORAL at 08:33

## 2023-07-10 RX ADMIN — SODIUM BICARBONATE: 84 INJECTION, SOLUTION INTRAVENOUS at 16:43

## 2023-07-10 RX ADMIN — FAMOTIDINE 20 MG: 20 TABLET ORAL at 20:10

## 2023-07-10 RX ADMIN — PANTOPRAZOLE SODIUM 40 MG: 40 TABLET, DELAYED RELEASE ORAL at 06:44

## 2023-07-10 RX ADMIN — POLYETHYLENE GLYCOL 3350 17 G: 17 POWDER, FOR SOLUTION ORAL at 08:33

## 2023-07-10 RX ADMIN — SODIUM CHLORIDE, PRESERVATIVE FREE 10 ML: 5 INJECTION INTRAVENOUS at 20:10

## 2023-07-10 RX ADMIN — LEVETIRACETAM 500 MG: 500 TABLET, FILM COATED ORAL at 20:09

## 2023-07-10 RX ADMIN — SODIUM CHLORIDE 500 ML: 900 INJECTION, SOLUTION INTRAVENOUS at 11:46

## 2023-07-10 RX ADMIN — LORAZEPAM 0.5 MG: 2 INJECTION INTRAMUSCULAR; INTRAVENOUS at 01:28

## 2023-07-10 RX ADMIN — LORAZEPAM 0.5 MG: 2 INJECTION INTRAMUSCULAR; INTRAVENOUS at 20:25

## 2023-07-10 RX ADMIN — DICYCLOMINE HYDROCHLORIDE 20 MG: 20 TABLET ORAL at 16:46

## 2023-07-10 RX ADMIN — POTASSIUM CHLORIDE 20 MEQ: 400 INJECTION, SOLUTION INTRAVENOUS at 08:33

## 2023-07-10 RX ADMIN — POTASSIUM CHLORIDE 20 MEQ: 400 INJECTION, SOLUTION INTRAVENOUS at 09:40

## 2023-07-10 RX ADMIN — LEUCOVORIN CALCIUM 25 MG: 25 TABLET ORAL at 06:44

## 2023-07-10 RX ADMIN — DICYCLOMINE HYDROCHLORIDE 20 MG: 20 TABLET ORAL at 06:45

## 2023-07-10 RX ADMIN — DICYCLOMINE HYDROCHLORIDE 20 MG: 20 TABLET ORAL at 11:20

## 2023-07-10 RX ADMIN — SODIUM BICARBONATE: 84 INJECTION, SOLUTION INTRAVENOUS at 03:33

## 2023-07-10 RX ADMIN — TAMSULOSIN HYDROCHLORIDE 0.4 MG: 0.4 CAPSULE ORAL at 08:33

## 2023-07-10 RX ADMIN — LEVETIRACETAM 500 MG: 500 TABLET, FILM COATED ORAL at 08:33

## 2023-07-10 RX ADMIN — SODIUM BICARBONATE: 84 INJECTION, SOLUTION INTRAVENOUS at 09:29

## 2023-07-10 RX ADMIN — DEXAMETHASONE SODIUM PHOSPHATE 4 MG: 4 INJECTION, SOLUTION INTRAMUSCULAR; INTRAVENOUS at 06:45

## 2023-07-10 RX ADMIN — LEUCOVORIN CALCIUM 25 MG: 25 TABLET ORAL at 01:27

## 2023-07-10 RX ADMIN — DICYCLOMINE HYDROCHLORIDE 20 MG: 20 TABLET ORAL at 20:10

## 2023-07-10 RX ADMIN — POTASSIUM CHLORIDE 20 MEQ: 400 INJECTION, SOLUTION INTRAVENOUS at 06:48

## 2023-07-10 RX ADMIN — MORPHINE SULFATE 7.5 MG: 15 TABLET ORAL at 23:30

## 2023-07-10 ASSESSMENT — PAIN - FUNCTIONAL ASSESSMENT: PAIN_FUNCTIONAL_ASSESSMENT: ACTIVITIES ARE NOT PREVENTED

## 2023-07-10 ASSESSMENT — PAIN DESCRIPTION - DESCRIPTORS: DESCRIPTORS: ACHING

## 2023-07-10 ASSESSMENT — PAIN SCALES - GENERAL
PAINLEVEL_OUTOF10: 7
PAINLEVEL_OUTOF10: 0

## 2023-07-10 ASSESSMENT — PAIN DESCRIPTION - LOCATION: LOCATION: HEAD

## 2023-07-10 NOTE — CARE COORDINATION
Completed chart review. Pt is from home his wife and plans on returning. SW will continue to follow.     JULY Gonzalez   for [facility] ([address])  Office Phone: [phone]  [facility] Mobile: [phone]

## 2023-07-10 NOTE — PLAN OF CARE
Problem: Safety - Adult  Goal: Free from fall injury  Outcome: Progressing  All fall precautions in place. Bed locked and in lowest position with alarm on. Overbed table and personal belonings within reach. Call light within reach and patient instructed to use call light for assistance. Non-skid socks on. Problem: Pain  Goal: Verbalizes/displays adequate comfort level or baseline comfort level  Outcome: Progressing   Pt endorsing no pain at this time. Pain being managed with prn and scheduled pain medication per the [facility] with some relief. Pt using rest, repositioning and distraction as non-pharm measures to decrease pain and promote comfort.

## 2023-07-10 NOTE — CONSULTS
Behavioral Health Note    Met with patient and wife due to consult for depression. Wife reports that she and patient were talking yesterday. Patient was talking about how he was having thoughts of death within the past few months due to his functioning declining and not knowing what was going on with his health. Attempted to meet with patient and discuss mood. He had difficulties engaging in conversation and responses were not always appropriate for question asked. Patient states that he is feeling better with regards to mood. He explained that since receiving chemotherapy, his vision has improved which has made him more hopeful. Patient denied suicidal thoughts and reports that he wants to live. He denied wanting to [place] up\". Engaging in behavioral health assessment or psychotherapy is challenging given patient's mental status at this time. If patient's lymphoma responds to treatment, he may be better able to engage in mental health intervention. Will continue to monitor and follow-up with patient. Discussed with wife monitoring behaviors such as patient's engagement in care as indicator of mood when he has troubles communicating. Currently, mood appears stable.

## 2023-07-11 ENCOUNTER — TELEPHONE (OUTPATIENT)
Dept: INTERNAL MEDICINE CLINIC | Age: 68
End: 2023-07-11

## 2023-07-11 ENCOUNTER — NURSE ONLY (OUTPATIENT)
Dept: CARDIOLOGY CLINIC | Age: 68
End: 2023-07-11

## 2023-07-11 VITALS
HEART RATE: 61 BPM | WEIGHT: 150.6 LBS | DIASTOLIC BLOOD PRESSURE: 78 MMHG | BODY MASS INDEX: 21.56 KG/M2 | OXYGEN SATURATION: 99 % | SYSTOLIC BLOOD PRESSURE: 126 MMHG | TEMPERATURE: 96.8 F | RESPIRATION RATE: 11 BRPM | HEIGHT: 70 IN

## 2023-07-11 LAB
ALBUMIN SERPL-MCNC: 3.5 G/DL (ref 3.4–5)
ALBUMIN SERPL-MCNC: 3.6 G/DL (ref 3.4–5)
ALP SERPL-CCNC: 53 U/L (ref 40–129)
ALT SERPL-CCNC: 76 U/L (ref 10–40)
ANION GAP SERPL CALCULATED.3IONS-SCNC: 13 MMOL/L (ref 3–16)
AST SERPL-CCNC: 52 U/L (ref 15–37)
BASOPHILS # BLD: 0 K/UL (ref 0–0.2)
BASOPHILS NFR BLD: 0.2 %
BILIRUB DIRECT SERPL-MCNC: <0.2 MG/DL (ref 0–0.3)
BILIRUB INDIRECT SERPL-MCNC: ABNORMAL MG/DL (ref 0–1)
BILIRUB SERPL-MCNC: 0.6 MG/DL (ref 0–1)
BUN SERPL-MCNC: 22 MG/DL (ref 7–20)
CALCIUM SERPL-MCNC: 8.6 MG/DL (ref 8.3–10.6)
CHLORIDE SERPL-SCNC: 101 MMOL/L (ref 99–110)
CO2 SERPL-SCNC: 27 MMOL/L (ref 21–32)
CREAT SERPL-MCNC: 1 MG/DL (ref 0.8–1.3)
DEPRECATED RDW RBC AUTO: 13 % (ref 12.4–15.4)
EOSINOPHIL # BLD: 0.3 K/UL (ref 0–0.6)
EOSINOPHIL NFR BLD: 2.9 %
GFR SERPLBLD CREATININE-BSD FMLA CKD-EPI: >60 ML/MIN/{1.73_M2}
GLUCOSE SERPL-MCNC: 101 MG/DL (ref 70–99)
HCT VFR BLD AUTO: 37.8 % (ref 40.5–52.5)
HGB BLD-MCNC: 12.9 G/DL (ref 13.5–17.5)
LYMPHOCYTES # BLD: 0.5 K/UL (ref 1–5.1)
LYMPHOCYTES NFR BLD: 5 %
MCH RBC QN AUTO: 32.1 PG (ref 26–34)
MCHC RBC AUTO-ENTMCNC: 34.1 G/DL (ref 31–36)
MCV RBC AUTO: 94.2 FL (ref 80–100)
METHOTREXATE LEVEL: 0.04 UMOL/L
MONOCYTES # BLD: 0.1 K/UL (ref 0–1.3)
MONOCYTES NFR BLD: 0.8 %
NEUTROPHILS # BLD: 9.1 K/UL (ref 1.7–7.7)
NEUTROPHILS NFR BLD: 91.1 %
PH UR STRIP.AUTO: 7.5 [PH] (ref 5–8)
PH UR STRIP.AUTO: 8 [PH] (ref 5–8)
PHOSPHATE SERPL-MCNC: 3 MG/DL (ref 2.5–4.9)
PLATELET # BLD AUTO: 178 K/UL (ref 135–450)
PMV BLD AUTO: 9.6 FL (ref 5–10.5)
POTASSIUM SERPL-SCNC: 3 MMOL/L (ref 3.5–5.1)
PROT SERPL-MCNC: 5.5 G/DL (ref 6.4–8.2)
RBC # BLD AUTO: 4.01 M/UL (ref 4.2–5.9)
SODIUM SERPL-SCNC: 141 MMOL/L (ref 136–145)
URATE SERPL-MCNC: 2.7 MG/DL (ref 3.5–7.2)
WBC # BLD AUTO: 9.9 K/UL (ref 4–11)

## 2023-07-11 PROCEDURE — 6360000002 HC RX W HCPCS: Performed by: NURSE PRACTITIONER

## 2023-07-11 PROCEDURE — 97530 THERAPEUTIC ACTIVITIES: CPT

## 2023-07-11 PROCEDURE — 85025 COMPLETE CBC W/AUTO DIFF WBC: CPT

## 2023-07-11 PROCEDURE — 6370000000 HC RX 637 (ALT 250 FOR IP): Performed by: NEUROLOGICAL SURGERY

## 2023-07-11 PROCEDURE — 2500000003 HC RX 250 WO HCPCS: Performed by: STUDENT IN AN ORGANIZED HEALTH CARE EDUCATION/TRAINING PROGRAM

## 2023-07-11 PROCEDURE — 6370000000 HC RX 637 (ALT 250 FOR IP): Performed by: NURSE PRACTITIONER

## 2023-07-11 PROCEDURE — 97110 THERAPEUTIC EXERCISES: CPT

## 2023-07-11 PROCEDURE — 2580000003 HC RX 258: Performed by: NEUROLOGICAL SURGERY

## 2023-07-11 PROCEDURE — 83986 ASSAY PH BODY FLUID NOS: CPT

## 2023-07-11 PROCEDURE — 84550 ASSAY OF BLOOD/URIC ACID: CPT

## 2023-07-11 PROCEDURE — 6360000002 HC RX W HCPCS: Performed by: STUDENT IN AN ORGANIZED HEALTH CARE EDUCATION/TRAINING PROGRAM

## 2023-07-11 PROCEDURE — 6370000000 HC RX 637 (ALT 250 FOR IP): Performed by: STUDENT IN AN ORGANIZED HEALTH CARE EDUCATION/TRAINING PROGRAM

## 2023-07-11 PROCEDURE — 97116 GAIT TRAINING THERAPY: CPT

## 2023-07-11 PROCEDURE — 2580000003 HC RX 258: Performed by: STUDENT IN AN ORGANIZED HEALTH CARE EDUCATION/TRAINING PROGRAM

## 2023-07-11 PROCEDURE — 80069 RENAL FUNCTION PANEL: CPT

## 2023-07-11 PROCEDURE — 83520 IMMUNOASSAY QUANT NOS NONAB: CPT

## 2023-07-11 PROCEDURE — 6360000002 HC RX W HCPCS: Performed by: NEUROLOGICAL SURGERY

## 2023-07-11 PROCEDURE — 80076 HEPATIC FUNCTION PANEL: CPT

## 2023-07-11 RX ORDER — HEPARIN SODIUM 100 [USP'U]/ML
500 INJECTION, SOLUTION INTRAVENOUS PRN
Status: DISCONTINUED | OUTPATIENT
Start: 2023-07-11 | End: 2023-07-11 | Stop reason: HOSPADM

## 2023-07-11 RX ORDER — DEXAMETHASONE 4 MG/1
4 TABLET ORAL EVERY 12 HOURS SCHEDULED
Status: DISCONTINUED | OUTPATIENT
Start: 2023-07-11 | End: 2023-07-11 | Stop reason: HOSPADM

## 2023-07-11 RX ORDER — PROCHLORPERAZINE MALEATE 10 MG
5 TABLET ORAL EVERY 6 HOURS PRN
Qty: 120 TABLET | Refills: 3 | Status: SHIPPED
Start: 2023-07-11 | End: 2023-07-11 | Stop reason: HOSPADM

## 2023-07-11 RX ORDER — LEVETIRACETAM 500 MG/1
500 TABLET ORAL 2 TIMES DAILY
Qty: 60 TABLET | Refills: 3 | Status: SHIPPED | OUTPATIENT
Start: 2023-07-11

## 2023-07-11 RX ORDER — HALOPERIDOL 5 MG/ML
5 INJECTION INTRAMUSCULAR ONCE
Status: COMPLETED | OUTPATIENT
Start: 2023-07-11 | End: 2023-07-11

## 2023-07-11 RX ORDER — LANOLIN ALCOHOL/MO/W.PET/CERES
400 CREAM (GRAM) TOPICAL DAILY
Qty: 30 TABLET | Refills: 3 | Status: SHIPPED | OUTPATIENT
Start: 2023-07-11

## 2023-07-11 RX ORDER — DEXAMETHASONE 4 MG/1
4 TABLET ORAL EVERY 12 HOURS SCHEDULED
Qty: 20 TABLET | Refills: 0 | Status: SHIPPED | OUTPATIENT
Start: 2023-07-11 | End: 2023-07-21

## 2023-07-11 RX ORDER — HALOPERIDOL 5 MG/ML
5 INJECTION INTRAMUSCULAR ONCE
Status: DISCONTINUED | OUTPATIENT
Start: 2023-07-11 | End: 2023-07-11 | Stop reason: HOSPADM

## 2023-07-11 RX ORDER — DEXAMETHASONE 4 MG/1
4 TABLET ORAL DAILY
Qty: 3 TABLET | Refills: 0 | Status: CANCELLED | OUTPATIENT
Start: 2023-07-11 | End: 2023-07-14

## 2023-07-11 RX ORDER — FAMOTIDINE 20 MG/1
20 TABLET, FILM COATED ORAL 2 TIMES DAILY
Qty: 60 TABLET | Refills: 3 | Status: SHIPPED | OUTPATIENT
Start: 2023-07-11

## 2023-07-11 RX ORDER — SENNA AND DOCUSATE SODIUM 50; 8.6 MG/1; MG/1
1 TABLET, FILM COATED ORAL 2 TIMES DAILY PRN
COMMUNITY
Start: 2023-07-11

## 2023-07-11 RX ORDER — ONDANSETRON 4 MG/1
4 TABLET, ORALLY DISINTEGRATING ORAL EVERY 6 HOURS PRN
Qty: 60 TABLET | Refills: 0 | Status: SHIPPED | OUTPATIENT
Start: 2023-07-11

## 2023-07-11 RX ADMIN — LEVETIRACETAM 500 MG: 500 TABLET, FILM COATED ORAL at 09:29

## 2023-07-11 RX ADMIN — Medication 500 UNITS: at 14:07

## 2023-07-11 RX ADMIN — Medication 400 MG: at 09:29

## 2023-07-11 RX ADMIN — LORAZEPAM 0.5 MG: 2 INJECTION INTRAMUSCULAR; INTRAVENOUS at 01:26

## 2023-07-11 RX ADMIN — MORPHINE SULFATE 7.5 MG: 15 TABLET ORAL at 03:01

## 2023-07-11 RX ADMIN — HALOPERIDOL LACTATE 5 MG: 5 INJECTION, SOLUTION INTRAMUSCULAR at 03:53

## 2023-07-11 RX ADMIN — TAMSULOSIN HYDROCHLORIDE 0.4 MG: 0.4 CAPSULE ORAL at 09:29

## 2023-07-11 RX ADMIN — POTASSIUM CHLORIDE 20 MEQ: 400 INJECTION, SOLUTION INTRAVENOUS at 11:13

## 2023-07-11 RX ADMIN — PROCHLORPERAZINE EDISYLATE 10 MG: 5 INJECTION, SOLUTION INTRAMUSCULAR; INTRAVENOUS at 03:04

## 2023-07-11 RX ADMIN — POTASSIUM CHLORIDE 20 MEQ: 400 INJECTION, SOLUTION INTRAVENOUS at 08:02

## 2023-07-11 RX ADMIN — POTASSIUM CHLORIDE 20 MEQ: 400 INJECTION, SOLUTION INTRAVENOUS at 09:10

## 2023-07-11 RX ADMIN — DEXAMETHASONE 4 MG: 4 TABLET ORAL at 09:29

## 2023-07-11 RX ADMIN — SODIUM BICARBONATE: 84 INJECTION, SOLUTION INTRAVENOUS at 00:14

## 2023-07-11 RX ADMIN — POTASSIUM CHLORIDE 20 MEQ: 400 INJECTION, SOLUTION INTRAVENOUS at 12:12

## 2023-07-11 RX ADMIN — SODIUM CHLORIDE, PRESERVATIVE FREE 10 ML: 5 INJECTION INTRAVENOUS at 09:33

## 2023-07-11 RX ADMIN — FAMOTIDINE 20 MG: 20 TABLET ORAL at 09:29

## 2023-07-11 RX ADMIN — LEUCOVORIN CALCIUM 25 MG: 25 TABLET ORAL at 01:29

## 2023-07-11 ASSESSMENT — PAIN SCALES - GENERAL
PAINLEVEL_OUTOF10: 0
PAINLEVEL_OUTOF10: 7

## 2023-07-11 ASSESSMENT — PAIN - FUNCTIONAL ASSESSMENT: PAIN_FUNCTIONAL_ASSESSMENT: ACTIVITIES ARE NOT PREVENTED

## 2023-07-11 ASSESSMENT — PAIN SCALES - WONG BAKER
WONGBAKER_NUMERICALRESPONSE: 0
WONGBAKER_NUMERICALRESPONSE: 2

## 2023-07-11 ASSESSMENT — PAIN DESCRIPTION - LOCATION: LOCATION: HEAD

## 2023-07-11 ASSESSMENT — PAIN DESCRIPTION - DESCRIPTORS: DESCRIPTORS: ACHING

## 2023-07-11 NOTE — TELEPHONE ENCOUNTER
Care Transitions Initial Follow Up Call    Outreach made within 2 business days of discharge: Yes    Patient: Sam Lopez Patient : 1955   MRN: [id number]  Reason for Admission: There are no discharge diagnoses documented for the most recent discharge.   Discharge Date: 23       Spoke with: AMRIT    Discharge department/facility: [facility] ED      Scheduled appointment with PCP within 7-14 days    Follow Up  Future Appointments   Date Time Provider [address] [place] [address]   2023  7:30 AM MEI Orellana CNP [facility]   2024  7:00 AM MEI Orellana  [place], [place]

## 2023-07-11 NOTE — DISCHARGE SUMMARY
[facility] Discharge Summary             Attending Physician: Johann Homans, DO    Referring MD: Johann Homans, DO  [address]  [address]    Name: Manjinder Michaud :  1955  MRN:  [id number]    Admission: 2023   Discharge:  23     Date: 2023    Diagnosis on admit: CNS mass    Consultations:   Neurosurgery: Dr. Cassi Guardado  Neurology: Dr. Jarocho Mccormick    Medications:      Medication List        START taking these medications      dexamethasone 4 MG tablet  Commonly known as: DECADRON  Take 1 tablet by mouth every 12 hours for 10 days     famotidine 20 MG tablet  Commonly known as: PEPCID  Take 1 tablet by mouth 2 times daily     levETIRAcetam 500 MG tablet  Commonly known as: KEPPRA  Take 1 tablet by mouth 2 times daily     magnesium oxide 400 (240 Mg) MG tablet  Commonly known as: MAG-OX  Take 1 tablet by mouth daily     ondansetron 4 MG disintegrating tablet  Commonly known as: ZOFRAN-ODT  Take 1 tablet by mouth every 6 hours as needed for Nausea or Vomiting     sennosides-docusate sodium 8.6-50 MG tablet  Commonly known as: SENOKOT-S  Take 1 tablet by mouth 2 times daily as needed for Constipation            CONTINUE taking these medications      tamsulosin 0.4 MG capsule  Commonly known as: FLOMAX     therapeutic multivitamin-minerals tablet  Take 1 tablet by mouth daily            STOP taking these medications      magnesium 30 MG tablet               Where to Get Your Medications        These medications were sent to [facility] [id number] [facility], [address] [place] [address] Road [address] [place] [address] Darlin Chin [phone]   [facility], [address] [place] [address]      Phone: [phone]   dexamethasone 4 MG tablet  famotidine 20 MG tablet  levETIRAcetam 500 MG tablet  magnesium oxide 400 (240 Mg) MG tablet  ondansetron 4 MG disintegrating tablet       You can get these medications from any pharmacy    You don't need a prescription for these medications  sennosides-docusate sodium

## 2023-07-11 NOTE — DISCHARGE INSTR - COC
Continuity of Care Form    Patient Name: Rufina Chong   :  1955  MRN:  [id number]    Admit date:  2023  Discharge date:  2023    Code Status Order: Full Code   Advance Directives:   Advance Care Flowsheet Documentation       Date/Time Healthcare Directive Type of Healthcare Directive Copy in [address] Agent's Name Healthcare Agent's Phone Number    23 1643 No, patient does not have an advance directive for healthcare treatment -- -- -- -- --            Admitting Physician:  Rosas Limon DO  PCP: MEI Jewell - CNP    Discharging Nurse: [facility] Unit/Room#: 4009/2080-89  Discharging Unit Phone Number: [phone]    Emergency Contact:   Extended Emergency Contact Information  Primary Emergency Contact: [facility]  Address: [address] [place] [address], [address] [place] [address],[address] [facility] of [address] [place] [address] [place] Phone: [phone]  Mobile Phone: [phone]  Relation: Spouse  Secondary Emergency Contact: [address] [place] [address] Phone: [phone]  Mobile Phone: [phone]  Relation: Child    Past Surgical History:  Past Surgical History:   Procedure Laterality Date    BRONCHOSCOPY N/A 2021    ENDOBRONCHIAL ULTRASOUND WITH EUS performed by Greyson Estrada MD at [address] [place] [address] 2022    BRONCHOSCOPY,  ENDOBRONCHIAL ULTRASOUND WITH ANESTHESIA AND PATHOLOGY performed by Yoli Sanchez MD at [address] [place] [address]  2022    BRONCHOSCOPY/TRANSBRONCHIAL NEEDLE BIOPSY performed by Yoli Sanchez MD at [address] [place] [address]  2022    BRONCHOSCOPY/TRANSBRONCHIAL NEEDLE BIOPSY ADDL LOBE performed by Yoli Sanchez MD at [address] [place] [address]  2022    BRONCHOSCOPY DIAGNOSTIC OR CELL [address] [place] [address] performed by Yoli Sanchez MD at [facility] 2023    RIGHT PARIETAL STEREOTACTIC BIOPSY OF BRAIN MASS performed by Sanjiv Hood MD at

## 2023-07-11 NOTE — DISCHARGE SUMMARY
Reviewed discharge instructions with patient and  spouse. Reviewed discharge medications including dosing, schedule, indication, and adverse reactions. Reviewed which medications were already taken today and next dosage due for each medication. Reviewed signs and symptoms that prompt a call to the physician and appropriate phone numbers. Reviewed follow up appointments that have been made in [facility] and Outpatient Oncology. Low microbial diet, activity restrictions, and increased risk of infection were reviewed. Patient and patient's wife verbalized understanding of all instructions and questions were answered to his/her. satisfaction. Signed discharge instructions were given to the patient and a copy placed in the paper-lite chart. Patient discharged to home with homecare per wheelchair with spouse.       Sydnee Rain RN

## 2023-07-11 NOTE — CARE COORDINATION
Case Management Assessment            Discharge Note                    Date / Time of Note: 7/11/2023 1:03 PM                  Discharge Note Completed by: JULY Phan   for [facility]  Office Phone: [phone]  [facility] Mobile: [phone]    Patient Name: Jason Matos   YOB: 1955  Diagnosis: Brain mass [G93.89]  Confusion [R41.0]   Date / Time: 6/28/2023  1:25 PM    Current PCP: Jemma Abreu, [address] [place] [address] patient: No    Hospitalization in the last 30 days: No       Advance Directives:  Code Status: Full Code  [place] DNR form completed and on chart: Not Indicated    Financial:  Payor: Leonardo Fernandez / Plan: Sanam Lisa ESSENTIAL/PLUS / Product Type: *No Product type* /      Pharmacy:    [facility] [id number] [address]  [address] [place] [address]  Phone: [phone] Fax: [phone]    [address] [place] [address] [address] [place] [address],[address]  Phone: [phone] Fax: [phone]      Assistance purchasing medications?: Potential Assistance Purchasing Medications: No  Assistance provided by Case Management: None at this time    Does patient want to participate in local refill/ meds to beds program?:      Meds To Beds General Rules:  1. Can ONLY be done Monday- Friday between 8:30am-5pm  2. Prescription(s) must be in pharmacy by 3pm to be filled same day  3. Copy of patient's insurance/ prescription drug card and patient face sheet must be sent along with the prescription(s)  4. Cost of Rx cannot be added to hospital bill. If financial assistance is needed, please contact unit  or ;  or  CANNOT provide pharmacy voucher for patients co-pays  5.  Patients can then  the prescription on their way out of

## 2023-07-11 NOTE — CARE COORDINATION
CTN contacted Niya with [facility] [phone]. They have accepted this patient and will pull referral from [facility].  They will contact patient and make arrangements for [facility] by 7/13  Electronically signed by Magui Li LPN on 7/96/5339 at 1:96 PM

## 2023-07-19 NOTE — PROGRESS NOTES
End of 91-day monitoring period 7/11  Pacing (% of Time Since 28-Jun-2023)   99.9% (MVP Off)  AP 86.7%   No  arrhythmias recorded. Remote transmission received for patient's dual chamber PACEMAKER. Transmission shows normal sensing and pacing function. EP physician will review. See interrogation under the cardiology tab in the [address] field for more details. Will continue to monitor remotely.

## 2023-07-20 ENCOUNTER — TELEPHONE (OUTPATIENT)
Dept: INTERNAL MEDICINE CLINIC | Age: 68
End: 2023-07-20

## 2023-07-20 ENCOUNTER — HOSPITAL ENCOUNTER (INPATIENT)
Age: 68
LOS: 4 days | Discharge: HOME | DRG: 846 | End: 2023-07-24
Attending: STUDENT IN AN ORGANIZED HEALTH CARE EDUCATION/TRAINING PROGRAM | Admitting: STUDENT IN AN ORGANIZED HEALTH CARE EDUCATION/TRAINING PROGRAM
Payer: MEDICARE

## 2023-07-20 ENCOUNTER — APPOINTMENT (OUTPATIENT)
Dept: GENERAL RADIOLOGY | Age: 68
DRG: 846 | End: 2023-07-20
Attending: STUDENT IN AN ORGANIZED HEALTH CARE EDUCATION/TRAINING PROGRAM
Payer: MEDICARE

## 2023-07-20 PROBLEM — Z51.11 ADMISSION FOR ANTINEOPLASTIC CHEMOTHERAPY: Status: ACTIVE | Noted: 2023-07-20

## 2023-07-20 PROBLEM — C85.90 LYMPHOMA, UNSPECIFIED BODY REGION, UNSPECIFIED LYMPHOMA TYPE (HCC): Status: ACTIVE | Noted: 2023-07-20

## 2023-07-20 LAB — PH UR STRIP.AUTO: 8.5 [PH] (ref 5–8)

## 2023-07-20 PROCEDURE — 2580000003 HC RX 258: Performed by: NURSE PRACTITIONER

## 2023-07-20 PROCEDURE — 2500000003 HC RX 250 WO HCPCS: Performed by: STUDENT IN AN ORGANIZED HEALTH CARE EDUCATION/TRAINING PROGRAM

## 2023-07-20 PROCEDURE — 6360000002 HC RX W HCPCS: Performed by: STUDENT IN AN ORGANIZED HEALTH CARE EDUCATION/TRAINING PROGRAM

## 2023-07-20 PROCEDURE — 6370000000 HC RX 637 (ALT 250 FOR IP): Performed by: NURSE PRACTITIONER

## 2023-07-20 PROCEDURE — 6370000000 HC RX 637 (ALT 250 FOR IP): Performed by: STUDENT IN AN ORGANIZED HEALTH CARE EDUCATION/TRAINING PROGRAM

## 2023-07-20 PROCEDURE — 81003 URINALYSIS AUTO W/O SCOPE: CPT

## 2023-07-20 PROCEDURE — 2060000000 HC ICU INTERMEDIATE R&B

## 2023-07-20 PROCEDURE — 83986 ASSAY PH BODY FLUID NOS: CPT

## 2023-07-20 PROCEDURE — A4217 STERILE WATER/SALINE, 500 ML: HCPCS | Performed by: NURSE PRACTITIONER

## 2023-07-20 PROCEDURE — 93005 ELECTROCARDIOGRAM TRACING: CPT | Performed by: NURSE PRACTITIONER

## 2023-07-20 PROCEDURE — 71046 X-RAY EXAM CHEST 2 VIEWS: CPT

## 2023-07-20 PROCEDURE — 6360000002 HC RX W HCPCS: Performed by: NURSE PRACTITIONER

## 2023-07-20 PROCEDURE — 2580000003 HC RX 258: Performed by: STUDENT IN AN ORGANIZED HEALTH CARE EDUCATION/TRAINING PROGRAM

## 2023-07-20 PROCEDURE — 96417 CHEMO IV INFUS EACH ADDL SEQ: CPT

## 2023-07-20 RX ORDER — HYDRALAZINE HYDROCHLORIDE 20 MG/ML
10 INJECTION INTRAMUSCULAR; INTRAVENOUS EVERY 6 HOURS PRN
Status: DISCONTINUED | OUTPATIENT
Start: 2023-07-20 | End: 2023-07-24 | Stop reason: HOSPADM

## 2023-07-20 RX ORDER — SODIUM CHLORIDE 0.9 % (FLUSH) 0.9 %
5-40 SYRINGE (ML) INJECTION PRN
Status: DISCONTINUED | OUTPATIENT
Start: 2023-07-20 | End: 2023-07-24 | Stop reason: HOSPADM

## 2023-07-20 RX ORDER — PROCHLORPERAZINE MALEATE 10 MG
10 TABLET ORAL EVERY 4 HOURS PRN
Status: DISCONTINUED | OUTPATIENT
Start: 2023-07-20 | End: 2023-07-24 | Stop reason: HOSPADM

## 2023-07-20 RX ORDER — TAMSULOSIN HYDROCHLORIDE 0.4 MG/1
0.4 CAPSULE ORAL DAILY
Status: DISCONTINUED | OUTPATIENT
Start: 2023-07-21 | End: 2023-07-24 | Stop reason: HOSPADM

## 2023-07-20 RX ORDER — PROCHLORPERAZINE EDISYLATE 5 MG/ML
10 INJECTION INTRAMUSCULAR; INTRAVENOUS EVERY 4 HOURS PRN
Status: DISCONTINUED | OUTPATIENT
Start: 2023-07-20 | End: 2023-07-24 | Stop reason: HOSPADM

## 2023-07-20 RX ORDER — ENOXAPARIN SODIUM 100 MG/ML
40 INJECTION SUBCUTANEOUS EVERY EVENING
Status: DISCONTINUED | OUTPATIENT
Start: 2023-07-20 | End: 2023-07-20 | Stop reason: SDUPTHER

## 2023-07-20 RX ORDER — SODIUM CHLORIDE 9 MG/ML
25 INJECTION, SOLUTION INTRAVENOUS PRN
Status: DISCONTINUED | OUTPATIENT
Start: 2023-07-20 | End: 2023-07-24 | Stop reason: HOSPADM

## 2023-07-20 RX ORDER — LEUCOVORIN CALCIUM 50 MG/5ML
25 INJECTION, POWDER, LYOPHILIZED, FOR SOLUTION INTRAMUSCULAR; INTRAVENOUS EVERY 6 HOURS
Status: DISCONTINUED | OUTPATIENT
Start: 2023-07-22 | End: 2023-07-24 | Stop reason: HOSPADM

## 2023-07-20 RX ORDER — MAGNESIUM SULFATE IN WATER 40 MG/ML
4000 INJECTION, SOLUTION INTRAVENOUS PRN
Status: DISCONTINUED | OUTPATIENT
Start: 2023-07-20 | End: 2023-07-24 | Stop reason: HOSPADM

## 2023-07-20 RX ORDER — M-VIT,TX,IRON,MINS/CALC/FOLIC 27MG-0.4MG
1 TABLET ORAL DAILY
Status: DISCONTINUED | OUTPATIENT
Start: 2023-07-21 | End: 2023-07-24 | Stop reason: HOSPADM

## 2023-07-20 RX ORDER — LEVETIRACETAM 500 MG/1
500 TABLET ORAL 2 TIMES DAILY
Status: DISCONTINUED | OUTPATIENT
Start: 2023-07-20 | End: 2023-07-24 | Stop reason: HOSPADM

## 2023-07-20 RX ORDER — ACETAMINOPHEN 325 MG/1
650 TABLET ORAL EVERY 6 HOURS PRN
Status: DISCONTINUED | OUTPATIENT
Start: 2023-07-20 | End: 2023-07-21

## 2023-07-20 RX ORDER — SODIUM CHLORIDE 9 MG/ML
INJECTION, SOLUTION INTRAVENOUS CONTINUOUS PRN
Status: DISCONTINUED | OUTPATIENT
Start: 2023-07-20 | End: 2023-07-24 | Stop reason: HOSPADM

## 2023-07-20 RX ORDER — FUROSEMIDE 10 MG/ML
40 INJECTION INTRAMUSCULAR; INTRAVENOUS EVERY 12 HOURS
Status: DISCONTINUED | OUTPATIENT
Start: 2023-07-22 | End: 2023-07-24 | Stop reason: HOSPADM

## 2023-07-20 RX ORDER — SODIUM CHLORIDE 0.9 % (FLUSH) 0.9 %
5-40 SYRINGE (ML) INJECTION EVERY 12 HOURS SCHEDULED
Status: DISCONTINUED | OUTPATIENT
Start: 2023-07-20 | End: 2023-07-24 | Stop reason: HOSPADM

## 2023-07-20 RX ORDER — POTASSIUM CHLORIDE 29.8 MG/ML
20 INJECTION INTRAVENOUS PRN
Status: DISCONTINUED | OUTPATIENT
Start: 2023-07-20 | End: 2023-07-20 | Stop reason: SDUPTHER

## 2023-07-20 RX ORDER — DEXAMETHASONE 4 MG/1
4 TABLET ORAL DAILY
Status: DISCONTINUED | OUTPATIENT
Start: 2023-07-21 | End: 2023-07-24 | Stop reason: HOSPADM

## 2023-07-20 RX ORDER — MAGNESIUM SULFATE IN WATER 40 MG/ML
4000 INJECTION, SOLUTION INTRAVENOUS PRN
Status: DISCONTINUED | OUTPATIENT
Start: 2023-07-20 | End: 2023-07-20 | Stop reason: SDUPTHER

## 2023-07-20 RX ORDER — ACETAMINOPHEN 650 MG/1
650 SUPPOSITORY RECTAL EVERY 6 HOURS PRN
Status: DISCONTINUED | OUTPATIENT
Start: 2023-07-20 | End: 2023-07-21

## 2023-07-20 RX ORDER — SODIUM CHLORIDE 9 MG/ML
500 INJECTION, SOLUTION INTRAVENOUS CONTINUOUS PRN
Status: DISCONTINUED | OUTPATIENT
Start: 2023-07-20 | End: 2023-07-20 | Stop reason: SDUPTHER

## 2023-07-20 RX ORDER — LORAZEPAM 0.5 MG/1
0.5 TABLET ORAL EVERY 4 HOURS PRN
Status: DISCONTINUED | OUTPATIENT
Start: 2023-07-20 | End: 2023-07-24 | Stop reason: HOSPADM

## 2023-07-20 RX ORDER — DIPHENHYDRAMINE HCL 25 MG
25 TABLET ORAL NIGHTLY PRN
Status: COMPLETED | OUTPATIENT
Start: 2023-07-20 | End: 2023-07-20

## 2023-07-20 RX ORDER — FUROSEMIDE 10 MG/ML
20 INJECTION INTRAMUSCULAR; INTRAVENOUS PRN
Status: DISCONTINUED | OUTPATIENT
Start: 2023-07-20 | End: 2023-07-24 | Stop reason: HOSPADM

## 2023-07-20 RX ORDER — FAMOTIDINE 20 MG/1
20 TABLET, FILM COATED ORAL 2 TIMES DAILY
Status: DISCONTINUED | OUTPATIENT
Start: 2023-07-20 | End: 2023-07-24 | Stop reason: HOSPADM

## 2023-07-20 RX ORDER — LANOLIN ALCOHOL/MO/W.PET/CERES
400 CREAM (GRAM) TOPICAL DAILY
Status: DISCONTINUED | OUTPATIENT
Start: 2023-07-21 | End: 2023-07-24 | Stop reason: HOSPADM

## 2023-07-20 RX ORDER — POTASSIUM CHLORIDE 29.8 MG/ML
20 INJECTION INTRAVENOUS PRN
Status: DISCONTINUED | OUTPATIENT
Start: 2023-07-20 | End: 2023-07-24 | Stop reason: HOSPADM

## 2023-07-20 RX ORDER — ONDANSETRON HYDROCHLORIDE 8 MG/1
24 TABLET, FILM COATED ORAL ONCE
Status: COMPLETED | OUTPATIENT
Start: 2023-07-20 | End: 2023-07-20

## 2023-07-20 RX ORDER — LORAZEPAM 2 MG/ML
0.5 INJECTION INTRAMUSCULAR EVERY 4 HOURS PRN
Status: DISCONTINUED | OUTPATIENT
Start: 2023-07-20 | End: 2023-07-24 | Stop reason: HOSPADM

## 2023-07-20 RX ORDER — ENOXAPARIN SODIUM 100 MG/ML
40 INJECTION SUBCUTANEOUS DAILY
Status: DISCONTINUED | OUTPATIENT
Start: 2023-07-20 | End: 2023-07-24 | Stop reason: HOSPADM

## 2023-07-20 RX ORDER — DEXAMETHASONE 4 MG/1
20 TABLET ORAL ONCE
Status: COMPLETED | OUTPATIENT
Start: 2023-07-20 | End: 2023-07-20

## 2023-07-20 RX ORDER — LEUCOVORIN CALCIUM 25 MG/1
25 TABLET ORAL EVERY 6 HOURS
Status: DISCONTINUED | OUTPATIENT
Start: 2023-07-22 | End: 2023-07-24 | Stop reason: HOSPADM

## 2023-07-20 RX ORDER — SODIUM CHLORIDE 9 MG/ML
INJECTION, SOLUTION INTRAVENOUS PRN
Status: DISCONTINUED | OUTPATIENT
Start: 2023-07-20 | End: 2023-07-24 | Stop reason: HOSPADM

## 2023-07-20 RX ADMIN — SODIUM BICARBONATE: 84 INJECTION, SOLUTION INTRAVENOUS at 16:19

## 2023-07-20 RX ADMIN — FAMOTIDINE 20 MG: 20 TABLET ORAL at 21:06

## 2023-07-20 RX ADMIN — LEVETIRACETAM 500 MG: 500 TABLET, FILM COATED ORAL at 21:06

## 2023-07-20 RX ADMIN — SODIUM CHLORIDE, PRESERVATIVE FREE 10 ML: 5 INJECTION INTRAVENOUS at 21:05

## 2023-07-20 RX ADMIN — ONDANSETRON HYDROCHLORIDE 24 MG: 8 TABLET, FILM COATED ORAL at 21:56

## 2023-07-20 RX ADMIN — SODIUM CHLORIDE, PRESERVATIVE FREE 10 ML: 5 INJECTION INTRAVENOUS at 21:07

## 2023-07-20 RX ADMIN — VINCRISTINE SULFATE 2.6 MG: 1 INJECTION, SOLUTION INTRAVENOUS at 22:41

## 2023-07-20 RX ADMIN — SODIUM BICARBONATE: 84 INJECTION, SOLUTION INTRAVENOUS at 20:36

## 2023-07-20 RX ADMIN — ENOXAPARIN SODIUM 40 MG: 100 INJECTION SUBCUTANEOUS at 18:37

## 2023-07-20 RX ADMIN — DIPHENHYDRAMINE HYDROCHLORIDE 25 MG: 25 TABLET ORAL at 23:04

## 2023-07-20 RX ADMIN — DEXAMETHASONE 20 MG: 4 TABLET ORAL at 21:56

## 2023-07-20 RX ADMIN — HYDRALAZINE HYDROCHLORIDE 10 MG: 20 INJECTION INTRAMUSCULAR; INTRAVENOUS at 23:58

## 2023-07-20 RX ADMIN — SODIUM CHLORIDE 15 ML: 900 IRRIGANT IRRIGATION at 21:06

## 2023-07-20 RX ADMIN — METHOTREXATE 6600 MG: 25 INJECTION, SOLUTION INTRA-ARTERIAL; INTRAMUSCULAR; INTRATHECAL; INTRAVENOUS at 23:28

## 2023-07-20 NOTE — PLAN OF CARE
Problem: ABCDS Injury Assessment  Goal: Absence of physical injury  Outcome: Progressing     Problem: Safety - Adult  Goal: Free from fall injury  Outcome: Progressing  Flowsheets (Taken 7/20/2023 1847)  Free From Fall Injury:   Instruct family/caregiver on patient safety   Based on caregiver fall risk screen, instruct family/caregiver to ask for assistance with transferring infant if caregiver noted to have fall risk factors  Note: Orthostatic vital signs obtained at start of shift - see flowsheet for details. Pt meets criteria for orthostasis. Pt is a Med fall risk. See Marisela James Fall Score and ABCDS Injury Risk assessments. - Screening for Orthostasis AND not a [place] Risk per STEPHENS/ABCDS: Pt bed is in low position, side rails up, call light and belongings are in reach. Fall risk light is on outside pts room. Pt encouraged to call for assistance as needed. Will continue with hourly rounds for PO intake, pain needs, toileting and repositioning as needed.        Problem: Gastrointestinal - Adult  Goal: Minimal or absence of nausea and vomiting  Outcome: Progressing     Problem: Gastrointestinal - Adult  Goal: Maintains or returns to baseline bowel function  Outcome: Progressing     Problem: Gastrointestinal - Adult  Goal: Maintains adequate nutritional intake  Outcome: Progressing     Problem: Infection - Adult  Goal: Absence of infection at discharge  Outcome: Progressing     Problem: Infection - Adult  Goal: Absence of infection during hospitalization  Outcome: Progressing  Flowsheets (Taken 7/20/2023 1847)  Absence of infection during hospitalization:   Assess and monitor for signs and symptoms of infection   Monitor lab/diagnostic results   Monitor all insertion sites i.e., indwelling lines, tubes and drains   [place] appropriate cooling/warming therapies per order   Administer medications as ordered   Instruct and encourage patient and family to use good hand hygiene technique   Identify and

## 2023-07-20 NOTE — ACP (ADVANCE CARE PLANNING)
Advance Care Planning     General Advance Care Planning (ACP) Conversation    Date of Conversation: 7/20/2023  Conducted with: Patient with Decision Making Capacity   Healthcare Decision Maker: Next of Kin by law (only applies in absence of above) (name) pt's wife Brett Sosa Decision Maker:    Primary Decision Maker: Vicente Dobbs - Spouse - [phone]  Click here to complete Healthcare Decision Makers including selection of the Healthcare Decision Maker Relationship (ie \"Primary\"). Today we documented Decision Maker(s) consistent with Legal Next of Kin hierarchy.     Length of Voluntary ACP Conversation in minutes:  <16 minutes (Non-Billable)    JULY Gonzalez   for [employer] and [address] [place] [address] ([address] [place] [address])  Office Phone: [phone] [address]: [phone]

## 2023-07-20 NOTE — TELEPHONE ENCOUNTER
RICKEY Ramirez from [address] [place] [address]# [phone]. Unable to get hold of patient to go and do an evaluation. Speech Therapist saw patient  on 7/18/23. Patient is going to be admitted to [facility]. for treatment X 5 days. Will be discharged home X 5 days, then admitted back to [facility] X 5 days for Tx. This will continue for 4 weeks. Every time patient is discharged, home care has to do another evaluation, preventing them from doing any therapy. They would like to hold off on Home care until the 4 weeks is finished. Then go out and do new evaluation. RICKEY Ramirez would like you to call her and discuss your thoughts on this.

## 2023-07-20 NOTE — ONCOLOGY
Original chemotherapy orders reviewed and acknowledged. 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 Patient educated on chemotherapy regimen. Acknowledgement of informed consent for chemotherapy obtained. Estimated body surface area is 1.85 meters squared as calculated from the following:    Height as of this encounter: 5' 10\" (1.778 m). Weight as of this encounter: 152 lb 9.6 oz (69.2 kg). verified. Appropriate dosing calculations of chemotherapy based on above height, weight, and BSA verified.       Electronically signed by Rachael Levine RN on 7/20/2023 at 4:01 PM

## 2023-07-20 NOTE — H&P
[facility] History and Physical       Attending Physician: Adolph Guzman DO    Primary Care: MEI Alonso - CNP       Referring MD: Adolph Guzman DO  [address]  [address] [place] [address],  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 CT CAP on 4/10/23 that was negative for any systemic disease. He underwent a biopsy on 23 performed by Dr. Paresh Montgomery that showed T cell infiltrate but not consistent with lymphoma so it was sent to [facility] clinic for second opinion and was consistent with reactive t cell population. He was started on steroids and had resolution of symptoms. He underwent a repeat MRI on 23 that showed significantly decreased cerebral periventricular lymphoma and vasogenic edema compatible with positive response to treatment. Plan initially was to repeat an MRI in 8 weeks for reevaluation, but patient presented to his PCP with confusion 2823. Given his new onset of confusion, patient was then admitted to [facility] for further work up and treatment. 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   Neurology and neurosurgery were consulted and he eventually underwent repeat

## 2023-07-21 LAB
ALBUMIN SERPL-MCNC: 3.2 G/DL (ref 3.4–5)
ALP SERPL-CCNC: 50 U/L (ref 40–129)
ALT SERPL-CCNC: 24 U/L (ref 10–40)
ANION GAP SERPL CALCULATED.3IONS-SCNC: 12 MMOL/L (ref 3–16)
AST SERPL-CCNC: 11 U/L (ref 15–37)
BASOPHILS # BLD: 0 K/UL (ref 0–0.2)
BASOPHILS NFR BLD: 0.1 %
BILIRUB DIRECT SERPL-MCNC: <0.2 MG/DL (ref 0–0.3)
BILIRUB INDIRECT SERPL-MCNC: ABNORMAL MG/DL (ref 0–1)
BILIRUB SERPL-MCNC: 0.8 MG/DL (ref 0–1)
BILIRUB UR QL STRIP.AUTO: NEGATIVE
BUN SERPL-MCNC: 16 MG/DL (ref 7–20)
CALCIUM SERPL-MCNC: 8.3 MG/DL (ref 8.3–10.6)
CHLORIDE SERPL-SCNC: 104 MMOL/L (ref 99–110)
CLARITY UR: CLEAR
CO2 SERPL-SCNC: 24 MMOL/L (ref 21–32)
COLOR UR: YELLOW
CREAT SERPL-MCNC: 0.9 MG/DL (ref 0.8–1.3)
DEPRECATED RDW RBC AUTO: 12.9 % (ref 12.4–15.4)
EKG ATRIAL RATE: 64 BPM
EKG DIAGNOSIS: NORMAL
EKG P AXIS: 54 DEGREES
EKG P-R INTERVAL: 184 MS
EKG Q-T INTERVAL: 496 MS
EKG QRS DURATION: 156 MS
EKG QTC CALCULATION (BAZETT): 511 MS
EKG R AXIS: -87 DEGREES
EKG T AXIS: 92 DEGREES
EKG VENTRICULAR RATE: 64 BPM
EOSINOPHIL # BLD: 0 K/UL (ref 0–0.6)
EOSINOPHIL NFR BLD: 0.1 %
GFR SERPLBLD CREATININE-BSD FMLA CKD-EPI: >60 ML/MIN/{1.73_M2}
GLUCOSE SERPL-MCNC: 151 MG/DL (ref 70–99)
GLUCOSE UR STRIP.AUTO-MCNC: NEGATIVE MG/DL
HCT VFR BLD AUTO: 35.1 % (ref 40.5–52.5)
HGB BLD-MCNC: 12.1 G/DL (ref 13.5–17.5)
HGB UR QL STRIP.AUTO: NEGATIVE
KETONES UR STRIP.AUTO-MCNC: NEGATIVE MG/DL
LDH SERPL L TO P-CCNC: 183 U/L (ref 100–190)
LEUKOCYTE ESTERASE UR QL STRIP.AUTO: NEGATIVE
LYMPHOCYTES # BLD: 0.1 K/UL (ref 1–5.1)
LYMPHOCYTES NFR BLD: 2.5 %
MAGNESIUM SERPL-MCNC: 2.1 MG/DL (ref 1.8–2.4)
MCH RBC QN AUTO: 32.1 PG (ref 26–34)
MCHC RBC AUTO-ENTMCNC: 34.4 G/DL (ref 31–36)
MCV RBC AUTO: 93.4 FL (ref 80–100)
MONOCYTES # BLD: 0.2 K/UL (ref 0–1.3)
MONOCYTES NFR BLD: 3.3 %
NEUTROPHILS # BLD: 4.6 K/UL (ref 1.7–7.7)
NEUTROPHILS NFR BLD: 94 %
NITRITE UR QL STRIP.AUTO: NEGATIVE
PH UR STRIP.AUTO: 7 [PH] (ref 5–8)
PH UR STRIP.AUTO: 7.5 [PH] (ref 5–8)
PH UR STRIP.AUTO: 8 [PH] (ref 5–8)
PHOSPHATE SERPL-MCNC: 2.8 MG/DL (ref 2.5–4.9)
PLATELET # BLD AUTO: 142 K/UL (ref 135–450)
PMV BLD AUTO: 9.5 FL (ref 5–10.5)
POTASSIUM SERPL-SCNC: 3.6 MMOL/L (ref 3.5–5.1)
PROT SERPL-MCNC: 5.3 G/DL (ref 6.4–8.2)
PROT UR STRIP.AUTO-MCNC: NEGATIVE MG/DL
RBC # BLD AUTO: 3.76 M/UL (ref 4.2–5.9)
SODIUM SERPL-SCNC: 140 MMOL/L (ref 136–145)
SP GR UR STRIP.AUTO: 1.01 (ref 1–1.03)
UA DIPSTICK W REFLEX MICRO PNL UR: NORMAL
URATE SERPL-MCNC: 3.2 MG/DL (ref 3.5–7.2)
URN SPEC COLLECT METH UR: NORMAL
UROBILINOGEN UR STRIP-ACNC: 0.2 E.U./DL
WBC # BLD AUTO: 4.9 K/UL (ref 4–11)

## 2023-07-21 PROCEDURE — 2580000003 HC RX 258: Performed by: STUDENT IN AN ORGANIZED HEALTH CARE EDUCATION/TRAINING PROGRAM

## 2023-07-21 PROCEDURE — 93010 ELECTROCARDIOGRAM REPORT: CPT | Performed by: INTERNAL MEDICINE

## 2023-07-21 PROCEDURE — 6360000002 HC RX W HCPCS: Performed by: NURSE PRACTITIONER

## 2023-07-21 PROCEDURE — 6370000000 HC RX 637 (ALT 250 FOR IP): Performed by: STUDENT IN AN ORGANIZED HEALTH CARE EDUCATION/TRAINING PROGRAM

## 2023-07-21 PROCEDURE — 83615 LACTATE (LD) (LDH) ENZYME: CPT

## 2023-07-21 PROCEDURE — 2580000003 HC RX 258: Performed by: NURSE PRACTITIONER

## 2023-07-21 PROCEDURE — 96415 CHEMO IV INFUSION ADDL HR: CPT

## 2023-07-21 PROCEDURE — 80048 BASIC METABOLIC PNL TOTAL CA: CPT

## 2023-07-21 PROCEDURE — 2060000000 HC ICU INTERMEDIATE R&B

## 2023-07-21 PROCEDURE — 83986 ASSAY PH BODY FLUID NOS: CPT

## 2023-07-21 PROCEDURE — 97116 GAIT TRAINING THERAPY: CPT

## 2023-07-21 PROCEDURE — 97535 SELF CARE MNGMENT TRAINING: CPT

## 2023-07-21 PROCEDURE — 80076 HEPATIC FUNCTION PANEL: CPT

## 2023-07-21 PROCEDURE — 36592 COLLECT BLOOD FROM PICC: CPT

## 2023-07-21 PROCEDURE — 2500000003 HC RX 250 WO HCPCS: Performed by: STUDENT IN AN ORGANIZED HEALTH CARE EDUCATION/TRAINING PROGRAM

## 2023-07-21 PROCEDURE — 6370000000 HC RX 637 (ALT 250 FOR IP): Performed by: NURSE PRACTITIONER

## 2023-07-21 PROCEDURE — 97161 PT EVAL LOW COMPLEX 20 MIN: CPT

## 2023-07-21 PROCEDURE — 97165 OT EVAL LOW COMPLEX 30 MIN: CPT

## 2023-07-21 PROCEDURE — 85025 COMPLETE CBC W/AUTO DIFF WBC: CPT

## 2023-07-21 PROCEDURE — 84100 ASSAY OF PHOSPHORUS: CPT

## 2023-07-21 PROCEDURE — 84550 ASSAY OF BLOOD/URIC ACID: CPT

## 2023-07-21 PROCEDURE — 83735 ASSAY OF MAGNESIUM: CPT

## 2023-07-21 RX ORDER — ACETAMINOPHEN 325 MG/1
650 TABLET ORAL EVERY 6 HOURS PRN
Status: DISCONTINUED | OUTPATIENT
Start: 2023-07-21 | End: 2023-07-24 | Stop reason: HOSPADM

## 2023-07-21 RX ORDER — DIPHENHYDRAMINE HCL 25 MG
25 TABLET ORAL NIGHTLY PRN
Status: DISCONTINUED | OUTPATIENT
Start: 2023-07-21 | End: 2023-07-24 | Stop reason: HOSPADM

## 2023-07-21 RX ORDER — POTASSIUM CHLORIDE 20 MEQ/1
20 TABLET, EXTENDED RELEASE ORAL
Status: DISCONTINUED | OUTPATIENT
Start: 2023-07-21 | End: 2023-07-24 | Stop reason: HOSPADM

## 2023-07-21 RX ADMIN — POTASSIUM CHLORIDE 20 MEQ: 1500 TABLET, EXTENDED RELEASE ORAL at 09:18

## 2023-07-21 RX ADMIN — DEXAMETHASONE 4 MG: 4 TABLET ORAL at 09:18

## 2023-07-21 RX ADMIN — FAMOTIDINE 20 MG: 20 TABLET ORAL at 09:18

## 2023-07-21 RX ADMIN — LEVETIRACETAM 500 MG: 500 TABLET, FILM COATED ORAL at 09:17

## 2023-07-21 RX ADMIN — TAMSULOSIN HYDROCHLORIDE 0.4 MG: 0.4 CAPSULE ORAL at 09:17

## 2023-07-21 RX ADMIN — SODIUM BICARBONATE: 84 INJECTION, SOLUTION INTRAVENOUS at 04:52

## 2023-07-21 RX ADMIN — ENOXAPARIN SODIUM 40 MG: 100 INJECTION SUBCUTANEOUS at 18:21

## 2023-07-21 RX ADMIN — SODIUM CHLORIDE, PRESERVATIVE FREE 10 ML: 5 INJECTION INTRAVENOUS at 09:18

## 2023-07-21 RX ADMIN — SODIUM CHLORIDE, PRESERVATIVE FREE 10 ML: 5 INJECTION INTRAVENOUS at 21:54

## 2023-07-21 RX ADMIN — FAMOTIDINE 20 MG: 20 TABLET ORAL at 21:51

## 2023-07-21 RX ADMIN — LEVETIRACETAM 500 MG: 500 TABLET, FILM COATED ORAL at 21:51

## 2023-07-21 RX ADMIN — SODIUM CHLORIDE, PRESERVATIVE FREE 10 ML: 5 INJECTION INTRAVENOUS at 21:53

## 2023-07-21 RX ADMIN — Medication 400 MG: at 09:17

## 2023-07-21 RX ADMIN — SODIUM CHLORIDE 15 ML: 900 IRRIGANT IRRIGATION at 21:59

## 2023-07-21 RX ADMIN — ACETAMINOPHEN 650 MG: 325 TABLET ORAL at 12:13

## 2023-07-21 RX ADMIN — Medication 1 TABLET: at 09:18

## 2023-07-21 RX ADMIN — DIPHENHYDRAMINE HYDROCHLORIDE 25 MG: 25 TABLET ORAL at 21:51

## 2023-07-21 ASSESSMENT — PAIN SCALES - GENERAL
PAINLEVEL_OUTOF10: 0
PAINLEVEL_OUTOF10: 0
PAINLEVEL_OUTOF10: 3

## 2023-07-21 ASSESSMENT — PAIN DESCRIPTION - ORIENTATION: ORIENTATION: LEFT;RIGHT

## 2023-07-21 ASSESSMENT — PAIN DESCRIPTION - DESCRIPTORS: DESCRIPTORS: DISCOMFORT;THROBBING

## 2023-07-21 ASSESSMENT — PAIN - FUNCTIONAL ASSESSMENT: PAIN_FUNCTIONAL_ASSESSMENT: ACTIVITIES ARE NOT PREVENTED

## 2023-07-21 ASSESSMENT — PAIN DESCRIPTION - LOCATION: LOCATION: HEAD

## 2023-07-21 NOTE — ONCOLOGY
Administration: Chemotherapy drug Methotrexate independently verified with Dian Da Silva RN prior to administration. Acknowledgement of informed consent for chemotherapy administration verified. Original order, appropriateness of regimen, drug supplied, height, weight, BSA, dose calculations, expiration dates/times, drug appearance, and two patient identifiers were verified by both RNs. Drug checked for vesicant/irritant status and for risk of hypersensitivity. Most recent laboratory values and allergies, were reviewed. Positive, brisk blood return via CVC was confirmed prior to administration. Chest x-ray for correct line placement reviewed. Yaneli Edmondson RN and Dian Da Silva RN verified correct rate of chemotherapy and maintenance IV fluids. Patient was educated on chemotherapy regimen prior to administration including indication for treatment related to disease & side effects of chemotherapy drug. Patient verbalizes understanding of all instructions. Completion of Chemotherapy: Monitoring during infusion done per policy, see Flowsheets. Blood return verified before, during, and after infusion per policy; no signs of extravasation. Pt tolerating chemotherapy well and without incident. Chemotherapy infusion end time on the [facility] Will continue to monitor.

## 2023-07-21 NOTE — PLAN OF CARE
Problem: Safety - Adult  Goal: Free from fall injury  7/21/2023 1910 by Artist Kehr, RN  Outcome: Progressing  Note: Pt in bed with bed alarm on, instructed to call for assistance. Verbalized understanding. All fall precautions in place. Problem: ABCDS Injury Assessment  Goal: Absence of physical injury  7/21/2023 1910 by Artist Kehr, RN  Outcome: Progressing  Note: Patient walked the halls multiple times today. Patient also did the bike in the family room for about 10 mins. Patient tolerated well.

## 2023-07-21 NOTE — ONCOLOGY
Verification:  Original chemotherapy orders reviewed and acknowledged. Appropriateness of chemotherapy treatment regimen R-MPV for diagnosis of CNS lymphoma was verified. Patient educated on chemotherapy regimen. Acknowledgement of informed consent for chemotherapy obtained. Estimated body surface area is 1.85 meters squared as calculated from the following:    Height as of this encounter: 5' 10\" (1.778 m). Weight as of this encounter: 152 lb 9.6 oz (69.2 kg). verified. Appropriate dosing calculations of chemotherapy based on above height, weight, and BSA verified. Administration: Chemotherapy drug Vincristine independently verified with Ashley Adames RN prior to administration. Acknowledgement of informed consent for chemotherapy administration verified. Original order, appropriateness of regimen, drug supplied, height, weight, BSA, dose calculations, expiration dates/times, drug appearance, and two patient identifiers were verified by both RNs. Drug checked for vesicant/irritant status and for risk of hypersensitivity. Most recent laboratory values and allergies, were reviewed. Positive, brisk blood return via CVC was confirmed prior to administration. Chest x-ray for correct line placement reviewed. Mira Benson RN and Ashley Adames RN verified correct rate of chemotherapy and maintenance IV fluids. Patient was educated on chemotherapy regimen prior to administration including indication for treatment related to disease & side effects of chemotherapy drug. Patient verbalizes understanding of all instructions. Completion of Chemotherapy: Monitoring during infusion done per policy, see Flowsheets. Blood return verified before, during, and after infusion per policy; no signs of extravasation. Pt tolerating chemotherapy well and without incident. Chemotherapy infusion end time on the [facility] Will continue to monitor.

## 2023-07-21 NOTE — PLAN OF CARE
Problem: ABCDS Injury Assessment  Goal: Absence of physical injury  7/21/2023 3099 by Pio Huertas RN  Outcome: Progressing  Flowsheets (Taken 7/20/2023 2256)  Absence of Physical Injury: Implement safety measures based on patient assessment    Problem: Safety - Adult  Goal: Free from fall injury  7/21/2023 2072 by Pio Huertas RN  Outcome: Progressing  Flowsheets (Taken 7/20/2023 2256)  Free From Fall Injury:   Yenny Patterson family/caregiver on patient safety   Based on caregiver fall risk screen, instruct family/caregiver to ask for assistance with transferring infant if caregiver noted to have fall risk factors    Flowsheets (Taken 7/20/2023 1847)  Free From Fall Injury:   Instruct family/caregiver on patient safety   Based on caregiver fall risk screen, instruct family/caregiver to ask for assistance with transferring infant if caregiver noted to have fall risk factors  Note: Orthostatic vital signs obtained at start of shift - see flowsheet for details. Pt meets criteria for orthostasis. Pt is a Med fall risk. See Danielle Holm Fall Score and ABCDS Injury Risk assessments. [place] Risk per STEPHENS/ABCDS: Pt bed is in low position, side rails up, call light and belongings are in reach. Fall risk light is on outside pts room. Pt encouraged to call for assistance as needed. Will continue with hourly rounds for PO intake, pain needs, toileting and repositioning as needed.

## 2023-07-22 LAB
ANION GAP SERPL CALCULATED.3IONS-SCNC: 8 MMOL/L (ref 3–16)
BASOPHILS # BLD: 0 K/UL (ref 0–0.2)
BASOPHILS NFR BLD: 0.5 %
BUN SERPL-MCNC: 16 MG/DL (ref 7–20)
CALCIUM SERPL-MCNC: 8 MG/DL (ref 8.3–10.6)
CHLORIDE SERPL-SCNC: 108 MMOL/L (ref 99–110)
CO2 SERPL-SCNC: 26 MMOL/L (ref 21–32)
CREAT SERPL-MCNC: 0.9 MG/DL (ref 0.8–1.3)
DEPRECATED RDW RBC AUTO: 13.3 % (ref 12.4–15.4)
EOSINOPHIL # BLD: 0 K/UL (ref 0–0.6)
EOSINOPHIL NFR BLD: 0.8 %
GFR SERPLBLD CREATININE-BSD FMLA CKD-EPI: >60 ML/MIN/{1.73_M2}
GLUCOSE SERPL-MCNC: 116 MG/DL (ref 70–99)
HCT VFR BLD AUTO: 34 % (ref 40.5–52.5)
HGB BLD-MCNC: 11.9 G/DL (ref 13.5–17.5)
LYMPHOCYTES # BLD: 0.4 K/UL (ref 1–5.1)
LYMPHOCYTES NFR BLD: 7.9 %
MCH RBC QN AUTO: 32.2 PG (ref 26–34)
MCHC RBC AUTO-ENTMCNC: 35 G/DL (ref 31–36)
MCV RBC AUTO: 92 FL (ref 80–100)
METHOTREXATE LEVEL: 1.19 UMOL/L
MONOCYTES # BLD: 0.6 K/UL (ref 0–1.3)
MONOCYTES NFR BLD: 10.8 %
NEUTROPHILS # BLD: 4.4 K/UL (ref 1.7–7.7)
NEUTROPHILS NFR BLD: 80 %
PH UR STRIP.AUTO: 7 [PH] (ref 5–8)
PH UR STRIP.AUTO: 7.5 [PH] (ref 5–8)
PH UR STRIP.AUTO: 8 [PH] (ref 5–8)
PLATELET # BLD AUTO: 161 K/UL (ref 135–450)
PMV BLD AUTO: 8.9 FL (ref 5–10.5)
POTASSIUM SERPL-SCNC: 3.3 MMOL/L (ref 3.5–5.1)
RBC # BLD AUTO: 3.69 M/UL (ref 4.2–5.9)
SODIUM SERPL-SCNC: 142 MMOL/L (ref 136–145)
WBC # BLD AUTO: 5.4 K/UL (ref 4–11)

## 2023-07-22 PROCEDURE — 2580000003 HC RX 258: Performed by: STUDENT IN AN ORGANIZED HEALTH CARE EDUCATION/TRAINING PROGRAM

## 2023-07-22 PROCEDURE — 2580000003 HC RX 258: Performed by: NURSE PRACTITIONER

## 2023-07-22 PROCEDURE — 83986 ASSAY PH BODY FLUID NOS: CPT

## 2023-07-22 PROCEDURE — 2060000000 HC ICU INTERMEDIATE R&B

## 2023-07-22 PROCEDURE — 85025 COMPLETE CBC W/AUTO DIFF WBC: CPT

## 2023-07-22 PROCEDURE — 36592 COLLECT BLOOD FROM PICC: CPT

## 2023-07-22 PROCEDURE — 83520 IMMUNOASSAY QUANT NOS NONAB: CPT

## 2023-07-22 PROCEDURE — 80048 BASIC METABOLIC PNL TOTAL CA: CPT

## 2023-07-22 PROCEDURE — 6370000000 HC RX 637 (ALT 250 FOR IP): Performed by: STUDENT IN AN ORGANIZED HEALTH CARE EDUCATION/TRAINING PROGRAM

## 2023-07-22 PROCEDURE — 6370000000 HC RX 637 (ALT 250 FOR IP): Performed by: NURSE PRACTITIONER

## 2023-07-22 PROCEDURE — 2500000003 HC RX 250 WO HCPCS: Performed by: STUDENT IN AN ORGANIZED HEALTH CARE EDUCATION/TRAINING PROGRAM

## 2023-07-22 PROCEDURE — 6360000002 HC RX W HCPCS: Performed by: NURSE PRACTITIONER

## 2023-07-22 RX ADMIN — LEUCOVORIN CALCIUM 25 MG: 25 TABLET ORAL at 18:55

## 2023-07-22 RX ADMIN — SODIUM CHLORIDE, PRESERVATIVE FREE 10 ML: 5 INJECTION INTRAVENOUS at 08:57

## 2023-07-22 RX ADMIN — SODIUM CHLORIDE 15 ML: 900 IRRIGANT IRRIGATION at 20:49

## 2023-07-22 RX ADMIN — LEVETIRACETAM 500 MG: 500 TABLET, FILM COATED ORAL at 20:48

## 2023-07-22 RX ADMIN — TAMSULOSIN HYDROCHLORIDE 0.4 MG: 0.4 CAPSULE ORAL at 08:56

## 2023-07-22 RX ADMIN — POTASSIUM CHLORIDE 20 MEQ: 400 INJECTION, SOLUTION INTRAVENOUS at 05:13

## 2023-07-22 RX ADMIN — SODIUM BICARBONATE: 84 INJECTION, SOLUTION INTRAVENOUS at 18:54

## 2023-07-22 RX ADMIN — POTASSIUM CHLORIDE 20 MEQ: 1500 TABLET, EXTENDED RELEASE ORAL at 08:56

## 2023-07-22 RX ADMIN — Medication 400 MG: at 08:56

## 2023-07-22 RX ADMIN — SODIUM BICARBONATE: 84 INJECTION, SOLUTION INTRAVENOUS at 01:35

## 2023-07-22 RX ADMIN — SODIUM BICARBONATE: 84 INJECTION, SOLUTION INTRAVENOUS at 11:45

## 2023-07-22 RX ADMIN — ENOXAPARIN SODIUM 40 MG: 100 INJECTION SUBCUTANEOUS at 18:06

## 2023-07-22 RX ADMIN — Medication 1 TABLET: at 08:56

## 2023-07-22 RX ADMIN — LEVETIRACETAM 500 MG: 500 TABLET, FILM COATED ORAL at 08:56

## 2023-07-22 RX ADMIN — FAMOTIDINE 20 MG: 20 TABLET ORAL at 08:56

## 2023-07-22 RX ADMIN — POTASSIUM CHLORIDE 20 MEQ: 400 INJECTION, SOLUTION INTRAVENOUS at 06:32

## 2023-07-22 RX ADMIN — FAMOTIDINE 20 MG: 20 TABLET ORAL at 20:48

## 2023-07-22 RX ADMIN — POTASSIUM CHLORIDE 20 MEQ: 400 INJECTION, SOLUTION INTRAVENOUS at 08:49

## 2023-07-22 RX ADMIN — POTASSIUM CHLORIDE 20 MEQ: 400 INJECTION, SOLUTION INTRAVENOUS at 07:28

## 2023-07-22 RX ADMIN — LEUCOVORIN CALCIUM 25 MG: 25 TABLET ORAL at 13:17

## 2023-07-22 RX ADMIN — SODIUM CHLORIDE, PRESERVATIVE FREE 10 ML: 5 INJECTION INTRAVENOUS at 20:50

## 2023-07-22 RX ADMIN — SODIUM CHLORIDE, PRESERVATIVE FREE 10 ML: 5 INJECTION INTRAVENOUS at 20:49

## 2023-07-22 RX ADMIN — LEUCOVORIN CALCIUM 25 MG: 25 TABLET ORAL at 00:55

## 2023-07-22 RX ADMIN — DEXAMETHASONE 4 MG: 4 TABLET ORAL at 08:56

## 2023-07-22 RX ADMIN — LEUCOVORIN CALCIUM 25 MG: 25 TABLET ORAL at 07:24

## 2023-07-22 ASSESSMENT — PAIN SCALES - GENERAL
PAINLEVEL_OUTOF10: 0

## 2023-07-22 NOTE — PLAN OF CARE
Problem: ABCDS Injury Assessment  Goal: Absence of physical injury  7/22/2023 0608 by Cassanrda Morrissey RN  Outcome: Progressing  Flowsheets (Taken 7/20/2023 2256 by Chiara Minor RN)  Absence of Physical Injury: Implement safety measures based on patient assessment  Note: Bed in low position, pt up with assist, bed and chair alarm on pt. HOB elevated as tolerated, call light in reach, non skid socks on pt. Problem: Safety - Adult  Goal: Free from fall injury  7/22/2023 0608 by Cassandra Morrissey RN  Outcome: Progressing  Flowsheets (Taken 7/22/2023 0608)  Free From Fall Injury: Instruct family/caregiver on patient safety  Note:  Pt is a High fall risk. See Rakel George Fall Score and ABCDS Injury Risk assessments. Explained fall risk precautions to pt and family and rationale behind their use to keep the patient safe. Pt bed is in low position, side rails up, call light and belongings are in reach. Fall wristband applied and present on pts wrist.  Bed alarm on. Pt encouraged to call for assistance. Will continue with hourly rounds for PO intake, pain needs, toileting and repositioning as needed. Problem: Gastrointestinal - Adult  Goal: Minimal or absence of nausea and vomiting  Outcome: Progressing  Flowsheets (Taken 7/22/2023 0608)  Minimal or absence of nausea and vomiting:   Provide nonpharmacologic comfort measures as appropriate   Administer IV fluids as ordered to ensure adequate hydration  Note: No complaints of nausea or vomiting this shift.       Problem: Infection - Adult  Goal: Absence of infection during hospitalization  Outcome: Progressing  Flowsheets (Taken 7/22/2023 0608)  Absence of infection during hospitalization:   Assess and monitor for signs and symptoms of infection   Monitor lab/diagnostic results   Monitor all insertion sites i.e., indwelling lines, tubes and drains   Administer medications as ordered   Instruct and encourage patient and family to use good hand hygiene technique  Note: CVC

## 2023-07-23 LAB
ANION GAP SERPL CALCULATED.3IONS-SCNC: 9 MMOL/L (ref 3–16)
BASOPHILS # BLD: 0 K/UL (ref 0–0.2)
BASOPHILS NFR BLD: 0.1 %
BUN SERPL-MCNC: 16 MG/DL (ref 7–20)
CALCIUM SERPL-MCNC: 8.3 MG/DL (ref 8.3–10.6)
CHLORIDE SERPL-SCNC: 107 MMOL/L (ref 99–110)
CO2 SERPL-SCNC: 27 MMOL/L (ref 21–32)
CREAT SERPL-MCNC: 1 MG/DL (ref 0.8–1.3)
DEPRECATED RDW RBC AUTO: 13.3 % (ref 12.4–15.4)
EOSINOPHIL # BLD: 0.1 K/UL (ref 0–0.6)
EOSINOPHIL NFR BLD: 2.3 %
GFR SERPLBLD CREATININE-BSD FMLA CKD-EPI: >60 ML/MIN/{1.73_M2}
GLUCOSE SERPL-MCNC: 82 MG/DL (ref 70–99)
HCT VFR BLD AUTO: 35.3 % (ref 40.5–52.5)
HGB BLD-MCNC: 12.3 G/DL (ref 13.5–17.5)
LYMPHOCYTES # BLD: 0.5 K/UL (ref 1–5.1)
LYMPHOCYTES NFR BLD: 14 %
MCH RBC QN AUTO: 32 PG (ref 26–34)
MCHC RBC AUTO-ENTMCNC: 34.7 G/DL (ref 31–36)
MCV RBC AUTO: 92.2 FL (ref 80–100)
METHOTREXATE LEVEL: 0.24 UMOL/L
MONOCYTES # BLD: 0.2 K/UL (ref 0–1.3)
MONOCYTES NFR BLD: 5.9 %
NEUTROPHILS # BLD: 3 K/UL (ref 1.7–7.7)
NEUTROPHILS NFR BLD: 77.7 %
PH UR STRIP.AUTO: 7.5 [PH] (ref 5–8)
PH UR STRIP.AUTO: 8 [PH] (ref 5–8)
PH UR STRIP.AUTO: 8.5 [PH] (ref 5–8)
PLATELET # BLD AUTO: 186 K/UL (ref 135–450)
PMV BLD AUTO: 8.5 FL (ref 5–10.5)
POTASSIUM SERPL-SCNC: 3.7 MMOL/L (ref 3.5–5.1)
RBC # BLD AUTO: 3.83 M/UL (ref 4.2–5.9)
SODIUM SERPL-SCNC: 143 MMOL/L (ref 136–145)
WBC # BLD AUTO: 3.8 K/UL (ref 4–11)

## 2023-07-23 PROCEDURE — 6370000000 HC RX 637 (ALT 250 FOR IP): Performed by: STUDENT IN AN ORGANIZED HEALTH CARE EDUCATION/TRAINING PROGRAM

## 2023-07-23 PROCEDURE — 2580000003 HC RX 258: Performed by: NURSE PRACTITIONER

## 2023-07-23 PROCEDURE — 83986 ASSAY PH BODY FLUID NOS: CPT

## 2023-07-23 PROCEDURE — 2500000003 HC RX 250 WO HCPCS: Performed by: STUDENT IN AN ORGANIZED HEALTH CARE EDUCATION/TRAINING PROGRAM

## 2023-07-23 PROCEDURE — 6370000000 HC RX 637 (ALT 250 FOR IP): Performed by: NURSE PRACTITIONER

## 2023-07-23 PROCEDURE — 6360000002 HC RX W HCPCS: Performed by: NURSE PRACTITIONER

## 2023-07-23 PROCEDURE — 2060000000 HC ICU INTERMEDIATE R&B

## 2023-07-23 PROCEDURE — 80048 BASIC METABOLIC PNL TOTAL CA: CPT

## 2023-07-23 PROCEDURE — 2580000003 HC RX 258: Performed by: STUDENT IN AN ORGANIZED HEALTH CARE EDUCATION/TRAINING PROGRAM

## 2023-07-23 PROCEDURE — 83520 IMMUNOASSAY QUANT NOS NONAB: CPT

## 2023-07-23 PROCEDURE — 36592 COLLECT BLOOD FROM PICC: CPT

## 2023-07-23 PROCEDURE — 85025 COMPLETE CBC W/AUTO DIFF WBC: CPT

## 2023-07-23 RX ADMIN — SODIUM CHLORIDE 15 ML: 900 IRRIGANT IRRIGATION at 13:03

## 2023-07-23 RX ADMIN — LORAZEPAM 0.5 MG: 0.5 TABLET ORAL at 09:58

## 2023-07-23 RX ADMIN — LEUCOVORIN CALCIUM 25 MG: 25 TABLET ORAL at 06:48

## 2023-07-23 RX ADMIN — LEUCOVORIN CALCIUM 25 MG: 25 TABLET ORAL at 00:33

## 2023-07-23 RX ADMIN — Medication 1 TABLET: at 08:26

## 2023-07-23 RX ADMIN — ACETAMINOPHEN 650 MG: 325 TABLET ORAL at 07:37

## 2023-07-23 RX ADMIN — LEVETIRACETAM 500 MG: 500 TABLET, FILM COATED ORAL at 21:00

## 2023-07-23 RX ADMIN — LEUCOVORIN CALCIUM 25 MG: 25 TABLET ORAL at 18:37

## 2023-07-23 RX ADMIN — SODIUM CHLORIDE, PRESERVATIVE FREE 10 ML: 5 INJECTION INTRAVENOUS at 08:26

## 2023-07-23 RX ADMIN — SODIUM BICARBONATE: 84 INJECTION, SOLUTION INTRAVENOUS at 03:09

## 2023-07-23 RX ADMIN — SODIUM CHLORIDE, PRESERVATIVE FREE 10 ML: 5 INJECTION INTRAVENOUS at 21:01

## 2023-07-23 RX ADMIN — DEXAMETHASONE 4 MG: 4 TABLET ORAL at 08:26

## 2023-07-23 RX ADMIN — POTASSIUM CHLORIDE 20 MEQ: 1500 TABLET, EXTENDED RELEASE ORAL at 08:26

## 2023-07-23 RX ADMIN — HYDRALAZINE HYDROCHLORIDE 10 MG: 20 INJECTION INTRAMUSCULAR; INTRAVENOUS at 03:08

## 2023-07-23 RX ADMIN — FAMOTIDINE 20 MG: 20 TABLET ORAL at 08:26

## 2023-07-23 RX ADMIN — LEVETIRACETAM 500 MG: 500 TABLET, FILM COATED ORAL at 08:26

## 2023-07-23 RX ADMIN — FAMOTIDINE 20 MG: 20 TABLET ORAL at 21:00

## 2023-07-23 RX ADMIN — ENOXAPARIN SODIUM 40 MG: 100 INJECTION SUBCUTANEOUS at 17:59

## 2023-07-23 RX ADMIN — TAMSULOSIN HYDROCHLORIDE 0.4 MG: 0.4 CAPSULE ORAL at 08:26

## 2023-07-23 RX ADMIN — SODIUM CHLORIDE 15 ML: 900 IRRIGANT IRRIGATION at 06:48

## 2023-07-23 RX ADMIN — SODIUM CHLORIDE, PRESERVATIVE FREE 10 ML: 5 INJECTION INTRAVENOUS at 10:52

## 2023-07-23 RX ADMIN — SODIUM BICARBONATE: 84 INJECTION, SOLUTION INTRAVENOUS at 17:54

## 2023-07-23 RX ADMIN — LEUCOVORIN CALCIUM 25 MG: 25 TABLET ORAL at 13:03

## 2023-07-23 RX ADMIN — LORAZEPAM 0.5 MG: 0.5 TABLET ORAL at 21:00

## 2023-07-23 RX ADMIN — SODIUM BICARBONATE: 84 INJECTION, SOLUTION INTRAVENOUS at 09:54

## 2023-07-23 RX ADMIN — DIPHENHYDRAMINE HYDROCHLORIDE 25 MG: 25 TABLET ORAL at 21:00

## 2023-07-23 RX ADMIN — SODIUM CHLORIDE 15 ML: 900 IRRIGANT IRRIGATION at 21:01

## 2023-07-23 RX ADMIN — Medication 400 MG: at 08:26

## 2023-07-23 ASSESSMENT — PAIN DESCRIPTION - PAIN TYPE: TYPE: ACUTE PAIN

## 2023-07-23 ASSESSMENT — PAIN DESCRIPTION - LOCATION: LOCATION: HEAD

## 2023-07-23 ASSESSMENT — PAIN DESCRIPTION - ORIENTATION: ORIENTATION: LEFT;RIGHT

## 2023-07-23 ASSESSMENT — PAIN - FUNCTIONAL ASSESSMENT: PAIN_FUNCTIONAL_ASSESSMENT: ACTIVITIES ARE NOT PREVENTED

## 2023-07-23 ASSESSMENT — PAIN DESCRIPTION - DESCRIPTORS: DESCRIPTORS: DISCOMFORT;THROBBING

## 2023-07-23 ASSESSMENT — PAIN SCALES - GENERAL
PAINLEVEL_OUTOF10: 0
PAINLEVEL_OUTOF10: 5
PAINLEVEL_OUTOF10: 0

## 2023-07-23 NOTE — PLAN OF CARE
Problem: ABCDS Injury Assessment  Goal: Absence of physical injury  Outcome: Progressing  Flowsheets (Taken 7/23/2023 0538)  Absence of Physical Injury: Implement safety measures based on patient assessment  Note: Bed in lowest position, bed/chair alarm on pt. HOB elevated as tolerated, pt stand by assist with steady  gait. Pt instructed to use call light for assistance, call light in reach. Overbed table at bedside. Problem: Safety - Adult  Goal: Free from fall injury  Outcome: Progressing  Flowsheets (Taken 7/23/2023 0538)  Free From Fall Injury: Instruct family/caregiver on patient safety  Note: Pt is a High fall risk. See Annelle Sacks Fall Score and ABCDS Injury Risk assessments. Pt bed is in low position, side rails up, call light and belongings are in reach. Fall risk light is on outside pts room. Pt encouraged to call for assistance as needed. Will continue with hourly rounds for PO intake, pain needs, toileting and repositioning as needed. Problem: Gastrointestinal - Adult  Goal: Minimal or absence of nausea and vomiting  Outcome: Progressing  Flowsheets (Taken 7/23/2023 0538)  Minimal or absence of nausea and vomiting: Administer IV fluids as ordered to ensure adequate hydration  Note: No complaints of nausea or vomiting this shift. Problem: Infection - Adult  Goal: Absence of infection during hospitalization  Outcome: Progressing  Flowsheets (Taken 7/23/2023 0538)  Absence of infection during hospitalization:   Assess and monitor for signs and symptoms of infection   Monitor lab/diagnostic results   Monitor all insertion sites i.e., indwelling lines, tubes and drains   Administer medications as ordered   Instruct and encourage patient and family to use good hand hygiene technique  Note: CVC site remains free of signs/symptoms of infection. No drainage, edema, erythema, pain, or warmth noted at site. Dressing changes continue per protocol and on an as needed basis - see flowsheet.

## 2023-07-24 ENCOUNTER — APPOINTMENT (OUTPATIENT)
Dept: GENERAL RADIOLOGY | Age: 68
DRG: 846 | End: 2023-07-24
Attending: STUDENT IN AN ORGANIZED HEALTH CARE EDUCATION/TRAINING PROGRAM
Payer: MEDICARE

## 2023-07-24 VITALS
DIASTOLIC BLOOD PRESSURE: 85 MMHG | RESPIRATION RATE: 16 BRPM | TEMPERATURE: 98.6 F | HEART RATE: 84 BPM | WEIGHT: 152 LBS | HEIGHT: 70 IN | SYSTOLIC BLOOD PRESSURE: 132 MMHG | BODY MASS INDEX: 21.76 KG/M2 | OXYGEN SATURATION: 100 %

## 2023-07-24 LAB
ALBUMIN SERPL-MCNC: 2.9 G/DL (ref 3.4–5)
ALP SERPL-CCNC: 55 U/L (ref 40–129)
ALT SERPL-CCNC: 35 U/L (ref 10–40)
ANION GAP SERPL CALCULATED.3IONS-SCNC: 10 MMOL/L (ref 3–16)
APTT BLD: 24.8 SEC (ref 22.7–35.9)
AST SERPL-CCNC: 17 U/L (ref 15–37)
BASOPHILS # BLD: 0 K/UL (ref 0–0.2)
BASOPHILS NFR BLD: 0.5 %
BILIRUB DIRECT SERPL-MCNC: <0.2 MG/DL (ref 0–0.3)
BILIRUB INDIRECT SERPL-MCNC: ABNORMAL MG/DL (ref 0–1)
BILIRUB SERPL-MCNC: 0.6 MG/DL (ref 0–1)
BILIRUB UR QL STRIP.AUTO: NEGATIVE
BUN SERPL-MCNC: 14 MG/DL (ref 7–20)
CALCIUM SERPL-MCNC: 8.3 MG/DL (ref 8.3–10.6)
CHLORIDE SERPL-SCNC: 106 MMOL/L (ref 99–110)
CLARITY UR: CLEAR
CO2 SERPL-SCNC: 25 MMOL/L (ref 21–32)
COLOR UR: YELLOW
CREAT SERPL-MCNC: 0.9 MG/DL (ref 0.8–1.3)
DEPRECATED RDW RBC AUTO: 13.7 % (ref 12.4–15.4)
EOSINOPHIL # BLD: 0.1 K/UL (ref 0–0.6)
EOSINOPHIL NFR BLD: 3.1 %
GFR SERPLBLD CREATININE-BSD FMLA CKD-EPI: >60 ML/MIN/{1.73_M2}
GLUCOSE SERPL-MCNC: 102 MG/DL (ref 70–99)
GLUCOSE UR STRIP.AUTO-MCNC: NEGATIVE MG/DL
HCT VFR BLD AUTO: 34.5 % (ref 40.5–52.5)
HGB BLD-MCNC: 11.8 G/DL (ref 13.5–17.5)
HGB UR QL STRIP.AUTO: NEGATIVE
INR PPP: 0.93 (ref 0.84–1.16)
KETONES UR STRIP.AUTO-MCNC: NEGATIVE MG/DL
LDH SERPL L TO P-CCNC: 165 U/L (ref 100–190)
LEUKOCYTE ESTERASE UR QL STRIP.AUTO: NEGATIVE
LYMPHOCYTES # BLD: 0.4 K/UL (ref 1–5.1)
LYMPHOCYTES NFR BLD: 11.7 %
MAGNESIUM SERPL-MCNC: 2.1 MG/DL (ref 1.8–2.4)
MCH RBC QN AUTO: 32.1 PG (ref 26–34)
MCHC RBC AUTO-ENTMCNC: 34.2 G/DL (ref 31–36)
MCV RBC AUTO: 93.7 FL (ref 80–100)
METHOTREXATE LEVEL: 0.1 UMOL/L
METHOTREXATE LEVEL: 0.11 UMOL/L
MONOCYTES # BLD: 0.1 K/UL (ref 0–1.3)
MONOCYTES NFR BLD: 4.1 %
NEUTROPHILS # BLD: 2.9 K/UL (ref 1.7–7.7)
NEUTROPHILS NFR BLD: 80.6 %
NITRITE UR QL STRIP.AUTO: NEGATIVE
PH UR STRIP.AUTO: 8 [PH] (ref 5–8)
PHOSPHATE SERPL-MCNC: 3.1 MG/DL (ref 2.5–4.9)
PLATELET # BLD AUTO: 179 K/UL (ref 135–450)
PMV BLD AUTO: 8.2 FL (ref 5–10.5)
POTASSIUM SERPL-SCNC: 3.5 MMOL/L (ref 3.5–5.1)
PROT SERPL-MCNC: 4.7 G/DL (ref 6.4–8.2)
PROT UR STRIP.AUTO-MCNC: NEGATIVE MG/DL
PROTHROMBIN TIME: 12.5 SEC (ref 11.5–14.8)
RBC # BLD AUTO: 3.68 M/UL (ref 4.2–5.9)
SODIUM SERPL-SCNC: 141 MMOL/L (ref 136–145)
SP GR UR STRIP.AUTO: 1.01 (ref 1–1.03)
UA DIPSTICK W REFLEX MICRO PNL UR: NORMAL
URATE SERPL-MCNC: 3 MG/DL (ref 3.5–7.2)
URN SPEC COLLECT METH UR: NORMAL
UROBILINOGEN UR STRIP-ACNC: 0.2 E.U./DL
WBC # BLD AUTO: 3.6 K/UL (ref 4–11)

## 2023-07-24 PROCEDURE — 6360000002 HC RX W HCPCS: Performed by: NURSE PRACTITIONER

## 2023-07-24 PROCEDURE — 6370000000 HC RX 637 (ALT 250 FOR IP): Performed by: STUDENT IN AN ORGANIZED HEALTH CARE EDUCATION/TRAINING PROGRAM

## 2023-07-24 PROCEDURE — 2500000003 HC RX 250 WO HCPCS: Performed by: STUDENT IN AN ORGANIZED HEALTH CARE EDUCATION/TRAINING PROGRAM

## 2023-07-24 PROCEDURE — 71045 X-RAY EXAM CHEST 1 VIEW: CPT

## 2023-07-24 PROCEDURE — 2580000003 HC RX 258: Performed by: STUDENT IN AN ORGANIZED HEALTH CARE EDUCATION/TRAINING PROGRAM

## 2023-07-24 PROCEDURE — 85730 THROMBOPLASTIN TIME PARTIAL: CPT

## 2023-07-24 PROCEDURE — 81003 URINALYSIS AUTO W/O SCOPE: CPT

## 2023-07-24 PROCEDURE — 83735 ASSAY OF MAGNESIUM: CPT

## 2023-07-24 PROCEDURE — 80048 BASIC METABOLIC PNL TOTAL CA: CPT

## 2023-07-24 PROCEDURE — 36415 COLL VENOUS BLD VENIPUNCTURE: CPT

## 2023-07-24 PROCEDURE — 84100 ASSAY OF PHOSPHORUS: CPT

## 2023-07-24 PROCEDURE — 83520 IMMUNOASSAY QUANT NOS NONAB: CPT

## 2023-07-24 PROCEDURE — 83615 LACTATE (LD) (LDH) ENZYME: CPT

## 2023-07-24 PROCEDURE — 6370000000 HC RX 637 (ALT 250 FOR IP): Performed by: NURSE PRACTITIONER

## 2023-07-24 PROCEDURE — 85610 PROTHROMBIN TIME: CPT

## 2023-07-24 PROCEDURE — 84550 ASSAY OF BLOOD/URIC ACID: CPT

## 2023-07-24 PROCEDURE — 85025 COMPLETE CBC W/AUTO DIFF WBC: CPT

## 2023-07-24 PROCEDURE — 80076 HEPATIC FUNCTION PANEL: CPT

## 2023-07-24 PROCEDURE — 2580000003 HC RX 258: Performed by: NURSE PRACTITIONER

## 2023-07-24 RX ORDER — HEPARIN SODIUM (PORCINE) LOCK FLUSH IV SOLN 100 UNIT/ML 100 UNIT/ML
500 SOLUTION INTRAVENOUS ONCE
Status: COMPLETED | OUTPATIENT
Start: 2023-07-24 | End: 2023-07-24

## 2023-07-24 RX ORDER — POTASSIUM CHLORIDE 20 MEQ/1
20 TABLET, EXTENDED RELEASE ORAL
Qty: 60 TABLET | Refills: 3 | Status: SHIPPED | OUTPATIENT
Start: 2023-07-24

## 2023-07-24 RX ORDER — DEXAMETHASONE 4 MG/1
4 TABLET ORAL
Qty: 20 TABLET | Refills: 0
Start: 2023-07-24

## 2023-07-24 RX ADMIN — TAMSULOSIN HYDROCHLORIDE 0.4 MG: 0.4 CAPSULE ORAL at 08:29

## 2023-07-24 RX ADMIN — Medication 1 TABLET: at 08:29

## 2023-07-24 RX ADMIN — HYDRALAZINE HYDROCHLORIDE 10 MG: 20 INJECTION INTRAMUSCULAR; INTRAVENOUS at 04:41

## 2023-07-24 RX ADMIN — FAMOTIDINE 20 MG: 20 TABLET ORAL at 08:29

## 2023-07-24 RX ADMIN — WATER 2 MG: 1 INJECTION INTRAMUSCULAR; INTRAVENOUS; SUBCUTANEOUS at 01:57

## 2023-07-24 RX ADMIN — POTASSIUM CHLORIDE 20 MEQ: 1500 TABLET, EXTENDED RELEASE ORAL at 08:30

## 2023-07-24 RX ADMIN — DEXAMETHASONE 4 MG: 4 TABLET ORAL at 08:29

## 2023-07-24 RX ADMIN — LEUCOVORIN CALCIUM 25 MG: 25 TABLET ORAL at 08:21

## 2023-07-24 RX ADMIN — LEVETIRACETAM 500 MG: 500 TABLET, FILM COATED ORAL at 08:29

## 2023-07-24 RX ADMIN — LEUCOVORIN CALCIUM 25 MG: 25 TABLET ORAL at 01:42

## 2023-07-24 RX ADMIN — Medication 400 MG: at 08:29

## 2023-07-24 RX ADMIN — SODIUM BICARBONATE: 84 INJECTION, SOLUTION INTRAVENOUS at 03:01

## 2023-07-24 RX ADMIN — HEPARIN 500 UNITS: 100 SYRINGE at 15:07

## 2023-07-24 RX ADMIN — SODIUM CHLORIDE, PRESERVATIVE FREE 10 ML: 5 INJECTION INTRAVENOUS at 08:30

## 2023-07-24 ASSESSMENT — PAIN SCALES - GENERAL
PAINLEVEL_OUTOF10: 0
PAINLEVEL_OUTOF10: 0

## 2023-07-24 NOTE — DISCHARGE INSTRUCTIONS
discharge instructions with you. We give these to you in writing so you will have a reference if you have questions about symptoms or problems to look for after you leave the hospital.     We know you want to feel better and get home soon. Please answer these questions so we can be sure you have what you need, your questions are answered, and you feel prepared for discharge. Yes No Do you understand your diagnosis? Yes No Do you know when and who you need to follow up with? Yes No Do you feel ready to go home & take care of your daily needs? Yes No Do you have the help you need at home? Yes No Do you understand what medications your are taking? Yes No Do you understand what your medications are for? Yes No Do you understand what medication side effects to watch for? Yes No Do you know what symptoms or health problems that require an immediate call to your physician? Yes No Do you feel ready for discharge? Yes No Are there any questions that you have re: how to care for yourself at home? Yes No Do you know about [facility]? If you have any questions after you get home, feel free to call the unit and ask to speak with your nurse. In about 7-10 days you will receive a survey. We value your opinion and hope that you have received care that will enable you to choose the best scores when completing the survey.     It was our pleasure to take care of you,  [facility]           [phone]                                                     Outpatient Infusion [phone]    [facility] Physician Office [address] [facility] or Procedural Scheduling [phone] ([address] [place] [address])

## 2023-07-24 NOTE — DISCHARGE SUMMARY
[facility] Discharge Summary             Attending Physician: Tosin Paige DO    Referring MD: Tosin Paige DO  [address]  [address] [place] [address] Avenue    Name: Yvrose Scott :  1955  MRN:  [id number]    Admission: 2023   Discharge:  23     Date: 2023    Diagnosis on admit: DLBC NHL w/ CNS relapse      Medications:      Medication List        START taking these medications      potassium chloride 20 MEQ extended release tablet  Commonly known as: KLOR-CON M  Take 1 tablet by mouth daily (with breakfast)     procarbazine 50 MG chemo capsule  Commonly known as: MATULANE  Take 4 capsules by mouth every 24 hours for 7 doses            CHANGE how you take these medications      dexamethasone 4 MG tablet  Commonly known as: DECADRON  Take 1 tablet by mouth daily (with breakfast)  What changed: when to take this            CONTINUE taking these medications      famotidine 20 MG tablet  Commonly known as: PEPCID  Take 1 tablet by mouth 2 times daily     levETIRAcetam 500 MG tablet  Commonly known as: KEPPRA  Take 1 tablet by mouth 2 times daily     magnesium oxide 400 (240 Mg) MG tablet  Commonly known as: MAG-OX  Take 1 tablet by mouth daily     ondansetron 4 MG disintegrating tablet  Commonly known as: ZOFRAN-ODT  Take 1 tablet by mouth every 6 hours as needed for Nausea or Vomiting     tamsulosin 0.4 MG capsule  Commonly known as: FLOMAX     therapeutic multivitamin-minerals tablet  Take 1 tablet by mouth daily            STOP taking these medications      sennosides-docusate sodium 8.6-50 MG tablet  Commonly known as: SENOKOT-S               Where to Get Your Medications        These medications were sent to [facility] [id number], [address] [place] [address] N Yelena Garcia [phone]   [facility], [address] [place] [address]      Phone: [phone]   potassium chloride 20 MEQ extended release tablet       Information about where to get these medications is not yet

## 2023-07-24 NOTE — PLAN OF CARE
Problem: ABCDS Injury Assessment  Goal: Absence of physical injury  7/24/2023 1434 by Jude Gomez RN  Outcome: Adequate for Discharge     Problem: Safety - Adult  Goal: Free from fall injury  7/24/2023 1434 by Jude Gomez RN  Outcome: Adequate for Discharge     Problem: Gastrointestinal - Adult  Goal: Minimal or absence of nausea and vomiting  7/24/2023 1434 by Jude Gomez RN  Outcome: Adequate for Discharge     Problem: Gastrointestinal - Adult  Goal: Maintains or returns to baseline bowel function  Outcome: Adequate for Discharge     Problem: Gastrointestinal - Adult  Goal: Maintains adequate nutritional intake  Outcome: Adequate for Discharge     Problem: Infection - Adult  Goal: Absence of infection at discharge  Outcome: Adequate for Discharge     Problem: Infection - Adult  Goal: Absence of infection during hospitalization  7/24/2023 1434 by Jude Gomez RN  Outcome: Adequate for Discharge     Problem: Infection - Adult  Goal: Absence of fever/infection during anticipated neutropenic period  Outcome: Adequate for Discharge     Problem: Metabolic/Fluid and Electrolytes - Adult  Goal: Electrolytes maintained within normal limits  7/24/2023 1434 by Jude Gomez RN  Outcome: Adequate for Discharge     Problem: Metabolic/Fluid and Electrolytes - Adult  Goal: Hemodynamic stability and optimal renal function maintained  Outcome: Adequate for Discharge     Problem: Musculoskeletal - Adult  Goal: Return mobility to safest level of function  7/24/2023 1434 by Jude Gomez RN  Outcome: Adequate for Discharge     Problem: Musculoskeletal - Adult  Goal: Maintain proper alignment of affected body part  Outcome: Adequate for Discharge     Problem: Pain  Goal: Verbalizes/displays adequate comfort level or baseline comfort level  Outcome: Adequate for Discharge

## 2023-07-24 NOTE — PLAN OF CARE
Problem: ABCDS Injury Assessment  Goal: Absence of physical injury  Outcome: Progressing  Flowsheets (Taken 7/23/2023 0538)  Absence of Physical Injury: Implement safety measures based on patient assessment     Problem: Safety - Adult  Goal: Free from fall injury  Outcome: Progressing  Flowsheets (Taken 7/24/2023 0602)  Free From Fall Injury: Instruct family/caregiver on patient safety  Note:   Pt is a High fall risk. See Le [place] Fall Score and ABCDS Injury Risk assessments. Explained fall risk precautions to pt and family and rationale behind their use to keep the patient safe. Pt bed is in low position, side rails up, call light and belongings are in reach. Fall wristband applied and present on pts wrist.  Bed alarm on. Pt encouraged to call for assistance. Will continue with hourly rounds for PO intake, pain needs, toileting and repositioning as needed.       Problem: Gastrointestinal - Adult  Goal: Minimal or absence of nausea and vomiting  Outcome: Progressing  Flowsheets (Taken 7/24/2023 0602)  Minimal or absence of nausea and vomiting: Administer IV fluids as ordered to ensure adequate hydration     Problem: Infection - Adult  Goal: Absence of infection during hospitalization  Outcome: Progressing  Flowsheets (Taken 7/24/2023 0602)  Absence of infection during hospitalization:   Assess and monitor for signs and symptoms of infection   Monitor lab/diagnostic results   Monitor all insertion sites i.e., indwelling lines, tubes and drains   Administer medications as ordered   Instruct and encourage patient and family to use good hand hygiene technique     Problem: Metabolic/Fluid and Electrolytes - Adult  Goal: Electrolytes maintained within normal limits  Outcome: Progressing  Flowsheets (Taken 7/24/2023 0602)  Electrolytes maintained within normal limits:   Monitor labs and assess patient for signs and symptoms of electrolyte imbalances   Administer electrolyte replacement as ordered   Monitor response to

## 2023-07-24 NOTE — CARE COORDINATION
CM delivered 2nd IMM and provided verbal explanation at bedside. Pt voiced understanding of discharge MCR rights and is agreeable to discharge within 4 hours of delivery of the IMM notice.     Brandy CHIN, MIRACLE-S   for [facility] ([address])  Office Phone: [phone] [address] [facility] Mobile: [phone]
CTN contacted Niya with [employer] [phone]. They have accepted this patient and will pull referral from [facility].  They will contact patient and make arrangements for [facility] by 7/26  Electronically signed by Marco Spence LPN on 1/79/5554 at 25:30 AM
Called [facility] today to discuss why the delivery of procarbazine was delayed. They would only say that it was rescheduled today. I have updated provider. If medication arrives today then patient should start it today. If the medication does not come today, do not give. The medication will started on the next cycle per Dr. Symone Sykes. I have relayed this information in person to patient sister. Sister will notify the spouse.
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)  Who is being Interviewed: Patient, Caregiver  What was the patient's/caregiver's perception as to why they think they needed to return back to the hospital?: Other (Comment) (this was a planned readmission)  Did you visit your Primary Care Physician after you left the hospital, before you returned this time?: No  Why weren't you able to visit your PCP?: Did not have an appointment  Did you see a specialist, such as Cardiac, Pulmonary, Orthopedic Physician, etc. after you left the hospital?: Yes  Who advised the patient to return to the hospital?: Physician (this was a planned readmission)  Does the patient report anything that got in the way of taking their medications?: No  In our efforts to provide the best possible care to you and others like you, can you think of anything that we could have done to help you after you left the hospital the first time, so that you might not have needed to return so soon?: Other (Comment) (this was a planned readmission)    Advance Directives:  Code Status: Full Code  [place] DNR form completed and on chart: Not Indicated    Financial:  Payor: Madhav Elam /
[facility] clarified information with [address] [facility] [facility] regarding medication procarbazine. Pharmacy stated that it will be shipped out tonight patient should have this medication tomorrow. Spouse notified.
who? Yes  Plans to Return to Present Housing: Yes  Other Identified Issues/Barriers to RETURNING to current housing: n/a  Potential Assistance needed at discharge: Home Care            Potential DME:    Patient expects to discharge to: [address] [place] [address] for transportation at discharge:      Financial    Payor: Guadalupe Speed / Plan: Mark Vidales ESSENTIAL/PLUS / Product Type: *No Product type* /     Does insurance require precert for SNF: Yes    Potential assistance Purchasing Medications: No  Meds-to-Beds request:        [facility] - [address] [place] [address], [address] [place] [address] Road [address] [place] [address]  [address] [place] [address]  Phone: [phone] Fax: [phone]    [address] [address]  Phone: [phone] Fax: [phone]      Notes:    Factors facilitating achievement of predicted outcomes: Family support, Motivated, Cooperative, Pleasant, Good insight into deficit    Barriers to discharge: Medical complications    Additional Case Management Notes: Chart review completed. Met with pt, his wife and sister Adriel Britton at bedside with pt/wife permission. Pt/wife stated nothing has changed since last admission and he will return home when able. They want to resume [facility] when discharged. They are aware SW will follow.     SW will follow    The Plan for Transition of Care is related to the following treatment [facility]   for [facility] ([address])  Office Phone: [phone]  [address] [place] [address] Road: [phone]

## 2023-07-28 ENCOUNTER — TELEPHONE (OUTPATIENT)
Dept: INTERNAL MEDICINE CLINIC | Age: 68
End: 2023-07-28

## 2023-07-28 NOTE — TELEPHONE ENCOUNTER
Care Transitions Initial Follow Up Call    Outreach made within 2 business days of discharge: Yes    Patient: Yvrose Scott Patient : 1955   MRN: [id number]  Reason for Admission: There are no discharge diagnoses documented for the most recent discharge.   Discharge Date: 23       Spoke with: AMRIT    Discharge department/facility: [facility]    Scheduled appointment with PCP within 7-14 days    Follow Up  Future Appointments   Date Time Provider [address] [place] [address]   2023  7:30 AM MEI Malhotra CNP [facility]   2024  7:00 AM MEI Malhotra  [facility], [address] [place] [address]

## 2023-08-02 ENCOUNTER — TELEPHONE (OUTPATIENT)
Dept: ONCOLOGY | Age: 68
End: 2023-08-02

## 2023-08-02 NOTE — TELEPHONE ENCOUNTER
Behavioral Health Note    Called wife due to concerns for depression. Patient is scheduled for admission on 8/3, but writer is out of the office. Will plan to coordinate outpatient appointment with BILLY BONDS and prestonr at hospital discharge.

## 2023-08-03 ENCOUNTER — HOSPITAL ENCOUNTER (INPATIENT)
Age: 68
LOS: 5 days | Discharge: HOME | DRG: 846 | End: 2023-08-08
Attending: STUDENT IN AN ORGANIZED HEALTH CARE EDUCATION/TRAINING PROGRAM | Admitting: STUDENT IN AN ORGANIZED HEALTH CARE EDUCATION/TRAINING PROGRAM
Payer: MEDICARE

## 2023-08-03 ENCOUNTER — APPOINTMENT (OUTPATIENT)
Dept: GENERAL RADIOLOGY | Age: 68
DRG: 846 | End: 2023-08-03
Attending: STUDENT IN AN ORGANIZED HEALTH CARE EDUCATION/TRAINING PROGRAM
Payer: MEDICARE

## 2023-08-03 LAB
BILIRUB UR QL STRIP.AUTO: NEGATIVE
CLARITY UR: CLEAR
COLOR UR: YELLOW
GLUCOSE UR STRIP.AUTO-MCNC: >=1000 MG/DL
HGB UR QL STRIP.AUTO: NEGATIVE
KETONES UR STRIP.AUTO-MCNC: NEGATIVE MG/DL
LEUKOCYTE ESTERASE UR QL STRIP.AUTO: NEGATIVE
NITRITE UR QL STRIP.AUTO: NEGATIVE
PH UR STRIP.AUTO: 7.5 [PH] (ref 5–8)
PH UR STRIP.AUTO: 8.5 [PH] (ref 5–8)
PROT UR STRIP.AUTO-MCNC: NEGATIVE MG/DL
SP GR UR STRIP.AUTO: 1.01 (ref 1–1.03)
UA DIPSTICK W REFLEX MICRO PNL UR: ABNORMAL
URN SPEC COLLECT METH UR: ABNORMAL
UROBILINOGEN UR STRIP-ACNC: 0.2 E.U./DL

## 2023-08-03 PROCEDURE — 2580000003 HC RX 258: Performed by: NURSE PRACTITIONER

## 2023-08-03 PROCEDURE — 96417 CHEMO IV INFUS EACH ADDL SEQ: CPT

## 2023-08-03 PROCEDURE — 6370000000 HC RX 637 (ALT 250 FOR IP): Performed by: STUDENT IN AN ORGANIZED HEALTH CARE EDUCATION/TRAINING PROGRAM

## 2023-08-03 PROCEDURE — 2060000000 HC ICU INTERMEDIATE R&B

## 2023-08-03 PROCEDURE — 6360000002 HC RX W HCPCS: Performed by: NURSE PRACTITIONER

## 2023-08-03 PROCEDURE — 2580000003 HC RX 258: Performed by: STUDENT IN AN ORGANIZED HEALTH CARE EDUCATION/TRAINING PROGRAM

## 2023-08-03 PROCEDURE — 2500000003 HC RX 250 WO HCPCS: Performed by: STUDENT IN AN ORGANIZED HEALTH CARE EDUCATION/TRAINING PROGRAM

## 2023-08-03 PROCEDURE — 93005 ELECTROCARDIOGRAM TRACING: CPT | Performed by: NURSE PRACTITIONER

## 2023-08-03 PROCEDURE — 6360000002 HC RX W HCPCS: Performed by: STUDENT IN AN ORGANIZED HEALTH CARE EDUCATION/TRAINING PROGRAM

## 2023-08-03 PROCEDURE — 83986 ASSAY PH BODY FLUID NOS: CPT

## 2023-08-03 PROCEDURE — A4217 STERILE WATER/SALINE, 500 ML: HCPCS | Performed by: NURSE PRACTITIONER

## 2023-08-03 PROCEDURE — 6370000000 HC RX 637 (ALT 250 FOR IP): Performed by: NURSE PRACTITIONER

## 2023-08-03 PROCEDURE — 71046 X-RAY EXAM CHEST 2 VIEWS: CPT

## 2023-08-03 PROCEDURE — 96415 CHEMO IV INFUSION ADDL HR: CPT

## 2023-08-03 PROCEDURE — 96409 CHEMO IV PUSH SNGL DRUG: CPT

## 2023-08-03 PROCEDURE — 81003 URINALYSIS AUTO W/O SCOPE: CPT

## 2023-08-03 RX ORDER — LORAZEPAM 0.5 MG/1
0.5 TABLET ORAL EVERY 4 HOURS PRN
Status: DISCONTINUED | OUTPATIENT
Start: 2023-08-03 | End: 2023-08-08 | Stop reason: HOSPADM

## 2023-08-03 RX ORDER — LEUCOVORIN CALCIUM 25 MG/1
25 TABLET ORAL EVERY 6 HOURS
Status: DISCONTINUED | OUTPATIENT
Start: 2023-08-04 | End: 2023-08-08

## 2023-08-03 RX ORDER — ONDANSETRON HYDROCHLORIDE 8 MG/1
24 TABLET, FILM COATED ORAL ONCE
Status: COMPLETED | OUTPATIENT
Start: 2023-08-03 | End: 2023-08-03

## 2023-08-03 RX ORDER — LANOLIN ALCOHOL/MO/W.PET/CERES
400 CREAM (GRAM) TOPICAL DAILY
Status: DISCONTINUED | OUTPATIENT
Start: 2023-08-04 | End: 2023-08-08 | Stop reason: HOSPADM

## 2023-08-03 RX ORDER — SODIUM CHLORIDE 9 MG/ML
INJECTION, SOLUTION INTRAVENOUS PRN
Status: DISCONTINUED | OUTPATIENT
Start: 2023-08-03 | End: 2023-08-08 | Stop reason: HOSPADM

## 2023-08-03 RX ORDER — FUROSEMIDE 10 MG/ML
40 INJECTION INTRAMUSCULAR; INTRAVENOUS EVERY 12 HOURS
Status: DISCONTINUED | OUTPATIENT
Start: 2023-08-05 | End: 2023-08-08 | Stop reason: HOSPADM

## 2023-08-03 RX ORDER — ACETAMINOPHEN 325 MG/1
650 TABLET ORAL EVERY 6 HOURS PRN
Status: DISCONTINUED | OUTPATIENT
Start: 2023-08-03 | End: 2023-08-08 | Stop reason: HOSPADM

## 2023-08-03 RX ORDER — ONDANSETRON 4 MG/1
4 TABLET, ORALLY DISINTEGRATING ORAL EVERY 6 HOURS PRN
Status: DISCONTINUED | OUTPATIENT
Start: 2023-08-03 | End: 2023-08-03

## 2023-08-03 RX ORDER — ENOXAPARIN SODIUM 100 MG/ML
40 INJECTION SUBCUTANEOUS EVERY EVENING
Status: DISCONTINUED | OUTPATIENT
Start: 2023-08-03 | End: 2023-08-08 | Stop reason: HOSPADM

## 2023-08-03 RX ORDER — FAMOTIDINE 20 MG/1
20 TABLET, FILM COATED ORAL 2 TIMES DAILY
Status: DISCONTINUED | OUTPATIENT
Start: 2023-08-04 | End: 2023-08-08 | Stop reason: HOSPADM

## 2023-08-03 RX ORDER — CEPHALEXIN 250 MG/1
250 CAPSULE ORAL EVERY 6 HOURS SCHEDULED
Status: DISPENSED | OUTPATIENT
Start: 2023-08-03 | End: 2023-08-08

## 2023-08-03 RX ORDER — PROCHLORPERAZINE MALEATE 10 MG
10 TABLET ORAL EVERY 4 HOURS PRN
Status: DISCONTINUED | OUTPATIENT
Start: 2023-08-03 | End: 2023-08-08 | Stop reason: HOSPADM

## 2023-08-03 RX ORDER — MAGNESIUM SULFATE IN WATER 40 MG/ML
4000 INJECTION, SOLUTION INTRAVENOUS PRN
Status: DISCONTINUED | OUTPATIENT
Start: 2023-08-03 | End: 2023-08-08 | Stop reason: HOSPADM

## 2023-08-03 RX ORDER — LORAZEPAM 2 MG/ML
0.5 INJECTION INTRAMUSCULAR EVERY 4 HOURS PRN
Status: DISCONTINUED | OUTPATIENT
Start: 2023-08-03 | End: 2023-08-08 | Stop reason: HOSPADM

## 2023-08-03 RX ORDER — ACETAMINOPHEN 650 MG/1
650 SUPPOSITORY RECTAL EVERY 6 HOURS PRN
Status: DISCONTINUED | OUTPATIENT
Start: 2023-08-03 | End: 2023-08-08 | Stop reason: HOSPADM

## 2023-08-03 RX ORDER — M-VIT,TX,IRON,MINS/CALC/FOLIC 27MG-0.4MG
1 TABLET ORAL DAILY
Status: DISCONTINUED | OUTPATIENT
Start: 2023-08-03 | End: 2023-08-08 | Stop reason: HOSPADM

## 2023-08-03 RX ORDER — TAMSULOSIN HYDROCHLORIDE 0.4 MG/1
0.4 CAPSULE ORAL DAILY
Status: DISCONTINUED | OUTPATIENT
Start: 2023-08-03 | End: 2023-08-08 | Stop reason: HOSPADM

## 2023-08-03 RX ORDER — FUROSEMIDE 10 MG/ML
20 INJECTION INTRAMUSCULAR; INTRAVENOUS PRN
Status: DISCONTINUED | OUTPATIENT
Start: 2023-08-03 | End: 2023-08-08 | Stop reason: HOSPADM

## 2023-08-03 RX ORDER — SODIUM CHLORIDE 0.9 % (FLUSH) 0.9 %
5-40 SYRINGE (ML) INJECTION EVERY 12 HOURS SCHEDULED
Status: DISCONTINUED | OUTPATIENT
Start: 2023-08-03 | End: 2023-08-08 | Stop reason: HOSPADM

## 2023-08-03 RX ORDER — SODIUM CHLORIDE 0.9 % (FLUSH) 0.9 %
5-40 SYRINGE (ML) INJECTION PRN
Status: DISCONTINUED | OUTPATIENT
Start: 2023-08-03 | End: 2023-08-08 | Stop reason: HOSPADM

## 2023-08-03 RX ORDER — POTASSIUM CHLORIDE 20 MEQ/1
20 TABLET, EXTENDED RELEASE ORAL
Status: DISCONTINUED | OUTPATIENT
Start: 2023-08-04 | End: 2023-08-08 | Stop reason: HOSPADM

## 2023-08-03 RX ORDER — SODIUM CHLORIDE 9 MG/ML
500 INJECTION, SOLUTION INTRAVENOUS CONTINUOUS PRN
Status: DISCONTINUED | OUTPATIENT
Start: 2023-08-03 | End: 2023-08-08 | Stop reason: HOSPADM

## 2023-08-03 RX ORDER — POTASSIUM CHLORIDE 29.8 MG/ML
20 INJECTION INTRAVENOUS PRN
Status: DISCONTINUED | OUTPATIENT
Start: 2023-08-03 | End: 2023-08-08 | Stop reason: HOSPADM

## 2023-08-03 RX ORDER — PROCHLORPERAZINE EDISYLATE 5 MG/ML
10 INJECTION INTRAMUSCULAR; INTRAVENOUS EVERY 4 HOURS PRN
Status: DISCONTINUED | OUTPATIENT
Start: 2023-08-03 | End: 2023-08-08 | Stop reason: HOSPADM

## 2023-08-03 RX ORDER — LEUCOVORIN CALCIUM 50 MG/5ML
25 INJECTION, POWDER, LYOPHILIZED, FOR SOLUTION INTRAMUSCULAR; INTRAVENOUS EVERY 6 HOURS
Status: DISCONTINUED | OUTPATIENT
Start: 2023-08-04 | End: 2023-08-08

## 2023-08-03 RX ORDER — DEXAMETHASONE 4 MG/1
20 TABLET ORAL ONCE
Status: COMPLETED | OUTPATIENT
Start: 2023-08-03 | End: 2023-08-03

## 2023-08-03 RX ORDER — LEVETIRACETAM 500 MG/1
500 TABLET ORAL 2 TIMES DAILY
Status: DISCONTINUED | OUTPATIENT
Start: 2023-08-03 | End: 2023-08-08 | Stop reason: HOSPADM

## 2023-08-03 RX ADMIN — VINCRISTINE SULFATE 2.6 MG: 1 INJECTION, SOLUTION INTRAVENOUS at 21:02

## 2023-08-03 RX ADMIN — CEPHALEXIN 250 MG: 250 CAPSULE ORAL at 16:27

## 2023-08-03 RX ADMIN — LEVETIRACETAM 500 MG: 500 TABLET, FILM COATED ORAL at 20:26

## 2023-08-03 RX ADMIN — SODIUM BICARBONATE: 84 INJECTION, SOLUTION INTRAVENOUS at 19:09

## 2023-08-03 RX ADMIN — SODIUM CHLORIDE, PRESERVATIVE FREE 10 ML: 5 INJECTION INTRAVENOUS at 20:26

## 2023-08-03 RX ADMIN — CEPHALEXIN 250 MG: 250 CAPSULE ORAL at 22:49

## 2023-08-03 RX ADMIN — ONDANSETRON HYDROCHLORIDE 24 MG: 8 TABLET, FILM COATED ORAL at 20:26

## 2023-08-03 RX ADMIN — METHOTREXATE 6500 MG: 25 INJECTION, SOLUTION INTRA-ARTERIAL; INTRAMUSCULAR; INTRATHECAL; INTRAVENOUS at 21:19

## 2023-08-03 RX ADMIN — DEXAMETHASONE 20 MG: 4 TABLET ORAL at 20:26

## 2023-08-03 RX ADMIN — SODIUM CHLORIDE 15 ML: 900 IRRIGANT IRRIGATION at 21:39

## 2023-08-03 RX ADMIN — ENOXAPARIN SODIUM 40 MG: 100 INJECTION SUBCUTANEOUS at 18:46

## 2023-08-03 RX ADMIN — SODIUM BICARBONATE: 84 INJECTION, SOLUTION INTRAVENOUS at 15:16

## 2023-08-03 NOTE — PROGRESS NOTES
4 Eyes Skin Assessment     NAME:  Margaret Haley  YOB: 1955  MEDICAL RECORD NUMBER:  [id number]    The patient is being assessed for  Admission    I agree that at least one RN has performed a thorough Head to Toe Skin Assessment on the patient. ALL assessment sites listed below have been assessed. Areas assessed by both nurses:    Head, Face, Ears, Shoulders, Back, Chest, Arms, Elbows, Hands, Sacrum. Buttock, Coccyx, Ischium, and Legs. Feet and Heels        Does the Patient have a Wound?  No noted wound(s)       Juan Prevention initiated by RN: No  Wound Care Orders initiated by RN: No    Pressure Injury (Stage 3,4, Unstageable, DTI, NWPT, and Complex wounds) if present, place Wound referral order by RN under : No    New Ostomies, if present place, Ostomy referral order under : No     Nurse 1 eSignature: Electronically signed by Percy Abarca RN on 8/3/23 at 2:56 PM EDT    **SHARE this note so that the co-signing nurse can place an eSignature**    Nurse 2 eSignature: Electronically signed by Lindsey Almonte, RN, RN on 8/3/23 at 6:02 PM EDT

## 2023-08-03 NOTE — PROGRESS NOTES
Original chemotherapy orders reviewed and acknowledged. Appropriateness of chemotherapy treatment regimen MPV for diagnosis of DLBC NHL was verified. Patient educated on chemotherapy regimen. Consent for chemotherapy obtained. Estimated body surface area is 1.85 meters squared as calculated from the following:    Height as of 7/20/23: 5' 10\" (1.778 m). Weight as of this encounter: 153 lb 4 oz (69.5 kg). verified. Appropriate dosing calculations of chemotherapy based on above height, weight, and BSA verified.       Lo Gee, RN8/3/2023  5:01 PM

## 2023-08-03 NOTE — H&P
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 His also has h/o BPH, GERD, and HTN. He is now being admitted for cycle #3 of R-MPV. He is feeling well and continues to regain memory. He does have some mild tenderness to his incision site to his scalp and is currently on keflex to treat possible infection. He reports improvement, but not resolution of the tenderness. He is eating and drinking well and has been gaining weight. He denies fever, chills, bleeding, or GI changes.     Past Surgical History:   Procedure Laterality Date    BRONCHOSCOPY N/A 2021    ENDOBRONCHIAL ULTRASOUND WITH EUS performed by Denis Ramirez MD at [address] [place] [address] N/A 2022    BRONCHOSCOPY,  ENDOBRONCHIAL ULTRASOUND WITH ANESTHESIA AND PATHOLOGY performed by Vivienne Minor MD at  [facility] [place]  2022    BRONCHOSCOPY/TRANSBRONCHIAL NEEDLE BIOPSY performed by Vivienne Minor MD at  [facility] [place]  2022    BRONCHOSCOPY/TRANSBRONCHIAL NEEDLE BIOPSY ADDL LOBE performed by Vivienne Minor MD at  [facility] [place]  2022    BRONCHOSCOPY DIAGNOSTIC OR CELL [address] ONLY performed by Vivienne Minor MD at [facility] 2023    RIGHT PARIETAL STEREOTACTIC

## 2023-08-03 NOTE — PLAN OF CARE
Problem: Safety - Adult  Goal: Free from fall injury  Outcome: Progressing  Orthostatic vital signs obtained at start of shift - see flowsheet for details. Pt does not meet criteria for orthostasis. Pt is a Med fall risk. See Lily Rana Fall Score and ABCDS Injury Risk assessments. - Screening for Orthostasis AND not a [place] Risk per STEPHENS/ABCDS: Pt bed is in low position, side rails up, call light and belongings are in reach. Fall risk light is on outside pts room. Pt encouraged to call for assistance as needed. Will continue with hourly rounds for PO intake, pain needs, toileting and repositioning as needed. Problem: Infection - Adult  Goal: Absence of infection at discharge  Outcome: Progressing  CVC site remains free of signs/symptoms of infection. No drainage, edema, erythema, pain, or warmth noted at site. Dressing changes continue per protocol and on an as needed basis - see flowsheet.

## 2023-08-04 LAB
ALBUMIN SERPL-MCNC: 3.3 G/DL (ref 3.4–5)
ALP SERPL-CCNC: 62 U/L (ref 40–129)
ALT SERPL-CCNC: 43 U/L (ref 10–40)
ANION GAP SERPL CALCULATED.3IONS-SCNC: 12 MMOL/L (ref 3–16)
AST SERPL-CCNC: 45 U/L (ref 15–37)
BASOPHILS # BLD: 0 K/UL (ref 0–0.2)
BASOPHILS NFR BLD: 0.2 %
BILIRUB DIRECT SERPL-MCNC: <0.2 MG/DL (ref 0–0.3)
BILIRUB INDIRECT SERPL-MCNC: ABNORMAL MG/DL (ref 0–1)
BILIRUB SERPL-MCNC: 0.8 MG/DL (ref 0–1)
BUN SERPL-MCNC: 18 MG/DL (ref 7–20)
CALCIUM SERPL-MCNC: 8 MG/DL (ref 8.3–10.6)
CHLORIDE SERPL-SCNC: 108 MMOL/L (ref 99–110)
CO2 SERPL-SCNC: 22 MMOL/L (ref 21–32)
CREAT SERPL-MCNC: 1 MG/DL (ref 0.8–1.3)
DEPRECATED RDW RBC AUTO: 13.1 % (ref 12.4–15.4)
EKG ATRIAL RATE: 62 BPM
EKG DIAGNOSIS: NORMAL
EKG P AXIS: 49 DEGREES
EKG P-R INTERVAL: 188 MS
EKG Q-T INTERVAL: 484 MS
EKG QRS DURATION: 152 MS
EKG QTC CALCULATION (BAZETT): 491 MS
EKG R AXIS: -73 DEGREES
EKG T AXIS: 74 DEGREES
EKG VENTRICULAR RATE: 62 BPM
EOSINOPHIL # BLD: 0 K/UL (ref 0–0.6)
EOSINOPHIL NFR BLD: 0 %
GFR SERPLBLD CREATININE-BSD FMLA CKD-EPI: >60 ML/MIN/{1.73_M2}
GLUCOSE SERPL-MCNC: 194 MG/DL (ref 70–99)
HCT VFR BLD AUTO: 34.4 % (ref 40.5–52.5)
HGB BLD-MCNC: 11.9 G/DL (ref 13.5–17.5)
LDH SERPL L TO P-CCNC: 203 U/L (ref 100–190)
LYMPHOCYTES # BLD: 0.2 K/UL (ref 1–5.1)
LYMPHOCYTES NFR BLD: 2.1 %
MAGNESIUM SERPL-MCNC: 1.9 MG/DL (ref 1.8–2.4)
MCH RBC QN AUTO: 32 PG (ref 26–34)
MCHC RBC AUTO-ENTMCNC: 34.6 G/DL (ref 31–36)
MCV RBC AUTO: 92.4 FL (ref 80–100)
MONOCYTES # BLD: 0.1 K/UL (ref 0–1.3)
MONOCYTES NFR BLD: 1.1 %
NEUTROPHILS # BLD: 7.4 K/UL (ref 1.7–7.7)
NEUTROPHILS NFR BLD: 96.6 %
PH UR STRIP.AUTO: 7 [PH] (ref 5–8)
PH UR STRIP.AUTO: 7.5 [PH] (ref 5–8)
PH UR STRIP.AUTO: 7.5 [PH] (ref 5–8)
PH UR STRIP.AUTO: 8 [PH] (ref 5–8)
PHOSPHATE SERPL-MCNC: 1.6 MG/DL (ref 2.5–4.9)
PLATELET # BLD AUTO: 197 K/UL (ref 135–450)
PMV BLD AUTO: 8.8 FL (ref 5–10.5)
POTASSIUM SERPL-SCNC: 3.6 MMOL/L (ref 3.5–5.1)
PROT SERPL-MCNC: 5.9 G/DL (ref 6.4–8.2)
RBC # BLD AUTO: 3.72 M/UL (ref 4.2–5.9)
SODIUM SERPL-SCNC: 142 MMOL/L (ref 136–145)
URATE SERPL-MCNC: 3.8 MG/DL (ref 3.5–7.2)
WBC # BLD AUTO: 7.7 K/UL (ref 4–11)

## 2023-08-04 PROCEDURE — 83520 IMMUNOASSAY QUANT NOS NONAB: CPT

## 2023-08-04 PROCEDURE — 83986 ASSAY PH BODY FLUID NOS: CPT

## 2023-08-04 PROCEDURE — 83615 LACTATE (LD) (LDH) ENZYME: CPT

## 2023-08-04 PROCEDURE — 6360000002 HC RX W HCPCS: Performed by: NURSE PRACTITIONER

## 2023-08-04 PROCEDURE — 6370000000 HC RX 637 (ALT 250 FOR IP): Performed by: NURSE PRACTITIONER

## 2023-08-04 PROCEDURE — 85025 COMPLETE CBC W/AUTO DIFF WBC: CPT

## 2023-08-04 PROCEDURE — 80048 BASIC METABOLIC PNL TOTAL CA: CPT

## 2023-08-04 PROCEDURE — 80076 HEPATIC FUNCTION PANEL: CPT

## 2023-08-04 PROCEDURE — 2060000000 HC ICU INTERMEDIATE R&B

## 2023-08-04 PROCEDURE — 2500000003 HC RX 250 WO HCPCS: Performed by: NURSE PRACTITIONER

## 2023-08-04 PROCEDURE — 6370000000 HC RX 637 (ALT 250 FOR IP): Performed by: STUDENT IN AN ORGANIZED HEALTH CARE EDUCATION/TRAINING PROGRAM

## 2023-08-04 PROCEDURE — 84550 ASSAY OF BLOOD/URIC ACID: CPT

## 2023-08-04 PROCEDURE — 93010 ELECTROCARDIOGRAM REPORT: CPT | Performed by: INTERNAL MEDICINE

## 2023-08-04 PROCEDURE — 83735 ASSAY OF MAGNESIUM: CPT

## 2023-08-04 PROCEDURE — 84100 ASSAY OF PHOSPHORUS: CPT

## 2023-08-04 PROCEDURE — 2580000003 HC RX 258: Performed by: STUDENT IN AN ORGANIZED HEALTH CARE EDUCATION/TRAINING PROGRAM

## 2023-08-04 PROCEDURE — 2580000003 HC RX 258: Performed by: NURSE PRACTITIONER

## 2023-08-04 PROCEDURE — 2500000003 HC RX 250 WO HCPCS: Performed by: STUDENT IN AN ORGANIZED HEALTH CARE EDUCATION/TRAINING PROGRAM

## 2023-08-04 RX ORDER — HYDRALAZINE HYDROCHLORIDE 20 MG/ML
10 INJECTION INTRAMUSCULAR; INTRAVENOUS EVERY 6 HOURS PRN
Status: DISCONTINUED | OUTPATIENT
Start: 2023-08-04 | End: 2023-08-08 | Stop reason: HOSPADM

## 2023-08-04 RX ORDER — CEPHALEXIN 250 MG/1
250 CAPSULE ORAL 4 TIMES DAILY
Qty: 17 CAPSULE | Refills: 0 | Status: CANCELLED
Start: 2023-08-04 | End: 2023-08-09

## 2023-08-04 RX ADMIN — POTASSIUM CHLORIDE 20 MEQ: 1500 TABLET, EXTENDED RELEASE ORAL at 08:54

## 2023-08-04 RX ADMIN — SODIUM CHLORIDE, PRESERVATIVE FREE 10 ML: 5 INJECTION INTRAVENOUS at 21:55

## 2023-08-04 RX ADMIN — FAMOTIDINE 20 MG: 20 TABLET, FILM COATED ORAL at 21:53

## 2023-08-04 RX ADMIN — Medication 400 MG: at 08:54

## 2023-08-04 RX ADMIN — MULTIPLE VITAMINS W/ MINERALS TAB 1 TABLET: TAB at 08:53

## 2023-08-04 RX ADMIN — CEPHALEXIN 250 MG: 250 CAPSULE ORAL at 21:53

## 2023-08-04 RX ADMIN — CEPHALEXIN 250 MG: 250 CAPSULE ORAL at 16:27

## 2023-08-04 RX ADMIN — TAMSULOSIN HYDROCHLORIDE 0.4 MG: 0.4 CAPSULE ORAL at 08:53

## 2023-08-04 RX ADMIN — POTASSIUM PHOSPHATE, MONOBASIC AND POTASSIUM PHOSPHATE, DIBASIC 30 MMOL: 224; 236 INJECTION, SOLUTION, CONCENTRATE INTRAVENOUS at 09:06

## 2023-08-04 RX ADMIN — LEUCOVORIN CALCIUM 25 MG: 25 TABLET ORAL at 23:23

## 2023-08-04 RX ADMIN — SODIUM BICARBONATE: 84 INJECTION, SOLUTION INTRAVENOUS at 10:59

## 2023-08-04 RX ADMIN — LEVETIRACETAM 500 MG: 500 TABLET, FILM COATED ORAL at 08:53

## 2023-08-04 RX ADMIN — LEVETIRACETAM 500 MG: 500 TABLET, FILM COATED ORAL at 21:53

## 2023-08-04 RX ADMIN — CEPHALEXIN 250 MG: 250 CAPSULE ORAL at 10:14

## 2023-08-04 RX ADMIN — SODIUM CHLORIDE 15 ML: 900 IRRIGANT IRRIGATION at 21:54

## 2023-08-04 RX ADMIN — SODIUM BICARBONATE: 84 INJECTION, SOLUTION INTRAVENOUS at 03:51

## 2023-08-04 RX ADMIN — SODIUM CHLORIDE, PRESERVATIVE FREE 10 ML: 5 INJECTION INTRAVENOUS at 08:55

## 2023-08-04 RX ADMIN — SODIUM BICARBONATE: 84 INJECTION, SOLUTION INTRAVENOUS at 17:43

## 2023-08-04 RX ADMIN — FAMOTIDINE 20 MG: 20 TABLET, FILM COATED ORAL at 08:54

## 2023-08-04 RX ADMIN — CEPHALEXIN 250 MG: 250 CAPSULE ORAL at 03:56

## 2023-08-04 RX ADMIN — ENOXAPARIN SODIUM 40 MG: 100 INJECTION SUBCUTANEOUS at 17:48

## 2023-08-04 NOTE — ONCOLOGY
Administration: Chemotherapy drug Vincristine independently verified with José Miguel Purvis RN prior to administration. Acknowledgement of informed consent for chemotherapy administration verified. Original order, appropriateness of regimen, drug supplied, height, weight, BSA, dose calculations, expiration dates/times, drug appearance, and two patient identifiers were verified by both RNs. Drug checked for vesicant/irritant status and for risk of hypersensitivity. Most recent laboratory values and allergies, were reviewed. Positive, brisk blood return via CVC was confirmed prior to administration. Chest x-ray for correct line placement reviewed. Vipin Rajput RN and José Miguel Purvis RN verified correct rate of chemotherapy and maintenance IV fluids. Patient was educated on chemotherapy regimen prior to administration including indication for treatment related to disease & side effects of chemotherapy drug. Patient verbalizes understanding of all instructions. Completion of Chemotherapy: Monitoring during infusion done per policy, see Flowsheets. Blood return verified before, during, and after infusion per policy; no signs of extravasation. Pt tolerated chemotherapy well and without incident. Chemotherapy infusion end time on the [facility] Will continue to monitor.

## 2023-08-04 NOTE — PLAN OF CARE
Problem: Safety - Adult  Goal: Free from fall injury  8/4/2023 1743 by Blanquita Smith RN  Outcome: Progressing  Orthostatic vital signs obtained at start of shift - see flowsheet for details. Pt meets criteria for orthostasis. Pt is a High fall risk. See Marisela James Fall Score and ABCDS Injury Risk assessments.   + Screening for Orthostasis and/or + High Fall Risk per STEPHENS/ABCDS: Explained fall risk precautions to pt and family and rationale behind their use to keep the patient safe. Pt bed is in low position, side rails up, call light and belongings are in reach. Fall wristband applied and present on pts wrist.  Bed alarm on. Pt encouraged to call for assistance. Will continue with hourly rounds for PO intake, pain needs, toileting and repositioning as needed. Dr. Jennifer Amor notified of positive orthostatic VS. Patient was asymptomatic. No new orders. Will monitor. Encourage increased PO fluid intake. Problem: Hematologic - Adult  Goal: Maintains hematologic stability  8/4/2023 1743 by Blanquita Smith RN  Outcome: Progressing  Patient's hemoglobin this AM:   Recent Labs     08/04/23  0403   HGB 11.9*     Patient's platelet count this AM:   Recent Labs     08/04/23  0403       Thrombocytopenia not present at this time. Patient showing no signs or symptoms of active bleeding. Transfusion not indicated at this time. Patient verbalizes understanding of all instructions. Will continue to assess and implement POC. Call light within reach and hourly rounding in place. Problem: Infection - Adult  Goal: Absence of infection at discharge  8/4/2023 1743 by Blanquita Smith RN  Outcome: Progressing  CVC site remains free of signs/symptoms of infection. No drainage, edema, erythema, pain, or warmth noted at site. Dressing changes continue per protocol and on an as needed basis - see flowsheet.      Compliant with [facility] Bath Protocol:  Performed CHG bath today per [facility]CC protocol utilizing CHG solution in the shower. CVC site cleansed with CHG wipe over dressing, skin surrounding dressing, and first 6\" of IV tubing. Pt tolerated well. Continued to encourage daily CHG bathing per [facility] protocol.

## 2023-08-04 NOTE — DISCHARGE INSTR - COC
Continuity of Care Form    Patient Name: Flex Pickering   :  1955  MRN:  [id number]    Admit date:  8/3/2023  Discharge date:  23    Code Status Order: Full Code   Advance Directives:     Admitting Physician:  Riley Caraballo DO  PCP: MEI Hutchinson CNP    Discharging Nurse: [facility] Unit/Room#: 9214/0351-59  Discharging Unit Phone Number: [phone]    Emergency Contact:   Extended Emergency Contact Information  Primary Emergency Contact: [facility]  Address: [address] [facility], [address] [facility] [place] [address] [address]ier[address] Phone: [phone]  Mobile Phone: [phone]  Relation: Spouse  Secondary Emergency Contact: [address] [place] [address] [place] Phone: [phone]  Mobile Phone: [phone]  Relation: Child    Past Surgical History:  Past Surgical History:   Procedure Laterality Date    BRONCHOSCOPY N/A 2021    ENDOBRONCHIAL ULTRASOUND WITH EUS performed by Pretty Robertson MD at [address] [place] [address] N/A 2022    BRONCHOSCOPY,  ENDOBRONCHIAL ULTRASOUND WITH ANESTHESIA AND PATHOLOGY performed by Cosmo Patino MD at [address] [place] [address]  2022    BRONCHOSCOPY/TRANSBRONCHIAL NEEDLE BIOPSY performed by Cosmo Patino MD at [address] [place] [address]  2022    BRONCHOSCOPY/TRANSBRONCHIAL NEEDLE BIOPSY ADDL LOBE performed by Cosmo Patino MD at [address] [place] [address]  2022    BRONCHOSCOPY DIAGNOSTIC OR CELL [facility] performed by Cosmo Patino MD at [facility] Right 2023    RIGHT PARIETAL STEREOTACTIC BIOPSY OF BRAIN MASS performed by Stephanie Rivera MD at [address] [facility] Left 7/3/2023    LEFT PARIETAL CRANIOTOMY FOR OPEN BIOPSY OF BRAIN MASS performed by Stephanie Rivera MD at [address]  2021    CT BIOPSY ABDOMEN RETROPERITONEUM 2021 Gonzalo Figueroa MD [address] [place] [address]  [facility]      teeth extracted    FRACTURE SURGERY Left     ankle    HERNIA REPAIR Right 06/18/15    Laparoscopic pre-peritoneal Right Inguinal hernia repair with mesh    HERNIA REPAIR Left 12/2/2021    ROBOTIC LEFT INGUINAL HERNIA REPAIR WITH MESH performed by Vahid Jarquin MD at [address] N/A 10/12/2021    MEDIASTINOSCOPY LYMPH NODE BIOPSY WITH ESOPHAGOGASTRODUODENOSCOPY AND ENDOBRONCHIAL ULTRASOUND performed by Nitin Appiah MD at [address] [facility] N/A 10/25/2021    SURGICAL PORT PLACEMENT performed by Minda Bumpers, MD at [address] Bilateral     SALIVARY GLAND SURGERY      THUMB AMPUTATION Right     due to melanoma    THYROIDECTOMY, PARTIAL         Immunization History:   Immunization History   Administered Date(s) Administered    COVID-19, MODERNA BLUE border, Primary or Immunocompromised, (age 12y+), IM, 100 mcg/0.5mL 02/14/2021, 03/14/2021    COVID-19, PFIZER Bivalent, DO NOT Dilute, (age 12y+), IM, 27 mcg/0.3 mL 11/18/2022    Influenza, FLUBLOK, (age 25 y+), PF, 0.5mL 10/01/2020    Influenza, FLUZONE (age 72 y+), High Dose, 0.7mL 10/09/2021    Influenza, High Dose (Fluzone 65 yrs and older) 10/16/2018, 09/30/2019    Pneumococcal, PCV-13, PREVNAR 13, (age 6w+), IM, 0.5mL 04/01/2019    Pneumococcal, PPSV23, PNEUMOVAX 23, (age 2y+), SC/IM, 0.5mL 06/03/2022    TDaP, ADACEL (age 6y-58y), BOOSTRIX (age 10y+), IM, 0.5mL 05/13/2008    Zoster Recombinant (Shingrix) 10/16/2018, 09/30/2019       Active Problems:  Patient Active Problem List   Diagnosis Code    IGT (impaired glucose tolerance) R73.02    History of malignant melanoma Z85.820    PAF (paroxysmal atrial fibrillation) (HCC) I48.0    Anemia D64.9    Complete heart block (HCC) I44.2    Third degree AV block (HCC) I44.2    Acquired hypothyroidism E03.9    Pacemaker-medtronic Z95.0    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

## 2023-08-04 NOTE — PLAN OF CARE
Problem: Safety - Adult  Goal: Free from fall injury  8/4/2023 0446 by Caleb Farah RN  Outcome: Progressing  Note: Orthostatic vital signs obtained at start of shift - see flowsheet for details. Pt does not meet criteria for orthostasis. Pt is a Med fall risk. See Marisela James Fall Score and ABCDS Injury Risk assessments. - Screening for Orthostasis AND not a [place] Risk per STEPHENS/ABCDS: Pt bed is in low position, side rails up, call light and belongings are in reach. Fall risk light is on outside pts room. Pt encouraged to call for assistance as needed. Will continue with hourly rounds for PO intake, pain needs, toileting and repositioning as needed. Problem: Hematologic - Adult  Goal: Maintains hematologic stability  Outcome: Progressing  Note: Patient's hemoglobin this AM:   Recent Labs     08/04/23  0403   HGB 11.9*     Patient's platelet count this AM:   Recent Labs     08/04/23  0403       Thrombocytopenia not present at this time. Patient showing no signs or symptoms of active bleeding. Transfusion not indicated at this time. Patient verbalizes understanding of all instructions. Will continue to assess and implement POC. Call light within reach and hourly rounding in place. Problem: Infection - Adult  Goal: Absence of infection at discharge  8/4/2023 0446 by Caleb Farah RN  Outcome: Progressing  Note: Pt remains afebrile throughout this shift.

## 2023-08-04 NOTE — ACP (ADVANCE CARE PLANNING)
Advance Care Planning     General Advance Care Planning (ACP) Conversation    Date of Conversation: 8/4/2023  Conducted with: Patient with Decision Making Capacity    Healthcare Decision Maker:    Primary Decision Maker: Diamond Mariscal - [phone]  Click here to complete Healthcare Decision Makers including selection of the Healthcare Decision Maker Relationship (ie \"Primary\"). Today we documented Decision Maker(s) consistent with Legal Next of Kin hierarchy.     Length of Voluntary ACP Conversation in minutes:  <16 minutes (Non-Billable)    JULY Pagan   for [employer] and [address] [place] [address] ([address])  Office Phone: [phone] [address]: [phone]

## 2023-08-04 NOTE — ONCOLOGY
Administration: Chemotherapy drug Methotrexate independently verified with Eliecer Winter RN prior to administration. Acknowledgement of informed consent for chemotherapy administration verified. Original order, appropriateness of regimen, drug supplied, height, weight, BSA, dose calculations, expiration dates/times, drug appearance, and two patient identifiers were verified by both RNs. Drug checked for vesicant/irritant status and for risk of hypersensitivity. Most recent laboratory values and allergies, were reviewed. Positive, brisk blood return via CVC was confirmed prior to administration. Chest x-ray for correct line placement reviewed. Bony Velazco RN and Eliecer Winter RN verified correct rate of chemotherapy and maintenance IV fluids. Patient was educated on chemotherapy regimen prior to administration including indication for treatment related to disease & side effects of chemotherapy drug. Patient verbalizes understanding of all instructions. Completion of Chemotherapy: Monitoring during infusion done per policy, see Flowsheets. Blood return verified before, during, and after infusion per policy; no signs of extravasation. Pt tolerating chemotherapy well and without incident. Chemotherapy infusion end time on the [facility] Will continue to monitor.

## 2023-08-05 LAB
ANION GAP SERPL CALCULATED.3IONS-SCNC: 10 MMOL/L (ref 3–16)
BASOPHILS # BLD: 0.1 K/UL (ref 0–0.2)
BASOPHILS NFR BLD: 0.9 %
BUN SERPL-MCNC: 19 MG/DL (ref 7–20)
CALCIUM SERPL-MCNC: 8.3 MG/DL (ref 8.3–10.6)
CHLORIDE SERPL-SCNC: 109 MMOL/L (ref 99–110)
CO2 SERPL-SCNC: 28 MMOL/L (ref 21–32)
CREAT SERPL-MCNC: 1.1 MG/DL (ref 0.8–1.3)
DEPRECATED RDW RBC AUTO: 13.5 % (ref 12.4–15.4)
EOSINOPHIL # BLD: 0 K/UL (ref 0–0.6)
EOSINOPHIL NFR BLD: 0.1 %
GFR SERPLBLD CREATININE-BSD FMLA CKD-EPI: >60 ML/MIN/{1.73_M2}
GLUCOSE SERPL-MCNC: 118 MG/DL (ref 70–99)
HCT VFR BLD AUTO: 33 % (ref 40.5–52.5)
HGB BLD-MCNC: 11.4 G/DL (ref 13.5–17.5)
LYMPHOCYTES # BLD: 0.5 K/UL (ref 1–5.1)
LYMPHOCYTES NFR BLD: 5.9 %
MCH RBC QN AUTO: 32 PG (ref 26–34)
MCHC RBC AUTO-ENTMCNC: 34.7 G/DL (ref 31–36)
MCV RBC AUTO: 92.2 FL (ref 80–100)
METHOTREXATE LEVEL: 0.43 UMOL/L
MONOCYTES # BLD: 0.5 K/UL (ref 0–1.3)
MONOCYTES NFR BLD: 6.4 %
NEUTROPHILS # BLD: 7 K/UL (ref 1.7–7.7)
NEUTROPHILS NFR BLD: 86.7 %
PH UR STRIP.AUTO: 6.5 [PH] (ref 5–8)
PH UR STRIP.AUTO: 7 [PH] (ref 5–8)
PH UR STRIP.AUTO: 7.5 [PH] (ref 5–8)
PH UR STRIP.AUTO: 8 [PH] (ref 5–8)
PLATELET # BLD AUTO: 226 K/UL (ref 135–450)
PMV BLD AUTO: 8.5 FL (ref 5–10.5)
POTASSIUM SERPL-SCNC: 3.5 MMOL/L (ref 3.5–5.1)
RBC # BLD AUTO: 3.58 M/UL (ref 4.2–5.9)
SODIUM SERPL-SCNC: 147 MMOL/L (ref 136–145)
WBC # BLD AUTO: 8.1 K/UL (ref 4–11)

## 2023-08-05 PROCEDURE — 85025 COMPLETE CBC W/AUTO DIFF WBC: CPT

## 2023-08-05 PROCEDURE — 6370000000 HC RX 637 (ALT 250 FOR IP): Performed by: STUDENT IN AN ORGANIZED HEALTH CARE EDUCATION/TRAINING PROGRAM

## 2023-08-05 PROCEDURE — 2580000003 HC RX 258: Performed by: NURSE PRACTITIONER

## 2023-08-05 PROCEDURE — 6360000002 HC RX W HCPCS: Performed by: NURSE PRACTITIONER

## 2023-08-05 PROCEDURE — 83986 ASSAY PH BODY FLUID NOS: CPT

## 2023-08-05 PROCEDURE — 6370000000 HC RX 637 (ALT 250 FOR IP): Performed by: NURSE PRACTITIONER

## 2023-08-05 PROCEDURE — 80048 BASIC METABOLIC PNL TOTAL CA: CPT

## 2023-08-05 PROCEDURE — 2060000000 HC ICU INTERMEDIATE R&B

## 2023-08-05 PROCEDURE — 2580000003 HC RX 258: Performed by: STUDENT IN AN ORGANIZED HEALTH CARE EDUCATION/TRAINING PROGRAM

## 2023-08-05 PROCEDURE — 36591 DRAW BLOOD OFF VENOUS DEVICE: CPT

## 2023-08-05 PROCEDURE — 83520 IMMUNOASSAY QUANT NOS NONAB: CPT

## 2023-08-05 PROCEDURE — 2500000003 HC RX 250 WO HCPCS: Performed by: STUDENT IN AN ORGANIZED HEALTH CARE EDUCATION/TRAINING PROGRAM

## 2023-08-05 RX ADMIN — LEVETIRACETAM 500 MG: 500 TABLET, FILM COATED ORAL at 21:35

## 2023-08-05 RX ADMIN — SODIUM CHLORIDE, PRESERVATIVE FREE 10 ML: 5 INJECTION INTRAVENOUS at 08:06

## 2023-08-05 RX ADMIN — Medication 400 MG: at 08:21

## 2023-08-05 RX ADMIN — SODIUM BICARBONATE: 84 INJECTION, SOLUTION INTRAVENOUS at 08:24

## 2023-08-05 RX ADMIN — CEPHALEXIN 250 MG: 250 CAPSULE ORAL at 04:04

## 2023-08-05 RX ADMIN — SODIUM CHLORIDE 15 ML: 900 IRRIGANT IRRIGATION at 08:22

## 2023-08-05 RX ADMIN — LEUCOVORIN CALCIUM 25 MG: 25 TABLET ORAL at 05:47

## 2023-08-05 RX ADMIN — POTASSIUM CHLORIDE 20 MEQ: 1500 TABLET, EXTENDED RELEASE ORAL at 08:21

## 2023-08-05 RX ADMIN — SODIUM CHLORIDE 15 ML: 900 IRRIGANT IRRIGATION at 17:00

## 2023-08-05 RX ADMIN — FAMOTIDINE 20 MG: 20 TABLET, FILM COATED ORAL at 08:21

## 2023-08-05 RX ADMIN — LEUCOVORIN CALCIUM 25 MG: 25 TABLET ORAL at 11:34

## 2023-08-05 RX ADMIN — SODIUM CHLORIDE 15 ML: 900 IRRIGANT IRRIGATION at 11:34

## 2023-08-05 RX ADMIN — SODIUM BICARBONATE: 84 INJECTION, SOLUTION INTRAVENOUS at 15:20

## 2023-08-05 RX ADMIN — SODIUM CHLORIDE, PRESERVATIVE FREE 10 ML: 5 INJECTION INTRAVENOUS at 21:36

## 2023-08-05 RX ADMIN — SODIUM CHLORIDE 15 ML: 900 IRRIGANT IRRIGATION at 21:37

## 2023-08-05 RX ADMIN — CEPHALEXIN 250 MG: 250 CAPSULE ORAL at 21:35

## 2023-08-05 RX ADMIN — FAMOTIDINE 20 MG: 20 TABLET, FILM COATED ORAL at 21:35

## 2023-08-05 RX ADMIN — LEUCOVORIN CALCIUM 25 MG: 25 TABLET ORAL at 17:33

## 2023-08-05 RX ADMIN — SODIUM BICARBONATE 50 MEQ: 84 INJECTION, SOLUTION INTRAVENOUS at 17:02

## 2023-08-05 RX ADMIN — LEVETIRACETAM 500 MG: 500 TABLET, FILM COATED ORAL at 08:21

## 2023-08-05 RX ADMIN — CEPHALEXIN 250 MG: 250 CAPSULE ORAL at 17:01

## 2023-08-05 RX ADMIN — MULTIPLE VITAMINS W/ MINERALS TAB 1 TABLET: TAB at 08:21

## 2023-08-05 RX ADMIN — ENOXAPARIN SODIUM 40 MG: 100 INJECTION SUBCUTANEOUS at 18:51

## 2023-08-05 RX ADMIN — SODIUM BICARBONATE: 84 INJECTION, SOLUTION INTRAVENOUS at 01:28

## 2023-08-05 RX ADMIN — SODIUM BICARBONATE: 84 INJECTION, SOLUTION INTRAVENOUS at 23:02

## 2023-08-05 RX ADMIN — TAMSULOSIN HYDROCHLORIDE 0.4 MG: 0.4 CAPSULE ORAL at 08:21

## 2023-08-05 RX ADMIN — CEPHALEXIN 250 MG: 250 CAPSULE ORAL at 10:48

## 2023-08-05 NOTE — PROGRESS NOTES
[facility] Progress Note    2023     Jason Matos    MRN: [id number]    : 1955    Referring MD: Lorraine Redmond, [address] [place] [address]      SUBJECTIVE:  She is doing well today. No new complaints. Has been ambulatory about the hallways and sitting up in the chair. Looking forward to going home. Son is visiting today.   Wife was on the speaker phone during the encounter today and had the opportunity also to provide input    ECOG PS:  (1) Restricted in physically strenuous activity, ambulatory and able to do work of light nature    KPS: 80% Normal activity with effort; some signs or symptoms of disease    Isolation: None    Medications    Scheduled Meds:   famotidine  20 mg Oral BID    levETIRAcetam  500 mg Oral BID    magnesium oxide  400 mg Oral Daily    therapeutic multivitamin-minerals  1 tablet Oral Daily    potassium chloride  20 mEq Oral Daily with breakfast    tamsulosin  0.4 mg Oral Daily    cephALEXin  250 mg Oral 4 times per day    sodium chloride flush  5-40 mL IntraVENous 2 times per day    enoxaparin  40 mg SubCUTAneous QPM    Saline Mouthwash  15 mL Swish & Spit 4x Daily AC & HS    furosemide  40 mg IntraVENous Q12H    leucovorin calcium  25 mg Oral Q6H    Or    leucovorin calcium  25 mg IntraVENous Q6H     Continuous Infusions:   sodium chloride      sodium chloride      IV infusion builder 150 mL/hr at 23 0128     PRN Meds:.hydrALAZINE, potassium phosphate IVPB, sodium chloride, sodium chloride flush, sodium chloride, potassium chloride, magnesium sulfate, magnesium hydroxide, acetaminophen **OR** acetaminophen, Saline Mouthwash, alteplase (CATHFLO) with sterile water injection, furosemide, prochlorperazine **OR** prochlorperazine, LORazepam **OR** LORazepam, sodium bicarbonate, leucovorin (WELLCOVORIN) IVPB    ROS:  As noted above, otherwise remainder of 10-point ROS negative    Physical Exam:     I&O:    Intake/Output Summary (Last 24 hours) at

## 2023-08-05 NOTE — PLAN OF CARE
Problem: ABCDS Injury Assessment  Goal: Absence of physical injury  Outcome: Progressing     Problem: Safety - Adult  Goal: Free from fall injury  8/5/2023 0539 by Elise Cameron RN  Outcome: Progressing  8/4/2023 1743 by Bj Monterroso RN  Outcome: Progressing     Problem: Hematologic - Adult  Goal: Maintains hematologic stability  8/5/2023 0539 by Elise Cameron RN  Outcome: Progressing  8/4/2023 1743 by Bj Monterroso RN  Outcome: Progressing     Problem: Infection - Adult  Goal: Absence of infection at discharge  8/5/2023 0539 by Elise Cameron RN  Outcome: Progressing  8/4/2023 1743 by Bj Monterroso RN  Outcome: Progressing    Pt free from fall/injury overnight, VSS and afebrile, no electrolyte or blood product replacements required this AM. Good UOP, no need for lasix overnight. Urine pH WNL not requiring HCO3.

## 2023-08-05 NOTE — PROGRESS NOTES
0005 - called lab to check on stat methotrexate level drawn at 2329. Verified lab received blood and processing. 0106 - called lab back to check on methotrexate level status. Stated will result shortly. 0139 - Verified with Keenan Fore in lab methotrexate level is still processing. Unable to give an estimated time on when it will result.      3977 - Lab called with result of 0.43

## 2023-08-06 LAB
ANION GAP SERPL CALCULATED.3IONS-SCNC: 9 MMOL/L (ref 3–16)
BASOPHILS # BLD: 0 K/UL (ref 0–0.2)
BASOPHILS NFR BLD: 0.4 %
BUN SERPL-MCNC: 13 MG/DL (ref 7–20)
CALCIUM SERPL-MCNC: 8.6 MG/DL (ref 8.3–10.6)
CHLORIDE SERPL-SCNC: 108 MMOL/L (ref 99–110)
CO2 SERPL-SCNC: 29 MMOL/L (ref 21–32)
CREAT SERPL-MCNC: 1.1 MG/DL (ref 0.8–1.3)
DEPRECATED RDW RBC AUTO: 13.3 % (ref 12.4–15.4)
EOSINOPHIL # BLD: 0.1 K/UL (ref 0–0.6)
EOSINOPHIL NFR BLD: 2.5 %
GFR SERPLBLD CREATININE-BSD FMLA CKD-EPI: >60 ML/MIN/{1.73_M2}
GLUCOSE SERPL-MCNC: 109 MG/DL (ref 70–99)
HCT VFR BLD AUTO: 31.8 % (ref 40.5–52.5)
HGB BLD-MCNC: 11 G/DL (ref 13.5–17.5)
LYMPHOCYTES # BLD: 0.5 K/UL (ref 1–5.1)
LYMPHOCYTES NFR BLD: 12.4 %
MCH RBC QN AUTO: 32.2 PG (ref 26–34)
MCHC RBC AUTO-ENTMCNC: 34.7 G/DL (ref 31–36)
MCV RBC AUTO: 92.6 FL (ref 80–100)
METHOTREXATE LEVEL: 0.29 UMOL/L
MONOCYTES # BLD: 0.1 K/UL (ref 0–1.3)
MONOCYTES NFR BLD: 2.7 %
NEUTROPHILS # BLD: 3.6 K/UL (ref 1.7–7.7)
NEUTROPHILS NFR BLD: 82 %
PH UR STRIP.AUTO: 7 [PH] (ref 5–8)
PH UR STRIP.AUTO: 8 [PH] (ref 5–8)
PH UR STRIP.AUTO: 8.5 [PH] (ref 5–8)
PLATELET # BLD AUTO: 216 K/UL (ref 135–450)
PMV BLD AUTO: 8.3 FL (ref 5–10.5)
POTASSIUM SERPL-SCNC: 3.4 MMOL/L (ref 3.5–5.1)
RBC # BLD AUTO: 3.43 M/UL (ref 4.2–5.9)
SODIUM SERPL-SCNC: 146 MMOL/L (ref 136–145)
WBC # BLD AUTO: 4.4 K/UL (ref 4–11)

## 2023-08-06 PROCEDURE — 6370000000 HC RX 637 (ALT 250 FOR IP): Performed by: NURSE PRACTITIONER

## 2023-08-06 PROCEDURE — 2500000003 HC RX 250 WO HCPCS: Performed by: STUDENT IN AN ORGANIZED HEALTH CARE EDUCATION/TRAINING PROGRAM

## 2023-08-06 PROCEDURE — 80048 BASIC METABOLIC PNL TOTAL CA: CPT

## 2023-08-06 PROCEDURE — 36591 DRAW BLOOD OFF VENOUS DEVICE: CPT

## 2023-08-06 PROCEDURE — 6360000002 HC RX W HCPCS: Performed by: NURSE PRACTITIONER

## 2023-08-06 PROCEDURE — 6370000000 HC RX 637 (ALT 250 FOR IP): Performed by: STUDENT IN AN ORGANIZED HEALTH CARE EDUCATION/TRAINING PROGRAM

## 2023-08-06 PROCEDURE — 2580000003 HC RX 258: Performed by: STUDENT IN AN ORGANIZED HEALTH CARE EDUCATION/TRAINING PROGRAM

## 2023-08-06 PROCEDURE — 6360000002 HC RX W HCPCS: Performed by: STUDENT IN AN ORGANIZED HEALTH CARE EDUCATION/TRAINING PROGRAM

## 2023-08-06 PROCEDURE — 83986 ASSAY PH BODY FLUID NOS: CPT

## 2023-08-06 PROCEDURE — 2060000000 HC ICU INTERMEDIATE R&B

## 2023-08-06 PROCEDURE — 83520 IMMUNOASSAY QUANT NOS NONAB: CPT

## 2023-08-06 PROCEDURE — 3E03305 INTRODUCTION OF OTHER ANTINEOPLASTIC INTO PERIPHERAL VEIN, PERCUTANEOUS APPROACH: ICD-10-PCS | Performed by: FAMILY MEDICINE

## 2023-08-06 PROCEDURE — 2580000003 HC RX 258: Performed by: NURSE PRACTITIONER

## 2023-08-06 PROCEDURE — 85025 COMPLETE CBC W/AUTO DIFF WBC: CPT

## 2023-08-06 RX ADMIN — POTASSIUM CHLORIDE 20 MEQ: 400 INJECTION, SOLUTION INTRAVENOUS at 05:38

## 2023-08-06 RX ADMIN — SODIUM BICARBONATE: 84 INJECTION, SOLUTION INTRAVENOUS at 22:27

## 2023-08-06 RX ADMIN — POTASSIUM CHLORIDE 20 MEQ: 400 INJECTION, SOLUTION INTRAVENOUS at 06:42

## 2023-08-06 RX ADMIN — SODIUM BICARBONATE: 84 INJECTION, SOLUTION INTRAVENOUS at 06:26

## 2023-08-06 RX ADMIN — LEUCOVORIN CALCIUM 25 MG: 25 TABLET ORAL at 01:44

## 2023-08-06 RX ADMIN — LEVETIRACETAM 500 MG: 500 TABLET, FILM COATED ORAL at 21:19

## 2023-08-06 RX ADMIN — LEUCOVORIN CALCIUM 25 MG: 25 TABLET ORAL at 06:25

## 2023-08-06 RX ADMIN — POTASSIUM CHLORIDE 20 MEQ: 400 INJECTION, SOLUTION INTRAVENOUS at 09:11

## 2023-08-06 RX ADMIN — SODIUM CHLORIDE, PRESERVATIVE FREE 10 ML: 5 INJECTION INTRAVENOUS at 21:18

## 2023-08-06 RX ADMIN — SODIUM CHLORIDE 15 ML: 900 IRRIGANT IRRIGATION at 11:23

## 2023-08-06 RX ADMIN — CEPHALEXIN 250 MG: 250 CAPSULE ORAL at 16:03

## 2023-08-06 RX ADMIN — TAMSULOSIN HYDROCHLORIDE 0.4 MG: 0.4 CAPSULE ORAL at 09:06

## 2023-08-06 RX ADMIN — ACETAMINOPHEN 650 MG: 325 TABLET ORAL at 04:24

## 2023-08-06 RX ADMIN — FUROSEMIDE 40 MG: 10 INJECTION, SOLUTION INTRAMUSCULAR; INTRAVENOUS at 18:28

## 2023-08-06 RX ADMIN — LEUCOVORIN CALCIUM 25 MG: 25 TABLET ORAL at 23:35

## 2023-08-06 RX ADMIN — CEPHALEXIN 250 MG: 250 CAPSULE ORAL at 04:05

## 2023-08-06 RX ADMIN — FAMOTIDINE 20 MG: 20 TABLET, FILM COATED ORAL at 21:19

## 2023-08-06 RX ADMIN — MULTIPLE VITAMINS W/ MINERALS TAB 1 TABLET: TAB at 09:06

## 2023-08-06 RX ADMIN — ENOXAPARIN SODIUM 40 MG: 100 INJECTION SUBCUTANEOUS at 18:27

## 2023-08-06 RX ADMIN — Medication 400 MG: at 09:05

## 2023-08-06 RX ADMIN — SODIUM CHLORIDE, PRESERVATIVE FREE 10 ML: 5 INJECTION INTRAVENOUS at 10:47

## 2023-08-06 RX ADMIN — SODIUM BICARBONATE: 84 INJECTION, SOLUTION INTRAVENOUS at 13:09

## 2023-08-06 RX ADMIN — CEPHALEXIN 250 MG: 250 CAPSULE ORAL at 22:35

## 2023-08-06 RX ADMIN — SODIUM CHLORIDE 15 ML: 900 IRRIGANT IRRIGATION at 17:28

## 2023-08-06 RX ADMIN — POTASSIUM CHLORIDE 20 MEQ: 400 INJECTION, SOLUTION INTRAVENOUS at 07:36

## 2023-08-06 RX ADMIN — LEUCOVORIN CALCIUM 25 MG: 25 TABLET ORAL at 11:23

## 2023-08-06 RX ADMIN — LEVETIRACETAM 500 MG: 500 TABLET, FILM COATED ORAL at 09:06

## 2023-08-06 RX ADMIN — POTASSIUM CHLORIDE 20 MEQ: 1500 TABLET, EXTENDED RELEASE ORAL at 09:09

## 2023-08-06 RX ADMIN — SODIUM CHLORIDE 15 ML: 900 IRRIGANT IRRIGATION at 21:19

## 2023-08-06 RX ADMIN — FAMOTIDINE 20 MG: 20 TABLET, FILM COATED ORAL at 09:06

## 2023-08-06 RX ADMIN — CEPHALEXIN 250 MG: 250 CAPSULE ORAL at 09:06

## 2023-08-06 RX ADMIN — LEUCOVORIN CALCIUM 25 MG: 25 TABLET ORAL at 17:28

## 2023-08-06 ASSESSMENT — PAIN DESCRIPTION - ONSET: ONSET: GRADUAL

## 2023-08-06 ASSESSMENT — PAIN SCALES - WONG BAKER: WONGBAKER_NUMERICALRESPONSE: 0

## 2023-08-06 ASSESSMENT — PAIN DESCRIPTION - PAIN TYPE: TYPE: ACUTE PAIN

## 2023-08-06 ASSESSMENT — PAIN - FUNCTIONAL ASSESSMENT: PAIN_FUNCTIONAL_ASSESSMENT: ACTIVITIES ARE NOT PREVENTED

## 2023-08-06 ASSESSMENT — PAIN SCALES - GENERAL: PAINLEVEL_OUTOF10: 4

## 2023-08-06 ASSESSMENT — PAIN DESCRIPTION - DESCRIPTORS: DESCRIPTORS: NAGGING

## 2023-08-06 ASSESSMENT — PAIN DESCRIPTION - FREQUENCY: FREQUENCY: CONTINUOUS

## 2023-08-06 ASSESSMENT — PAIN DESCRIPTION - ORIENTATION: ORIENTATION: ANTERIOR

## 2023-08-06 ASSESSMENT — PAIN DESCRIPTION - LOCATION: LOCATION: HEAD

## 2023-08-06 NOTE — PLAN OF CARE
Problem: Safety - Adult  Goal: Free from fall injury  8/6/2023 1018 by Vaughn Sanchez RN  Outcome: Progressing   Orthostatic vital signs obtained at start of shift - see flowsheet for details. Pt does not meet criteria for orthostasis. Pt is a Low fall risk. See Castro Plump Fall Score and ABCDS Injury Risk assessments. Problem: ABCDS Injury Assessment  Goal: Absence of physical injury  8/6/2023 1018 by Vaughn Sanchez RN  Outcome: Progressing  Pt with activity orders for up ad benito. Encouraged pt to be up OOB as much as possible throughout the day and for all meals. Encouraged frequent short naps as necessary to preserve energy but instructed that while awake, pt should be OOB. Encouraged pt to ambulate in halls. Pt seen up ad benito in halls once this shift. Pt is visualized to be OOB % of the time this shift. Will continue to encourage frequent activity.

## 2023-08-06 NOTE — PROGRESS NOTES
[facility] Progress Note    2023     Sam Lopez    MRN: [id number]    : 1955    Referring MD: Waleska Mcginnis, [address] [place] [address],  [address] [place] [address] Avenue      SUBJECTIVE:  Very agitated follong his being told his MTX level too high for discharged. Would not allow further conversation, exam. Threw food. Oriented x 3.  Thought process clear, just angry    ECOG PS:  (1) Restricted in physically strenuous activity, ambulatory and able to do work of light nature    KPS: 80% Normal activity with effort; some signs or symptoms of disease    Isolation: None    Medications    Scheduled Meds:   famotidine  20 mg Oral BID    levETIRAcetam  500 mg Oral BID    magnesium oxide  400 mg Oral Daily    therapeutic multivitamin-minerals  1 tablet Oral Daily    potassium chloride  20 mEq Oral Daily with breakfast    tamsulosin  0.4 mg Oral Daily    cephALEXin  250 mg Oral 4 times per day    sodium chloride flush  5-40 mL IntraVENous 2 times per day    enoxaparin  40 mg SubCUTAneous QPM    Saline Mouthwash  15 mL Swish & Spit 4x Daily AC & HS    furosemide  40 mg IntraVENous Q12H    leucovorin calcium  25 mg Oral Q6H    Or    leucovorin calcium  25 mg IntraVENous Q6H     Continuous Infusions:   sodium chloride      sodium chloride      IV infusion builder 150 mL/hr at 23 0626     PRN Meds:.hydrALAZINE, potassium phosphate IVPB, sodium chloride, sodium chloride flush, sodium chloride, potassium chloride, magnesium sulfate, magnesium hydroxide, acetaminophen **OR** acetaminophen, Saline Mouthwash, alteplase (CATHFLO) with sterile water injection, furosemide, prochlorperazine **OR** prochlorperazine, LORazepam **OR** LORazepam, sodium bicarbonate, leucovorin (WELLCOVORIN) IVPB    ROS:  As noted above, otherwise remainder of 10-point ROS negative    Physical Exam:     I&O:    Intake/Output Summary (Last 24 hours) at 2023 0730  Last data filed at 2023 0650  Gross per 24 hour   Intake 2670 ml   Output 5950

## 2023-08-06 NOTE — PLAN OF CARE
Problem: ABCDS Injury Assessment  Goal: Absence of physical injury  Outcome: Progressing     Problem: Safety - Adult  Goal: Free from fall injury  Outcome: Progressing     Problem: Hematologic - Adult  Goal: Maintains hematologic stability  Outcome: Progressing     Problem: Pain  Goal: Verbalizes/displays adequate comfort level or baseline comfort level  Outcome: Progressing       No injury/fall to note overnight. Elevated BP overnight however not yet meeting parameters for Prn meds, pt asymptomatic. Tylenol given x1 overnight for headache. Remains afebrile, good UOP overnight.

## 2023-08-07 ENCOUNTER — APPOINTMENT (OUTPATIENT)
Dept: GENERAL RADIOLOGY | Age: 68
DRG: 846 | End: 2023-08-07
Attending: STUDENT IN AN ORGANIZED HEALTH CARE EDUCATION/TRAINING PROGRAM
Payer: MEDICARE

## 2023-08-07 LAB
ALBUMIN SERPL-MCNC: 3.2 G/DL (ref 3.4–5)
ALP SERPL-CCNC: 65 U/L (ref 40–129)
ALT SERPL-CCNC: 80 U/L (ref 10–40)
ANION GAP SERPL CALCULATED.3IONS-SCNC: 9 MMOL/L (ref 3–16)
APTT BLD: 26.4 SEC (ref 22.7–35.9)
AST SERPL-CCNC: 48 U/L (ref 15–37)
BASOPHILS # BLD: 0 K/UL (ref 0–0.2)
BASOPHILS NFR BLD: 0.2 %
BILIRUB DIRECT SERPL-MCNC: <0.2 MG/DL (ref 0–0.3)
BILIRUB INDIRECT SERPL-MCNC: ABNORMAL MG/DL (ref 0–1)
BILIRUB SERPL-MCNC: 0.3 MG/DL (ref 0–1)
BILIRUB UR QL STRIP.AUTO: NEGATIVE
BUN SERPL-MCNC: 15 MG/DL (ref 7–20)
CALCIUM SERPL-MCNC: 8.8 MG/DL (ref 8.3–10.6)
CHLORIDE SERPL-SCNC: 102 MMOL/L (ref 99–110)
CLARITY UR: CLEAR
CO2 SERPL-SCNC: 30 MMOL/L (ref 21–32)
COLOR UR: YELLOW
CREAT SERPL-MCNC: 1.1 MG/DL (ref 0.8–1.3)
DEPRECATED RDW RBC AUTO: 13.1 % (ref 12.4–15.4)
EOSINOPHIL # BLD: 0.2 K/UL (ref 0–0.6)
EOSINOPHIL NFR BLD: 4.2 %
GFR SERPLBLD CREATININE-BSD FMLA CKD-EPI: >60 ML/MIN/{1.73_M2}
GLUCOSE SERPL-MCNC: 104 MG/DL (ref 70–99)
GLUCOSE UR STRIP.AUTO-MCNC: NEGATIVE MG/DL
HCT VFR BLD AUTO: 31 % (ref 40.5–52.5)
HGB BLD-MCNC: 10.9 G/DL (ref 13.5–17.5)
HGB UR QL STRIP.AUTO: NEGATIVE
INR PPP: 0.91 (ref 0.84–1.16)
KETONES UR STRIP.AUTO-MCNC: NEGATIVE MG/DL
LDH SERPL L TO P-CCNC: 202 U/L (ref 100–190)
LEUKOCYTE ESTERASE UR QL STRIP.AUTO: NEGATIVE
LYMPHOCYTES # BLD: 0.6 K/UL (ref 1–5.1)
LYMPHOCYTES NFR BLD: 14.9 %
MAGNESIUM SERPL-MCNC: 2.1 MG/DL (ref 1.8–2.4)
MCH RBC QN AUTO: 32.7 PG (ref 26–34)
MCHC RBC AUTO-ENTMCNC: 35.2 G/DL (ref 31–36)
MCV RBC AUTO: 92.9 FL (ref 80–100)
METHOTREXATE LEVEL: 0.19 UMOL/L
MONOCYTES # BLD: 0.1 K/UL (ref 0–1.3)
MONOCYTES NFR BLD: 1.9 %
NEUTROPHILS # BLD: 3 K/UL (ref 1.7–7.7)
NEUTROPHILS NFR BLD: 78.8 %
NITRITE UR QL STRIP.AUTO: NEGATIVE
PH UR STRIP.AUTO: 6.5 [PH] (ref 5–8)
PH UR STRIP.AUTO: 6.5 [PH] (ref 5–8)
PH UR STRIP.AUTO: 7 [PH] (ref 5–8)
PH UR STRIP.AUTO: 8 [PH] (ref 5–8)
PHOSPHATE SERPL-MCNC: 3.4 MG/DL (ref 2.5–4.9)
PLATELET # BLD AUTO: 217 K/UL (ref 135–450)
PMV BLD AUTO: 8.2 FL (ref 5–10.5)
POTASSIUM SERPL-SCNC: 3.4 MMOL/L (ref 3.5–5.1)
PROT SERPL-MCNC: 5.5 G/DL (ref 6.4–8.2)
PROT UR STRIP.AUTO-MCNC: NEGATIVE MG/DL
PROTHROMBIN TIME: 12.3 SEC (ref 11.5–14.8)
RBC # BLD AUTO: 3.34 M/UL (ref 4.2–5.9)
SODIUM SERPL-SCNC: 141 MMOL/L (ref 136–145)
SP GR UR STRIP.AUTO: <=1.005 (ref 1–1.03)
UA DIPSTICK W REFLEX MICRO PNL UR: NORMAL
URATE SERPL-MCNC: 3.9 MG/DL (ref 3.5–7.2)
URN SPEC COLLECT METH UR: NORMAL
UROBILINOGEN UR STRIP-ACNC: 0.2 E.U./DL
WBC # BLD AUTO: 3.8 K/UL (ref 4–11)

## 2023-08-07 PROCEDURE — 6370000000 HC RX 637 (ALT 250 FOR IP): Performed by: NURSE PRACTITIONER

## 2023-08-07 PROCEDURE — 2500000003 HC RX 250 WO HCPCS: Performed by: STUDENT IN AN ORGANIZED HEALTH CARE EDUCATION/TRAINING PROGRAM

## 2023-08-07 PROCEDURE — 80048 BASIC METABOLIC PNL TOTAL CA: CPT

## 2023-08-07 PROCEDURE — 36591 DRAW BLOOD OFF VENOUS DEVICE: CPT

## 2023-08-07 PROCEDURE — 2060000000 HC ICU INTERMEDIATE R&B

## 2023-08-07 PROCEDURE — 83520 IMMUNOASSAY QUANT NOS NONAB: CPT

## 2023-08-07 PROCEDURE — 83986 ASSAY PH BODY FLUID NOS: CPT

## 2023-08-07 PROCEDURE — 85730 THROMBOPLASTIN TIME PARTIAL: CPT

## 2023-08-07 PROCEDURE — 85610 PROTHROMBIN TIME: CPT

## 2023-08-07 PROCEDURE — 83735 ASSAY OF MAGNESIUM: CPT

## 2023-08-07 PROCEDURE — 84550 ASSAY OF BLOOD/URIC ACID: CPT

## 2023-08-07 PROCEDURE — 80076 HEPATIC FUNCTION PANEL: CPT

## 2023-08-07 PROCEDURE — 85025 COMPLETE CBC W/AUTO DIFF WBC: CPT

## 2023-08-07 PROCEDURE — 2580000003 HC RX 258: Performed by: NURSE PRACTITIONER

## 2023-08-07 PROCEDURE — 2580000003 HC RX 258: Performed by: STUDENT IN AN ORGANIZED HEALTH CARE EDUCATION/TRAINING PROGRAM

## 2023-08-07 PROCEDURE — 71045 X-RAY EXAM CHEST 1 VIEW: CPT

## 2023-08-07 PROCEDURE — 6360000002 HC RX W HCPCS: Performed by: NURSE PRACTITIONER

## 2023-08-07 PROCEDURE — 84100 ASSAY OF PHOSPHORUS: CPT

## 2023-08-07 PROCEDURE — 81003 URINALYSIS AUTO W/O SCOPE: CPT

## 2023-08-07 PROCEDURE — 83615 LACTATE (LD) (LDH) ENZYME: CPT

## 2023-08-07 PROCEDURE — 6370000000 HC RX 637 (ALT 250 FOR IP): Performed by: STUDENT IN AN ORGANIZED HEALTH CARE EDUCATION/TRAINING PROGRAM

## 2023-08-07 RX ADMIN — SODIUM BICARBONATE 50 MEQ: 84 INJECTION, SOLUTION INTRAVENOUS at 05:54

## 2023-08-07 RX ADMIN — MULTIPLE VITAMINS W/ MINERALS TAB 1 TABLET: TAB at 08:37

## 2023-08-07 RX ADMIN — FAMOTIDINE 20 MG: 20 TABLET, FILM COATED ORAL at 08:37

## 2023-08-07 RX ADMIN — CEPHALEXIN 250 MG: 250 CAPSULE ORAL at 21:35

## 2023-08-07 RX ADMIN — LEUCOVORIN CALCIUM 25 MG: 25 TABLET ORAL at 17:46

## 2023-08-07 RX ADMIN — POTASSIUM CHLORIDE 20 MEQ: 400 INJECTION, SOLUTION INTRAVENOUS at 10:26

## 2023-08-07 RX ADMIN — SODIUM CHLORIDE 15 ML: 900 IRRIGANT IRRIGATION at 05:53

## 2023-08-07 RX ADMIN — POTASSIUM CHLORIDE 20 MEQ: 1500 TABLET, EXTENDED RELEASE ORAL at 08:37

## 2023-08-07 RX ADMIN — CEPHALEXIN 250 MG: 250 CAPSULE ORAL at 16:22

## 2023-08-07 RX ADMIN — POTASSIUM CHLORIDE 20 MEQ: 400 INJECTION, SOLUTION INTRAVENOUS at 08:37

## 2023-08-07 RX ADMIN — POTASSIUM CHLORIDE 20 MEQ: 400 INJECTION, SOLUTION INTRAVENOUS at 05:13

## 2023-08-07 RX ADMIN — SODIUM BICARBONATE: 84 INJECTION, SOLUTION INTRAVENOUS at 04:43

## 2023-08-07 RX ADMIN — Medication 400 MG: at 08:37

## 2023-08-07 RX ADMIN — LEUCOVORIN CALCIUM 25 MG: 25 TABLET ORAL at 05:11

## 2023-08-07 RX ADMIN — LEUCOVORIN CALCIUM 25 MG: 25 TABLET ORAL at 11:28

## 2023-08-07 RX ADMIN — LEUCOVORIN CALCIUM 25 MG: 25 TABLET ORAL at 23:20

## 2023-08-07 RX ADMIN — LEVETIRACETAM 500 MG: 500 TABLET, FILM COATED ORAL at 20:47

## 2023-08-07 RX ADMIN — SODIUM CHLORIDE: 9 INJECTION, SOLUTION INTRAVENOUS at 05:12

## 2023-08-07 RX ADMIN — SODIUM BICARBONATE: 84 INJECTION, SOLUTION INTRAVENOUS at 23:24

## 2023-08-07 RX ADMIN — SODIUM BICARBONATE: 84 INJECTION, SOLUTION INTRAVENOUS at 11:28

## 2023-08-07 RX ADMIN — TAMSULOSIN HYDROCHLORIDE 0.4 MG: 0.4 CAPSULE ORAL at 08:37

## 2023-08-07 RX ADMIN — ENOXAPARIN SODIUM 40 MG: 100 INJECTION SUBCUTANEOUS at 17:46

## 2023-08-07 RX ADMIN — SODIUM BICARBONATE: 84 INJECTION, SOLUTION INTRAVENOUS at 18:22

## 2023-08-07 RX ADMIN — CEPHALEXIN 250 MG: 250 CAPSULE ORAL at 04:33

## 2023-08-07 RX ADMIN — POTASSIUM CHLORIDE 20 MEQ: 400 INJECTION, SOLUTION INTRAVENOUS at 06:59

## 2023-08-07 RX ADMIN — CEPHALEXIN 250 MG: 250 CAPSULE ORAL at 10:24

## 2023-08-07 RX ADMIN — SODIUM CHLORIDE, PRESERVATIVE FREE 10 ML: 5 INJECTION INTRAVENOUS at 20:48

## 2023-08-07 RX ADMIN — LEVETIRACETAM 500 MG: 500 TABLET, FILM COATED ORAL at 08:37

## 2023-08-07 RX ADMIN — FAMOTIDINE 20 MG: 20 TABLET, FILM COATED ORAL at 20:47

## 2023-08-07 RX ADMIN — SODIUM CHLORIDE, PRESERVATIVE FREE 10 ML: 5 INJECTION INTRAVENOUS at 08:39

## 2023-08-07 NOTE — PROGRESS NOTES
Stat MTX lab drawn. This RN placed call to lab, spoke with [facility]. This RN notified [facility] that STAT MTX level being sent.

## 2023-08-07 NOTE — PROGRESS NOTES
Call placed to lab (chemistry). This RN explained to  Alonso Jasso) that for the urine ph there are two values. Pt MRN provided. Per sharon, the urine pH for the UA is 6.5 and the urine pH lab resulted at 7.0.   Per sharon, it looks like there are two results because the patient's UA and urine pH tests were sent at the same time, and the results resulted at the same time but the urine pH result that he sees is 7.0

## 2023-08-07 NOTE — PLAN OF CARE
Problem: Safety - Adult  Goal: Free from fall injury  Outcome: Progressing  Free From Fall Injury:   Instruct family/caregiver on patient safety   Based on caregiver fall risk screen, instruct family/caregiver to ask for assistance with transferring infant if caregiver noted to have fall risk factors     Problem: Hematologic - Adult  Goal: Maintains hematologic stability  Outcome: Progressing  Maintains hematologic stability:   Assess for signs and symptoms of bleeding or hemorrhage   Monitor labs for bleeding or clotting disorders   Administer blood products/factors as ordered  Goal: Able to ambulate independently  Description: Able to ambulate independently  Outcome: Progressing     Problem: Infection - Adult  Goal: Absence of infection at discharge  Outcome: Progressing  Absence of infection at discharge:   Assess and monitor for signs and symptoms of infection   Monitor lab/diagnostic results   Monitor all insertion sites i.e., indwelling lines, tubes and drains   Administer medications as ordered   Instruct and encourage patient and family to use good hand hygiene technique

## 2023-08-07 NOTE — BH NOTE
Component Value Date    T4FREE 1.5 08/20/2021     No results found for: FOLATE  No results found for: VITD25   Lab Results   Component Value Date/Time    COLORU Yellow 08/07/2023 12:11 AM    NITRU Negative 08/07/2023 12:11 AM    GLUCOSEU Negative 08/07/2023 12:11 AM    KETUA Negative 08/07/2023 12:11 AM    UROBILINOGEN 0.2 08/07/2023 12:11 AM    BILIRUBINUR Negative 08/07/2023 12:11 AM    BILIRUBINUR neg 02/25/2015 02:22 PM     EKG:   Encounter Date: 08/03/23   EKG 12 lead   Result Value    Ventricular Rate 62    Atrial Rate 62    P-R Interval 188    QRS Duration 152    Q-T Interval 484    QTc Calculation (Bazett) 491    P Axis 49    R Axis -73    T Axis 74    Diagnosis      AV dual-paced rhythmAbnormal ECGConfirmed by [facility] - JORGE WATSON MD ([id number]) on 8/4/2023 10:23:55 AM     Dx:   Psychiatric (DSM V) Diagnoses: Adjustment Disorder with depressed mood and anxiety R/O Anxiety and depression due to general medical conditions (CNS DLBCL)    Assessment  Lizett Laguna is a 77 y.o.   male with H/O DLBCL with CNS involvement, here for Cycle 3 R-MPV. Saw patient due to concerns for depression and anxiety. Patient appears to be experiencing depressed mood and anxiety that was triggered by deficits from CNS lymphoma and perpetuated by negative thoughts about himself. Patient has a tendency towards guilt and feeling unworthy. It is possible patient's CNS involvement with lymphoma is physiologically contributing to mood changes. Discussed treatment options including therapy and medications. Patient reports that he is interested in both. Recommendations:    Treatment team could consider sertraline (25mg) for one week and then increase to 50mg. Psychologist will follow-up next week during next hospital admission for additional therapy and medication monitoring if treatment team starts sertraline  Discussed TBI clinic with NP and wife. Will discuss further during treatment rounds.     Interventions during this admission: Supportive listening, Assessment of current needs, Grounding skills, Identifying values, Setting plan for value guided action, Behavioral activation, Cognitive restructuring , Collaborating with family members     Rad Pearson Psy.D., MSCP  Clinical Psychologist    Prior to evaluation, explained provider's roles. Patient expressed understanding and was agreeable to complete the evaluation. Notably, psychologist's license allows for consultation regarding psychotropic medications in the [facility], but psychologist's license does not permit prescribing of any medications. Defer to prescribing provider regarding which medications he/she chooses to prescribe.

## 2023-08-07 NOTE — PROGRESS NOTES
brain (6/1/23): Significantly decreased cerebral periventricular lymphoma and vasogenic edema compatible with positive response to treatment. 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 Progressed local mass effect with 4 mm left to right midline shift. The dominant measurable portion of which measures 5.5 x 2.6 cm (series 13, image 76), previously with only stippled areas of enhancement in this region     PLAN:  R+MPV - Procabazine w/ even cycles      Cycle 3, Day + 5    MTX level:  8/4/23 - 0.43  8/5/23 - 0.29  8/6/23 - 0.19  8/7/23 - pending    2. Neuro:   Severe memory impairment and visual changes: r/t CNS mass & vasogenic edema has improved w/ chemotherapy, but cont to have confusion  - Cont delirium precautions  - S/p Decadron taper (7/4/23 - 7/27/23): 6mg IV q6h (7/4/23), 8mg q8h (7/6/23), 4mg q12h (7/8/23), 4mg daily (7/10/23) , 4mg BID (7/11/23, increased w/ confusion),  4mg daily (7/17/23 - 7/27/23)  - Cont Keppra BID  Headache:  d/t brain mass   - Cont Tylenol PRN    3. ID: afebrile, but w/ scalp swelling/cellulitis (POA)  - Start antimicrobial ppx if ANC <1.5  - Cont Keflex Day + 5/7 (started 8/3/23)     4. Heme: Counts stable, anemia from chemotherapy   - Transfuse for Hgb <7 and Plt <10  - No transfusion today     5. Metabolic: Hyperglycemia & hyperNa w/ stable renal fxn & hypoPhos  - Cont Mag Ox 400 mg daily  - Cont KCl 20 meq daily (started 7/21/23)  - IV KPhos 30 mmol x 1 (8/4/23)  - Cont IVFs:  D5.2NS + NaHCO2 50 meq @ 150 mL/hr  - Replace Phos, Mg and K+ per standing orders     6.  Cardiac:   - H/o CHB w/ left SC dual chamber pacemaker and HTN  CHB:  - Dual chamber pacemaker in place  HTN:  intermittently elevated   - Hydralazine PRN SBP >180 - he received a few times while inpatient  - Consider adding BP medications if remains elevated outpatient     7. :   - H/o BPH  BPH:    - Cont Flomax      8. 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 Adenoid cystic carcinoma   - S/p resection in 4/2021 and underwent radiation     10. MSK:   Generalized weakness:  D/t CNS lymphoma  - Consult PT/OT when admitted      6. GI / Nutrition:   - H/o constipation and GERD  Nutrition:  Fair appetite & intake  - Cont low microbial diet  - Dietitian to follow while inpatient   GERD:  - S/p PPI - holding while on chemotherapy w/ MTX  - Cont Pepcid BID (started 7/10/23)  Nausea:  - Cont Zofran as needed    12. Psych:  - Dr. Dante Calderon has been consulted to evaluate patient  - 8/6-8/7: Having intermittent outbursts and very agitated and angry with hospitalization    - DVT Prophylaxis: Platelets >35,791 cells/dL, - daily lovenox prophylaxis ordered  Contraindications to pharmacologic prophylaxis: None  Contraindications to mechanical prophylaxis: None    - Disposition: home when MTX level < 0.1. During his last admission it took until Monday for him to be able to go home.       MEI Justice - KAVON Arceo DO

## 2023-08-08 ENCOUNTER — TELEPHONE (OUTPATIENT)
Dept: INTERNAL MEDICINE CLINIC | Age: 68
End: 2023-08-08

## 2023-08-08 VITALS
OXYGEN SATURATION: 98 % | HEIGHT: 70 IN | RESPIRATION RATE: 16 BRPM | DIASTOLIC BLOOD PRESSURE: 89 MMHG | TEMPERATURE: 98.1 F | WEIGHT: 153.4 LBS | BODY MASS INDEX: 21.96 KG/M2 | HEART RATE: 78 BPM | SYSTOLIC BLOOD PRESSURE: 134 MMHG

## 2023-08-08 LAB
ANION GAP SERPL CALCULATED.3IONS-SCNC: 5 MMOL/L (ref 3–16)
BASOPHILS # BLD: 0 K/UL (ref 0–0.2)
BASOPHILS NFR BLD: 0.4 %
BUN SERPL-MCNC: 12 MG/DL (ref 7–20)
CALCIUM SERPL-MCNC: 8.4 MG/DL (ref 8.3–10.6)
CHLORIDE SERPL-SCNC: 107 MMOL/L (ref 99–110)
CO2 SERPL-SCNC: 31 MMOL/L (ref 21–32)
CREAT SERPL-MCNC: 1.2 MG/DL (ref 0.8–1.3)
DEPRECATED RDW RBC AUTO: 13.5 % (ref 12.4–15.4)
EOSINOPHIL # BLD: 0.1 K/UL (ref 0–0.6)
EOSINOPHIL NFR BLD: 3 %
GFR SERPLBLD CREATININE-BSD FMLA CKD-EPI: >60 ML/MIN/{1.73_M2}
GLUCOSE SERPL-MCNC: 107 MG/DL (ref 70–99)
HCT VFR BLD AUTO: 30.3 % (ref 40.5–52.5)
HGB BLD-MCNC: 10.4 G/DL (ref 13.5–17.5)
LYMPHOCYTES # BLD: 0.6 K/UL (ref 1–5.1)
LYMPHOCYTES NFR BLD: 16.2 %
MCH RBC QN AUTO: 31.9 PG (ref 26–34)
MCHC RBC AUTO-ENTMCNC: 34.2 G/DL (ref 31–36)
MCV RBC AUTO: 93.2 FL (ref 80–100)
METHOTREXATE LEVEL: 0.07 UMOL/L
MONOCYTES # BLD: 0.1 K/UL (ref 0–1.3)
MONOCYTES NFR BLD: 2.2 %
NEUTROPHILS # BLD: 2.7 K/UL (ref 1.7–7.7)
NEUTROPHILS NFR BLD: 78.2 %
PH UR STRIP.AUTO: 8 [PH] (ref 5–8)
PH UR STRIP.AUTO: 8 [PH] (ref 5–8)
PLATELET # BLD AUTO: 233 K/UL (ref 135–450)
PMV BLD AUTO: 8.5 FL (ref 5–10.5)
POTASSIUM SERPL-SCNC: 3.9 MMOL/L (ref 3.5–5.1)
RBC # BLD AUTO: 3.25 M/UL (ref 4.2–5.9)
SODIUM SERPL-SCNC: 143 MMOL/L (ref 136–145)
WBC # BLD AUTO: 3.5 K/UL (ref 4–11)

## 2023-08-08 PROCEDURE — 2580000003 HC RX 258: Performed by: NURSE PRACTITIONER

## 2023-08-08 PROCEDURE — 80048 BASIC METABOLIC PNL TOTAL CA: CPT

## 2023-08-08 PROCEDURE — 2500000003 HC RX 250 WO HCPCS: Performed by: STUDENT IN AN ORGANIZED HEALTH CARE EDUCATION/TRAINING PROGRAM

## 2023-08-08 PROCEDURE — 85025 COMPLETE CBC W/AUTO DIFF WBC: CPT

## 2023-08-08 PROCEDURE — 6370000000 HC RX 637 (ALT 250 FOR IP): Performed by: NURSE PRACTITIONER

## 2023-08-08 PROCEDURE — 6360000002 HC RX W HCPCS: Performed by: NURSE PRACTITIONER

## 2023-08-08 PROCEDURE — 6370000000 HC RX 637 (ALT 250 FOR IP): Performed by: STUDENT IN AN ORGANIZED HEALTH CARE EDUCATION/TRAINING PROGRAM

## 2023-08-08 PROCEDURE — 36591 DRAW BLOOD OFF VENOUS DEVICE: CPT

## 2023-08-08 PROCEDURE — 2580000003 HC RX 258: Performed by: STUDENT IN AN ORGANIZED HEALTH CARE EDUCATION/TRAINING PROGRAM

## 2023-08-08 PROCEDURE — 83986 ASSAY PH BODY FLUID NOS: CPT

## 2023-08-08 RX ORDER — SERTRALINE HYDROCHLORIDE 25 MG/1
25 TABLET, FILM COATED ORAL DAILY
Qty: 30 TABLET | Refills: 0 | Status: ON HOLD | OUTPATIENT
Start: 2023-08-08 | End: 2023-09-07

## 2023-08-08 RX ORDER — HEPARIN 100 UNIT/ML
500 SYRINGE INTRAVENOUS PRN
Status: DISCONTINUED | OUTPATIENT
Start: 2023-08-08 | End: 2023-08-08 | Stop reason: HOSPADM

## 2023-08-08 RX ADMIN — CEPHALEXIN 250 MG: 250 CAPSULE ORAL at 04:01

## 2023-08-08 RX ADMIN — TAMSULOSIN HYDROCHLORIDE 0.4 MG: 0.4 CAPSULE ORAL at 07:59

## 2023-08-08 RX ADMIN — POTASSIUM CHLORIDE 20 MEQ: 1500 TABLET, EXTENDED RELEASE ORAL at 07:59

## 2023-08-08 RX ADMIN — FAMOTIDINE 20 MG: 20 TABLET, FILM COATED ORAL at 07:59

## 2023-08-08 RX ADMIN — SODIUM BICARBONATE: 84 INJECTION, SOLUTION INTRAVENOUS at 08:00

## 2023-08-08 RX ADMIN — LEUCOVORIN CALCIUM 25 MG: 25 TABLET ORAL at 05:54

## 2023-08-08 RX ADMIN — MULTIPLE VITAMINS W/ MINERALS TAB 1 TABLET: TAB at 07:59

## 2023-08-08 RX ADMIN — LEVETIRACETAM 500 MG: 500 TABLET, FILM COATED ORAL at 07:59

## 2023-08-08 RX ADMIN — Medication 400 MG: at 07:59

## 2023-08-08 RX ADMIN — Medication 500 UNITS: at 11:51

## 2023-08-08 RX ADMIN — SODIUM CHLORIDE, PRESERVATIVE FREE 10 ML: 5 INJECTION INTRAVENOUS at 10:47

## 2023-08-08 NOTE — PLAN OF CARE
Problem: ABCDS Injury Assessment  Goal: Absence of physical injury  Outcome: Completed     Problem: Safety - Adult  Goal: Free from fall injury  8/8/2023 1009 by Bianca Solorzano RN  Outcome: Completed  8/8/2023 0154 by Ran Gomez RN  Outcome: Progressing  Flowsheets (Taken 8/7/2023 0559)  Free From Fall Injury:   Instruct family/caregiver on patient safety   Based on caregiver fall risk screen, instruct family/caregiver to ask for assistance with transferring infant if caregiver noted to have fall risk factors     Problem: Hematologic - Adult  Goal: Maintains hematologic stability  8/8/2023 1009 by Bianca Solorzano RN  Outcome: Completed  8/8/2023 0154 by Ran Gomez RN  Outcome: Progressing  Flowsheets (Taken 8/7/2023 0608)  Maintains hematologic stability:   Assess for signs and symptoms of bleeding or hemorrhage   Monitor labs for bleeding or clotting disorders   Administer blood products/factors as ordered  Goal: Able to ambulate independently  Description: Able to ambulate independently  8/8/2023 1009 by Bianca Solorzano RN  Outcome: Completed  8/8/2023 0154 by Ran Gomez RN  Outcome: Progressing     Problem: Infection - Adult  Goal: Absence of infection at discharge  Outcome: Completed     Problem: Pain  Goal: Verbalizes/displays adequate comfort level or baseline comfort level  Outcome: Completed     Problem: Metabolic/Fluid and Electrolytes - Adult  Goal: Electrolytes maintained within normal limits  8/8/2023 1009 by Bianca Solorzano RN  Outcome: Completed  8/8/2023 0154 by Ran Gomez RN  Outcome: Progressing  Flowsheets (Taken 8/8/2023 0154)  Electrolytes maintained within normal limits:   Monitor labs and assess patient for signs and symptoms of electrolyte imbalances   Administer electrolyte replacement as ordered   Monitor response to electrolyte replacements, including repeat lab results as appropriate   Instruct patient on fluid and nutrition restrictions as appropriate  Goal: Hemodynamic stability and optimal renal function maintained  8/8/2023 1009 by Siomara Smith RN  Outcome: Completed  8/8/2023 0158 by Nita Francisco RN  Outcome: Progressing  Flowsheets (Taken 8/8/2023 0154)  Hemodynamic stability and optimal renal function maintained:   Monitor labs and assess for signs and symptoms of volume excess or deficit   Monitor intake, output and patient weight   Monitor urine specific gravity, serum osmolarity and serum sodium as indicated or ordered   Monitor response to interventions for patient's volume status, including labs, urine output, blood pressure (other measures as available)   Encourage oral intake as appropriate   Instruct patient on fluid and nutrition restrictions as appropriate  8/8/2023 0154 by Nita Francisco RN  Flowsheets (Taken 8/8/2023 0154)  Hemodynamic stability and optimal renal function maintained:   Monitor labs and assess for signs and symptoms of volume excess or deficit   Monitor intake, output and patient weight   Monitor urine specific gravity, serum osmolarity and serum sodium as indicated or ordered   Monitor response to interventions for patient's volume status, including labs, urine output, blood pressure (other measures as available)   Encourage oral intake as appropriate   Instruct patient on fluid and nutrition restrictions as appropriate  Goal: Glucose maintained within prescribed range  Outcome: Completed     Problem: Skin/Tissue Integrity - Adult  Goal: Skin integrity remains intact  8/8/2023 1009 by Siomara Smith RN  Outcome: Completed  8/8/2023 0154 by Nita Francisco RN  Outcome: Progressing  Goal: Incisions, wounds, or drain sites healing without S/S of infection  Outcome: Completed  Goal: Oral mucous membranes remain intact  8/8/2023 1009 by Siomara Smith RN  Outcome: Completed  8/8/2023 0154 by Nita Francisco RN  Outcome: Progressing  Flowsheets (Taken 8/8/2023 0154)  Oral Mucous Membranes Remain Intact:   Assess oral mucosa

## 2023-08-08 NOTE — TELEPHONE ENCOUNTER
Care Transitions Initial Follow Up Call    Outreach made within 2 business days of discharge: Yes    Patient: Pola Cruz Patient : 1955   MRN: [id number]  Reason for Admission: There are no discharge diagnoses documented for the most recent discharge.   Discharge Date: 23       Spoke with: AMRIT    Discharge department/facility: [facility]      Scheduled appointment with PCP within 7-14 days    Follow Up  Future Appointments   Date Time Provider [address] [place] [address]   2023  7:30 AM MEI Joseph CNP [facility]   2024  7:00 AM MEI Joseph  [place], [place]

## 2023-08-08 NOTE — DISCHARGE SUMMARY
[facility] Discharge Summary             Attending Physician: Linsey Garibay DO    Referring MD: Linsey Garibay DO  [address]  [address] [place] [address]    Name: Mikael JaimeB:  1955  MRN:  [id number]    Admission: 8/3/2023   Discharge:  23     Date: 2023    Diagnosis on admit: DLBC NHL w/ CNS relapse      Medications:      Medication List        START taking these medications      sertraline 25 MG tablet  Commonly known as: ZOLOFT  Take 1 tablet by mouth daily Take for 1 week then increase per Provider. CONTINUE taking these medications      famotidine 20 MG tablet  Commonly known as: PEPCID  Take 1 tablet by mouth 2 times daily     levETIRAcetam 500 MG tablet  Commonly known as: KEPPRA  Take 1 tablet by mouth 2 times daily     magnesium oxide 400 (240 Mg) MG tablet  Commonly known as: MAG-OX  Take 1 tablet by mouth daily     potassium chloride 20 MEQ extended release tablet  Commonly known as: KLOR-CON M  Take 1 tablet by mouth daily (with breakfast)     tamsulosin 0.4 MG capsule  Commonly known as: FLOMAX     therapeutic multivitamin-minerals tablet  Take 1 tablet by mouth daily            STOP taking these medications      dexamethasone 4 MG tablet  Commonly known as: DECADRON     ondansetron 4 MG disintegrating tablet  Commonly known as: ZOFRAN-ODT               Where to Get Your Medications        These medications were sent to [facility] [id number] [facility], [facility] [address], [address] [place] [address]      Phone: [phone]   sertraline 25 MG tablet         Reason for Admission: R+MPV Cycle #3    Hospital Course:     Tee Argueta is a 78 y/o male w/ 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  He also has history of melanoma of his right thumb 15 years ago s/p resection and salivary gland adenoid cystic Keppra BID  Headache:  d/t brain mass   - Cont Tylenol PRN    3. ID: afebrile, but w/ scalp swelling/cellulitis (POA)  - Start antimicrobial ppx if ANC <1.5  - S/p Keflex Day x 7 days (8/1/23 - 8/8/23) for scalp swelling / cellulitis      4. Heme: Counts stable, anemia from chemotherapy   - Transfuse for Hgb <7 and Plt <10  - No transfusion today     5. Metabolic: Hyperglycemia w/ stable renal fxn & hypoPhos  - Cont Mag Ox 400 mg daily  - Cont KCl 20 meq daily (started 7/21/23)  - IV KPhos 30 mmol x 1 (8/4/23)  - Cont IVFs:  D5.2NS + NaHCO2 50 meq @ 150 mL/hr  - Replace Phos, Mg and K+ per standing orders     6. Cardiac:   - H/o CHB w/ left SC dual chamber pacemaker and HTN  CHB:  - Dual chamber pacemaker in place  HTN:  intermittently elevated   - Hydralazine PRN SBP >180 - he received a few times while inpatient  - Consider adding BP medications if remains elevated outpatient     7. :   - H/o BPH  BPH:    - Cont Flomax      8. 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 Adenoid cystic carcinoma   - S/p resection in 4/2021 and underwent radiation     10. MSK:   Generalized weakness:  D/t CNS lymphoma  - Consult PT/OT when admitted      6. GI / Nutrition:   - H/o constipation and GERD  Nutrition:  Fair appetite & intake  - Cont low microbial diet  - Dietitian to follow while inpatient   GERD:  - S/p PPI - holding while on chemotherapy w/ MTX  - Cont Pepcid BID (started 7/10/23)  Nausea:  - Cont Zofran as needed     12. Psych:  - Dr. Eriberto Garcia was consulted to evaluate patient     Condition on discharge: Stable    Discharge Instructions:  Return to [facility] on Friday, 8/11/23, for labs (CBC w/ diff, CMP, Mag & Phos) and provider visit. The patient was advised on activity and dietary restrictions.     The patient was advised to follow up in the emergency department or contact the physician with any unresolved nausea/vomiting/diarrhea/pain or temperature greater than 100.5 F or any other unusual

## 2023-08-08 NOTE — PROGRESS NOTES
Reviewed discharge instructions with patient and  family members. Reviewed discharge medications including dosing, schedule, indication, and adverse reactions. Reviewed which medications were already taken today and next dosage due for each medication. Reviewed signs and symptoms that prompt a call to the physician and appropriate phone numbers. Reviewed follow up appointments that have been made in [facility] and Outpatient Oncology. Low microbial diet, activity restrictions, and increased risk of infection were reviewed. Patient verbalized understanding of all instructions and questions were answered to his. satisfaction. Signed discharge instructions were given to the patient and a copy placed in the paper-lite chart. Patient discharged to home per wheelchair with family members.       Zane Grider RN

## 2023-08-08 NOTE — PLAN OF CARE
Problem: Safety - Adult  Goal: Free from fall injury  Outcome: Progressing  Free From Fall Injury:   Instruct family/caregiver on patient safety   Based on caregiver fall risk screen, instruct family/caregiver to ask for assistance with transferring infant if caregiver noted to have fall risk factors     Problem: Hematologic - Adult  Goal: Maintains hematologic stability  Outcome: Progressing  Maintains hematologic stability:   Assess for signs and symptoms of bleeding or hemorrhage   Monitor labs for bleeding or clotting disorders   Administer blood products/factors as ordered  Goal: Able to ambulate independently  Description: Able to ambulate independently  Outcome: Progressing     Problem: Metabolic/Fluid and Electrolytes - Adult  Goal: Electrolytes maintained within normal limits  Outcome: Progressing  Electrolytes maintained within normal limits:   Monitor labs and assess patient for signs and symptoms of electrolyte imbalances   Administer electrolyte replacement as ordered   Monitor response to electrolyte replacements, including repeat lab results as appropriate   Instruct patient on fluid and nutrition restrictions as appropriate     Problem: Skin/Tissue Integrity - Adult  Goal: Skin integrity remains intact  Outcome: Progressing  Goal: Oral mucous membranes remain intact  Outcome: Progressing  Oral Mucous Membranes Remain Intact:   Assess oral mucosa and hygiene practices   Implement preventative oral hygiene regimen   Implement oral medicated treatments as ordered     Problem: Musculoskeletal - Adult  Goal: Return mobility to safest level of function  Outcome: Progressing    Problem: Metabolic/Fluid and Electrolytes - Adult  Goal: Electrolytes maintained within normal limits  Outcome: Progressing  Flowsheets (Taken 8/8/2023 0154)  Electrolytes maintained within normal limits:   Monitor labs and assess patient for signs and symptoms of electrolyte imbalances   Administer electrolyte replacement as

## 2023-08-08 NOTE — CARE COORDINATION
CM delivered 2nd IMM and provided verbal explanation at bedside. Pt voiced understanding of discharge MCR rights and is agreeable to discharge within 4 hours of delivery of the IMM notice.     JULY Pagan   for [facility] ([address])  Office Phone: [phone]  [facility] Mobile: [phone]
Case Management Assessment  Initial Evaluation    Date/Time of Evaluation: 8/4/2023 11:07 AM  Assessment Completed by: JULY Herrera   for [facility])  Office Phone: [phone]  [address] [place] [address] Mobile: [phone]    If patient is discharged prior to next notation, then this note serves as note for discharge by case management. Patient Name: Flex Pickering                   YOB: 1955  Diagnosis: Lymphoma, unspecified body region, unspecified lymphoma type [facility]) [C85.90]  Admission for antineoplastic chemotherapy [Z51.11]                   Date / Time: 8/3/2023  1:50 PM    Patient Admission Status: Inpatient   Readmission Risk (Low < 19, Mod (19-27), High > 27): Readmission Risk Score: 19.9    Current PCP: MEI Hutchinson CNP  PCP verified by CM? Yes    Chart Reviewed: Yes      History Provided by: Patient  Patient Orientation: Alert and Oriented    Patient Cognition: Alert    Hospitalization in the last 30 days (Readmission):  Yes    If yes, Readmission Assessment in CM Navigator will be completed. Advance Directives:      Code Status: Full Code   Patient's Primary Decision Maker is: Legal Next of Kin    Primary Decision Makegeovani [facility] [phone]    Discharge Planning:    Patient lives with: Spouse/Significant Other Type of Home: House  Primary Care Giver: Self  Patient Support Systems include: Spouse/Significant Other, Family Members   Current Financial resources: Medicare ([place] [employer])  Current community resources: None  Current services prior to admission: Other (Comment), Home Care ([facility])            Current DME:              Type of Home Care services:  OT, PT, Nursing Services ([facility])    ADLS  Prior functional level: Independent in ADLs/IADLs  Current functional level: Independent in ADLs/IADLs    PT AM-PAC:   /24  OT AM-PAC:   /24    Family can provide assistance at DC:  Yes  Would you like
Spoke with pt's wife at bedside while pt was sleeping. Pt's wife confirmed pt will return home when able and wants to put [facility] home care on hold for now; not resume it at discharge. She is aware she needs to call to cancel and she stated understanding. CNP aware pt's wife wants to put [facility] on hold and not resume; no issues noted.      Tony CHIN, MIRACLE-S   for [facility] ([address])  Office Phone: [phone]  [facility] Mobile: [phone]
Care:  Home Care ordered at discharge: Not Indicated    Home Oxygen and Respiratory Equipment:  Oxygen needed at discharge?: Not Indicated; 98% on room air per vitals in epic    Additional CM Notes: Chart review completed. Spoke with pt and pt's son Roshan Patino at bedside with pt permission. Pt confirmed he is returning home today and denied all needs from  regarding his discharge. Per conversation with pt's wife yesterday, they are wanting to wait to resume [address] at this time. No HHC or DME orders entered in epic.      COVID Result:    Lab Results   Component Value Date/Time    COVID19 Not Detected 11/29/2021 08:50 AM    COVID19 Not Detected 10/22/2021 11:54 AM       The Plan for Transition of Care is related to the following treatment goals of Lymphoma, unspecified body region, unspecified lymphoma type ([address]) [C85.90]  Admission for antineoplastic chemotherapy [Z51.11]    Shilo CHIN, MIRACLE-S   for [facility] ([address] [place], [place] [address])  Office Phone: [phone] [address]: [phone]

## 2023-08-08 NOTE — DISCHARGE INSTRUCTIONS
[address] Discharge Instructions    Call for Questions/Concerns:  [phone] ([phone]) [facility] office  The phone number listed above is available 24 hrs/7 days per week  [facility] is open M-F 8am-4:30pm; Sat-Sun/Holidays 8am-1pm    Symptoms to Report Immediately:    Fever of 100.5 or greater  Vomiting without relief after use of anti-nausea medication  Severe abdominal cramping  Diarrhea: More than 3 loose, watery bowel movements in a 24 hour period  Unusual or excessive bleeding from your mouth, nose, rectum, bladder or vagina  Sudden onset of shortness of breath or chest pain  Signs/symptoms of infection: redness, warmth, swelling-particularly to central line site    Report to Physician's office within 24 hours:    Pain not relieved by pain medication  Change in urination-odor, cloudiness, frequency, or pain with urination  Flu-like symptoms  Skin changes-rash, hives, redness or peeling of skin    Additional Instructions:    Avoid people with colds, flu-like symptoms, or any sign of infection  Drink plenty of fluids-attempt to consume 2-3 liters ( ounces) of fluids/24 hour period  Continue low microbial diet until instructed by physician to resume normal diet  Bring all of your medications with you to your doctor's appointments  Bring your current medicine list to each hospital and office visit        8/8/2023 11:44 AM  Nitin Arzola RN            My Discharge Checklist    Here at the [facility], we want to make sure you have the help you will need once you leave the hospital.  We are going to go over your discharge instructions with you. We give these to you in writing so you will have a reference if you have questions about symptoms or problems to look for after you leave the hospital.     We know you want to feel better and get home soon.  Please answer these questions so we can be sure you have what you need, your questions are answered, and you feel prepared for

## 2023-08-11 ENCOUNTER — TELEPHONE (OUTPATIENT)
Dept: INTERNAL MEDICINE CLINIC | Age: 68
End: 2023-08-11

## 2023-08-11 NOTE — TELEPHONE ENCOUNTER
Care Transitions Initial Follow Up Call    Outreach made within 2 business days of discharge: Yes    Patient: Delvis Gaines Patient : 1955   MRN: [id number]  Reason for Admission: There are no discharge diagnoses documented for the most recent discharge.   Discharge Date: 23       Spoke with: AMRIT    Discharge department/facility: [facility]    Scheduled appointment with PCP within 7-14 days    Follow Up  Future Appointments   Date Time Provider [address] [place] [address]   2023  7:30 AM MEI Alarcon Cera, CNP [facility]   2024  7:00 AM MEI Alarcon Cera,  [place], [place]

## 2023-08-15 NOTE — PROGRESS NOTES
Physician Progress Note      PATIENT:               Leobardo Woodstock  CSN #:                  [id number]  :                       1955  ADMIT DATE:       8/3/2023 1:50 PM  [facility] DATE:        2023 12:39 PM  RESPONDING  PROVIDER #:        Lou Rome NP          QUERY TEXT:    Pt admitted  for encounter for chemotherapy. 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 Per DC Summary:  Severe memory impairment and visual changes: r/t   CNS mass & vasogenic edema has improved w/ chemotherapy, but cont to have   confusion. If possible, please document in progress notes and discharge   summary:    The medical record reflects the following:  Risk Factors: 77 y.o. 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     Query created by: Evan Dhillon on 2023 1:47 PM      Electronically signed by:  Lou Rome NP 8/15/2023 7:57 AM

## 2023-08-17 ENCOUNTER — APPOINTMENT (OUTPATIENT)
Dept: GENERAL RADIOLOGY | Age: 68
DRG: 847 | End: 2023-08-17
Attending: STUDENT IN AN ORGANIZED HEALTH CARE EDUCATION/TRAINING PROGRAM
Payer: MEDICARE

## 2023-08-17 ENCOUNTER — HOSPITAL ENCOUNTER (INPATIENT)
Age: 68
LOS: 5 days | Discharge: HOME | DRG: 847 | End: 2023-08-22
Attending: STUDENT IN AN ORGANIZED HEALTH CARE EDUCATION/TRAINING PROGRAM | Admitting: STUDENT IN AN ORGANIZED HEALTH CARE EDUCATION/TRAINING PROGRAM
Payer: MEDICARE

## 2023-08-17 PROBLEM — C85.89 PRIMARY CNS LYMPHOMA (HCC): Status: ACTIVE | Noted: 2023-08-17

## 2023-08-17 LAB
APTT BLD: 22.9 SEC (ref 22.7–35.9)
INR PPP: 1.11 (ref 0.84–1.16)
MAGNESIUM SERPL-MCNC: 2.2 MG/DL (ref 1.8–2.4)
PH UR STRIP.AUTO: 8.5 [PH] (ref 5–8)
PROTHROMBIN TIME: 14.3 SEC (ref 11.5–14.8)

## 2023-08-17 PROCEDURE — 6360000002 HC RX W HCPCS: Performed by: STUDENT IN AN ORGANIZED HEALTH CARE EDUCATION/TRAINING PROGRAM

## 2023-08-17 PROCEDURE — 96413 CHEMO IV INFUSION 1 HR: CPT

## 2023-08-17 PROCEDURE — 2580000003 HC RX 258: Performed by: STUDENT IN AN ORGANIZED HEALTH CARE EDUCATION/TRAINING PROGRAM

## 2023-08-17 PROCEDURE — 6370000000 HC RX 637 (ALT 250 FOR IP): Performed by: NURSE PRACTITIONER

## 2023-08-17 PROCEDURE — 71046 X-RAY EXAM CHEST 2 VIEWS: CPT

## 2023-08-17 PROCEDURE — 3E03305 INTRODUCTION OF OTHER ANTINEOPLASTIC INTO PERIPHERAL VEIN, PERCUTANEOUS APPROACH: ICD-10-PCS | Performed by: STUDENT IN AN ORGANIZED HEALTH CARE EDUCATION/TRAINING PROGRAM

## 2023-08-17 PROCEDURE — 2580000003 HC RX 258: Performed by: NURSE PRACTITIONER

## 2023-08-17 PROCEDURE — 2060000000 HC ICU INTERMEDIATE R&B

## 2023-08-17 PROCEDURE — A4217 STERILE WATER/SALINE, 500 ML: HCPCS | Performed by: NURSE PRACTITIONER

## 2023-08-17 PROCEDURE — 83986 ASSAY PH BODY FLUID NOS: CPT

## 2023-08-17 PROCEDURE — 93005 ELECTROCARDIOGRAM TRACING: CPT | Performed by: NURSE PRACTITIONER

## 2023-08-17 PROCEDURE — 6370000000 HC RX 637 (ALT 250 FOR IP): Performed by: STUDENT IN AN ORGANIZED HEALTH CARE EDUCATION/TRAINING PROGRAM

## 2023-08-17 PROCEDURE — 85610 PROTHROMBIN TIME: CPT

## 2023-08-17 PROCEDURE — 85730 THROMBOPLASTIN TIME PARTIAL: CPT

## 2023-08-17 PROCEDURE — 83735 ASSAY OF MAGNESIUM: CPT

## 2023-08-17 PROCEDURE — 2500000003 HC RX 250 WO HCPCS: Performed by: STUDENT IN AN ORGANIZED HEALTH CARE EDUCATION/TRAINING PROGRAM

## 2023-08-17 PROCEDURE — 96417 CHEMO IV INFUS EACH ADDL SEQ: CPT

## 2023-08-17 PROCEDURE — 6360000002 HC RX W HCPCS: Performed by: NURSE PRACTITIONER

## 2023-08-17 PROCEDURE — 96415 CHEMO IV INFUSION ADDL HR: CPT

## 2023-08-17 RX ORDER — MAGNESIUM SULFATE IN WATER 40 MG/ML
4000 INJECTION, SOLUTION INTRAVENOUS PRN
Status: DISCONTINUED | OUTPATIENT
Start: 2023-08-17 | End: 2023-08-22 | Stop reason: HOSPADM

## 2023-08-17 RX ORDER — LANOLIN ALCOHOL/MO/W.PET/CERES
400 CREAM (GRAM) TOPICAL DAILY
Status: DISCONTINUED | OUTPATIENT
Start: 2023-08-18 | End: 2023-08-22 | Stop reason: HOSPADM

## 2023-08-17 RX ORDER — ONDANSETRON HYDROCHLORIDE 8 MG/1
24 TABLET, FILM COATED ORAL ONCE
Status: COMPLETED | OUTPATIENT
Start: 2023-08-17 | End: 2023-08-17

## 2023-08-17 RX ORDER — PROCHLORPERAZINE EDISYLATE 5 MG/ML
10 INJECTION INTRAMUSCULAR; INTRAVENOUS EVERY 4 HOURS PRN
Status: DISCONTINUED | OUTPATIENT
Start: 2023-08-17 | End: 2023-08-22 | Stop reason: HOSPADM

## 2023-08-17 RX ORDER — POTASSIUM CHLORIDE 20 MEQ/1
20 TABLET, EXTENDED RELEASE ORAL
Status: DISCONTINUED | OUTPATIENT
Start: 2023-08-18 | End: 2023-08-22 | Stop reason: HOSPADM

## 2023-08-17 RX ORDER — LEVETIRACETAM 500 MG/1
500 TABLET ORAL 2 TIMES DAILY
Status: DISCONTINUED | OUTPATIENT
Start: 2023-08-17 | End: 2023-08-22 | Stop reason: HOSPADM

## 2023-08-17 RX ORDER — SERTRALINE HYDROCHLORIDE 25 MG/1
25 TABLET, FILM COATED ORAL DAILY
Status: DISCONTINUED | OUTPATIENT
Start: 2023-08-18 | End: 2023-08-21

## 2023-08-17 RX ORDER — LORAZEPAM 0.5 MG/1
0.5 TABLET ORAL EVERY 4 HOURS PRN
Status: DISCONTINUED | OUTPATIENT
Start: 2023-08-17 | End: 2023-08-22 | Stop reason: HOSPADM

## 2023-08-17 RX ORDER — PROCHLORPERAZINE MALEATE 10 MG
10 TABLET ORAL EVERY 4 HOURS PRN
Status: DISCONTINUED | OUTPATIENT
Start: 2023-08-17 | End: 2023-08-22 | Stop reason: HOSPADM

## 2023-08-17 RX ORDER — LEUCOVORIN CALCIUM 25 MG/1
25 TABLET ORAL EVERY 6 HOURS
Status: DISCONTINUED | OUTPATIENT
Start: 2023-08-19 | End: 2023-08-22

## 2023-08-17 RX ORDER — SODIUM CHLORIDE 9 MG/ML
INJECTION, SOLUTION INTRAVENOUS CONTINUOUS PRN
Status: DISCONTINUED | OUTPATIENT
Start: 2023-08-17 | End: 2023-08-22 | Stop reason: HOSPADM

## 2023-08-17 RX ORDER — FAMOTIDINE 20 MG/1
20 TABLET, FILM COATED ORAL 2 TIMES DAILY
Status: DISCONTINUED | OUTPATIENT
Start: 2023-08-17 | End: 2023-08-22 | Stop reason: HOSPADM

## 2023-08-17 RX ORDER — M-VIT,TX,IRON,MINS/CALC/FOLIC 27MG-0.4MG
1 TABLET ORAL DAILY
Status: DISCONTINUED | OUTPATIENT
Start: 2023-08-18 | End: 2023-08-22 | Stop reason: HOSPADM

## 2023-08-17 RX ORDER — FUROSEMIDE 10 MG/ML
40 INJECTION INTRAMUSCULAR; INTRAVENOUS EVERY 12 HOURS
Status: DISCONTINUED | OUTPATIENT
Start: 2023-08-19 | End: 2023-08-22

## 2023-08-17 RX ORDER — LEUCOVORIN CALCIUM 50 MG/5ML
25 INJECTION, POWDER, LYOPHILIZED, FOR SOLUTION INTRAMUSCULAR; INTRAVENOUS EVERY 6 HOURS
Status: DISCONTINUED | OUTPATIENT
Start: 2023-08-19 | End: 2023-08-22

## 2023-08-17 RX ORDER — SODIUM CHLORIDE 0.9 % (FLUSH) 0.9 %
10 SYRINGE (ML) INJECTION PRN
Status: DISCONTINUED | OUTPATIENT
Start: 2023-08-17 | End: 2023-08-22 | Stop reason: HOSPADM

## 2023-08-17 RX ORDER — DEXAMETHASONE 4 MG/1
20 TABLET ORAL ONCE
Status: COMPLETED | OUTPATIENT
Start: 2023-08-17 | End: 2023-08-17

## 2023-08-17 RX ORDER — SODIUM CHLORIDE 9 MG/ML
INJECTION, SOLUTION INTRAVENOUS PRN
Status: DISCONTINUED | OUTPATIENT
Start: 2023-08-17 | End: 2023-08-22 | Stop reason: HOSPADM

## 2023-08-17 RX ORDER — ENOXAPARIN SODIUM 100 MG/ML
40 INJECTION SUBCUTANEOUS DAILY
Status: DISCONTINUED | OUTPATIENT
Start: 2023-08-17 | End: 2023-08-22 | Stop reason: HOSPADM

## 2023-08-17 RX ORDER — SODIUM CHLORIDE 0.9 % (FLUSH) 0.9 %
10 SYRINGE (ML) INJECTION EVERY 12 HOURS SCHEDULED
Status: DISCONTINUED | OUTPATIENT
Start: 2023-08-17 | End: 2023-08-22 | Stop reason: HOSPADM

## 2023-08-17 RX ORDER — FUROSEMIDE 10 MG/ML
20 INJECTION INTRAMUSCULAR; INTRAVENOUS PRN
Status: DISCONTINUED | OUTPATIENT
Start: 2023-08-17 | End: 2023-08-22 | Stop reason: HOSPADM

## 2023-08-17 RX ORDER — LORAZEPAM 2 MG/ML
0.5 INJECTION INTRAMUSCULAR EVERY 4 HOURS PRN
Status: DISCONTINUED | OUTPATIENT
Start: 2023-08-17 | End: 2023-08-22 | Stop reason: HOSPADM

## 2023-08-17 RX ORDER — TAMSULOSIN HYDROCHLORIDE 0.4 MG/1
0.4 CAPSULE ORAL DAILY
Status: DISCONTINUED | OUTPATIENT
Start: 2023-08-18 | End: 2023-08-22 | Stop reason: HOSPADM

## 2023-08-17 RX ADMIN — ONDANSETRON HYDROCHLORIDE 24 MG: 8 TABLET, FILM COATED ORAL at 21:58

## 2023-08-17 RX ADMIN — VINCRISTINE SULFATE 2.6 MG: 1 INJECTION, SOLUTION INTRAVENOUS at 22:25

## 2023-08-17 RX ADMIN — FAMOTIDINE 20 MG: 20 TABLET, FILM COATED ORAL at 20:44

## 2023-08-17 RX ADMIN — SODIUM BICARBONATE: 84 INJECTION, SOLUTION INTRAVENOUS at 16:05

## 2023-08-17 RX ADMIN — DEXAMETHASONE 20 MG: 4 TABLET ORAL at 21:57

## 2023-08-17 RX ADMIN — SODIUM CHLORIDE, PRESERVATIVE FREE 10 ML: 5 INJECTION INTRAVENOUS at 22:00

## 2023-08-17 RX ADMIN — SODIUM CHLORIDE 15 ML: 900 IRRIGANT IRRIGATION at 22:52

## 2023-08-17 RX ADMIN — METHOTREXATE 6500 MG: 25 INJECTION, SOLUTION INTRA-ARTERIAL; INTRAMUSCULAR; INTRATHECAL; INTRAVENOUS at 22:41

## 2023-08-17 RX ADMIN — ENOXAPARIN SODIUM 40 MG: 100 INJECTION SUBCUTANEOUS at 19:00

## 2023-08-17 RX ADMIN — SODIUM BICARBONATE: 84 INJECTION, SOLUTION INTRAVENOUS at 20:06

## 2023-08-17 RX ADMIN — LEVETIRACETAM 500 MG: 500 TABLET, FILM COATED ORAL at 20:44

## 2023-08-17 NOTE — H&P
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 His also has h/o BPH, GERD, and HTN. He is now being admitted for cycle #4 of R-MPV. He is feeling well today. He continues with intermittent confusion at times. His vision has not returned to normal. He denies fever, chills, bleeding, or GI changes. He is eating and drinking well. No headaches.        Past Surgical History:   Procedure Laterality Date    BRONCHOSCOPY N/A 2021    ENDOBRONCHIAL ULTRASOUND WITH EUS performed by Lulú Glaser MD at [address] [place] [address] N/A 2022    BRONCHOSCOPY,  ENDOBRONCHIAL ULTRASOUND WITH ANESTHESIA AND PATHOLOGY performed by Dick Marie MD at  [facility]  2022    BRONCHOSCOPY/TRANSBRONCHIAL NEEDLE BIOPSY performed by Dick Marie MD at  [facility]  2022    BRONCHOSCOPY/TRANSBRONCHIAL NEEDLE BIOPSY ADDL LOBE performed by Dick Marie MD at  [facility]  2022    BRONCHOSCOPY DIAGNOSTIC OR CELL [address] ONLY performed by Dick Marie MD at [facility] Right 2023    RIGHT PARIETAL STEREOTACTIC BIOPSY OF BRAIN MASS performed by Charleen Gavin MD at [address] [place] [address] Left 7/3/2023    LEFT PARIETAL CRANIOTOMY FOR OPEN

## 2023-08-18 LAB
ALBUMIN SERPL-MCNC: 3.5 G/DL (ref 3.4–5)
ALP SERPL-CCNC: 64 U/L (ref 40–129)
ALT SERPL-CCNC: 23 U/L (ref 10–40)
ANION GAP SERPL CALCULATED.3IONS-SCNC: 11 MMOL/L (ref 3–16)
AST SERPL-CCNC: 17 U/L (ref 15–37)
BASOPHILS # BLD: 0 K/UL (ref 0–0.2)
BASOPHILS NFR BLD: 0.2 %
BILIRUB DIRECT SERPL-MCNC: <0.2 MG/DL (ref 0–0.3)
BILIRUB INDIRECT SERPL-MCNC: ABNORMAL MG/DL (ref 0–1)
BILIRUB SERPL-MCNC: 0.6 MG/DL (ref 0–1)
BUN SERPL-MCNC: 16 MG/DL (ref 7–20)
CALCIUM SERPL-MCNC: 8.5 MG/DL (ref 8.3–10.6)
CHLORIDE SERPL-SCNC: 104 MMOL/L (ref 99–110)
CO2 SERPL-SCNC: 25 MMOL/L (ref 21–32)
CREAT SERPL-MCNC: 1.1 MG/DL (ref 0.8–1.3)
DEPRECATED RDW RBC AUTO: 13.7 % (ref 12.4–15.4)
EKG ATRIAL RATE: 69 BPM
EKG DIAGNOSIS: NORMAL
EKG P AXIS: 84 DEGREES
EKG P-R INTERVAL: 186 MS
EKG Q-T INTERVAL: 490 MS
EKG QRS DURATION: 158 MS
EKG QTC CALCULATION (BAZETT): 525 MS
EKG R AXIS: -78 DEGREES
EKG T AXIS: 81 DEGREES
EKG VENTRICULAR RATE: 69 BPM
EOSINOPHIL # BLD: 0 K/UL (ref 0–0.6)
EOSINOPHIL NFR BLD: 0 %
GFR SERPLBLD CREATININE-BSD FMLA CKD-EPI: >60 ML/MIN/{1.73_M2}
GLUCOSE SERPL-MCNC: 195 MG/DL (ref 70–99)
HCT VFR BLD AUTO: 30.6 % (ref 40.5–52.5)
HGB BLD-MCNC: 10.8 G/DL (ref 13.5–17.5)
LDH SERPL L TO P-CCNC: 201 U/L (ref 100–190)
LYMPHOCYTES # BLD: 0.2 K/UL (ref 1–5.1)
LYMPHOCYTES NFR BLD: 6.2 %
MCH RBC QN AUTO: 32.6 PG (ref 26–34)
MCHC RBC AUTO-ENTMCNC: 35.2 G/DL (ref 31–36)
MCV RBC AUTO: 92.5 FL (ref 80–100)
MONOCYTES # BLD: 0.1 K/UL (ref 0–1.3)
MONOCYTES NFR BLD: 4 %
NEUTROPHILS # BLD: 2.9 K/UL (ref 1.7–7.7)
NEUTROPHILS NFR BLD: 89.6 %
PH UR STRIP.AUTO: 7 [PH] (ref 5–8)
PH UR STRIP.AUTO: 7.5 [PH] (ref 5–8)
PH UR STRIP.AUTO: 7.5 [PH] (ref 5–8)
PH UR STRIP.AUTO: 8 [PH] (ref 5–8)
PHOSPHATE SERPL-MCNC: 1.9 MG/DL (ref 2.5–4.9)
PLATELET # BLD AUTO: 254 K/UL (ref 135–450)
PMV BLD AUTO: 8.6 FL (ref 5–10.5)
POTASSIUM SERPL-SCNC: 3.9 MMOL/L (ref 3.5–5.1)
PROT SERPL-MCNC: 5.9 G/DL (ref 6.4–8.2)
RBC # BLD AUTO: 3.31 M/UL (ref 4.2–5.9)
SODIUM SERPL-SCNC: 140 MMOL/L (ref 136–145)
URATE SERPL-MCNC: 3.8 MG/DL (ref 3.5–7.2)
WBC # BLD AUTO: 3.2 K/UL (ref 4–11)

## 2023-08-18 PROCEDURE — 2580000003 HC RX 258: Performed by: STUDENT IN AN ORGANIZED HEALTH CARE EDUCATION/TRAINING PROGRAM

## 2023-08-18 PROCEDURE — 93010 ELECTROCARDIOGRAM REPORT: CPT | Performed by: INTERNAL MEDICINE

## 2023-08-18 PROCEDURE — 85025 COMPLETE CBC W/AUTO DIFF WBC: CPT

## 2023-08-18 PROCEDURE — 84550 ASSAY OF BLOOD/URIC ACID: CPT

## 2023-08-18 PROCEDURE — 2060000000 HC ICU INTERMEDIATE R&B

## 2023-08-18 PROCEDURE — 2580000003 HC RX 258: Performed by: NURSE PRACTITIONER

## 2023-08-18 PROCEDURE — 36592 COLLECT BLOOD FROM PICC: CPT

## 2023-08-18 PROCEDURE — 6360000002 HC RX W HCPCS: Performed by: NURSE PRACTITIONER

## 2023-08-18 PROCEDURE — 2500000003 HC RX 250 WO HCPCS: Performed by: STUDENT IN AN ORGANIZED HEALTH CARE EDUCATION/TRAINING PROGRAM

## 2023-08-18 PROCEDURE — 84100 ASSAY OF PHOSPHORUS: CPT

## 2023-08-18 PROCEDURE — 83986 ASSAY PH BODY FLUID NOS: CPT

## 2023-08-18 PROCEDURE — 80048 BASIC METABOLIC PNL TOTAL CA: CPT

## 2023-08-18 PROCEDURE — 83615 LACTATE (LD) (LDH) ENZYME: CPT

## 2023-08-18 PROCEDURE — 6370000000 HC RX 637 (ALT 250 FOR IP): Performed by: NURSE PRACTITIONER

## 2023-08-18 PROCEDURE — 80076 HEPATIC FUNCTION PANEL: CPT

## 2023-08-18 RX ORDER — HYDRALAZINE HYDROCHLORIDE 20 MG/ML
10 INJECTION INTRAMUSCULAR; INTRAVENOUS EVERY 6 HOURS PRN
Status: DISCONTINUED | OUTPATIENT
Start: 2023-08-18 | End: 2023-08-22 | Stop reason: HOSPADM

## 2023-08-18 RX ADMIN — SODIUM CHLORIDE, PRESERVATIVE FREE 10 ML: 5 INJECTION INTRAVENOUS at 09:08

## 2023-08-18 RX ADMIN — SERTRALINE HYDROCHLORIDE 25 MG: 25 TABLET ORAL at 09:08

## 2023-08-18 RX ADMIN — LEVETIRACETAM 500 MG: 500 TABLET, FILM COATED ORAL at 09:08

## 2023-08-18 RX ADMIN — Medication 400 MG: at 09:07

## 2023-08-18 RX ADMIN — TAMSULOSIN HYDROCHLORIDE 0.4 MG: 0.4 CAPSULE ORAL at 09:08

## 2023-08-18 RX ADMIN — FAMOTIDINE 20 MG: 20 TABLET, FILM COATED ORAL at 09:08

## 2023-08-18 RX ADMIN — SODIUM CHLORIDE 15 ML: 900 IRRIGANT IRRIGATION at 18:27

## 2023-08-18 RX ADMIN — SODIUM CHLORIDE 15 ML: 900 IRRIGANT IRRIGATION at 11:30

## 2023-08-18 RX ADMIN — SODIUM BICARBONATE: 84 INJECTION, SOLUTION INTRAVENOUS at 11:45

## 2023-08-18 RX ADMIN — SODIUM CHLORIDE, PRESERVATIVE FREE 10 ML: 5 INJECTION INTRAVENOUS at 20:38

## 2023-08-18 RX ADMIN — SODIUM CHLORIDE 15 ML: 900 IRRIGANT IRRIGATION at 20:38

## 2023-08-18 RX ADMIN — ENOXAPARIN SODIUM 40 MG: 100 INJECTION SUBCUTANEOUS at 18:21

## 2023-08-18 RX ADMIN — Medication 1 TABLET: at 09:08

## 2023-08-18 RX ADMIN — SODIUM BICARBONATE: 84 INJECTION, SOLUTION INTRAVENOUS at 05:20

## 2023-08-18 RX ADMIN — POTASSIUM CHLORIDE 20 MEQ: 1500 TABLET, EXTENDED RELEASE ORAL at 09:08

## 2023-08-18 RX ADMIN — SODIUM BICARBONATE: 84 INJECTION, SOLUTION INTRAVENOUS at 18:21

## 2023-08-18 RX ADMIN — FAMOTIDINE 20 MG: 20 TABLET, FILM COATED ORAL at 20:38

## 2023-08-18 RX ADMIN — LEVETIRACETAM 500 MG: 500 TABLET, FILM COATED ORAL at 20:38

## 2023-08-18 ASSESSMENT — PAIN SCALES - GENERAL: PAINLEVEL_OUTOF10: 0

## 2023-08-18 NOTE — CARE COORDINATION
Case Management Assessment  Initial Evaluation    Date/Time of Evaluation: 8/18/2023 11:49 AM  Assessment Completed by: JULY Zavala   for [facility])  Office Phone: [phone]  [facility] R[facility] Mobile: [phone]    If patient is discharged prior to next notation, then this note serves as note for discharge by case management. Patient Name: Hattie Bonds                   YOB: 1955  Diagnosis: Primary CNS lymphoma [facility]) [C85.89]                   Date / Time: 8/17/2023  2:43 PM    Patient Admission Status: Inpatient   Readmission Risk (Low < 19, Mod (19-27), High > 27): Readmission Risk Score: 20.4    Current PCP: MEI Mi CNP  PCP verified by CM? Yes    Chart Reviewed: Yes      History Provided by: Patient  Patient Orientation: Alert and Oriented    Patient Cognition: Alert    Hospitalization in the last 30 days (Readmission):  Yes    If yes, Readmission Assessment in CM Navigator will be completed. Advance Directives:      Code Status: Full Code   Patient's Primary Decision Maker is: Legal Next of Kin    [facility] - [phone]    Discharge Planning:    Patient lives with: Spouse/Significant Other Type of Home: House  Primary Care Giver: Self  Patient Support Systems include: Spouse/Significant Other, Children   Current Financial resources: Medicare ([place] Medicare)  Current community resources: None  Current services prior to admission: Other (Comment) ([facility])            Current DME:              Type of Home Care services:  None    ADLS  Prior functional level: Independent in ADLs/IADLs  Current functional level: Independent in ADLs/IADLs    PT AM-PAC:   /24  OT AM-PAC:   /24    Family can provide assistance at DC: Yes  Would you like Case Management to discuss the discharge plan with any other family members/significant others, and if so, who?  Yes  Plans to Return to Present

## 2023-08-18 NOTE — DISCHARGE SUMMARY
Dr. Kerri Trivedi saw patient (8/21/23)     Condition on discharge: Stable    Discharge Instructions:  Return to [facility] on 8/25/23 for labs (CBC w/ diff, CMP, Mag & Phos) and provider visit. MRI brain on Thursday or Friday       The patient was advised on activity and dietary restrictions. The patient was advised to follow up in the emergency department or contact the physician with any unresolved nausea/vomiting/diarrhea/pain or temperature greater than 100.5 F or any other unusual symptoms.        This discharge summary and plan was discussed and agreed upon with MD Nataliia Navarrete, MEI - CNP

## 2023-08-18 NOTE — ONCOLOGY
Original chemotherapy orders reviewed and acknowledged. Appropriateness of chemotherapy treatment regimen R+MPV for diagnosis of DLBCL was verified. Patient educated on chemotherapy regimen. Acknowledgement of informed consent for chemotherapy obtained. Estimated body surface area is 1.85 meters squared as calculated from the following:    Height as of this encounter: 5' 10\" (1.778 m). Weight as of this encounter: 153 lb (69.4 kg). verified. Appropriate dosing calculations of chemotherapy based on above height, weight, and BSA verified. Administration: Chemotherapy drug Vincristine independently verified with Neo Combs RN prior to administration. Acknowledgement of informed consent for chemotherapy administration verified. Original order, appropriateness of regimen, drug supplied, height, weight, BSA, dose calculations, expiration dates/times, drug appearance, and two patient identifiers were verified by both RNs. Drug checked for vesicant/irritant status and for risk of hypersensitivity. Most recent laboratory values and allergies, were reviewed. Positive, brisk blood return via CVC was confirmed prior to administration. Chest x-ray for correct line placement reviewed. Odalys Noguera RN and Neo Combs RN verified correct rate of chemotherapy and maintenance IV fluids. Patient was educated on chemotherapy regimen prior to administration including indication for treatment related to disease & side effects of chemotherapy drug. Patient verbalizes understanding of all instructions. Completion of Chemotherapy: Monitoring during infusion done per policy, see Flowsheets. Blood return verified before, during, and after infusion per policy; no signs of extravasation. Pt tolerated chemotherapy well and without incident. Chemotherapy infusion end time on the [facility]. Will continue to monitor.      Administration: Chemotherapy drug Methotrexate independently verified with Neo Combs RN

## 2023-08-18 NOTE — ACP (ADVANCE CARE PLANNING)
Advance Care Planning     General Advance Care Planning (ACP) Conversation    Date of Conversation: 8/18/2023  Conducted with: Patient with Decision Making Capacity    Healthcare Decision Maker:    Primary Decision Maker: Nadia Karen - [phone]  Click here to complete Healthcare Decision Makers including selection of the Healthcare Decision Maker Relationship (ie \"Primary\"). Today we documented Decision Maker(s) consistent with Legal Next of Kin hierarchy.     Length of Voluntary ACP Conversation in minutes:  <16 minutes (Non-Billable)    JULY Lisa   for [employer] and [address] [place] [address] ([address])  Office Phone: [phone] [address]: [phone]

## 2023-08-19 LAB
ALBUMIN SERPL-MCNC: 3 G/DL (ref 3.4–5)
ALP SERPL-CCNC: 54 U/L (ref 40–129)
ALT SERPL-CCNC: 42 U/L (ref 10–40)
ANION GAP SERPL CALCULATED.3IONS-SCNC: 11 MMOL/L (ref 3–16)
AST SERPL-CCNC: 31 U/L (ref 15–37)
BASOPHILS # BLD: 0 K/UL (ref 0–0.2)
BASOPHILS NFR BLD: 0.5 %
BILIRUB DIRECT SERPL-MCNC: <0.2 MG/DL (ref 0–0.3)
BILIRUB INDIRECT SERPL-MCNC: ABNORMAL MG/DL (ref 0–1)
BILIRUB SERPL-MCNC: <0.2 MG/DL (ref 0–1)
BUN SERPL-MCNC: 16 MG/DL (ref 7–20)
CALCIUM SERPL-MCNC: 8.2 MG/DL (ref 8.3–10.6)
CHLORIDE SERPL-SCNC: 110 MMOL/L (ref 99–110)
CO2 SERPL-SCNC: 26 MMOL/L (ref 21–32)
CREAT SERPL-MCNC: 1.2 MG/DL (ref 0.8–1.3)
DEPRECATED RDW RBC AUTO: 13.8 % (ref 12.4–15.4)
EOSINOPHIL # BLD: 0 K/UL (ref 0–0.6)
EOSINOPHIL NFR BLD: 0.2 %
GFR SERPLBLD CREATININE-BSD FMLA CKD-EPI: >60 ML/MIN/{1.73_M2}
GLUCOSE SERPL-MCNC: 112 MG/DL (ref 70–99)
HCT VFR BLD AUTO: 28.5 % (ref 40.5–52.5)
HGB BLD-MCNC: 10.1 G/DL (ref 13.5–17.5)
LDH SERPL L TO P-CCNC: 193 U/L (ref 100–190)
LYMPHOCYTES # BLD: 0.6 K/UL (ref 1–5.1)
LYMPHOCYTES NFR BLD: 11.8 %
MCH RBC QN AUTO: 32.3 PG (ref 26–34)
MCHC RBC AUTO-ENTMCNC: 35.3 G/DL (ref 31–36)
MCV RBC AUTO: 91.7 FL (ref 80–100)
METHOTREXATE LEVEL: 3.15 UMOL/L
MONOCYTES # BLD: 0.9 K/UL (ref 0–1.3)
MONOCYTES NFR BLD: 18.7 %
NEUTROPHILS # BLD: 3.4 K/UL (ref 1.7–7.7)
NEUTROPHILS NFR BLD: 68.8 %
PH UR STRIP.AUTO: 7 [PH] (ref 5–8)
PH UR STRIP.AUTO: 7.5 [PH] (ref 5–8)
PH UR STRIP.AUTO: 8 [PH] (ref 5–8)
PHOSPHATE SERPL-MCNC: 1.9 MG/DL (ref 2.5–4.9)
PLATELET # BLD AUTO: 257 K/UL (ref 135–450)
PMV BLD AUTO: 8.8 FL (ref 5–10.5)
POTASSIUM SERPL-SCNC: 3.4 MMOL/L (ref 3.5–5.1)
PROT SERPL-MCNC: 5.1 G/DL (ref 6.4–8.2)
RBC # BLD AUTO: 3.11 M/UL (ref 4.2–5.9)
SODIUM SERPL-SCNC: 147 MMOL/L (ref 136–145)
URATE SERPL-MCNC: 3.7 MG/DL (ref 3.5–7.2)
WBC # BLD AUTO: 4.9 K/UL (ref 4–11)

## 2023-08-19 PROCEDURE — 2500000003 HC RX 250 WO HCPCS: Performed by: NURSE PRACTITIONER

## 2023-08-19 PROCEDURE — 2500000003 HC RX 250 WO HCPCS: Performed by: STUDENT IN AN ORGANIZED HEALTH CARE EDUCATION/TRAINING PROGRAM

## 2023-08-19 PROCEDURE — 2580000003 HC RX 258: Performed by: NURSE PRACTITIONER

## 2023-08-19 PROCEDURE — 2580000003 HC RX 258: Performed by: STUDENT IN AN ORGANIZED HEALTH CARE EDUCATION/TRAINING PROGRAM

## 2023-08-19 PROCEDURE — 6370000000 HC RX 637 (ALT 250 FOR IP): Performed by: NURSE PRACTITIONER

## 2023-08-19 PROCEDURE — 6370000000 HC RX 637 (ALT 250 FOR IP): Performed by: STUDENT IN AN ORGANIZED HEALTH CARE EDUCATION/TRAINING PROGRAM

## 2023-08-19 PROCEDURE — 84550 ASSAY OF BLOOD/URIC ACID: CPT

## 2023-08-19 PROCEDURE — 83986 ASSAY PH BODY FLUID NOS: CPT

## 2023-08-19 PROCEDURE — 6360000002 HC RX W HCPCS: Performed by: NURSE PRACTITIONER

## 2023-08-19 PROCEDURE — 2060000000 HC ICU INTERMEDIATE R&B

## 2023-08-19 PROCEDURE — 6360000002 HC RX W HCPCS: Performed by: STUDENT IN AN ORGANIZED HEALTH CARE EDUCATION/TRAINING PROGRAM

## 2023-08-19 PROCEDURE — 85025 COMPLETE CBC W/AUTO DIFF WBC: CPT

## 2023-08-19 PROCEDURE — 84100 ASSAY OF PHOSPHORUS: CPT

## 2023-08-19 PROCEDURE — 83520 IMMUNOASSAY QUANT NOS NONAB: CPT

## 2023-08-19 PROCEDURE — 80076 HEPATIC FUNCTION PANEL: CPT

## 2023-08-19 PROCEDURE — 36591 DRAW BLOOD OFF VENOUS DEVICE: CPT

## 2023-08-19 PROCEDURE — 80048 BASIC METABOLIC PNL TOTAL CA: CPT

## 2023-08-19 PROCEDURE — 83615 LACTATE (LD) (LDH) ENZYME: CPT

## 2023-08-19 RX ORDER — MECOBALAMIN 5000 MCG
8 TABLET,DISINTEGRATING ORAL NIGHTLY PRN
Status: DISCONTINUED | OUTPATIENT
Start: 2023-08-19 | End: 2023-08-22 | Stop reason: HOSPADM

## 2023-08-19 RX ADMIN — FUROSEMIDE 40 MG: 10 INJECTION, SOLUTION INTRAMUSCULAR; INTRAVENOUS at 18:37

## 2023-08-19 RX ADMIN — SODIUM CHLORIDE, PRESERVATIVE FREE 10 ML: 5 INJECTION INTRAVENOUS at 19:56

## 2023-08-19 RX ADMIN — LEVETIRACETAM 500 MG: 500 TABLET, FILM COATED ORAL at 08:28

## 2023-08-19 RX ADMIN — TAMSULOSIN HYDROCHLORIDE 0.4 MG: 0.4 CAPSULE ORAL at 08:29

## 2023-08-19 RX ADMIN — FAMOTIDINE 20 MG: 20 TABLET, FILM COATED ORAL at 19:56

## 2023-08-19 RX ADMIN — SODIUM PHOSPHATE, MONOBASIC, MONOHYDRATE AND SODIUM PHOSPHATE, DIBASIC, ANHYDROUS 20 MMOL: 142; 276 INJECTION, SOLUTION INTRAVENOUS at 11:48

## 2023-08-19 RX ADMIN — SODIUM BICARBONATE: 84 INJECTION, SOLUTION INTRAVENOUS at 15:52

## 2023-08-19 RX ADMIN — SODIUM CHLORIDE 15 ML: 900 IRRIGANT IRRIGATION at 11:09

## 2023-08-19 RX ADMIN — SODIUM BICARBONATE: 84 INJECTION, SOLUTION INTRAVENOUS at 22:40

## 2023-08-19 RX ADMIN — Medication 400 MG: at 08:28

## 2023-08-19 RX ADMIN — POTASSIUM CHLORIDE 20 MEQ: 1500 TABLET, EXTENDED RELEASE ORAL at 08:29

## 2023-08-19 RX ADMIN — SODIUM CHLORIDE, PRESERVATIVE FREE 10 ML: 5 INJECTION INTRAVENOUS at 11:20

## 2023-08-19 RX ADMIN — ENOXAPARIN SODIUM 40 MG: 100 INJECTION SUBCUTANEOUS at 17:58

## 2023-08-19 RX ADMIN — SODIUM CHLORIDE 15 ML: 900 IRRIGANT IRRIGATION at 19:56

## 2023-08-19 RX ADMIN — LEUCOVORIN CALCIUM 25 MG: 25 TABLET ORAL at 06:47

## 2023-08-19 RX ADMIN — LEUCOVORIN CALCIUM 25 MG: 25 TABLET ORAL at 12:53

## 2023-08-19 RX ADMIN — SERTRALINE HYDROCHLORIDE 25 MG: 25 TABLET ORAL at 08:29

## 2023-08-19 RX ADMIN — LEVETIRACETAM 500 MG: 500 TABLET, FILM COATED ORAL at 19:56

## 2023-08-19 RX ADMIN — SODIUM BICARBONATE: 84 INJECTION, SOLUTION INTRAVENOUS at 00:32

## 2023-08-19 RX ADMIN — SODIUM CHLORIDE 15 ML: 900 IRRIGANT IRRIGATION at 16:10

## 2023-08-19 RX ADMIN — FAMOTIDINE 20 MG: 20 TABLET, FILM COATED ORAL at 08:29

## 2023-08-19 RX ADMIN — Medication 1 TABLET: at 08:31

## 2023-08-19 RX ADMIN — SODIUM BICARBONATE: 84 INJECTION, SOLUTION INTRAVENOUS at 08:26

## 2023-08-19 RX ADMIN — LEUCOVORIN CALCIUM 25 MG: 25 TABLET ORAL at 03:22

## 2023-08-19 RX ADMIN — LEUCOVORIN CALCIUM 25 MG: 25 TABLET ORAL at 18:37

## 2023-08-19 ASSESSMENT — PAIN SCALES - GENERAL
PAINLEVEL_OUTOF10: 0

## 2023-08-20 LAB
ALBUMIN SERPL-MCNC: 3.3 G/DL (ref 3.4–5)
ALP SERPL-CCNC: 55 U/L (ref 40–129)
ALT SERPL-CCNC: 49 U/L (ref 10–40)
ANION GAP SERPL CALCULATED.3IONS-SCNC: 9 MMOL/L (ref 3–16)
AST SERPL-CCNC: 31 U/L (ref 15–37)
BASOPHILS # BLD: 0 K/UL (ref 0–0.2)
BASOPHILS NFR BLD: 0.9 %
BILIRUB DIRECT SERPL-MCNC: <0.2 MG/DL (ref 0–0.3)
BILIRUB INDIRECT SERPL-MCNC: ABNORMAL MG/DL (ref 0–1)
BILIRUB SERPL-MCNC: 0.3 MG/DL (ref 0–1)
BUN SERPL-MCNC: 13 MG/DL (ref 7–20)
CALCIUM SERPL-MCNC: 8.2 MG/DL (ref 8.3–10.6)
CHLORIDE SERPL-SCNC: 106 MMOL/L (ref 99–110)
CO2 SERPL-SCNC: 31 MMOL/L (ref 21–32)
CREAT SERPL-MCNC: 1.3 MG/DL (ref 0.8–1.3)
DEPRECATED RDW RBC AUTO: 14.1 % (ref 12.4–15.4)
EOSINOPHIL # BLD: 0.1 K/UL (ref 0–0.6)
EOSINOPHIL NFR BLD: 3.7 %
GFR SERPLBLD CREATININE-BSD FMLA CKD-EPI: 59 ML/MIN/{1.73_M2}
GLUCOSE SERPL-MCNC: 105 MG/DL (ref 70–99)
HCT VFR BLD AUTO: 30.7 % (ref 40.5–52.5)
HGB BLD-MCNC: 10.6 G/DL (ref 13.5–17.5)
LDH SERPL L TO P-CCNC: 203 U/L (ref 100–190)
LYMPHOCYTES # BLD: 0.6 K/UL (ref 1–5.1)
LYMPHOCYTES NFR BLD: 24.4 %
MCH RBC QN AUTO: 31.7 PG (ref 26–34)
MCHC RBC AUTO-ENTMCNC: 34.6 G/DL (ref 31–36)
MCV RBC AUTO: 91.8 FL (ref 80–100)
METHOTREXATE LEVEL: 0.3 UMOL/L
MONOCYTES # BLD: 0.2 K/UL (ref 0–1.3)
MONOCYTES NFR BLD: 8.7 %
NEUTROPHILS # BLD: 1.5 K/UL (ref 1.7–7.7)
NEUTROPHILS NFR BLD: 62.3 %
PH UR STRIP.AUTO: 7 [PH] (ref 5–8)
PH UR STRIP.AUTO: 8 [PH] (ref 5–8)
PHOSPHATE SERPL-MCNC: 3.5 MG/DL (ref 2.5–4.9)
PLATELET # BLD AUTO: 269 K/UL (ref 135–450)
PMV BLD AUTO: 8.2 FL (ref 5–10.5)
POTASSIUM SERPL-SCNC: 3.3 MMOL/L (ref 3.5–5.1)
PROT SERPL-MCNC: 5.6 G/DL (ref 6.4–8.2)
RBC # BLD AUTO: 3.35 M/UL (ref 4.2–5.9)
SODIUM SERPL-SCNC: 146 MMOL/L (ref 136–145)
URATE SERPL-MCNC: 4.7 MG/DL (ref 3.5–7.2)
WBC # BLD AUTO: 2.4 K/UL (ref 4–11)

## 2023-08-20 PROCEDURE — 84550 ASSAY OF BLOOD/URIC ACID: CPT

## 2023-08-20 PROCEDURE — 83615 LACTATE (LD) (LDH) ENZYME: CPT

## 2023-08-20 PROCEDURE — 6370000000 HC RX 637 (ALT 250 FOR IP): Performed by: NURSE PRACTITIONER

## 2023-08-20 PROCEDURE — 80076 HEPATIC FUNCTION PANEL: CPT

## 2023-08-20 PROCEDURE — 83986 ASSAY PH BODY FLUID NOS: CPT

## 2023-08-20 PROCEDURE — 2580000003 HC RX 258: Performed by: STUDENT IN AN ORGANIZED HEALTH CARE EDUCATION/TRAINING PROGRAM

## 2023-08-20 PROCEDURE — 80048 BASIC METABOLIC PNL TOTAL CA: CPT

## 2023-08-20 PROCEDURE — 85025 COMPLETE CBC W/AUTO DIFF WBC: CPT

## 2023-08-20 PROCEDURE — 2580000003 HC RX 258: Performed by: NURSE PRACTITIONER

## 2023-08-20 PROCEDURE — 6370000000 HC RX 637 (ALT 250 FOR IP): Performed by: STUDENT IN AN ORGANIZED HEALTH CARE EDUCATION/TRAINING PROGRAM

## 2023-08-20 PROCEDURE — 84100 ASSAY OF PHOSPHORUS: CPT

## 2023-08-20 PROCEDURE — 6360000002 HC RX W HCPCS: Performed by: STUDENT IN AN ORGANIZED HEALTH CARE EDUCATION/TRAINING PROGRAM

## 2023-08-20 PROCEDURE — 2500000003 HC RX 250 WO HCPCS: Performed by: STUDENT IN AN ORGANIZED HEALTH CARE EDUCATION/TRAINING PROGRAM

## 2023-08-20 PROCEDURE — 2060000000 HC ICU INTERMEDIATE R&B

## 2023-08-20 PROCEDURE — 83520 IMMUNOASSAY QUANT NOS NONAB: CPT

## 2023-08-20 PROCEDURE — 6360000002 HC RX W HCPCS: Performed by: NURSE PRACTITIONER

## 2023-08-20 RX ORDER — POLYETHYLENE GLYCOL 3350 17 G/17G
17 POWDER, FOR SOLUTION ORAL DAILY PRN
Status: DISCONTINUED | OUTPATIENT
Start: 2023-08-20 | End: 2023-08-22 | Stop reason: HOSPADM

## 2023-08-20 RX ADMIN — LEVETIRACETAM 500 MG: 500 TABLET, FILM COATED ORAL at 20:47

## 2023-08-20 RX ADMIN — TAMSULOSIN HYDROCHLORIDE 0.4 MG: 0.4 CAPSULE ORAL at 09:13

## 2023-08-20 RX ADMIN — LEUCOVORIN CALCIUM 25 MG: 25 TABLET ORAL at 06:36

## 2023-08-20 RX ADMIN — SODIUM BICARBONATE: 84 INJECTION, SOLUTION INTRAVENOUS at 05:27

## 2023-08-20 RX ADMIN — FAMOTIDINE 20 MG: 20 TABLET, FILM COATED ORAL at 09:13

## 2023-08-20 RX ADMIN — FAMOTIDINE 20 MG: 20 TABLET, FILM COATED ORAL at 20:47

## 2023-08-20 RX ADMIN — LEUCOVORIN CALCIUM 25 MG: 25 TABLET ORAL at 00:37

## 2023-08-20 RX ADMIN — SODIUM CHLORIDE, PRESERVATIVE FREE 10 ML: 5 INJECTION INTRAVENOUS at 20:48

## 2023-08-20 RX ADMIN — POLYETHYLENE GLYCOL 3350 17 G: 17 POWDER, FOR SOLUTION ORAL at 17:35

## 2023-08-20 RX ADMIN — SODIUM CHLORIDE 15 ML: 900 IRRIGANT IRRIGATION at 09:18

## 2023-08-20 RX ADMIN — SERTRALINE HYDROCHLORIDE 25 MG: 25 TABLET ORAL at 09:13

## 2023-08-20 RX ADMIN — LEUCOVORIN CALCIUM 25 MG: 25 TABLET ORAL at 12:24

## 2023-08-20 RX ADMIN — Medication 1 TABLET: at 09:13

## 2023-08-20 RX ADMIN — SODIUM CHLORIDE, PRESERVATIVE FREE 10 ML: 5 INJECTION INTRAVENOUS at 09:17

## 2023-08-20 RX ADMIN — FUROSEMIDE 40 MG: 10 INJECTION, SOLUTION INTRAMUSCULAR; INTRAVENOUS at 18:23

## 2023-08-20 RX ADMIN — LEVETIRACETAM 500 MG: 500 TABLET, FILM COATED ORAL at 09:13

## 2023-08-20 RX ADMIN — Medication 400 MG: at 09:13

## 2023-08-20 RX ADMIN — ENOXAPARIN SODIUM 40 MG: 100 INJECTION SUBCUTANEOUS at 18:22

## 2023-08-20 RX ADMIN — LEUCOVORIN CALCIUM 25 MG: 25 TABLET ORAL at 18:22

## 2023-08-20 RX ADMIN — SODIUM CHLORIDE 15 ML: 900 IRRIGANT IRRIGATION at 20:48

## 2023-08-20 RX ADMIN — SODIUM BICARBONATE: 84 INJECTION, SOLUTION INTRAVENOUS at 12:22

## 2023-08-20 RX ADMIN — SODIUM CHLORIDE 15 ML: 900 IRRIGANT IRRIGATION at 16:09

## 2023-08-20 RX ADMIN — POTASSIUM CHLORIDE 20 MEQ: 1500 TABLET, EXTENDED RELEASE ORAL at 09:13

## 2023-08-20 ASSESSMENT — PAIN SCALES - GENERAL
PAINLEVEL_OUTOF10: 0
PAINLEVEL_OUTOF10: 0

## 2023-08-21 ENCOUNTER — APPOINTMENT (OUTPATIENT)
Dept: GENERAL RADIOLOGY | Age: 68
DRG: 847 | End: 2023-08-21
Attending: STUDENT IN AN ORGANIZED HEALTH CARE EDUCATION/TRAINING PROGRAM
Payer: MEDICARE

## 2023-08-21 LAB
ALBUMIN SERPL-MCNC: 3.6 G/DL (ref 3.4–5)
ALP SERPL-CCNC: 61 U/L (ref 40–129)
ALT SERPL-CCNC: 55 U/L (ref 10–40)
ANION GAP SERPL CALCULATED.3IONS-SCNC: 10 MMOL/L (ref 3–16)
APTT BLD: 28 SEC (ref 22.7–35.9)
AST SERPL-CCNC: 35 U/L (ref 15–37)
BACTERIA URNS QL MICRO: ABNORMAL /HPF
BASOPHILS # BLD: 0 K/UL (ref 0–0.2)
BASOPHILS NFR BLD: 1.1 %
BILIRUB DIRECT SERPL-MCNC: <0.2 MG/DL (ref 0–0.3)
BILIRUB INDIRECT SERPL-MCNC: ABNORMAL MG/DL (ref 0–1)
BILIRUB SERPL-MCNC: 0.5 MG/DL (ref 0–1)
BILIRUB UR QL STRIP.AUTO: NEGATIVE
BUN SERPL-MCNC: 13 MG/DL (ref 7–20)
CALCIUM SERPL-MCNC: 8.9 MG/DL (ref 8.3–10.6)
CHLORIDE SERPL-SCNC: 100 MMOL/L (ref 99–110)
CLARITY UR: CLEAR
CO2 SERPL-SCNC: 30 MMOL/L (ref 21–32)
COLOR UR: YELLOW
CREAT SERPL-MCNC: 1 MG/DL (ref 0.8–1.3)
DEPRECATED RDW RBC AUTO: 13.7 % (ref 12.4–15.4)
EKG ATRIAL RATE: 76 BPM
EKG DIAGNOSIS: NORMAL
EKG P AXIS: 76 DEGREES
EKG P-R INTERVAL: 192 MS
EKG Q-T INTERVAL: 448 MS
EKG QRS DURATION: 152 MS
EKG QTC CALCULATION (BAZETT): 504 MS
EKG R AXIS: -84 DEGREES
EKG T AXIS: 86 DEGREES
EKG VENTRICULAR RATE: 76 BPM
EOSINOPHIL # BLD: 0.2 K/UL (ref 0–0.6)
EOSINOPHIL NFR BLD: 6 %
EPI CELLS #/AREA URNS HPF: ABNORMAL /HPF (ref 0–5)
GFR SERPLBLD CREATININE-BSD FMLA CKD-EPI: >60 ML/MIN/{1.73_M2}
GLUCOSE SERPL-MCNC: 113 MG/DL (ref 70–99)
GLUCOSE UR STRIP.AUTO-MCNC: NEGATIVE MG/DL
HCT VFR BLD AUTO: 32.1 % (ref 40.5–52.5)
HGB BLD-MCNC: 11.3 G/DL (ref 13.5–17.5)
HGB UR QL STRIP.AUTO: NEGATIVE
INR PPP: 0.96 (ref 0.84–1.16)
KETONES UR STRIP.AUTO-MCNC: NEGATIVE MG/DL
LDH SERPL L TO P-CCNC: 199 U/L (ref 100–190)
LEUKOCYTE ESTERASE UR QL STRIP.AUTO: NEGATIVE
LYMPHOCYTES # BLD: 0.5 K/UL (ref 1–5.1)
LYMPHOCYTES NFR BLD: 18.5 %
Lab: NORMAL
MAGNESIUM SERPL-MCNC: 2 MG/DL (ref 1.8–2.4)
MCH RBC QN AUTO: 32.3 PG (ref 26–34)
MCHC RBC AUTO-ENTMCNC: 35.3 G/DL (ref 31–36)
MCV RBC AUTO: 91.5 FL (ref 80–100)
METHOTREXATE LEVEL: 0.15 UMOL/L
MONOCYTES # BLD: 0.1 K/UL (ref 0–1.3)
MONOCYTES NFR BLD: 3.1 %
NEUTROPHILS # BLD: 1.9 K/UL (ref 1.7–7.7)
NEUTROPHILS NFR BLD: 71.3 %
NITRITE UR QL STRIP.AUTO: POSITIVE
PH UR STRIP.AUTO: 7 [PH] (ref 5–8)
PH UR STRIP.AUTO: 7.5 [PH] (ref 5–8)
PH UR STRIP.AUTO: 8 [PH] (ref 5–8)
PH UR STRIP.AUTO: 8.5 [PH] (ref 5–8)
PHOSPHATE SERPL-MCNC: 3 MG/DL (ref 2.5–4.9)
PLATELET # BLD AUTO: 280 K/UL (ref 135–450)
PMV BLD AUTO: 8.5 FL (ref 5–10.5)
POTASSIUM SERPL-SCNC: 3.1 MMOL/L (ref 3.5–5.1)
PROT SERPL-MCNC: 5.9 G/DL (ref 6.4–8.2)
PROT UR STRIP.AUTO-MCNC: NEGATIVE MG/DL
PROTHROMBIN TIME: 12.8 SEC (ref 11.5–14.8)
RBC # BLD AUTO: 3.51 M/UL (ref 4.2–5.9)
RBC #/AREA URNS HPF: ABNORMAL /HPF (ref 0–4)
REPORT: NORMAL
SODIUM SERPL-SCNC: 140 MMOL/L (ref 136–145)
SP GR UR STRIP.AUTO: 1.01 (ref 1–1.03)
THIS TEST SENT TO: NORMAL
UA DIPSTICK W REFLEX MICRO PNL UR: YES
URATE SERPL-MCNC: 3.8 MG/DL (ref 3.5–7.2)
URN SPEC COLLECT METH UR: ABNORMAL
UROBILINOGEN UR STRIP-ACNC: 0.2 E.U./DL
WBC # BLD AUTO: 2.6 K/UL (ref 4–11)
WBC #/AREA URNS HPF: ABNORMAL /HPF (ref 0–5)

## 2023-08-21 PROCEDURE — 71045 X-RAY EXAM CHEST 1 VIEW: CPT

## 2023-08-21 PROCEDURE — 83520 IMMUNOASSAY QUANT NOS NONAB: CPT

## 2023-08-21 PROCEDURE — 6370000000 HC RX 637 (ALT 250 FOR IP): Performed by: STUDENT IN AN ORGANIZED HEALTH CARE EDUCATION/TRAINING PROGRAM

## 2023-08-21 PROCEDURE — 6360000002 HC RX W HCPCS: Performed by: STUDENT IN AN ORGANIZED HEALTH CARE EDUCATION/TRAINING PROGRAM

## 2023-08-21 PROCEDURE — 6370000000 HC RX 637 (ALT 250 FOR IP): Performed by: NURSE PRACTITIONER

## 2023-08-21 PROCEDURE — 85025 COMPLETE CBC W/AUTO DIFF WBC: CPT

## 2023-08-21 PROCEDURE — 80076 HEPATIC FUNCTION PANEL: CPT

## 2023-08-21 PROCEDURE — 36591 DRAW BLOOD OFF VENOUS DEVICE: CPT

## 2023-08-21 PROCEDURE — 2060000000 HC ICU INTERMEDIATE R&B

## 2023-08-21 PROCEDURE — 2580000003 HC RX 258: Performed by: STUDENT IN AN ORGANIZED HEALTH CARE EDUCATION/TRAINING PROGRAM

## 2023-08-21 PROCEDURE — 87086 URINE CULTURE/COLONY COUNT: CPT

## 2023-08-21 PROCEDURE — 83986 ASSAY PH BODY FLUID NOS: CPT

## 2023-08-21 PROCEDURE — 6360000002 HC RX W HCPCS: Performed by: NURSE PRACTITIONER

## 2023-08-21 PROCEDURE — 93010 ELECTROCARDIOGRAM REPORT: CPT | Performed by: INTERNAL MEDICINE

## 2023-08-21 PROCEDURE — 85610 PROTHROMBIN TIME: CPT

## 2023-08-21 PROCEDURE — 83735 ASSAY OF MAGNESIUM: CPT

## 2023-08-21 PROCEDURE — 83615 LACTATE (LD) (LDH) ENZYME: CPT

## 2023-08-21 PROCEDURE — 85730 THROMBOPLASTIN TIME PARTIAL: CPT

## 2023-08-21 PROCEDURE — 84100 ASSAY OF PHOSPHORUS: CPT

## 2023-08-21 PROCEDURE — 93005 ELECTROCARDIOGRAM TRACING: CPT | Performed by: NURSE PRACTITIONER

## 2023-08-21 PROCEDURE — 80048 BASIC METABOLIC PNL TOTAL CA: CPT

## 2023-08-21 PROCEDURE — 2580000003 HC RX 258: Performed by: NURSE PRACTITIONER

## 2023-08-21 PROCEDURE — 84550 ASSAY OF BLOOD/URIC ACID: CPT

## 2023-08-21 PROCEDURE — 81001 URINALYSIS AUTO W/SCOPE: CPT

## 2023-08-21 PROCEDURE — 2500000003 HC RX 250 WO HCPCS: Performed by: STUDENT IN AN ORGANIZED HEALTH CARE EDUCATION/TRAINING PROGRAM

## 2023-08-21 RX ORDER — POTASSIUM CHLORIDE 29.8 MG/ML
20 INJECTION INTRAVENOUS PRN
Status: DISCONTINUED | OUTPATIENT
Start: 2023-08-21 | End: 2023-08-22 | Stop reason: HOSPADM

## 2023-08-21 RX ORDER — SERTRALINE HYDROCHLORIDE 25 MG/1
25 TABLET, FILM COATED ORAL ONCE
Status: COMPLETED | OUTPATIENT
Start: 2023-08-21 | End: 2023-08-21

## 2023-08-21 RX ADMIN — SODIUM CHLORIDE 15 ML: 900 IRRIGANT IRRIGATION at 06:23

## 2023-08-21 RX ADMIN — SERTRALINE HYDROCHLORIDE 25 MG: 25 TABLET ORAL at 11:39

## 2023-08-21 RX ADMIN — SODIUM CHLORIDE, PRESERVATIVE FREE 10 ML: 5 INJECTION INTRAVENOUS at 21:35

## 2023-08-21 RX ADMIN — SODIUM BICARBONATE: 84 INJECTION, SOLUTION INTRAVENOUS at 21:37

## 2023-08-21 RX ADMIN — LEUCOVORIN CALCIUM 25 MG: 25 TABLET ORAL at 06:23

## 2023-08-21 RX ADMIN — SODIUM CHLORIDE, PRESERVATIVE FREE 10 ML: 5 INJECTION INTRAVENOUS at 09:30

## 2023-08-21 RX ADMIN — ENOXAPARIN SODIUM 40 MG: 100 INJECTION SUBCUTANEOUS at 18:34

## 2023-08-21 RX ADMIN — Medication 400 MG: at 09:30

## 2023-08-21 RX ADMIN — SODIUM CHLORIDE 15 ML: 900 IRRIGANT IRRIGATION at 21:36

## 2023-08-21 RX ADMIN — SODIUM BICARBONATE: 84 INJECTION, SOLUTION INTRAVENOUS at 02:48

## 2023-08-21 RX ADMIN — POTASSIUM CHLORIDE 20 MEQ: 400 INJECTION, SOLUTION INTRAVENOUS at 11:42

## 2023-08-21 RX ADMIN — SODIUM CHLORIDE 15 ML: 900 IRRIGANT IRRIGATION at 11:39

## 2023-08-21 RX ADMIN — LEUCOVORIN CALCIUM 25 MG: 25 TABLET ORAL at 18:44

## 2023-08-21 RX ADMIN — POTASSIUM CHLORIDE 20 MEQ: 1500 TABLET, EXTENDED RELEASE ORAL at 08:22

## 2023-08-21 RX ADMIN — POTASSIUM CHLORIDE 20 MEQ: 400 INJECTION, SOLUTION INTRAVENOUS at 10:34

## 2023-08-21 RX ADMIN — Medication 7.5 MG: at 21:35

## 2023-08-21 RX ADMIN — POTASSIUM CHLORIDE 20 MEQ: 400 INJECTION, SOLUTION INTRAVENOUS at 08:25

## 2023-08-21 RX ADMIN — LEVETIRACETAM 500 MG: 500 TABLET, FILM COATED ORAL at 21:35

## 2023-08-21 RX ADMIN — FAMOTIDINE 20 MG: 20 TABLET, FILM COATED ORAL at 09:30

## 2023-08-21 RX ADMIN — SERTRALINE HYDROCHLORIDE 25 MG: 25 TABLET ORAL at 09:30

## 2023-08-21 RX ADMIN — SODIUM BICARBONATE: 84 INJECTION, SOLUTION INTRAVENOUS at 13:17

## 2023-08-21 RX ADMIN — LEUCOVORIN CALCIUM 25 MG: 25 TABLET ORAL at 12:52

## 2023-08-21 RX ADMIN — FUROSEMIDE 40 MG: 10 INJECTION, SOLUTION INTRAMUSCULAR; INTRAVENOUS at 18:34

## 2023-08-21 RX ADMIN — LEUCOVORIN CALCIUM 25 MG: 25 TABLET ORAL at 00:45

## 2023-08-21 RX ADMIN — POTASSIUM CHLORIDE 20 MEQ: 400 INJECTION, SOLUTION INTRAVENOUS at 09:29

## 2023-08-21 RX ADMIN — POLYETHYLENE GLYCOL 3350 17 G: 17 POWDER, FOR SOLUTION ORAL at 10:53

## 2023-08-21 RX ADMIN — LEVETIRACETAM 500 MG: 500 TABLET, FILM COATED ORAL at 09:30

## 2023-08-21 RX ADMIN — FAMOTIDINE 20 MG: 20 TABLET, FILM COATED ORAL at 21:35

## 2023-08-21 RX ADMIN — Medication 1 TABLET: at 09:30

## 2023-08-21 RX ADMIN — TAMSULOSIN HYDROCHLORIDE 0.4 MG: 0.4 CAPSULE ORAL at 09:30

## 2023-08-21 NOTE — CARE COORDINATION
CM delivered 2nd IMM delivered within 4 hours for DC, verbal explanation of patient rights at bedside. Pt voiced understanding of discharge MCR rights and is agreeable to discharge.     Tony CHIN, MIRACLE-S   for [facility] ([address])  Office Phone: [phone]  [facility] Mobile: [phone]

## 2023-08-21 NOTE — BH NOTE
Behavioral Health Note    Patient attended unit support group. No immediate needs at this time.
expressed understanding and was agreeable to complete the evaluation. Notably, psychologist's license allows for consultation regarding psychotropic medications in the state of [place], but psychologist's license does not permit prescribing of any medications. Defer to prescribing provider regarding which medications he/she chooses to prescribe.

## 2023-08-21 NOTE — CARE COORDINATION
SW spoke with pt at bedside. Pt confirmed he will return home with his wife when able and denied needs for SW. Pt was given his IMM.     SW will continue to follow    JULY Morel   for [facility] ([address])  Office Phone: [phone] [address]: [phone]

## 2023-08-22 VITALS
HEIGHT: 70 IN | TEMPERATURE: 98.2 F | BODY MASS INDEX: 21.22 KG/M2 | DIASTOLIC BLOOD PRESSURE: 78 MMHG | SYSTOLIC BLOOD PRESSURE: 133 MMHG | OXYGEN SATURATION: 100 % | WEIGHT: 148.2 LBS | HEART RATE: 73 BPM | RESPIRATION RATE: 16 BRPM

## 2023-08-22 LAB
ALBUMIN SERPL-MCNC: 3.6 G/DL (ref 3.4–5)
ALP SERPL-CCNC: 64 U/L (ref 40–129)
ALT SERPL-CCNC: 60 U/L (ref 10–40)
ANION GAP SERPL CALCULATED.3IONS-SCNC: 10 MMOL/L (ref 3–16)
AST SERPL-CCNC: 40 U/L (ref 15–37)
BACTERIA UR CULT: NORMAL
BASOPHILS # BLD: 0 K/UL (ref 0–0.2)
BASOPHILS NFR BLD: 1.2 %
BILIRUB DIRECT SERPL-MCNC: <0.2 MG/DL (ref 0–0.3)
BILIRUB INDIRECT SERPL-MCNC: ABNORMAL MG/DL (ref 0–1)
BILIRUB SERPL-MCNC: 0.5 MG/DL (ref 0–1)
BUN SERPL-MCNC: 14 MG/DL (ref 7–20)
CALCIUM SERPL-MCNC: 8.8 MG/DL (ref 8.3–10.6)
CHLORIDE SERPL-SCNC: 101 MMOL/L (ref 99–110)
CO2 SERPL-SCNC: 28 MMOL/L (ref 21–32)
CREAT SERPL-MCNC: 1.1 MG/DL (ref 0.8–1.3)
DEPRECATED RDW RBC AUTO: 13.6 % (ref 12.4–15.4)
EOSINOPHIL # BLD: 0.2 K/UL (ref 0–0.6)
EOSINOPHIL NFR BLD: 6.2 %
GFR SERPLBLD CREATININE-BSD FMLA CKD-EPI: >60 ML/MIN/{1.73_M2}
GLUCOSE SERPL-MCNC: 106 MG/DL (ref 70–99)
HCT VFR BLD AUTO: 32.8 % (ref 40.5–52.5)
HGB BLD-MCNC: 11.5 G/DL (ref 13.5–17.5)
LDH SERPL L TO P-CCNC: 215 U/L (ref 100–190)
LYMPHOCYTES # BLD: 0.7 K/UL (ref 1–5.1)
LYMPHOCYTES NFR BLD: 25.1 %
MCH RBC QN AUTO: 32.4 PG (ref 26–34)
MCHC RBC AUTO-ENTMCNC: 35.2 G/DL (ref 31–36)
MCV RBC AUTO: 91.8 FL (ref 80–100)
METHOTREXATE LEVEL: 0.08 UMOL/L
MONOCYTES # BLD: 0.1 K/UL (ref 0–1.3)
MONOCYTES NFR BLD: 4.1 %
NEUTROPHILS # BLD: 1.8 K/UL (ref 1.7–7.7)
NEUTROPHILS NFR BLD: 63.4 %
PH UR STRIP.AUTO: 7.5 [PH] (ref 5–8)
PHOSPHATE SERPL-MCNC: 2.9 MG/DL (ref 2.5–4.9)
PLATELET # BLD AUTO: 285 K/UL (ref 135–450)
PMV BLD AUTO: 9.1 FL (ref 5–10.5)
POTASSIUM SERPL-SCNC: 3.8 MMOL/L (ref 3.5–5.1)
PROT SERPL-MCNC: 6 G/DL (ref 6.4–8.2)
RBC # BLD AUTO: 3.57 M/UL (ref 4.2–5.9)
SODIUM SERPL-SCNC: 139 MMOL/L (ref 136–145)
URATE SERPL-MCNC: 3.4 MG/DL (ref 3.5–7.2)
WBC # BLD AUTO: 2.8 K/UL (ref 4–11)

## 2023-08-22 PROCEDURE — 84100 ASSAY OF PHOSPHORUS: CPT

## 2023-08-22 PROCEDURE — 2580000003 HC RX 258: Performed by: NURSE PRACTITIONER

## 2023-08-22 PROCEDURE — 83986 ASSAY PH BODY FLUID NOS: CPT

## 2023-08-22 PROCEDURE — 83615 LACTATE (LD) (LDH) ENZYME: CPT

## 2023-08-22 PROCEDURE — 6370000000 HC RX 637 (ALT 250 FOR IP): Performed by: STUDENT IN AN ORGANIZED HEALTH CARE EDUCATION/TRAINING PROGRAM

## 2023-08-22 PROCEDURE — 84550 ASSAY OF BLOOD/URIC ACID: CPT

## 2023-08-22 PROCEDURE — 85025 COMPLETE CBC W/AUTO DIFF WBC: CPT

## 2023-08-22 PROCEDURE — 80048 BASIC METABOLIC PNL TOTAL CA: CPT

## 2023-08-22 PROCEDURE — 6370000000 HC RX 637 (ALT 250 FOR IP): Performed by: NURSE PRACTITIONER

## 2023-08-22 PROCEDURE — 80076 HEPATIC FUNCTION PANEL: CPT

## 2023-08-22 PROCEDURE — 83520 IMMUNOASSAY QUANT NOS NONAB: CPT

## 2023-08-22 PROCEDURE — 6360000002 HC RX W HCPCS: Performed by: NURSE PRACTITIONER

## 2023-08-22 PROCEDURE — 36591 DRAW BLOOD OFF VENOUS DEVICE: CPT

## 2023-08-22 RX ORDER — HEPARIN SODIUM (PORCINE) LOCK FLUSH IV SOLN 100 UNIT/ML 100 UNIT/ML
500 SOLUTION INTRAVENOUS ONCE
Status: COMPLETED | OUTPATIENT
Start: 2023-08-22 | End: 2023-08-22

## 2023-08-22 RX ORDER — POLYETHYLENE GLYCOL 3350 17 G/17G
17 POWDER, FOR SOLUTION ORAL DAILY PRN
Qty: 527 G | Refills: 1 | COMMUNITY
Start: 2023-08-22

## 2023-08-22 RX ADMIN — LEVETIRACETAM 500 MG: 500 TABLET, FILM COATED ORAL at 08:41

## 2023-08-22 RX ADMIN — LEUCOVORIN CALCIUM 25 MG: 25 TABLET ORAL at 00:38

## 2023-08-22 RX ADMIN — Medication 400 MG: at 08:41

## 2023-08-22 RX ADMIN — POTASSIUM CHLORIDE 20 MEQ: 1500 TABLET, EXTENDED RELEASE ORAL at 08:41

## 2023-08-22 RX ADMIN — HEPARIN 500 UNITS: 100 SYRINGE at 12:20

## 2023-08-22 RX ADMIN — Medication 1 TABLET: at 08:41

## 2023-08-22 RX ADMIN — TAMSULOSIN HYDROCHLORIDE 0.4 MG: 0.4 CAPSULE ORAL at 08:41

## 2023-08-22 RX ADMIN — SODIUM CHLORIDE, PRESERVATIVE FREE 10 ML: 5 INJECTION INTRAVENOUS at 08:42

## 2023-08-22 RX ADMIN — SODIUM CHLORIDE 15 ML: 900 IRRIGANT IRRIGATION at 12:20

## 2023-08-22 RX ADMIN — SERTRALINE HYDROCHLORIDE 50 MG: 50 TABLET ORAL at 08:41

## 2023-08-22 RX ADMIN — FAMOTIDINE 20 MG: 20 TABLET, FILM COATED ORAL at 08:41

## 2023-08-22 NOTE — PLAN OF CARE
Problem: Infection - Adult  Goal: Absence of infection during hospitalization  8/22/2023 1150 by Lucero Carrillo RN  Outcome: Progressing   Peripheral IV removed for discharge. Port de accessed and heparin locked. No signs of infection at site. Problem: Safety - Adult  Goal: Free from fall injury  8/22/2023 1150 by Lucero Carrillo RN  Outcome: Progressing   Orthostatic vital signs obtained at start of shift - see flowsheet for details. Pt does not meet criteria for orthostasis. Pt is a Low fall risk. See Lilydiana Shearer Fall Score and ABCDS Injury Risk assessments.
Problem: Safety - Adult  Goal: Free from fall injury  8/18/2023 0020 by Lurdes Peter RN  Outcome: Progressing  Flowsheets (Taken 8/18/2023 0013)  Free From Fall Injury:   Instruct family/caregiver on patient safety   Based on caregiver fall risk screen, instruct family/caregiver to ask for assistance with transferring infant if caregiver noted to have fall risk factors     Problem: ABCDS Injury Assessment  Goal: Absence of physical injury  8/18/2023 0020 by Lurdes Peter RN  Outcome: Progressing  Flowsheets (Taken 8/18/2023 0013)  Absence of Physical Injury: Implement safety measures based on patient assessment
Problem: Safety - Adult  Goal: Free from fall injury  8/19/2023 0453 by Tiffanie Ortega RN  Outcome: Progressing  8/19/2023 0450 by Tiffanie Ortega RN  Outcome: Progressing     Problem: ABCDS Injury Assessment  Goal: Absence of physical injury  8/19/2023 0453 by Tiffanie Ortega RN  Outcome: Progressing  8/19/2023 0450 by Tiffanie Ortega RN  Outcome: Progressing     Problem: Pain  Goal: Verbalizes/displays adequate comfort level or baseline comfort level  Outcome: Progressing     Problem: Skin/Tissue Integrity  Goal: Absence of new skin breakdown  Description: 1. Monitor for areas of redness and/or skin breakdown  2. Assess vascular access sites hourly  3. Every 4-6 hours minimum:  Change oxygen saturation probe site  4. Every 4-6 hours:  If on nasal continuous positive airway pressure, respiratory therapy assess nares and determine need for appliance change or resting period. Outcome: Progressing     Problem: Skin/Tissue Integrity - Adult  Goal: Skin integrity remains intact  Outcome: Progressing  Goal: Oral mucous membranes remain intact  Outcome: Progressing     Problem: Gastrointestinal - Adult  Goal: Minimal or absence of nausea and vomiting  Outcome: Progressing       Pt free from fall/injury overnight.  Pt without complaints of pain/nausea/vomiting, no PRN meds given,
Problem: Safety - Adult  Goal: Free from fall injury  8/20/2023 0548 by Trey Owen RN  Outcome: Progressing  8/19/2023 1712 by Mariana Espitia RN  Flowsheets (Taken 8/19/2023 1227)  Free From Fall Injury: Instruct family/caregiver on patient safety  Note: Orthostatic vital signs obtained at start of shift - see flowsheet for details. Pt does not meet criteria for orthostasis. Pt is a Low fall risk. See Brenda Crumb Fall Score and ABCDS Injury Risk assessments. - Screening for Orthostasis AND not a [place] Risk per STEPHENS/ABCDS: Pt bed is in low position, side rails up, call light and belongings are in reach. Fall risk light is on outside pts room. Pt encouraged to call for assistance as needed. Will continue with hourly rounds for PO intake, pain needs, toileting and repositioning as needed. Problem: ABCDS Injury Assessment  Goal: Absence of physical injury  Outcome: Progressing     Problem: Pain  Goal: Verbalizes/displays adequate comfort level or baseline comfort level  8/20/2023 0548 by Trey Owen RN  Outcome: Progressing  8/19/2023 1712 by Mariana Espitia RN  Note: Pt with no complaints of pain during shift. Pt denies further needs at this time. Will continue to monitor and implement plan of care. Problem: Skin/Tissue Integrity  Goal: Absence of new skin breakdown  Description: 1. Monitor for areas of redness and/or skin breakdown  2. Assess vascular access sites hourly  3. Every 4-6 hours minimum:  Change oxygen saturation probe site  4. Every 4-6 hours:  If on nasal continuous positive airway pressure, respiratory therapy assess nares and determine need for appliance change or resting period.   Outcome: Progressing     Problem: Skin/Tissue Integrity - Adult  Goal: Skin integrity remains intact  Outcome: Progressing  Goal: Oral mucous membranes remain intact  Outcome: Progressing     Problem: Gastrointestinal - Adult  Goal: Minimal or absence of nausea and vomiting  Outcome:
Problem: Safety - Adult  Goal: Free from fall injury  8/20/2023 1410 by Jesse Nuñez RN  Outcome: Progressing  Flowsheets (Taken 8/20/2023 1410)  Free From Fall Injury: Instruct family/caregiver on patient safety  Note: Orthostatic vital signs obtained at start of shift - see flowsheet for details. Pt does not meet criteria for orthostasis. Pt is a Med fall risk. See V2contactsa Courts Fall Score and ABCDS Injury Risk assessments. - Screening for Orthostasis AND not a [place] Risk per STEPHENS/ABCDS: Pt bed is in low position, side rails up, call light and belongings are in reach. Fall risk light is on outside pts room. Pt encouraged to call for assistance as needed. Will continue with hourly rounds for PO intake, pain needs, toileting and repositioning as needed. Problem: Infection - Adult  Goal: Absence of infection during hospitalization  8/20/2023 1410 by Jesse Nuñez RN  Outcome: Progressing  Note: Pt did not show s/s of infection on shift.  Afebrile on shift     Problem: Pain  Goal: Verbalizes/displays adequate comfort level or baseline comfort level  8/20/2023 1410 by Jesse Nuñez RN  Outcome: Progressing  Note: Pt did not complain of pain on shift
Problem: Safety - Adult  Goal: Free from fall injury  8/22/2023 0128 by Tesha Brand RN  Outcome: Progressing  Note: Orthostatic vital signs obtained at start of shift - see flowsheet for details. Pt does not meet criteria for orthostasis. Pt is a Med fall risk. See Magalene Karuna Fall Score and ABCDS Injury Risk assessments. - Screening for Orthostasis AND not a [place] Risk per STEPHENS/ABCDS: Pt bed is in low position, side rails up, call light and belongings are in reach. Fall risk light is on outside pts room. Pt encouraged to call for assistance as needed. Will continue with hourly rounds for PO intake, pain needs, toileting and repositioning as needed. Problem: Hematologic - Adult  Goal: Maintains hematologic stability  8/22/2023 0128 by Tesha Brand RN  Outcome: Progressing  Patient's hemoglobin this AM:   Recent Labs     08/22/23  0255   HGB 11.5*     Patient's platelet count this AM:   Recent Labs     08/22/23  0255       Thrombocytopenia not present at this time. Patient showing no signs or symptoms of active bleeding. Transfusion not indicated at this time. Patient verbalizes understanding of all instructions. Will continue to assess and implement POC. Call light within reach and hourly rounding in place. Problem: Infection - Adult  Goal: Absence of infection during hospitalization  8/22/2023 0128 by Tesha Brand RN  Outcome: Progressing  Note: CVC site remains free of signs/symptoms of infection. No drainage, edema, erythema, pain, or warmth noted at site. Dressing changes continue per protocol and on an as needed basis - see flowsheet. Compliant with [facility] Bath Protocol:  Performed CHG bath today per [facility] protocol utilizing CHG solution in the shower. CVC site cleansed with CHG wipe over dressing, skin surrounding dressing, and first 6\" of IV tubing. Pt tolerated well. Continued to encourage daily CHG bathing per [facility] protocol.
Problem: Safety - Adult  Goal: Free from fall injury  Flowsheets (Taken 8/17/2023 1812)  Free From Fall Injury: Instruct family/caregiver on patient safety     Problem: ABCDS Injury Assessment  Goal: Absence of physical injury  Flowsheets (Taken 8/17/2023 1812)  Absence of Physical Injury: Implement safety measures based on patient assessment
Problem: Safety - Adult  Goal: Free from fall injury  Outcome: Progressing  Pt with activity orders for up ad beniot. Encouraged pt to be up OOB as much as possible throughout the day and for all meals. Encouraged frequent short naps as necessary to preserve energy but instructed that while awake, pt should be OOB. Pt is visualized to be OOB % of the time this shift. Will continue to encourage frequent activity. Problem: Infection - Adult  Goal: Absence of infection during hospitalization  Outcome: Progressing   CVC site remains free of signs/symptoms of infection. No drainage, edema, erythema, pain, or warmth noted at site. Dressing changes continue per protocol and on an as needed basis - see flowsheet. Performed CHG bath today per [facility] protocol utilizing Bed bath with CHG wipes. CVC site cleansed with CHG wipe over dressing, skin surrounding dressing, and first 6\" of IV tubing. Pt tolerated well. Continued to encourage daily CHG bathing per [facility] protocol.
areas of skin breakdown assessed during shift. Patient is able to appropriately reposition self in bed as needed. Problem: Skin/Tissue Integrity - Adult  Goal: Skin integrity remains intact  Outcome: Progressing  Flowsheets (Taken 8/21/2023 0218)  Skin Integrity Remains Intact:   Monitor for areas of redness and/or skin breakdown   Assess vascular access sites hourly   Every 4-6 hours minimum: Change oxygen saturation probe site   Every 4-6 hours: If on nasal continuous positive airway pressure, respiratory therapy assesses nares and determine need for appliance change or resting period     Problem: Skin/Tissue Integrity - Adult  Goal: Oral mucous membranes remain intact  Outcome: Progressing  Flowsheets (Taken 8/21/2023 0218)  Oral Mucous Membranes Remain Intact:   Assess oral mucosa and hygiene practices   Implement preventative oral hygiene regimen   Implement oral medicated treatments as ordered  Note: Patient frequently using saline mouthwash.       Problem: Gastrointestinal - Adult  Goal: Minimal or absence of nausea and vomiting  Outcome: Progressing  Flowsheets (Taken 8/21/2023 0218)  Minimal or absence of nausea and vomiting:   Administer IV fluids as ordered to ensure adequate hydration   Provide nonpharmacologic comfort measures as appropriate   Administer ordered antiemetic medications as needed     Problem: Hematologic - Adult  Goal: Maintains hematologic stability  Outcome: Progressing  Flowsheets (Taken 8/17/2023 1454 by Jerardo Arthur RN)  Maintains hematologic stability: Assess for signs and symptoms of bleeding or hemorrhage     Problem: Infection - Adult  Goal: Absence of infection during hospitalization  8/21/2023 0218 by Christine Alonso RN  Outcome: Progressing  Flowsheets (Taken 8/21/2023 0218)  Absence of infection during hospitalization:   Assess and monitor for signs and symptoms of infection   Monitor lab/diagnostic results   Monitor all insertion sites i.e., indwelling lines,

## 2023-08-22 NOTE — CARE COORDINATION
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]   Date / Time: 8/17/2023  2:43 PM    Current PCP: Leyda Butcher, [address] [place] [address] patient: No    Hospitalization in the last 30 days: Yes  Readmission Assessment  Number of Days since last admission?: 8-30 days  Previous Disposition: Home with Family  Who is being Kaylyn Gorman:  (chart review as this was a planned readmission per H&P)  What was the patient's/caregiver's perception as to why they think they needed to return back to the hospital?: Other (Comment) (this was a planned readmission per H&P)  Did you visit your Primary Care Physician after you left the hospital, before you returned this time?: No  Why weren't you able to visit your PCP?: Did not have an appointment (this was a planned readmission per H&P)  Did you see a specialist, such as Cardiac, Pulmonary, Orthopedic Physician, etc. after you left the hospital?: Yes  Who advised the patient to return to the hospital?: Physician (this was a planned readmission per H&P)  Does the patient report anything that got in the way of taking their medications?: No (this was a planned readmission per H&P)  In our efforts to provide the best possible care to you and others like you, can you think of anything that we could have done to help you after you left the hospital the first time, so that you might not have needed to return so soon?: Other (Comment) (this was a planned readmission per H&P)    Advance Directives:  Code Status: Full Code  [place] DNR form completed and on chart: Not Indicated    Financial:  Payor: Savanna Bridges / Plan: JOESPH

## 2023-08-22 NOTE — DISCHARGE INSTR - COC
Continuity of Care Form    Patient Name: Sam Lopez   :  1955  MRN:  [id number]    Admit date:  2023  Discharge date:  2023    Code Status Order: Full Code   Advance Directives:     Admitting Physician:  Waleska Mcginnis DO  PCP: Arabella Paul, APRN - CNP    Discharging Nurse: Ming Estevez, [address] Unit/Room#: 1293/6096-30  Discharging Unit Phone Number: [phone]    Emergency Contact:   Extended Emergency Contact Information  Primary Emergency Contact: [facility]  Address: [address] [place] [facility]", [address] [facility] of [address] [place] Risk [place]/O Phone: [phone]  Mobile Phone: [phone]  Relation: Spouse  Secondary Emergency Contact: [address] [facility] Phone: [phone]  Mobile Phone: [phone]  Relation: Child    Past Surgical History:  Past Surgical History:   Procedure Laterality Date    BRONCHOSCOPY N/A 2021    ENDOBRONCHIAL ULTRASOUND WITH EUS performed by Katie Armstrong MD at [address] [place] [address] N/A 2022    BRONCHOSCOPY,  ENDOBRONCHIAL ULTRASOUND WITH ANESTHESIA AND PATHOLOGY performed by Syed Hendrix MD at [address] [place] [address]  2022    BRONCHOSCOPY/TRANSBRONCHIAL NEEDLE BIOPSY performed by Syed Hendrix MD at [address] [place] [address]  2022    BRONCHOSCOPY/TRANSBRONCHIAL NEEDLE BIOPSY ADDL LOBE performed by Syed Hendrix MD at [address] [place] [address]  2022    BRONCHOSCOPY DIAGNOSTIC OR CELL [facility] performed by Syed Hendrix MD at [facility] 2023    RIGHT PARIETAL STEREOTACTIC BIOPSY OF BRAIN MASS performed by Dakota Hernandez MD at [address] [facility] Left 7/3/2023    LEFT PARIETAL CRANIOTOMY FOR OPEN BIOPSY OF BRAIN MASS performed by Dakota Hernandez MD at [address]  2021    CT BIOPSY ABDOMEN RETROPERITONEUM 2021 Eda Garay MD [facility] [place]  [facility] [address]      teeth extracted

## 2023-08-24 ENCOUNTER — TELEPHONE (OUTPATIENT)
Dept: CARDIOLOGY CLINIC | Age: 68
End: 2023-08-24

## 2023-08-24 ENCOUNTER — TRANSCRIBE ORDERS (OUTPATIENT)
Dept: ADMINISTRATIVE | Age: 68
End: 2023-08-24

## 2023-08-24 DIAGNOSIS — C83.38 DIFFUSE LARGE B-CELL LYMPHOMA, LYMPH NODES OF MULTIPLE SITES (HCC): Primary | ICD-10-CM

## 2023-08-24 NOTE — TELEPHONE ENCOUNTER
Returned call Evens Chen unavailable. Got fax number to send information to. Fax sent and successful receipt noted.

## 2023-08-24 NOTE — TELEPHONE ENCOUNTER
Angela Oscar office called and stated pt needs an MRI.  Angela Alvarado stated she needs the following information on the Pts device to order the MRI:    Cardiologist Name  #  Fax#  [phone]  Device Type  Device Model Number  Seriel Number    Please Contact Jeniffer GRACE#[phone]

## 2023-08-29 ENCOUNTER — HOSPITAL ENCOUNTER (OUTPATIENT)
Dept: MRI IMAGING | Age: 68
Discharge: HOME | End: 2023-08-29
Attending: STUDENT IN AN ORGANIZED HEALTH CARE EDUCATION/TRAINING PROGRAM
Payer: MEDICARE

## 2023-08-29 DIAGNOSIS — C83.38 DIFFUSE LARGE B-CELL LYMPHOMA, LYMPH NODES OF MULTIPLE SITES (HCC): ICD-10-CM

## 2023-08-29 PROCEDURE — 6360000004 HC RX CONTRAST MEDICATION: Performed by: STUDENT IN AN ORGANIZED HEALTH CARE EDUCATION/TRAINING PROGRAM

## 2023-08-29 PROCEDURE — 70553 MRI BRAIN STEM W/O & W/DYE: CPT

## 2023-08-29 PROCEDURE — A9579 GAD-BASE MR CONTRAST NOS,1ML: HCPCS | Performed by: STUDENT IN AN ORGANIZED HEALTH CARE EDUCATION/TRAINING PROGRAM

## 2023-08-29 RX ADMIN — GADOTERIDOL 16 ML: 279.3 INJECTION, SOLUTION INTRAVENOUS at 14:07

## 2023-08-31 ENCOUNTER — APPOINTMENT (OUTPATIENT)
Dept: GENERAL RADIOLOGY | Age: 68
DRG: 847 | End: 2023-08-31
Attending: STUDENT IN AN ORGANIZED HEALTH CARE EDUCATION/TRAINING PROGRAM
Payer: MEDICARE

## 2023-08-31 ENCOUNTER — HOSPITAL ENCOUNTER (INPATIENT)
Age: 68
LOS: 4 days | Discharge: HOME | DRG: 847 | End: 2023-09-04
Attending: STUDENT IN AN ORGANIZED HEALTH CARE EDUCATION/TRAINING PROGRAM | Admitting: STUDENT IN AN ORGANIZED HEALTH CARE EDUCATION/TRAINING PROGRAM
Payer: MEDICARE

## 2023-08-31 PROBLEM — C83.30: Status: ACTIVE | Noted: 2023-08-31

## 2023-08-31 LAB
EKG ATRIAL RATE: 70 BPM
EKG DIAGNOSIS: NORMAL
EKG P AXIS: 111 DEGREES
EKG Q-T INTERVAL: 488 MS
EKG QRS DURATION: 158 MS
EKG QTC CALCULATION (BAZETT): 511 MS
EKG R AXIS: -76 DEGREES
EKG T AXIS: 85 DEGREES
EKG VENTRICULAR RATE: 66 BPM
PH UR STRIP.AUTO: 8.5 [PH] (ref 5–8)

## 2023-08-31 PROCEDURE — 93010 ELECTROCARDIOGRAM REPORT: CPT | Performed by: INTERNAL MEDICINE

## 2023-08-31 PROCEDURE — 2580000003 HC RX 258: Performed by: NURSE PRACTITIONER

## 2023-08-31 PROCEDURE — 2580000003 HC RX 258: Performed by: STUDENT IN AN ORGANIZED HEALTH CARE EDUCATION/TRAINING PROGRAM

## 2023-08-31 PROCEDURE — 6360000002 HC RX W HCPCS: Performed by: STUDENT IN AN ORGANIZED HEALTH CARE EDUCATION/TRAINING PROGRAM

## 2023-08-31 PROCEDURE — 93005 ELECTROCARDIOGRAM TRACING: CPT | Performed by: NURSE PRACTITIONER

## 2023-08-31 PROCEDURE — 2060000000 HC ICU INTERMEDIATE R&B

## 2023-08-31 PROCEDURE — 83986 ASSAY PH BODY FLUID NOS: CPT

## 2023-08-31 PROCEDURE — 6360000002 HC RX W HCPCS: Performed by: NURSE PRACTITIONER

## 2023-08-31 PROCEDURE — 6370000000 HC RX 637 (ALT 250 FOR IP): Performed by: PHYSICIAN ASSISTANT

## 2023-08-31 PROCEDURE — 71046 X-RAY EXAM CHEST 2 VIEWS: CPT

## 2023-08-31 PROCEDURE — 6370000000 HC RX 637 (ALT 250 FOR IP): Performed by: STUDENT IN AN ORGANIZED HEALTH CARE EDUCATION/TRAINING PROGRAM

## 2023-08-31 PROCEDURE — 2500000003 HC RX 250 WO HCPCS: Performed by: STUDENT IN AN ORGANIZED HEALTH CARE EDUCATION/TRAINING PROGRAM

## 2023-08-31 RX ORDER — POTASSIUM CHLORIDE 20 MEQ/1
20 TABLET, EXTENDED RELEASE ORAL
Status: DISCONTINUED | OUTPATIENT
Start: 2023-09-01 | End: 2023-09-04 | Stop reason: HOSPADM

## 2023-08-31 RX ORDER — SODIUM CHLORIDE 9 MG/ML
INJECTION, SOLUTION INTRAVENOUS CONTINUOUS PRN
Status: DISCONTINUED | OUTPATIENT
Start: 2023-08-31 | End: 2023-09-04 | Stop reason: HOSPADM

## 2023-08-31 RX ORDER — MECOBALAMIN 5000 MCG
5 TABLET,DISINTEGRATING ORAL NIGHTLY PRN
Status: DISCONTINUED | OUTPATIENT
Start: 2023-08-31 | End: 2023-09-04 | Stop reason: HOSPADM

## 2023-08-31 RX ORDER — M-VIT,TX,IRON,MINS/CALC/FOLIC 27MG-0.4MG
1 TABLET ORAL DAILY
Status: DISCONTINUED | OUTPATIENT
Start: 2023-09-01 | End: 2023-09-04 | Stop reason: HOSPADM

## 2023-08-31 RX ORDER — LORAZEPAM 0.5 MG/1
0.5 TABLET ORAL EVERY 4 HOURS PRN
Status: DISCONTINUED | OUTPATIENT
Start: 2023-08-31 | End: 2023-09-04 | Stop reason: HOSPADM

## 2023-08-31 RX ORDER — POTASSIUM CHLORIDE 29.8 MG/ML
20 INJECTION INTRAVENOUS PRN
Status: DISCONTINUED | OUTPATIENT
Start: 2023-08-31 | End: 2023-09-04 | Stop reason: HOSPADM

## 2023-08-31 RX ORDER — SODIUM CHLORIDE 0.9 % (FLUSH) 0.9 %
5-40 SYRINGE (ML) INJECTION EVERY 12 HOURS SCHEDULED
Status: DISCONTINUED | OUTPATIENT
Start: 2023-08-31 | End: 2023-09-04 | Stop reason: HOSPADM

## 2023-08-31 RX ORDER — PROCHLORPERAZINE EDISYLATE 5 MG/ML
5 INJECTION INTRAMUSCULAR; INTRAVENOUS EVERY 4 HOURS PRN
Status: DISCONTINUED | OUTPATIENT
Start: 2023-08-31 | End: 2023-09-04 | Stop reason: HOSPADM

## 2023-08-31 RX ORDER — POLYETHYLENE GLYCOL 3350 17 G/17G
17 POWDER, FOR SOLUTION ORAL DAILY
Status: DISCONTINUED | OUTPATIENT
Start: 2023-08-31 | End: 2023-09-04 | Stop reason: HOSPADM

## 2023-08-31 RX ORDER — TAMSULOSIN HYDROCHLORIDE 0.4 MG/1
0.4 CAPSULE ORAL DAILY
Status: DISCONTINUED | OUTPATIENT
Start: 2023-09-01 | End: 2023-09-04 | Stop reason: HOSPADM

## 2023-08-31 RX ORDER — MAGNESIUM SULFATE IN WATER 40 MG/ML
4000 INJECTION, SOLUTION INTRAVENOUS PRN
Status: DISCONTINUED | OUTPATIENT
Start: 2023-08-31 | End: 2023-09-04 | Stop reason: HOSPADM

## 2023-08-31 RX ORDER — LEUCOVORIN CALCIUM 25 MG/1
25 TABLET ORAL EVERY 6 HOURS
Status: DISCONTINUED | OUTPATIENT
Start: 2023-09-02 | End: 2023-09-04 | Stop reason: HOSPADM

## 2023-08-31 RX ORDER — ENOXAPARIN SODIUM 100 MG/ML
40 INJECTION SUBCUTANEOUS DAILY
Status: DISCONTINUED | OUTPATIENT
Start: 2023-08-31 | End: 2023-09-04 | Stop reason: HOSPADM

## 2023-08-31 RX ORDER — FUROSEMIDE 10 MG/ML
20 INJECTION INTRAMUSCULAR; INTRAVENOUS PRN
Status: DISCONTINUED | OUTPATIENT
Start: 2023-08-31 | End: 2023-09-04 | Stop reason: HOSPADM

## 2023-08-31 RX ORDER — LORAZEPAM 2 MG/ML
0.5 INJECTION INTRAMUSCULAR EVERY 4 HOURS PRN
Status: DISCONTINUED | OUTPATIENT
Start: 2023-08-31 | End: 2023-09-04 | Stop reason: HOSPADM

## 2023-08-31 RX ORDER — ONDANSETRON HYDROCHLORIDE 8 MG/1
24 TABLET, FILM COATED ORAL ONCE
Status: COMPLETED | OUTPATIENT
Start: 2023-08-31 | End: 2023-08-31

## 2023-08-31 RX ORDER — SODIUM CHLORIDE 0.9 % (FLUSH) 0.9 %
5-40 SYRINGE (ML) INJECTION PRN
Status: DISCONTINUED | OUTPATIENT
Start: 2023-08-31 | End: 2023-09-04 | Stop reason: HOSPADM

## 2023-08-31 RX ORDER — LANOLIN ALCOHOL/MO/W.PET/CERES
400 CREAM (GRAM) TOPICAL DAILY
Status: DISCONTINUED | OUTPATIENT
Start: 2023-09-01 | End: 2023-09-03

## 2023-08-31 RX ORDER — FAMOTIDINE 20 MG/1
20 TABLET, FILM COATED ORAL 2 TIMES DAILY
Status: DISCONTINUED | OUTPATIENT
Start: 2023-08-31 | End: 2023-09-04 | Stop reason: HOSPADM

## 2023-08-31 RX ORDER — SENNA AND DOCUSATE SODIUM 50; 8.6 MG/1; MG/1
2 TABLET, FILM COATED ORAL 2 TIMES DAILY PRN
Status: DISCONTINUED | OUTPATIENT
Start: 2023-08-31 | End: 2023-09-04 | Stop reason: HOSPADM

## 2023-08-31 RX ORDER — DEXAMETHASONE 4 MG/1
20 TABLET ORAL ONCE
Status: COMPLETED | OUTPATIENT
Start: 2023-08-31 | End: 2023-08-31

## 2023-08-31 RX ORDER — PROCHLORPERAZINE MALEATE 10 MG
5 TABLET ORAL EVERY 4 HOURS PRN
Status: DISCONTINUED | OUTPATIENT
Start: 2023-08-31 | End: 2023-09-04 | Stop reason: HOSPADM

## 2023-08-31 RX ORDER — FUROSEMIDE 10 MG/ML
40 INJECTION INTRAMUSCULAR; INTRAVENOUS EVERY 12 HOURS
Status: DISCONTINUED | OUTPATIENT
Start: 2023-09-02 | End: 2023-09-04 | Stop reason: HOSPADM

## 2023-08-31 RX ORDER — LEUCOVORIN CALCIUM 50 MG/5ML
25 INJECTION, POWDER, LYOPHILIZED, FOR SOLUTION INTRAMUSCULAR; INTRAVENOUS EVERY 6 HOURS
Status: DISCONTINUED | OUTPATIENT
Start: 2023-09-02 | End: 2023-09-04 | Stop reason: HOSPADM

## 2023-08-31 RX ORDER — LEVETIRACETAM 500 MG/1
500 TABLET ORAL 2 TIMES DAILY
Status: DISCONTINUED | OUTPATIENT
Start: 2023-08-31 | End: 2023-09-04 | Stop reason: HOSPADM

## 2023-08-31 RX ORDER — SODIUM CHLORIDE 9 MG/ML
INJECTION, SOLUTION INTRAVENOUS PRN
Status: DISCONTINUED | OUTPATIENT
Start: 2023-08-31 | End: 2023-09-04 | Stop reason: HOSPADM

## 2023-08-31 RX ADMIN — SODIUM CHLORIDE, PRESERVATIVE FREE 10 ML: 5 INJECTION INTRAVENOUS at 21:03

## 2023-08-31 RX ADMIN — SODIUM BICARBONATE: 84 INJECTION, SOLUTION INTRAVENOUS at 16:23

## 2023-08-31 RX ADMIN — SODIUM BICARBONATE: 84 INJECTION, SOLUTION INTRAVENOUS at 20:48

## 2023-08-31 RX ADMIN — ONDANSETRON HYDROCHLORIDE 24 MG: 8 TABLET, FILM COATED ORAL at 21:19

## 2023-08-31 RX ADMIN — LEVETIRACETAM 500 MG: 500 TABLET, FILM COATED ORAL at 20:55

## 2023-08-31 RX ADMIN — METHOTREXATE 6500 MG: 25 INJECTION, SOLUTION INTRA-ARTERIAL; INTRAMUSCULAR; INTRATHECAL; INTRAVENOUS at 22:44

## 2023-08-31 RX ADMIN — FAMOTIDINE 20 MG: 20 TABLET ORAL at 20:55

## 2023-08-31 RX ADMIN — VINCRISTINE SULFATE 2.6 MG: 1 INJECTION, SOLUTION INTRAVENOUS at 22:09

## 2023-08-31 RX ADMIN — DEXAMETHASONE 20 MG: 4 TABLET ORAL at 21:19

## 2023-08-31 RX ADMIN — ENOXAPARIN SODIUM 40 MG: 100 INJECTION SUBCUTANEOUS at 18:17

## 2023-08-31 ASSESSMENT — LIFESTYLE VARIABLES
HOW MANY STANDARD DRINKS CONTAINING ALCOHOL DO YOU HAVE ON A TYPICAL DAY: PATIENT DOES NOT DRINK
HOW OFTEN DO YOU HAVE A DRINK CONTAINING ALCOHOL: NEVER

## 2023-08-31 NOTE — H&P
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 His also has h/o BPH, GERD, and HTN. Following cycle #4, he had a restaging MRI brain on 23 which showed significant interval improvement in the irregular enhancement involving the intra-axial brain in the left hemisphere adjacent to the left lateral ventricle and significant interval improvement in the surrounding vasogenic edema and T2/FLAIR hyperintense signal involving left greater than right hemispheres. It did show a residual T2 and FLAIR hyperintense signal seen involving the left greater than right periventricular white matter regions, as well as the posterior corpus callosum and a small amount of residual enhancement seen adjacent to the atrium of the left lateral ventricle as described. He is now being admitted for cycle #5 of R-MPV. Plan will be complete an additional 2 cycles and then repeat MRI brain. He is feeling well today. He continues with intermittent confusion at times. His vision is improving but has not returned to normal. He denies fever, chills, bleeding, or GI changes. He is eating and drinking well. No headaches.        Past Surgical History:   Procedure Laterality Date    BRONCHOSCOPY N/A 2021    ENDOBRONCHIAL ULTRASOUND WITH EUS performed by [facility] O not consistent w/ lymphoma, sent to [facility] for second opinion and consistent with reactive t cell population   - CT CAP (4/10/23):no evidence of systemic disease   - MRI brain (6/1/23): Significantly decreased cerebral periventricular lymphoma and vasogenic edema compatible with positive response to treatment. 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 Progressed local mass effect with 4 mm left to right midline shift. The dominant measurable portion of which measures 5.5 x 2.6 cm (series 13, image 76), previously with only stippled areas of enhancement in this region  - MRI brain (8/29/23): Significant interval improvement in the irregular enhancement involving the intra-axial brain in the left hemisphere adjacent to the left lateral ventricle compared to the prior MRI from 6/28/2023. Significant interval improvement in the surrounding vasogenic edema and T2/FLAIR hyperintense signal involving left greater than right hemispheres. Currently, there is residual T2 and FLAIR hyperintense signal seen involving the left greater than right periventricular white matter regions, as well as the posterior corpus callosum. There is a small  amount of residual enhancement seen adjacent to the atrium of the left lateral ventricle as described. Continued imaging follow-up is recommended     PLAN:  R+MPV  - Procabazine w/ even cycles. Plan for 2 more cycles followed by repeat MRI. Cycle 5, Day + 1        2.  Neuro:   Severe memory impairment and visual changes: r/t CNS mass & vasogenic edema has improved w/ chemotherapy, but cont to have confusion  - Cont delirium precautions  - S/p Decadron taper (7/4/23 - 7/27/23): 6mg IV q6h (7/4/23), 8mg q8h (7/6/23), 4mg q12h (7/8/23), 4mg daily

## 2023-08-31 NOTE — ACP (ADVANCE CARE PLANNING)
Advance Care Planning     General Advance Care Planning (ACP) Conversation    Date of Conversation: 8/31/2023  Conducted with: Patient with Decision Making Capacity   Healthcare Decision Maker: Next of Kin by law (only applies in absence of above) (name) pt's wife    Healthcare Decision Maker:    Primary Decision Maker: Johnson Higgins - Spouse - [phone]  Click here to Jacklyn including selection of the Healthcare Decision Maker Relationship (ie \"Primary\"). Today we documented Decision Maker(s) consistent with Legal Next of Kin hierarchy.     Length of Voluntary ACP Conversation in minutes:  <16 minutes (Non-Billable)    JULY Bowden   for Reasult and [address] [place] [address] ([address] [place] [address])  Office Phone: [phone] [address]: [phone]

## 2023-08-31 NOTE — CARE COORDINATION
Case Management Assessment  Initial Evaluation    Date/Time of Evaluation: 8/31/2023 3:52 PM  Assessment Completed by: JULY Barragan   for [facility])  Office Phone: [phone]  [address] [place] [address] Mobile: [phone]    If patient is discharged prior to next notation, then this note serves as note for discharge by case management. 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 Yes    Chart Reviewed: Yes      History Provided by: Patient  Patient Orientation: Alert and Oriented    Patient Cognition: Alert    Hospitalization in the last 30 days (Readmission):  Yes    If yes, Readmission Assessment in CM Navigator will be completed. Advance Directives:      Code Status: Full Code   Patient's Primary Decision Maker is: Legal Next of Kin    Primary Decision Maker[facility]ara [facility] - [phone]    Discharge Planning:    Patient lives with: Spouse/Significant Other Type of Home: House  Primary Care Giver: Self  Patient Support Systems include: Spouse/Significant Other, Family Members   Current Financial resources: Medicare ([place] medicare)  Current community resources: None  Current services prior to admission: Other (Comment) ([facility])            Current DME:              Type of Home Care services:  None    ADLS  Prior functional level: Independent in ADLs/IADLs  Current functional level: Independent in ADLs/IADLs    PT AM-PAC:   /24  OT AM-PAC:   /24    Family can provide assistance at DC:  Yes  Would you like Case Management to discuss the discharge plan with any other family members/significant others, and if so, who? Yes  Plans to Return to Present Housing: Yes  Other Identified Issues/Barriers to RETURNING to current housing: n/a  Potential Assistance needed at discharge: N/A            Potential DME:    Patient expects to discharge to: [address] [place] [address] for transportation at discharge:      Financial    Payor: Mikey Prior / Plan: Camacho David ESSENTIAL/PLUS / Product Type: *No Product type* /     Does insurance require precert for SNF: Yes    Potential assistance Purchasing Medications: No  Meds-to-Beds request:        MEDICAL [facility], [facility] [facility]  [address] [place] [address]  Phone: [phone] Fax: [phone]    [address] [place] [address]  [address] [place] [address]  Phone: [phone] Fax: [phone]      Notes:    Factors facilitating achievement of predicted outcomes: Family support, Caregiver support, Motivated, Cooperative, Pleasant, and Good insight into deficits    Barriers to discharge: Medical complications    Additional Case Management Notes: Chart review completed. Met with pt and pt's wife Mora Link at bedside with pt permission. Both stated nothing has changed since last admission and he remains independent with his ADl's. He plans on returning home when able and denied needs for skilled home care. Pt was given his IMM as pt has an order for inpatient in [facility]. SW will follow but anticipates no needs.      The Plan for Transition of Care is related to the following treatment goals of Lymphoma malignant, immunoblastic ([facility]) [C83.30]  CNS lymphoma ([facility]) [C85.89]    MIRACLE Mendez-S   for [facility] ([address])  Office Phone: [phone]  [address] [place] [address] Road: [phone]

## 2023-09-01 LAB
ALBUMIN SERPL-MCNC: 3.6 G/DL (ref 3.4–5)
ALP SERPL-CCNC: 55 U/L (ref 40–129)
ALT SERPL-CCNC: 25 U/L (ref 10–40)
ANION GAP SERPL CALCULATED.3IONS-SCNC: 11 MMOL/L (ref 3–16)
AST SERPL-CCNC: 33 U/L (ref 15–37)
BASOPHILS # BLD: 0 K/UL (ref 0–0.2)
BASOPHILS NFR BLD: 0.2 %
BILIRUB DIRECT SERPL-MCNC: <0.2 MG/DL (ref 0–0.3)
BILIRUB INDIRECT SERPL-MCNC: ABNORMAL MG/DL (ref 0–1)
BILIRUB SERPL-MCNC: 0.7 MG/DL (ref 0–1)
BUN SERPL-MCNC: 21 MG/DL (ref 7–20)
CALCIUM SERPL-MCNC: 8.6 MG/DL (ref 8.3–10.6)
CHLORIDE SERPL-SCNC: 106 MMOL/L (ref 99–110)
CO2 SERPL-SCNC: 25 MMOL/L (ref 21–32)
CREAT SERPL-MCNC: 1.3 MG/DL (ref 0.8–1.3)
DEPRECATED RDW RBC AUTO: 14.4 % (ref 12.4–15.4)
EKG ATRIAL RATE: 69 BPM
EKG DIAGNOSIS: NORMAL
EKG P AXIS: 36 DEGREES
EKG P-R INTERVAL: 194 MS
EKG Q-T INTERVAL: 484 MS
EKG QRS DURATION: 166 MS
EKG QTC CALCULATION (BAZETT): 518 MS
EKG R AXIS: -77 DEGREES
EKG T AXIS: 87 DEGREES
EKG VENTRICULAR RATE: 69 BPM
EOSINOPHIL # BLD: 0 K/UL (ref 0–0.6)
EOSINOPHIL NFR BLD: 0 %
GFR SERPLBLD CREATININE-BSD FMLA CKD-EPI: 59 ML/MIN/{1.73_M2}
GLUCOSE SERPL-MCNC: 204 MG/DL (ref 70–99)
HCT VFR BLD AUTO: 28.9 % (ref 40.5–52.5)
HGB BLD-MCNC: 10.2 G/DL (ref 13.5–17.5)
LDH SERPL L TO P-CCNC: 229 U/L (ref 100–190)
LYMPHOCYTES # BLD: 0.2 K/UL (ref 1–5.1)
LYMPHOCYTES NFR BLD: 2.6 %
MAGNESIUM SERPL-MCNC: 1.8 MG/DL (ref 1.8–2.4)
MCH RBC QN AUTO: 32.9 PG (ref 26–34)
MCHC RBC AUTO-ENTMCNC: 35.2 G/DL (ref 31–36)
MCV RBC AUTO: 93.4 FL (ref 80–100)
MONOCYTES # BLD: 0.2 K/UL (ref 0–1.3)
MONOCYTES NFR BLD: 2.6 %
NEUTROPHILS # BLD: 8.2 K/UL (ref 1.7–7.7)
NEUTROPHILS NFR BLD: 94.6 %
PH UR STRIP.AUTO: 7.5 [PH] (ref 5–8)
PH UR STRIP.AUTO: 8 [PH] (ref 5–8)
PHOSPHATE SERPL-MCNC: 2 MG/DL (ref 2.5–4.9)
PLATELET # BLD AUTO: 182 K/UL (ref 135–450)
PMV BLD AUTO: 9.1 FL (ref 5–10.5)
POTASSIUM SERPL-SCNC: 3.7 MMOL/L (ref 3.5–5.1)
PROT SERPL-MCNC: 5.6 G/DL (ref 6.4–8.2)
RBC # BLD AUTO: 3.1 M/UL (ref 4.2–5.9)
SODIUM SERPL-SCNC: 142 MMOL/L (ref 136–145)
URATE SERPL-MCNC: 4.4 MG/DL (ref 3.5–7.2)
WBC # BLD AUTO: 8.7 K/UL (ref 4–11)

## 2023-09-01 PROCEDURE — 6370000000 HC RX 637 (ALT 250 FOR IP): Performed by: PHYSICIAN ASSISTANT

## 2023-09-01 PROCEDURE — 83615 LACTATE (LD) (LDH) ENZYME: CPT

## 2023-09-01 PROCEDURE — 80076 HEPATIC FUNCTION PANEL: CPT

## 2023-09-01 PROCEDURE — 6360000002 HC RX W HCPCS: Performed by: STUDENT IN AN ORGANIZED HEALTH CARE EDUCATION/TRAINING PROGRAM

## 2023-09-01 PROCEDURE — 6360000002 HC RX W HCPCS: Performed by: NURSE PRACTITIONER

## 2023-09-01 PROCEDURE — 2580000003 HC RX 258: Performed by: STUDENT IN AN ORGANIZED HEALTH CARE EDUCATION/TRAINING PROGRAM

## 2023-09-01 PROCEDURE — 2500000003 HC RX 250 WO HCPCS: Performed by: NURSE PRACTITIONER

## 2023-09-01 PROCEDURE — 80048 BASIC METABOLIC PNL TOTAL CA: CPT

## 2023-09-01 PROCEDURE — 2580000003 HC RX 258: Performed by: NURSE PRACTITIONER

## 2023-09-01 PROCEDURE — 84550 ASSAY OF BLOOD/URIC ACID: CPT

## 2023-09-01 PROCEDURE — 93010 ELECTROCARDIOGRAM REPORT: CPT | Performed by: INTERNAL MEDICINE

## 2023-09-01 PROCEDURE — 83735 ASSAY OF MAGNESIUM: CPT

## 2023-09-01 PROCEDURE — 83986 ASSAY PH BODY FLUID NOS: CPT

## 2023-09-01 PROCEDURE — 36591 DRAW BLOOD OFF VENOUS DEVICE: CPT

## 2023-09-01 PROCEDURE — 94761 N-INVAS EAR/PLS OXIMETRY MLT: CPT

## 2023-09-01 PROCEDURE — 94150 VITAL CAPACITY TEST: CPT

## 2023-09-01 PROCEDURE — 85025 COMPLETE CBC W/AUTO DIFF WBC: CPT

## 2023-09-01 PROCEDURE — 93005 ELECTROCARDIOGRAM TRACING: CPT | Performed by: NURSE PRACTITIONER

## 2023-09-01 PROCEDURE — 84100 ASSAY OF PHOSPHORUS: CPT

## 2023-09-01 PROCEDURE — 2500000003 HC RX 250 WO HCPCS: Performed by: STUDENT IN AN ORGANIZED HEALTH CARE EDUCATION/TRAINING PROGRAM

## 2023-09-01 PROCEDURE — 2060000000 HC ICU INTERMEDIATE R&B

## 2023-09-01 RX ORDER — HYDRALAZINE HYDROCHLORIDE 20 MG/ML
10 INJECTION INTRAMUSCULAR; INTRAVENOUS EVERY 6 HOURS PRN
Status: DISCONTINUED | OUTPATIENT
Start: 2023-09-01 | End: 2023-09-04 | Stop reason: HOSPADM

## 2023-09-01 RX ADMIN — ENOXAPARIN SODIUM 40 MG: 100 INJECTION SUBCUTANEOUS at 18:01

## 2023-09-01 RX ADMIN — POTASSIUM PHOSPHATE, MONOBASIC POTASSIUM PHOSPHATE, DIBASIC 20 MMOL: 224; 236 INJECTION, SOLUTION, CONCENTRATE INTRAVENOUS at 10:19

## 2023-09-01 RX ADMIN — FAMOTIDINE 20 MG: 20 TABLET ORAL at 08:06

## 2023-09-01 RX ADMIN — SODIUM BICARBONATE: 84 INJECTION, SOLUTION INTRAVENOUS at 04:37

## 2023-09-01 RX ADMIN — SODIUM BICARBONATE: 84 INJECTION, SOLUTION INTRAVENOUS at 12:06

## 2023-09-01 RX ADMIN — SODIUM CHLORIDE, PRESERVATIVE FREE 10 ML: 5 INJECTION INTRAVENOUS at 20:32

## 2023-09-01 RX ADMIN — POLYETHYLENE GLYCOL 3350 17 G: 17 POWDER, FOR SOLUTION ORAL at 08:02

## 2023-09-01 RX ADMIN — FUROSEMIDE 20 MG: 10 INJECTION, SOLUTION INTRAMUSCULAR; INTRAVENOUS at 05:25

## 2023-09-01 RX ADMIN — POTASSIUM CHLORIDE 20 MEQ: 1500 TABLET, EXTENDED RELEASE ORAL at 08:03

## 2023-09-01 RX ADMIN — SODIUM BICARBONATE: 84 INJECTION, SOLUTION INTRAVENOUS at 15:34

## 2023-09-01 RX ADMIN — TAMSULOSIN HYDROCHLORIDE 0.4 MG: 0.4 CAPSULE ORAL at 08:03

## 2023-09-01 RX ADMIN — MULTIPLE VITAMINS W/ MINERALS TAB 1 TABLET: TAB at 08:03

## 2023-09-01 RX ADMIN — LEVETIRACETAM 500 MG: 500 TABLET, FILM COATED ORAL at 20:32

## 2023-09-01 RX ADMIN — SERTRALINE HYDROCHLORIDE 50 MG: 50 TABLET ORAL at 08:03

## 2023-09-01 RX ADMIN — Medication 400 MG: at 08:03

## 2023-09-01 RX ADMIN — FAMOTIDINE 20 MG: 20 TABLET ORAL at 20:32

## 2023-09-01 RX ADMIN — LEVETIRACETAM 500 MG: 500 TABLET, FILM COATED ORAL at 08:03

## 2023-09-01 RX ADMIN — SODIUM CHLORIDE, PRESERVATIVE FREE 10 ML: 5 INJECTION INTRAVENOUS at 08:07

## 2023-09-01 NOTE — ONCOLOGY
Verification:  Original chemotherapy orders reviewed and acknowledged. Appropriateness of chemotherapy treatment regimen R-MPV for diagnosis of DLBCL was verified. Patient educated on chemotherapy regimen. Acknowledgement of informed consent for chemotherapy obtained. Estimated body surface area is 1.89 meters squared as calculated from the following:    Height as of this encounter: 5' 10\" (1.778 m). Weight as of this encounter: 159 lb (72.1 kg). verified. Appropriate dosing calculations of chemotherapy based on above height, weight, and BSA verified. Administration: Chemotherapy drug Vincristine independently verified with Niya Valdez RN prior to administration. Acknowledgement of informed consent for chemotherapy administration verified. Original order, appropriateness of regimen, drug supplied, height, weight, BSA, dose calculations, expiration dates/times, drug appearance, and two patient identifiers were verified by both RNs. Drug checked for vesicant/irritant status and for risk of hypersensitivity. Most recent laboratory values and allergies, were reviewed. Positive, brisk blood return via CVC was confirmed prior to administration. Chest x-ray for correct line placement reviewed. Alfredo Morales RN and Dante Lane RN verified correct rate of chemotherapy and maintenance IV fluids. Patient was educated on chemotherapy regimen prior to administration including indication for treatment related to disease & side effects of chemotherapy drug. Patient verbalizes understanding of all instructions. Completion of Chemotherapy: Monitoring during infusion done per policy, see Flowsheets. Blood return verified before, during, and after infusion per policy; no signs of extravasation. Pt tolerated chemotherapy well and without incident. Chemotherapy infusion end time on the [facility]

## 2023-09-01 NOTE — CARE COORDINATION
Attended MD rounds. Pt continues to plan on returning home when able with no needs anticipated. SW will follow but anticipates no needs.     Mirna Le MSW, MIRACLE-S   for [facility] ([address])  Office Phone: [phone]  [facility] Mobile: [phone]

## 2023-09-01 NOTE — ONCOLOGY
Administration: Chemotherapy drug Methotrexate independently verified with Niya Valdez RN prior to administration. Acknowledgement of informed consent for chemotherapy administration verified. Original order, appropriateness of regimen, drug supplied, height, weight, BSA, dose calculations, expiration dates/times, drug appearance, and two patient identifiers were verified by both RNs. Drug checked for vesicant/irritant status and for risk of hypersensitivity. Most recent laboratory values and allergies, were reviewed. Positive, brisk blood return via CVC was confirmed prior to administration. Chest x-ray for correct line placement reviewed. Enedina Pollock RN and Dante Lane RN verified correct rate of chemotherapy and maintenance IV fluids. Patient was educated on chemotherapy regimen prior to administration including indication for treatment related to disease & side effects of chemotherapy drug. Patient verbalizes understanding of all instructions. Completion of Chemotherapy: Monitoring during infusion done per policy, see Flowsheets. Blood return verified before, during, and after infusion per policy; no signs of extravasation. Pt tolerated chemotherapy well and without incident. Chemotherapy infusion end time on the [facility]

## 2023-09-01 NOTE — BH NOTE
Behavioral Health Intervention Note    Met with patient briefly. He had multiple visitors unknown to writer. Therefore, did not attempt to assess behavioral health needs. ----------------------------------------------------------------------------------------------------------    At initial encounter with patient, discussed role of psychologist including addressing emotional and behavioral needs while receiving care at the [facility]/[address]. Discussed short-term nature of services. Also discussed with patient that a component of provider's role is completing the pre-surgical psychological evaluations for bone marrow transplant and CAR-T. Informed patient of psychologist's various roles in order for the patient to be fully informed when consenting to behavioral health treatment. Patient was agreeable to engaging in care with psychologist. Discussed limits of confidentiality including that psychologist coordinates with patient's treatment team and other limits of confidentiality were discussed as needed.

## 2023-09-01 NOTE — PLAN OF CARE
Problem: Safety - Adult  Goal: Free from fall injury  Flowsheets (Taken 9/1/2023 1451)  Free From Fall Injury: Instruct family/caregiver on patient safety  Note: Pt with no complaints of pain during shift. Pt denies further needs at this time. Will continue to monitor and implement plan of care. Problem: ABCDS Injury Assessment  Goal: Absence of physical injury  Recent Flowsheet Documentation  Taken 9/1/2023 1451 by Tavo Nugent RN  Absence of Physical Injury: Implement safety measures based on patient assessment     Problem: Hematologic - Adult  Goal: Maintains hematologic stability  Note: Patient's hemoglobin this AM:   Recent Labs     09/01/23  0405   HGB 10.2*     Patient's platelet count this AM:   Recent Labs     09/01/23  0405       Thrombocytopenia Precautions in place. Patient showing no signs or symptoms of active bleeding. Transfusion not indicated at this time. Patient verbalizes understanding of all instructions. Will continue to assess and implement POC. Call light within reach and hourly rounding in place.

## 2023-09-01 NOTE — PROGRESS NOTES
administered Communion to Leyva Oil patient, per his request.  Patient's wife received as well.     09/01/23 1645   Encounter Summary   Encounter Overview/Reason  Rituals, Rites and Sacraments   Service Provided For: Patient and family together   Referral/Consult From: Nurse;Patient   Support System Spouse   Last Encounter  09/01/23  (Pt received Communion.  [facility])   Complexity of Encounter Moderate   Begin Time 1635   End Time  1645   Total Time Calculated 10 min   Rituals, Rites and Sacraments   Type Rastafari Communion  (Pt and spouse received Communion.)      [facility]  Phone: [phone]  Pager: [phone]

## 2023-09-01 NOTE — DISCHARGE SUMMARY
lymphoma  - Cont PT/OT when admitted      11. GI / Nutrition:   - H/o constipation and GERD  Nutrition:  Fair appetite & intake  - Cont regular diet    GERD:  - S/p PPI - holding while on chemotherapy w/ MTX  - Cont Pepcid BID (started 7/10/23)  Nausea:  - Cont Zofran as needed     12. Psych:  - H/o depressed mood  Depression:  - Cont Zoloft 50 mg daily (increased 8/21/23)  - Dr. Thierry Brenner saw patient (8/21/23)    13. Cardiac  Prolonged QTc:  - EKG (8/31/23 & 9/1/23): >500  - Repeat EKG (9/2/23):  Pending  - Cont to monitor, Zoloft is low risk, but may need stopped     Condition on discharge: Stable    Discharge Instructions:  Return to [facility] on *** for labs (CBC w/ diff, CMP, Mag & Phos) and provider visit. The patient was advised on activity and dietary restrictions. The patient was advised to follow up in the emergency department or contact the physician with any unresolved nausea/vomiting/diarrhea/pain or temperature greater than 100.5 F or any other unusual symptoms.        This discharge summary and plan was discussed and agreed upon with Dr. Osvaldo Mcdermott, MEI - CNP

## 2023-09-02 LAB
ALBUMIN SERPL-MCNC: 3.4 G/DL (ref 3.4–5)
ALP SERPL-CCNC: 54 U/L (ref 40–129)
ALT SERPL-CCNC: 76 U/L (ref 10–40)
ANION GAP SERPL CALCULATED.3IONS-SCNC: 10 MMOL/L (ref 3–16)
ANION GAP SERPL CALCULATED.3IONS-SCNC: 7 MMOL/L (ref 3–16)
AST SERPL-CCNC: 59 U/L (ref 15–37)
BASOPHILS # BLD: 0 K/UL (ref 0–0.2)
BASOPHILS NFR BLD: 0.1 %
BILIRUB DIRECT SERPL-MCNC: <0.2 MG/DL (ref 0–0.3)
BILIRUB INDIRECT SERPL-MCNC: ABNORMAL MG/DL (ref 0–1)
BILIRUB SERPL-MCNC: <0.2 MG/DL (ref 0–1)
BUN SERPL-MCNC: 15 MG/DL (ref 7–20)
BUN SERPL-MCNC: 16 MG/DL (ref 7–20)
CALCIUM SERPL-MCNC: 8.1 MG/DL (ref 8.3–10.6)
CALCIUM SERPL-MCNC: 8.2 MG/DL (ref 8.3–10.6)
CHLORIDE SERPL-SCNC: 106 MMOL/L (ref 99–110)
CHLORIDE SERPL-SCNC: 107 MMOL/L (ref 99–110)
CO2 SERPL-SCNC: 28 MMOL/L (ref 21–32)
CO2 SERPL-SCNC: 29 MMOL/L (ref 21–32)
CREAT SERPL-MCNC: 1.2 MG/DL (ref 0.8–1.3)
CREAT SERPL-MCNC: 1.4 MG/DL (ref 0.8–1.3)
DEPRECATED RDW RBC AUTO: 14.5 % (ref 12.4–15.4)
EKG ATRIAL RATE: 70 BPM
EKG DIAGNOSIS: NORMAL
EKG P AXIS: 118 DEGREES
EKG P-R INTERVAL: 204 MS
EKG Q-T INTERVAL: 494 MS
EKG QRS DURATION: 170 MS
EKG QTC CALCULATION (BAZETT): 533 MS
EKG R AXIS: -63 DEGREES
EKG T AXIS: 96 DEGREES
EKG VENTRICULAR RATE: 70 BPM
EOSINOPHIL # BLD: 0 K/UL (ref 0–0.6)
EOSINOPHIL NFR BLD: 0.7 %
GFR SERPLBLD CREATININE-BSD FMLA CKD-EPI: 54 ML/MIN/{1.73_M2}
GFR SERPLBLD CREATININE-BSD FMLA CKD-EPI: >60 ML/MIN/{1.73_M2}
GLUCOSE SERPL-MCNC: 103 MG/DL (ref 70–99)
GLUCOSE SERPL-MCNC: 97 MG/DL (ref 70–99)
HCT VFR BLD AUTO: 27.4 % (ref 40.5–52.5)
HGB BLD-MCNC: 9.6 G/DL (ref 13.5–17.5)
LDH SERPL L TO P-CCNC: 199 U/L (ref 100–190)
LYMPHOCYTES # BLD: 0.5 K/UL (ref 1–5.1)
LYMPHOCYTES NFR BLD: 9.7 %
MAGNESIUM SERPL-MCNC: 1.8 MG/DL (ref 1.8–2.4)
MCH RBC QN AUTO: 32.4 PG (ref 26–34)
MCHC RBC AUTO-ENTMCNC: 35.1 G/DL (ref 31–36)
MCV RBC AUTO: 92.3 FL (ref 80–100)
METHOTREXATE LEVEL: 4.2 UMOL/L
MONOCYTES # BLD: 0.7 K/UL (ref 0–1.3)
MONOCYTES NFR BLD: 11.9 %
NEUTROPHILS # BLD: 4.3 K/UL (ref 1.7–7.7)
NEUTROPHILS NFR BLD: 77.6 %
PH UR STRIP.AUTO: 7 [PH] (ref 5–8)
PH UR STRIP.AUTO: 7.5 [PH] (ref 5–8)
PH UR STRIP.AUTO: 8 [PH] (ref 5–8)
PH UR STRIP.AUTO: 8 [PH] (ref 5–8)
PHOSPHATE SERPL-MCNC: 2.8 MG/DL (ref 2.5–4.9)
PLATELET # BLD AUTO: 180 K/UL (ref 135–450)
PMV BLD AUTO: 8.7 FL (ref 5–10.5)
POTASSIUM SERPL-SCNC: 3.2 MMOL/L (ref 3.5–5.1)
POTASSIUM SERPL-SCNC: 4.2 MMOL/L (ref 3.5–5.1)
PROT SERPL-MCNC: 5.4 G/DL (ref 6.4–8.2)
RBC # BLD AUTO: 2.97 M/UL (ref 4.2–5.9)
SODIUM SERPL-SCNC: 142 MMOL/L (ref 136–145)
SODIUM SERPL-SCNC: 145 MMOL/L (ref 136–145)
URATE SERPL-MCNC: 4.3 MG/DL (ref 3.5–7.2)
WBC # BLD AUTO: 5.6 K/UL (ref 4–11)

## 2023-09-02 PROCEDURE — 84100 ASSAY OF PHOSPHORUS: CPT

## 2023-09-02 PROCEDURE — 83735 ASSAY OF MAGNESIUM: CPT

## 2023-09-02 PROCEDURE — 83615 LACTATE (LD) (LDH) ENZYME: CPT

## 2023-09-02 PROCEDURE — 83986 ASSAY PH BODY FLUID NOS: CPT

## 2023-09-02 PROCEDURE — 85025 COMPLETE CBC W/AUTO DIFF WBC: CPT

## 2023-09-02 PROCEDURE — 83520 IMMUNOASSAY QUANT NOS NONAB: CPT

## 2023-09-02 PROCEDURE — 80076 HEPATIC FUNCTION PANEL: CPT

## 2023-09-02 PROCEDURE — 84550 ASSAY OF BLOOD/URIC ACID: CPT

## 2023-09-02 PROCEDURE — 93010 ELECTROCARDIOGRAM REPORT: CPT | Performed by: INTERNAL MEDICINE

## 2023-09-02 PROCEDURE — 6360000002 HC RX W HCPCS: Performed by: NURSE PRACTITIONER

## 2023-09-02 PROCEDURE — 6370000000 HC RX 637 (ALT 250 FOR IP): Performed by: PHYSICIAN ASSISTANT

## 2023-09-02 PROCEDURE — 2580000003 HC RX 258: Performed by: NURSE PRACTITIONER

## 2023-09-02 PROCEDURE — 6370000000 HC RX 637 (ALT 250 FOR IP): Performed by: STUDENT IN AN ORGANIZED HEALTH CARE EDUCATION/TRAINING PROGRAM

## 2023-09-02 PROCEDURE — 2580000003 HC RX 258: Performed by: STUDENT IN AN ORGANIZED HEALTH CARE EDUCATION/TRAINING PROGRAM

## 2023-09-02 PROCEDURE — 93005 ELECTROCARDIOGRAM TRACING: CPT | Performed by: NURSE PRACTITIONER

## 2023-09-02 PROCEDURE — 2060000000 HC ICU INTERMEDIATE R&B

## 2023-09-02 PROCEDURE — 80048 BASIC METABOLIC PNL TOTAL CA: CPT

## 2023-09-02 PROCEDURE — 2500000003 HC RX 250 WO HCPCS: Performed by: STUDENT IN AN ORGANIZED HEALTH CARE EDUCATION/TRAINING PROGRAM

## 2023-09-02 RX ORDER — MAGNESIUM SULFATE IN WATER 40 MG/ML
2000 INJECTION, SOLUTION INTRAVENOUS PRN
Status: DISCONTINUED | OUTPATIENT
Start: 2023-09-02 | End: 2023-09-04 | Stop reason: HOSPADM

## 2023-09-02 RX ORDER — POTASSIUM CHLORIDE 29.8 MG/ML
20 INJECTION INTRAVENOUS PRN
Status: DISCONTINUED | OUTPATIENT
Start: 2023-09-02 | End: 2023-09-04 | Stop reason: HOSPADM

## 2023-09-02 RX ADMIN — LEUCOVORIN CALCIUM 25 MG: 25 TABLET ORAL at 06:53

## 2023-09-02 RX ADMIN — LEUCOVORIN CALCIUM 25 MG: 25 TABLET ORAL at 18:56

## 2023-09-02 RX ADMIN — POTASSIUM CHLORIDE 20 MEQ: 1500 TABLET, EXTENDED RELEASE ORAL at 09:53

## 2023-09-02 RX ADMIN — FAMOTIDINE 20 MG: 20 TABLET ORAL at 20:43

## 2023-09-02 RX ADMIN — FAMOTIDINE 20 MG: 20 TABLET ORAL at 09:53

## 2023-09-02 RX ADMIN — SODIUM BICARBONATE: 84 INJECTION, SOLUTION INTRAVENOUS at 21:32

## 2023-09-02 RX ADMIN — MULTIPLE VITAMINS W/ MINERALS TAB 1 TABLET: TAB at 09:53

## 2023-09-02 RX ADMIN — LEVETIRACETAM 500 MG: 500 TABLET, FILM COATED ORAL at 20:43

## 2023-09-02 RX ADMIN — ENOXAPARIN SODIUM 40 MG: 100 INJECTION SUBCUTANEOUS at 18:56

## 2023-09-02 RX ADMIN — POTASSIUM CHLORIDE 20 MEQ: 29.8 INJECTION, SOLUTION INTRAVENOUS at 06:53

## 2023-09-02 RX ADMIN — LEVETIRACETAM 500 MG: 500 TABLET, FILM COATED ORAL at 09:53

## 2023-09-02 RX ADMIN — Medication 400 MG: at 09:53

## 2023-09-02 RX ADMIN — POTASSIUM CHLORIDE 20 MEQ: 29.8 INJECTION, SOLUTION INTRAVENOUS at 11:06

## 2023-09-02 RX ADMIN — SODIUM BICARBONATE: 84 INJECTION, SOLUTION INTRAVENOUS at 14:18

## 2023-09-02 RX ADMIN — SODIUM CHLORIDE, PRESERVATIVE FREE 10 ML: 5 INJECTION INTRAVENOUS at 09:54

## 2023-09-02 RX ADMIN — LEUCOVORIN CALCIUM 25 MG: 25 TABLET ORAL at 01:16

## 2023-09-02 RX ADMIN — POTASSIUM CHLORIDE 20 MEQ: 29.8 INJECTION, SOLUTION INTRAVENOUS at 09:52

## 2023-09-02 RX ADMIN — POTASSIUM CHLORIDE 20 MEQ: 29.8 INJECTION, SOLUTION INTRAVENOUS at 12:47

## 2023-09-02 RX ADMIN — SODIUM CHLORIDE, PRESERVATIVE FREE 10 ML: 5 INJECTION INTRAVENOUS at 21:34

## 2023-09-02 RX ADMIN — TAMSULOSIN HYDROCHLORIDE 0.4 MG: 0.4 CAPSULE ORAL at 09:53

## 2023-09-02 RX ADMIN — LEUCOVORIN CALCIUM 25 MG: 25 TABLET ORAL at 12:45

## 2023-09-02 RX ADMIN — SERTRALINE HYDROCHLORIDE 50 MG: 50 TABLET ORAL at 09:53

## 2023-09-02 NOTE — PLAN OF CARE
noted. Patient turns self frequently while in bed. Patient is up independently this shift and is using urinals.

## 2023-09-02 NOTE — PLAN OF CARE
Problem: Safety - Adult  Goal: Free from fall injury  9/2/2023 1935 by Floridalma Garcia RN  Outcome: Progressing  Orthostatic vital signs obtained at start of shift - see flowsheet for details. Pt does not meet criteria for orthostasis. Pt is a Med fall risk. See Antoinette Jones Fall Score and ABCDS Injury Risk assessments. Pt bed is in low position, side rails up, call light and belongings are in reach. Fall risk light is on outside pts room. Pt encouraged to call for assistance as needed. Will continue with hourly rounds for PO intake, pain needs, toileting and repositioning as needed. Problem: ABCDS Injury Assessment  Goal: Absence of physical injury  9/2/2023 1935 by Floridalma Garcia RN  Outcome: Progressing  No new physical injuries noted. Problem: Hematologic - Adult  Goal: Maintains hematologic stability  9/2/2023 1935 by Floridalma Garcia RN  Outcome: Progressing  Patient's hemoglobin this AM:   Recent Labs     09/02/23  0435   HGB 9.6*     Patient's platelet count this AM:   Recent Labs     09/02/23  0435       Thrombocytopenia not present at this time. Patient showing no signs or symptoms of active bleeding. Transfusion not indicated at this time. Patient verbalizes understanding of all instructions. Will continue to assess and implement POC. Call light within reach and hourly rounding in place. Problem: Skin/Tissue Integrity  Goal: Absence of new skin breakdown  Description: 1. Monitor for areas of redness and/or skin breakdown  2. Assess vascular access sites hourly  3. Every 4-6 hours minimum:  Change oxygen saturation probe site  4. Every 4-6 hours:  If on nasal continuous positive airway pressure, respiratory therapy assess nares and determine need for appliance change or resting period. 9/2/2023 1935 by Floridalma Garcia RN  Outcome: Progressing  Pt to remain free of skin breakdown during stay, pt encouraged to turn and reposition frequently. No evidence of skin breakdown noted.

## 2023-09-03 LAB
ALBUMIN SERPL-MCNC: 3.2 G/DL (ref 3.4–5)
ALP SERPL-CCNC: 48 U/L (ref 40–129)
ALT SERPL-CCNC: 74 U/L (ref 10–40)
ANION GAP SERPL CALCULATED.3IONS-SCNC: 10 MMOL/L (ref 3–16)
AST SERPL-CCNC: 39 U/L (ref 15–37)
BASOPHILS # BLD: 0 K/UL (ref 0–0.2)
BASOPHILS NFR BLD: 0.9 %
BILIRUB DIRECT SERPL-MCNC: <0.2 MG/DL (ref 0–0.3)
BILIRUB INDIRECT SERPL-MCNC: ABNORMAL MG/DL (ref 0–1)
BILIRUB SERPL-MCNC: 0.3 MG/DL (ref 0–1)
BUN SERPL-MCNC: 14 MG/DL (ref 7–20)
CALCIUM SERPL-MCNC: 8 MG/DL (ref 8.3–10.6)
CHLORIDE SERPL-SCNC: 106 MMOL/L (ref 99–110)
CO2 SERPL-SCNC: 26 MMOL/L (ref 21–32)
CREAT SERPL-MCNC: 1.2 MG/DL (ref 0.8–1.3)
DEPRECATED RDW RBC AUTO: 14.9 % (ref 12.4–15.4)
EOSINOPHIL # BLD: 0.2 K/UL (ref 0–0.6)
EOSINOPHIL NFR BLD: 6 %
GFR SERPLBLD CREATININE-BSD FMLA CKD-EPI: >60 ML/MIN/{1.73_M2}
GLUCOSE SERPL-MCNC: 95 MG/DL (ref 70–99)
HCT VFR BLD AUTO: 26.1 % (ref 40.5–52.5)
HGB BLD-MCNC: 9.1 G/DL (ref 13.5–17.5)
LDH SERPL L TO P-CCNC: 183 U/L (ref 100–190)
LYMPHOCYTES # BLD: 0.6 K/UL (ref 1–5.1)
LYMPHOCYTES NFR BLD: 20.7 %
MAGNESIUM SERPL-MCNC: 1.9 MG/DL (ref 1.8–2.4)
MCH RBC QN AUTO: 32.3 PG (ref 26–34)
MCHC RBC AUTO-ENTMCNC: 35 G/DL (ref 31–36)
MCV RBC AUTO: 92.5 FL (ref 80–100)
METHOTREXATE LEVEL: 0.39 UMOL/L
MONOCYTES # BLD: 0.3 K/UL (ref 0–1.3)
MONOCYTES NFR BLD: 9.6 %
NEUTROPHILS # BLD: 1.7 K/UL (ref 1.7–7.7)
NEUTROPHILS NFR BLD: 62.8 %
PH UR STRIP.AUTO: 7.5 [PH] (ref 5–8)
PH UR STRIP.AUTO: 8 [PH] (ref 5–8)
PH UR STRIP.AUTO: 8.5 [PH] (ref 5–8)
PH UR STRIP.AUTO: 8.5 [PH] (ref 5–8)
PHOSPHATE SERPL-MCNC: 3.2 MG/DL (ref 2.5–4.9)
PLATELET # BLD AUTO: 184 K/UL (ref 135–450)
PMV BLD AUTO: 8.8 FL (ref 5–10.5)
POTASSIUM SERPL-SCNC: 3.5 MMOL/L (ref 3.5–5.1)
PROT SERPL-MCNC: 5.1 G/DL (ref 6.4–8.2)
RBC # BLD AUTO: 2.82 M/UL (ref 4.2–5.9)
SODIUM SERPL-SCNC: 142 MMOL/L (ref 136–145)
URATE SERPL-MCNC: 4.1 MG/DL (ref 3.5–7.2)
WBC # BLD AUTO: 2.7 K/UL (ref 4–11)

## 2023-09-03 PROCEDURE — 83520 IMMUNOASSAY QUANT NOS NONAB: CPT

## 2023-09-03 PROCEDURE — 84100 ASSAY OF PHOSPHORUS: CPT

## 2023-09-03 PROCEDURE — 80048 BASIC METABOLIC PNL TOTAL CA: CPT

## 2023-09-03 PROCEDURE — 2580000003 HC RX 258: Performed by: NURSE PRACTITIONER

## 2023-09-03 PROCEDURE — 2580000003 HC RX 258: Performed by: STUDENT IN AN ORGANIZED HEALTH CARE EDUCATION/TRAINING PROGRAM

## 2023-09-03 PROCEDURE — 84550 ASSAY OF BLOOD/URIC ACID: CPT

## 2023-09-03 PROCEDURE — 6360000002 HC RX W HCPCS: Performed by: NURSE PRACTITIONER

## 2023-09-03 PROCEDURE — 83986 ASSAY PH BODY FLUID NOS: CPT

## 2023-09-03 PROCEDURE — 83735 ASSAY OF MAGNESIUM: CPT

## 2023-09-03 PROCEDURE — 85025 COMPLETE CBC W/AUTO DIFF WBC: CPT

## 2023-09-03 PROCEDURE — 80076 HEPATIC FUNCTION PANEL: CPT

## 2023-09-03 PROCEDURE — 2500000003 HC RX 250 WO HCPCS: Performed by: STUDENT IN AN ORGANIZED HEALTH CARE EDUCATION/TRAINING PROGRAM

## 2023-09-03 PROCEDURE — 6370000000 HC RX 637 (ALT 250 FOR IP): Performed by: PHYSICIAN ASSISTANT

## 2023-09-03 PROCEDURE — 2060000000 HC ICU INTERMEDIATE R&B

## 2023-09-03 PROCEDURE — 83615 LACTATE (LD) (LDH) ENZYME: CPT

## 2023-09-03 PROCEDURE — 6370000000 HC RX 637 (ALT 250 FOR IP): Performed by: STUDENT IN AN ORGANIZED HEALTH CARE EDUCATION/TRAINING PROGRAM

## 2023-09-03 PROCEDURE — 6370000000 HC RX 637 (ALT 250 FOR IP): Performed by: NURSE PRACTITIONER

## 2023-09-03 PROCEDURE — 36591 DRAW BLOOD OFF VENOUS DEVICE: CPT

## 2023-09-03 RX ORDER — LANOLIN ALCOHOL/MO/W.PET/CERES
400 CREAM (GRAM) TOPICAL 2 TIMES DAILY
Status: DISCONTINUED | OUTPATIENT
Start: 2023-09-03 | End: 2023-09-04 | Stop reason: HOSPADM

## 2023-09-03 RX ADMIN — POTASSIUM CHLORIDE 20 MEQ: 1500 TABLET, EXTENDED RELEASE ORAL at 10:03

## 2023-09-03 RX ADMIN — POTASSIUM CHLORIDE 20 MEQ: 29.8 INJECTION, SOLUTION INTRAVENOUS at 06:30

## 2023-09-03 RX ADMIN — POTASSIUM CHLORIDE 20 MEQ: 29.8 INJECTION, SOLUTION INTRAVENOUS at 05:18

## 2023-09-03 RX ADMIN — LEUCOVORIN CALCIUM 25 MG: 25 TABLET ORAL at 01:17

## 2023-09-03 RX ADMIN — SODIUM CHLORIDE, PRESERVATIVE FREE 10 ML: 5 INJECTION INTRAVENOUS at 20:48

## 2023-09-03 RX ADMIN — TAMSULOSIN HYDROCHLORIDE 0.4 MG: 0.4 CAPSULE ORAL at 10:03

## 2023-09-03 RX ADMIN — MULTIPLE VITAMINS W/ MINERALS TAB 1 TABLET: TAB at 10:03

## 2023-09-03 RX ADMIN — FAMOTIDINE 20 MG: 20 TABLET ORAL at 20:42

## 2023-09-03 RX ADMIN — LEUCOVORIN CALCIUM 25 MG: 25 TABLET ORAL at 18:30

## 2023-09-03 RX ADMIN — LEUCOVORIN CALCIUM 25 MG: 25 TABLET ORAL at 13:25

## 2023-09-03 RX ADMIN — POLYETHYLENE GLYCOL 3350 17 G: 17 POWDER, FOR SOLUTION ORAL at 10:03

## 2023-09-03 RX ADMIN — LEVETIRACETAM 500 MG: 500 TABLET, FILM COATED ORAL at 10:03

## 2023-09-03 RX ADMIN — FAMOTIDINE 20 MG: 20 TABLET ORAL at 10:03

## 2023-09-03 RX ADMIN — ENOXAPARIN SODIUM 40 MG: 100 INJECTION SUBCUTANEOUS at 18:29

## 2023-09-03 RX ADMIN — Medication 400 MG: at 20:42

## 2023-09-03 RX ADMIN — SODIUM BICARBONATE: 84 INJECTION, SOLUTION INTRAVENOUS at 12:07

## 2023-09-03 RX ADMIN — SODIUM CHLORIDE, PRESERVATIVE FREE 10 ML: 5 INJECTION INTRAVENOUS at 10:08

## 2023-09-03 RX ADMIN — Medication 400 MG: at 10:03

## 2023-09-03 RX ADMIN — LEVETIRACETAM 500 MG: 500 TABLET, FILM COATED ORAL at 20:42

## 2023-09-03 RX ADMIN — SODIUM BICARBONATE: 84 INJECTION, SOLUTION INTRAVENOUS at 05:13

## 2023-09-03 RX ADMIN — SODIUM BICARBONATE: 84 INJECTION, SOLUTION INTRAVENOUS at 20:45

## 2023-09-03 RX ADMIN — SERTRALINE HYDROCHLORIDE 50 MG: 50 TABLET ORAL at 10:03

## 2023-09-03 RX ADMIN — LEUCOVORIN CALCIUM 25 MG: 25 TABLET ORAL at 06:50

## 2023-09-03 NOTE — PLAN OF CARE
Problem: Safety - Adult  Goal: Free from fall injury  Outcome: Progressing  Free From Fall Injury:   Instruct family/caregiver on patient safety   Based on caregiver fall risk screen, instruct family/caregiver to ask for assistance with transferring infant if caregiver noted to have fall risk factors  Note: Orthostatic vital signs obtained at start of shift - see flowsheet for details. Pt does not meet criteria for orthostasis. Pt is a Medium fall risk. See Williams Phi Fall Score and ABCDS Injury Risk assessments. - Screening for Orthostasis AND not a [place] Risk per STEPHENS/ABCDS: Pt bed is in low position, side rails up, call light and belongings are in reach. Fall risk light is on outside pts room. Pt encouraged to call for assistance as needed. Will continue with hourly rounds for PO intake, pain needs, toileting and repositioning as needed. Problem: Hematologic - Adult  Goal: Maintains hematologic stability  Outcome: Progressing  Maintains hematologic stability:   Assess for signs and symptoms of bleeding or hemorrhage   Monitor labs for bleeding or clotting disorders   Administer blood products/factors as ordered  Note: Patient's hemoglobin this AM:   Recent Labs     09/03/23  0349   HGB 9.1*     Patient's platelet count this AM:   Recent Labs     09/03/23  0349       Thrombocytopenia not present at this time. Patient showing no signs or symptoms of active bleeding. Transfusion not indicated at this time. Patient verbalizes understanding of all instructions. Will continue to assess and implement POC. Call light within reach and hourly rounding in place. Problem: Skin/Tissue Integrity  Goal: Absence of new skin breakdown  Description: 1. Monitor for areas of redness and/or skin breakdown  2. Assess vascular access sites hourly  3. Every 4-6 hours minimum:  Change oxygen saturation probe site  4.   Every 4-6 hours:  If on nasal continuous positive airway pressure, respiratory

## 2023-09-03 NOTE — PLAN OF CARE
Problem: Safety - Adult  Goal: Free from fall injury  9/3/2023 1718 by Cheryl Mtz RN  Outcome: Progressing  Note: Orthostatic vital signs obtained at start of shift - see flowsheet for details. Pt does not meet criteria for orthostasis. Pt is a Low fall risk. See Annelle Sacks Fall Score and ABCDS Injury Risk assessments. - Screening for Orthostasis AND not a [place] Risk per STEPHENS/ABCDS: Pt bed is in low position, side rails up, call light and belongings are in reach. Pt encouraged to call for assistance as needed. Will continue with hourly rounds for PO intake, pain needs, toileting and repositioning as needed. Problem: ABCDS Injury Assessment  Goal: Absence of physical injury  Outcome: Progressing  Note: No new physical injury observed or reported. Pt educated to notify RN if injury occurs. Problem: Skin/Tissue Integrity  Goal: Absence of new skin breakdown  Description: 1. Monitor for areas of redness and/or skin breakdown  2. Assess vascular access sites hourly  3. Every 4-6 hours minimum:  Change oxygen saturation probe site  4. Every 4-6 hours:  If on nasal continuous positive airway pressure, respiratory therapy assess nares and determine need for appliance change or resting period. 9/3/2023 1718 by Cheryl Mtz RN  Outcome: Progressing  Note: Pt up independently and repositions self in bed. Pt continent of bowel and bladder. No new skin breakdown observed or reported. Problem: Hematologic - Adult  Goal: Maintains hematologic stability  9/3/2023 1718 by Cheryl Mtz RN  Outcome: Progressing  Note: Patient's hemoglobin this AM:   Recent Labs     09/03/23  0349   HGB 9.1*     Patient's platelet count this AM:   Recent Labs     09/03/23  0349       Thrombocytopenia not present at this time. Patient showing no signs or symptoms of active bleeding. Transfusion not indicated at this time. Patient verbalizes understanding of all instructions.  Will continue to assess and implement POC. Call light within reach and hourly rounding in place.

## 2023-09-04 ENCOUNTER — APPOINTMENT (OUTPATIENT)
Dept: GENERAL RADIOLOGY | Age: 68
DRG: 847 | End: 2023-09-04
Attending: STUDENT IN AN ORGANIZED HEALTH CARE EDUCATION/TRAINING PROGRAM
Payer: MEDICARE

## 2023-09-04 VITALS
DIASTOLIC BLOOD PRESSURE: 88 MMHG | OXYGEN SATURATION: 100 % | SYSTOLIC BLOOD PRESSURE: 154 MMHG | HEIGHT: 70 IN | HEART RATE: 78 BPM | RESPIRATION RATE: 18 BRPM | WEIGHT: 162.4 LBS | TEMPERATURE: 96.8 F | BODY MASS INDEX: 23.25 KG/M2

## 2023-09-04 LAB
ALBUMIN SERPL-MCNC: 3.2 G/DL (ref 3.4–5)
ALP SERPL-CCNC: 52 U/L (ref 40–129)
ALT SERPL-CCNC: 65 U/L (ref 10–40)
ANION GAP SERPL CALCULATED.3IONS-SCNC: 7 MMOL/L (ref 3–16)
APTT BLD: 25.1 SEC (ref 22.7–35.9)
AST SERPL-CCNC: 29 U/L (ref 15–37)
BASOPHILS # BLD: 0 K/UL (ref 0–0.2)
BASOPHILS NFR BLD: 1.2 %
BILIRUB DIRECT SERPL-MCNC: <0.2 MG/DL (ref 0–0.3)
BILIRUB INDIRECT SERPL-MCNC: ABNORMAL MG/DL (ref 0–1)
BILIRUB SERPL-MCNC: 0.5 MG/DL (ref 0–1)
BILIRUB UR QL STRIP.AUTO: NEGATIVE
BUN SERPL-MCNC: 13 MG/DL (ref 7–20)
CALCIUM SERPL-MCNC: 8.6 MG/DL (ref 8.3–10.6)
CHLORIDE SERPL-SCNC: 106 MMOL/L (ref 99–110)
CLARITY UR: CLEAR
CO2 SERPL-SCNC: 29 MMOL/L (ref 21–32)
COLOR UR: YELLOW
CREAT SERPL-MCNC: 0.9 MG/DL (ref 0.8–1.3)
DEPRECATED RDW RBC AUTO: 14.7 % (ref 12.4–15.4)
EOSINOPHIL # BLD: 0.3 K/UL (ref 0–0.6)
EOSINOPHIL NFR BLD: 10.1 %
GFR SERPLBLD CREATININE-BSD FMLA CKD-EPI: >60 ML/MIN/{1.73_M2}
GLUCOSE SERPL-MCNC: 98 MG/DL (ref 70–99)
GLUCOSE UR STRIP.AUTO-MCNC: NEGATIVE MG/DL
HCT VFR BLD AUTO: 26.7 % (ref 40.5–52.5)
HGB BLD-MCNC: 9.3 G/DL (ref 13.5–17.5)
HGB UR QL STRIP.AUTO: NEGATIVE
INR PPP: 0.96 (ref 0.84–1.16)
KETONES UR STRIP.AUTO-MCNC: NEGATIVE MG/DL
LDH SERPL L TO P-CCNC: 179 U/L (ref 100–190)
LEUKOCYTE ESTERASE UR QL STRIP.AUTO: NEGATIVE
LYMPHOCYTES # BLD: 0.5 K/UL (ref 1–5.1)
LYMPHOCYTES NFR BLD: 20.5 %
MAGNESIUM SERPL-MCNC: 2 MG/DL (ref 1.8–2.4)
MCH RBC QN AUTO: 32.6 PG (ref 26–34)
MCHC RBC AUTO-ENTMCNC: 35 G/DL (ref 31–36)
MCV RBC AUTO: 93.1 FL (ref 80–100)
METHOTREXATE LEVEL: 0.1 UMOL/L
METHOTREXATE LEVEL: 0.13 UMOL/L
MONOCYTES # BLD: 0.1 K/UL (ref 0–1.3)
MONOCYTES NFR BLD: 5.3 %
NEUTROPHILS # BLD: 1.6 K/UL (ref 1.7–7.7)
NEUTROPHILS NFR BLD: 62.9 %
NITRITE UR QL STRIP.AUTO: NEGATIVE
PH UR STRIP.AUTO: 7.5 [PH] (ref 5–8)
PH UR STRIP.AUTO: 8 [PH] (ref 5–8)
PHOSPHATE SERPL-MCNC: 3.3 MG/DL (ref 2.5–4.9)
PLATELET # BLD AUTO: 200 K/UL (ref 135–450)
PMV BLD AUTO: 8.9 FL (ref 5–10.5)
POTASSIUM SERPL-SCNC: 3.5 MMOL/L (ref 3.5–5.1)
PROT SERPL-MCNC: 5.1 G/DL (ref 6.4–8.2)
PROT UR STRIP.AUTO-MCNC: NEGATIVE MG/DL
PROTHROMBIN TIME: 12.8 SEC (ref 11.5–14.8)
RBC # BLD AUTO: 2.87 M/UL (ref 4.2–5.9)
SODIUM SERPL-SCNC: 142 MMOL/L (ref 136–145)
SP GR UR STRIP.AUTO: 1.01 (ref 1–1.03)
UA DIPSTICK W REFLEX MICRO PNL UR: NORMAL
URATE SERPL-MCNC: 3 MG/DL (ref 3.5–7.2)
URN SPEC COLLECT METH UR: NORMAL
UROBILINOGEN UR STRIP-ACNC: 0.2 E.U./DL
WBC # BLD AUTO: 2.5 K/UL (ref 4–11)

## 2023-09-04 PROCEDURE — 81003 URINALYSIS AUTO W/O SCOPE: CPT

## 2023-09-04 PROCEDURE — 6370000000 HC RX 637 (ALT 250 FOR IP): Performed by: NURSE PRACTITIONER

## 2023-09-04 PROCEDURE — 84550 ASSAY OF BLOOD/URIC ACID: CPT

## 2023-09-04 PROCEDURE — 85610 PROTHROMBIN TIME: CPT

## 2023-09-04 PROCEDURE — 83735 ASSAY OF MAGNESIUM: CPT

## 2023-09-04 PROCEDURE — 80076 HEPATIC FUNCTION PANEL: CPT

## 2023-09-04 PROCEDURE — 80048 BASIC METABOLIC PNL TOTAL CA: CPT

## 2023-09-04 PROCEDURE — 83615 LACTATE (LD) (LDH) ENZYME: CPT

## 2023-09-04 PROCEDURE — 36592 COLLECT BLOOD FROM PICC: CPT

## 2023-09-04 PROCEDURE — 2500000003 HC RX 250 WO HCPCS: Performed by: STUDENT IN AN ORGANIZED HEALTH CARE EDUCATION/TRAINING PROGRAM

## 2023-09-04 PROCEDURE — 6360000002 HC RX W HCPCS: Performed by: NURSE PRACTITIONER

## 2023-09-04 PROCEDURE — 36591 DRAW BLOOD OFF VENOUS DEVICE: CPT

## 2023-09-04 PROCEDURE — 71045 X-RAY EXAM CHEST 1 VIEW: CPT

## 2023-09-04 PROCEDURE — 85025 COMPLETE CBC W/AUTO DIFF WBC: CPT

## 2023-09-04 PROCEDURE — 2580000003 HC RX 258: Performed by: NURSE PRACTITIONER

## 2023-09-04 PROCEDURE — 83986 ASSAY PH BODY FLUID NOS: CPT

## 2023-09-04 PROCEDURE — 84100 ASSAY OF PHOSPHORUS: CPT

## 2023-09-04 PROCEDURE — 83520 IMMUNOASSAY QUANT NOS NONAB: CPT

## 2023-09-04 PROCEDURE — 6370000000 HC RX 637 (ALT 250 FOR IP): Performed by: STUDENT IN AN ORGANIZED HEALTH CARE EDUCATION/TRAINING PROGRAM

## 2023-09-04 PROCEDURE — 6370000000 HC RX 637 (ALT 250 FOR IP): Performed by: PHYSICIAN ASSISTANT

## 2023-09-04 PROCEDURE — 2580000003 HC RX 258: Performed by: STUDENT IN AN ORGANIZED HEALTH CARE EDUCATION/TRAINING PROGRAM

## 2023-09-04 PROCEDURE — 85730 THROMBOPLASTIN TIME PARTIAL: CPT

## 2023-09-04 RX ORDER — SENNA AND DOCUSATE SODIUM 50; 8.6 MG/1; MG/1
2 TABLET, FILM COATED ORAL 2 TIMES DAILY PRN
Status: ON HOLD | COMMUNITY
Start: 2023-09-04

## 2023-09-04 RX ORDER — HEPARIN 100 UNIT/ML
500 SYRINGE INTRAVENOUS
Status: DISCONTINUED | OUTPATIENT
Start: 2023-09-04 | End: 2023-09-04 | Stop reason: HOSPADM

## 2023-09-04 RX ADMIN — POTASSIUM CHLORIDE 20 MEQ: 29.8 INJECTION, SOLUTION INTRAVENOUS at 06:56

## 2023-09-04 RX ADMIN — SODIUM CHLORIDE, PRESERVATIVE FREE 10 ML: 5 INJECTION INTRAVENOUS at 10:07

## 2023-09-04 RX ADMIN — Medication 400 MG: at 08:19

## 2023-09-04 RX ADMIN — FAMOTIDINE 20 MG: 20 TABLET ORAL at 08:19

## 2023-09-04 RX ADMIN — LEUCOVORIN CALCIUM 25 MG: 25 TABLET ORAL at 13:15

## 2023-09-04 RX ADMIN — LEUCOVORIN CALCIUM 25 MG: 25 TABLET ORAL at 01:15

## 2023-09-04 RX ADMIN — POTASSIUM CHLORIDE 20 MEQ: 29.8 INJECTION, SOLUTION INTRAVENOUS at 08:18

## 2023-09-04 RX ADMIN — LEVETIRACETAM 500 MG: 500 TABLET, FILM COATED ORAL at 08:18

## 2023-09-04 RX ADMIN — SODIUM BICARBONATE: 84 INJECTION, SOLUTION INTRAVENOUS at 10:07

## 2023-09-04 RX ADMIN — POTASSIUM CHLORIDE 20 MEQ: 1500 TABLET, EXTENDED RELEASE ORAL at 08:25

## 2023-09-04 RX ADMIN — POLYETHYLENE GLYCOL 3350 17 G: 17 POWDER, FOR SOLUTION ORAL at 08:18

## 2023-09-04 RX ADMIN — LEUCOVORIN CALCIUM 25 MG: 25 TABLET ORAL at 06:56

## 2023-09-04 RX ADMIN — SODIUM BICARBONATE: 84 INJECTION, SOLUTION INTRAVENOUS at 03:42

## 2023-09-04 RX ADMIN — TAMSULOSIN HYDROCHLORIDE 0.4 MG: 0.4 CAPSULE ORAL at 08:18

## 2023-09-04 RX ADMIN — MULTIPLE VITAMINS W/ MINERALS TAB 1 TABLET: TAB at 08:19

## 2023-09-04 RX ADMIN — SERTRALINE HYDROCHLORIDE 50 MG: 50 TABLET ORAL at 08:18

## 2023-09-04 NOTE — PLAN OF CARE
Problem: Safety - Adult  Goal: Free from fall injury  9/4/2023 1339 by Alexandrea Fall RN  Outcome: Completed   Orthostatic vital signs obtained at start of shift - see flowsheet for details. Pt does not meet criteria for orthostasis. Pt is a Low fall risk. See Anjana Christiansen Fall Score and ABCDS Injury Risk assessments. Problem: Hematologic - Adult  Goal: Maintains hematologic stability  9/4/2023 1339 by Alexandrea Fall RN  Outcome: Completed   Patient's hemoglobin this AM:   Recent Labs     09/04/23  0400   HGB 9.3*     Patient's platelet count this AM:   Recent Labs     09/04/23  0400       Thrombocytopenia not present at this time. Patient showing no signs or symptoms of active bleeding. Transfusion not indicated at this time. Patient verbalizes understanding of all instructions. Will continue to assess and implement POC. Call light within reach and hourly rounding in place.

## 2023-09-04 NOTE — CARE COORDINATION
09/04/23 1341   IMM Letter   IMM Letter given to Patient/Family/Significant other/Guardian/POA/by: Juan Kirk RN   IMM Letter date given: 09/04/23   IMM Letter time given: 5       Mr. Martin Hobbs is in agreement with a discharge within the 4 hour window of presentation of the 2nd IMM. The plan is for Mr. Ever Bartholomew to return home today. Family will provide transportation.

## 2023-09-04 NOTE — PLAN OF CARE
Problem: Safety - Adult  Goal: Free from fall injury  Outcome: Progressing  Free From Fall Injury:   Instruct family/caregiver on patient safety   Based on caregiver fall risk screen, instruct family/caregiver to ask for assistance with transferring infant if caregiver noted to have fall risk factors  Note: Orthostatic vital signs obtained at start of shift - see flowsheet for details. Pt does not meet criteria for orthostasis. Pt is a Low fall risk. See Castro Plump Fall Score and ABCDS Injury Risk assessments. - Screening for Orthostasis AND not a [place] Risk per STEPHENS/ABCDS: Pt bed is in low position, side rails up, call light and belongings are in reach. Fall risk light is on outside pts room. Pt encouraged to call for assistance as needed. Will continue with hourly rounds for PO intake, pain needs, toileting and repositioning as needed. Problem: Hematologic - Adult  Goal: Maintains hematologic stability  Outcome: Progressing  Maintains hematologic stability:   Assess for signs and symptoms of bleeding or hemorrhage   Monitor labs for bleeding or clotting disorders   Administer blood products/factors as ordered  Note: Patient's hemoglobin this AM:   Recent Labs     09/04/23  0400   HGB 9.3*     Patient's platelet count this AM:   Recent Labs     09/04/23  0400       Thrombocytopenia not present at this time. Patient showing no signs or symptoms of active bleeding. Transfusion not indicated at this time. Patient verbalizes understanding of all instructions. Will continue to assess and implement POC. Call light within reach and hourly rounding in place. Problem: Skin/Tissue Integrity  Goal: Absence of new skin breakdown  Description: 1. Monitor for areas of redness and/or skin breakdown  2. Assess vascular access sites hourly  3. Every 4-6 hours minimum:  Change oxygen saturation probe site  4.   Every 4-6 hours:  If on nasal continuous positive airway pressure, respiratory therapy

## 2023-09-04 NOTE — PROGRESS NOTES
Reviewed discharge instructions with patient and  spouse. Reviewed discharge medications including dosing, schedule, indication, and adverse reactions. Reviewed which medications were already taken today and next dosage due for each medication. Patient verbalized understanding of all instructions and questions were answered to his. satisfaction. Signed discharge instructions were given to the patient and a copy placed in the paper-lite chart. Patient discharged to home per self with spouse. Port heparin locked.      Elliott Grover RN

## 2023-09-04 NOTE — DISCHARGE SUMMARY
[facility] Discharge Summary             Attending Physician: Leon Joy DO    Referring MD: Leon Joy DO  [address]  [address] [place] [address],  [address] [place] [address]    Name: Brandie Berkowitz :  1955  MRN:  [id number]    Admission: 2023   Discharge:  23     Date: 2023    Diagnosis on admit: DLBC NHL w/ CNS relapse      Medications:      Medication List        START taking these medications      sennosides-docusate sodium 8.6-50 MG tablet  Commonly known as: SENOKOT-S  Take 2 tablets by mouth 2 times daily as needed for Constipation            CONTINUE taking these medications      famotidine 20 MG tablet  Commonly known as: PEPCID  Take 1 tablet by mouth 2 times daily     levETIRAcetam 500 MG tablet  Commonly known as: KEPPRA  Take 1 tablet by mouth 2 times daily     magnesium oxide 400 (240 Mg) MG tablet  Commonly known as: MAG-OX  Take 1 tablet by mouth daily     polyethylene glycol 17 g packet  Commonly known as: GLYCOLAX  Take 1 packet by mouth daily as needed for Constipation     potassium chloride 20 MEQ extended release tablet  Commonly known as: KLOR-CON M  Take 1 tablet by mouth daily (with breakfast)     sertraline 50 MG tablet  Commonly known as: ZOLOFT  Take 1 tablet by mouth daily     tamsulosin 0.4 MG capsule  Commonly known as: FLOMAX     therapeutic multivitamin-minerals tablet  Take 1 tablet by mouth daily               Where to Get Your Medications        You can get these medications from any pharmacy    You don't need a prescription for these medications  sennosides-docusate sodium 8.6-50 MG tablet         Reason for Admission: R+MPV Cycle #5    Hospital Course:     Subhash Lopez is a 78 y/o male w/ 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 Progressed local mass effect with 4 mm left to right midline shift. The dominant measurable portion of which measures 5.5 x 2.6 cm (series 13, image 76), previously with only stippled areas of enhancement in this region  - MRI brain (8/29/23): Significant interval improvement in the irregular enhancement involving the intra-axial brain in the left hemisphere adjacent to the left lateral ventricle compared to the prior MRI from 6/28/2023. Significant interval improvement in the surrounding vasogenic edema and T2/FLAIR hyperintense signal involving left greater than right hemispheres. Currently, there is residual T2 and FLAIR hyperintense signal seen involving the left greater than right periventricular white matter regions, as well as the posterior corpus callosum. There is a small amount of residual enhancement seen adjacent to the atrium of the left lateral ventricle as described. Continued imaging follow-up is recommended     PLAN:  R+MPV  - Procabazine w/ even cycles. Repeat MRI after cycle #6     Cycle 5, Day + 6        2. Neuro:   Severe memory impairment and visual changes: r/t CNS mass & vasogenic edema has improved w/ chemotherapy, but cont to have confusion  - Cont delirium precautions  - S/p Decadron taper (7/4/23 - 7/27/23): 6mg IV q6h (7/4/23), 8mg q8h (7/6/23), 4mg q12h (7/8/23), 4mg daily (7/10/23) , 4mg BID (7/11/23, increased w/ confusion),  4mg daily (7/17/23 - 7/27/23)  - Cont Keppra BID  Headache:  d/t brain mass   - Cont Tylenol PRN    3.  ID: afebrile, but w/ scalp swelling/cellulitis (POA)  - S/p Keflex Day x 7 days (8/1/23 - 8/8/23) for scalp swelling / cellulitis   - Start

## 2023-09-05 ENCOUNTER — TELEPHONE (OUTPATIENT)
Dept: INTERNAL MEDICINE CLINIC | Age: 68
End: 2023-09-05

## 2023-09-05 NOTE — TELEPHONE ENCOUNTER
Care Transitions Initial Follow Up Call    Outreach made within 2 business days of discharge: Yes    Patient: Corey Diop Patient : 1955   MRN: [id number]  Reason for Admission: There are no discharge diagnoses documented for the most recent discharge. Discharge Date: 23       Spoke with: Pt's Wife    Discharge department/facility: [facility] Interactive Patient Contact:  Was patient able to fill all prescriptions: Yes  Was patient instructed to bring all medications to the follow-up visit: Yes  Is patient taking all medications as directed in the discharge summary?  Yes  Does patient understand their discharge instructions: Yes  Does patient have questions or concerns that need addressed prior to 7-14 day follow up office visit: no    Scheduled appointment with PCP within 7-14 days    Follow Up  Future Appointments   Date Time Provider [address] [place] [address]   2024  7:00 AM MEI Lo -  [place], [place]

## 2023-09-07 ENCOUNTER — HOSPITAL ENCOUNTER (OUTPATIENT)
Dept: ONCOLOGY | Age: 68
Setting detail: INFUSION SERIES
Discharge: HOME | End: 2023-09-07

## 2023-09-07 VITALS
TEMPERATURE: 97.9 F | WEIGHT: 163.8 LBS | RESPIRATION RATE: 16 BRPM | HEIGHT: 70 IN | SYSTOLIC BLOOD PRESSURE: 129 MMHG | BODY MASS INDEX: 23.45 KG/M2 | DIASTOLIC BLOOD PRESSURE: 71 MMHG | HEART RATE: 77 BPM

## 2023-09-07 DIAGNOSIS — C85.89 CNS LYMPHOMA (HCC): Primary | ICD-10-CM

## 2023-09-07 DIAGNOSIS — Z01.818 PRE-TRANSPLANT EVALUATION FOR STEM CELL TRANSPLANT: ICD-10-CM

## 2023-09-07 NOTE — CARE COORDINATION
I met with patient along with his wife, sister, and brother-in-law to provide education regarding potential auto stem cell transplant. Discussed evaluation process, mobilization/collection, chemo regimen, cell infusion, and recovery along with side effects and complications. Risk for infection was emphasized with strategies to prevent infection. Pt was informed that continuous caregiver will be required with no driving until cleared by physician. Provided patient with tentative calendar and discussed timeline of how care may be provided through outpatient [address] [place] [address], and scheduled [facility] appointments. Also provided with our Transplant Patient handbook and business card. Answered questions appropriately and instructed to call with further questions. Will follow up during next appointment.

## 2023-09-14 ENCOUNTER — APPOINTMENT (OUTPATIENT)
Dept: GENERAL RADIOLOGY | Age: 68
DRG: 846 | End: 2023-09-14
Attending: STUDENT IN AN ORGANIZED HEALTH CARE EDUCATION/TRAINING PROGRAM
Payer: MEDICARE

## 2023-09-14 ENCOUNTER — HOSPITAL ENCOUNTER (INPATIENT)
Age: 68
LOS: 5 days | Discharge: HOME | DRG: 846 | End: 2023-09-19
Attending: STUDENT IN AN ORGANIZED HEALTH CARE EDUCATION/TRAINING PROGRAM | Admitting: STUDENT IN AN ORGANIZED HEALTH CARE EDUCATION/TRAINING PROGRAM
Payer: MEDICARE

## 2023-09-14 LAB
BILIRUB UR QL STRIP.AUTO: NEGATIVE
CLARITY UR: CLEAR
COLOR UR: YELLOW
GLUCOSE UR STRIP.AUTO-MCNC: NEGATIVE MG/DL
HGB UR QL STRIP.AUTO: NEGATIVE
KETONES UR STRIP.AUTO-MCNC: NEGATIVE MG/DL
LEUKOCYTE ESTERASE UR QL STRIP.AUTO: NEGATIVE
NITRITE UR QL STRIP.AUTO: NEGATIVE
PH UR STRIP.AUTO: 6 [PH] (ref 5–8)
PH UR STRIP.AUTO: 8 [PH] (ref 5–8)
PROT UR STRIP.AUTO-MCNC: NEGATIVE MG/DL
SP GR UR STRIP.AUTO: 1.01 (ref 1–1.03)
UA DIPSTICK W REFLEX MICRO PNL UR: NORMAL
URN SPEC COLLECT METH UR: NORMAL
UROBILINOGEN UR STRIP-ACNC: 0.2 E.U./DL

## 2023-09-14 PROCEDURE — 3E03305 INTRODUCTION OF OTHER ANTINEOPLASTIC INTO PERIPHERAL VEIN, PERCUTANEOUS APPROACH: ICD-10-PCS | Performed by: STUDENT IN AN ORGANIZED HEALTH CARE EDUCATION/TRAINING PROGRAM

## 2023-09-14 PROCEDURE — 6370000000 HC RX 637 (ALT 250 FOR IP): Performed by: NURSE PRACTITIONER

## 2023-09-14 PROCEDURE — 81003 URINALYSIS AUTO W/O SCOPE: CPT

## 2023-09-14 PROCEDURE — 2500000003 HC RX 250 WO HCPCS: Performed by: STUDENT IN AN ORGANIZED HEALTH CARE EDUCATION/TRAINING PROGRAM

## 2023-09-14 PROCEDURE — 6360000002 HC RX W HCPCS: Performed by: STUDENT IN AN ORGANIZED HEALTH CARE EDUCATION/TRAINING PROGRAM

## 2023-09-14 PROCEDURE — 6370000000 HC RX 637 (ALT 250 FOR IP): Performed by: STUDENT IN AN ORGANIZED HEALTH CARE EDUCATION/TRAINING PROGRAM

## 2023-09-14 PROCEDURE — 93005 ELECTROCARDIOGRAM TRACING: CPT | Performed by: NURSE PRACTITIONER

## 2023-09-14 PROCEDURE — 2580000003 HC RX 258: Performed by: STUDENT IN AN ORGANIZED HEALTH CARE EDUCATION/TRAINING PROGRAM

## 2023-09-14 PROCEDURE — A4217 STERILE WATER/SALINE, 500 ML: HCPCS | Performed by: NURSE PRACTITIONER

## 2023-09-14 PROCEDURE — 6360000002 HC RX W HCPCS: Performed by: NURSE PRACTITIONER

## 2023-09-14 PROCEDURE — 94761 N-INVAS EAR/PLS OXIMETRY MLT: CPT

## 2023-09-14 PROCEDURE — 83986 ASSAY PH BODY FLUID NOS: CPT

## 2023-09-14 PROCEDURE — 71046 X-RAY EXAM CHEST 2 VIEWS: CPT

## 2023-09-14 PROCEDURE — 2060000000 HC ICU INTERMEDIATE R&B

## 2023-09-14 RX ORDER — PROCHLORPERAZINE MALEATE 10 MG
5 TABLET ORAL EVERY 4 HOURS PRN
Status: DISCONTINUED | OUTPATIENT
Start: 2023-09-14 | End: 2023-09-19 | Stop reason: HOSPADM

## 2023-09-14 RX ORDER — LEVETIRACETAM 500 MG/1
500 TABLET ORAL 2 TIMES DAILY
Status: DISCONTINUED | OUTPATIENT
Start: 2023-09-14 | End: 2023-09-19 | Stop reason: HOSPADM

## 2023-09-14 RX ORDER — SODIUM CHLORIDE 0.9 % (FLUSH) 0.9 %
5-40 SYRINGE (ML) INJECTION EVERY 12 HOURS SCHEDULED
Status: DISCONTINUED | OUTPATIENT
Start: 2023-09-14 | End: 2023-09-19 | Stop reason: HOSPADM

## 2023-09-14 RX ORDER — POTASSIUM CHLORIDE 29.8 MG/ML
20 INJECTION INTRAVENOUS PRN
Status: DISCONTINUED | OUTPATIENT
Start: 2023-09-14 | End: 2023-09-19 | Stop reason: HOSPADM

## 2023-09-14 RX ORDER — LORAZEPAM 0.5 MG/1
0.5 TABLET ORAL EVERY 4 HOURS PRN
Status: DISCONTINUED | OUTPATIENT
Start: 2023-09-14 | End: 2023-09-19 | Stop reason: HOSPADM

## 2023-09-14 RX ORDER — ONDANSETRON HYDROCHLORIDE 8 MG/1
24 TABLET, FILM COATED ORAL ONCE
Status: COMPLETED | OUTPATIENT
Start: 2023-09-14 | End: 2023-09-14

## 2023-09-14 RX ORDER — LORAZEPAM 2 MG/ML
0.5 INJECTION INTRAMUSCULAR EVERY 4 HOURS PRN
Status: DISCONTINUED | OUTPATIENT
Start: 2023-09-14 | End: 2023-09-19 | Stop reason: HOSPADM

## 2023-09-14 RX ORDER — FUROSEMIDE 10 MG/ML
40 INJECTION INTRAMUSCULAR; INTRAVENOUS EVERY 12 HOURS
Status: DISCONTINUED | OUTPATIENT
Start: 2023-09-16 | End: 2023-09-19 | Stop reason: HOSPADM

## 2023-09-14 RX ORDER — MAGNESIUM SULFATE IN WATER 40 MG/ML
4000 INJECTION, SOLUTION INTRAVENOUS PRN
Status: DISCONTINUED | OUTPATIENT
Start: 2023-09-14 | End: 2023-09-19 | Stop reason: HOSPADM

## 2023-09-14 RX ORDER — POTASSIUM CHLORIDE 20 MEQ/1
20 TABLET, EXTENDED RELEASE ORAL
Status: DISCONTINUED | OUTPATIENT
Start: 2023-09-15 | End: 2023-09-15

## 2023-09-14 RX ORDER — SODIUM CHLORIDE 9 MG/ML
500 INJECTION, SOLUTION INTRAVENOUS CONTINUOUS PRN
Status: DISCONTINUED | OUTPATIENT
Start: 2023-09-14 | End: 2023-09-19 | Stop reason: HOSPADM

## 2023-09-14 RX ORDER — ACETAMINOPHEN 650 MG/1
650 SUPPOSITORY RECTAL EVERY 6 HOURS PRN
Status: DISCONTINUED | OUTPATIENT
Start: 2023-09-14 | End: 2023-09-19 | Stop reason: HOSPADM

## 2023-09-14 RX ORDER — LEUCOVORIN CALCIUM 50 MG/5ML
25 INJECTION, POWDER, LYOPHILIZED, FOR SOLUTION INTRAMUSCULAR; INTRAVENOUS EVERY 6 HOURS
Status: DISCONTINUED | OUTPATIENT
Start: 2023-09-16 | End: 2023-09-19

## 2023-09-14 RX ORDER — PROCHLORPERAZINE EDISYLATE 5 MG/ML
5 INJECTION INTRAMUSCULAR; INTRAVENOUS EVERY 4 HOURS PRN
Status: DISCONTINUED | OUTPATIENT
Start: 2023-09-14 | End: 2023-09-19 | Stop reason: HOSPADM

## 2023-09-14 RX ORDER — DEXAMETHASONE 4 MG/1
20 TABLET ORAL ONCE
Status: COMPLETED | OUTPATIENT
Start: 2023-09-14 | End: 2023-09-14

## 2023-09-14 RX ORDER — FUROSEMIDE 10 MG/ML
20 INJECTION INTRAMUSCULAR; INTRAVENOUS PRN
Status: DISCONTINUED | OUTPATIENT
Start: 2023-09-14 | End: 2023-09-19 | Stop reason: HOSPADM

## 2023-09-14 RX ORDER — LANOLIN ALCOHOL/MO/W.PET/CERES
400 CREAM (GRAM) TOPICAL DAILY
Status: DISCONTINUED | OUTPATIENT
Start: 2023-09-15 | End: 2023-09-15

## 2023-09-14 RX ORDER — TAMSULOSIN HYDROCHLORIDE 0.4 MG/1
0.4 CAPSULE ORAL DAILY
Status: DISCONTINUED | OUTPATIENT
Start: 2023-09-15 | End: 2023-09-19 | Stop reason: HOSPADM

## 2023-09-14 RX ORDER — POLYETHYLENE GLYCOL 3350 17 G/17G
17 POWDER, FOR SOLUTION ORAL DAILY PRN
Status: DISCONTINUED | OUTPATIENT
Start: 2023-09-14 | End: 2023-09-19 | Stop reason: HOSPADM

## 2023-09-14 RX ORDER — M-VIT,TX,IRON,MINS/CALC/FOLIC 27MG-0.4MG
1 TABLET ORAL DAILY
Status: DISCONTINUED | OUTPATIENT
Start: 2023-09-15 | End: 2023-09-19 | Stop reason: HOSPADM

## 2023-09-14 RX ORDER — ACETAMINOPHEN 325 MG/1
650 TABLET ORAL EVERY 6 HOURS PRN
Status: DISCONTINUED | OUTPATIENT
Start: 2023-09-14 | End: 2023-09-19 | Stop reason: HOSPADM

## 2023-09-14 RX ORDER — SODIUM CHLORIDE 9 MG/ML
INJECTION, SOLUTION INTRAVENOUS PRN
Status: DISCONTINUED | OUTPATIENT
Start: 2023-09-14 | End: 2023-09-19 | Stop reason: HOSPADM

## 2023-09-14 RX ORDER — SODIUM CHLORIDE 0.9 % (FLUSH) 0.9 %
5-40 SYRINGE (ML) INJECTION PRN
Status: DISCONTINUED | OUTPATIENT
Start: 2023-09-14 | End: 2023-09-19 | Stop reason: HOSPADM

## 2023-09-14 RX ORDER — LEUCOVORIN CALCIUM 25 MG/1
25 TABLET ORAL EVERY 6 HOURS
Status: DISCONTINUED | OUTPATIENT
Start: 2023-09-16 | End: 2023-09-19

## 2023-09-14 RX ORDER — ENOXAPARIN SODIUM 100 MG/ML
40 INJECTION SUBCUTANEOUS EVERY EVENING
Status: DISCONTINUED | OUTPATIENT
Start: 2023-09-14 | End: 2023-09-19 | Stop reason: HOSPADM

## 2023-09-14 RX ORDER — SENNA AND DOCUSATE SODIUM 50; 8.6 MG/1; MG/1
2 TABLET, FILM COATED ORAL 2 TIMES DAILY PRN
Status: DISCONTINUED | OUTPATIENT
Start: 2023-09-14 | End: 2023-09-19 | Stop reason: HOSPADM

## 2023-09-14 RX ADMIN — DEXAMETHASONE 20 MG: 4 TABLET ORAL at 23:19

## 2023-09-14 RX ADMIN — ONDANSETRON HYDROCHLORIDE 24 MG: 8 TABLET, FILM COATED ORAL at 23:19

## 2023-09-14 RX ADMIN — LEVETIRACETAM 500 MG: 500 TABLET, FILM COATED ORAL at 21:01

## 2023-09-14 RX ADMIN — SODIUM CHLORIDE 15 ML: 900 IRRIGANT IRRIGATION at 20:48

## 2023-09-14 RX ADMIN — ENOXAPARIN SODIUM 40 MG: 100 INJECTION SUBCUTANEOUS at 17:28

## 2023-09-14 RX ADMIN — SODIUM BICARBONATE: 84 INJECTION, SOLUTION INTRAVENOUS at 16:45

## 2023-09-14 RX ADMIN — SODIUM BICARBONATE: 84 INJECTION, SOLUTION INTRAVENOUS at 21:57

## 2023-09-14 NOTE — H&P
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 His also has h/o BPH, GERD, and HTN. Following cycle #4, he had a restaging MRI brain on 23 which showed significant interval improvement in the irregular enhancement involving the intra-axial brain in the left hemisphere adjacent to the left lateral ventricle and significant interval improvement in the surrounding vasogenic edema and T2/FLAIR hyperintense signal involving left greater than right hemispheres. It did show a residual T2 and FLAIR hyperintense signal seen involving the left greater than right periventricular white matter regions, as well as the posterior corpus callosum and a small amount of residual enhancement seen adjacent to the atrium of the left lateral ventricle as described. He is now being admitted for cycle #6 of R-MPV. He will have repeat imaging and then plan for transplant. He does have appt for second opinion later this month. He is feeling well today. He continues with intermittent confusion at times. His vision is improving but has not returned to normal. He denies fever, chills, bleeding, or GI changes. He is eating and drinking well. No headaches.        Past Surgical History:   Procedure Laterality Date    BRONCHOSCOPY N/A 2021    ENDOBRONCHIAL

## 2023-09-15 LAB
ALBUMIN SERPL-MCNC: 3.7 G/DL (ref 3.4–5)
ALP SERPL-CCNC: 62 U/L (ref 40–129)
ALT SERPL-CCNC: 22 U/L (ref 10–40)
ANION GAP SERPL CALCULATED.3IONS-SCNC: 13 MMOL/L (ref 3–16)
AST SERPL-CCNC: 19 U/L (ref 15–37)
BASOPHILS # BLD: 0 K/UL (ref 0–0.2)
BASOPHILS NFR BLD: 0.1 %
BILIRUB DIRECT SERPL-MCNC: <0.2 MG/DL (ref 0–0.3)
BILIRUB INDIRECT SERPL-MCNC: ABNORMAL MG/DL (ref 0–1)
BILIRUB SERPL-MCNC: 0.4 MG/DL (ref 0–1)
BUN SERPL-MCNC: 22 MG/DL (ref 7–20)
CALCIUM SERPL-MCNC: 8.6 MG/DL (ref 8.3–10.6)
CHLORIDE SERPL-SCNC: 105 MMOL/L (ref 99–110)
CO2 SERPL-SCNC: 24 MMOL/L (ref 21–32)
CREAT SERPL-MCNC: 1.2 MG/DL (ref 0.8–1.3)
DEPRECATED RDW RBC AUTO: 15.1 % (ref 12.4–15.4)
EKG ATRIAL RATE: 67 BPM
EKG ATRIAL RATE: 74 BPM
EKG DIAGNOSIS: NORMAL
EKG DIAGNOSIS: NORMAL
EKG P AXIS: 40 DEGREES
EKG P AXIS: 71 DEGREES
EKG P-R INTERVAL: 186 MS
EKG P-R INTERVAL: 204 MS
EKG Q-T INTERVAL: 486 MS
EKG Q-T INTERVAL: 504 MS
EKG QRS DURATION: 160 MS
EKG QRS DURATION: 164 MS
EKG QTC CALCULATION (BAZETT): 532 MS
EKG QTC CALCULATION (BAZETT): 539 MS
EKG R AXIS: -73 DEGREES
EKG R AXIS: -81 DEGREES
EKG T AXIS: 86 DEGREES
EKG T AXIS: 88 DEGREES
EKG VENTRICULAR RATE: 67 BPM
EKG VENTRICULAR RATE: 74 BPM
EOSINOPHIL # BLD: 0 K/UL (ref 0–0.6)
EOSINOPHIL NFR BLD: 0.1 %
GFR SERPLBLD CREATININE-BSD FMLA CKD-EPI: >60 ML/MIN/{1.73_M2}
GLUCOSE SERPL-MCNC: 213 MG/DL (ref 70–99)
HCT VFR BLD AUTO: 29 % (ref 40.5–52.5)
HGB BLD-MCNC: 10.1 G/DL (ref 13.5–17.5)
LDH SERPL L TO P-CCNC: 195 U/L (ref 100–190)
LYMPHOCYTES # BLD: 0.2 K/UL (ref 1–5.1)
LYMPHOCYTES NFR BLD: 4.2 %
MAGNESIUM SERPL-MCNC: 1.8 MG/DL (ref 1.8–2.4)
MCH RBC QN AUTO: 32.5 PG (ref 26–34)
MCHC RBC AUTO-ENTMCNC: 34.9 G/DL (ref 31–36)
MCV RBC AUTO: 93.3 FL (ref 80–100)
MONOCYTES # BLD: 0.1 K/UL (ref 0–1.3)
MONOCYTES NFR BLD: 1.2 %
NEUTROPHILS # BLD: 4.6 K/UL (ref 1.7–7.7)
NEUTROPHILS NFR BLD: 94.4 %
PH UR STRIP.AUTO: 7 [PH] (ref 5–8)
PH UR STRIP.AUTO: 7.5 [PH] (ref 5–8)
PH UR STRIP.AUTO: 8 [PH] (ref 5–8)
PHOSPHATE SERPL-MCNC: 2.1 MG/DL (ref 2.5–4.9)
PLATELET # BLD AUTO: 225 K/UL (ref 135–450)
PMV BLD AUTO: 8.9 FL (ref 5–10.5)
POTASSIUM SERPL-SCNC: 3.9 MMOL/L (ref 3.5–5.1)
PROT SERPL-MCNC: 6 G/DL (ref 6.4–8.2)
RBC # BLD AUTO: 3.11 M/UL (ref 4.2–5.9)
SODIUM SERPL-SCNC: 142 MMOL/L (ref 136–145)
URATE SERPL-MCNC: 4 MG/DL (ref 3.5–7.2)
WBC # BLD AUTO: 4.9 K/UL (ref 4–11)

## 2023-09-15 PROCEDURE — 80048 BASIC METABOLIC PNL TOTAL CA: CPT

## 2023-09-15 PROCEDURE — 2580000003 HC RX 258: Performed by: NURSE PRACTITIONER

## 2023-09-15 PROCEDURE — 96413 CHEMO IV INFUSION 1 HR: CPT

## 2023-09-15 PROCEDURE — 93010 ELECTROCARDIOGRAM REPORT: CPT | Performed by: INTERNAL MEDICINE

## 2023-09-15 PROCEDURE — 80076 HEPATIC FUNCTION PANEL: CPT

## 2023-09-15 PROCEDURE — 2060000000 HC ICU INTERMEDIATE R&B

## 2023-09-15 PROCEDURE — 6360000002 HC RX W HCPCS: Performed by: STUDENT IN AN ORGANIZED HEALTH CARE EDUCATION/TRAINING PROGRAM

## 2023-09-15 PROCEDURE — 84100 ASSAY OF PHOSPHORUS: CPT

## 2023-09-15 PROCEDURE — 6360000002 HC RX W HCPCS: Performed by: NURSE PRACTITIONER

## 2023-09-15 PROCEDURE — 2580000003 HC RX 258: Performed by: STUDENT IN AN ORGANIZED HEALTH CARE EDUCATION/TRAINING PROGRAM

## 2023-09-15 PROCEDURE — 2500000003 HC RX 250 WO HCPCS: Performed by: STUDENT IN AN ORGANIZED HEALTH CARE EDUCATION/TRAINING PROGRAM

## 2023-09-15 PROCEDURE — 83735 ASSAY OF MAGNESIUM: CPT

## 2023-09-15 PROCEDURE — 96415 CHEMO IV INFUSION ADDL HR: CPT

## 2023-09-15 PROCEDURE — 83615 LACTATE (LD) (LDH) ENZYME: CPT

## 2023-09-15 PROCEDURE — 85025 COMPLETE CBC W/AUTO DIFF WBC: CPT

## 2023-09-15 PROCEDURE — 93005 ELECTROCARDIOGRAM TRACING: CPT | Performed by: NURSE PRACTITIONER

## 2023-09-15 PROCEDURE — 83986 ASSAY PH BODY FLUID NOS: CPT

## 2023-09-15 PROCEDURE — 6370000000 HC RX 637 (ALT 250 FOR IP): Performed by: NURSE PRACTITIONER

## 2023-09-15 PROCEDURE — 84550 ASSAY OF BLOOD/URIC ACID: CPT

## 2023-09-15 RX ORDER — HYDRALAZINE HYDROCHLORIDE 20 MG/ML
10 INJECTION INTRAMUSCULAR; INTRAVENOUS EVERY 6 HOURS PRN
Status: DISCONTINUED | OUTPATIENT
Start: 2023-09-15 | End: 2023-09-19 | Stop reason: HOSPADM

## 2023-09-15 RX ORDER — POTASSIUM CHLORIDE 20 MEQ/1
20 TABLET, EXTENDED RELEASE ORAL 2 TIMES DAILY WITH MEALS
Status: DISCONTINUED | OUTPATIENT
Start: 2023-09-15 | End: 2023-09-18

## 2023-09-15 RX ORDER — LANOLIN ALCOHOL/MO/W.PET/CERES
400 CREAM (GRAM) TOPICAL 2 TIMES DAILY
Status: DISCONTINUED | OUTPATIENT
Start: 2023-09-15 | End: 2023-09-19 | Stop reason: HOSPADM

## 2023-09-15 RX ADMIN — SODIUM BICARBONATE: 84 INJECTION, SOLUTION INTRAVENOUS at 20:18

## 2023-09-15 RX ADMIN — Medication 400 MG: at 21:34

## 2023-09-15 RX ADMIN — VINCRISTINE SULFATE 2.6 MG: 1 INJECTION, SOLUTION INTRAVENOUS at 00:33

## 2023-09-15 RX ADMIN — TAMSULOSIN HYDROCHLORIDE 0.4 MG: 0.4 CAPSULE ORAL at 08:17

## 2023-09-15 RX ADMIN — SODIUM CHLORIDE 15 ML: 900 IRRIGANT IRRIGATION at 08:20

## 2023-09-15 RX ADMIN — Medication 400 MG: at 08:17

## 2023-09-15 RX ADMIN — SERTRALINE HYDROCHLORIDE 50 MG: 50 TABLET ORAL at 11:12

## 2023-09-15 RX ADMIN — SODIUM CHLORIDE 15 ML: 900 IRRIGANT IRRIGATION at 21:35

## 2023-09-15 RX ADMIN — SODIUM CHLORIDE, PRESERVATIVE FREE 10 ML: 5 INJECTION INTRAVENOUS at 08:18

## 2023-09-15 RX ADMIN — SODIUM BICARBONATE: 84 INJECTION, SOLUTION INTRAVENOUS at 07:01

## 2023-09-15 RX ADMIN — SODIUM BICARBONATE: 84 INJECTION, SOLUTION INTRAVENOUS at 13:38

## 2023-09-15 RX ADMIN — POTASSIUM CHLORIDE 20 MEQ: 1500 TABLET, EXTENDED RELEASE ORAL at 17:39

## 2023-09-15 RX ADMIN — MULTIPLE VITAMINS W/ MINERALS TAB 1 TABLET: TAB at 08:17

## 2023-09-15 RX ADMIN — SODIUM CHLORIDE, PRESERVATIVE FREE 10 ML: 5 INJECTION INTRAVENOUS at 00:17

## 2023-09-15 RX ADMIN — SODIUM CHLORIDE, PRESERVATIVE FREE 10 ML: 5 INJECTION INTRAVENOUS at 21:34

## 2023-09-15 RX ADMIN — ENOXAPARIN SODIUM 40 MG: 100 INJECTION SUBCUTANEOUS at 17:39

## 2023-09-15 RX ADMIN — METHOTREXATE 6600 MG: 25 INJECTION, SOLUTION INTRA-ARTERIAL; INTRAMUSCULAR; INTRATHECAL; INTRAVENOUS at 00:57

## 2023-09-15 RX ADMIN — LEVETIRACETAM 500 MG: 500 TABLET, FILM COATED ORAL at 08:17

## 2023-09-15 RX ADMIN — POTASSIUM CHLORIDE 20 MEQ: 1500 TABLET, EXTENDED RELEASE ORAL at 08:17

## 2023-09-15 RX ADMIN — LEVETIRACETAM 500 MG: 500 TABLET, FILM COATED ORAL at 21:34

## 2023-09-15 NOTE — ONCOLOGY
Original chemotherapy orders reviewed and acknowledged. Appropriateness of chemotherapy treatment regimen R-MVP for diagnosis of DLBC was verified. Patient educated on chemotherapy regimen. Acknowledgement of informed consent for chemotherapy obtained. Estimated body surface area is 1.9 meters squared as calculated from the following:    Height as of this encounter: 5' 9.69\" (1.77 m). Weight as of this encounter: 161 lb 6.4 oz (73.2 kg). verified. Appropriate dosing calculations of chemotherapy based on above height, weight, and BSA verified. Administration: Chemotherapy drug Vincristine and Methotrexate independently verified with Rachael Kirk RN prior to administration. Acknowledgement of informed consent for chemotherapy administration verified. Original order, appropriateness of regimen, drug supplied, height, weight, BSA, dose calculations, expiration dates/times, drug appearance, and two patient identifiers were verified by both RNs. Drug checked for vesicant/irritant status and for risk of hypersensitivity. Most recent laboratory values and allergies, were reviewed. Positive, brisk blood return via CVC was confirmed prior to administration. Chest x-ray for correct line placement reviewed. Anali Wahl RN and Rachael Kirk RN verified correct rate of chemotherapy and maintenance IV fluids. Patient was educated on chemotherapy regimen prior to administration including indication for treatment related to disease & side effects of chemotherapy drug. Patient verbalizes understanding of all instructions. Completion of Chemotherapy: Monitoring during infusion done per policy, see Flowsheets. Blood return verified before, during, and after infusion per policy; no signs of extravasation. Pt tolerated chemotherapy well and without incident. Chemotherapy infusion end time on the [facility]

## 2023-09-15 NOTE — PLAN OF CARE
Problem: Hematologic - Adult  Goal: Maintains hematologic stability  Outcome: Progressing  Note: Patient's hemoglobin this AM:   Recent Labs     09/15/23  0310   HGB 10.1*     Patient's platelet count this AM:   Recent Labs     09/15/23  0310        Transfusion not indicated at this time. Problem: Infection - Adult  Goal: Absence of infection at discharge  Outcome: Progressing  Note: Patient remains afebrile. Problem: Pain  Goal: Verbalizes/displays adequate comfort level or baseline comfort level  Outcome: Progressing  Note: Patient denies pain this shift. Problem: Safety - Adult  Goal: Free from fall injury  Outcome: Progressing  Note: All safety precautions in place. CLWR. Patient demonstrates appropriate use of call light. Problem: ABCDS Injury Assessment  Goal: Absence of physical injury  Outcome: Progressing  Note: Orthostatic vital signs obtained at start of shift - see flowsheet for details. Pt does not meet criteria for orthostasis. Pt is a Med fall risk. See Lorri Newman Fall Score and ABCDS Injury Risk assessments. - Screening for Orthostasis AND not a [place] Risk per STEPHENS/ABCDS: Pt bed is in low position, side rails up, call light and belongings are in reach. Fall risk light is on outside pts room. Pt encouraged to call for assistance as needed. Will continue with hourly rounds for PO intake, pain needs, toileting and repositioning as needed.

## 2023-09-15 NOTE — PROGRESS NOTES
[facility] Progress Note    9/15/2023     Annalisa Manzano    MRN: [id number]    : 1955    Referring MD: Cassandra cMkeon, [address] [place] [address]      SUBJECTIVE:  he is doing ok. No issues.  Tolerating chemo well    ECOG PS:  (1) Restricted in physically strenuous activity, ambulatory and able to do work of light nature    KPS: 80% Normal activity with effort; some signs or symptoms of disease      Isolation: None    Medications    Scheduled Meds:   [START ON 2023] furosemide  40 mg IntraVENous Q12H    [START ON 2023] leucovorin calcium  25 mg Oral Q6H    Or    [START ON 2023] leucovorin calcium  25 mg IntraVENous Q6H    procarbazine  200 mg Oral Q24H    levETIRAcetam  500 mg Oral BID    magnesium oxide  400 mg Oral Daily    therapeutic multivitamin-minerals  1 tablet Oral Daily    potassium chloride  20 mEq Oral Daily with breakfast    sertraline  50 mg Oral Daily    tamsulosin  0.4 mg Oral Daily    sodium chloride flush  5-40 mL IntraVENous 2 times per day    enoxaparin  40 mg SubCUTAneous QPM    Saline Mouthwash  15 mL Swish & Spit 4x Daily AC & HS     Continuous Infusions:   sodium bicarbonate 50 mEq in dextrose 5 % and 0.45 % NaCl 1,000 mL infusion 150 mL/hr at 23 2157    sodium chloride      sodium chloride       PRN Meds:.potassium phosphate 20 mmol in sodium chloride 0.9 % 250 mL IVPB, furosemide, prochlorperazine **OR** prochlorperazine, LORazepam **OR** LORazepam, sodium bicarbonate, leucovorin calcium (WELLCOVORIN) 150 mg in dextrose 5 % 100 mL IVPB, polyethylene glycol, sennosides-docusate sodium, sodium chloride, sodium chloride flush, sodium chloride, potassium chloride, magnesium sulfate, magnesium hydroxide, acetaminophen **OR** acetaminophen, Saline Mouthwash, alteplase (CATHFLO) 2 mg in sterile water 2 mL injection    ROS:  As noted above, otherwise remainder of 10-point ROS negative    Physical Exam:     I&O:    Intake/Output Summary (Last 24

## 2023-09-15 NOTE — PLAN OF CARE
Problem: Safety - Adult  Goal: Free from fall injury  9/15/2023 0241 by Presley Cohen RN  Outcome: Progressing  Orthostatic vital signs obtained at start of shift - see flowsheet for details. Pt meets criteria for orthostasis. Pt is a Med fall risk. See Williams Yip Fall Score and ABCDS Injury Risk assessments. - Screening for Orthostasis AND not a [place] Risk per STEPHENS/ABCDS: Pt bed is in low position, side rails up, call light and belongings are in reach. Fall risk light is on outside pts room. Pt encouraged to call for assistance as needed. Will continue with hourly rounds for PO intake, pain needs, toileting and repositioning as needed. Problem: ABCDS Injury Assessment  Goal: Absence of physical injury  9/15/2023 0241 by Presley Cohen RN  Outcome: Progressing  Flowsheets (Taken 9/15/2023 0241)  Absence of Physical Injury: Implement safety measures based on patient assessment     Problem: Pain  Goal: Verbalizes/displays adequate comfort level or baseline comfort level  9/15/2023 0241 by Presley Cohen RN  Outcome: Progressing  Flowsheets (Taken 9/15/2023 0241)  Verbalizes/displays adequate comfort level or baseline comfort level: Assess pain using appropriate pain scale  Note: No c/o pain this shift     Problem: Infection - Adult  Goal: Absence of infection at discharge  Outcome: Progressing  CVC site remains free of signs/symptoms of infection. No drainage, edema, erythema, pain, or warmth noted at site. Dressing changes continue per protocol and on an as needed basis - see flowsheet. Problem: Metabolic/Fluid and Electrolytes - Adult  Goal: Electrolytes maintained within normal limits  Outcome: Progressing  No replacements needed this shift.       Problem: Hematologic - Adult  Goal: Maintains hematologic stability  Outcome: Progressing  Patient's hemoglobin this AM:   Recent Labs     09/15/23  0310   HGB 10.1*     Patient's platelet count this AM:   Recent Labs     09/15/23  0310

## 2023-09-15 NOTE — ACP (ADVANCE CARE PLANNING)
Advance Care Planning     General Advance Care Planning (ACP) Conversation    Date of Conversation: 9/15/2023  Conducted with: Patient with Decision Making Capacity    Healthcare Decision Maker:    Primary Decision Maker: Graciela Lozada - [phone]  Click here to complete Healthcare Decision Makers including selection of the Healthcare Decision Maker Relationship (ie \"Primary\"). Today we documented Decision Maker(s) consistent with Legal Next of Kin hierarchy.     Length of Voluntary ACP Conversation in minutes:  <16 minutes (Non-Billable)    JULY Kennedy   for [employer] and [address] [place] [address] ([address])  Office Phone: [phone] [address]: [phone]

## 2023-09-15 NOTE — PLAN OF CARE
Problem: Safety - Adult  Goal: Free from fall injury  Outcome: Progressing  Note: Orthostatic vital signs obtained at start of shift - see flowsheet for details. Pt does not meet criteria for orthostasis. Pt is a Low fall risk. See Lorrayne Hark Fall Score and ABCDS Injury Risk assessments. - Screening for Orthostasis AND not a [place] Risk per STEPHENS/ABCDS: Pt bed is in low position, side rails up, call light and belongings are in reach. Pt encouraged to call for assistance as needed. Will continue with hourly rounds for PO intake, pain needs, toileting and repositioning as needed. Problem: ABCDS Injury Assessment  Goal: Absence of physical injury  Outcome: Progressing  Note: No new observed or reported injuries. Pt educated to notify RN of new injury. Problem: Pain  Goal: Verbalizes/displays adequate comfort level or baseline comfort level  Outcome: Progressing  Note: Pt did not report pain this shift. Pt educated on importance of calling for pain meds when in pain. Pt verbalized understanding.

## 2023-09-15 NOTE — CARE COORDINATION
Case Management Assessment  Initial Evaluation    Date/Time of Evaluation: 9/15/2023 9:29 AM  Assessment Completed by: JULY Perales   for [facility]  Office Phone: [phone]  [address] [place] [address] Mobile: [phone]    If patient is discharged prior to next notation, then this note serves as note for discharge by case management. 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 Yes    Chart Reviewed: Yes      History Provided by: Patient  Patient Orientation: Alert and Oriented    Patient Cognition: Alert    Hospitalization in the last 30 days (Readmission):  Yes    If yes, Readmission Assessment in CM Navigator will be completed. Advance Directives:      Code Status: Full Code   Patient's Primary Decision Maker is: Legal Next of Kin    Primary Decision MakerDoree [facility] - [phone]    Discharge Planning:    Patient lives with: Spouse/Significant Other Type of Home: House  Primary Care Giver: Self  Patient Support Systems include: Spouse/Significant Other, Family Members   Current Financial resources: Medicare ([place] Medicare)  Current community resources: None  Current services prior to admission: Other (Comment) ([facility])            Current DME:              Type of Home Care services:  None    ADLS  Prior functional level: Independent in ADLs/IADLs  Current functional level: Independent in ADLs/IADLs    PT AM-PAC:   /24  OT AM-PAC:   /24    Family can provide assistance at DC:  Yes  Would you like Case Management to discuss the discharge plan with any other family members/significant others, and if so, who? Yes  Plans to Return to Present Housing: Yes  Other Identified Issues/Barriers to RETURNING to current housing: n/a  Potential Assistance needed at discharge: N/A            Potential DME:    Patient expects to discharge to: [address] [place] [address] for transportation at discharge:      Financial    Payor: Edinson Mendenhall / Plan: Andrae Garcia ESSENTIAL/PLUS / Product Type: *No Product type* /     Does insurance require precert for SNF: Yes    Potential assistance Purchasing Medications: No  Meds-to-Beds request:        [facility] [facility]  [address] [place] [address]  Phone: [phone] Fax: [phone]    [address] [place] [address]  [address] [place] [address]  Phone: [phone] Fax: [phone]      Notes:    Factors facilitating achievement of predicted outcomes: Family support, Friend support, Motivated, Cooperative, Pleasant, and Good insight into deficits    Barriers to discharge: Medical complications    Additional Case Management Notes: Chart review completed. Spoke with pt at bedside. Pt stated nothing has changed since last admission and he remains independent with his ADL's. He will return home when able and denied skilled home care for RN/PT/OT. PT/OT signed off. Pt was given his IMM as he has an admit order in epic.     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

## 2023-09-15 NOTE — PROGRESS NOTES
Occupational Therapy/Physical Therapy-signed off    OT/PT orders received, chart reviewed, spoke to patient/confirmed with Rn.  Pt reports he is up ad benito, indep with ADLs, functional transfers, and functional mobility. He denies feeling weak or having balance difficulties. Will sign off for OT/PT. Please re-order if patient's status declines. Plan discussed with Rn and she is in agreement.   Sam Mosher, OTR/L [facility]  Teresa Theodore, PT

## 2023-09-15 NOTE — PROGRESS NOTES
4 Eyes Skin Assessment     NAME:  Hattie Bonds  YOB: 1955  MEDICAL RECORD NUMBER:  [id number]    The patient is being assessed for  Admission    I agree that at least one RN has performed a thorough Head to Toe Skin Assessment on the patient. ALL assessment sites listed below have been assessed. Areas assessed by both nurses:    Head, Face, Ears, Shoulders, Back, Chest, Arms, Elbows, Hands, Sacrum. Buttock, Coccyx, Ischium, and Legs. Feet and Heels        Does the Patient have a Wound?  No noted wound(s)       Juan Prevention initiated by RN: No  Wound Care Orders initiated by RN: No    Pressure Injury (Stage 3,4, Unstageable, DTI, NWPT, and Complex wounds) if present, place Wound referral order by RN under : No    New Ostomies, if present place, Ostomy referral order under : No     Nurse 1 eSignature: Electronically signed by Mary Ann Patel RN on 9/14/23 at 8:29 PM EDT    **SHARE this note so that the co-signing nurse can place an eSignature**    Nurse 2 eSignature: Electronically signed by Sana Lozano RN on 9/14/23 at 8:30 PM EDT

## 2023-09-16 LAB
ALBUMIN SERPL-MCNC: 3.3 G/DL (ref 3.4–5)
ALP SERPL-CCNC: 55 U/L (ref 40–129)
ALT SERPL-CCNC: 79 U/L (ref 10–40)
ANION GAP SERPL CALCULATED.3IONS-SCNC: 9 MMOL/L (ref 3–16)
AST SERPL-CCNC: 58 U/L (ref 15–37)
BASOPHILS # BLD: 0 K/UL (ref 0–0.2)
BASOPHILS NFR BLD: 0.3 %
BILIRUB DIRECT SERPL-MCNC: <0.2 MG/DL (ref 0–0.3)
BILIRUB INDIRECT SERPL-MCNC: ABNORMAL MG/DL (ref 0–1)
BILIRUB SERPL-MCNC: 0.3 MG/DL (ref 0–1)
BUN SERPL-MCNC: 20 MG/DL (ref 7–20)
CALCIUM SERPL-MCNC: 8.1 MG/DL (ref 8.3–10.6)
CHLORIDE SERPL-SCNC: 107 MMOL/L (ref 99–110)
CO2 SERPL-SCNC: 27 MMOL/L (ref 21–32)
CREAT SERPL-MCNC: 1.2 MG/DL (ref 0.8–1.3)
DEPRECATED RDW RBC AUTO: 15.1 % (ref 12.4–15.4)
EOSINOPHIL # BLD: 0 K/UL (ref 0–0.6)
EOSINOPHIL NFR BLD: 0.3 %
GFR SERPLBLD CREATININE-BSD FMLA CKD-EPI: >60 ML/MIN/{1.73_M2}
GLUCOSE SERPL-MCNC: 123 MG/DL (ref 70–99)
HCT VFR BLD AUTO: 26.8 % (ref 40.5–52.5)
HGB BLD-MCNC: 9.3 G/DL (ref 13.5–17.5)
LYMPHOCYTES # BLD: 0.6 K/UL (ref 1–5.1)
LYMPHOCYTES NFR BLD: 8.9 %
MCH RBC QN AUTO: 31.9 PG (ref 26–34)
MCHC RBC AUTO-ENTMCNC: 34.8 G/DL (ref 31–36)
MCV RBC AUTO: 91.7 FL (ref 80–100)
METHOTREXATE LEVEL: 0.28 UMOL/L
MONOCYTES # BLD: 0.9 K/UL (ref 0–1.3)
MONOCYTES NFR BLD: 13.6 %
NEUTROPHILS # BLD: 4.9 K/UL (ref 1.7–7.7)
NEUTROPHILS NFR BLD: 76.9 %
PH UR STRIP.AUTO: 7.5 [PH] (ref 5–8)
PH UR STRIP.AUTO: 8 [PH] (ref 5–8)
PHOSPHATE SERPL-MCNC: 2.3 MG/DL (ref 2.5–4.9)
PLATELET # BLD AUTO: 210 K/UL (ref 135–450)
PMV BLD AUTO: 8.5 FL (ref 5–10.5)
POTASSIUM SERPL-SCNC: 3.3 MMOL/L (ref 3.5–5.1)
PROT SERPL-MCNC: 5.2 G/DL (ref 6.4–8.2)
RBC # BLD AUTO: 2.92 M/UL (ref 4.2–5.9)
SODIUM SERPL-SCNC: 143 MMOL/L (ref 136–145)
WBC # BLD AUTO: 6.3 K/UL (ref 4–11)

## 2023-09-16 PROCEDURE — 6360000002 HC RX W HCPCS: Performed by: NURSE PRACTITIONER

## 2023-09-16 PROCEDURE — 80048 BASIC METABOLIC PNL TOTAL CA: CPT

## 2023-09-16 PROCEDURE — 6370000000 HC RX 637 (ALT 250 FOR IP): Performed by: STUDENT IN AN ORGANIZED HEALTH CARE EDUCATION/TRAINING PROGRAM

## 2023-09-16 PROCEDURE — 83986 ASSAY PH BODY FLUID NOS: CPT

## 2023-09-16 PROCEDURE — 2580000003 HC RX 258: Performed by: STUDENT IN AN ORGANIZED HEALTH CARE EDUCATION/TRAINING PROGRAM

## 2023-09-16 PROCEDURE — 2060000000 HC ICU INTERMEDIATE R&B

## 2023-09-16 PROCEDURE — 2580000003 HC RX 258: Performed by: NURSE PRACTITIONER

## 2023-09-16 PROCEDURE — 2500000003 HC RX 250 WO HCPCS: Performed by: STUDENT IN AN ORGANIZED HEALTH CARE EDUCATION/TRAINING PROGRAM

## 2023-09-16 PROCEDURE — 36591 DRAW BLOOD OFF VENOUS DEVICE: CPT

## 2023-09-16 PROCEDURE — 80076 HEPATIC FUNCTION PANEL: CPT

## 2023-09-16 PROCEDURE — 84100 ASSAY OF PHOSPHORUS: CPT

## 2023-09-16 PROCEDURE — 85025 COMPLETE CBC W/AUTO DIFF WBC: CPT

## 2023-09-16 PROCEDURE — 83520 IMMUNOASSAY QUANT NOS NONAB: CPT

## 2023-09-16 PROCEDURE — 6370000000 HC RX 637 (ALT 250 FOR IP): Performed by: NURSE PRACTITIONER

## 2023-09-16 RX ADMIN — LEVETIRACETAM 500 MG: 500 TABLET, FILM COATED ORAL at 21:21

## 2023-09-16 RX ADMIN — POTASSIUM CHLORIDE 20 MEQ: 1500 TABLET, EXTENDED RELEASE ORAL at 18:01

## 2023-09-16 RX ADMIN — POTASSIUM CHLORIDE 20 MEQ: 400 INJECTION, SOLUTION INTRAVENOUS at 06:21

## 2023-09-16 RX ADMIN — PROCHLORPERAZINE MALEATE 5 MG: 10 TABLET ORAL at 14:54

## 2023-09-16 RX ADMIN — LEUCOVORIN CALCIUM 25 MG: 25 TABLET ORAL at 08:48

## 2023-09-16 RX ADMIN — SODIUM BICARBONATE: 84 INJECTION, SOLUTION INTRAVENOUS at 21:26

## 2023-09-16 RX ADMIN — ENOXAPARIN SODIUM 40 MG: 100 INJECTION SUBCUTANEOUS at 18:01

## 2023-09-16 RX ADMIN — SODIUM CHLORIDE 15 ML: 900 IRRIGANT IRRIGATION at 06:24

## 2023-09-16 RX ADMIN — LEUCOVORIN CALCIUM 25 MG: 25 TABLET ORAL at 03:05

## 2023-09-16 RX ADMIN — POTASSIUM CHLORIDE 20 MEQ: 400 INJECTION, SOLUTION INTRAVENOUS at 05:04

## 2023-09-16 RX ADMIN — SODIUM BICARBONATE: 84 INJECTION, SOLUTION INTRAVENOUS at 03:10

## 2023-09-16 RX ADMIN — POTASSIUM CHLORIDE 20 MEQ: 1500 TABLET, EXTENDED RELEASE ORAL at 08:48

## 2023-09-16 RX ADMIN — LEUCOVORIN CALCIUM 25 MG: 25 TABLET ORAL at 21:21

## 2023-09-16 RX ADMIN — SERTRALINE HYDROCHLORIDE 50 MG: 50 TABLET ORAL at 08:48

## 2023-09-16 RX ADMIN — Medication 400 MG: at 21:21

## 2023-09-16 RX ADMIN — TAMSULOSIN HYDROCHLORIDE 0.4 MG: 0.4 CAPSULE ORAL at 08:48

## 2023-09-16 RX ADMIN — LEUCOVORIN CALCIUM 25 MG: 25 TABLET ORAL at 14:54

## 2023-09-16 RX ADMIN — Medication 400 MG: at 08:48

## 2023-09-16 RX ADMIN — POTASSIUM CHLORIDE 20 MEQ: 400 INJECTION, SOLUTION INTRAVENOUS at 08:48

## 2023-09-16 RX ADMIN — SODIUM CHLORIDE, PRESERVATIVE FREE 10 ML: 5 INJECTION INTRAVENOUS at 08:49

## 2023-09-16 RX ADMIN — SODIUM CHLORIDE, PRESERVATIVE FREE 10 ML: 5 INJECTION INTRAVENOUS at 21:24

## 2023-09-16 RX ADMIN — LEVETIRACETAM 500 MG: 500 TABLET, FILM COATED ORAL at 08:48

## 2023-09-16 RX ADMIN — POTASSIUM CHLORIDE 20 MEQ: 400 INJECTION, SOLUTION INTRAVENOUS at 09:51

## 2023-09-16 RX ADMIN — MULTIPLE VITAMINS W/ MINERALS TAB 1 TABLET: TAB at 08:48

## 2023-09-16 NOTE — PROGRESS NOTES
[facility] Progress Note    2023     Yvrose Scott    MRN: [id number]    : 1955    Referring MD: Tosin Paige, [address] [place] [address],  [address] [place] [address]      SUBJECTIVE:  He is doing ok. No issues.  Tolerating chemo well    ECOG PS:  (1) Restricted in physically strenuous activity, ambulatory and able to do work of light nature    KPS: 80% Normal activity with effort; some signs or symptoms of disease      Isolation: None    Medications    Scheduled Meds:   potassium chloride  20 mEq Oral BID WC    magnesium oxide  400 mg Oral BID    furosemide  40 mg IntraVENous Q12H    leucovorin calcium  25 mg Oral Q6H    Or    leucovorin calcium  25 mg IntraVENous Q6H    procarbazine  200 mg Oral Q24H    levETIRAcetam  500 mg Oral BID    therapeutic multivitamin-minerals  1 tablet Oral Daily    sertraline  50 mg Oral Daily    tamsulosin  0.4 mg Oral Daily    sodium chloride flush  5-40 mL IntraVENous 2 times per day    enoxaparin  40 mg SubCUTAneous QPM    Saline Mouthwash  15 mL Swish & Spit 4x Daily AC & HS     Continuous Infusions:   sodium bicarbonate 50 mEq in dextrose 5 % and 0.45 % NaCl 1,000 mL infusion 150 mL/hr at 23 0310    sodium chloride      sodium chloride       PRN Meds:.potassium phosphate 20 mmol in sodium chloride 0.9 % 250 mL IVPB, hydrALAZINE, furosemide, prochlorperazine **OR** prochlorperazine, LORazepam **OR** LORazepam, sodium bicarbonate, leucovorin calcium (WELLCOVORIN) 150 mg in dextrose 5 % 100 mL IVPB, polyethylene glycol, sennosides-docusate sodium, sodium chloride, sodium chloride flush, sodium chloride, potassium chloride, magnesium sulfate, magnesium hydroxide, acetaminophen **OR** acetaminophen, Saline Mouthwash, alteplase (CATHFLO) 2 mg in sterile water 2 mL injection    ROS:  As noted above, otherwise remainder of 10-point ROS negative    Physical Exam:     I&O:    Intake/Output Summary (Last 24 hours) at 2023 0705  Last data filed at 2023 3802  Gross 2. Neuro:   Severe memory impairment and visual changes: r/t CNS mass & vasogenic edema has improved w/ chemotherapy, but cont to have confusion  - Cont delirium precautions  - S/p Decadron taper (7/4/23 - 7/27/23): 6mg IV q6h (7/4/23), 8mg q8h (7/6/23), 4mg q12h (7/8/23), 4mg daily (7/10/23) , 4mg BID (7/11/23, increased w/ confusion),  4mg daily (7/17/23 - 7/27/23)  - Cont Keppra BID  Headache:  d/t brain mass   - Cont Tylenol PRN    3. ID: afebrile, but w/ scalp swelling/cellulitis (POA)  - S/p Keflex Day x 7 days (8/1/23 - 8/8/23) for scalp swelling / cellulitis   - Start antimicrobial ppx if Nicholasberg <1.5     4. Heme: Counts stable, anemia from chemotherapy   - Transfuse for Hgb <7 and Plt <10  - No transfusion today     5. Metabolic: Hyperglycemia w/ stable renal fxn & hypoK+  - Cont Mag Ox 400 mg daily  - Cont KCl 20 meq daily (started 7/21/23)  - Cont IVFs:  D5.2NS + NaHCO3 @ 150 mL/hr  - Replace Mg, K+, and Phos per standing orders      6. Cardiac:   - H/o CHB w/ left SC dual chamber pacemaker and HTN  CHB:  - Dual chamber pacemaker in place  HTN:  intermittently elevated   - Cont Hydralazine PRN SBP >180   - Consider adding BP medications if remains elevated outpatient     7. :   - H/o BPH  BPH:    - Cont Flomax 0.4 mg daily      8. 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 Adenoid cystic carcinoma   - S/p resection in 4/2021 and underwent radiation     10. MSK:   Generalized weakness:  D/t CNS lymphoma - improving  - Cont PT/OT when admitted      11. GI / Nutrition:   - H/o constipation and GERD  Nutrition:  Fair appetite & intake  - Cont regular diet  - Dietitian to follow while inpatient    GERD:  - S/p PPI - holding while on chemotherapy w/ MTX  - S/p Pepcid BID- pt states he has not been taking this   Nausea:  no complaints   - Cont Compazine and Ativan as needed     12.   Psychosocial:  - H/o depressed mood  Depression:  - Cont Zoloft 50 mg daily (increased 8/21/23)  -

## 2023-09-16 NOTE — PLAN OF CARE
Problem: ABCDS Injury Assessment  Goal: Absence of physical injury  9/16/2023 1423 by Sandi Brand RN  Flowsheets (Taken 9/15/2023 0241 by Rachael Mendez RN)  Absence of Physical Injury: Implement safety measures based on patient assessment  9/16/2023 0603 by Shane Kyle RN  Outcome: Progressing  Note: Patient remained free of falls during shift. Patient is a Frey Fall Risk: Medium (25-44); uses call light appropriately, ambulates with a steady gait and no assistive devices. Orthostatic blood pressures assessed and patient remains negative. Will continue to encourage ambulation and implement POC. Call light within reach and hourly rounding in place. Problem: Pain  Goal: Verbalizes/displays adequate comfort level or baseline comfort level  9/16/2023 1423 by Sandi Brand RN  Note: Client denies pain this shift  9/16/2023 0603 by Shane Kyle RN  Outcome: Progressing  Note: Patient has no complaints of pain during shift. Will continue to assess comfort and pain on 0-10 number scale and medicate per order while encouraging non pharmacological techniques as well. Call light within reach and hourly rounding in place. Problem: Metabolic/Fluid and Electrolytes - Adult  Goal: Electrolytes maintained within normal limits  9/16/2023 0603 by Shane Kyle RN  Outcome: Not Progressing  Note: Patient required K+ replacement this am for level 3.3. Patient placed on Telemetry while replacement is in place. V Paced which is his normal rhythm.

## 2023-09-17 LAB
ALBUMIN SERPL-MCNC: 3.4 G/DL (ref 3.4–5)
ALP SERPL-CCNC: 53 U/L (ref 40–129)
ALT SERPL-CCNC: 69 U/L (ref 10–40)
ANION GAP SERPL CALCULATED.3IONS-SCNC: 9 MMOL/L (ref 3–16)
AST SERPL-CCNC: 36 U/L (ref 15–37)
BASOPHILS # BLD: 0 K/UL (ref 0–0.2)
BASOPHILS NFR BLD: 1.1 %
BILIRUB DIRECT SERPL-MCNC: <0.2 MG/DL (ref 0–0.3)
BILIRUB INDIRECT SERPL-MCNC: ABNORMAL MG/DL (ref 0–1)
BILIRUB SERPL-MCNC: 0.3 MG/DL (ref 0–1)
BUN SERPL-MCNC: 15 MG/DL (ref 7–20)
CALCIUM SERPL-MCNC: 8.3 MG/DL (ref 8.3–10.6)
CHLORIDE SERPL-SCNC: 107 MMOL/L (ref 99–110)
CO2 SERPL-SCNC: 29 MMOL/L (ref 21–32)
CREAT SERPL-MCNC: 1.3 MG/DL (ref 0.8–1.3)
DEPRECATED RDW RBC AUTO: 15.1 % (ref 12.4–15.4)
EOSINOPHIL # BLD: 0.1 K/UL (ref 0–0.6)
EOSINOPHIL NFR BLD: 4.7 %
GFR SERPLBLD CREATININE-BSD FMLA CKD-EPI: 59 ML/MIN/{1.73_M2}
GLUCOSE SERPL-MCNC: 105 MG/DL (ref 70–99)
HCT VFR BLD AUTO: 27.9 % (ref 40.5–52.5)
HGB BLD-MCNC: 9.6 G/DL (ref 13.5–17.5)
LYMPHOCYTES # BLD: 0.5 K/UL (ref 1–5.1)
LYMPHOCYTES NFR BLD: 17.1 %
MCH RBC QN AUTO: 32 PG (ref 26–34)
MCHC RBC AUTO-ENTMCNC: 34.5 G/DL (ref 31–36)
MCV RBC AUTO: 92.7 FL (ref 80–100)
METHOTREXATE LEVEL: 0.34 UMOL/L
MONOCYTES # BLD: 0.3 K/UL (ref 0–1.3)
MONOCYTES NFR BLD: 11.5 %
NEUTROPHILS # BLD: 1.8 K/UL (ref 1.7–7.7)
NEUTROPHILS NFR BLD: 65.6 %
PH UR STRIP.AUTO: 6 [PH] (ref 5–8)
PH UR STRIP.AUTO: 7.5 [PH] (ref 5–8)
PH UR STRIP.AUTO: 8 [PH] (ref 5–8)
PHOSPHATE SERPL-MCNC: 3.3 MG/DL (ref 2.5–4.9)
PLATELET # BLD AUTO: 214 K/UL (ref 135–450)
PMV BLD AUTO: 8.8 FL (ref 5–10.5)
POTASSIUM SERPL-SCNC: 3.4 MMOL/L (ref 3.5–5.1)
PROT SERPL-MCNC: 5.3 G/DL (ref 6.4–8.2)
RBC # BLD AUTO: 3.01 M/UL (ref 4.2–5.9)
SODIUM SERPL-SCNC: 145 MMOL/L (ref 136–145)
WBC # BLD AUTO: 2.8 K/UL (ref 4–11)

## 2023-09-17 PROCEDURE — 2060000000 HC ICU INTERMEDIATE R&B

## 2023-09-17 PROCEDURE — 6360000002 HC RX W HCPCS: Performed by: STUDENT IN AN ORGANIZED HEALTH CARE EDUCATION/TRAINING PROGRAM

## 2023-09-17 PROCEDURE — 6370000000 HC RX 637 (ALT 250 FOR IP): Performed by: STUDENT IN AN ORGANIZED HEALTH CARE EDUCATION/TRAINING PROGRAM

## 2023-09-17 PROCEDURE — 84100 ASSAY OF PHOSPHORUS: CPT

## 2023-09-17 PROCEDURE — 6360000002 HC RX W HCPCS: Performed by: NURSE PRACTITIONER

## 2023-09-17 PROCEDURE — 85025 COMPLETE CBC W/AUTO DIFF WBC: CPT

## 2023-09-17 PROCEDURE — 2500000003 HC RX 250 WO HCPCS: Performed by: STUDENT IN AN ORGANIZED HEALTH CARE EDUCATION/TRAINING PROGRAM

## 2023-09-17 PROCEDURE — 83520 IMMUNOASSAY QUANT NOS NONAB: CPT

## 2023-09-17 PROCEDURE — 80048 BASIC METABOLIC PNL TOTAL CA: CPT

## 2023-09-17 PROCEDURE — 2580000003 HC RX 258: Performed by: STUDENT IN AN ORGANIZED HEALTH CARE EDUCATION/TRAINING PROGRAM

## 2023-09-17 PROCEDURE — 36591 DRAW BLOOD OFF VENOUS DEVICE: CPT

## 2023-09-17 PROCEDURE — 2580000003 HC RX 258: Performed by: NURSE PRACTITIONER

## 2023-09-17 PROCEDURE — 80076 HEPATIC FUNCTION PANEL: CPT

## 2023-09-17 PROCEDURE — 6370000000 HC RX 637 (ALT 250 FOR IP): Performed by: NURSE PRACTITIONER

## 2023-09-17 PROCEDURE — 83986 ASSAY PH BODY FLUID NOS: CPT

## 2023-09-17 RX ADMIN — LEVETIRACETAM 500 MG: 500 TABLET, FILM COATED ORAL at 20:45

## 2023-09-17 RX ADMIN — SODIUM BICARBONATE: 84 INJECTION, SOLUTION INTRAVENOUS at 04:29

## 2023-09-17 RX ADMIN — SODIUM CHLORIDE, PRESERVATIVE FREE 10 ML: 5 INJECTION INTRAVENOUS at 08:02

## 2023-09-17 RX ADMIN — SODIUM BICARBONATE: 84 INJECTION, SOLUTION INTRAVENOUS at 11:46

## 2023-09-17 RX ADMIN — SODIUM BICARBONATE: 84 INJECTION, SOLUTION INTRAVENOUS at 18:14

## 2023-09-17 RX ADMIN — POTASSIUM CHLORIDE 20 MEQ: 1500 TABLET, EXTENDED RELEASE ORAL at 08:00

## 2023-09-17 RX ADMIN — POTASSIUM CHLORIDE 20 MEQ: 400 INJECTION, SOLUTION INTRAVENOUS at 06:49

## 2023-09-17 RX ADMIN — Medication 400 MG: at 08:01

## 2023-09-17 RX ADMIN — MULTIPLE VITAMINS W/ MINERALS TAB 1 TABLET: TAB at 07:59

## 2023-09-17 RX ADMIN — POTASSIUM CHLORIDE 20 MEQ: 400 INJECTION, SOLUTION INTRAVENOUS at 07:58

## 2023-09-17 RX ADMIN — SERTRALINE HYDROCHLORIDE 50 MG: 50 TABLET ORAL at 08:01

## 2023-09-17 RX ADMIN — Medication 400 MG: at 20:45

## 2023-09-17 RX ADMIN — POTASSIUM CHLORIDE 20 MEQ: 1500 TABLET, EXTENDED RELEASE ORAL at 17:04

## 2023-09-17 RX ADMIN — SODIUM CHLORIDE, PRESERVATIVE FREE 10 ML: 5 INJECTION INTRAVENOUS at 20:55

## 2023-09-17 RX ADMIN — SODIUM BICARBONATE 50 MEQ: 84 INJECTION, SOLUTION INTRAVENOUS at 20:54

## 2023-09-17 RX ADMIN — LEUCOVORIN CALCIUM 25 MG: 25 TABLET ORAL at 03:03

## 2023-09-17 RX ADMIN — TAMSULOSIN HYDROCHLORIDE 0.4 MG: 0.4 CAPSULE ORAL at 08:01

## 2023-09-17 RX ADMIN — LEUCOVORIN CALCIUM 25 MG: 25 TABLET ORAL at 15:06

## 2023-09-17 RX ADMIN — LEVETIRACETAM 500 MG: 500 TABLET, FILM COATED ORAL at 07:59

## 2023-09-17 RX ADMIN — LEUCOVORIN CALCIUM 25 MG: 25 TABLET ORAL at 20:45

## 2023-09-17 RX ADMIN — SODIUM CHLORIDE 15 ML: 900 IRRIGANT IRRIGATION at 10:41

## 2023-09-17 RX ADMIN — LEUCOVORIN CALCIUM 25 MG: 25 TABLET ORAL at 08:54

## 2023-09-17 RX ADMIN — ENOXAPARIN SODIUM 40 MG: 100 INJECTION SUBCUTANEOUS at 17:45

## 2023-09-17 RX ADMIN — POTASSIUM CHLORIDE 20 MEQ: 400 INJECTION, SOLUTION INTRAVENOUS at 09:34

## 2023-09-17 RX ADMIN — FUROSEMIDE 40 MG: 10 INJECTION, SOLUTION INTRAMUSCULAR; INTRAVENOUS at 17:52

## 2023-09-17 RX ADMIN — SODIUM CHLORIDE 15 ML: 900 IRRIGANT IRRIGATION at 15:07

## 2023-09-17 RX ADMIN — SODIUM CHLORIDE 15 ML: 900 IRRIGANT IRRIGATION at 20:55

## 2023-09-17 RX ADMIN — POTASSIUM CHLORIDE 20 MEQ: 400 INJECTION, SOLUTION INTRAVENOUS at 04:49

## 2023-09-17 NOTE — PROGRESS NOTES
[facility] Progress Note    2023     Lizett Laguna    MRN: [id number]    : 1955    Referring MD: Kia Eller, [address] [place] [address],  [address] [place] [address]      SUBJECTIVE:  He is doing ok. No issues.  Tolerating chemo well    ECOG PS:  (1) Restricted in physically strenuous activity, ambulatory and able to do work of light nature    KPS: 80% Normal activity with effort; some signs or symptoms of disease      Isolation: None    Medications    Scheduled Meds:   potassium chloride  20 mEq Oral BID WC    magnesium oxide  400 mg Oral BID    furosemide  40 mg IntraVENous Q12H    leucovorin calcium  25 mg Oral Q6H    Or    leucovorin calcium  25 mg IntraVENous Q6H    procarbazine  200 mg Oral Q24H    levETIRAcetam  500 mg Oral BID    therapeutic multivitamin-minerals  1 tablet Oral Daily    sertraline  50 mg Oral Daily    tamsulosin  0.4 mg Oral Daily    sodium chloride flush  5-40 mL IntraVENous 2 times per day    enoxaparin  40 mg SubCUTAneous QPM    Saline Mouthwash  15 mL Swish & Spit 4x Daily AC & HS     Continuous Infusions:   sodium bicarbonate 50 mEq in dextrose 5 % and 0.45 % NaCl 1,000 mL infusion 150 mL/hr at 23 0429    sodium chloride      sodium chloride       PRN Meds:.potassium phosphate 20 mmol in sodium chloride 0.9 % 250 mL IVPB, hydrALAZINE, furosemide, prochlorperazine **OR** prochlorperazine, LORazepam **OR** LORazepam, sodium bicarbonate, leucovorin calcium (WELLCOVORIN) 150 mg in dextrose 5 % 100 mL IVPB, polyethylene glycol, sennosides-docusate sodium, sodium chloride, sodium chloride flush, sodium chloride, potassium chloride, magnesium sulfate, magnesium hydroxide, acetaminophen **OR** acetaminophen, Saline Mouthwash, alteplase (CATHFLO) 2 mg in sterile water 2 mL injection    ROS:  As noted above, otherwise remainder of 10-point ROS negative    Physical Exam:     I&O:    Intake/Output Summary (Last 24 hours) at 2023 0742  Last data filed at 2023 0097  Gross

## 2023-09-17 NOTE — PLAN OF CARE
Problem: Safety - Adult  Goal: Free from fall injury  Outcome: Progressing  Free From Fall Injury:   Instruct family/caregiver on patient safety   Based on caregiver fall risk screen, instruct family/caregiver to ask for assistance with transferring infant if caregiver noted to have fall risk factors  Note: Obtained orthostatic vitals at the beginning of shift (see flowsheet for further details). Pt meets criteria for orthostasis. Pt is a high fall risk. See Magalene Karuna Fall Score and ABCDS Injury. Injury risk assessment. Problem: ABCDS Injury Assessment  Goal: Absence of physical injury  Outcome: Progressing  Absence of Physical Injury: Implement safety measures based on patient assessment      Problem: ABCDS Injury Assessment  Goal: Absence of physical injury  Outcome: Progressing  Flowsheets (Taken 9/17/2023 0557)  Absence of Physical Injury: Implement safety measures based on patient assessment      Problem: Infection - Adult  Goal: Absence of infection at discharge  Outcome: Progressing  Absence of infection at discharge:   Assess and monitor for signs and symptoms of infection   Monitor lab/diagnostic results   Monitor all insertion sites i.e., indwelling lines, tubes and drains   Monitor endotracheal (as able) and nasal secretions for changes in amount and color   Administer medications as ordered   Instruct and encourage patient and family to use good hand hygiene technique      Problem: Metabolic/Fluid and Electrolytes - Adult  Goal: Electrolytes maintained within normal limits  Outcome: Progressing  Electrolytes maintained within normal limits:   Monitor labs and assess patient for signs and symptoms of electrolyte imbalances   Monitor response to electrolyte replacements, including repeat lab results as appropriate   Instruct patient on fluid and nutrition restrictions as appropriate   Administer electrolyte replacement as ordered  Note: Pt received required K+ replacement this AM for level of 3.4.  Pt

## 2023-09-17 NOTE — PLAN OF CARE
Problem: Safety - Adult  Goal: Free from fall injury  9/17/2023 1454 by Louis Kilgore RN  Outcome: Progressing  Note: Orthostatic vital signs obtained at start of shift - see flowsheet for details. Pt does not meet criteria for orthostasis. Pt is a Low fall risk. See Williams Shaws Fall Score and ABCDS Injury Risk assessments. - Screening for Orthostasis AND not a [place] Risk per STEPHENS/ABCDS: Pt bed is in low position, side rails up, call light and belongings are in reach. Pt encouraged to call for assistance as needed. Will continue with hourly rounds for PO intake, pain needs, toileting and repositioning as needed. Problem: ABCDS Injury Assessment  Goal: Absence of physical injury  9/17/2023 1454 by Louis Kilgore RN  Outcome: Progressing  Note: No new physical injuries noted or reported. Pt educated to notify RN if injury status changes. Problem: Pain  Goal: Verbalizes/displays adequate comfort level or baseline comfort level  Outcome: Progressing  Note: Pt did not report pain this shift. Pt educated on importance of calling for pain meds when in pain. Pt verbalized understanding. Problem: Infection - Adult  Goal: Absence of infection at discharge  9/17/2023 1454 by Louis Kilgore RN  Outcome: Progressing  Note: CVC site remains free of signs/symptoms of infection. No drainage, edema, erythema, pain, or warmth noted at site. Dressing changes continue per protocol and on an as needed basis - see flowsheet. Compliant with [facility] Bath Protocol:  Performed CHG bath today per [facility] protocol utilizing CHG solution in the shower. CVC site cleansed with CHG wipe over dressing, skin surrounding dressing, and first 6\" of IV tubing. Pt tolerated well. Continued to encourage daily CHG bathing per [facility] protocol.      Problem: Metabolic/Fluid and Electrolytes - Adult  Goal: Electrolytes maintained within normal limits  9/17/2023 1454 by Louis Kilgore RN  Outcome: Progressing  Note: Pt given potassium IV

## 2023-09-18 ENCOUNTER — APPOINTMENT (OUTPATIENT)
Dept: GENERAL RADIOLOGY | Age: 68
DRG: 846 | End: 2023-09-18
Attending: STUDENT IN AN ORGANIZED HEALTH CARE EDUCATION/TRAINING PROGRAM
Payer: MEDICARE

## 2023-09-18 LAB
ALBUMIN SERPL-MCNC: 3.6 G/DL (ref 3.4–5)
ALP SERPL-CCNC: 61 U/L (ref 40–129)
ALT SERPL-CCNC: 66 U/L (ref 10–40)
ANION GAP SERPL CALCULATED.3IONS-SCNC: 10 MMOL/L (ref 3–16)
APTT BLD: 25.6 SEC (ref 22.7–35.9)
AST SERPL-CCNC: 32 U/L (ref 15–37)
BASOPHILS # BLD: 0 K/UL (ref 0–0.2)
BASOPHILS NFR BLD: 0.7 %
BILIRUB DIRECT SERPL-MCNC: <0.2 MG/DL (ref 0–0.3)
BILIRUB INDIRECT SERPL-MCNC: ABNORMAL MG/DL (ref 0–1)
BILIRUB SERPL-MCNC: 0.6 MG/DL (ref 0–1)
BUN SERPL-MCNC: 16 MG/DL (ref 7–20)
CALCIUM SERPL-MCNC: 8.7 MG/DL (ref 8.3–10.6)
CHLORIDE SERPL-SCNC: 103 MMOL/L (ref 99–110)
CO2 SERPL-SCNC: 30 MMOL/L (ref 21–32)
CREAT SERPL-MCNC: 1.2 MG/DL (ref 0.8–1.3)
DEPRECATED RDW RBC AUTO: 14.8 % (ref 12.4–15.4)
EOSINOPHIL # BLD: 0.2 K/UL (ref 0–0.6)
EOSINOPHIL NFR BLD: 5.5 %
GFR SERPLBLD CREATININE-BSD FMLA CKD-EPI: >60 ML/MIN/{1.73_M2}
GLUCOSE SERPL-MCNC: 117 MG/DL (ref 70–99)
HCT VFR BLD AUTO: 30.1 % (ref 40.5–52.5)
HGB BLD-MCNC: 10.4 G/DL (ref 13.5–17.5)
INR PPP: 0.97 (ref 0.84–1.16)
LDH SERPL L TO P-CCNC: 196 U/L (ref 100–190)
LYMPHOCYTES # BLD: 0.4 K/UL (ref 1–5.1)
LYMPHOCYTES NFR BLD: 11.6 %
MAGNESIUM SERPL-MCNC: 2.1 MG/DL (ref 1.8–2.4)
MCH RBC QN AUTO: 31.6 PG (ref 26–34)
MCHC RBC AUTO-ENTMCNC: 34.5 G/DL (ref 31–36)
MCV RBC AUTO: 91.6 FL (ref 80–100)
METHOTREXATE LEVEL: 0.22 UMOL/L
MONOCYTES # BLD: 0.1 K/UL (ref 0–1.3)
MONOCYTES NFR BLD: 4.5 %
NEUTROPHILS # BLD: 2.5 K/UL (ref 1.7–7.7)
NEUTROPHILS NFR BLD: 77.7 %
PH UR STRIP.AUTO: 7.5 [PH] (ref 5–8)
PH UR STRIP.AUTO: 8 [PH] (ref 5–8)
PH UR STRIP.AUTO: 8.5 [PH] (ref 5–8)
PHOSPHATE SERPL-MCNC: 3.1 MG/DL (ref 2.5–4.9)
PLATELET # BLD AUTO: 229 K/UL (ref 135–450)
PMV BLD AUTO: 8.9 FL (ref 5–10.5)
POTASSIUM SERPL-SCNC: 3.3 MMOL/L (ref 3.5–5.1)
PROT SERPL-MCNC: 6.1 G/DL (ref 6.4–8.2)
PROTHROMBIN TIME: 12.9 SEC (ref 11.5–14.8)
RBC # BLD AUTO: 3.28 M/UL (ref 4.2–5.9)
SODIUM SERPL-SCNC: 143 MMOL/L (ref 136–145)
URATE SERPL-MCNC: 4.1 MG/DL (ref 3.5–7.2)
WBC # BLD AUTO: 3.2 K/UL (ref 4–11)

## 2023-09-18 PROCEDURE — 85610 PROTHROMBIN TIME: CPT

## 2023-09-18 PROCEDURE — 6360000002 HC RX W HCPCS: Performed by: NURSE PRACTITIONER

## 2023-09-18 PROCEDURE — 2500000003 HC RX 250 WO HCPCS: Performed by: STUDENT IN AN ORGANIZED HEALTH CARE EDUCATION/TRAINING PROGRAM

## 2023-09-18 PROCEDURE — 80076 HEPATIC FUNCTION PANEL: CPT

## 2023-09-18 PROCEDURE — 2580000003 HC RX 258: Performed by: NURSE PRACTITIONER

## 2023-09-18 PROCEDURE — 71045 X-RAY EXAM CHEST 1 VIEW: CPT

## 2023-09-18 PROCEDURE — 2060000000 HC ICU INTERMEDIATE R&B

## 2023-09-18 PROCEDURE — 85025 COMPLETE CBC W/AUTO DIFF WBC: CPT

## 2023-09-18 PROCEDURE — 85730 THROMBOPLASTIN TIME PARTIAL: CPT

## 2023-09-18 PROCEDURE — 80048 BASIC METABOLIC PNL TOTAL CA: CPT

## 2023-09-18 PROCEDURE — 2580000003 HC RX 258: Performed by: STUDENT IN AN ORGANIZED HEALTH CARE EDUCATION/TRAINING PROGRAM

## 2023-09-18 PROCEDURE — 83520 IMMUNOASSAY QUANT NOS NONAB: CPT

## 2023-09-18 PROCEDURE — 6360000002 HC RX W HCPCS: Performed by: STUDENT IN AN ORGANIZED HEALTH CARE EDUCATION/TRAINING PROGRAM

## 2023-09-18 PROCEDURE — 83615 LACTATE (LD) (LDH) ENZYME: CPT

## 2023-09-18 PROCEDURE — 83735 ASSAY OF MAGNESIUM: CPT

## 2023-09-18 PROCEDURE — 84100 ASSAY OF PHOSPHORUS: CPT

## 2023-09-18 PROCEDURE — 84550 ASSAY OF BLOOD/URIC ACID: CPT

## 2023-09-18 PROCEDURE — 83986 ASSAY PH BODY FLUID NOS: CPT

## 2023-09-18 PROCEDURE — 6370000000 HC RX 637 (ALT 250 FOR IP): Performed by: NURSE PRACTITIONER

## 2023-09-18 PROCEDURE — 6370000000 HC RX 637 (ALT 250 FOR IP): Performed by: STUDENT IN AN ORGANIZED HEALTH CARE EDUCATION/TRAINING PROGRAM

## 2023-09-18 RX ORDER — POTASSIUM CHLORIDE 20 MEQ/1
20 TABLET, EXTENDED RELEASE ORAL
Status: DISCONTINUED | OUTPATIENT
Start: 2023-09-18 | End: 2023-09-19 | Stop reason: HOSPADM

## 2023-09-18 RX ADMIN — SODIUM CHLORIDE 15 ML: 900 IRRIGANT IRRIGATION at 20:36

## 2023-09-18 RX ADMIN — POTASSIUM CHLORIDE 20 MEQ: 400 INJECTION, SOLUTION INTRAVENOUS at 06:43

## 2023-09-18 RX ADMIN — SODIUM CHLORIDE, PRESERVATIVE FREE 10 ML: 5 INJECTION INTRAVENOUS at 09:02

## 2023-09-18 RX ADMIN — Medication 400 MG: at 20:36

## 2023-09-18 RX ADMIN — LEUCOVORIN CALCIUM 25 MG: 25 TABLET ORAL at 14:28

## 2023-09-18 RX ADMIN — SODIUM BICARBONATE: 84 INJECTION, SOLUTION INTRAVENOUS at 09:23

## 2023-09-18 RX ADMIN — LEUCOVORIN CALCIUM 25 MG: 25 TABLET ORAL at 03:15

## 2023-09-18 RX ADMIN — Medication 400 MG: at 09:02

## 2023-09-18 RX ADMIN — LEUCOVORIN CALCIUM 25 MG: 25 TABLET ORAL at 20:36

## 2023-09-18 RX ADMIN — ENOXAPARIN SODIUM 40 MG: 100 INJECTION SUBCUTANEOUS at 18:23

## 2023-09-18 RX ADMIN — POTASSIUM CHLORIDE 20 MEQ: 400 INJECTION, SOLUTION INTRAVENOUS at 04:44

## 2023-09-18 RX ADMIN — LEVETIRACETAM 500 MG: 500 TABLET, FILM COATED ORAL at 20:36

## 2023-09-18 RX ADMIN — POTASSIUM CHLORIDE 20 MEQ: 400 INJECTION, SOLUTION INTRAVENOUS at 08:54

## 2023-09-18 RX ADMIN — FUROSEMIDE 40 MG: 10 INJECTION, SOLUTION INTRAMUSCULAR; INTRAVENOUS at 18:36

## 2023-09-18 RX ADMIN — SODIUM CHLORIDE 15 ML: 900 IRRIGANT IRRIGATION at 09:02

## 2023-09-18 RX ADMIN — LEVETIRACETAM 500 MG: 500 TABLET, FILM COATED ORAL at 09:02

## 2023-09-18 RX ADMIN — SODIUM CHLORIDE 15 ML: 900 IRRIGANT IRRIGATION at 18:28

## 2023-09-18 RX ADMIN — SODIUM CHLORIDE, PRESERVATIVE FREE 10 ML: 5 INJECTION INTRAVENOUS at 20:36

## 2023-09-18 RX ADMIN — LEUCOVORIN CALCIUM 25 MG: 25 TABLET ORAL at 09:02

## 2023-09-18 RX ADMIN — SODIUM BICARBONATE: 84 INJECTION, SOLUTION INTRAVENOUS at 23:55

## 2023-09-18 RX ADMIN — SERTRALINE HYDROCHLORIDE 50 MG: 50 TABLET ORAL at 09:02

## 2023-09-18 RX ADMIN — POTASSIUM CHLORIDE 20 MEQ: 1500 TABLET, EXTENDED RELEASE ORAL at 11:35

## 2023-09-18 RX ADMIN — POTASSIUM CHLORIDE 20 MEQ: 1500 TABLET, EXTENDED RELEASE ORAL at 18:23

## 2023-09-18 RX ADMIN — TAMSULOSIN HYDROCHLORIDE 0.4 MG: 0.4 CAPSULE ORAL at 09:01

## 2023-09-18 RX ADMIN — POTASSIUM CHLORIDE 20 MEQ: 400 INJECTION, SOLUTION INTRAVENOUS at 10:04

## 2023-09-18 RX ADMIN — MULTIPLE VITAMINS W/ MINERALS TAB 1 TABLET: TAB at 09:02

## 2023-09-18 RX ADMIN — SODIUM BICARBONATE: 84 INJECTION, SOLUTION INTRAVENOUS at 16:41

## 2023-09-18 RX ADMIN — POTASSIUM CHLORIDE 20 MEQ: 1500 TABLET, EXTENDED RELEASE ORAL at 09:02

## 2023-09-18 RX ADMIN — SODIUM BICARBONATE: 84 INJECTION, SOLUTION INTRAVENOUS at 01:42

## 2023-09-18 NOTE — PLAN OF CARE
Problem: Safety - Adult  Goal: Free from fall injury  9/18/2023 1742 by Louis Kilgore RN  Outcome: Progressing  Note: Orthostatic vital signs obtained at start of shift - see flowsheet for details. Pt does not meet criteria for orthostasis. Pt is a Low fall risk. See Williams Phi Fall Score and ABCDS Injury Risk assessments. - Screening for Orthostasis AND not a [place] Risk per STEPHENS/ABCDS: Pt bed is in low position, side rails up, call light and belongings are in reach. Pt encouraged to call for assistance as needed. Will continue with hourly rounds for PO intake, pain needs, toileting and repositioning as needed. Problem: ABCDS Injury Assessment  Goal: Absence of physical injury  9/18/2023 1742 by Louis Kilgore RN  Outcome: Progressing  Note: No new observed or reported injuries. Pt educated to notify RN if there is a change in injury status. Problem: Pain  Goal: Verbalizes/displays adequate comfort level or baseline comfort level  9/18/2023 1742 by Louis Kilgore RN  Outcome: Progressing  Note: Pt did not report pain this shift. Pt educated on importance of calling for pain meds when in pain. Pt verbalized understanding. Problem: Infection - Adult  Goal: Absence of infection at discharge  9/18/2023 1742 by Louis Kilgore RN  Outcome: Progressing  Note: CVC site remains free of signs/symptoms of infection. No drainage, edema, erythema, pain, or warmth noted at site. Dressing changes continue per protocol and on an as needed basis - see flowsheet. Compliant with [facility] Bath Protocol:  Performed CHG bath today per [facility] protocol utilizing Bed bath with CHG wipes. CVC site cleansed with CHG wipe over dressing, skin surrounding dressing, and first 6\" of IV tubing. Pt tolerated well. Continued to encourage daily CHG bathing per [facility] protocol.     Problem: Metabolic/Fluid and Electrolytes - Adult  Goal: Electrolytes maintained within normal limits  9/18/2023 1742 by Louis Kilgore RN  Outcome:

## 2023-09-18 NOTE — CARE COORDINATION
Reviewed discharge plan with patient and wife. Patient will be discharged when methotrexate level is at the appropriate level. Patient is aware. Will continue to monitor for discharge needs.

## 2023-09-18 NOTE — PLAN OF CARE
Problem: Safety - Adult  Goal: Free from fall injury  Outcome: Progressing  Free From Fall Injury:   Instruct family/caregiver on patient safety   Based on caregiver fall risk screen, instruct family/caregiver to ask for assistance with transferring infant if caregiver noted to have fall risk factors      Problem: ABCDS Injury Assessment  Goal: Absence of physical injury  Outcome: Progressing  Absence of Physical Injury: Implement safety measures based on patient assessment      Problem: Pain  Goal: Verbalizes/displays adequate comfort level or baseline comfort level  Outcome: Progressing  Verbalizes/displays adequate comfort level or baseline comfort level:   Encourage patient to monitor pain and request assistance   Administer analgesics based on type and severity of pain and evaluate response   Consider cultural and social influences on pain and pain management   Assess pain using appropriate pain scale   Implement non-pharmacological measures as appropriate and evaluate response      Problem: Metabolic/Fluid and Electrolytes - Adult  Goal: Electrolytes maintained within normal limits  Outcome: Progressing  Electrolytes maintained within normal limits:   Monitor labs and assess patient for signs and symptoms of electrolyte imbalances   Monitor response to electrolyte replacements, including repeat lab results as appropriate   Instruct patient on fluid and nutrition restrictions as appropriate   Administer electrolyte replacement as ordered      Problem: Infection - Adult  Goal: Absence of infection at discharge  Absence of infection at discharge:   Assess and monitor for signs and symptoms of infection   Monitor lab/diagnostic results   Monitor all insertion sites i.e., indwelling lines, tubes and drains   Monitor endotracheal (as able) and nasal secretions for changes in amount and color   Administer medications as ordered   Instruct and encourage patient and family to use good hand hygiene technique

## 2023-09-18 NOTE — CARE COORDINATION
CM delivered 2nd IMM delivered within 4 hours for DC, verbal explanation of patient rights at bedside. Pt voiced understanding of discharge MCR rights and is agreeable to discharge.     Bhavna Quinteros MSW, MIRACLE-S   for [facility] ([address])  Office Phone: [phone]  [facility] Mobile: [phone]

## 2023-09-18 NOTE — CARE COORDINATION
8:43am: Spoke with pt at bedside.  Pt confirmed he will return home when able and denied needs for SW.    SW will continue to follow but anticipates no needs    Gideon CHIN, MIRACLE-S   for [facility] ([address])  Office Phone: [phone] [address]: [phone]

## 2023-09-18 NOTE — PROGRESS NOTES
Patient seen and assessed for weekly line care needs. CVC site remains free of visible signs and symptoms of infection. No drainage, edema, erythema, pain, itching or warmth noted at and around the insertion site. Line care performed by Staff RN. The need for continued use of the CVC is due to ongoing therapy. Patient verbalizes understanding of line care education provided by line care RN. Staff RNs will continue to monitor and assess the CVC site throughout the patient's hospital stay. Sterile dressing changes will continue to be changed per policy.     Signed by Joey Talley RN on 9/18/2023 at 9:29 AM

## 2023-09-18 NOTE — PROGRESS NOTES
[facility] Progress Note    2023     Annalisa Manzano    MRN: [id number]    : 1955    Referring MD: Cassandra Mckeon, [address] [facility],  [address] [place] [address] Avenue      SUBJECTIVE:   no acute events overnight.  Feels frustrated by MTX level    ECOG PS:  (1) Restricted in physically strenuous activity, ambulatory and able to do work of light nature    KPS: 80% Normal activity with effort; some signs or symptoms of disease      Isolation: None    Medications    Scheduled Meds:   potassium chloride  20 mEq Oral TID WC    magnesium oxide  400 mg Oral BID    furosemide  40 mg IntraVENous Q12H    leucovorin calcium  25 mg Oral Q6H    Or    leucovorin calcium  25 mg IntraVENous Q6H    procarbazine  200 mg Oral Q24H    levETIRAcetam  500 mg Oral BID    therapeutic multivitamin-minerals  1 tablet Oral Daily    sertraline  50 mg Oral Daily    tamsulosin  0.4 mg Oral Daily    sodium chloride flush  5-40 mL IntraVENous 2 times per day    enoxaparin  40 mg SubCUTAneous QPM    Saline Mouthwash  15 mL Swish & Spit 4x Daily AC & HS     Continuous Infusions:   sodium bicarbonate 50 mEq in dextrose 5 % and 0.45 % NaCl 1,000 mL infusion 150 mL/hr at 23 0142    sodium chloride      sodium chloride       PRN Meds:.potassium phosphate 20 mmol in sodium chloride 0.9 % 250 mL IVPB, hydrALAZINE, furosemide, prochlorperazine **OR** prochlorperazine, LORazepam **OR** LORazepam, sodium bicarbonate, leucovorin calcium (WELLCOVORIN) 150 mg in dextrose 5 % 100 mL IVPB, polyethylene glycol, sennosides-docusate sodium, sodium chloride, sodium chloride flush, sodium chloride, potassium chloride, magnesium sulfate, magnesium hydroxide, acetaminophen **OR** acetaminophen, Saline Mouthwash, alteplase (CATHFLO) 2 mg in sterile water 2 mL injection    ROS:  As noted above, otherwise remainder of 10-point ROS negative    Physical Exam:     I&O:    Intake/Output Summary (Last 24 hours) at 2023 9335  Last data filed at 2023 inpatient    GERD:  - S/p PPI - holding while on chemotherapy w/ MTX  - S/p Pepcid BID- pt states he has not been taking this   Nausea:  no complaints   - Cont Compazine and Ativan as needed     12. Psychosocial:  - H/o depressed mood  Depression:  - Cont Zoloft 50 mg daily (increased 8/21/23)  - Dr. Aly Isabel following  Hospital concerns/anxiety:   Wyatt Rivera spent  ~ 40 minutes (9/7/23) w/ patient and wife discussing concerns. Wife is looking for a second opinion as their experiences in the hospital have been poor. Some complaints included: Blood on the walls in their room upon arrival, dirty bedside table upon arrival, poor food quality. His wife is very concerned as this is not acceptable during transplant and he is extremely neutropenic. He loses weight with each hospital admission which is very concerning as well. - Robin Dee is aware and she will reach out to patient  - He and his wife met with [facility] on 9/11/23      13.   Cardiac  - AV dual paced pacemaker in place   Prolonged QTc:  - EKG (8/31/23, 9/1/23, 9/14/23, & 9/15/23): >500 - can subtract 50 d/t having ventricular paced pacemaker in place, so <500  - Cont to monitor      - DVT Prophylaxis: Platelets >40,011 cells/dL, - daily lovenox prophylaxis ordered  Contraindications to pharmacologic prophylaxis: None  Contraindications to mechanical prophylaxis: None    - Disposition: home when MTX level < 0.1    The patient was seen and examined by MD Connie Jernigan MD, [facility]  Oncology Hematology [facility])  Hematology, BMT, Cellular therapy

## 2023-09-19 VITALS
DIASTOLIC BLOOD PRESSURE: 83 MMHG | BODY MASS INDEX: 22.54 KG/M2 | SYSTOLIC BLOOD PRESSURE: 131 MMHG | OXYGEN SATURATION: 99 % | TEMPERATURE: 98.1 F | HEART RATE: 81 BPM | WEIGHT: 157.4 LBS | RESPIRATION RATE: 17 BRPM | HEIGHT: 70 IN

## 2023-09-19 LAB
ALBUMIN SERPL-MCNC: 4 G/DL (ref 3.4–5)
ALP SERPL-CCNC: 64 U/L (ref 40–129)
ALT SERPL-CCNC: 71 U/L (ref 10–40)
ANION GAP SERPL CALCULATED.3IONS-SCNC: 9 MMOL/L (ref 3–16)
AST SERPL-CCNC: 40 U/L (ref 15–37)
BASOPHILS # BLD: 0 K/UL (ref 0–0.2)
BASOPHILS NFR BLD: 0.5 %
BILIRUB DIRECT SERPL-MCNC: <0.2 MG/DL (ref 0–0.3)
BILIRUB INDIRECT SERPL-MCNC: ABNORMAL MG/DL (ref 0–1)
BILIRUB SERPL-MCNC: 0.5 MG/DL (ref 0–1)
BUN SERPL-MCNC: 15 MG/DL (ref 7–20)
CALCIUM SERPL-MCNC: 9 MG/DL (ref 8.3–10.6)
CHLORIDE SERPL-SCNC: 103 MMOL/L (ref 99–110)
CO2 SERPL-SCNC: 30 MMOL/L (ref 21–32)
CREAT SERPL-MCNC: 1.2 MG/DL (ref 0.8–1.3)
DEPRECATED RDW RBC AUTO: 15.1 % (ref 12.4–15.4)
EOSINOPHIL # BLD: 0.2 K/UL (ref 0–0.6)
EOSINOPHIL NFR BLD: 4.9 %
GFR SERPLBLD CREATININE-BSD FMLA CKD-EPI: >60 ML/MIN/{1.73_M2}
GLUCOSE SERPL-MCNC: 123 MG/DL (ref 70–99)
HCT VFR BLD AUTO: 31.2 % (ref 40.5–52.5)
HGB BLD-MCNC: 10.8 G/DL (ref 13.5–17.5)
LYMPHOCYTES # BLD: 0.4 K/UL (ref 1–5.1)
LYMPHOCYTES NFR BLD: 13.8 %
MCH RBC QN AUTO: 32.1 PG (ref 26–34)
MCHC RBC AUTO-ENTMCNC: 34.6 G/DL (ref 31–36)
MCV RBC AUTO: 92.7 FL (ref 80–100)
METHOTREXATE LEVEL: 0.09 UMOL/L
MONOCYTES # BLD: 0.1 K/UL (ref 0–1.3)
MONOCYTES NFR BLD: 3.6 %
NEUTROPHILS # BLD: 2.4 K/UL (ref 1.7–7.7)
NEUTROPHILS NFR BLD: 77.2 %
PH UR STRIP.AUTO: 8 [PH] (ref 5–8)
PHOSPHATE SERPL-MCNC: 2.9 MG/DL (ref 2.5–4.9)
PLATELET # BLD AUTO: 245 K/UL (ref 135–450)
PMV BLD AUTO: 8.7 FL (ref 5–10.5)
POTASSIUM SERPL-SCNC: 4 MMOL/L (ref 3.5–5.1)
PROT SERPL-MCNC: 6.3 G/DL (ref 6.4–8.2)
RBC # BLD AUTO: 3.36 M/UL (ref 4.2–5.9)
SODIUM SERPL-SCNC: 142 MMOL/L (ref 136–145)
WBC # BLD AUTO: 3.1 K/UL (ref 4–11)

## 2023-09-19 PROCEDURE — 2580000003 HC RX 258: Performed by: STUDENT IN AN ORGANIZED HEALTH CARE EDUCATION/TRAINING PROGRAM

## 2023-09-19 PROCEDURE — 36591 DRAW BLOOD OFF VENOUS DEVICE: CPT

## 2023-09-19 PROCEDURE — 80076 HEPATIC FUNCTION PANEL: CPT

## 2023-09-19 PROCEDURE — 83986 ASSAY PH BODY FLUID NOS: CPT

## 2023-09-19 PROCEDURE — 80048 BASIC METABOLIC PNL TOTAL CA: CPT

## 2023-09-19 PROCEDURE — 6370000000 HC RX 637 (ALT 250 FOR IP): Performed by: NURSE PRACTITIONER

## 2023-09-19 PROCEDURE — 83520 IMMUNOASSAY QUANT NOS NONAB: CPT

## 2023-09-19 PROCEDURE — 6370000000 HC RX 637 (ALT 250 FOR IP): Performed by: STUDENT IN AN ORGANIZED HEALTH CARE EDUCATION/TRAINING PROGRAM

## 2023-09-19 PROCEDURE — 84100 ASSAY OF PHOSPHORUS: CPT

## 2023-09-19 PROCEDURE — 85025 COMPLETE CBC W/AUTO DIFF WBC: CPT

## 2023-09-19 PROCEDURE — 2500000003 HC RX 250 WO HCPCS: Performed by: STUDENT IN AN ORGANIZED HEALTH CARE EDUCATION/TRAINING PROGRAM

## 2023-09-19 RX ADMIN — SODIUM BICARBONATE: 84 INJECTION, SOLUTION INTRAVENOUS at 09:04

## 2023-09-19 RX ADMIN — LEVETIRACETAM 500 MG: 500 TABLET, FILM COATED ORAL at 09:11

## 2023-09-19 RX ADMIN — LEUCOVORIN CALCIUM 25 MG: 25 TABLET ORAL at 09:11

## 2023-09-19 RX ADMIN — POTASSIUM CHLORIDE 20 MEQ: 1500 TABLET, EXTENDED RELEASE ORAL at 09:10

## 2023-09-19 RX ADMIN — SERTRALINE HYDROCHLORIDE 50 MG: 50 TABLET ORAL at 09:10

## 2023-09-19 RX ADMIN — Medication 400 MG: at 09:11

## 2023-09-19 RX ADMIN — LEUCOVORIN CALCIUM 25 MG: 25 TABLET ORAL at 03:13

## 2023-09-19 RX ADMIN — SODIUM CHLORIDE 15 ML: 900 IRRIGANT IRRIGATION at 09:12

## 2023-09-19 RX ADMIN — MULTIPLE VITAMINS W/ MINERALS TAB 1 TABLET: TAB at 09:10

## 2023-09-19 RX ADMIN — TAMSULOSIN HYDROCHLORIDE 0.4 MG: 0.4 CAPSULE ORAL at 09:12

## 2023-09-19 NOTE — DISCHARGE INSTR - DIET

## 2023-09-19 NOTE — PLAN OF CARE
and nutrition restrictions as appropriate   Administer electrolyte replacement as ordered     Problem: Metabolic/Fluid and Electrolytes - Adult  Goal: Electrolytes maintained within normal limits  Outcome: Progressing  Electrolytes maintained within normal limits:   Monitor labs and assess patient for signs and symptoms of electrolyte imbalances   Monitor response to electrolyte replacements, including repeat lab results as appropriate   Instruct patient on fluid and nutrition restrictions as appropriate   Administer electrolyte replacement as ordered     Problem: Hematologic - Adult  Goal: Maintains hematologic stability  Outcome: Progressing  Maintains hematologic stability:   Assess for signs and symptoms of bleeding or hemorrhage   Administer blood products/factors as ordered   Monitor labs for bleeding or clotting disorders  Patient's hemoglobin this AM:   Recent Labs     09/19/23  0305   HGB 10.8*     Patient's platelet count this AM:   Recent Labs     09/19/23  0305       Thrombocytopenia not present at this time. Patient showing no signs or symptoms of active bleeding. Transfusion not indicated at this time. Patient verbalizes understanding of all instructions. Will continue to assess and implement POC. Call light within reach and hourly rounding in place.

## 2023-09-19 NOTE — DISCHARGE INSTRUCTIONS
[address] Discharge Instructions    Call for Questions/Concerns:  [phone] ([phone]) [facility] office  The phone number listed above is available 24 hrs/7 days per week  [facility] is open M-F 8am-4:30pm; Sat-Sun/Holidays 8am-1pm    Symptoms to Report Immediately:    Fever of 100.5 or greater  Vomiting without relief after use of anti-nausea medication  Severe abdominal cramping  Diarrhea: More than 3 loose, watery bowel movements in a 24 hour period  Unusual or excessive bleeding from your mouth, nose, rectum, bladder or vagina  Sudden onset of shortness of breath or chest pain  Signs/symptoms of infection: redness, warmth, swelling-particularly to central line site    Report to Physician's office within 24 hours:    Pain not relieved by pain medication  Change in urination-odor, cloudiness, frequency, or pain with urination  Flu-like symptoms  Skin changes-rash, hives, redness or peeling of skin    Additional Instructions:    Avoid people with colds, flu-like symptoms, or any sign of infection  Drink plenty of fluids-attempt to consume 2-3 liters ( ounces) of fluids/24 hour period  Continue low microbial diet until instructed by physician to resume normal diet  Bring all of your medications with you to your doctor's appointments  Bring your current medicine list to each hospital and office visit       9/19/2023 1:02 PM  Arabella Liao RN            My Discharge Checklist    Here at the [address] [facility], we want to make sure you have the help you will need once you leave the hospital.  We are going to go over your discharge instructions with you. We give these to you in writing so you will have a reference if you have questions about symptoms or problems to look for after you leave the hospital.     We know you want to feel better and get home soon.  Please answer these questions so we can be sure you have what you need, your questions are answered, and you feel prepared for

## 2023-09-19 NOTE — CARE COORDINATION
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]   Date / Time: 9/14/2023  2:48 PM    Current PCP: Miguel Arias [address] [place] [address] patient: No    Hospitalization in the last 30 days: Yes  Readmission Assessment  Number of Days since last admission?: 8-30 days  Previous Disposition: Home with Family  Who is being Interviewed: Patient  What was the patient's/caregiver's perception as to why they think they needed to return back to the hospital?: Other (Comment) (this was a planned readmission)  Did you visit your Primary Care Physician after you left the hospital, before you returned this time?: No  Why weren't you able to visit your PCP?: Did not have an appointment  Did you see a specialist, such as Cardiac, Pulmonary, Orthopedic Physician, etc. after you left the hospital?: Yes  Who advised the patient to return to the hospital?: Physician (this was a planned readmission)  Does the patient report anything that got in the way of taking their medications?: No  In our efforts to provide the best possible care to you and others like you, can you think of anything that we could have done to help you after you left the hospital the first time, so that you might not have needed to return so soon?: Other (Comment) (this was a planned readmission)    Advance Directives:  Code Status: Full Code  [place] DNR form completed and on chart: Not Indicated    Financial:  Payor: Tristen Sham / Plan: Luda Abida ESSENTIAL/PLUS / Product Type: *No Product type* /      Pharmacy:    Romel Love [phone] -

## 2023-09-19 NOTE — DISCHARGE SUMMARY
[facility] Discharge Summary             Attending Physician: No att. providers found    Referring MD: Wilton Vee DO  [address]  [address] [place] [address],  [address] [place] [address] Avenue    Name: Brian Shaver :  1955  MRN:  [id number]    Admission: 2023   Discharge: 23     Date: 2023    Diagnosis on admit: DLBC NHL w/ CNS relapse      Medications:      Medication List        START taking these medications      procarbazine 50 MG chemo capsule  Commonly known as: MATULANE  Take 4 capsules by mouth every 24 hours for 3 doses            CONTINUE taking these medications      levETIRAcetam 500 MG tablet  Commonly known as: KEPPRA  Take 1 tablet by mouth 2 times daily     magnesium oxide 400 (240 Mg) MG tablet  Commonly known as: MAG-OX  Take 1 tablet by mouth daily     polyethylene glycol 17 g packet  Commonly known as: GLYCOLAX  Take 1 packet by mouth daily as needed for Constipation     potassium chloride 20 MEQ extended release tablet  Commonly known as: KLOR-CON M  Take 1 tablet by mouth daily (with breakfast)     sennosides-docusate sodium 8.6-50 MG tablet  Commonly known as: SENOKOT-S  Take 2 tablets by mouth 2 times daily as needed for Constipation     sertraline 50 MG tablet  Commonly known as: ZOLOFT  Take 1 tablet by mouth daily     tamsulosin 0.4 MG capsule  Commonly known as: FLOMAX     therapeutic multivitamin-minerals tablet  Take 1 tablet by mouth daily            STOP taking these medications      famotidine 20 MG tablet  Commonly known as: PEPCID               Where to Get Your Medications        Information about where to get these medications is not yet available    Ask your nurse or doctor about these medications  procarbazine 50 MG chemo capsule         Reason for Admission: R+MPV Cycle #6 w/ Procarbazine     Hospital Course:     Shae Mckeon is a 76 y/o male w/ 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 Progressed local mass effect with 4 mm left to right midline shift. The dominant measurable portion of which measures 5.5 x 2.6 cm (series 13, image 76), previously with only stippled areas of enhancement in this region  - MRI brain (8/29/23): Significant interval improvement in the irregular enhancement involving the intra-axial brain in the left hemisphere adjacent to the left lateral ventricle compared to the prior MRI from 6/28/2023. Significant interval improvement in the surrounding vasogenic edema and T2/FLAIR hyperintense signal involving left greater than right hemispheres. Currently, there is residual T2 and FLAIR hyperintense signal seen involving the left greater than right periventricular white matter regions, as well as the posterior corpus callosum. There is a small amount of residual enhancement seen adjacent to the atrium of the left lateral ventricle as described. Continued imaging follow-up is recommended     PLAN:  R+MPV  - Procabazine w/ even cycles. Repeat MRI after cycle #6. Possible ASCT w/ Thiotepa and BEAM preparative regimen if in CR. Cycle 6, Day + 6        2. Neuro:   Severe memory impairment and visual changes: r/t CNS mass & vasogenic edema has improved w/ chemotherapy, but cont to have confusion  - Cont delirium precautions  - S/p Decadron taper (7/4/23 - 7/27/23): 6mg IV q6h (7/4/23), 8mg q8h (7/6/23), 4mg q12h (7/8/23), 4mg daily (7/10/23) , 4mg BID (7/11/23, increased w/ confusion),  4mg daily (7/17/23 - 7/27/23)  - Cont Keppra BID  Headache:  d/t brain mass   - Cont Tylenol PRN    3.  ID: afebrile, but w/

## 2023-09-19 NOTE — DISCHARGE INSTR - COC
Continuity of Care Form    Patient Name: Yadiel Cotton   :  1955  MRN:  [id number]    Admit date:  2023  Discharge date:  ***    Code Status Order: Full Code   Advance Directives:     Admitting Physician:  Brandie Diop DO  PCP: Rhina Reynoso, APRN - CNP    Discharging Nurse: [facility] Unit/Room#: 4509/5121-25  Discharging Unit Phone Number: ***    Emergency Contact:   Extended Emergency Contact Information  Primary Emergency Contact: [facility]  Address: [address], [address] [facility] [address] [address] [address] Phone: [phone]  Mobile Phone: [phone]  Relation: Spouse  Secondary Emergency Contact: [address] [facility] Phone: [phone]  Mobile Phone: [phone]  Relation: Child    Past Surgical History:  Past Surgical History:   Procedure Laterality Date    BRONCHOSCOPY N/A 2021    ENDOBRONCHIAL ULTRASOUND WITH EUS performed by Andreas Adames MD at [address] [place] [address] N/A 2022    BRONCHOSCOPY,  ENDOBRONCHIAL ULTRASOUND WITH ANESTHESIA AND PATHOLOGY performed by Brianna Lynn MD at  [facility]  2022    BRONCHOSCOPY/TRANSBRONCHIAL NEEDLE BIOPSY performed by Brianna Lynn MD at [address] [place] [address]  2022    BRONCHOSCOPY/TRANSBRONCHIAL NEEDLE BIOPSY ADDL LOBE performed by Brianna Lynn MD at [address] [place] [address]  2022    BRONCHOSCOPY DIAGNOSTIC OR CELL [address] [facility] performed by Brianna Lynn MD at [facility] 2023    RIGHT PARIETAL STEREOTACTIC BIOPSY OF BRAIN MASS performed by Maricarmen Garcia MD at [address] [facility] Left 7/3/2023    LEFT PARIETAL CRANIOTOMY FOR OPEN BIOPSY OF BRAIN MASS performed by Maricarmen Garcia MD at [address]  2021    CT BIOPSY ABDOMEN RETROPERITONEUM 2021 Darrell Garrido MD [address] [facility]      teeth extracted    FRACTURE SURGERY Left     ankle

## 2023-09-20 NOTE — PLAN OF CARE
Problem: Safety - Adult  Goal: Free from fall injury  9/19/2023 2031 by Aurelio Mcgrath RN  Outcome: Completed     Problem: ABCDS Injury Assessment  Goal: Absence of physical injury  9/19/2023 2031 by Aurelio Mcgrath RN  Outcome: Completed     Problem: Pain  Goal: Verbalizes/displays adequate comfort level or baseline comfort level  Outcome: Completed     Problem: Infection - Adult  Goal: Absence of infection at discharge  9/19/2023 2031 by Aurelio Mcgrath RN  Outcome: Completed     Problem: Metabolic/Fluid and Electrolytes - Adult  Goal: Hemodynamic stability and optimal renal function maintained  Outcome: Completed     Problem: Metabolic/Fluid and Electrolytes - Adult  Goal: Glucose maintained within prescribed range  Outcome: Completed     Problem: Hematologic - Adult  Goal: Maintains hematologic stability  9/19/2023 2031 by Aurelio Mcgrath RN  Outcome: Completed

## 2023-09-21 ENCOUNTER — HOSPITAL ENCOUNTER (OUTPATIENT)
Dept: NON INVASIVE DIAGNOSTICS | Age: 68
Discharge: HOME | End: 2023-09-21
Attending: STUDENT IN AN ORGANIZED HEALTH CARE EDUCATION/TRAINING PROGRAM
Payer: MEDICARE

## 2023-09-21 ENCOUNTER — CLINICAL DOCUMENTATION (OUTPATIENT)
Dept: ONCOLOGY | Age: 68
End: 2023-09-21

## 2023-09-21 ENCOUNTER — HOSPITAL ENCOUNTER (OUTPATIENT)
Dept: ONCOLOGY | Age: 68
Setting detail: INFUSION SERIES
Discharge: HOME | End: 2023-09-21
Payer: MEDICARE

## 2023-09-21 ENCOUNTER — TELEPHONE (OUTPATIENT)
Dept: INTERNAL MEDICINE CLINIC | Age: 68
End: 2023-09-21

## 2023-09-21 DIAGNOSIS — C85.89 CNS LYMPHOMA (HCC): ICD-10-CM

## 2023-09-21 DIAGNOSIS — Z01.818 PRE-TRANSPLANT EVALUATION FOR STEM CELL TRANSPLANT: ICD-10-CM

## 2023-09-21 LAB
ABO + RH BLD: NORMAL
ALBUMIN SERPL-MCNC: 4.2 G/DL (ref 3.4–5)
ALBUMIN/GLOB SERPL: 1.8 {RATIO} (ref 1.1–2.2)
ALP SERPL-CCNC: 70 U/L (ref 40–129)
ALT SERPL-CCNC: 64 U/L (ref 10–40)
AMPHETAMINES UR QL SCN>1000 NG/ML: NORMAL
ANION GAP SERPL CALCULATED.3IONS-SCNC: 11 MMOL/L (ref 3–16)
APTT BLD: 24.7 SEC (ref 22.7–35.9)
AST SERPL-CCNC: 32 U/L (ref 15–37)
BARBITURATES UR QL SCN>200 NG/ML: NORMAL
BASOPHILS # BLD: 0 K/UL (ref 0–0.2)
BASOPHILS NFR BLD: 1.2 %
BENZODIAZ UR QL SCN>200 NG/ML: NORMAL
BILIRUB DIRECT SERPL-MCNC: <0.2 MG/DL (ref 0–0.3)
BILIRUB INDIRECT SERPL-MCNC: NORMAL MG/DL (ref 0–1)
BILIRUB SERPL-MCNC: 0.3 MG/DL (ref 0–1)
BILIRUB UR QL STRIP.AUTO: NEGATIVE
BLD GP AB SCN SERPL QL: NORMAL
BUN SERPL-MCNC: 31 MG/DL (ref 7–20)
CALCIUM SERPL-MCNC: 9.1 MG/DL (ref 8.3–10.6)
CANNABINOIDS UR QL SCN>50 NG/ML: NORMAL
CHLORIDE SERPL-SCNC: 106 MMOL/L (ref 99–110)
CLARITY UR: CLEAR
CO2 SERPL-SCNC: 25 MMOL/L (ref 21–32)
COCAINE UR QL SCN: NORMAL
COLOR UR: YELLOW
CREAT SERPL-MCNC: 1.4 MG/DL (ref 0.8–1.3)
DEPRECATED RDW RBC AUTO: 15.1 % (ref 12.4–15.4)
DRUG SCREEN COMMENT UR-IMP: NORMAL
EOSINOPHIL # BLD: 0.1 K/UL (ref 0–0.6)
EOSINOPHIL NFR BLD: 4.4 %
FENTANYL SCREEN, URINE: NORMAL
GFR SERPLBLD CREATININE-BSD FMLA CKD-EPI: 54 ML/MIN/{1.73_M2}
GLUCOSE SERPL-MCNC: 94 MG/DL (ref 70–99)
GLUCOSE UR STRIP.AUTO-MCNC: NEGATIVE MG/DL
HAV IGM SERPL QL IA: NORMAL
HBV SURFACE AB SERPL IA-ACNC: <3.5 MIU/ML
HCT VFR BLD AUTO: 31.2 % (ref 40.5–52.5)
HGB BLD-MCNC: 10.7 G/DL (ref 13.5–17.5)
HGB UR QL STRIP.AUTO: NEGATIVE
INR PPP: 0.96 (ref 0.84–1.16)
KETONES UR STRIP.AUTO-MCNC: NEGATIVE MG/DL
LDH SERPL L TO P-CCNC: 215 U/L (ref 100–190)
LEUKOCYTE ESTERASE UR QL STRIP.AUTO: NEGATIVE
LYMPHOCYTES # BLD: 0.6 K/UL (ref 1–5.1)
LYMPHOCYTES NFR BLD: 17.6 %
MAGNESIUM SERPL-MCNC: 2.2 MG/DL (ref 1.8–2.4)
MCH RBC QN AUTO: 32 PG (ref 26–34)
MCHC RBC AUTO-ENTMCNC: 34.4 G/DL (ref 31–36)
MCV RBC AUTO: 93.2 FL (ref 80–100)
METHADONE UR QL SCN>300 NG/ML: NORMAL
MONOCYTES # BLD: 0.2 K/UL (ref 0–1.3)
MONOCYTES NFR BLD: 6.4 %
NEUTROPHILS # BLD: 2.3 K/UL (ref 1.7–7.7)
NEUTROPHILS NFR BLD: 70.4 %
NITRITE UR QL STRIP.AUTO: NEGATIVE
OPIATES UR QL SCN>300 NG/ML: NORMAL
OXYCODONE UR QL SCN: NORMAL
PCP UR QL SCN>25 NG/ML: NORMAL
PH UR STRIP.AUTO: 7.5 [PH] (ref 5–8)
PH UR STRIP: 7.5 [PH]
PHOSPHATE SERPL-MCNC: 3.4 MG/DL (ref 2.5–4.9)
PLATELET # BLD AUTO: 218 K/UL (ref 135–450)
PMV BLD AUTO: 9 FL (ref 5–10.5)
POTASSIUM SERPL-SCNC: 4.8 MMOL/L (ref 3.5–5.1)
PROT SERPL-MCNC: 6.5 G/DL (ref 6.4–8.2)
PROT UR STRIP.AUTO-MCNC: NEGATIVE MG/DL
PROTHROMBIN TIME: 12.8 SEC (ref 11.5–14.8)
RBC # BLD AUTO: 3.35 M/UL (ref 4.2–5.9)
SODIUM SERPL-SCNC: 142 MMOL/L (ref 136–145)
SP GR UR STRIP.AUTO: 1.01 (ref 1–1.03)
UA COMPLETE W REFLEX CULTURE PNL UR: NORMAL
UA DIPSTICK W REFLEX MICRO PNL UR: NORMAL
URATE SERPL-MCNC: 4.3 MG/DL (ref 3.5–7.2)
URN SPEC COLLECT METH UR: NORMAL
UROBILINOGEN UR STRIP-ACNC: 0.2 E.U./DL
WBC # BLD AUTO: 3.3 K/UL (ref 4–11)

## 2023-09-21 PROCEDURE — 85610 PROTHROMBIN TIME: CPT

## 2023-09-21 PROCEDURE — 86850 RBC ANTIBODY SCREEN: CPT

## 2023-09-21 PROCEDURE — 83615 LACTATE (LD) (LDH) ENZYME: CPT

## 2023-09-21 PROCEDURE — 86694 HERPES SIMPLEX NES ANTBDY: CPT

## 2023-09-21 PROCEDURE — 85730 THROMBOPLASTIN TIME PARTIAL: CPT

## 2023-09-21 PROCEDURE — 80053 COMPREHEN METABOLIC PANEL: CPT

## 2023-09-21 PROCEDURE — 93306 TTE W/DOPPLER COMPLETE: CPT

## 2023-09-21 PROCEDURE — G0480 DRUG TEST DEF 1-7 CLASSES: HCPCS

## 2023-09-21 PROCEDURE — 84100 ASSAY OF PHOSPHORUS: CPT

## 2023-09-21 PROCEDURE — 84550 ASSAY OF BLOOD/URIC ACID: CPT

## 2023-09-21 PROCEDURE — 86645 CMV ANTIBODY IGM: CPT

## 2023-09-21 PROCEDURE — 82248 BILIRUBIN DIRECT: CPT

## 2023-09-21 PROCEDURE — 86901 BLOOD TYPING SEROLOGIC RH(D): CPT

## 2023-09-21 PROCEDURE — 36591 DRAW BLOOD OFF VENOUS DEVICE: CPT

## 2023-09-21 PROCEDURE — 6360000004 HC RX CONTRAST MEDICATION: Performed by: INTERNAL MEDICINE

## 2023-09-21 PROCEDURE — 85025 COMPLETE CBC W/AUTO DIFF WBC: CPT

## 2023-09-21 PROCEDURE — 86706 HEP B SURFACE ANTIBODY: CPT

## 2023-09-21 PROCEDURE — 83735 ASSAY OF MAGNESIUM: CPT

## 2023-09-21 PROCEDURE — 86707 HEPATITIS BE ANTIBODY: CPT

## 2023-09-21 PROCEDURE — 86900 BLOOD TYPING SEROLOGIC ABO: CPT

## 2023-09-21 PROCEDURE — 80307 DRUG TEST PRSMV CHEM ANLYZR: CPT

## 2023-09-21 PROCEDURE — 85660 RBC SICKLE CELL TEST: CPT

## 2023-09-21 PROCEDURE — 81003 URINALYSIS AUTO W/O SCOPE: CPT

## 2023-09-21 PROCEDURE — 86787 VARICELLA-ZOSTER ANTIBODY: CPT

## 2023-09-21 PROCEDURE — 86709 HEPATITIS A IGM ANTIBODY: CPT

## 2023-09-21 RX ADMIN — PERFLUTREN 1.5 ML: 6.52 INJECTION, SUSPENSION INTRAVENOUS at 09:06

## 2023-09-21 NOTE — TELEPHONE ENCOUNTER
Care Transitions Initial Follow Up Call    Outreach made within 2 business days of discharge: Yes    Patient: Heron Melgar Patient : 1955   MRN: [id number]  Reason for Admission: There are no discharge diagnoses documented for the most recent discharge. Discharge Date: 23       Spoke with: AMRIT    Discharge department/facility: [facility] Interactive Patient Contact:  Was patient able to fill all prescriptions: Yes  Was patient instructed to bring all medications to the follow-up visit: Yes  Is patient taking all medications as directed in the discharge summary?  Yes  Does patient understand their discharge instructions: Yes  Does patient have questions or concerns that need addressed prior to 7-14 day follow up office visit: no    Scheduled appointment with PCP within 7-14 days    Follow Up  Future Appointments   Date Time Provider [facility] [address]   2023  8:30 AM SCHEDULE, [facility] STRESS ECHO RM [facility] ECHO [facility]   2023  9:30 AM Jessica Oquendo PSYD [facility] INF None   2023 11:00 AM [facility] INFUSION 1 [facility] INF None   2023  8:30 AM [facility] Religious [facility]   2023 11:15 AM Eric Holloway, PT [facility] OP PT Religious HOD   2023 12:30 PM SCHEDULE, [facility] PFT [facility] PFT [facility]   2023  1:30 PM Marilu Harris RD, LD [facility] INF None   2024  7:00 AM MEI Vargas - CNP [facility] AND [place], [facility]

## 2023-09-21 NOTE — PROGRESS NOTES
Pt seen at [facility] outpatient infusion for lab draw via port. Port flushed and de-accessed post lab draw. Pt discharged ambulatory to next appt with family.

## 2023-09-21 NOTE — BH NOTE
place.  Functionality of Social Support System: 0) Excellent: Members of the support team have demonstrated initiative in learning and are already committed to and actively and effectively engaged in the patient's care. Appropriateness of Physical Living Space and Environment: 0) Excellent: Patient has excellent, long-term, permanent and adequate housing. Social Support System Total Score: 0    Psychological Stability and Psychopathology   Presence of Psychopathology: 4) Moderate Psychopathology - Present or history of moderate psychopathology (e.g., depressive or anxiety disorder). Treatment, if needed, has been/was effective, good compliance. No SI/SA at present; although possible history SI/SA in past.  Assessment of Depression: 1) Mild Clinical Depression  Assessment of Anxiety: 2) Moderate Clinical Anxiety  History of Organic Psychopathology or Neurocognitive Impairment: 0) None: No history of disease or treatment induced psychiatric problem. Assessment of Current Cognitive Functionin) Borderline Level of Cognitive Functioning; or MoCA / MMSE = 22 - 25. Influence of Personality Traits vs. Disorder: 0) None: No history of significant personality disorder or psychopathology/traits. Effect of Truthfulness vs. Deceptive Behavior in Presentation: 0) No evidence of deceptive behavior in history or at present. Overall Risk for Psychopathology: 2) Mild: History of poor coping with current or previous medical challenges or psychosocial stressors. Only minimal, if any, psychiatric complications in response to illness, medical treatment or psychosocial stressors. Psychological Stability and Psychopathology Total Score: 10    Lifestyle and Effect of Substance Use  Alcohol Use/Abuse/Dependence: 2) ALCOHOL USE - NO ABUSE: History of minimal alcohol use which has caused no social or medical problems (i.e., no abuse). If requested by the team the patient promptly discontinued all alcohol use.   Alcohol Risk for

## 2023-09-22 LAB
HBV E AB SERPL QL IA: NEGATIVE
SICKLE CELL SCREEN: NEGATIVE

## 2023-09-22 NOTE — PROGRESS NOTES
Oncology Nutrition Note    RECOMMENDATIONS:   PO Diet: Continue General diet. Dietary protein at all meals/snacks. Encourage good variety/balance of foods leading up to BMT. Emphasis on Food Safety for Immunocompromised pt when ANC < 1.5  Inpatient Admit Rec'd/Plan:   Rec'd Diet order: REGULAR diet w/no lunch meat. Avoid \"Low Microbial\" Diet order d/t over-restricting patient menu. Allow double portions of all menu items. Add comments to diet order \"Pt may request double portions of all entrees, sides, dessert, etc as desired\". Adjust in-patient meal times to 8a-12p-6p DAILY; coordinate with patient services. Add orders upon admission for Ensure Enlive BID (standard high calorie/high protein). Consult to BMT RD upon admit for close monitoring. ONS: Continue Ensure Complete QD now until BMT. Suggesting pt increase to BID upon admit for BMT. Nutrition Education:   9/25/23 Food Safety booklet, Inpatient Cancer Unit Food Guidelines and HHS \"Always Available\" menu w/verbal review provided. Nutrition follow-up with RD post-BMT at D+ 30/60/90 and PRN. MALNUTRITION ASSESSMENT  Context of Malnutrition: Chronic Illness   Malnutrition Status: At risk for malnutrition (Comment) (weight)       NUTRITION ASSESSMENT:   Nutritional summary & status: Nutrition eval and edu for w/u for Thiotepa/Bu/Cy then AUTO BMT (tentatively scheduled 11/3/23). Nutritionally pt currently stable however hx includes unintentional weight loss and reduced PO intake r/t hospital admissions, per pt and wife.  lb, however RD notes net loss of -12 lb (6.9%) in last 6-7 mo. Denies barriers to nutrition other than hospitalization and  barriers. Diet recall reveals pt w/some memory deficits with eating patterns, but overall he is eating consistently 3 meals per day. Pt also reporting reduced portion sizes and mild altered taste/smell impacting nutrition.  Using Ensure Complete once daily and aiming to drink 64 oz

## 2023-09-23 LAB
CMV IGM SERPL IA-ACNC: <8 AU/ML
VZV IGG SER IA-ACNC: 2311 IV
VZV IGM SER IA-ACNC: 0.16 ISR

## 2023-09-24 LAB
HSV1+2 IGG SER IA-ACNC: 4.45 IV
HSV1+2 IGM SER IA-ACNC: 1.93 IV

## 2023-09-25 ENCOUNTER — HOSPITAL ENCOUNTER (OUTPATIENT)
Dept: ONCOLOGY | Age: 68
Setting detail: INFUSION SERIES
Discharge: HOME | End: 2023-09-25

## 2023-09-25 ENCOUNTER — TRANSCRIBE ORDERS (OUTPATIENT)
Dept: ADMINISTRATIVE | Age: 68
End: 2023-09-25

## 2023-09-25 ENCOUNTER — HOSPITAL ENCOUNTER (OUTPATIENT)
Dept: PHYSICAL THERAPY | Age: 68
Setting detail: THERAPIES SERIES
Discharge: HOME | End: 2023-09-25
Attending: STUDENT IN AN ORGANIZED HEALTH CARE EDUCATION/TRAINING PROGRAM
Payer: MEDICARE

## 2023-09-25 ENCOUNTER — HOSPITAL ENCOUNTER (OUTPATIENT)
Dept: PULMONOLOGY | Age: 68
Discharge: HOME | End: 2023-09-25
Payer: MEDICARE

## 2023-09-25 VITALS — HEIGHT: 70 IN | BODY MASS INDEX: 22.79 KG/M2

## 2023-09-25 DIAGNOSIS — Z01.818 PRE-TRANSPLANT EVALUATION FOR STEM CELL TRANSPLANT: ICD-10-CM

## 2023-09-25 DIAGNOSIS — C85.89 CNS LYMPHOMA (HCC): ICD-10-CM

## 2023-09-25 DIAGNOSIS — C83.38 RETICULOSARCOMA OF LYMPH NODES OF MULTIPLE SITES (HCC): Primary | ICD-10-CM

## 2023-09-25 LAB
ANABASINE UR-MCNC: <5 NG/ML
COTININE UR-MCNC: <15 NG/ML
NICOTINE UR-MCNC: <15 NG/ML
TRANS-3-OH-COTININE UR-MCNC: <50 NG/ML

## 2023-09-25 PROCEDURE — 94729 DIFFUSING CAPACITY: CPT

## 2023-09-25 PROCEDURE — 97161 PT EVAL LOW COMPLEX 20 MIN: CPT

## 2023-09-25 PROCEDURE — 94664 DEMO&/EVAL PT USE INHALER: CPT

## 2023-09-25 PROCEDURE — 97530 THERAPEUTIC ACTIVITIES: CPT

## 2023-09-25 PROCEDURE — 97110 THERAPEUTIC EXERCISES: CPT

## 2023-09-25 PROCEDURE — 94726 PLETHYSMOGRAPHY LUNG VOLUMES: CPT

## 2023-09-25 PROCEDURE — 94010 BREATHING CAPACITY TEST: CPT

## 2023-09-25 PROCEDURE — 94760 N-INVAS EAR/PLS OXIMETRY 1: CPT

## 2023-09-25 NOTE — ONCOLOGY
Nutrition History and Dietary Recall     Food / Nutrition-Related History  Food Allergies or Intolerances:  none   Vitamins, Minerals, and other Herbal Supplements: MV;    Food Restrictions / Cultural Requests:   none   Followed diet for medical reasons: no   Made dietary/lifestyle changes in past: no Outcomes: n/a   Are there any specific food preferences that may impact your ability to eat adequately during your hospitalization/after your procedure/treamtent?: YES; Multiple  issues in past admissions. Small portions; food preparation (poorly prepared/poorly seasoned); restrictive \"low microbial diet\"; timing of food/meal service too close together. Current Nutrition:   Are you currently following a specific diet? PO Diet: Regular   Oral Nutrition Supplements: Standard High Calorie  Using Ensure Complete QD     Do you have a set/regular meal pattern each day? yes. Which meals do you typically consume each day? Breakfast  yes. Lunch  yes. Dinner  yes. How many meals do you typically consume in one day? 3  How many snacks do you typically consume in one day?  1     24 hour recall/food frequency:  Breakfast: 8a  Consumes: 7 out of 7 days  Current meal includes: Fruit, oatmeal or cold cereal; yogurt, erwin/eggs   PO Intake: 51-75% and %  Fluids: Milk, Coffee, juice and 12 oz coffee     AM Snack:Sometimes   Current snack includes: mixed nuts or fruit,   PO Intake: %  Fluids: water or soda      Lunch: 12p  Consumes: 7 out of 7 days  Current meal includes: leftovers from dinner night before (protein + veg + starch);   PO Intake: 51-75% and %  Fluids: water        PM Snack: Sometimes   Current snack includes: ice cream  PO Intake: 51-75%      Dinner: 6/6:30p  Consumes: 7 out of 7 days  Current meal includes: \"balanced meal\" includes protein (chicken, pork, meat of some kind) + veg and sometimes starchy veg; pizza or entree salad; chicken wings   PO Intake: 51-75% and %  Fluids:

## 2023-09-25 NOTE — PROGRESS NOTES
[address] [facility]     Outpatient Cardiology         Patient Name:  Guera Amaya  Requesting Physician: No admitting provider for patient encounter. Primary Care Physician: MEI Chiu - CNP    Reason for Consultation/Chief Complaint:   Chief Complaint   Patient presents with    Pre-op Exam     Stem cell transplant         History of Present Illness:    CHILO Buitrago a 77 y.o. 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 S/P partial thyroidectomy. Memory is impaired, and has visual changes. From a preop/pre-BMT perspective, he is doing okay, no clear symptoms concerning for CAD or heart failure. Most recent echo okay. Did not complete a stress test in 2021, today we discussed completing this. In the past he was noted to have a possible right atrial mass attached to the interatrial septum, I question if this is related to prior port placement. Regardless it will not  at this point. The mass was not obstructive at that point on most recent echo it did not obstruct the RV inflow. PMH  Past Medical History:   Diagnosis Date    Alcohol abuse 04/16/2021    CAD (coronary artery disease)     Cancer     right thumb melanoma, Total thumb removed 2012 by general surgeon at [facility] ?name    Cancer [facility]) 2002    melonoma,-Rt thumb, s/p excision-The [address] followed by Kaya Umaña [facility])     Non hodgkins lymphoma    Encounter for imaging to screen for metal prior to MRI 10/07/2021    MRI Conditional Medtronic [place] XT DR Model#[id number] Leads: RV [id number] implanted 8/24/21. Normal Mode. 1.5T or 3.0T. Pt must be A/OX4 per Medtronic guidelines.  Med[employer] Rep and RN must present for exam. Pt currently follows Dr. Ami Jon    History of

## 2023-09-25 NOTE — THERAPY EVALUATION
[address] [place] [address] and Therapy 2010 [address] [facility], [facility], [address] [place] [address] office: [phone] fax: [phone]       Physical Therapy Certification    Dear Waleska Mcginnis DO ,    We had the pleasure of evaluating the following patient for physical therapy services at [facility] ([facility] A summary of our findings can be found in the initial assessment below. This includes our plan of care. If you have any questions or concerns regarding these findings, please do not hesitate to contact me at the office phone number checked above. Thank you for the referral.       Physician Signature:_______________________________Date:__________________  By signing above (or electronic signature), therapist's plan is approved by physician      Physical Therapy: Initial Evaluation   Patient: Sam Lopez (36 y.o. 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   [] Yes, Patient intake triggered further evaluation      [] C-SSRS Screening completed  [] PCP notified via Plan of Care  [] Emergency services notified     Preferred Language for Healthcare:   [x] English       [] other:    Relevant Medical History:  [x] Patient history, allergies, meds reviewed.  Medical chart

## 2023-09-27 PROCEDURE — 97161 PT EVAL LOW COMPLEX 20 MIN: CPT

## 2023-09-27 PROCEDURE — 97112 NEUROMUSCULAR REEDUCATION: CPT

## 2023-09-27 PROCEDURE — 97530 THERAPEUTIC ACTIVITIES: CPT

## 2023-09-27 NOTE — PROCEDURES
[address] [facility]               [address]                               PULMONARY FUNCTION    PATIENT NAME: Lizett Laguna                     :        1955  MED REC NO:   [id number]                          ROOM:  ACCOUNT NO:   [de-identified]                           ADMIT DATE: 2023  PROVIDER:     Lyly Castorena MD    DATE OF PROCEDURE:  2023    FINDINGS:  Forced vital capacity, expiratory flow rates and FEV1/FVC  ratio are normal.  Lung volume determinations by plethysmography show  normal total lung capacity, FRC, RV. Single-breath diffusion capacity  is normal.    IMPRESSION:  Normal pulmonary function study.         Lyly Castorena MD    D: 2023 14:39:15       T: 2023 14:43:57     [email or web address]  Job#: [id number]     Doc#: [id number]    CC:

## 2023-09-28 ENCOUNTER — TELEPHONE (OUTPATIENT)
Dept: ONCOLOGY | Age: 68
End: 2023-09-28

## 2023-09-28 PROCEDURE — 94727 GAS DIL/WSHOT DETER LNG VOL: CPT | Performed by: INTERNAL MEDICINE

## 2023-09-28 PROCEDURE — 94729 DIFFUSING CAPACITY: CPT | Performed by: INTERNAL MEDICINE

## 2023-09-28 PROCEDURE — 94060 EVALUATION OF WHEEZING: CPT | Performed by: INTERNAL MEDICINE

## 2023-09-28 NOTE — TELEPHONE ENCOUNTER
Behavioral Health Note    Called patient to determine if he wanted to schedule a follow-up behavioral health appointment. He states that his MRI was moved back to tomorrow and he has an oncology appointment on Monday. Patient or wife will call after Monday to schedule if needed.

## 2023-09-29 ENCOUNTER — HOSPITAL ENCOUNTER (OUTPATIENT)
Dept: PHYSICAL THERAPY | Age: 68
Setting detail: THERAPIES SERIES
End: 2023-09-29
Attending: STUDENT IN AN ORGANIZED HEALTH CARE EDUCATION/TRAINING PROGRAM
Payer: MEDICARE

## 2023-09-29 ENCOUNTER — HOSPITAL ENCOUNTER (OUTPATIENT)
Dept: MRI IMAGING | Age: 68
Discharge: HOME | End: 2023-09-29
Attending: STUDENT IN AN ORGANIZED HEALTH CARE EDUCATION/TRAINING PROGRAM
Payer: MEDICARE

## 2023-09-29 DIAGNOSIS — C83.38 RETICULOSARCOMA OF LYMPH NODES OF MULTIPLE SITES (HCC): ICD-10-CM

## 2023-09-29 PROCEDURE — 70553 MRI BRAIN STEM W/O & W/DYE: CPT

## 2023-09-29 PROCEDURE — 6360000004 HC RX CONTRAST MEDICATION: Performed by: STUDENT IN AN ORGANIZED HEALTH CARE EDUCATION/TRAINING PROGRAM

## 2023-09-29 PROCEDURE — A9579 GAD-BASE MR CONTRAST NOS,1ML: HCPCS | Performed by: STUDENT IN AN ORGANIZED HEALTH CARE EDUCATION/TRAINING PROGRAM

## 2023-09-29 RX ADMIN — GADOTERIDOL 15 ML: 279.3 INJECTION, SOLUTION INTRAVENOUS at 15:22

## 2023-09-29 NOTE — FLOWSHEET NOTE
benefit from skilled physical therapy to address and promote return to highest level of functional independence. Frailty deficits include weight loss, but pt also has deficits including dec strength, endurance, and balance, and plan to address with the following interventions: therapeutic exercise, endurance training, gait training, balance training, as indicated. Treatment/Activity Tolerance:  [x] Patient tolerated treatment well [] Patient limited by fatique  [] Patient limited by pain  [] Patient limited by other medical complications  [] Other:     Overall Progression Towards Functional goals/ Treatment Progress Update:  [] Patient is progressing as expected towards functional goals listed. [] Progression is slowed due to complexities/Impairments listed. [] Progression has been slowed due to co-morbidities. [x] Plan just implemented, too soon to assess goals progression <30days   [] Goals require adjustment due to lack of progress  [] Patient is not progressing as expected and requires additional follow up with physician  [] Other    Prognosis for POC: [x] Good [] Fair  [] Poor    Patient requires continued skilled intervention: [x] Yes  [] No        PLAN: See eval  [] Continue per plan of care [] Alter current plan (see comments)  [x] Plan of care initiated [] Hold pending MD visit [] Discharge    Electronically signed by: Bry Lima PT, PT    Note: If patient does not return for scheduled/recommended follow up visits, this note will serve as a discharge from care along with the most recent update on progress.

## 2023-09-29 NOTE — CARE COORDINATION
[facility] Outpatient Therapy  [address] [place] [address], [id number] [place] [address], [address] [place] [address] Avenue  Phone: [phone]   Fax: [phone]    Physical Therapy Missed Visit Note     Date:  2023    Patient Name:  Flex Pickering      :  1955    MRN: [id number]      Cancelled visits to date: 0  No-shows to date: 1    For today's appointment patient:  []  Cancelled  []  Rescheduled appointment  [x]  No-show     Reason given by patient:  []  Patient ill  []  Conflicting appointment  []  No transportation    []  Conflict with work  [x]  No reason given  []  Other:     Comments:      Electronically signed by:  Ofelia Traylor, PT, DPT

## 2023-10-02 ENCOUNTER — HOSPITAL ENCOUNTER (OUTPATIENT)
Dept: PHYSICAL THERAPY | Age: 68
Setting detail: THERAPIES SERIES
Discharge: HOME | End: 2023-10-02
Attending: STUDENT IN AN ORGANIZED HEALTH CARE EDUCATION/TRAINING PROGRAM
Payer: MEDICARE

## 2023-10-02 ENCOUNTER — OFFICE VISIT (OUTPATIENT)
Dept: CARDIOLOGY CLINIC | Age: 68
End: 2023-10-02
Payer: MEDICARE

## 2023-10-02 VITALS
BODY MASS INDEX: 23.94 KG/M2 | SYSTOLIC BLOOD PRESSURE: 108 MMHG | WEIGHT: 165.4 LBS | DIASTOLIC BLOOD PRESSURE: 70 MMHG | HEART RATE: 66 BPM

## 2023-10-02 DIAGNOSIS — I25.10 CAD IN NATIVE ARTERY: ICD-10-CM

## 2023-10-02 DIAGNOSIS — Z01.810 PREOP CARDIOVASCULAR EXAM: Primary | ICD-10-CM

## 2023-10-02 DIAGNOSIS — I44.2 COMPLETE HEART BLOCK (HCC): ICD-10-CM

## 2023-10-02 DIAGNOSIS — I51.89 RIGHT ATRIAL MASS: ICD-10-CM

## 2023-10-02 DIAGNOSIS — I48.0 PAF (PAROXYSMAL ATRIAL FIBRILLATION) (HCC): ICD-10-CM

## 2023-10-02 DIAGNOSIS — Z95.0 PACEMAKER: ICD-10-CM

## 2023-10-02 LAB
Lab: NORMAL
REPORT: NORMAL
THIS TEST SENT TO: NORMAL

## 2023-10-02 PROCEDURE — 99214 OFFICE O/P EST MOD 30 MIN: CPT | Performed by: INTERNAL MEDICINE

## 2023-10-02 PROCEDURE — 93000 ELECTROCARDIOGRAM COMPLETE: CPT | Performed by: INTERNAL MEDICINE

## 2023-10-02 PROCEDURE — 1123F ACP DISCUSS/DSCN MKR DOCD: CPT | Performed by: INTERNAL MEDICINE

## 2023-10-02 PROCEDURE — 97110 THERAPEUTIC EXERCISES: CPT

## 2023-10-02 PROCEDURE — 97530 THERAPEUTIC ACTIVITIES: CPT

## 2023-10-02 RX ORDER — ACETAMINOPHEN 325 MG/1
2 TABLET ORAL
COMMUNITY
Start: 2023-09-28

## 2023-10-02 NOTE — FLOWSHEET NOTE
[facility] Outpatient [facility]  [address] 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]  Pre-transplant evaluation for stem cell transplant [Z01.818]  Treatment Diagnosis Information:   R53.0 Cancer Related Fatigue  Insurance/Certification information:   Medicare  Physician Information:  Pete Salinas DO   Plan of care signed:    [] Yes  [x] No    Date of Patient follow up with Physician:      Progress Report: []  Yes  []  No   If Yes, Date Range for reporting period:  Beginning:    Ending:      Progress report due (10 Rx/or 30 days whichever is less):      Recertification due (POC duration/ or 90 days whichever is less): 23    Visit # Insurance Allowable Auth Needed   1 TBD []Yes   []No     RESTRICTIONS/PRECAUTIONS:   Latex Allergy:  [x]NO      []YES  Preferred Language for Healthcare:   [x]English       []other:    Functional Scale: TUG   Score: 8.71  Date assessed:     Pain level:  /10     SUBJECTIVE:  Pt diagnosed 2023 with relapse of DLBCL w/ adrenal involvement and now CNS involvement. He is in the preparation stage for possible BMT. ONSET:10/21 with relapse diagnosed 2023     Treatment Plan for cancer: Oct 23 BMT    OBJECTIVE: See eval  Observation:   Test measurements:  6 minute walk test 1423 ft (433 m)    Exercises/Interventions: Exercises in bold performed in department today. Items not bolded are carried forward from prior visits for continuity of the record.     Exercise/Equipment Resistance/Repetitions HEP Other comments     []      []      []      []      []      []      []      []      []      []      []      []      []      []      []      []      []      []      Home Exercise Program:      Therapeutic

## 2023-10-02 NOTE — PATIENT INSTRUCTIONS
PLEASE CALL [facility] SCHEDULING -[phone] TO MAKE AN APPOINTMENT FOR YOUR TESTING - stress test/scan of your heart

## 2023-10-03 PROBLEM — I51.89 RIGHT ATRIAL MASS: Status: ACTIVE | Noted: 2023-10-03

## 2023-10-03 PROBLEM — Z01.810 PREOP CARDIOVASCULAR EXAM: Status: ACTIVE | Noted: 2023-10-03

## 2023-10-03 ASSESSMENT — ENCOUNTER SYMPTOMS
BACK PAIN: 0
COUGH: 0
VOMITING: 0
ABDOMINAL PAIN: 0
BLOOD IN STOOL: 0
COLOR CHANGE: 0
SHORTNESS OF BREATH: 0
EYE DISCHARGE: 0
CHEST TIGHTNESS: 0
ABDOMINAL DISTENTION: 0
FACIAL SWELLING: 0
WHEEZING: 0

## 2023-10-03 NOTE — ASSESSMENT & PLAN NOTE
Plan for stress test prior to BMT.   If low to intermediate risk okay to proceed  Most recent echo with out evidence of RA mass

## 2023-10-03 NOTE — ASSESSMENT & PLAN NOTE
Currently not seen on most recent echo, regardless of mass etiology, the mass itself is not obstructing. Previously scheduled to get cardiac MRI, unclear why it did not happen, I wonder if it was related to pacemaker artifact.   At this point it will not

## 2023-10-06 ENCOUNTER — HOSPITAL ENCOUNTER (OUTPATIENT)
Dept: ONCOLOGY | Age: 68
Setting detail: INFUSION SERIES
Discharge: HOME | End: 2023-10-06
Payer: MEDICARE

## 2023-10-06 PROCEDURE — 86694 HERPES SIMPLEX NES ANTBDY: CPT

## 2023-10-06 PROCEDURE — 36591 DRAW BLOOD OFF VENOUS DEVICE: CPT

## 2023-10-10 DIAGNOSIS — Z52.011 AUTOLOGOUS DONOR OF STEM CELLS: ICD-10-CM

## 2023-10-10 RX ORDER — LOPERAMIDE HYDROCHLORIDE 2 MG/1
2 CAPSULE ORAL PRN
OUTPATIENT
Start: 2023-10-17

## 2023-10-10 RX ORDER — PROCHLORPERAZINE MALEATE 10 MG
10 TABLET ORAL EVERY 6 HOURS PRN
OUTPATIENT
Start: 2023-10-17

## 2023-10-10 RX ORDER — CALCIUM GLUCONATE 20 MG/ML
2000 INJECTION, SOLUTION INTRAVENOUS PRN
OUTPATIENT
Start: 2023-10-17

## 2023-10-12 ENCOUNTER — HOSPITAL ENCOUNTER (OUTPATIENT)
Dept: CARDIOLOGY | Age: 68
Discharge: HOME | End: 2023-10-12
Payer: MEDICARE

## 2023-10-12 ENCOUNTER — PRE-PROCEDURE TELEPHONE (OUTPATIENT)
Dept: CARDIAC CATH/INVASIVE PROCEDURES | Age: 68
End: 2023-10-12

## 2023-10-12 DIAGNOSIS — I48.0 PAF (PAROXYSMAL ATRIAL FIBRILLATION) (HCC): ICD-10-CM

## 2023-10-12 DIAGNOSIS — Z01.810 PREOP CARDIOVASCULAR EXAM: ICD-10-CM

## 2023-10-12 DIAGNOSIS — I44.2 COMPLETE HEART BLOCK (HCC): ICD-10-CM

## 2023-10-12 DIAGNOSIS — I25.10 CAD IN NATIVE ARTERY: ICD-10-CM

## 2023-10-12 DIAGNOSIS — Z95.0 PACEMAKER: ICD-10-CM

## 2023-10-12 PROCEDURE — 78452 HT MUSCLE IMAGE SPECT MULT: CPT

## 2023-10-12 PROCEDURE — 93017 CV STRESS TEST TRACING ONLY: CPT

## 2023-10-12 PROCEDURE — 6360000002 HC RX W HCPCS: Performed by: INTERNAL MEDICINE

## 2023-10-12 PROCEDURE — A9502 TC99M TETROFOSMIN: HCPCS | Performed by: INTERNAL MEDICINE

## 2023-10-12 PROCEDURE — 3430000000 HC RX DIAGNOSTIC RADIOPHARMACEUTICAL: Performed by: INTERNAL MEDICINE

## 2023-10-12 RX ORDER — REGADENOSON 0.08 MG/ML
0.4 INJECTION, SOLUTION INTRAVENOUS
Status: COMPLETED | OUTPATIENT
Start: 2023-10-12 | End: 2023-10-12

## 2023-10-12 RX ADMIN — TETROFOSMIN 33 MILLICURIE: 1.38 INJECTION, POWDER, LYOPHILIZED, FOR SOLUTION INTRAVENOUS at 13:18

## 2023-10-12 RX ADMIN — TETROFOSMIN 11.4 MILLICURIE: 1.38 INJECTION, POWDER, LYOPHILIZED, FOR SOLUTION INTRAVENOUS at 12:14

## 2023-10-12 RX ADMIN — REGADENOSON 0.4 MG: 0.08 INJECTION, SOLUTION INTRAVENOUS at 13:18

## 2023-10-12 NOTE — PROGRESS NOTES
Patient noted to have pacemaker. RN showed Dr. Debbie Velazquez cardiologist in office EKG. RN to convert patient to rm.

## 2023-10-12 NOTE — PROGRESS NOTES
Called patient about procedure. Told to be here at __1230____ for procedure at __1400____. Must be NPO after midnight but can take morning medication with sips of water. Patient stated they __are not on__ blood thinners __ prior to procedure as directed by the office. Told to have a responsible adult with them to take them home and stay with them afterwards, if they do not get admitted to hospital. Also, to bring a current list of medications. No other questions or concerns.

## 2023-10-13 ENCOUNTER — HOSPITAL ENCOUNTER (OUTPATIENT)
Dept: INTERVENTIONAL RADIOLOGY/VASCULAR | Age: 68
Discharge: HOME | End: 2023-10-13
Payer: MEDICARE

## 2023-10-13 VITALS — WEIGHT: 161 LBS | BODY MASS INDEX: 23.05 KG/M2 | HEIGHT: 70 IN | TEMPERATURE: 97.9 F

## 2023-10-13 DIAGNOSIS — G96.9 CNS (CENTRAL NERVOUS SYSTEM DISEASE): ICD-10-CM

## 2023-10-13 PROBLEM — Z52.001 STEM CELL DONOR: Status: ACTIVE | Noted: 2023-10-13

## 2023-10-13 LAB — INR BLD: 1.1 (ref 0.84–1.16)

## 2023-10-13 PROCEDURE — C1751 CATH, INF, PER/CENT/MIDLINE: HCPCS

## 2023-10-13 PROCEDURE — 6360000002 HC RX W HCPCS

## 2023-10-13 PROCEDURE — 36558 INSERT TUNNELED CV CATH: CPT

## 2023-10-13 PROCEDURE — 77001 FLUOROGUIDE FOR VEIN DEVICE: CPT

## 2023-10-13 PROCEDURE — 85610 PROTHROMBIN TIME: CPT

## 2023-10-13 PROCEDURE — C1769 GUIDE WIRE: HCPCS

## 2023-10-13 PROCEDURE — 36590 REMOVAL TUNNELED CV CATH: CPT

## 2023-10-13 PROCEDURE — 2500000003 HC RX 250 WO HCPCS

## 2023-10-13 PROCEDURE — 99152 MOD SED SAME PHYS/QHP 5/>YRS: CPT

## 2023-10-13 PROCEDURE — 76937 US GUIDE VASCULAR ACCESS: CPT

## 2023-10-13 PROCEDURE — C1894 INTRO/SHEATH, NON-LASER: HCPCS

## 2023-10-13 PROCEDURE — 2580000003 HC RX 258

## 2023-10-13 NOTE — DISCHARGE INSTRUCTIONS
Line Placement    __X__ You may be drowsy or lightheaded after receiving sedation. DO NOT operate a vehicle (automobile, bicycle, motorcycle, machinery, or power tools), make any important decisions or sign any important/legal documents, or drink alcoholic beverages for the next 24 hours  _X___ We strongly suggest that a responsible adult be with you for the next 24 hours for your protection and safety  __X__ If the intravenous catheter site is painful, apply warm wet compresses on the site until the soreness is relieved and elevate the arm above the heart. Call your physician if no improvement in 2 to 3 days    DIETARY INSTRUCTIONS:    ____ Drink extra fluids over the next 24 hours (If not contraindicated by illness or by physician order)  ____ Start with clear liquids and progress to normal diet as you feel like eating.   If you experience nausea or repeated episodes of vomiting, which persist beyond 12-24 hours, notify your doctor        __X__ Resume your previous diet    ACTIVITY INSTRUCTIONS:    _X___ See other instructions  ____ No special instructions  ____ Rest for 24 hours    ____ Up as tolerated  ____ Increase activity as tolerated    Wound/Dressing Instructions:  __X__ See other instructions  ____ May shower, tomorrow  ____ Remove bandage within 24 hours    MEDICATION INSTRUCTIONS:    ____ See Medication Reconciliation Sheet      SPECIAL INSTRUCTIONS:  __________________________________________________________________________________________________________________________________________________________________________________________________________________________________________                                                                                                                                                                                                                                                                                                  Please make sure that you follow-up

## 2023-10-16 LAB
HSV1+2 IGG SER IA-ACNC: 3.17
HSV1+2 IGM SER IA-ACNC: 2.05

## 2023-10-16 RX ORDER — TAMSULOSIN HYDROCHLORIDE 0.4 MG/1
0.4 CAPSULE ORAL DAILY
Qty: 90 CAPSULE | Refills: 1 | Status: ON HOLD | OUTPATIENT
Start: 2023-10-16 | End: 2023-11-22 | Stop reason: HOSPADM

## 2023-10-16 NOTE — TELEPHONE ENCOUNTER
Refill request for  flomax  medication.      Name of Pharmacy-  [facility]       Last visit -  6/28/23     Pending visit -  2/22/24    Last refill - 4/28/23      Medication Contract signed -    Last Oarrs ran-          Additional Comments

## 2023-10-17 ENCOUNTER — HOSPITAL ENCOUNTER (OUTPATIENT)
Dept: ONCOLOGY | Age: 68
Setting detail: INFUSION SERIES
Discharge: HOME | End: 2023-10-17
Payer: MEDICARE

## 2023-10-17 VITALS
TEMPERATURE: 98.5 F | SYSTOLIC BLOOD PRESSURE: 103 MMHG | OXYGEN SATURATION: 96 % | DIASTOLIC BLOOD PRESSURE: 66 MMHG | RESPIRATION RATE: 16 BRPM | HEART RATE: 62 BPM

## 2023-10-17 DIAGNOSIS — Z52.011 AUTOLOGOUS DONOR OF STEM CELLS: Primary | ICD-10-CM

## 2023-10-17 DIAGNOSIS — Z52.001 STEM CELL DONOR: Primary | ICD-10-CM

## 2023-10-17 LAB
ALBUMIN SERPL-MCNC: 4.1 G/DL (ref 3.4–5)
ALBUMIN/GLOB SERPL: 2 {RATIO} (ref 1.1–2.2)
ALP SERPL-CCNC: 275 U/L (ref 40–129)
ALT SERPL-CCNC: 17 U/L (ref 10–40)
ANION GAP SERPL CALCULATED.3IONS-SCNC: 11 MMOL/L (ref 3–16)
ANISOCYTOSIS BLD QL SMEAR: ABNORMAL
AST SERPL-CCNC: 25 U/L (ref 15–37)
BASOPHILS # BLD: 0 K/UL (ref 0–0.2)
BASOPHILS # BLD: 0.1 K/UL (ref 0–0.2)
BASOPHILS NFR BLD: 0 %
BASOPHILS NFR BLD: 0.1 %
BILIRUB SERPL-MCNC: <0.2 MG/DL (ref 0–1)
BUN SERPL-MCNC: 25 MG/DL (ref 7–20)
CALCIUM SERPL-MCNC: 9.4 MG/DL (ref 8.3–10.6)
CHLORIDE SERPL-SCNC: 104 MMOL/L (ref 99–110)
CO2 SERPL-SCNC: 25 MMOL/L (ref 21–32)
CREAT SERPL-MCNC: 1.4 MG/DL (ref 0.8–1.3)
DEPRECATED RDW RBC AUTO: 15.9 % (ref 12.4–15.4)
DEPRECATED RDW RBC AUTO: 16.1 % (ref 12.4–15.4)
EOSINOPHIL # BLD: 0 K/UL (ref 0–0.6)
EOSINOPHIL # BLD: 0.7 K/UL (ref 0–0.6)
EOSINOPHIL NFR BLD: 0 %
EOSINOPHIL NFR BLD: 1.1 %
GFR SERPLBLD CREATININE-BSD FMLA CKD-EPI: 54 ML/MIN/{1.73_M2}
GLUCOSE SERPL-MCNC: 148 MG/DL (ref 70–99)
HCT VFR BLD AUTO: 30.2 % (ref 40.5–52.5)
HCT VFR BLD AUTO: 33.4 % (ref 40.5–52.5)
HGB BLD-MCNC: 10.1 G/DL (ref 13.5–17.5)
HGB BLD-MCNC: 10.9 G/DL (ref 13.5–17.5)
LYMPHOCYTES # BLD: 0.4 K/UL (ref 1–5.1)
LYMPHOCYTES # BLD: 1.3 K/UL (ref 1–5.1)
LYMPHOCYTES NFR BLD: 0 %
LYMPHOCYTES NFR BLD: 2.3 %
MAGNESIUM SERPL-MCNC: 1.9 MG/DL (ref 1.8–2.4)
MCH RBC QN AUTO: 31.6 PG (ref 26–34)
MCH RBC QN AUTO: 32 PG (ref 26–34)
MCHC RBC AUTO-ENTMCNC: 32.8 G/DL (ref 31–36)
MCHC RBC AUTO-ENTMCNC: 33.4 G/DL (ref 31–36)
MCV RBC AUTO: 95.8 FL (ref 80–100)
MCV RBC AUTO: 96.4 FL (ref 80–100)
METAMYELOCYTES NFR BLD MANUAL: 2 %
MONOCYTES # BLD: 0.4 K/UL (ref 0–1.3)
MONOCYTES # BLD: 2.3 K/UL (ref 0–1.3)
MONOCYTES NFR BLD: 1 %
MONOCYTES NFR BLD: 3.8 %
NEUTROPHILS # BLD: 42.9 K/UL (ref 1.7–7.7)
NEUTROPHILS # BLD: 55.2 K/UL (ref 1.7–7.7)
NEUTROPHILS NFR BLD: 92.7 %
NEUTROPHILS NFR BLD: 96 %
PLATELET # BLD AUTO: 100 K/UL (ref 135–450)
PLATELET # BLD AUTO: 206 K/UL (ref 135–450)
PLATELET BLD QL SMEAR: ABNORMAL
PMV BLD AUTO: 7.7 FL (ref 5–10.5)
PMV BLD AUTO: 9.2 FL (ref 5–10.5)
POTASSIUM SERPL-SCNC: 4.1 MMOL/L (ref 3.5–5.1)
PROT SERPL-MCNC: 6.2 G/DL (ref 6.4–8.2)
RBC # BLD AUTO: 3.16 M/UL (ref 4.2–5.9)
RBC # BLD AUTO: 3.47 M/UL (ref 4.2–5.9)
SODIUM SERPL-SCNC: 140 MMOL/L (ref 136–145)
VARIANT LYMPHS NFR BLD MANUAL: 1 % (ref 0–6)
WBC # BLD AUTO: 43.8 K/UL (ref 4–11)
WBC # BLD AUTO: 59.5 K/UL (ref 4–11)

## 2023-10-17 PROCEDURE — 38206 HARVEST AUTO STEM CELLS: CPT

## 2023-10-17 PROCEDURE — 96372 THER/PROPH/DIAG INJ SC/IM: CPT

## 2023-10-17 PROCEDURE — 85025 COMPLETE CBC W/AUTO DIFF WBC: CPT

## 2023-10-17 PROCEDURE — 6360000002 HC RX W HCPCS: Performed by: STUDENT IN AN ORGANIZED HEALTH CARE EDUCATION/TRAINING PROGRAM

## 2023-10-17 PROCEDURE — 83735 ASSAY OF MAGNESIUM: CPT

## 2023-10-17 PROCEDURE — 36592 COLLECT BLOOD FROM PICC: CPT

## 2023-10-17 PROCEDURE — 80053 COMPREHEN METABOLIC PANEL: CPT

## 2023-10-17 RX ORDER — PROCHLORPERAZINE MALEATE 10 MG
10 TABLET ORAL EVERY 6 HOURS PRN
Status: DISCONTINUED | OUTPATIENT
Start: 2023-10-17 | End: 2023-10-18 | Stop reason: HOSPADM

## 2023-10-17 RX ORDER — LOPERAMIDE HYDROCHLORIDE 2 MG/1
2 CAPSULE ORAL PRN
Status: DISCONTINUED | OUTPATIENT
Start: 2023-10-17 | End: 2023-10-18 | Stop reason: HOSPADM

## 2023-10-17 RX ORDER — CALCIUM GLUCONATE 20 MG/ML
2000 INJECTION, SOLUTION INTRAVENOUS PRN
Status: DISCONTINUED | OUTPATIENT
Start: 2023-10-17 | End: 2023-10-18 | Stop reason: HOSPADM

## 2023-10-17 RX ORDER — CALCIUM GLUCONATE 20 MG/ML
2000 INJECTION, SOLUTION INTRAVENOUS PRN
OUTPATIENT
Start: 2023-10-18

## 2023-10-17 RX ORDER — LOPERAMIDE HYDROCHLORIDE 2 MG/1
2 CAPSULE ORAL PRN
OUTPATIENT
Start: 2023-10-18

## 2023-10-17 RX ORDER — PLERIXAFOR 24 MG/1.2ML
24 SOLUTION SUBCUTANEOUS
Start: 2023-10-18

## 2023-10-17 RX ORDER — PLERIXAFOR 24 MG/1.2ML
24 SOLUTION SUBCUTANEOUS
Status: DISCONTINUED | OUTPATIENT
Start: 2023-10-17 | End: 2023-10-18 | Stop reason: HOSPADM

## 2023-10-17 RX ORDER — PROCHLORPERAZINE MALEATE 10 MG
10 TABLET ORAL EVERY 6 HOURS PRN
OUTPATIENT
Start: 2023-10-18

## 2023-10-17 RX ADMIN — FILGRASTIM-AAFI 780 MCG: 480 INJECTION, SOLUTION SUBCUTANEOUS at 07:17

## 2023-10-17 NOTE — PROGRESS NOTES
Pt arrived to [facility] today for scheduled stem cell pheresis procedure. Labs drawn and Granix administered per this RN. Apheresis and education completed by [facility] Claritza MONZON. Review [facility] documentation for details. Patient discharged home with wife. Verbalizes understanding of follow up appointments.      Laurita Liu RN

## 2023-10-18 ENCOUNTER — HOSPITAL ENCOUNTER (OUTPATIENT)
Dept: ONCOLOGY | Age: 68
Setting detail: INFUSION SERIES
Discharge: HOME | End: 2023-10-18
Payer: MEDICARE

## 2023-10-18 ENCOUNTER — HOSPITAL ENCOUNTER (OUTPATIENT)
Dept: ONCOLOGY | Age: 68
Setting detail: INFUSION SERIES
End: 2023-10-18
Payer: MEDICARE

## 2023-10-18 VITALS — BODY MASS INDEX: 24.42 KG/M2 | WEIGHT: 170.19 LBS

## 2023-10-18 PROCEDURE — 96523 IRRIG DRUG DELIVERY DEVICE: CPT

## 2023-10-18 NOTE — PROGRESS NOTES
BCC Allogeneic Progress Note    10/18/2023    [facility]    :  1955    MRN:  [id number]    Referring MD: No referring provider defined for this encounter. Subjective: Tolerating the pheresis without compliants. Specifically, no back pain, chest pain or abdominal discomfort. ECOG PS:  (1) Restricted in physically strenuous activity, ambulatory and able to do work of light nature    KPS: 90% Able to carry on normal activity; minor signs or symptoms of disease    Isolation:  None     Medications    Scheduled Meds:  Continuous Infusions:  PRN Meds:    ROS:  As noted above, otherwise remainder of 10-point ROS negative    Physical Exam:    I&O:  No intake or output data in the 24 hours ending 10/18/23 0653    Vital Signs: There were no vitals taken for this visit. Weight:    Wt Readings from Last 3 Encounters:   10/13/23 73 kg (161 lb)   10/02/23 75 kg (165 lb 6.4 oz)   23 71.4 kg (157 lb 6.4 oz)       General: Awake, alert and oriented. HEENT: normocephalic, alopecia, PERRL, no scleral erythema or icterus, Oral mucosa moist and intact, throat clear  NECK: supple   BACK: Straight  SKIN: warm dry and intact without lesions rashes or masses  CHEST: CTA bilaterally without use of accessory muscles  CV: Normal S1 S2, RRR, no MRG  ABD: NT, ND, normoactive BS  EXTREMITIES: without edema, denies calf tenderness  NEURO: CN II - XII grossly intact  CATHETER: without signs of infection    Data:   CBC:   Recent Labs     10/17/23  0715 10/17/23  1224   WBC 59.5* 43.8*   HGB 10.9* 10.1*   HCT 33.4* 30.2*   MCV 96.4 95.8    100*     BMP/Mag:  Recent Labs     10/17/23  0715      K 4.1      CO2 25   BUN 25*   CREATININE 1.4*   MG 1.90     LIVP:   Recent Labs     10/17/23  0715   AST 25   ALT 17   BILITOT <0.2   ALKPHOS 275*     Uric Acid:  No results for input(s): \"LABURIC\" in the last 72 hours.   Coags: No results for input(s): \"PROTIME\", \"INR\", \"APTT\" in the

## 2023-10-18 NOTE — PROGRESS NOTES
Pt seen and assessed at [facility] for line care needs. CVC site remains free of signs/symptoms of infection. No drainage, edema, erythema, pain, or warmth noted at site. Line care performed per flowsheet. CVC need continues d/t ongoing outpt therapy. Pt verbalizes understanding of discharge instructions. Discharged ambulatory to home with wife.

## 2023-10-19 ENCOUNTER — TELEPHONE (OUTPATIENT)
Dept: CARDIOLOGY CLINIC | Age: 68
End: 2023-10-19

## 2023-10-19 DIAGNOSIS — R94.39 ABNORMAL STRESS TEST: Primary | ICD-10-CM

## 2023-10-19 NOTE — TELEPHONE ENCOUNTER
Procedure:  [facility]  Doctor:  Dr. Cathi Yañez  Date:  10/23/23  Time:  2pm  Arrival:  12:30pm  Reps:  n/a  Anesthesia: n/a      Spoke with patient and wife. Instructions sent thru [facility].

## 2023-10-19 NOTE — TELEPHONE ENCOUNTER
Per Dr Carla Bailey, Plan for Left Heart cath with indication of diagnostic only and no PCI to be performed. Spoke with patient and wife; verb understanding. Left Heart Catheterization- with indication of diagnostic only and no PCI to be performed    A left heart catheterization is a procedure that provides your cardiologist with detailed information regarding how your heart functions. A small catheter (long, fine tube) is inserted into an artery (a vessel that carries blood and oxygen) that leads to your heart. While watching with x-ray equipment, small amounts of dye are injected which enables visualization of the heart arteries and chambers. The pictures that your cardiologist receives from the cardiac catheterization enable him or her to decide on the best treatment for you. Date of the procedure:   10/23/2023    Time of arrival:      Cardiologist performing the procedure:  Dr Jaylyn Wilson, [facility]     Instructions for your left heart catheterization:    1. Bring a list of your medications to the hospital.    2.  Please notify us before the procedure if you are allergic to anything; especially x-ray contrast dye, iodine, nickel, or any type of jewelry. This is very important! 3. Do not eat or drink anything at all after midnight (or 8 hours) prior to the procedure. 4.  Take all morning medications EXCEPT any diuretics (water pills) the day of the procedure with a small sip of water. 5.  If you are on Coumadin, Warfarin, or Bobetta Backer, please notify us so that we can make adjustments to your medication. 6.  If you are taking Xarelto, Eliquis, or Pradaxa, please stop staking these medications two days prior to the procedure (including the day of the procedure). 7.  If you are diabetic, check your blood sugar in the morning. If your blood sugar is 120 or less, do not take insulin. If your blood sugar is more than 120, take half the dose of your normal insulin.   Do not take Metformin

## 2023-10-20 NOTE — PRE-PROCEDURE INSTRUCTIONS
Called patient about procedure. Told to be here at 1230 for procedure at 1400. NPO after midnight, but can take morning medication with sips of water, patient stated they are not on blood thinners. To have a responsible adult be with patient take them home and stay with them afterwards, if they do not get admitted to [facility]. And if available bring current list of medications. No other questions or concerns.

## 2023-10-23 ENCOUNTER — HOSPITAL ENCOUNTER (OUTPATIENT)
Dept: CARDIAC CATH/INVASIVE PROCEDURES | Age: 68
Discharge: HOME | End: 2023-10-25
Payer: MEDICARE

## 2023-10-23 VITALS
DIASTOLIC BLOOD PRESSURE: 83 MMHG | SYSTOLIC BLOOD PRESSURE: 137 MMHG | RESPIRATION RATE: 16 BRPM | HEART RATE: 61 BPM | OXYGEN SATURATION: 98 %

## 2023-10-23 LAB
ANION GAP SERPL CALCULATED.3IONS-SCNC: 10 MMOL/L (ref 3–16)
BUN SERPL-MCNC: 21 MG/DL (ref 7–20)
CALCIUM SERPL-MCNC: 9.5 MG/DL (ref 8.3–10.6)
CHLORIDE SERPL-SCNC: 107 MMOL/L (ref 99–110)
CO2 SERPL-SCNC: 25 MMOL/L (ref 21–32)
CREAT SERPL-MCNC: 1.2 MG/DL (ref 0.8–1.3)
DEPRECATED RDW RBC AUTO: 15.8 % (ref 12.4–15.4)
EKG ATRIAL RATE: 65 BPM
EKG DIAGNOSIS: NORMAL
EKG P AXIS: 37 DEGREES
EKG P-R INTERVAL: 186 MS
EKG Q-T INTERVAL: 488 MS
EKG QRS DURATION: 156 MS
EKG QTC CALCULATION (BAZETT): 507 MS
EKG R AXIS: -82 DEGREES
EKG T AXIS: 80 DEGREES
EKG VENTRICULAR RATE: 65 BPM
GFR SERPLBLD CREATININE-BSD FMLA CKD-EPI: >60 ML/MIN/{1.73_M2}
GLUCOSE SERPL-MCNC: 94 MG/DL (ref 70–99)
HCT VFR BLD AUTO: 35.3 % (ref 40.5–52.5)
HGB BLD-MCNC: 11.9 G/DL (ref 13.5–17.5)
INR PPP: 0.96 (ref 0.84–1.16)
MCH RBC QN AUTO: 32.6 PG (ref 26–34)
MCHC RBC AUTO-ENTMCNC: 33.9 G/DL (ref 31–36)
MCV RBC AUTO: 96.4 FL (ref 80–100)
PLATELET # BLD AUTO: 115 K/UL (ref 135–450)
PMV BLD AUTO: 9.3 FL (ref 5–10.5)
POTASSIUM SERPL-SCNC: 4.5 MMOL/L (ref 3.5–5.1)
PROTHROMBIN TIME: 12.8 SEC (ref 11.5–14.8)
RBC # BLD AUTO: 3.66 M/UL (ref 4.2–5.9)
SODIUM SERPL-SCNC: 142 MMOL/L (ref 136–145)
WBC # BLD AUTO: 7 K/UL (ref 4–11)

## 2023-10-23 PROCEDURE — 30233N1 TRANSFUSION OF NONAUTOLOGOUS RED BLOOD CELLS INTO PERIPHERAL VEIN, PERCUTANEOUS APPROACH: ICD-10-PCS | Performed by: STUDENT IN AN ORGANIZED HEALTH CARE EDUCATION/TRAINING PROGRAM

## 2023-10-23 PROCEDURE — 99152 MOD SED SAME PHYS/QHP 5/>YRS: CPT | Performed by: INTERNAL MEDICINE

## 2023-10-23 PROCEDURE — B2111ZZ FLUOROSCOPY OF MULTIPLE CORONARY ARTERIES USING LOW OSMOLAR CONTRAST: ICD-10-PCS | Performed by: INTERNAL MEDICINE

## 2023-10-23 PROCEDURE — 6360000004 HC RX CONTRAST MEDICATION: Performed by: INTERNAL MEDICINE

## 2023-10-23 PROCEDURE — 2709999900 HC NON-CHARGEABLE SUPPLY

## 2023-10-23 PROCEDURE — 4A023N7 MEASUREMENT OF CARDIAC SAMPLING AND PRESSURE, LEFT HEART, PERCUTANEOUS APPROACH: ICD-10-PCS | Performed by: INTERNAL MEDICINE

## 2023-10-23 PROCEDURE — 6360000002 HC RX W HCPCS

## 2023-10-23 PROCEDURE — 85027 COMPLETE CBC AUTOMATED: CPT

## 2023-10-23 PROCEDURE — C1769 GUIDE WIRE: HCPCS

## 2023-10-23 PROCEDURE — 93458 L HRT ARTERY/VENTRICLE ANGIO: CPT

## 2023-10-23 PROCEDURE — 99152 MOD SED SAME PHYS/QHP 5/>YRS: CPT

## 2023-10-23 PROCEDURE — 6370000000 HC RX 637 (ALT 250 FOR IP): Performed by: INTERNAL MEDICINE

## 2023-10-23 PROCEDURE — 93005 ELECTROCARDIOGRAM TRACING: CPT | Performed by: INTERNAL MEDICINE

## 2023-10-23 PROCEDURE — 80048 BASIC METABOLIC PNL TOTAL CA: CPT

## 2023-10-23 PROCEDURE — 2500000003 HC RX 250 WO HCPCS

## 2023-10-23 PROCEDURE — 93458 L HRT ARTERY/VENTRICLE ANGIO: CPT | Performed by: INTERNAL MEDICINE

## 2023-10-23 PROCEDURE — 85610 PROTHROMBIN TIME: CPT

## 2023-10-23 PROCEDURE — C1894 INTRO/SHEATH, NON-LASER: HCPCS

## 2023-10-23 PROCEDURE — 93010 ELECTROCARDIOGRAM REPORT: CPT | Performed by: INTERNAL MEDICINE

## 2023-10-23 RX ORDER — SODIUM CHLORIDE 0.9 % (FLUSH) 0.9 %
5-40 SYRINGE (ML) INJECTION PRN
Status: DISCONTINUED | OUTPATIENT
Start: 2023-10-23 | End: 2023-10-25 | Stop reason: HOSPADM

## 2023-10-23 RX ORDER — SODIUM CHLORIDE 0.9 % (FLUSH) 0.9 %
5-40 SYRINGE (ML) INJECTION EVERY 12 HOURS SCHEDULED
Status: DISCONTINUED | OUTPATIENT
Start: 2023-10-23 | End: 2023-10-25 | Stop reason: HOSPADM

## 2023-10-23 RX ORDER — SODIUM CHLORIDE 9 MG/ML
INJECTION, SOLUTION INTRAVENOUS PRN
Status: DISCONTINUED | OUTPATIENT
Start: 2023-10-23 | End: 2023-10-25 | Stop reason: HOSPADM

## 2023-10-23 RX ORDER — ASPIRIN 325 MG
325 TABLET, DELAYED RELEASE (ENTERIC COATED) ORAL DAILY
Status: DISCONTINUED | OUTPATIENT
Start: 2023-10-23 | End: 2023-10-25 | Stop reason: HOSPADM

## 2023-10-23 RX ORDER — SODIUM CHLORIDE 9 MG/ML
INJECTION, SOLUTION INTRAVENOUS CONTINUOUS
Status: ACTIVE | OUTPATIENT
Start: 2023-10-23 | End: 2023-10-24

## 2023-10-23 RX ORDER — ACETAMINOPHEN 325 MG/1
650 TABLET ORAL EVERY 4 HOURS PRN
Status: DISCONTINUED | OUTPATIENT
Start: 2023-10-23 | End: 2023-10-25 | Stop reason: HOSPADM

## 2023-10-23 RX ADMIN — ASPIRIN 325 MG: 325 TABLET, COATED ORAL at 13:30

## 2023-10-23 RX ADMIN — IOHEXOL 150 ML: 350 INJECTION, SOLUTION INTRAVENOUS at 16:54

## 2023-10-23 NOTE — ANESTHESIA PRE-OP
Brief Pre-Op Note/Sedation Assessment      Leonel [facility]  1955  [id number]  4:56 PM    Planned Procedure: Cardiac Catheterization Procedure  Post Procedure Plan: Return to same level of care  Consent: I have discussed with the patient and/or the patient representative the indication, alternatives, and the possible risks and/or complications of the planned procedure and the anesthesia methods. The patient and/or patient representative appear to understand and agree to proceed. Chief Complaint:   Chest Pain/Pressure  Anginal Equivalent      Indications for Cath Procedure:  Presentation:  ACS <= 24 hrs and Cardiomyopathy  2. Anginal Classification within 2 weeks:  CCS II - Slight limitation, with angina only during vigorous physical activity  3. Angina Symptoms Assessment:  Asymptomatic  4. Heart Failure Class within last 2 weeks:  No symptoms  5. Cardiovascular Instability:  No    Prior Ischemic Workup/Eval:  Pre-Procedural Medications: Yes: STATIN  2. Stress Test Completed? Yes:  Stress or Imaging Studies Performed (within ANY time period):   Type:  Stress Nuclear  Results:  Positive:  Myocardial Perfusion Defects (Nuclear) Extent of Ischemia:  Intermediate    Does Patient need surgery? Cath Valve Surgery:  No    Pre-Procedure Medical History:  Vital Signs:  /80   Pulse 63   Resp 16   SpO2 98%     Allergies:     Allergies   Allergen Reactions    Percocet [Oxycodone-Acetaminophen] Itching     Medications:    Current Outpatient Medications   Medication Sig Dispense Refill    tamsulosin (FLOMAX) 0.4 MG capsule TAKE ONE CAPSULE BY MOUTH DAILY 90 capsule 1    acetaminophen (TYLENOL) 325 MG tablet Take 2 tablets by mouth (Patient not taking: Reported on 10/13/2023)      sennosides-docusate sodium (SENOKOT-S) 8.6-50 MG tablet Take 2 tablets by mouth 2 times daily as needed for Constipation      polyethylene glycol (GLYCOLAX) 17 g packet Take 1 packet by mouth daily as needed for Constipation

## 2023-10-23 NOTE — PROCEDURES
[facility]                                                LEFT HEART CATH    Rodolfo Iredell   77 y.o., male  1955      10/23/2023    Procedure performed by Dr. Kathe Wen MD, [facility]  Surgical assistants :none    Procedure  Selective Coronary Angiography  Cardiac Catheterization for Coronary Anatomy  Left Heart Catheterization  Left Ventriculogram not done. LVEDP was evaluated. Radial artery access assisted by ultrasound  Arterial Access Right Radial Artery after a negative Jorge test  TR Band    Any and all anesthesia was administered by my staff under my direct supervision and monitored by a trained independent observer. Indication:Cardiac cath to rule out ischemic CAD, Possible angioplasty, The procedure and risks described to patient including risk of CVA, MI, bleeding, emergency surgery, death, this patient having evaluation for stem cell transplant. Some atypical discomfort. Had paroxysmal atrial fibrillation and an abnormal positive stress test.  Has DLBCL with adrenal involvement. 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 No clear symptoms concerning for CAD but in preparation for the bone marrow transplant a stress test was performed suggesting abnormality. , Consent signed, or positive stress test  Unspecified Angina  Anesthesia: Moderate sedation with Versed and Fentanyl IV  Anesthesia Start time 5682  Anesthesia end time 1651  Estimated blood loss :minimal    Specimen removed: NONE    Abnormal Stress Test      Procedure Description:  After written informed consent was obtained, the patient was   brought to the cardiac catheterization suite,

## 2023-10-23 NOTE — PROGRESS NOTES
VSS. Patient tolerating PO fluids and food. Patient ambulated to bathroom, no dizziness noted. Voided. R radial site remains CDI with no bleeding or hematoma. No complaints of pain, numbness, or tingling noted. Pulses WNL. Gauze and tegaderm in place with splint. Discharge instructions gone over with patient and wife, all questions addressed. All belongings gathered and sent with patient. Patient taken to front entrance, where wife will drive him home.

## 2023-10-23 NOTE — DISCHARGE INSTRUCTIONS
[facility]  Cardiovascular Special Procedures   Radial/Brachial Access Angiogram  Patient Discharge Instructions      Day 1 (Procedure Day):  Rest for the remainder of the day. Avoid excessive use of the affected arm. Do not drive a car. Do not be alone. Leave dressing intact. Day 2:  You may drive a car, unless otherwise directed by physician. Remove dressing. You may bathe/shower, but wash gently around the puncture site and pat dry. Place new band-aid on site daily until skin heals. Things to Avoid:  You may not do any strenuous exercises for one week. (ex: golfing, bowling, tennis, vacuum, snow removal, jogging, and aerobic exercises). No hot tubs, baths, or pools for 3-5 days. Do not lift anything over 3-5 pounds for 3 days, with affected arm. No lotions, powders, or ointments near site for 5 days. Things to watch for:  You may have a small lump or a bruise. This is common and will go away. If bleeding occurs from the site, or a hematoma (lump) begins to increase in size, immediately apply pressure directly over the site and call 911 to return to the hospital.  Report any coolness or numbness in the arm or hand immediately. Report any excessive pain of the arm or hand immediately. If mild discomfort occurs at the puncture site you may place an ice pack on the site at 15 minute intervals. .   Watch for signs and symptoms of an infection at the arm site (fever, local pain, redness, warmth or discharge from the puncture site), call your physician immediately if any develop. Other:  No work/school for 72 hours if you received a stent; otherwise you may go back to work the following day (as long as your job does not interfere with the lifting restrictions). Any Questions please call us at [phone] (between 7am- 5pm, Mon-Fri). After hours you should contact your physician.           [facility]  Cardiovascular Special Procedures  General Discharge

## 2023-10-23 NOTE — PROGRESS NOTES
Cath Lab Pre Procedure Flowsheet    Plan of Care:     Hemodynamics and cardiac rhythm will remain stable. Comfort level will be maintained. Respiratory function will remain adequate. Pt/family will verbalize understanding of the procedure. Procedure will be tolerated without complications. Patient will recover from procedure without complications. ID armband on patient and identification verified. Informed consent obtained. Non invasive blood pressure cuff applied, monitoring initiated. EKG pads and pulse oximeter applied, monitoring initiated. Instructions given. Patient and / or family verbalize understanding. H&P will be documented by physician in [address] [place] [address]. Pre-procedure:    NPO Status: Pt has been NPO since midnight. Pregnancy Test: N/A. Prep Sites: Wrist(s)  Groin(s)    Pulses: Right Radial Artery 2+. Jorge's Test - Adequate blood flow. Right DP 2+  Right PT 1+  Left DP 2+  Left PT 1+  Left Fem 2+  Right Fem 2+    Jorge's Test: positive     Pre SBP: 129    Pre EKG Rhythm: Paced    Pre-procedure blood work collected by: Russell Wolf RN    IV access: central line (pre-existing) R subclavian    Prior Cath/CABG on Chart: No    EF: 58%    Stress Test Date: 10/12/23. FINDINGS:  intermediate risk    Anticoagulants: None. Antiplatelets: None.      Chief Complaint:   none    Admit Source: Outpatient    Surgeries Planned: No    Bleeding Problems: No    Medication Compliance Issues: No    Hypertension: No    Dyslipidemia: No    Family History of CAD: Yes    Prior MI: No    Prior PCI: No    Prior CABG: No    Cerebral Vascular Disease: No    Peripheral Arterial Disease: No    Chronic Lung Disease: No    Tobacco: Never    Diabetic: No    Cardiac Arrest: No    Dialysis: No    Frailty Score: 4 VULNERABLE (while not dependent on others for IADLs, symptoms limit activities)    Recreational Drug Use: No

## 2023-10-25 ENCOUNTER — APPOINTMENT (OUTPATIENT)
Dept: GENERAL RADIOLOGY | Age: 68
DRG: 016 | End: 2023-10-25
Attending: STUDENT IN AN ORGANIZED HEALTH CARE EDUCATION/TRAINING PROGRAM
Payer: MEDICARE

## 2023-10-25 ENCOUNTER — HOSPITAL ENCOUNTER (INPATIENT)
Age: 68
LOS: 28 days | Discharge: HOME | DRG: 016 | End: 2023-11-22
Attending: STUDENT IN AN ORGANIZED HEALTH CARE EDUCATION/TRAINING PROGRAM | Admitting: STUDENT IN AN ORGANIZED HEALTH CARE EDUCATION/TRAINING PROGRAM
Payer: MEDICARE

## 2023-10-25 DIAGNOSIS — Z52.011 AUTOLOGOUS DONOR OF STEM CELLS: ICD-10-CM

## 2023-10-25 DIAGNOSIS — C83.30 LYMPHOMA MALIGNANT, IMMUNOBLASTIC (HCC): Primary | ICD-10-CM

## 2023-10-25 DIAGNOSIS — C85.90 LYMPHOMA, UNSPECIFIED BODY REGION, UNSPECIFIED LYMPHOMA TYPE (HCC): ICD-10-CM

## 2023-10-25 LAB
ALBUMIN SERPL-MCNC: 4.1 G/DL (ref 3.4–5)
ALP SERPL-CCNC: 105 U/L (ref 40–129)
ALT SERPL-CCNC: 17 U/L (ref 10–40)
ANION GAP SERPL CALCULATED.3IONS-SCNC: 9 MMOL/L (ref 3–16)
APTT BLD: 24.8 SEC (ref 22.7–35.9)
AST SERPL-CCNC: 17 U/L (ref 15–37)
BASOPHILS # BLD: 0 K/UL (ref 0–0.2)
BASOPHILS NFR BLD: 0.2 %
BILIRUB DIRECT SERPL-MCNC: <0.2 MG/DL (ref 0–0.3)
BILIRUB INDIRECT SERPL-MCNC: ABNORMAL MG/DL (ref 0–1)
BILIRUB SERPL-MCNC: <0.2 MG/DL (ref 0–1)
BILIRUB UR QL STRIP.AUTO: NEGATIVE
BUN SERPL-MCNC: 23 MG/DL (ref 7–20)
CALCIUM SERPL-MCNC: 9.2 MG/DL (ref 8.3–10.6)
CHLORIDE SERPL-SCNC: 105 MMOL/L (ref 99–110)
CLARITY UR: CLEAR
CO2 SERPL-SCNC: 25 MMOL/L (ref 21–32)
COLOR UR: YELLOW
CREAT SERPL-MCNC: 1.2 MG/DL (ref 0.8–1.3)
DEPRECATED RDW RBC AUTO: 15.9 % (ref 12.4–15.4)
EKG ATRIAL RATE: 71 BPM
EKG DIAGNOSIS: NORMAL
EKG P AXIS: 65 DEGREES
EKG P-R INTERVAL: 194 MS
EKG Q-T INTERVAL: 458 MS
EKG QRS DURATION: 150 MS
EKG QTC CALCULATION (BAZETT): 497 MS
EKG R AXIS: 266 DEGREES
EKG T AXIS: 74 DEGREES
EKG VENTRICULAR RATE: 71 BPM
EOSINOPHIL # BLD: 0.3 K/UL (ref 0–0.6)
EOSINOPHIL NFR BLD: 5.3 %
GFR SERPLBLD CREATININE-BSD FMLA CKD-EPI: >60 ML/MIN/{1.73_M2}
GLUCOSE SERPL-MCNC: 83 MG/DL (ref 70–99)
GLUCOSE UR STRIP.AUTO-MCNC: NEGATIVE MG/DL
HCT VFR BLD AUTO: 32.9 % (ref 40.5–52.5)
HGB BLD-MCNC: 11.1 G/DL (ref 13.5–17.5)
HGB UR QL STRIP.AUTO: NEGATIVE
INR PPP: 0.95 (ref 0.84–1.16)
KETONES UR STRIP.AUTO-MCNC: NEGATIVE MG/DL
LDH SERPL L TO P-CCNC: 234 U/L (ref 100–190)
LEUKOCYTE ESTERASE UR QL STRIP.AUTO: NEGATIVE
LYMPHOCYTES # BLD: 0.3 K/UL (ref 1–5.1)
LYMPHOCYTES NFR BLD: 5.7 %
MAGNESIUM SERPL-MCNC: 2.1 MG/DL (ref 1.8–2.4)
MCH RBC QN AUTO: 31.8 PG (ref 26–34)
MCHC RBC AUTO-ENTMCNC: 33.7 G/DL (ref 31–36)
MCV RBC AUTO: 94.4 FL (ref 80–100)
MONOCYTES # BLD: 0.5 K/UL (ref 0–1.3)
MONOCYTES NFR BLD: 8.5 %
NEUTROPHILS # BLD: 4.4 K/UL (ref 1.7–7.7)
NEUTROPHILS NFR BLD: 80.3 %
NITRITE UR QL STRIP.AUTO: NEGATIVE
PH UR STRIP.AUTO: 6.5 [PH] (ref 5–8)
PHOSPHATE SERPL-MCNC: 3.4 MG/DL (ref 2.5–4.9)
PLATELET # BLD AUTO: 152 K/UL (ref 135–450)
PMV BLD AUTO: 9.8 FL (ref 5–10.5)
POTASSIUM SERPL-SCNC: 4.8 MMOL/L (ref 3.5–5.1)
PROT SERPL-MCNC: 6.3 G/DL (ref 6.4–8.2)
PROT UR STRIP.AUTO-MCNC: NEGATIVE MG/DL
PROTHROMBIN TIME: 12.6 SEC (ref 11.5–14.8)
RBC # BLD AUTO: 3.48 M/UL (ref 4.2–5.9)
SODIUM SERPL-SCNC: 139 MMOL/L (ref 136–145)
SP GR UR STRIP.AUTO: 1.01 (ref 1–1.03)
UA DIPSTICK W REFLEX MICRO PNL UR: NORMAL
URATE SERPL-MCNC: 4.5 MG/DL (ref 3.5–7.2)
URN SPEC COLLECT METH UR: NORMAL
UROBILINOGEN UR STRIP-ACNC: 0.2 E.U./DL
WBC # BLD AUTO: 5.5 K/UL (ref 4–11)

## 2023-10-25 PROCEDURE — 81003 URINALYSIS AUTO W/O SCOPE: CPT

## 2023-10-25 PROCEDURE — 6370000000 HC RX 637 (ALT 250 FOR IP): Performed by: NURSE PRACTITIONER

## 2023-10-25 PROCEDURE — 2580000003 HC RX 258: Performed by: NURSE PRACTITIONER

## 2023-10-25 PROCEDURE — 80048 BASIC METABOLIC PNL TOTAL CA: CPT

## 2023-10-25 PROCEDURE — 85610 PROTHROMBIN TIME: CPT

## 2023-10-25 PROCEDURE — 6360000002 HC RX W HCPCS: Performed by: NURSE PRACTITIONER

## 2023-10-25 PROCEDURE — 85025 COMPLETE CBC W/AUTO DIFF WBC: CPT

## 2023-10-25 PROCEDURE — 93005 ELECTROCARDIOGRAM TRACING: CPT | Performed by: NURSE PRACTITIONER

## 2023-10-25 PROCEDURE — 96417 CHEMO IV INFUS EACH ADDL SEQ: CPT

## 2023-10-25 PROCEDURE — 36592 COLLECT BLOOD FROM PICC: CPT

## 2023-10-25 PROCEDURE — 80076 HEPATIC FUNCTION PANEL: CPT

## 2023-10-25 PROCEDURE — 71046 X-RAY EXAM CHEST 2 VIEWS: CPT

## 2023-10-25 PROCEDURE — 2060000000 HC ICU INTERMEDIATE R&B

## 2023-10-25 PROCEDURE — 96415 CHEMO IV INFUSION ADDL HR: CPT

## 2023-10-25 PROCEDURE — 84100 ASSAY OF PHOSPHORUS: CPT

## 2023-10-25 PROCEDURE — 6360000002 HC RX W HCPCS: Performed by: STUDENT IN AN ORGANIZED HEALTH CARE EDUCATION/TRAINING PROGRAM

## 2023-10-25 PROCEDURE — 85730 THROMBOPLASTIN TIME PARTIAL: CPT

## 2023-10-25 PROCEDURE — 6370000000 HC RX 637 (ALT 250 FOR IP): Performed by: STUDENT IN AN ORGANIZED HEALTH CARE EDUCATION/TRAINING PROGRAM

## 2023-10-25 PROCEDURE — 93010 ELECTROCARDIOGRAM REPORT: CPT | Performed by: INTERNAL MEDICINE

## 2023-10-25 PROCEDURE — 83735 ASSAY OF MAGNESIUM: CPT

## 2023-10-25 PROCEDURE — 84550 ASSAY OF BLOOD/URIC ACID: CPT

## 2023-10-25 PROCEDURE — 83615 LACTATE (LD) (LDH) ENZYME: CPT

## 2023-10-25 PROCEDURE — A4217 STERILE WATER/SALINE, 500 ML: HCPCS | Performed by: NURSE PRACTITIONER

## 2023-10-25 PROCEDURE — 2580000003 HC RX 258: Performed by: STUDENT IN AN ORGANIZED HEALTH CARE EDUCATION/TRAINING PROGRAM

## 2023-10-25 RX ORDER — ONDANSETRON HYDROCHLORIDE 8 MG/1
24 TABLET, FILM COATED ORAL EVERY 24 HOURS
Status: COMPLETED | OUTPATIENT
Start: 2023-10-26 | End: 2023-10-30

## 2023-10-25 RX ORDER — ACETAMINOPHEN 325 MG/1
650 TABLET ORAL EVERY 4 HOURS PRN
Status: DISCONTINUED | OUTPATIENT
Start: 2023-10-31 | End: 2023-11-01

## 2023-10-25 RX ORDER — DEXAMETHASONE 4 MG/1
20 TABLET ORAL ONCE
Status: COMPLETED | OUTPATIENT
Start: 2023-10-25 | End: 2023-10-25

## 2023-10-25 RX ORDER — SODIUM CHLORIDE 0.9 % (FLUSH) 0.9 %
5-40 SYRINGE (ML) INJECTION PRN
Status: DISCONTINUED | OUTPATIENT
Start: 2023-10-25 | End: 2023-11-22 | Stop reason: HOSPADM

## 2023-10-25 RX ORDER — POTASSIUM CHLORIDE 29.8 MG/ML
20 INJECTION INTRAVENOUS PRN
Status: DISCONTINUED | OUTPATIENT
Start: 2023-10-25 | End: 2023-11-22 | Stop reason: HOSPADM

## 2023-10-25 RX ORDER — FUROSEMIDE 10 MG/ML
40 INJECTION INTRAMUSCULAR; INTRAVENOUS EVERY 12 HOURS SCHEDULED
Status: DISCONTINUED | OUTPATIENT
Start: 2023-10-26 | End: 2023-11-05

## 2023-10-25 RX ORDER — SERTRALINE HYDROCHLORIDE 100 MG/1
100 TABLET, FILM COATED ORAL DAILY
Status: DISCONTINUED | OUTPATIENT
Start: 2023-10-26 | End: 2023-11-22 | Stop reason: HOSPADM

## 2023-10-25 RX ORDER — BUDESONIDE 3 MG/1
3 CAPSULE, COATED PELLETS ORAL 3 TIMES DAILY
Status: DISCONTINUED | OUTPATIENT
Start: 2023-11-03 | End: 2023-11-02

## 2023-10-25 RX ORDER — FUROSEMIDE 10 MG/ML
20 INJECTION INTRAMUSCULAR; INTRAVENOUS PRN
Status: DISPENSED | OUTPATIENT
Start: 2023-10-31 | End: 2023-11-02

## 2023-10-25 RX ORDER — ACETAMINOPHEN 325 MG/1
650 TABLET ORAL EVERY 4 HOURS PRN
Status: DISCONTINUED | OUTPATIENT
Start: 2023-10-31 | End: 2023-10-25

## 2023-10-25 RX ORDER — LORAZEPAM 0.5 MG/1
0.5 TABLET ORAL EVERY 6 HOURS
Status: COMPLETED | OUTPATIENT
Start: 2023-10-28 | End: 2023-10-31

## 2023-10-25 RX ORDER — PROCHLORPERAZINE MALEATE 10 MG
10 TABLET ORAL EVERY 4 HOURS PRN
Status: DISCONTINUED | OUTPATIENT
Start: 2023-10-25 | End: 2023-11-01

## 2023-10-25 RX ORDER — PROCHLORPERAZINE EDISYLATE 5 MG/ML
10 INJECTION INTRAMUSCULAR; INTRAVENOUS EVERY 4 HOURS PRN
Status: DISCONTINUED | OUTPATIENT
Start: 2023-10-25 | End: 2023-11-01

## 2023-10-25 RX ORDER — TAMSULOSIN HYDROCHLORIDE 0.4 MG/1
0.4 CAPSULE ORAL DAILY
Status: DISCONTINUED | OUTPATIENT
Start: 2023-10-26 | End: 2023-11-22 | Stop reason: HOSPADM

## 2023-10-25 RX ORDER — VALACYCLOVIR HYDROCHLORIDE 500 MG/1
500 TABLET, FILM COATED ORAL 2 TIMES DAILY
Status: ON HOLD | COMMUNITY
End: 2023-11-22 | Stop reason: HOSPADM

## 2023-10-25 RX ORDER — ENOXAPARIN SODIUM 100 MG/ML
40 INJECTION SUBCUTANEOUS DAILY
Status: DISCONTINUED | OUTPATIENT
Start: 2023-10-25 | End: 2023-11-06

## 2023-10-25 RX ORDER — LEVETIRACETAM 500 MG/1
500 TABLET ORAL 2 TIMES DAILY
Status: DISCONTINUED | OUTPATIENT
Start: 2023-10-25 | End: 2023-11-22 | Stop reason: HOSPADM

## 2023-10-25 RX ORDER — LORAZEPAM 0.5 MG/1
0.5 TABLET ORAL EVERY 4 HOURS PRN
Status: DISCONTINUED | OUTPATIENT
Start: 2023-10-31 | End: 2023-11-22 | Stop reason: HOSPADM

## 2023-10-25 RX ORDER — VALACYCLOVIR HYDROCHLORIDE 500 MG/1
500 TABLET, FILM COATED ORAL 2 TIMES DAILY
Status: DISCONTINUED | OUTPATIENT
Start: 2023-10-25 | End: 2023-11-17

## 2023-10-25 RX ORDER — SENNA AND DOCUSATE SODIUM 50; 8.6 MG/1; MG/1
2 TABLET, FILM COATED ORAL 2 TIMES DAILY PRN
Status: DISCONTINUED | OUTPATIENT
Start: 2023-10-25 | End: 2023-11-22 | Stop reason: HOSPADM

## 2023-10-25 RX ORDER — MAGNESIUM SULFATE IN WATER 40 MG/ML
4000 INJECTION, SOLUTION INTRAVENOUS PRN
Status: DISCONTINUED | OUTPATIENT
Start: 2023-10-25 | End: 2023-11-22 | Stop reason: HOSPADM

## 2023-10-25 RX ORDER — POLYETHYLENE GLYCOL 3350 17 G/17G
17 POWDER, FOR SOLUTION ORAL DAILY PRN
Status: DISCONTINUED | OUTPATIENT
Start: 2023-10-25 | End: 2023-11-22 | Stop reason: HOSPADM

## 2023-10-25 RX ORDER — ONDANSETRON HYDROCHLORIDE 8 MG/1
24 TABLET, FILM COATED ORAL ONCE
Status: COMPLETED | OUTPATIENT
Start: 2023-10-25 | End: 2023-10-25

## 2023-10-25 RX ORDER — DEXTROSE AND SODIUM CHLORIDE 5; .45 G/100ML; G/100ML
INJECTION, SOLUTION INTRAVENOUS CONTINUOUS
Status: ACTIVE | OUTPATIENT
Start: 2023-10-25 | End: 2023-10-25

## 2023-10-25 RX ORDER — SODIUM CHLORIDE 0.9 % (FLUSH) 0.9 %
5-40 SYRINGE (ML) INJECTION EVERY 12 HOURS SCHEDULED
Status: DISCONTINUED | OUTPATIENT
Start: 2023-10-25 | End: 2023-11-22 | Stop reason: HOSPADM

## 2023-10-25 RX ORDER — SODIUM CHLORIDE 9 MG/ML
INJECTION, SOLUTION INTRAVENOUS CONTINUOUS
Status: DISCONTINUED | OUTPATIENT
Start: 2023-11-02 | End: 2023-11-04

## 2023-10-25 RX ORDER — SODIUM CHLORIDE 9 MG/ML
INJECTION, SOLUTION INTRAVENOUS CONTINUOUS PRN
Status: DISCONTINUED | OUTPATIENT
Start: 2023-10-25 | End: 2023-11-22 | Stop reason: HOSPADM

## 2023-10-25 RX ORDER — ONDANSETRON HYDROCHLORIDE 8 MG/1
24 TABLET, FILM COATED ORAL EVERY 24 HOURS
Status: COMPLETED | OUTPATIENT
Start: 2023-10-31 | End: 2023-11-01

## 2023-10-25 RX ORDER — DIPHENHYDRAMINE HCL 25 MG
25 TABLET ORAL NIGHTLY PRN
Status: DISCONTINUED | OUTPATIENT
Start: 2023-10-25 | End: 2023-11-22 | Stop reason: HOSPADM

## 2023-10-25 RX ORDER — LORAZEPAM 2 MG/ML
0.5 INJECTION INTRAMUSCULAR EVERY 4 HOURS PRN
Status: DISCONTINUED | OUTPATIENT
Start: 2023-10-31 | End: 2023-11-22 | Stop reason: HOSPADM

## 2023-10-25 RX ORDER — DEXTROSE AND SODIUM CHLORIDE 5; .45 G/100ML; G/100ML
INJECTION, SOLUTION INTRAVENOUS CONTINUOUS
Status: ACTIVE | OUTPATIENT
Start: 2023-10-25 | End: 2023-10-31

## 2023-10-25 RX ORDER — LORAZEPAM 2 MG/ML
0.5 INJECTION INTRAMUSCULAR EVERY 6 HOURS
Status: COMPLETED | OUTPATIENT
Start: 2023-10-28 | End: 2023-10-31

## 2023-10-25 RX ORDER — LORAZEPAM 2 MG/ML
1 INJECTION INTRAMUSCULAR ONCE
Status: COMPLETED | OUTPATIENT
Start: 2023-10-28 | End: 2023-10-28

## 2023-10-25 RX ORDER — HYDRALAZINE HYDROCHLORIDE 20 MG/ML
10 INJECTION INTRAMUSCULAR; INTRAVENOUS EVERY 6 HOURS PRN
Status: DISCONTINUED | OUTPATIENT
Start: 2023-10-25 | End: 2023-11-22 | Stop reason: HOSPADM

## 2023-10-25 RX ORDER — DEXAMETHASONE 4 MG/1
8 TABLET ORAL ONCE
Status: COMPLETED | OUTPATIENT
Start: 2023-11-02 | End: 2023-11-02

## 2023-10-25 RX ORDER — DEXAMETHASONE 4 MG/1
12 TABLET ORAL EVERY 24 HOURS
Status: COMPLETED | OUTPATIENT
Start: 2023-10-31 | End: 2023-11-01

## 2023-10-25 RX ORDER — M-VIT,TX,IRON,MINS/CALC/FOLIC 27MG-0.4MG
1 TABLET ORAL DAILY
Status: DISCONTINUED | OUTPATIENT
Start: 2023-10-26 | End: 2023-11-22 | Stop reason: HOSPADM

## 2023-10-25 RX ORDER — DEXAMETHASONE 4 MG/1
20 TABLET ORAL EVERY 24 HOURS
Status: COMPLETED | OUTPATIENT
Start: 2023-10-26 | End: 2023-10-30

## 2023-10-25 RX ORDER — URSODIOL 250 MG/1
500 TABLET, FILM COATED ORAL 2 TIMES DAILY
Status: DISCONTINUED | OUTPATIENT
Start: 2023-10-25 | End: 2023-11-22 | Stop reason: HOSPADM

## 2023-10-25 RX ORDER — LANOLIN ALCOHOL/MO/W.PET/CERES
400 CREAM (GRAM) TOPICAL DAILY
Status: DISCONTINUED | OUTPATIENT
Start: 2023-10-26 | End: 2023-11-02

## 2023-10-25 RX ORDER — FLUCONAZOLE 200 MG/1
400 TABLET ORAL DAILY
Status: DISCONTINUED | OUTPATIENT
Start: 2023-11-02 | End: 2023-11-13

## 2023-10-25 RX ORDER — SODIUM CHLORIDE 9 MG/ML
INJECTION, SOLUTION INTRAVENOUS CONTINUOUS
Status: ACTIVE | OUTPATIENT
Start: 2023-10-31 | End: 2023-11-02

## 2023-10-25 RX ORDER — SODIUM CHLORIDE 9 MG/ML
INJECTION, SOLUTION INTRAVENOUS PRN
Status: DISCONTINUED | OUTPATIENT
Start: 2023-10-25 | End: 2023-11-22 | Stop reason: HOSPADM

## 2023-10-25 RX ADMIN — SODIUM CHLORIDE, PRESERVATIVE FREE 10 ML: 5 INJECTION INTRAVENOUS at 10:49

## 2023-10-25 RX ADMIN — DEXTROSE AND SODIUM CHLORIDE: 5; 450 INJECTION, SOLUTION INTRAVENOUS at 17:32

## 2023-10-25 RX ADMIN — VALACYCLOVIR HYDROCHLORIDE 500 MG: 500 TABLET, FILM COATED ORAL at 20:04

## 2023-10-25 RX ADMIN — ONDANSETRON HYDROCHLORIDE 24 MG: 8 TABLET, FILM COATED ORAL at 16:50

## 2023-10-25 RX ADMIN — SODIUM CHLORIDE 15 ML: 900 IRRIGANT IRRIGATION at 10:49

## 2023-10-25 RX ADMIN — THIOTEPA 500 MG: 100 INJECTION, POWDER, LYOPHILIZED, FOR SOLUTION INTRACAVITARY; INTRAVENOUS; INTRAVESICAL at 17:39

## 2023-10-25 RX ADMIN — LEVETIRACETAM 500 MG: 500 TABLET, FILM COATED ORAL at 20:04

## 2023-10-25 RX ADMIN — SODIUM CHLORIDE, PRESERVATIVE FREE 10 ML: 5 INJECTION INTRAVENOUS at 20:05

## 2023-10-25 RX ADMIN — DEXAMETHASONE 20 MG: 4 TABLET ORAL at 16:50

## 2023-10-25 RX ADMIN — ENOXAPARIN SODIUM 40 MG: 100 INJECTION SUBCUTANEOUS at 17:42

## 2023-10-25 RX ADMIN — URSODIOL 500 MG: 250 TABLET, FILM COATED ORAL at 20:04

## 2023-10-25 RX ADMIN — SODIUM CHLORIDE 15 ML: 900 IRRIGANT IRRIGATION at 17:44

## 2023-10-25 RX ADMIN — DEXTROSE AND SODIUM CHLORIDE: 5; 450 INJECTION, SOLUTION INTRAVENOUS at 13:55

## 2023-10-25 NOTE — CARE COORDINATION
Case Management Assessment  Initial Evaluation    Date/Time of Evaluation: 10/25/2023 3:11 PM  Assessment Completed by: JULY Mays   for [facility])  Office Phone: [phone]  [facility]: [phone]    If patient is discharged prior to next notation, then this note serves as note for discharge by case management. 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 Yes    Chart Reviewed: Yes      History Provided by: Patient  Patient Orientation: Alert and Oriented    Patient Cognition: Alert    Hospitalization in the last 30 days (Readmission):  No    If yes, Readmission Assessment in CM Navigator will be completed. Advance Directives:      Code Status: Full Code   Patient's Primary Decision Maker is: Legal Next of Kin    [facility] [place] - [phone]    Discharge Planning:    Patient lives with: Spouse/Significant Other Type of Home: House  Primary Care Giver: Self  Patient Support Systems include: Spouse/Significant Other   Current Financial resources: Medicare ([place] Medicare)  Current community resources: None  Current services prior to admission: Other (Comment) ([place])            Current DME:              Type of Home Care services:  None    ADLS  Prior functional level: Independent in ADLs/IADLs  Current functional level: Independent in ADLs/IADLs    PT AM-PAC:   /24  OT AM-PAC:   /24    Family can provide assistance at DC: Yes  Would you like Case Management to discuss the discharge plan with any other family members/significant others, and if so, who?  Yes  Plans to

## 2023-10-26 LAB
ANION GAP SERPL CALCULATED.3IONS-SCNC: 10 MMOL/L (ref 3–16)
APTT BLD: 27.1 SEC (ref 22.7–35.9)
BASOPHILS # BLD: 0 K/UL (ref 0–0.2)
BASOPHILS NFR BLD: 0.1 %
BUN SERPL-MCNC: 21 MG/DL (ref 7–20)
CALCIUM SERPL-MCNC: 8.9 MG/DL (ref 8.3–10.6)
CHLORIDE SERPL-SCNC: 105 MMOL/L (ref 99–110)
CO2 SERPL-SCNC: 23 MMOL/L (ref 21–32)
CREAT SERPL-MCNC: 1.2 MG/DL (ref 0.8–1.3)
DEPRECATED RDW RBC AUTO: 15.8 % (ref 12.4–15.4)
EOSINOPHIL # BLD: 0 K/UL (ref 0–0.6)
EOSINOPHIL NFR BLD: 0 %
GFR SERPLBLD CREATININE-BSD FMLA CKD-EPI: >60 ML/MIN/{1.73_M2}
GLUCOSE SERPL-MCNC: 177 MG/DL (ref 70–99)
HCT VFR BLD AUTO: 31.2 % (ref 40.5–52.5)
HGB BLD-MCNC: 10.6 G/DL (ref 13.5–17.5)
INR PPP: 1.07 (ref 0.84–1.16)
LYMPHOCYTES # BLD: 0.2 K/UL (ref 1–5.1)
LYMPHOCYTES NFR BLD: 3.6 %
MCH RBC QN AUTO: 32.1 PG (ref 26–34)
MCHC RBC AUTO-ENTMCNC: 33.8 G/DL (ref 31–36)
MCV RBC AUTO: 94.9 FL (ref 80–100)
MONOCYTES # BLD: 0.1 K/UL (ref 0–1.3)
MONOCYTES NFR BLD: 1.3 %
NEUTROPHILS # BLD: 4.4 K/UL (ref 1.7–7.7)
NEUTROPHILS NFR BLD: 95 %
PHOSPHATE SERPL-MCNC: 2.1 MG/DL (ref 2.5–4.9)
PLATELET # BLD AUTO: 164 K/UL (ref 135–450)
PMV BLD AUTO: 9.6 FL (ref 5–10.5)
POTASSIUM SERPL-SCNC: 4.4 MMOL/L (ref 3.5–5.1)
PROTHROMBIN TIME: 13.9 SEC (ref 11.5–14.8)
RBC # BLD AUTO: 3.29 M/UL (ref 4.2–5.9)
SODIUM SERPL-SCNC: 138 MMOL/L (ref 136–145)
URATE SERPL-MCNC: 4.2 MG/DL (ref 3.5–7.2)
WBC # BLD AUTO: 4.6 K/UL (ref 4–11)

## 2023-10-26 PROCEDURE — 97162 PT EVAL MOD COMPLEX 30 MIN: CPT

## 2023-10-26 PROCEDURE — 6360000002 HC RX W HCPCS: Performed by: STUDENT IN AN ORGANIZED HEALTH CARE EDUCATION/TRAINING PROGRAM

## 2023-10-26 PROCEDURE — 84550 ASSAY OF BLOOD/URIC ACID: CPT

## 2023-10-26 PROCEDURE — 96413 CHEMO IV INFUSION 1 HR: CPT

## 2023-10-26 PROCEDURE — 80048 BASIC METABOLIC PNL TOTAL CA: CPT

## 2023-10-26 PROCEDURE — 97116 GAIT TRAINING THERAPY: CPT

## 2023-10-26 PROCEDURE — 97530 THERAPEUTIC ACTIVITIES: CPT

## 2023-10-26 PROCEDURE — 6370000000 HC RX 637 (ALT 250 FOR IP): Performed by: NURSE PRACTITIONER

## 2023-10-26 PROCEDURE — 36592 COLLECT BLOOD FROM PICC: CPT

## 2023-10-26 PROCEDURE — 2060000000 HC ICU INTERMEDIATE R&B

## 2023-10-26 PROCEDURE — 85025 COMPLETE CBC W/AUTO DIFF WBC: CPT

## 2023-10-26 PROCEDURE — 85730 THROMBOPLASTIN TIME PARTIAL: CPT

## 2023-10-26 PROCEDURE — 2580000003 HC RX 258: Performed by: STUDENT IN AN ORGANIZED HEALTH CARE EDUCATION/TRAINING PROGRAM

## 2023-10-26 PROCEDURE — 6360000002 HC RX W HCPCS: Performed by: NURSE PRACTITIONER

## 2023-10-26 PROCEDURE — 97165 OT EVAL LOW COMPLEX 30 MIN: CPT

## 2023-10-26 PROCEDURE — 84100 ASSAY OF PHOSPHORUS: CPT

## 2023-10-26 PROCEDURE — 6370000000 HC RX 637 (ALT 250 FOR IP): Performed by: STUDENT IN AN ORGANIZED HEALTH CARE EDUCATION/TRAINING PROGRAM

## 2023-10-26 PROCEDURE — 2580000003 HC RX 258: Performed by: NURSE PRACTITIONER

## 2023-10-26 PROCEDURE — 96415 CHEMO IV INFUSION ADDL HR: CPT

## 2023-10-26 PROCEDURE — 97535 SELF CARE MNGMENT TRAINING: CPT

## 2023-10-26 PROCEDURE — 85610 PROTHROMBIN TIME: CPT

## 2023-10-26 RX ADMIN — DEXAMETHASONE 20 MG: 4 TABLET ORAL at 10:02

## 2023-10-26 RX ADMIN — VALACYCLOVIR HYDROCHLORIDE 500 MG: 500 TABLET, FILM COATED ORAL at 08:33

## 2023-10-26 RX ADMIN — ENOXAPARIN SODIUM 40 MG: 100 INJECTION SUBCUTANEOUS at 18:39

## 2023-10-26 RX ADMIN — SODIUM CHLORIDE, PRESERVATIVE FREE 10 ML: 5 INJECTION INTRAVENOUS at 08:34

## 2023-10-26 RX ADMIN — Medication 400 MG: at 08:33

## 2023-10-26 RX ADMIN — THIOTEPA 500 MG: 100 INJECTION, POWDER, LYOPHILIZED, FOR SOLUTION INTRACAVITARY; INTRAVENOUS; INTRAVESICAL at 10:39

## 2023-10-26 RX ADMIN — URSODIOL 500 MG: 250 TABLET, FILM COATED ORAL at 08:33

## 2023-10-26 RX ADMIN — URSODIOL 500 MG: 250 TABLET, FILM COATED ORAL at 21:05

## 2023-10-26 RX ADMIN — LEVETIRACETAM 500 MG: 500 TABLET, FILM COATED ORAL at 21:04

## 2023-10-26 RX ADMIN — Medication 1 TABLET: at 08:33

## 2023-10-26 RX ADMIN — TAMSULOSIN HYDROCHLORIDE 0.4 MG: 0.4 CAPSULE ORAL at 08:33

## 2023-10-26 RX ADMIN — SERTRALINE HYDROCHLORIDE 100 MG: 100 TABLET ORAL at 08:33

## 2023-10-26 RX ADMIN — DEXTROSE AND SODIUM CHLORIDE: 5; 450 INJECTION, SOLUTION INTRAVENOUS at 08:27

## 2023-10-26 RX ADMIN — VALACYCLOVIR HYDROCHLORIDE 500 MG: 500 TABLET, FILM COATED ORAL at 21:05

## 2023-10-26 RX ADMIN — DEXTROSE AND SODIUM CHLORIDE: 5; 450 INJECTION, SOLUTION INTRAVENOUS at 18:44

## 2023-10-26 RX ADMIN — ONDANSETRON HYDROCHLORIDE 24 MG: 8 TABLET, FILM COATED ORAL at 10:02

## 2023-10-26 RX ADMIN — LEVETIRACETAM 500 MG: 500 TABLET, FILM COATED ORAL at 08:33

## 2023-10-26 ASSESSMENT — PAIN SCALES - GENERAL
PAINLEVEL_OUTOF10: 0
PAINLEVEL_OUTOF10: 0

## 2023-10-26 NOTE — CARE COORDINATION
Received notification from [address] yesterday via email that pt's wife is requesting 2 copies of POA and to complete forms as they are messed up. Consult placed to spiritual care as they assist with ACP documents; Saniya MONZON aware.     Catrina Walker MSW, MIRACLE-S   for [facility] ([address])  Office Phone: [phone]  [facility] Mobile: [phone]

## 2023-10-27 LAB
ALBUMIN SERPL-MCNC: 3.6 G/DL (ref 3.4–5)
ALP SERPL-CCNC: 80 U/L (ref 40–129)
ALT SERPL-CCNC: 14 U/L (ref 10–40)
ANION GAP SERPL CALCULATED.3IONS-SCNC: 10 MMOL/L (ref 3–16)
AST SERPL-CCNC: 13 U/L (ref 15–37)
BASOPHILS # BLD: 0 K/UL (ref 0–0.2)
BASOPHILS NFR BLD: 0.2 %
BILIRUB DIRECT SERPL-MCNC: <0.2 MG/DL (ref 0–0.3)
BILIRUB INDIRECT SERPL-MCNC: ABNORMAL MG/DL (ref 0–1)
BILIRUB SERPL-MCNC: 0.3 MG/DL (ref 0–1)
BUN SERPL-MCNC: 22 MG/DL (ref 7–20)
CALCIUM SERPL-MCNC: 8.9 MG/DL (ref 8.3–10.6)
CHLORIDE SERPL-SCNC: 104 MMOL/L (ref 99–110)
CO2 SERPL-SCNC: 24 MMOL/L (ref 21–32)
CREAT SERPL-MCNC: 1 MG/DL (ref 0.8–1.3)
DEPRECATED RDW RBC AUTO: 16 % (ref 12.4–15.4)
EOSINOPHIL # BLD: 0 K/UL (ref 0–0.6)
EOSINOPHIL NFR BLD: 0.1 %
GFR SERPLBLD CREATININE-BSD FMLA CKD-EPI: >60 ML/MIN/{1.73_M2}
GLUCOSE SERPL-MCNC: 145 MG/DL (ref 70–99)
HCT VFR BLD AUTO: 28.5 % (ref 40.5–52.5)
HGB BLD-MCNC: 9.7 G/DL (ref 13.5–17.5)
LDH SERPL L TO P-CCNC: 168 U/L (ref 100–190)
LYMPHOCYTES # BLD: 0.2 K/UL (ref 1–5.1)
LYMPHOCYTES NFR BLD: 2.6 %
MAGNESIUM SERPL-MCNC: 2 MG/DL (ref 1.8–2.4)
MCH RBC QN AUTO: 32.6 PG (ref 26–34)
MCHC RBC AUTO-ENTMCNC: 34.1 G/DL (ref 31–36)
MCV RBC AUTO: 95.5 FL (ref 80–100)
MONOCYTES # BLD: 0.3 K/UL (ref 0–1.3)
MONOCYTES NFR BLD: 4.3 %
NEUTROPHILS # BLD: 5.7 K/UL (ref 1.7–7.7)
NEUTROPHILS NFR BLD: 92.8 %
PHOSPHATE SERPL-MCNC: 3.2 MG/DL (ref 2.5–4.9)
PLATELET # BLD AUTO: 189 K/UL (ref 135–450)
PMV BLD AUTO: 9.9 FL (ref 5–10.5)
POTASSIUM SERPL-SCNC: 4 MMOL/L (ref 3.5–5.1)
PROT SERPL-MCNC: 5.5 G/DL (ref 6.4–8.2)
RBC # BLD AUTO: 2.99 M/UL (ref 4.2–5.9)
SODIUM SERPL-SCNC: 138 MMOL/L (ref 136–145)
URATE SERPL-MCNC: 4.8 MG/DL (ref 3.5–7.2)
WBC # BLD AUTO: 6.2 K/UL (ref 4–11)

## 2023-10-27 PROCEDURE — 6360000002 HC RX W HCPCS: Performed by: NURSE PRACTITIONER

## 2023-10-27 PROCEDURE — 36592 COLLECT BLOOD FROM PICC: CPT

## 2023-10-27 PROCEDURE — 85025 COMPLETE CBC W/AUTO DIFF WBC: CPT

## 2023-10-27 PROCEDURE — 80048 BASIC METABOLIC PNL TOTAL CA: CPT

## 2023-10-27 PROCEDURE — 83615 LACTATE (LD) (LDH) ENZYME: CPT

## 2023-10-27 PROCEDURE — 96415 CHEMO IV INFUSION ADDL HR: CPT

## 2023-10-27 PROCEDURE — 83735 ASSAY OF MAGNESIUM: CPT

## 2023-10-27 PROCEDURE — 2580000003 HC RX 258: Performed by: STUDENT IN AN ORGANIZED HEALTH CARE EDUCATION/TRAINING PROGRAM

## 2023-10-27 PROCEDURE — 6360000002 HC RX W HCPCS: Performed by: STUDENT IN AN ORGANIZED HEALTH CARE EDUCATION/TRAINING PROGRAM

## 2023-10-27 PROCEDURE — 6370000000 HC RX 637 (ALT 250 FOR IP): Performed by: NURSE PRACTITIONER

## 2023-10-27 PROCEDURE — 96413 CHEMO IV INFUSION 1 HR: CPT

## 2023-10-27 PROCEDURE — 3E04305 INTRODUCTION OF OTHER ANTINEOPLASTIC INTO CENTRAL VEIN, PERCUTANEOUS APPROACH: ICD-10-PCS | Performed by: STUDENT IN AN ORGANIZED HEALTH CARE EDUCATION/TRAINING PROGRAM

## 2023-10-27 PROCEDURE — 2060000000 HC ICU INTERMEDIATE R&B

## 2023-10-27 PROCEDURE — 84550 ASSAY OF BLOOD/URIC ACID: CPT

## 2023-10-27 PROCEDURE — 6370000000 HC RX 637 (ALT 250 FOR IP): Performed by: STUDENT IN AN ORGANIZED HEALTH CARE EDUCATION/TRAINING PROGRAM

## 2023-10-27 PROCEDURE — 80076 HEPATIC FUNCTION PANEL: CPT

## 2023-10-27 PROCEDURE — 84100 ASSAY OF PHOSPHORUS: CPT

## 2023-10-27 PROCEDURE — 2580000003 HC RX 258: Performed by: NURSE PRACTITIONER

## 2023-10-27 RX ADMIN — ENOXAPARIN SODIUM 40 MG: 100 INJECTION SUBCUTANEOUS at 17:59

## 2023-10-27 RX ADMIN — Medication 1 TABLET: at 07:56

## 2023-10-27 RX ADMIN — FUROSEMIDE 40 MG: 10 INJECTION, SOLUTION INTRAMUSCULAR; INTRAVENOUS at 18:09

## 2023-10-27 RX ADMIN — THIOTEPA 500 MG: 100 INJECTION, POWDER, LYOPHILIZED, FOR SOLUTION INTRACAVITARY; INTRAVENOUS; INTRAVESICAL at 10:23

## 2023-10-27 RX ADMIN — URSODIOL 500 MG: 250 TABLET, FILM COATED ORAL at 07:56

## 2023-10-27 RX ADMIN — VALACYCLOVIR HYDROCHLORIDE 500 MG: 500 TABLET, FILM COATED ORAL at 07:56

## 2023-10-27 RX ADMIN — DEXTROSE AND SODIUM CHLORIDE: 5; 450 INJECTION, SOLUTION INTRAVENOUS at 19:49

## 2023-10-27 RX ADMIN — ONDANSETRON HYDROCHLORIDE 24 MG: 8 TABLET, FILM COATED ORAL at 09:30

## 2023-10-27 RX ADMIN — SODIUM CHLORIDE, PRESERVATIVE FREE 10 ML: 5 INJECTION INTRAVENOUS at 07:56

## 2023-10-27 RX ADMIN — DEXTROSE AND SODIUM CHLORIDE: 5; 450 INJECTION, SOLUTION INTRAVENOUS at 08:39

## 2023-10-27 RX ADMIN — URSODIOL 500 MG: 250 TABLET, FILM COATED ORAL at 22:01

## 2023-10-27 RX ADMIN — SERTRALINE HYDROCHLORIDE 100 MG: 100 TABLET ORAL at 07:56

## 2023-10-27 RX ADMIN — TAMSULOSIN HYDROCHLORIDE 0.4 MG: 0.4 CAPSULE ORAL at 07:56

## 2023-10-27 RX ADMIN — SODIUM CHLORIDE, PRESERVATIVE FREE 10 ML: 5 INJECTION INTRAVENOUS at 22:01

## 2023-10-27 RX ADMIN — LEVETIRACETAM 500 MG: 500 TABLET, FILM COATED ORAL at 22:01

## 2023-10-27 RX ADMIN — SODIUM CHLORIDE 15 ML: 900 IRRIGANT IRRIGATION at 22:02

## 2023-10-27 RX ADMIN — LEVETIRACETAM 500 MG: 500 TABLET, FILM COATED ORAL at 07:56

## 2023-10-27 RX ADMIN — VALACYCLOVIR HYDROCHLORIDE 500 MG: 500 TABLET, FILM COATED ORAL at 22:01

## 2023-10-27 RX ADMIN — DEXAMETHASONE 20 MG: 4 TABLET ORAL at 09:29

## 2023-10-27 RX ADMIN — Medication 400 MG: at 07:56

## 2023-10-27 NOTE — ACP (ADVANCE CARE PLANNING)
Advance Care Planning     General Advance Care Planning (ACP) Conversation    Date of Conversation: 10/25/2023  Conducted with: Patient with Decision Making Capacity   Healthcare Decision Maker: Next of Kin by law (only applies in absence of above) (name) pt's wife    Healthcare Decision Maker:    Primary Decision Maker: Julio Gavin - Spouse - [phone]  Click here to [place] including selection of the Healthcare Decision Maker Relationship (ie \"Primary\"). Today we documented Decision Maker(s) consistent with Legal Next of Kin hierarchy.     Length of Voluntary ACP Conversation in minutes:  <16 minutes (Non-Billable)    JULY Ambrosio   for [employer] and [address] [place] [address] ([address] [place] [address])  Office Phone: [phone] [address]: [phone]
Advance Care Planning   Healthcare Decision Maker:    Primary Decision Maker: Reyes Braun - [phone]    Click here to complete Healthcare Decision Makers including selection of the Healthcare Decision Maker Relationship (ie \"Primary\"). Today we documented Decision Maker(s) consistent with Legal Next of Kin hierarchy.  discussed [facility] and Living Will documents with patient's wife Avril Godfrey. Pt was sleeping at the beginning of encounter, so writer addressed spouse's questions regarding the documents, as she has been assisting him with completing them. Pt's wife also stated that she would like to complete ACP documents for herself as well while pt is doing so. Writer provided copies of documents for pt's wife to complete, per her request.  Patient and spouse asked  to return on 10/27 for further discussion and witnessing of signatures. Writer will follow up as requested.       [facility]  Phone: [phone]  Pager: [phone]
ones    Care Preferences Communicated:   No    Outcomes/Plan:  ACP Discussion: Completed  New advance directive completed. Returned original document(s) to patient, as well as copies for distribution to appointed agents  Copy of advance directive given to staff to scan into medical record.     Electronically signed by Fanny Bruno on 10/27/2023 at 4:08 PM

## 2023-10-27 NOTE — CARE COORDINATION
Spoke with pt and his son Alethea Olszewski at bedside with pt permission. No current needs expressed. They are aware to notify Saniya MONZON if needs arise. Pt will return home with his wife when able.     SW will follow    JULY Cannon   for [facility] ([address])  Office Phone: [phone] [address]: [phone]

## 2023-10-27 NOTE — CARE COORDINATION
Patient and caregiver have been through the educational process for autologous marrow transplantation including the autlogous  family meeting, preadmission teaching and preadmission physician office visit. Patient also provided written information on unit protocols during admission. Patient and caregiver verbalize understanding of treatment regimen, possible adverse events, length of stay, and risk and benefits of transplantation and are agreeable to proceed. Patient and caregiver have been given an opportunity to ask, and to have their questions answered to their satisfaction.

## 2023-10-27 NOTE — ONCOLOGY
Administration: Chemotherapy drug Thiotepa independently verified with Jose Mayberry RN prior to administration. Acknowledgement of informed consent for chemotherapy administration verified. Original order, appropriateness of regimen, drug supplied, height, weight, BSA, dose calculations, expiration dates/times, drug appearance, and two patient identifiers were verified by both RNs. Drug checked for vesicant/irritant status and for risk of hypersensitivity. Most recent laboratory values and allergies, were reviewed. Positive, brisk blood return via CVC was confirmed prior to administration. Chest x-ray for correct line placement reviewed. Donta Sprague RN and Jose Mayberry RN verified correct rate of chemotherapy and maintenance IV fluids. Patient was educated on chemotherapy regimen prior to administration including indication for treatment related to disease & side effects of chemotherapy drug. Patient verbalizes understanding of all instructions. Monitoring during infusion done per policy, see Flowsheets. Blood return verified before, during, and after infusion per policy; no signs of extravasation. Pt tolerating chemotherapy well and without incident.

## 2023-10-28 LAB
ANION GAP SERPL CALCULATED.3IONS-SCNC: 11 MMOL/L (ref 3–16)
BASOPHILS # BLD: 0 K/UL (ref 0–0.2)
BASOPHILS NFR BLD: 0 %
BUN SERPL-MCNC: 26 MG/DL (ref 7–20)
CALCIUM SERPL-MCNC: 8.6 MG/DL (ref 8.3–10.6)
CHLORIDE SERPL-SCNC: 104 MMOL/L (ref 99–110)
CO2 SERPL-SCNC: 25 MMOL/L (ref 21–32)
CREAT SERPL-MCNC: 1.1 MG/DL (ref 0.8–1.3)
DEPRECATED RDW RBC AUTO: 16 % (ref 12.4–15.4)
EOSINOPHIL # BLD: 0 K/UL (ref 0–0.6)
EOSINOPHIL NFR BLD: 0 %
GFR SERPLBLD CREATININE-BSD FMLA CKD-EPI: >60 ML/MIN/{1.73_M2}
GLUCOSE SERPL-MCNC: 118 MG/DL (ref 70–99)
HCT VFR BLD AUTO: 28.5 % (ref 40.5–52.5)
HGB BLD-MCNC: 9.7 G/DL (ref 13.5–17.5)
LYMPHOCYTES # BLD: 0.2 K/UL (ref 1–5.1)
LYMPHOCYTES NFR BLD: 3.5 %
MCH RBC QN AUTO: 32.6 PG (ref 26–34)
MCHC RBC AUTO-ENTMCNC: 34 G/DL (ref 31–36)
MCV RBC AUTO: 95.9 FL (ref 80–100)
MONOCYTES # BLD: 0.2 K/UL (ref 0–1.3)
MONOCYTES NFR BLD: 3.9 %
NEUTROPHILS # BLD: 5.1 K/UL (ref 1.7–7.7)
NEUTROPHILS NFR BLD: 92.6 %
PHOSPHATE SERPL-MCNC: 3.6 MG/DL (ref 2.5–4.9)
PLATELET # BLD AUTO: 212 K/UL (ref 135–450)
PMV BLD AUTO: 9.6 FL (ref 5–10.5)
POTASSIUM SERPL-SCNC: 3.8 MMOL/L (ref 3.5–5.1)
RBC # BLD AUTO: 2.97 M/UL (ref 4.2–5.9)
SODIUM SERPL-SCNC: 140 MMOL/L (ref 136–145)
URATE SERPL-MCNC: 5.3 MG/DL (ref 3.5–7.2)
WBC # BLD AUTO: 5.5 K/UL (ref 4–11)

## 2023-10-28 PROCEDURE — 2580000003 HC RX 258: Performed by: STUDENT IN AN ORGANIZED HEALTH CARE EDUCATION/TRAINING PROGRAM

## 2023-10-28 PROCEDURE — 2580000003 HC RX 258: Performed by: NURSE PRACTITIONER

## 2023-10-28 PROCEDURE — 85025 COMPLETE CBC W/AUTO DIFF WBC: CPT

## 2023-10-28 PROCEDURE — 6360000002 HC RX W HCPCS: Performed by: NURSE PRACTITIONER

## 2023-10-28 PROCEDURE — 6370000000 HC RX 637 (ALT 250 FOR IP): Performed by: NURSE PRACTITIONER

## 2023-10-28 PROCEDURE — 2060000000 HC ICU INTERMEDIATE R&B

## 2023-10-28 PROCEDURE — 96413 CHEMO IV INFUSION 1 HR: CPT

## 2023-10-28 PROCEDURE — 6360000002 HC RX W HCPCS: Performed by: STUDENT IN AN ORGANIZED HEALTH CARE EDUCATION/TRAINING PROGRAM

## 2023-10-28 PROCEDURE — 6370000000 HC RX 637 (ALT 250 FOR IP): Performed by: STUDENT IN AN ORGANIZED HEALTH CARE EDUCATION/TRAINING PROGRAM

## 2023-10-28 PROCEDURE — 36592 COLLECT BLOOD FROM PICC: CPT

## 2023-10-28 PROCEDURE — 96415 CHEMO IV INFUSION ADDL HR: CPT

## 2023-10-28 PROCEDURE — 84550 ASSAY OF BLOOD/URIC ACID: CPT

## 2023-10-28 PROCEDURE — 80048 BASIC METABOLIC PNL TOTAL CA: CPT

## 2023-10-28 PROCEDURE — 84100 ASSAY OF PHOSPHORUS: CPT

## 2023-10-28 RX ADMIN — DEXAMETHASONE 20 MG: 4 TABLET ORAL at 10:08

## 2023-10-28 RX ADMIN — VALACYCLOVIR HYDROCHLORIDE 500 MG: 500 TABLET, FILM COATED ORAL at 08:32

## 2023-10-28 RX ADMIN — FUROSEMIDE 40 MG: 10 INJECTION, SOLUTION INTRAMUSCULAR; INTRAVENOUS at 18:44

## 2023-10-28 RX ADMIN — SODIUM CHLORIDE 15 ML: 900 IRRIGANT IRRIGATION at 21:35

## 2023-10-28 RX ADMIN — LEVETIRACETAM 500 MG: 500 TABLET, FILM COATED ORAL at 08:32

## 2023-10-28 RX ADMIN — DEXTROSE AND SODIUM CHLORIDE: 5; 450 INJECTION, SOLUTION INTRAVENOUS at 09:34

## 2023-10-28 RX ADMIN — LORAZEPAM 0.5 MG: 0.5 TABLET ORAL at 14:22

## 2023-10-28 RX ADMIN — Medication 1 TABLET: at 08:32

## 2023-10-28 RX ADMIN — Medication 400 MG: at 08:32

## 2023-10-28 RX ADMIN — LEVETIRACETAM 500 MG: 500 TABLET, FILM COATED ORAL at 21:34

## 2023-10-28 RX ADMIN — URSODIOL 500 MG: 250 TABLET, FILM COATED ORAL at 08:32

## 2023-10-28 RX ADMIN — DEXTROSE AND SODIUM CHLORIDE: 5; 450 INJECTION, SOLUTION INTRAVENOUS at 18:55

## 2023-10-28 RX ADMIN — SODIUM CHLORIDE, PRESERVATIVE FREE 10 ML: 5 INJECTION INTRAVENOUS at 21:34

## 2023-10-28 RX ADMIN — SODIUM CHLORIDE, PRESERVATIVE FREE 10 ML: 5 INJECTION INTRAVENOUS at 08:32

## 2023-10-28 RX ADMIN — SODIUM CHLORIDE 15 ML: 900 IRRIGANT IRRIGATION at 18:44

## 2023-10-28 RX ADMIN — LORAZEPAM 0.5 MG: 0.5 TABLET ORAL at 21:34

## 2023-10-28 RX ADMIN — SODIUM CHLORIDE 15 ML: 900 IRRIGANT IRRIGATION at 10:51

## 2023-10-28 RX ADMIN — DEXTROSE AND SODIUM CHLORIDE: 5; 450 INJECTION, SOLUTION INTRAVENOUS at 02:40

## 2023-10-28 RX ADMIN — LORAZEPAM 1 MG: 2 INJECTION INTRAMUSCULAR; INTRAVENOUS at 08:32

## 2023-10-28 RX ADMIN — SODIUM CHLORIDE 15 ML: 900 IRRIGANT IRRIGATION at 06:49

## 2023-10-28 RX ADMIN — SERTRALINE HYDROCHLORIDE 100 MG: 100 TABLET ORAL at 08:32

## 2023-10-28 RX ADMIN — TAMSULOSIN HYDROCHLORIDE 0.4 MG: 0.4 CAPSULE ORAL at 08:32

## 2023-10-28 RX ADMIN — BUSULFAN 240 MG: 6 INJECTION INTRAVENOUS at 10:44

## 2023-10-28 RX ADMIN — ONDANSETRON HYDROCHLORIDE 24 MG: 8 TABLET, FILM COATED ORAL at 10:08

## 2023-10-28 RX ADMIN — VALACYCLOVIR HYDROCHLORIDE 500 MG: 500 TABLET, FILM COATED ORAL at 21:34

## 2023-10-28 RX ADMIN — ENOXAPARIN SODIUM 40 MG: 100 INJECTION SUBCUTANEOUS at 18:43

## 2023-10-28 RX ADMIN — URSODIOL 500 MG: 250 TABLET, FILM COATED ORAL at 21:38

## 2023-10-28 ASSESSMENT — PAIN SCALES - GENERAL
PAINLEVEL_OUTOF10: 0

## 2023-10-28 NOTE — ONCOLOGY
Administration: Chemotherapy drug Busulfan independently verified with Shilo Kebede RN prior to administration. Acknowledgement of informed consent for chemotherapy administration verified. Original order, appropriateness of regimen, drug supplied, height, weight, BSA, dose calculations, expiration dates/times, drug appearance, and two patient identifiers were verified by both RNs. Drug checked for vesicant/irritant status and for risk of hypersensitivity. Most recent laboratory values and allergies, were reviewed. Positive, brisk blood return via CVC was confirmed prior to administration. Chest x-ray for correct line placement reviewed. Sathish Mcgowan RN and Shilo Kebede RN verified correct rate of chemotherapy and maintenance IV fluids. Patient was educated on chemotherapy regimen prior to administration including indication for treatment related to disease & side effects of chemotherapy drug. Patient verbalizes understanding of all instructions. Premed given. vss    Completion of Chemotherapy: Monitoring during infusion done per policy, see Flowsheets. Blood return verified before, during, and after infusion per policy; no signs of extravasation. Pt {tolerated  chemotherapy well and without incident. Chemotherapy infusion end time on the [facility] Will continue to monitor.

## 2023-10-29 LAB
ANION GAP SERPL CALCULATED.3IONS-SCNC: 8 MMOL/L (ref 3–16)
BASOPHILS # BLD: 0 K/UL (ref 0–0.2)
BASOPHILS NFR BLD: 0.1 %
BUN SERPL-MCNC: 20 MG/DL (ref 7–20)
CALCIUM SERPL-MCNC: 8.6 MG/DL (ref 8.3–10.6)
CHLORIDE SERPL-SCNC: 104 MMOL/L (ref 99–110)
CO2 SERPL-SCNC: 26 MMOL/L (ref 21–32)
CREAT SERPL-MCNC: 1 MG/DL (ref 0.8–1.3)
DEPRECATED RDW RBC AUTO: 16.1 % (ref 12.4–15.4)
EOSINOPHIL # BLD: 0 K/UL (ref 0–0.6)
EOSINOPHIL NFR BLD: 0 %
GFR SERPLBLD CREATININE-BSD FMLA CKD-EPI: >60 ML/MIN/{1.73_M2}
GLUCOSE SERPL-MCNC: 126 MG/DL (ref 70–99)
HCT VFR BLD AUTO: 29.5 % (ref 40.5–52.5)
HGB BLD-MCNC: 10 G/DL (ref 13.5–17.5)
LYMPHOCYTES # BLD: 0.1 K/UL (ref 1–5.1)
LYMPHOCYTES NFR BLD: 2.1 %
MCH RBC QN AUTO: 32.1 PG (ref 26–34)
MCHC RBC AUTO-ENTMCNC: 33.8 G/DL (ref 31–36)
MCV RBC AUTO: 94.9 FL (ref 80–100)
MONOCYTES # BLD: 0.1 K/UL (ref 0–1.3)
MONOCYTES NFR BLD: 1.7 %
NEUTROPHILS # BLD: 3.2 K/UL (ref 1.7–7.7)
NEUTROPHILS NFR BLD: 96.1 %
PHOSPHATE SERPL-MCNC: 3.6 MG/DL (ref 2.5–4.9)
PLATELET # BLD AUTO: 238 K/UL (ref 135–450)
PMV BLD AUTO: 9.9 FL (ref 5–10.5)
POTASSIUM SERPL-SCNC: 3.7 MMOL/L (ref 3.5–5.1)
RBC # BLD AUTO: 3.11 M/UL (ref 4.2–5.9)
SODIUM SERPL-SCNC: 138 MMOL/L (ref 136–145)
URATE SERPL-MCNC: 4.6 MG/DL (ref 3.5–7.2)
WBC # BLD AUTO: 3.3 K/UL (ref 4–11)

## 2023-10-29 PROCEDURE — 6360000002 HC RX W HCPCS: Performed by: STUDENT IN AN ORGANIZED HEALTH CARE EDUCATION/TRAINING PROGRAM

## 2023-10-29 PROCEDURE — 2060000000 HC ICU INTERMEDIATE R&B

## 2023-10-29 PROCEDURE — 80048 BASIC METABOLIC PNL TOTAL CA: CPT

## 2023-10-29 PROCEDURE — 84100 ASSAY OF PHOSPHORUS: CPT

## 2023-10-29 PROCEDURE — 96415 CHEMO IV INFUSION ADDL HR: CPT

## 2023-10-29 PROCEDURE — 84550 ASSAY OF BLOOD/URIC ACID: CPT

## 2023-10-29 PROCEDURE — 6370000000 HC RX 637 (ALT 250 FOR IP): Performed by: NURSE PRACTITIONER

## 2023-10-29 PROCEDURE — 36592 COLLECT BLOOD FROM PICC: CPT

## 2023-10-29 PROCEDURE — 2580000003 HC RX 258: Performed by: NURSE PRACTITIONER

## 2023-10-29 PROCEDURE — 96413 CHEMO IV INFUSION 1 HR: CPT

## 2023-10-29 PROCEDURE — 2580000003 HC RX 258: Performed by: STUDENT IN AN ORGANIZED HEALTH CARE EDUCATION/TRAINING PROGRAM

## 2023-10-29 PROCEDURE — 6370000000 HC RX 637 (ALT 250 FOR IP): Performed by: STUDENT IN AN ORGANIZED HEALTH CARE EDUCATION/TRAINING PROGRAM

## 2023-10-29 PROCEDURE — 85025 COMPLETE CBC W/AUTO DIFF WBC: CPT

## 2023-10-29 PROCEDURE — 6360000002 HC RX W HCPCS: Performed by: NURSE PRACTITIONER

## 2023-10-29 RX ADMIN — DEXTROSE AND SODIUM CHLORIDE: 5; 450 INJECTION, SOLUTION INTRAVENOUS at 17:52

## 2023-10-29 RX ADMIN — PROCHLORPERAZINE MALEATE 10 MG: 10 TABLET ORAL at 13:44

## 2023-10-29 RX ADMIN — SERTRALINE HYDROCHLORIDE 100 MG: 100 TABLET ORAL at 09:17

## 2023-10-29 RX ADMIN — SODIUM CHLORIDE 15 ML: 900 IRRIGANT IRRIGATION at 11:28

## 2023-10-29 RX ADMIN — DEXTROSE AND SODIUM CHLORIDE: 5; 450 INJECTION, SOLUTION INTRAVENOUS at 09:16

## 2023-10-29 RX ADMIN — LEVETIRACETAM 500 MG: 500 TABLET, FILM COATED ORAL at 20:25

## 2023-10-29 RX ADMIN — SODIUM CHLORIDE, PRESERVATIVE FREE 10 ML: 5 INJECTION INTRAVENOUS at 09:16

## 2023-10-29 RX ADMIN — SODIUM CHLORIDE, PRESERVATIVE FREE 10 ML: 5 INJECTION INTRAVENOUS at 20:27

## 2023-10-29 RX ADMIN — SODIUM CHLORIDE 15 ML: 900 IRRIGANT IRRIGATION at 20:26

## 2023-10-29 RX ADMIN — ENOXAPARIN SODIUM 40 MG: 100 INJECTION SUBCUTANEOUS at 17:50

## 2023-10-29 RX ADMIN — LORAZEPAM 0.5 MG: 0.5 TABLET ORAL at 14:31

## 2023-10-29 RX ADMIN — URSODIOL 500 MG: 250 TABLET, FILM COATED ORAL at 20:25

## 2023-10-29 RX ADMIN — LEVETIRACETAM 500 MG: 500 TABLET, FILM COATED ORAL at 09:17

## 2023-10-29 RX ADMIN — SODIUM CHLORIDE 15 ML: 900 IRRIGANT IRRIGATION at 16:06

## 2023-10-29 RX ADMIN — DEXAMETHASONE 20 MG: 4 TABLET ORAL at 09:47

## 2023-10-29 RX ADMIN — ONDANSETRON HYDROCHLORIDE 24 MG: 8 TABLET, FILM COATED ORAL at 09:47

## 2023-10-29 RX ADMIN — LORAZEPAM 0.5 MG: 0.5 TABLET ORAL at 08:20

## 2023-10-29 RX ADMIN — SODIUM CHLORIDE 15 ML: 900 IRRIGANT IRRIGATION at 09:20

## 2023-10-29 RX ADMIN — VALACYCLOVIR HYDROCHLORIDE 500 MG: 500 TABLET, FILM COATED ORAL at 20:25

## 2023-10-29 RX ADMIN — TAMSULOSIN HYDROCHLORIDE 0.4 MG: 0.4 CAPSULE ORAL at 09:17

## 2023-10-29 RX ADMIN — BUSULFAN 240 MG: 6 INJECTION INTRAVENOUS at 10:20

## 2023-10-29 RX ADMIN — LORAZEPAM 0.5 MG: 0.5 TABLET ORAL at 02:34

## 2023-10-29 RX ADMIN — VALACYCLOVIR HYDROCHLORIDE 500 MG: 500 TABLET, FILM COATED ORAL at 09:16

## 2023-10-29 RX ADMIN — Medication 1 TABLET: at 09:16

## 2023-10-29 RX ADMIN — DEXTROSE AND SODIUM CHLORIDE: 5; 450 INJECTION, SOLUTION INTRAVENOUS at 01:43

## 2023-10-29 RX ADMIN — LORAZEPAM 0.5 MG: 0.5 TABLET ORAL at 20:25

## 2023-10-29 RX ADMIN — Medication 400 MG: at 09:17

## 2023-10-29 RX ADMIN — URSODIOL 500 MG: 250 TABLET, FILM COATED ORAL at 09:16

## 2023-10-29 NOTE — ONCOLOGY
Administration: Chemotherapy drug Busulfan independently verified with Levi Sandoval RN prior to administration. Acknowledgement of informed consent for chemotherapy administration verified. Original order, appropriateness of regimen, drug supplied, height, weight, BSA, dose calculations, expiration dates/times, drug appearance, and two patient identifiers were verified by both RNs. Drug checked for vesicant/irritant status and for risk of hypersensitivity. Most recent laboratory values and allergies, were reviewed. Positive, brisk blood return via CVC was confirmed prior to administration. Chest x-ray for correct line placement reviewed. Sis Telles RN and Levi Sandoval RN verified correct rate of chemotherapy and maintenance IV fluids. Patient was educated on chemotherapy regimen prior to administration including indication for treatment related to disease & side effects of chemotherapy drug. Patient verbalizes understanding of all instructions. premed given. Vss. Completion of Chemotherapy: Monitoring during infusion done per policy, see Flowsheets. Blood return verified before, during, and after infusion per policy; no signs of extravasation. Pt {tolerated chemotherapy well and without incident. Chemotherapy infusion end time on the [facility] Will continue to monitor.

## 2023-10-30 ENCOUNTER — APPOINTMENT (OUTPATIENT)
Dept: GENERAL RADIOLOGY | Age: 68
DRG: 016 | End: 2023-10-30
Attending: STUDENT IN AN ORGANIZED HEALTH CARE EDUCATION/TRAINING PROGRAM
Payer: MEDICARE

## 2023-10-30 LAB
ALBUMIN SERPL-MCNC: 3.2 G/DL (ref 3.4–5)
ALP SERPL-CCNC: 65 U/L (ref 40–129)
ALT SERPL-CCNC: 50 U/L (ref 10–40)
ANION GAP SERPL CALCULATED.3IONS-SCNC: 10 MMOL/L (ref 3–16)
APTT BLD: 24.2 SEC (ref 22.7–35.9)
AST SERPL-CCNC: 36 U/L (ref 15–37)
BASOPHILS # BLD: 0 K/UL (ref 0–0.2)
BASOPHILS NFR BLD: 0.1 %
BILIRUB DIRECT SERPL-MCNC: <0.2 MG/DL (ref 0–0.3)
BILIRUB INDIRECT SERPL-MCNC: ABNORMAL MG/DL (ref 0–1)
BILIRUB SERPL-MCNC: 0.4 MG/DL (ref 0–1)
BILIRUB UR QL STRIP.AUTO: NEGATIVE
BUN SERPL-MCNC: 17 MG/DL (ref 7–20)
CALCIUM SERPL-MCNC: 8.5 MG/DL (ref 8.3–10.6)
CHLORIDE SERPL-SCNC: 103 MMOL/L (ref 99–110)
CLARITY UR: CLEAR
CO2 SERPL-SCNC: 22 MMOL/L (ref 21–32)
COLOR UR: YELLOW
CREAT SERPL-MCNC: 0.9 MG/DL (ref 0.8–1.3)
DEPRECATED RDW RBC AUTO: 16 % (ref 12.4–15.4)
EOSINOPHIL # BLD: 0 K/UL (ref 0–0.6)
EOSINOPHIL NFR BLD: 0.1 %
GFR SERPLBLD CREATININE-BSD FMLA CKD-EPI: >60 ML/MIN/{1.73_M2}
GLUCOSE SERPL-MCNC: 136 MG/DL (ref 70–99)
GLUCOSE UR STRIP.AUTO-MCNC: NEGATIVE MG/DL
HCT VFR BLD AUTO: 28.5 % (ref 40.5–52.5)
HGB BLD-MCNC: 9.7 G/DL (ref 13.5–17.5)
HGB UR QL STRIP.AUTO: NEGATIVE
INR PPP: 1.01 (ref 0.84–1.16)
KETONES UR STRIP.AUTO-MCNC: NEGATIVE MG/DL
LDH SERPL L TO P-CCNC: 159 U/L (ref 100–190)
LEUKOCYTE ESTERASE UR QL STRIP.AUTO: NEGATIVE
LYMPHOCYTES # BLD: 0.1 K/UL (ref 1–5.1)
LYMPHOCYTES NFR BLD: 1.8 %
MAGNESIUM SERPL-MCNC: 2.1 MG/DL (ref 1.8–2.4)
MCH RBC QN AUTO: 32.2 PG (ref 26–34)
MCHC RBC AUTO-ENTMCNC: 33.9 G/DL (ref 31–36)
MCV RBC AUTO: 94.8 FL (ref 80–100)
MONOCYTES # BLD: 0 K/UL (ref 0–1.3)
MONOCYTES NFR BLD: 0.3 %
NEUTROPHILS # BLD: 3.4 K/UL (ref 1.7–7.7)
NEUTROPHILS NFR BLD: 97.7 %
NITRITE UR QL STRIP.AUTO: NEGATIVE
PH UR STRIP.AUTO: 6.5 [PH] (ref 5–8)
PHOSPHATE SERPL-MCNC: 3 MG/DL (ref 2.5–4.9)
PLATELET # BLD AUTO: 236 K/UL (ref 135–450)
PMV BLD AUTO: 9.3 FL (ref 5–10.5)
POTASSIUM SERPL-SCNC: 4.1 MMOL/L (ref 3.5–5.1)
PROT SERPL-MCNC: 5.2 G/DL (ref 6.4–8.2)
PROT UR STRIP.AUTO-MCNC: NEGATIVE MG/DL
PROTHROMBIN TIME: 13.3 SEC (ref 11.5–14.8)
RBC # BLD AUTO: 3.01 M/UL (ref 4.2–5.9)
SODIUM SERPL-SCNC: 135 MMOL/L (ref 136–145)
SP GR UR STRIP.AUTO: 1.01 (ref 1–1.03)
UA DIPSTICK W REFLEX MICRO PNL UR: NORMAL
URATE SERPL-MCNC: 3.1 MG/DL (ref 3.5–7.2)
URN SPEC COLLECT METH UR: NORMAL
UROBILINOGEN UR STRIP-ACNC: 0.2 E.U./DL
WBC # BLD AUTO: 3.5 K/UL (ref 4–11)

## 2023-10-30 PROCEDURE — 81003 URINALYSIS AUTO W/O SCOPE: CPT

## 2023-10-30 PROCEDURE — 96415 CHEMO IV INFUSION ADDL HR: CPT

## 2023-10-30 PROCEDURE — 2580000003 HC RX 258: Performed by: NURSE PRACTITIONER

## 2023-10-30 PROCEDURE — 80076 HEPATIC FUNCTION PANEL: CPT

## 2023-10-30 PROCEDURE — 2580000003 HC RX 258: Performed by: STUDENT IN AN ORGANIZED HEALTH CARE EDUCATION/TRAINING PROGRAM

## 2023-10-30 PROCEDURE — 83735 ASSAY OF MAGNESIUM: CPT

## 2023-10-30 PROCEDURE — 6370000000 HC RX 637 (ALT 250 FOR IP): Performed by: STUDENT IN AN ORGANIZED HEALTH CARE EDUCATION/TRAINING PROGRAM

## 2023-10-30 PROCEDURE — 36592 COLLECT BLOOD FROM PICC: CPT

## 2023-10-30 PROCEDURE — 85610 PROTHROMBIN TIME: CPT

## 2023-10-30 PROCEDURE — 6370000000 HC RX 637 (ALT 250 FOR IP): Performed by: NURSE PRACTITIONER

## 2023-10-30 PROCEDURE — 6360000002 HC RX W HCPCS: Performed by: STUDENT IN AN ORGANIZED HEALTH CARE EDUCATION/TRAINING PROGRAM

## 2023-10-30 PROCEDURE — 84550 ASSAY OF BLOOD/URIC ACID: CPT

## 2023-10-30 PROCEDURE — 71045 X-RAY EXAM CHEST 1 VIEW: CPT

## 2023-10-30 PROCEDURE — 83615 LACTATE (LD) (LDH) ENZYME: CPT

## 2023-10-30 PROCEDURE — 80048 BASIC METABOLIC PNL TOTAL CA: CPT

## 2023-10-30 PROCEDURE — 84100 ASSAY OF PHOSPHORUS: CPT

## 2023-10-30 PROCEDURE — 96413 CHEMO IV INFUSION 1 HR: CPT

## 2023-10-30 PROCEDURE — 6360000002 HC RX W HCPCS: Performed by: NURSE PRACTITIONER

## 2023-10-30 PROCEDURE — 85025 COMPLETE CBC W/AUTO DIFF WBC: CPT

## 2023-10-30 PROCEDURE — 85730 THROMBOPLASTIN TIME PARTIAL: CPT

## 2023-10-30 PROCEDURE — 2060000000 HC ICU INTERMEDIATE R&B

## 2023-10-30 RX ADMIN — LORAZEPAM 0.5 MG: 0.5 TABLET ORAL at 03:28

## 2023-10-30 RX ADMIN — ENOXAPARIN SODIUM 40 MG: 100 INJECTION SUBCUTANEOUS at 18:24

## 2023-10-30 RX ADMIN — LEVETIRACETAM 500 MG: 500 TABLET, FILM COATED ORAL at 08:41

## 2023-10-30 RX ADMIN — VALACYCLOVIR HYDROCHLORIDE 500 MG: 500 TABLET, FILM COATED ORAL at 20:09

## 2023-10-30 RX ADMIN — SODIUM CHLORIDE 15 ML: 900 IRRIGANT IRRIGATION at 20:10

## 2023-10-30 RX ADMIN — Medication 400 MG: at 08:41

## 2023-10-30 RX ADMIN — Medication 1 TABLET: at 08:41

## 2023-10-30 RX ADMIN — SODIUM CHLORIDE, PRESERVATIVE FREE 10 ML: 5 INJECTION INTRAVENOUS at 08:41

## 2023-10-30 RX ADMIN — LORAZEPAM 0.5 MG: 0.5 TABLET ORAL at 08:41

## 2023-10-30 RX ADMIN — DEXAMETHASONE 20 MG: 4 TABLET ORAL at 09:48

## 2023-10-30 RX ADMIN — BUSULFAN 240 MG: 6 INJECTION INTRAVENOUS at 10:36

## 2023-10-30 RX ADMIN — LEVETIRACETAM 500 MG: 500 TABLET, FILM COATED ORAL at 20:10

## 2023-10-30 RX ADMIN — VALACYCLOVIR HYDROCHLORIDE 500 MG: 500 TABLET, FILM COATED ORAL at 08:41

## 2023-10-30 RX ADMIN — DEXTROSE AND SODIUM CHLORIDE: 5; 450 INJECTION, SOLUTION INTRAVENOUS at 00:33

## 2023-10-30 RX ADMIN — ONDANSETRON HYDROCHLORIDE 24 MG: 8 TABLET, FILM COATED ORAL at 09:49

## 2023-10-30 RX ADMIN — LORAZEPAM 0.5 MG: 0.5 TABLET ORAL at 20:09

## 2023-10-30 RX ADMIN — DEXTROSE AND SODIUM CHLORIDE: 5; 450 INJECTION, SOLUTION INTRAVENOUS at 18:29

## 2023-10-30 RX ADMIN — SERTRALINE HYDROCHLORIDE 100 MG: 100 TABLET ORAL at 08:41

## 2023-10-30 RX ADMIN — URSODIOL 500 MG: 250 TABLET, FILM COATED ORAL at 20:09

## 2023-10-30 RX ADMIN — DEXTROSE AND SODIUM CHLORIDE: 5; 450 INJECTION, SOLUTION INTRAVENOUS at 06:16

## 2023-10-30 RX ADMIN — TAMSULOSIN HYDROCHLORIDE 0.4 MG: 0.4 CAPSULE ORAL at 08:41

## 2023-10-30 RX ADMIN — LORAZEPAM 0.5 MG: 0.5 TABLET ORAL at 14:41

## 2023-10-30 RX ADMIN — URSODIOL 500 MG: 250 TABLET, FILM COATED ORAL at 08:48

## 2023-10-30 RX ADMIN — SODIUM CHLORIDE 15 ML: 900 IRRIGANT IRRIGATION at 06:17

## 2023-10-30 RX ADMIN — SODIUM CHLORIDE, PRESERVATIVE FREE 10 ML: 5 INJECTION INTRAVENOUS at 20:10

## 2023-10-30 ASSESSMENT — PAIN SCALES - GENERAL
PAINLEVEL_OUTOF10: 0
PAINLEVEL_OUTOF10: 0

## 2023-10-30 NOTE — CARE COORDINATION
Met with patient and spouse following rounds. Caregiver preparing home for when patient is discharge. Reviewed areas to be sure are clean dust free and no mold or wet areas in the home. Reviewed infection prevention measures. Provided transplant timeline on the board. Reviewed transplant recovery when WBC & ANC will drop. When we expect the white count to recover. Patient and spouse acknowledge. Will follow for discharge needs.

## 2023-10-30 NOTE — ONCOLOGY
Administration: Chemotherapy drug Busulfan independently verified with Joan Calvillo RN prior to administration. Acknowledgement of informed consent for chemotherapy administration verified. Original order, appropriateness of regimen, drug supplied, height, weight, BSA, dose calculations, expiration dates/times, drug appearance, and two patient identifiers were verified by both RNs. Drug checked for vesicant/irritant status and for risk of hypersensitivity. Most recent laboratory values and allergies, were reviewed. Positive, brisk blood return via CVC was confirmed prior to administration. Chest x-ray for correct line placement reviewed. Fredi Love RN and Joan Calvillo RN verified correct rate of chemotherapy and maintenance IV fluids. Patient was educated on chemotherapy regimen prior to administration including indication for treatment related to disease & side effects of chemotherapy drug. Patient verbalizes understanding of all instructions. Completion of Chemotherapy: Monitoring during infusion done per policy, see Flowsheets. Blood return verified before, during, and after infusion per policy; no signs of extravasation. Pt tolerating chemotherapy well and without incident. Chemotherapy infusion end time on the [facility] Will continue to monitor.

## 2023-10-31 LAB
ANION GAP SERPL CALCULATED.3IONS-SCNC: 10 MMOL/L (ref 3–16)
ANION GAP SERPL CALCULATED.3IONS-SCNC: 12 MMOL/L (ref 3–16)
BASOPHILS # BLD: 0 K/UL (ref 0–0.2)
BASOPHILS NFR BLD: 0.7 %
BUN SERPL-MCNC: 19 MG/DL (ref 7–20)
BUN SERPL-MCNC: 21 MG/DL (ref 7–20)
CALCIUM SERPL-MCNC: 8.2 MG/DL (ref 8.3–10.6)
CALCIUM SERPL-MCNC: 8.6 MG/DL (ref 8.3–10.6)
CHLORIDE SERPL-SCNC: 102 MMOL/L (ref 99–110)
CHLORIDE SERPL-SCNC: 104 MMOL/L (ref 99–110)
CO2 SERPL-SCNC: 20 MMOL/L (ref 21–32)
CO2 SERPL-SCNC: 23 MMOL/L (ref 21–32)
CREAT SERPL-MCNC: 0.9 MG/DL (ref 0.8–1.3)
CREAT SERPL-MCNC: 1 MG/DL (ref 0.8–1.3)
DEPRECATED RDW RBC AUTO: 15.7 % (ref 12.4–15.4)
EOSINOPHIL # BLD: 0 K/UL (ref 0–0.6)
EOSINOPHIL NFR BLD: 0 %
GFR SERPLBLD CREATININE-BSD FMLA CKD-EPI: >60 ML/MIN/{1.73_M2}
GFR SERPLBLD CREATININE-BSD FMLA CKD-EPI: >60 ML/MIN/{1.73_M2}
GLUCOSE SERPL-MCNC: 111 MG/DL (ref 70–99)
GLUCOSE SERPL-MCNC: 169 MG/DL (ref 70–99)
HCT VFR BLD AUTO: 29.2 % (ref 40.5–52.5)
HGB BLD-MCNC: 9.9 G/DL (ref 13.5–17.5)
LYMPHOCYTES # BLD: 0.1 K/UL (ref 1–5.1)
LYMPHOCYTES NFR BLD: 1.8 %
MCH RBC QN AUTO: 32.8 PG (ref 26–34)
MCHC RBC AUTO-ENTMCNC: 34 G/DL (ref 31–36)
MCV RBC AUTO: 96.6 FL (ref 80–100)
MONOCYTES # BLD: 0 K/UL (ref 0–1.3)
MONOCYTES NFR BLD: 0.4 %
NEUTROPHILS # BLD: 3.7 K/UL (ref 1.7–7.7)
NEUTROPHILS NFR BLD: 97.1 %
PHOSPHATE SERPL-MCNC: 2.9 MG/DL (ref 2.5–4.9)
PLATELET # BLD AUTO: 251 K/UL (ref 135–450)
PMV BLD AUTO: 9.5 FL (ref 5–10.5)
POTASSIUM SERPL-SCNC: 3.8 MMOL/L (ref 3.5–5.1)
POTASSIUM SERPL-SCNC: 4 MMOL/L (ref 3.5–5.1)
RBC # BLD AUTO: 3.03 M/UL (ref 4.2–5.9)
SODIUM SERPL-SCNC: 134 MMOL/L (ref 136–145)
SODIUM SERPL-SCNC: 137 MMOL/L (ref 136–145)
URATE SERPL-MCNC: 2.7 MG/DL (ref 3.5–7.2)
WBC # BLD AUTO: 3.9 K/UL (ref 4–11)

## 2023-10-31 PROCEDURE — 96413 CHEMO IV INFUSION 1 HR: CPT

## 2023-10-31 PROCEDURE — 6370000000 HC RX 637 (ALT 250 FOR IP): Performed by: STUDENT IN AN ORGANIZED HEALTH CARE EDUCATION/TRAINING PROGRAM

## 2023-10-31 PROCEDURE — 6360000002 HC RX W HCPCS: Performed by: NURSE PRACTITIONER

## 2023-10-31 PROCEDURE — 2580000003 HC RX 258: Performed by: STUDENT IN AN ORGANIZED HEALTH CARE EDUCATION/TRAINING PROGRAM

## 2023-10-31 PROCEDURE — 6370000000 HC RX 637 (ALT 250 FOR IP): Performed by: NURSE PRACTITIONER

## 2023-10-31 PROCEDURE — 96415 CHEMO IV INFUSION ADDL HR: CPT

## 2023-10-31 PROCEDURE — 80048 BASIC METABOLIC PNL TOTAL CA: CPT

## 2023-10-31 PROCEDURE — 84550 ASSAY OF BLOOD/URIC ACID: CPT

## 2023-10-31 PROCEDURE — 6360000002 HC RX W HCPCS: Performed by: STUDENT IN AN ORGANIZED HEALTH CARE EDUCATION/TRAINING PROGRAM

## 2023-10-31 PROCEDURE — 2580000003 HC RX 258: Performed by: NURSE PRACTITIONER

## 2023-10-31 PROCEDURE — 2060000000 HC ICU INTERMEDIATE R&B

## 2023-10-31 PROCEDURE — 84100 ASSAY OF PHOSPHORUS: CPT

## 2023-10-31 PROCEDURE — 85025 COMPLETE CBC W/AUTO DIFF WBC: CPT

## 2023-10-31 RX ADMIN — Medication 400 MG: at 08:40

## 2023-10-31 RX ADMIN — LORAZEPAM 0.5 MG: 0.5 TABLET ORAL at 14:26

## 2023-10-31 RX ADMIN — SODIUM CHLORIDE 15 ML: 900 IRRIGANT IRRIGATION at 18:51

## 2023-10-31 RX ADMIN — SODIUM CHLORIDE 15 ML: 900 IRRIGANT IRRIGATION at 08:42

## 2023-10-31 RX ADMIN — LORAZEPAM 0.5 MG: 0.5 TABLET ORAL at 02:57

## 2023-10-31 RX ADMIN — VALACYCLOVIR HYDROCHLORIDE 500 MG: 500 TABLET, FILM COATED ORAL at 08:40

## 2023-10-31 RX ADMIN — Medication 1 TABLET: at 08:40

## 2023-10-31 RX ADMIN — LEVETIRACETAM 500 MG: 500 TABLET, FILM COATED ORAL at 21:05

## 2023-10-31 RX ADMIN — TAMSULOSIN HYDROCHLORIDE 0.4 MG: 0.4 CAPSULE ORAL at 08:40

## 2023-10-31 RX ADMIN — DEXAMETHASONE 12 MG: 4 TABLET ORAL at 09:34

## 2023-10-31 RX ADMIN — ENOXAPARIN SODIUM 40 MG: 100 INJECTION SUBCUTANEOUS at 18:50

## 2023-10-31 RX ADMIN — ONDANSETRON HYDROCHLORIDE 24 MG: 8 TABLET, FILM COATED ORAL at 09:34

## 2023-10-31 RX ADMIN — FOSAPREPITANT 150 MG: 150 INJECTION, POWDER, LYOPHILIZED, FOR SOLUTION INTRAVENOUS at 09:40

## 2023-10-31 RX ADMIN — LEVETIRACETAM 500 MG: 500 TABLET, FILM COATED ORAL at 08:40

## 2023-10-31 RX ADMIN — SERTRALINE HYDROCHLORIDE 100 MG: 100 TABLET ORAL at 08:40

## 2023-10-31 RX ADMIN — SODIUM CHLORIDE 15 ML: 900 IRRIGANT IRRIGATION at 21:05

## 2023-10-31 RX ADMIN — PROCHLORPERAZINE EDISYLATE 10 MG: 5 INJECTION INTRAMUSCULAR; INTRAVENOUS at 13:02

## 2023-10-31 RX ADMIN — SODIUM CHLORIDE: 9 INJECTION, SOLUTION INTRAVENOUS at 16:58

## 2023-10-31 RX ADMIN — LORAZEPAM 0.5 MG: 0.5 TABLET ORAL at 08:40

## 2023-10-31 RX ADMIN — URSODIOL 500 MG: 250 TABLET, FILM COATED ORAL at 21:05

## 2023-10-31 RX ADMIN — SODIUM CHLORIDE: 9 INJECTION, SOLUTION INTRAVENOUS at 23:22

## 2023-10-31 RX ADMIN — MESNA 900 MG: 100 INJECTION, SOLUTION INTRAVENOUS at 09:55

## 2023-10-31 RX ADMIN — SODIUM CHLORIDE: 9 INJECTION, SOLUTION INTRAVENOUS at 07:39

## 2023-10-31 RX ADMIN — FUROSEMIDE 40 MG: 10 INJECTION, SOLUTION INTRAMUSCULAR; INTRAVENOUS at 18:50

## 2023-10-31 RX ADMIN — VALACYCLOVIR HYDROCHLORIDE 500 MG: 500 TABLET, FILM COATED ORAL at 21:05

## 2023-10-31 RX ADMIN — SODIUM CHLORIDE, PRESERVATIVE FREE 10 ML: 5 INJECTION INTRAVENOUS at 21:05

## 2023-10-31 RX ADMIN — URSODIOL 500 MG: 250 TABLET, FILM COATED ORAL at 08:40

## 2023-10-31 RX ADMIN — FUROSEMIDE 20 MG: 10 INJECTION, SOLUTION INTRAMUSCULAR; INTRAVENOUS at 14:27

## 2023-10-31 RX ADMIN — MESNA 4500 MG: 100 INJECTION, SOLUTION INTRAVENOUS at 10:41

## 2023-10-31 RX ADMIN — CYCLOPHOSPHAMIDE 4500 MG: 1 INJECTION, POWDER, FOR SOLUTION INTRAVENOUS; ORAL at 10:41

## 2023-10-31 RX ADMIN — SODIUM CHLORIDE, PRESERVATIVE FREE 10 ML: 5 INJECTION INTRAVENOUS at 08:40

## 2023-10-31 RX ADMIN — DEXTROSE AND SODIUM CHLORIDE: 5; 450 INJECTION, SOLUTION INTRAVENOUS at 05:40

## 2023-10-31 ASSESSMENT — PAIN SCALES - GENERAL: PAINLEVEL_OUTOF10: 0

## 2023-10-31 NOTE — ONCOLOGY
Administration: Chemotherapy drug Cytoxan independently verified with Tasha Monroe RN prior to administration. Acknowledgement of informed consent for chemotherapy administration verified. Original order, appropriateness of regimen, drug supplied, height, weight, BSA, dose calculations, expiration dates/times, drug appearance, and two patient identifiers were verified by both RNs. Drug checked for vesicant/irritant status and for risk of hypersensitivity. Most recent laboratory values and allergies, were reviewed. Positive, brisk blood return via CVC was confirmed prior to administration. Chest x-ray for correct line placement reviewed. Cornell Chowdary RN and Tasha Monroe RN verified correct rate of chemotherapy and maintenance IV fluids. Patient was educated on chemotherapy regimen prior to administration including indication for treatment related to disease & side effects of chemotherapy drug. Patient verbalizes understanding of all instructions. Completion of Chemotherapy: Monitoring during infusion done per policy, see Flowsheets. Blood return verified before, during, and after infusion per policy; no signs of extravasation. Pt tolerating chemotherapy well and without incident. Chemotherapy infusion end time on the [facility]

## 2023-10-31 NOTE — CARE COORDINATION
Met with patient following rounds. Patient is D-3 of auto transplant. Patient was up dressed Cytoxan was just started. Patient in good spirits. Reviewed transplant timeline with patient. Will continue to follow for education and discharge needs.

## 2023-11-01 LAB
ALBUMIN SERPL-MCNC: 3.2 G/DL (ref 3.4–5)
ALP SERPL-CCNC: 62 U/L (ref 40–129)
ALT SERPL-CCNC: 57 U/L (ref 10–40)
ANION GAP SERPL CALCULATED.3IONS-SCNC: 11 MMOL/L (ref 3–16)
ANISOCYTOSIS BLD QL SMEAR: ABNORMAL
AST SERPL-CCNC: 33 U/L (ref 15–37)
BASOPHILS # BLD: 0 K/UL (ref 0–0.2)
BASOPHILS NFR BLD: 0 %
BILIRUB DIRECT SERPL-MCNC: <0.2 MG/DL (ref 0–0.3)
BILIRUB INDIRECT SERPL-MCNC: ABNORMAL MG/DL (ref 0–1)
BILIRUB SERPL-MCNC: 0.3 MG/DL (ref 0–1)
BUN SERPL-MCNC: 21 MG/DL (ref 7–20)
C DIFF TOX A+B STL QL IA: NORMAL
CALCIUM SERPL-MCNC: 8.2 MG/DL (ref 8.3–10.6)
CHLORIDE SERPL-SCNC: 102 MMOL/L (ref 99–110)
CO2 SERPL-SCNC: 23 MMOL/L (ref 21–32)
CREAT SERPL-MCNC: 0.9 MG/DL (ref 0.8–1.3)
DEPRECATED RDW RBC AUTO: 15.6 % (ref 12.4–15.4)
EOSINOPHIL # BLD: 0 K/UL (ref 0–0.6)
EOSINOPHIL NFR BLD: 0 %
GFR SERPLBLD CREATININE-BSD FMLA CKD-EPI: >60 ML/MIN/{1.73_M2}
GLUCOSE SERPL-MCNC: 115 MG/DL (ref 70–99)
HCT VFR BLD AUTO: 27.1 % (ref 40.5–52.5)
HGB BLD-MCNC: 9.4 G/DL (ref 13.5–17.5)
LDH SERPL L TO P-CCNC: 162 U/L (ref 100–190)
LYMPHOCYTES # BLD: 0 K/UL (ref 1–5.1)
LYMPHOCYTES NFR BLD: 0 %
MACROCYTES BLD QL SMEAR: ABNORMAL
MAGNESIUM SERPL-MCNC: 2 MG/DL (ref 1.8–2.4)
MCH RBC QN AUTO: 33 PG (ref 26–34)
MCHC RBC AUTO-ENTMCNC: 34.8 G/DL (ref 31–36)
MCV RBC AUTO: 94.9 FL (ref 80–100)
MICROCYTES BLD QL SMEAR: ABNORMAL
MONOCYTES # BLD: 0 K/UL (ref 0–1.3)
MONOCYTES NFR BLD: 0 %
NEUTROPHILS # BLD: 3.1 K/UL (ref 1.7–7.7)
NEUTROPHILS NFR BLD: 96 %
NEUTS BAND NFR BLD MANUAL: 4 % (ref 0–7)
OVALOCYTES BLD QL SMEAR: ABNORMAL
PHOSPHATE SERPL-MCNC: 3.3 MG/DL (ref 2.5–4.9)
PLATELET # BLD AUTO: 206 K/UL (ref 135–450)
PLATELET BLD QL SMEAR: ADEQUATE
PMV BLD AUTO: 9.3 FL (ref 5–10.5)
POTASSIUM SERPL-SCNC: 3.7 MMOL/L (ref 3.5–5.1)
PROT SERPL-MCNC: 5 G/DL (ref 6.4–8.2)
RBC # BLD AUTO: 2.85 M/UL (ref 4.2–5.9)
RBC MORPH BLD: NORMAL
SCHISTOCYTES BLD QL SMEAR: ABNORMAL
SLIDE REVIEW: ABNORMAL
SODIUM SERPL-SCNC: 136 MMOL/L (ref 136–145)
URATE SERPL-MCNC: 2.9 MG/DL (ref 3.5–7.2)
WBC # BLD AUTO: 3.1 K/UL (ref 4–11)

## 2023-11-01 PROCEDURE — 80076 HEPATIC FUNCTION PANEL: CPT

## 2023-11-01 PROCEDURE — 6370000000 HC RX 637 (ALT 250 FOR IP): Performed by: NURSE PRACTITIONER

## 2023-11-01 PROCEDURE — 2060000000 HC ICU INTERMEDIATE R&B

## 2023-11-01 PROCEDURE — 96415 CHEMO IV INFUSION ADDL HR: CPT

## 2023-11-01 PROCEDURE — 36592 COLLECT BLOOD FROM PICC: CPT

## 2023-11-01 PROCEDURE — 6370000000 HC RX 637 (ALT 250 FOR IP): Performed by: STUDENT IN AN ORGANIZED HEALTH CARE EDUCATION/TRAINING PROGRAM

## 2023-11-01 PROCEDURE — 96413 CHEMO IV INFUSION 1 HR: CPT

## 2023-11-01 PROCEDURE — 6360000002 HC RX W HCPCS: Performed by: NURSE PRACTITIONER

## 2023-11-01 PROCEDURE — 84100 ASSAY OF PHOSPHORUS: CPT

## 2023-11-01 PROCEDURE — 2580000003 HC RX 258: Performed by: NURSE PRACTITIONER

## 2023-11-01 PROCEDURE — 80048 BASIC METABOLIC PNL TOTAL CA: CPT

## 2023-11-01 PROCEDURE — 87449 NOS EACH ORGANISM AG IA: CPT

## 2023-11-01 PROCEDURE — 83615 LACTATE (LD) (LDH) ENZYME: CPT

## 2023-11-01 PROCEDURE — 87324 CLOSTRIDIUM AG IA: CPT

## 2023-11-01 PROCEDURE — 84550 ASSAY OF BLOOD/URIC ACID: CPT

## 2023-11-01 PROCEDURE — 83735 ASSAY OF MAGNESIUM: CPT

## 2023-11-01 PROCEDURE — 6360000002 HC RX W HCPCS: Performed by: STUDENT IN AN ORGANIZED HEALTH CARE EDUCATION/TRAINING PROGRAM

## 2023-11-01 PROCEDURE — 2580000003 HC RX 258: Performed by: STUDENT IN AN ORGANIZED HEALTH CARE EDUCATION/TRAINING PROGRAM

## 2023-11-01 PROCEDURE — 6370000000 HC RX 637 (ALT 250 FOR IP): Performed by: INTERNAL MEDICINE

## 2023-11-01 PROCEDURE — 85025 COMPLETE CBC W/AUTO DIFF WBC: CPT

## 2023-11-01 RX ORDER — PROCHLORPERAZINE MALEATE 10 MG
10 TABLET ORAL EVERY 4 HOURS PRN
Status: DISCONTINUED | OUTPATIENT
Start: 2023-11-01 | End: 2023-11-01

## 2023-11-01 RX ORDER — PROCHLORPERAZINE MALEATE 10 MG
5 TABLET ORAL EVERY 4 HOURS PRN
Status: DISCONTINUED | OUTPATIENT
Start: 2023-11-01 | End: 2023-11-22 | Stop reason: HOSPADM

## 2023-11-01 RX ORDER — PROCHLORPERAZINE EDISYLATE 5 MG/ML
10 INJECTION INTRAMUSCULAR; INTRAVENOUS EVERY 4 HOURS PRN
Status: DISCONTINUED | OUTPATIENT
Start: 2023-11-01 | End: 2023-11-01

## 2023-11-01 RX ORDER — PROCHLORPERAZINE EDISYLATE 5 MG/ML
5 INJECTION INTRAMUSCULAR; INTRAVENOUS EVERY 4 HOURS PRN
Status: DISCONTINUED | OUTPATIENT
Start: 2023-11-01 | End: 2023-11-22 | Stop reason: HOSPADM

## 2023-11-01 RX ORDER — ACETAMINOPHEN 325 MG/1
650 TABLET ORAL EVERY 4 HOURS PRN
Status: DISCONTINUED | OUTPATIENT
Start: 2023-11-01 | End: 2023-11-22 | Stop reason: HOSPADM

## 2023-11-01 RX ADMIN — URSODIOL 500 MG: 250 TABLET, FILM COATED ORAL at 08:45

## 2023-11-01 RX ADMIN — MESNA 4500 MG: 100 INJECTION, SOLUTION INTRAVENOUS at 10:45

## 2023-11-01 RX ADMIN — VALACYCLOVIR HYDROCHLORIDE 500 MG: 500 TABLET, FILM COATED ORAL at 08:45

## 2023-11-01 RX ADMIN — PROCHLORPERAZINE MALEATE 5 MG: 10 TABLET ORAL at 14:52

## 2023-11-01 RX ADMIN — VALACYCLOVIR HYDROCHLORIDE 500 MG: 500 TABLET, FILM COATED ORAL at 20:21

## 2023-11-01 RX ADMIN — ENOXAPARIN SODIUM 40 MG: 100 INJECTION SUBCUTANEOUS at 16:50

## 2023-11-01 RX ADMIN — URSODIOL 500 MG: 250 TABLET, FILM COATED ORAL at 20:21

## 2023-11-01 RX ADMIN — Medication 400 MG: at 08:45

## 2023-11-01 RX ADMIN — MESNA 900 MG: 100 INJECTION, SOLUTION INTRAVENOUS at 10:22

## 2023-11-01 RX ADMIN — SODIUM CHLORIDE: 9 INJECTION, SOLUTION INTRAVENOUS at 05:34

## 2023-11-01 RX ADMIN — CYCLOPHOSPHAMIDE 4500 MG: 1 INJECTION, POWDER, FOR SOLUTION INTRAVENOUS; ORAL at 10:50

## 2023-11-01 RX ADMIN — SODIUM CHLORIDE 15 ML: 900 IRRIGANT IRRIGATION at 20:22

## 2023-11-01 RX ADMIN — Medication 1 TABLET: at 08:45

## 2023-11-01 RX ADMIN — ACETAMINOPHEN 650 MG: 325 TABLET ORAL at 17:00

## 2023-11-01 RX ADMIN — FUROSEMIDE 20 MG: 10 INJECTION, SOLUTION INTRAMUSCULAR; INTRAVENOUS at 09:06

## 2023-11-01 RX ADMIN — LEVETIRACETAM 500 MG: 500 TABLET, FILM COATED ORAL at 20:21

## 2023-11-01 RX ADMIN — LEVETIRACETAM 500 MG: 500 TABLET, FILM COATED ORAL at 08:45

## 2023-11-01 RX ADMIN — FIDAXOMICIN 200 MG: 200 TABLET, FILM COATED ORAL at 20:21

## 2023-11-01 RX ADMIN — DEXAMETHASONE 12 MG: 4 TABLET ORAL at 09:46

## 2023-11-01 RX ADMIN — FUROSEMIDE 40 MG: 10 INJECTION, SOLUTION INTRAMUSCULAR; INTRAVENOUS at 16:50

## 2023-11-01 RX ADMIN — ONDANSETRON HYDROCHLORIDE 24 MG: 8 TABLET, FILM COATED ORAL at 09:46

## 2023-11-01 RX ADMIN — SERTRALINE HYDROCHLORIDE 100 MG: 100 TABLET ORAL at 08:46

## 2023-11-01 RX ADMIN — TAMSULOSIN HYDROCHLORIDE 0.4 MG: 0.4 CAPSULE ORAL at 08:45

## 2023-11-01 RX ADMIN — SODIUM CHLORIDE: 9 INJECTION, SOLUTION INTRAVENOUS at 20:27

## 2023-11-01 RX ADMIN — SODIUM CHLORIDE, PRESERVATIVE FREE 10 ML: 5 INJECTION INTRAVENOUS at 20:22

## 2023-11-01 RX ADMIN — SODIUM CHLORIDE: 9 INJECTION, SOLUTION INTRAVENOUS at 14:45

## 2023-11-01 RX ADMIN — SODIUM CHLORIDE, PRESERVATIVE FREE 10 ML: 5 INJECTION INTRAVENOUS at 08:46

## 2023-11-01 RX ADMIN — FIDAXOMICIN 200 MG: 200 TABLET, FILM COATED ORAL at 14:45

## 2023-11-01 RX ADMIN — PROCHLORPERAZINE MALEATE 10 MG: 10 TABLET ORAL at 08:46

## 2023-11-01 ASSESSMENT — PAIN DESCRIPTION - LOCATION: LOCATION: HEAD

## 2023-11-01 ASSESSMENT — PAIN SCALES - GENERAL
PAINLEVEL_OUTOF10: 0
PAINLEVEL_OUTOF10: 3
PAINLEVEL_OUTOF10: 0
PAINLEVEL_OUTOF10: 8
PAINLEVEL_OUTOF10: 0
PAINLEVEL_OUTOF10: 0

## 2023-11-01 ASSESSMENT — PAIN DESCRIPTION - FREQUENCY: FREQUENCY: INTERMITTENT

## 2023-11-01 ASSESSMENT — PAIN DESCRIPTION - DESCRIPTORS: DESCRIPTORS: ACHING

## 2023-11-01 ASSESSMENT — PAIN DESCRIPTION - PAIN TYPE: TYPE: ACUTE PAIN

## 2023-11-01 ASSESSMENT — PAIN DESCRIPTION - ONSET: ONSET: GRADUAL

## 2023-11-01 ASSESSMENT — PAIN DESCRIPTION - ORIENTATION: ORIENTATION: INNER

## 2023-11-01 ASSESSMENT — PAIN - FUNCTIONAL ASSESSMENT: PAIN_FUNCTIONAL_ASSESSMENT: ACTIVITIES ARE NOT PREVENTED

## 2023-11-01 NOTE — BH NOTE
Behavioral Health Intervention Note    Met with patient briefly. Patient denied behavioral health needs today.      Gerald Rodriguez Psy.D., MSCP  Clinical Psychologist

## 2023-11-02 PROBLEM — Z01.810 PREOP CARDIOVASCULAR EXAM: Status: RESOLVED | Noted: 2023-10-03 | Resolved: 2023-11-02

## 2023-11-02 LAB
ANION GAP SERPL CALCULATED.3IONS-SCNC: 12 MMOL/L (ref 3–16)
ANION GAP SERPL CALCULATED.3IONS-SCNC: 8 MMOL/L (ref 3–16)
ANION GAP SERPL CALCULATED.3IONS-SCNC: 9 MMOL/L (ref 3–16)
APTT BLD: 24.1 SEC (ref 22.7–35.9)
BASOPHILS # BLD: 0 K/UL (ref 0–0.2)
BASOPHILS NFR BLD: 3 %
BODY TEMPERATURE: 36
BUN SERPL-MCNC: 16 MG/DL (ref 7–20)
BUN SERPL-MCNC: 17 MG/DL (ref 7–20)
BUN SERPL-MCNC: 19 MG/DL (ref 7–20)
C DIFF TOX GENS STL QL NAA+PROBE: ABNORMAL
CALCIUM SERPL-MCNC: 8.2 MG/DL (ref 8.3–10.6)
CALCIUM SERPL-MCNC: 8.3 MG/DL (ref 8.3–10.6)
CALCIUM SERPL-MCNC: 8.9 MG/DL (ref 8.3–10.6)
CHLORIDE SERPL-SCNC: 105 MMOL/L (ref 99–110)
CHLORIDE SERPL-SCNC: 107 MMOL/L (ref 99–110)
CHLORIDE SERPL-SCNC: 109 MMOL/L (ref 99–110)
CO2 SERPL-SCNC: 21 MMOL/L (ref 21–32)
CO2 SERPL-SCNC: 22 MMOL/L (ref 21–32)
CO2 SERPL-SCNC: 23 MMOL/L (ref 21–32)
CREAT SERPL-MCNC: 0.9 MG/DL (ref 0.8–1.3)
CREAT SERPL-MCNC: 1 MG/DL (ref 0.8–1.3)
CREAT SERPL-MCNC: 1 MG/DL (ref 0.8–1.3)
DEPRECATED RDW RBC AUTO: 15.9 % (ref 12.4–15.4)
EOSINOPHIL # BLD: 0 K/UL (ref 0–0.6)
EOSINOPHIL NFR BLD: 0.1 %
GFR SERPLBLD CREATININE-BSD FMLA CKD-EPI: >60 ML/MIN/{1.73_M2}
GLUCOSE SERPL-MCNC: 102 MG/DL (ref 70–99)
GLUCOSE SERPL-MCNC: 166 MG/DL (ref 70–99)
GLUCOSE SERPL-MCNC: 91 MG/DL (ref 70–99)
HCT VFR BLD AUTO: 29.9 % (ref 40.5–52.5)
HGB BLD-MCNC: 10.3 G/DL (ref 13.5–17.5)
INR PPP: 1.04 (ref 0.84–1.16)
LYMPHOCYTES # BLD: 0 K/UL (ref 1–5.1)
LYMPHOCYTES NFR BLD: 2.5 %
MCH RBC QN AUTO: 32.6 PG (ref 26–34)
MCHC RBC AUTO-ENTMCNC: 34.5 G/DL (ref 31–36)
MCV RBC AUTO: 94.5 FL (ref 80–100)
MONOCYTES # BLD: 0 K/UL (ref 0–1.3)
MONOCYTES NFR BLD: 1.1 %
NEUTROPHILS # BLD: 1.5 K/UL (ref 1.7–7.7)
NEUTROPHILS NFR BLD: 93.3 %
ORGANISM: ABNORMAL
PERFORMED ON: ABNORMAL
PHOSPHATE SERPL-MCNC: 2.6 MG/DL (ref 2.5–4.9)
PLATELET # BLD AUTO: 198 K/UL (ref 135–450)
PMV BLD AUTO: 9.2 FL (ref 5–10.5)
POC SAMPLE TYPE: ABNORMAL
POTASSIUM BLD-SCNC: 2.9 MMOL/L (ref 3.5–5.1)
POTASSIUM SERPL-SCNC: 2.9 MMOL/L (ref 3.5–5.1)
POTASSIUM SERPL-SCNC: 4 MMOL/L (ref 3.5–5.1)
POTASSIUM SERPL-SCNC: 4.2 MMOL/L (ref 3.5–5.1)
PROTHROMBIN TIME: 13.6 SEC (ref 11.5–14.8)
RBC # BLD AUTO: 3.16 M/UL (ref 4.2–5.9)
SODIUM SERPL-SCNC: 138 MMOL/L (ref 136–145)
SODIUM SERPL-SCNC: 139 MMOL/L (ref 136–145)
SODIUM SERPL-SCNC: 139 MMOL/L (ref 136–145)
URATE SERPL-MCNC: 2.6 MG/DL (ref 3.5–7.2)
WBC # BLD AUTO: 1.6 K/UL (ref 4–11)

## 2023-11-02 PROCEDURE — 2580000003 HC RX 258: Performed by: NURSE PRACTITIONER

## 2023-11-02 PROCEDURE — 84100 ASSAY OF PHOSPHORUS: CPT

## 2023-11-02 PROCEDURE — 6370000000 HC RX 637 (ALT 250 FOR IP): Performed by: NURSE PRACTITIONER

## 2023-11-02 PROCEDURE — 85025 COMPLETE CBC W/AUTO DIFF WBC: CPT

## 2023-11-02 PROCEDURE — 84132 ASSAY OF SERUM POTASSIUM: CPT

## 2023-11-02 PROCEDURE — 6360000002 HC RX W HCPCS: Performed by: NURSE PRACTITIONER

## 2023-11-02 PROCEDURE — 2580000003 HC RX 258: Performed by: STUDENT IN AN ORGANIZED HEALTH CARE EDUCATION/TRAINING PROGRAM

## 2023-11-02 PROCEDURE — 80048 BASIC METABOLIC PNL TOTAL CA: CPT

## 2023-11-02 PROCEDURE — 6370000000 HC RX 637 (ALT 250 FOR IP): Performed by: STUDENT IN AN ORGANIZED HEALTH CARE EDUCATION/TRAINING PROGRAM

## 2023-11-02 PROCEDURE — 2060000000 HC ICU INTERMEDIATE R&B

## 2023-11-02 PROCEDURE — 6360000002 HC RX W HCPCS: Performed by: STUDENT IN AN ORGANIZED HEALTH CARE EDUCATION/TRAINING PROGRAM

## 2023-11-02 PROCEDURE — 85730 THROMBOPLASTIN TIME PARTIAL: CPT

## 2023-11-02 PROCEDURE — 84550 ASSAY OF BLOOD/URIC ACID: CPT

## 2023-11-02 PROCEDURE — 85610 PROTHROMBIN TIME: CPT

## 2023-11-02 RX ORDER — LEVOFLOXACIN 500 MG/1
500 TABLET, FILM COATED ORAL NIGHTLY
Status: DISCONTINUED | OUTPATIENT
Start: 2023-11-02 | End: 2023-11-06

## 2023-11-02 RX ORDER — PANTOPRAZOLE SODIUM 40 MG/1
40 TABLET, DELAYED RELEASE ORAL
Status: DISCONTINUED | OUTPATIENT
Start: 2023-11-02 | End: 2023-11-03

## 2023-11-02 RX ORDER — POTASSIUM CHLORIDE 1.5 G/1.58G
40 POWDER, FOR SOLUTION ORAL ONCE
Status: DISCONTINUED | OUTPATIENT
Start: 2023-11-02 | End: 2023-11-02 | Stop reason: SDUPTHER

## 2023-11-02 RX ORDER — BUDESONIDE 3 MG/1
3 CAPSULE, COATED PELLETS ORAL 3 TIMES DAILY
Status: DISCONTINUED | OUTPATIENT
Start: 2023-11-02 | End: 2023-11-22 | Stop reason: HOSPADM

## 2023-11-02 RX ADMIN — SERTRALINE HYDROCHLORIDE 100 MG: 100 TABLET ORAL at 09:26

## 2023-11-02 RX ADMIN — SODIUM CHLORIDE 15 ML: 900 IRRIGANT IRRIGATION at 06:19

## 2023-11-02 RX ADMIN — SODIUM CHLORIDE, PRESERVATIVE FREE 10 ML: 5 INJECTION INTRAVENOUS at 20:54

## 2023-11-02 RX ADMIN — POTASSIUM CHLORIDE 20 MEQ: 400 INJECTION, SOLUTION INTRAVENOUS at 09:29

## 2023-11-02 RX ADMIN — SODIUM CHLORIDE: 9 INJECTION, SOLUTION INTRAVENOUS at 21:21

## 2023-11-02 RX ADMIN — TAMSULOSIN HYDROCHLORIDE 0.4 MG: 0.4 CAPSULE ORAL at 09:26

## 2023-11-02 RX ADMIN — FUROSEMIDE 40 MG: 10 INJECTION, SOLUTION INTRAMUSCULAR; INTRAVENOUS at 11:09

## 2023-11-02 RX ADMIN — VALACYCLOVIR HYDROCHLORIDE 500 MG: 500 TABLET, FILM COATED ORAL at 09:28

## 2023-11-02 RX ADMIN — LEVETIRACETAM 500 MG: 500 TABLET, FILM COATED ORAL at 20:54

## 2023-11-02 RX ADMIN — Medication 1 TABLET: at 09:28

## 2023-11-02 RX ADMIN — POTASSIUM CHLORIDE 20 MEQ: 400 INJECTION, SOLUTION INTRAVENOUS at 04:56

## 2023-11-02 RX ADMIN — VALACYCLOVIR HYDROCHLORIDE 500 MG: 500 TABLET, FILM COATED ORAL at 20:54

## 2023-11-02 RX ADMIN — URSODIOL 500 MG: 250 TABLET, FILM COATED ORAL at 20:54

## 2023-11-02 RX ADMIN — FIDAXOMICIN 200 MG: 200 TABLET, FILM COATED ORAL at 09:43

## 2023-11-02 RX ADMIN — POTASSIUM BICARBONATE 40 MEQ: 782 TABLET, EFFERVESCENT ORAL at 09:26

## 2023-11-02 RX ADMIN — SODIUM CHLORIDE: 9 INJECTION, SOLUTION INTRAVENOUS at 09:38

## 2023-11-02 RX ADMIN — LEVOFLOXACIN 500 MG: 500 TABLET, FILM COATED ORAL at 20:54

## 2023-11-02 RX ADMIN — FIDAXOMICIN 200 MG: 200 TABLET, FILM COATED ORAL at 21:20

## 2023-11-02 RX ADMIN — SODIUM CHLORIDE 15 ML: 900 IRRIGANT IRRIGATION at 20:55

## 2023-11-02 RX ADMIN — PROCHLORPERAZINE EDISYLATE 5 MG: 5 INJECTION INTRAMUSCULAR; INTRAVENOUS at 10:34

## 2023-11-02 RX ADMIN — PROCHLORPERAZINE MALEATE 5 MG: 10 TABLET ORAL at 03:46

## 2023-11-02 RX ADMIN — ACETAMINOPHEN 650 MG: 325 TABLET ORAL at 09:42

## 2023-11-02 RX ADMIN — FLUCONAZOLE 400 MG: 200 TABLET ORAL at 09:27

## 2023-11-02 RX ADMIN — PANTOPRAZOLE SODIUM 40 MG: 40 TABLET, DELAYED RELEASE ORAL at 09:28

## 2023-11-02 RX ADMIN — SODIUM CHLORIDE 15 ML: 900 IRRIGANT IRRIGATION at 11:12

## 2023-11-02 RX ADMIN — POTASSIUM CHLORIDE 20 MEQ: 400 INJECTION, SOLUTION INTRAVENOUS at 06:18

## 2023-11-02 RX ADMIN — POTASSIUM CHLORIDE 20 MEQ: 400 INJECTION, SOLUTION INTRAVENOUS at 08:00

## 2023-11-02 RX ADMIN — DEXAMETHASONE 8 MG: 4 TABLET ORAL at 09:27

## 2023-11-02 RX ADMIN — URSODIOL 500 MG: 250 TABLET, FILM COATED ORAL at 09:26

## 2023-11-02 RX ADMIN — LEVETIRACETAM 500 MG: 500 TABLET, FILM COATED ORAL at 09:28

## 2023-11-02 RX ADMIN — SODIUM CHLORIDE, PRESERVATIVE FREE 10 ML: 5 INJECTION INTRAVENOUS at 11:11

## 2023-11-02 RX ADMIN — BUDESONIDE 3 MG: 3 CAPSULE, GELATIN COATED ORAL at 15:51

## 2023-11-02 RX ADMIN — SODIUM CHLORIDE 15 ML: 900 IRRIGANT IRRIGATION at 16:54

## 2023-11-02 RX ADMIN — ENOXAPARIN SODIUM 40 MG: 100 INJECTION SUBCUTANEOUS at 17:55

## 2023-11-02 RX ADMIN — BUDESONIDE 3 MG: 3 CAPSULE, GELATIN COATED ORAL at 09:42

## 2023-11-02 RX ADMIN — SODIUM CHLORIDE: 9 INJECTION, SOLUTION INTRAVENOUS at 03:48

## 2023-11-02 RX ADMIN — BUDESONIDE 3 MG: 3 CAPSULE, GELATIN COATED ORAL at 20:54

## 2023-11-02 ASSESSMENT — PAIN - FUNCTIONAL ASSESSMENT: PAIN_FUNCTIONAL_ASSESSMENT: PREVENTS OR INTERFERES SOME ACTIVE ACTIVITIES AND ADLS

## 2023-11-02 ASSESSMENT — PAIN SCALES - GENERAL
PAINLEVEL_OUTOF10: 0
PAINLEVEL_OUTOF10: 8
PAINLEVEL_OUTOF10: 0
PAINLEVEL_OUTOF10: 0

## 2023-11-02 ASSESSMENT — PAIN DESCRIPTION - FREQUENCY: FREQUENCY: INTERMITTENT

## 2023-11-02 ASSESSMENT — PAIN SCALES - WONG BAKER
WONGBAKER_NUMERICALRESPONSE: 0

## 2023-11-02 ASSESSMENT — PAIN DESCRIPTION - ORIENTATION: ORIENTATION: INNER

## 2023-11-02 ASSESSMENT — PAIN DESCRIPTION - ONSET: ONSET: GRADUAL

## 2023-11-02 ASSESSMENT — PAIN DESCRIPTION - DESCRIPTORS: DESCRIPTORS: DISCOMFORT

## 2023-11-02 ASSESSMENT — PAIN DESCRIPTION - PAIN TYPE: TYPE: ACUTE PAIN

## 2023-11-02 ASSESSMENT — PAIN DESCRIPTION - LOCATION: LOCATION: THROAT

## 2023-11-03 LAB
ALBUMIN SERPL-MCNC: 3.3 G/DL (ref 3.4–5)
ALP SERPL-CCNC: 54 U/L (ref 40–129)
ALT SERPL-CCNC: 33 U/L (ref 10–40)
ANION GAP SERPL CALCULATED.3IONS-SCNC: 7 MMOL/L (ref 3–16)
ANION GAP SERPL CALCULATED.3IONS-SCNC: 8 MMOL/L (ref 3–16)
AST SERPL-CCNC: 15 U/L (ref 15–37)
BILIRUB DIRECT SERPL-MCNC: <0.2 MG/DL (ref 0–0.3)
BILIRUB INDIRECT SERPL-MCNC: ABNORMAL MG/DL (ref 0–1)
BILIRUB SERPL-MCNC: <0.2 MG/DL (ref 0–1)
BUN SERPL-MCNC: 17 MG/DL (ref 7–20)
BUN SERPL-MCNC: 19 MG/DL (ref 7–20)
CALCIUM SERPL-MCNC: 8.1 MG/DL (ref 8.3–10.6)
CALCIUM SERPL-MCNC: 8.2 MG/DL (ref 8.3–10.6)
CHLORIDE SERPL-SCNC: 107 MMOL/L (ref 99–110)
CHLORIDE SERPL-SCNC: 110 MMOL/L (ref 99–110)
CO2 SERPL-SCNC: 24 MMOL/L (ref 21–32)
CO2 SERPL-SCNC: 25 MMOL/L (ref 21–32)
CREAT SERPL-MCNC: 0.8 MG/DL (ref 0.8–1.3)
CREAT SERPL-MCNC: 0.9 MG/DL (ref 0.8–1.3)
DEPRECATED RDW RBC AUTO: 15.7 % (ref 12.4–15.4)
GFR SERPLBLD CREATININE-BSD FMLA CKD-EPI: >60 ML/MIN/{1.73_M2}
GFR SERPLBLD CREATININE-BSD FMLA CKD-EPI: >60 ML/MIN/{1.73_M2}
GLUCOSE SERPL-MCNC: 119 MG/DL (ref 70–99)
GLUCOSE SERPL-MCNC: 98 MG/DL (ref 70–99)
HCT VFR BLD AUTO: 24.7 % (ref 40.5–52.5)
HGB BLD-MCNC: 8.6 G/DL (ref 13.5–17.5)
LDH SERPL L TO P-CCNC: 139 U/L (ref 100–190)
MAGNESIUM SERPL-MCNC: 1.7 MG/DL (ref 1.8–2.4)
MCH RBC QN AUTO: 32.5 PG (ref 26–34)
MCHC RBC AUTO-ENTMCNC: 34.6 G/DL (ref 31–36)
MCV RBC AUTO: 93.9 FL (ref 80–100)
PHOSPHATE SERPL-MCNC: 2.1 MG/DL (ref 2.5–4.9)
PLATELET # BLD AUTO: 139 K/UL (ref 135–450)
PMV BLD AUTO: 9.3 FL (ref 5–10.5)
POTASSIUM SERPL-SCNC: 3.6 MMOL/L (ref 3.5–5.1)
POTASSIUM SERPL-SCNC: 3.9 MMOL/L (ref 3.5–5.1)
PROT SERPL-MCNC: 4.9 G/DL (ref 6.4–8.2)
RBC # BLD AUTO: 2.63 M/UL (ref 4.2–5.9)
SODIUM SERPL-SCNC: 140 MMOL/L (ref 136–145)
SODIUM SERPL-SCNC: 141 MMOL/L (ref 136–145)
URATE SERPL-MCNC: 2.4 MG/DL (ref 3.5–7.2)
WBC # BLD AUTO: 0.2 K/UL (ref 4–11)

## 2023-11-03 PROCEDURE — 6370000000 HC RX 637 (ALT 250 FOR IP): Performed by: STUDENT IN AN ORGANIZED HEALTH CARE EDUCATION/TRAINING PROGRAM

## 2023-11-03 PROCEDURE — 6360000002 HC RX W HCPCS: Performed by: NURSE PRACTITIONER

## 2023-11-03 PROCEDURE — 38208 THAW PRESERVED STEM CELLS: CPT | Performed by: STUDENT IN AN ORGANIZED HEALTH CARE EDUCATION/TRAINING PROGRAM

## 2023-11-03 PROCEDURE — 84550 ASSAY OF BLOOD/URIC ACID: CPT

## 2023-11-03 PROCEDURE — 2060000000 HC ICU INTERMEDIATE R&B

## 2023-11-03 PROCEDURE — 38241 TRANSPLT AUTOL HCT/DONOR: CPT | Performed by: STUDENT IN AN ORGANIZED HEALTH CARE EDUCATION/TRAINING PROGRAM

## 2023-11-03 PROCEDURE — 30243Y0 TRANSFUSION OF AUTOLOGOUS HEMATOPOIETIC STEM CELLS INTO CENTRAL VEIN, PERCUTANEOUS APPROACH: ICD-10-PCS | Performed by: STUDENT IN AN ORGANIZED HEALTH CARE EDUCATION/TRAINING PROGRAM

## 2023-11-03 PROCEDURE — 6370000000 HC RX 637 (ALT 250 FOR IP): Performed by: NURSE PRACTITIONER

## 2023-11-03 PROCEDURE — 80076 HEPATIC FUNCTION PANEL: CPT

## 2023-11-03 PROCEDURE — 6360000002 HC RX W HCPCS: Performed by: STUDENT IN AN ORGANIZED HEALTH CARE EDUCATION/TRAINING PROGRAM

## 2023-11-03 PROCEDURE — 36592 COLLECT BLOOD FROM PICC: CPT

## 2023-11-03 PROCEDURE — 84100 ASSAY OF PHOSPHORUS: CPT

## 2023-11-03 PROCEDURE — 2580000003 HC RX 258: Performed by: NURSE PRACTITIONER

## 2023-11-03 PROCEDURE — 80048 BASIC METABOLIC PNL TOTAL CA: CPT

## 2023-11-03 PROCEDURE — 83615 LACTATE (LD) (LDH) ENZYME: CPT

## 2023-11-03 PROCEDURE — 83735 ASSAY OF MAGNESIUM: CPT

## 2023-11-03 PROCEDURE — 85025 COMPLETE CBC W/AUTO DIFF WBC: CPT

## 2023-11-03 RX ORDER — SODIUM CHLORIDE 9 MG/ML
INJECTION, SOLUTION INTRAVENOUS CONTINUOUS PRN
Status: DISCONTINUED | OUTPATIENT
Start: 2023-11-03 | End: 2023-11-03 | Stop reason: SDUPTHER

## 2023-11-03 RX ORDER — ACETAMINOPHEN 325 MG/1
650 TABLET ORAL ONCE
Status: COMPLETED | OUTPATIENT
Start: 2023-11-03 | End: 2023-11-03

## 2023-11-03 RX ORDER — MORPHINE SULFATE 2 MG/ML
2 INJECTION, SOLUTION INTRAMUSCULAR; INTRAVENOUS PRN
Status: DISCONTINUED | OUTPATIENT
Start: 2023-11-03 | End: 2023-11-22 | Stop reason: HOSPADM

## 2023-11-03 RX ORDER — EPINEPHRINE 1 MG/ML
0.3 INJECTION, SOLUTION INTRAMUSCULAR; SUBCUTANEOUS
Status: DISPENSED | OUTPATIENT
Start: 2023-11-03 | End: 2023-11-04

## 2023-11-03 RX ORDER — DIPHENHYDRAMINE HYDROCHLORIDE 50 MG/ML
25 INJECTION INTRAMUSCULAR; INTRAVENOUS PRN
Status: DISPENSED | OUTPATIENT
Start: 2023-11-03 | End: 2023-11-04

## 2023-11-03 RX ORDER — PANTOPRAZOLE SODIUM 40 MG/1
40 TABLET, DELAYED RELEASE ORAL
Status: DISCONTINUED | OUTPATIENT
Start: 2023-11-03 | End: 2023-11-20

## 2023-11-03 RX ORDER — DIPHENHYDRAMINE HCL 25 MG
25 TABLET ORAL ONCE
Status: COMPLETED | OUTPATIENT
Start: 2023-11-03 | End: 2023-11-03

## 2023-11-03 RX ADMIN — TAMSULOSIN HYDROCHLORIDE 0.4 MG: 0.4 CAPSULE ORAL at 09:12

## 2023-11-03 RX ADMIN — VALACYCLOVIR HYDROCHLORIDE 500 MG: 500 TABLET, FILM COATED ORAL at 09:13

## 2023-11-03 RX ADMIN — Medication 1 TABLET: at 09:13

## 2023-11-03 RX ADMIN — URSODIOL 500 MG: 250 TABLET, FILM COATED ORAL at 19:44

## 2023-11-03 RX ADMIN — SODIUM CHLORIDE 15 ML: 900 IRRIGANT IRRIGATION at 06:39

## 2023-11-03 RX ADMIN — ACETAMINOPHEN 650 MG: 325 TABLET ORAL at 10:35

## 2023-11-03 RX ADMIN — PANTOPRAZOLE SODIUM 40 MG: 40 TABLET, DELAYED RELEASE ORAL at 06:38

## 2023-11-03 RX ADMIN — LEVOFLOXACIN 500 MG: 500 TABLET, FILM COATED ORAL at 19:44

## 2023-11-03 RX ADMIN — FUROSEMIDE 40 MG: 10 INJECTION, SOLUTION INTRAMUSCULAR; INTRAVENOUS at 18:12

## 2023-11-03 RX ADMIN — BUDESONIDE 3 MG: 3 CAPSULE, GELATIN COATED ORAL at 19:44

## 2023-11-03 RX ADMIN — SODIUM CHLORIDE: 9 INJECTION, SOLUTION INTRAVENOUS at 19:54

## 2023-11-03 RX ADMIN — LEVETIRACETAM 500 MG: 500 TABLET, FILM COATED ORAL at 19:44

## 2023-11-03 RX ADMIN — FIDAXOMICIN 200 MG: 200 TABLET, FILM COATED ORAL at 19:46

## 2023-11-03 RX ADMIN — FLUCONAZOLE 400 MG: 200 TABLET ORAL at 09:13

## 2023-11-03 RX ADMIN — VALACYCLOVIR HYDROCHLORIDE 500 MG: 500 TABLET, FILM COATED ORAL at 19:44

## 2023-11-03 RX ADMIN — SODIUM CHLORIDE, PRESERVATIVE FREE 10 ML: 5 INJECTION INTRAVENOUS at 11:47

## 2023-11-03 RX ADMIN — HYDROCORTISONE SODIUM SUCCINATE 100 MG: 100 INJECTION, POWDER, FOR SOLUTION INTRAMUSCULAR; INTRAVENOUS at 10:36

## 2023-11-03 RX ADMIN — BUDESONIDE 3 MG: 3 CAPSULE, GELATIN COATED ORAL at 15:41

## 2023-11-03 RX ADMIN — SODIUM CHLORIDE: 9 INJECTION, SOLUTION INTRAVENOUS at 09:20

## 2023-11-03 RX ADMIN — URSODIOL 500 MG: 250 TABLET, FILM COATED ORAL at 09:12

## 2023-11-03 RX ADMIN — PANTOPRAZOLE SODIUM 40 MG: 40 TABLET, DELAYED RELEASE ORAL at 15:41

## 2023-11-03 RX ADMIN — SODIUM CHLORIDE 15 ML: 900 IRRIGANT IRRIGATION at 19:45

## 2023-11-03 RX ADMIN — SODIUM CHLORIDE: 9 INJECTION, SOLUTION INTRAVENOUS at 15:11

## 2023-11-03 RX ADMIN — SODIUM CHLORIDE 15 ML: 900 IRRIGANT IRRIGATION at 11:48

## 2023-11-03 RX ADMIN — PROCHLORPERAZINE EDISYLATE 5 MG: 5 INJECTION INTRAMUSCULAR; INTRAVENOUS at 19:52

## 2023-11-03 RX ADMIN — SERTRALINE HYDROCHLORIDE 100 MG: 100 TABLET ORAL at 09:12

## 2023-11-03 RX ADMIN — SODIUM CHLORIDE 15 ML: 900 IRRIGANT IRRIGATION at 18:17

## 2023-11-03 RX ADMIN — BUDESONIDE 3 MG: 3 CAPSULE, GELATIN COATED ORAL at 09:13

## 2023-11-03 RX ADMIN — SODIUM CHLORIDE, PRESERVATIVE FREE 10 ML: 5 INJECTION INTRAVENOUS at 19:44

## 2023-11-03 RX ADMIN — ENOXAPARIN SODIUM 40 MG: 100 INJECTION SUBCUTANEOUS at 18:13

## 2023-11-03 RX ADMIN — DIPHENHYDRAMINE HYDROCHLORIDE 25 MG: 25 TABLET ORAL at 10:36

## 2023-11-03 RX ADMIN — FIDAXOMICIN 200 MG: 200 TABLET, FILM COATED ORAL at 09:12

## 2023-11-03 RX ADMIN — LEVETIRACETAM 500 MG: 500 TABLET, FILM COATED ORAL at 09:13

## 2023-11-03 ASSESSMENT — PAIN SCALES - GENERAL
PAINLEVEL_OUTOF10: 0

## 2023-11-03 NOTE — PROCEDURES
Transplant (T0) Progress Note      Annalisa Manzano                           Blood Type: B pos    11/3/23                               Start time:1135 Completion SKU    Transplant Type: Autologous    Product Type: PBSC (peripheral blood stem cell)    Product Unit Number: [id number]    Cell Count: 3.1  x 10^6  Volume: 350 mL      Product Manipulation:      Volume Reduction  No  Plasma Depletion  No  RBC Depletion  No    Catheter lumen used for infusion: Red             Positive Blood Return: Yes      Premeds Given: Yes- Tylenol, Benadryl, Hydrocortisone    Adverse Reaction(s) and treatment: Baseline vital signs obtained prior to infusion and monitoring completed throughout per [facility] protocol - see flowsheets. All IVFs turned down to [facility] during stem cell infusion. Stem cell product(s) verified with 2nd RN Sis Telles prior to infusion using 2 patient identifiers. Infused via gravity with rate controlled by Etta Gorman RN per [facility] protocols. Emergency medications epinephrine, benadryl, & hydrocortisone available as needed. Emergency medical equipment available as needed including telemetry monitoring throughout infusion, supplemental O2, suction equipment, and crash cart. RN at bedside throughout infusion.    Etta Gorman RN

## 2023-11-04 LAB
ANION GAP SERPL CALCULATED.3IONS-SCNC: 7 MMOL/L (ref 3–16)
ANION GAP SERPL CALCULATED.3IONS-SCNC: 8 MMOL/L (ref 3–16)
BUN SERPL-MCNC: 16 MG/DL (ref 7–20)
BUN SERPL-MCNC: 16 MG/DL (ref 7–20)
CALCIUM SERPL-MCNC: 7.8 MG/DL (ref 8.3–10.6)
CALCIUM SERPL-MCNC: 8.2 MG/DL (ref 8.3–10.6)
CHLORIDE SERPL-SCNC: 107 MMOL/L (ref 99–110)
CHLORIDE SERPL-SCNC: 110 MMOL/L (ref 99–110)
CO2 SERPL-SCNC: 25 MMOL/L (ref 21–32)
CO2 SERPL-SCNC: 25 MMOL/L (ref 21–32)
CREAT SERPL-MCNC: 0.9 MG/DL (ref 0.8–1.3)
CREAT SERPL-MCNC: 1 MG/DL (ref 0.8–1.3)
DEPRECATED RDW RBC AUTO: 15.8 % (ref 12.4–15.4)
GFR SERPLBLD CREATININE-BSD FMLA CKD-EPI: >60 ML/MIN/{1.73_M2}
GFR SERPLBLD CREATININE-BSD FMLA CKD-EPI: >60 ML/MIN/{1.73_M2}
GLUCOSE SERPL-MCNC: 109 MG/DL (ref 70–99)
GLUCOSE SERPL-MCNC: 93 MG/DL (ref 70–99)
HCT VFR BLD AUTO: 21.6 % (ref 40.5–52.5)
HGB BLD-MCNC: 7.4 G/DL (ref 13.5–17.5)
MCH RBC QN AUTO: 32.7 PG (ref 26–34)
MCHC RBC AUTO-ENTMCNC: 34.5 G/DL (ref 31–36)
MCV RBC AUTO: 94.8 FL (ref 80–100)
PHOSPHATE SERPL-MCNC: 2.4 MG/DL (ref 2.5–4.9)
PLATELET # BLD AUTO: 98 K/UL (ref 135–450)
PMV BLD AUTO: 9.5 FL (ref 5–10.5)
POTASSIUM SERPL-SCNC: 3.3 MMOL/L (ref 3.5–5.1)
POTASSIUM SERPL-SCNC: 3.8 MMOL/L (ref 3.5–5.1)
RBC # BLD AUTO: 2.27 M/UL (ref 4.2–5.9)
SODIUM SERPL-SCNC: 139 MMOL/L (ref 136–145)
SODIUM SERPL-SCNC: 143 MMOL/L (ref 136–145)
URATE SERPL-MCNC: 2.8 MG/DL (ref 3.5–7.2)
WBC # BLD AUTO: 0.1 K/UL (ref 4–11)

## 2023-11-04 PROCEDURE — 6370000000 HC RX 637 (ALT 250 FOR IP): Performed by: NURSE PRACTITIONER

## 2023-11-04 PROCEDURE — 6360000002 HC RX W HCPCS: Performed by: NURSE PRACTITIONER

## 2023-11-04 PROCEDURE — 6370000000 HC RX 637 (ALT 250 FOR IP): Performed by: STUDENT IN AN ORGANIZED HEALTH CARE EDUCATION/TRAINING PROGRAM

## 2023-11-04 PROCEDURE — 2580000003 HC RX 258: Performed by: NURSE PRACTITIONER

## 2023-11-04 PROCEDURE — 84550 ASSAY OF BLOOD/URIC ACID: CPT

## 2023-11-04 PROCEDURE — 6360000002 HC RX W HCPCS: Performed by: STUDENT IN AN ORGANIZED HEALTH CARE EDUCATION/TRAINING PROGRAM

## 2023-11-04 PROCEDURE — 84100 ASSAY OF PHOSPHORUS: CPT

## 2023-11-04 PROCEDURE — 2060000000 HC ICU INTERMEDIATE R&B

## 2023-11-04 PROCEDURE — 36592 COLLECT BLOOD FROM PICC: CPT

## 2023-11-04 PROCEDURE — 80048 BASIC METABOLIC PNL TOTAL CA: CPT

## 2023-11-04 PROCEDURE — 85025 COMPLETE CBC W/AUTO DIFF WBC: CPT

## 2023-11-04 RX ORDER — CHLORPROMAZINE HYDROCHLORIDE 25 MG/1
25 TABLET, FILM COATED ORAL EVERY 6 HOURS PRN
Status: DISCONTINUED | OUTPATIENT
Start: 2023-11-04 | End: 2023-11-06

## 2023-11-04 RX ADMIN — LEVETIRACETAM 500 MG: 500 TABLET, FILM COATED ORAL at 09:01

## 2023-11-04 RX ADMIN — CHLORPROMAZINE HYDROCHLORIDE 25 MG: 25 TABLET, FILM COATED ORAL at 20:43

## 2023-11-04 RX ADMIN — SERTRALINE HYDROCHLORIDE 100 MG: 100 TABLET ORAL at 09:01

## 2023-11-04 RX ADMIN — SODIUM CHLORIDE 15 ML: 900 IRRIGANT IRRIGATION at 20:45

## 2023-11-04 RX ADMIN — PROCHLORPERAZINE EDISYLATE 5 MG: 5 INJECTION INTRAMUSCULAR; INTRAVENOUS at 13:05

## 2023-11-04 RX ADMIN — VALACYCLOVIR HYDROCHLORIDE 500 MG: 500 TABLET, FILM COATED ORAL at 20:43

## 2023-11-04 RX ADMIN — POTASSIUM CHLORIDE 20 MEQ: 400 INJECTION, SOLUTION INTRAVENOUS at 08:57

## 2023-11-04 RX ADMIN — FUROSEMIDE 40 MG: 10 INJECTION, SOLUTION INTRAMUSCULAR; INTRAVENOUS at 18:05

## 2023-11-04 RX ADMIN — POTASSIUM CHLORIDE 20 MEQ: 400 INJECTION, SOLUTION INTRAVENOUS at 06:03

## 2023-11-04 RX ADMIN — SODIUM CHLORIDE 15 ML: 900 IRRIGANT IRRIGATION at 12:55

## 2023-11-04 RX ADMIN — URSODIOL 500 MG: 250 TABLET, FILM COATED ORAL at 09:02

## 2023-11-04 RX ADMIN — POTASSIUM CHLORIDE 20 MEQ: 400 INJECTION, SOLUTION INTRAVENOUS at 05:01

## 2023-11-04 RX ADMIN — SODIUM CHLORIDE, PRESERVATIVE FREE 10 ML: 5 INJECTION INTRAVENOUS at 20:44

## 2023-11-04 RX ADMIN — BUDESONIDE 3 MG: 3 CAPSULE, GELATIN COATED ORAL at 09:01

## 2023-11-04 RX ADMIN — SODIUM CHLORIDE: 9 INJECTION, SOLUTION INTRAVENOUS at 00:29

## 2023-11-04 RX ADMIN — LEVETIRACETAM 500 MG: 500 TABLET, FILM COATED ORAL at 20:43

## 2023-11-04 RX ADMIN — URSODIOL 500 MG: 250 TABLET, FILM COATED ORAL at 20:43

## 2023-11-04 RX ADMIN — LEVOFLOXACIN 500 MG: 500 TABLET, FILM COATED ORAL at 20:43

## 2023-11-04 RX ADMIN — PANTOPRAZOLE SODIUM 40 MG: 40 TABLET, DELAYED RELEASE ORAL at 07:02

## 2023-11-04 RX ADMIN — TAMSULOSIN HYDROCHLORIDE 0.4 MG: 0.4 CAPSULE ORAL at 09:02

## 2023-11-04 RX ADMIN — FIDAXOMICIN 200 MG: 200 TABLET, FILM COATED ORAL at 09:02

## 2023-11-04 RX ADMIN — BUDESONIDE 3 MG: 3 CAPSULE, GELATIN COATED ORAL at 20:43

## 2023-11-04 RX ADMIN — SODIUM CHLORIDE, PRESERVATIVE FREE 10 ML: 5 INJECTION INTRAVENOUS at 09:01

## 2023-11-04 RX ADMIN — VALACYCLOVIR HYDROCHLORIDE 500 MG: 500 TABLET, FILM COATED ORAL at 09:02

## 2023-11-04 RX ADMIN — FIDAXOMICIN 200 MG: 200 TABLET, FILM COATED ORAL at 20:43

## 2023-11-04 RX ADMIN — BUDESONIDE 3 MG: 3 CAPSULE, GELATIN COATED ORAL at 15:43

## 2023-11-04 RX ADMIN — ENOXAPARIN SODIUM 40 MG: 100 INJECTION SUBCUTANEOUS at 18:04

## 2023-11-04 RX ADMIN — POTASSIUM CHLORIDE 20 MEQ: 400 INJECTION, SOLUTION INTRAVENOUS at 07:17

## 2023-11-04 RX ADMIN — FLUCONAZOLE 400 MG: 200 TABLET ORAL at 09:01

## 2023-11-04 RX ADMIN — PANTOPRAZOLE SODIUM 40 MG: 40 TABLET, DELAYED RELEASE ORAL at 15:43

## 2023-11-04 RX ADMIN — Medication 1 TABLET: at 09:01

## 2023-11-05 LAB
ANION GAP SERPL CALCULATED.3IONS-SCNC: 10 MMOL/L (ref 3–16)
ANION GAP SERPL CALCULATED.3IONS-SCNC: 8 MMOL/L (ref 3–16)
BUN SERPL-MCNC: 14 MG/DL (ref 7–20)
BUN SERPL-MCNC: 14 MG/DL (ref 7–20)
CALCIUM SERPL-MCNC: 8.1 MG/DL (ref 8.3–10.6)
CALCIUM SERPL-MCNC: 8.4 MG/DL (ref 8.3–10.6)
CHLORIDE SERPL-SCNC: 103 MMOL/L (ref 99–110)
CHLORIDE SERPL-SCNC: 103 MMOL/L (ref 99–110)
CO2 SERPL-SCNC: 26 MMOL/L (ref 21–32)
CO2 SERPL-SCNC: 27 MMOL/L (ref 21–32)
CREAT SERPL-MCNC: 0.9 MG/DL (ref 0.8–1.3)
CREAT SERPL-MCNC: 1 MG/DL (ref 0.8–1.3)
DEPRECATED RDW RBC AUTO: 15.6 % (ref 12.4–15.4)
GFR SERPLBLD CREATININE-BSD FMLA CKD-EPI: >60 ML/MIN/{1.73_M2}
GFR SERPLBLD CREATININE-BSD FMLA CKD-EPI: >60 ML/MIN/{1.73_M2}
GLUCOSE SERPL-MCNC: 102 MG/DL (ref 70–99)
GLUCOSE SERPL-MCNC: 99 MG/DL (ref 70–99)
HCT VFR BLD AUTO: 22.2 % (ref 40.5–52.5)
HGB BLD-MCNC: 7.7 G/DL (ref 13.5–17.5)
MCH RBC QN AUTO: 32.6 PG (ref 26–34)
MCHC RBC AUTO-ENTMCNC: 34.5 G/DL (ref 31–36)
MCV RBC AUTO: 94.4 FL (ref 80–100)
PHOSPHATE SERPL-MCNC: 3 MG/DL (ref 2.5–4.9)
PLATELET # BLD AUTO: 69 K/UL (ref 135–450)
PMV BLD AUTO: 8.8 FL (ref 5–10.5)
POTASSIUM SERPL-SCNC: 3.4 MMOL/L (ref 3.5–5.1)
POTASSIUM SERPL-SCNC: 4.2 MMOL/L (ref 3.5–5.1)
RBC # BLD AUTO: 2.35 M/UL (ref 4.2–5.9)
SODIUM SERPL-SCNC: 138 MMOL/L (ref 136–145)
SODIUM SERPL-SCNC: 139 MMOL/L (ref 136–145)
URATE SERPL-MCNC: 2.8 MG/DL (ref 3.5–7.2)
WBC # BLD AUTO: 0.1 K/UL (ref 4–11)

## 2023-11-05 PROCEDURE — 6370000000 HC RX 637 (ALT 250 FOR IP): Performed by: NURSE PRACTITIONER

## 2023-11-05 PROCEDURE — 84550 ASSAY OF BLOOD/URIC ACID: CPT

## 2023-11-05 PROCEDURE — 6370000000 HC RX 637 (ALT 250 FOR IP): Performed by: STUDENT IN AN ORGANIZED HEALTH CARE EDUCATION/TRAINING PROGRAM

## 2023-11-05 PROCEDURE — 80048 BASIC METABOLIC PNL TOTAL CA: CPT

## 2023-11-05 PROCEDURE — 85025 COMPLETE CBC W/AUTO DIFF WBC: CPT

## 2023-11-05 PROCEDURE — 6360000002 HC RX W HCPCS: Performed by: NURSE PRACTITIONER

## 2023-11-05 PROCEDURE — 36592 COLLECT BLOOD FROM PICC: CPT

## 2023-11-05 PROCEDURE — 2060000000 HC ICU INTERMEDIATE R&B

## 2023-11-05 PROCEDURE — 2580000003 HC RX 258: Performed by: NURSE PRACTITIONER

## 2023-11-05 PROCEDURE — 84100 ASSAY OF PHOSPHORUS: CPT

## 2023-11-05 RX ADMIN — VALACYCLOVIR HYDROCHLORIDE 500 MG: 500 TABLET, FILM COATED ORAL at 19:53

## 2023-11-05 RX ADMIN — TAMSULOSIN HYDROCHLORIDE 0.4 MG: 0.4 CAPSULE ORAL at 08:20

## 2023-11-05 RX ADMIN — FIDAXOMICIN 200 MG: 200 TABLET, FILM COATED ORAL at 19:54

## 2023-11-05 RX ADMIN — LEVETIRACETAM 500 MG: 500 TABLET, FILM COATED ORAL at 19:53

## 2023-11-05 RX ADMIN — URSODIOL 500 MG: 250 TABLET, FILM COATED ORAL at 08:20

## 2023-11-05 RX ADMIN — LEVOFLOXACIN 500 MG: 500 TABLET, FILM COATED ORAL at 19:53

## 2023-11-05 RX ADMIN — BUDESONIDE 3 MG: 3 CAPSULE, GELATIN COATED ORAL at 08:21

## 2023-11-05 RX ADMIN — URSODIOL 500 MG: 250 TABLET, FILM COATED ORAL at 19:53

## 2023-11-05 RX ADMIN — PROCHLORPERAZINE EDISYLATE 5 MG: 5 INJECTION INTRAMUSCULAR; INTRAVENOUS at 16:51

## 2023-11-05 RX ADMIN — LEVETIRACETAM 500 MG: 500 TABLET, FILM COATED ORAL at 08:21

## 2023-11-05 RX ADMIN — FLUCONAZOLE 400 MG: 200 TABLET ORAL at 08:21

## 2023-11-05 RX ADMIN — PANTOPRAZOLE SODIUM 40 MG: 40 TABLET, DELAYED RELEASE ORAL at 16:46

## 2023-11-05 RX ADMIN — PANTOPRAZOLE SODIUM 40 MG: 40 TABLET, DELAYED RELEASE ORAL at 05:20

## 2023-11-05 RX ADMIN — Medication 1 TABLET: at 08:21

## 2023-11-05 RX ADMIN — VALACYCLOVIR HYDROCHLORIDE 500 MG: 500 TABLET, FILM COATED ORAL at 08:21

## 2023-11-05 RX ADMIN — ENOXAPARIN SODIUM 40 MG: 100 INJECTION SUBCUTANEOUS at 16:46

## 2023-11-05 RX ADMIN — BUDESONIDE 3 MG: 3 CAPSULE, GELATIN COATED ORAL at 19:53

## 2023-11-05 RX ADMIN — SODIUM CHLORIDE, PRESERVATIVE FREE 10 ML: 5 INJECTION INTRAVENOUS at 08:21

## 2023-11-05 RX ADMIN — CHLORPROMAZINE HYDROCHLORIDE 25 MG: 25 TABLET, FILM COATED ORAL at 05:20

## 2023-11-05 RX ADMIN — SODIUM CHLORIDE 15 ML: 900 IRRIGANT IRRIGATION at 05:25

## 2023-11-05 RX ADMIN — SODIUM CHLORIDE, PRESERVATIVE FREE 10 ML: 5 INJECTION INTRAVENOUS at 19:54

## 2023-11-05 RX ADMIN — POTASSIUM CHLORIDE 20 MEQ: 400 INJECTION, SOLUTION INTRAVENOUS at 08:25

## 2023-11-05 RX ADMIN — CHLORPROMAZINE HYDROCHLORIDE 25 MG: 25 TABLET, FILM COATED ORAL at 19:53

## 2023-11-05 RX ADMIN — DIPHENHYDRAMINE HYDROCHLORIDE 5 ML: 12.5 LIQUID ORAL at 21:03

## 2023-11-05 RX ADMIN — POTASSIUM CHLORIDE 20 MEQ: 400 INJECTION, SOLUTION INTRAVENOUS at 06:47

## 2023-11-05 RX ADMIN — FIDAXOMICIN 200 MG: 200 TABLET, FILM COATED ORAL at 08:21

## 2023-11-05 RX ADMIN — POTASSIUM CHLORIDE 20 MEQ: 400 INJECTION, SOLUTION INTRAVENOUS at 09:27

## 2023-11-05 RX ADMIN — BUDESONIDE 3 MG: 3 CAPSULE, GELATIN COATED ORAL at 16:46

## 2023-11-05 RX ADMIN — POTASSIUM CHLORIDE 20 MEQ: 400 INJECTION, SOLUTION INTRAVENOUS at 05:25

## 2023-11-05 RX ADMIN — SERTRALINE HYDROCHLORIDE 100 MG: 100 TABLET ORAL at 08:21

## 2023-11-05 ASSESSMENT — PAIN SCALES - GENERAL
PAINLEVEL_OUTOF10: 0
PAINLEVEL_OUTOF10: 0

## 2023-11-06 ENCOUNTER — APPOINTMENT (OUTPATIENT)
Dept: GENERAL RADIOLOGY | Age: 68
DRG: 016 | End: 2023-11-06
Attending: STUDENT IN AN ORGANIZED HEALTH CARE EDUCATION/TRAINING PROGRAM
Payer: MEDICARE

## 2023-11-06 LAB
ALBUMIN SERPL-MCNC: 3.3 G/DL (ref 3.4–5)
ALP SERPL-CCNC: 55 U/L (ref 40–129)
ALT SERPL-CCNC: 27 U/L (ref 10–40)
AMORPH SED URNS QL MICRO: ABNORMAL /HPF
ANION GAP SERPL CALCULATED.3IONS-SCNC: 11 MMOL/L (ref 3–16)
ANION GAP SERPL CALCULATED.3IONS-SCNC: 9 MMOL/L (ref 3–16)
APTT BLD: 28 SEC (ref 22.7–35.9)
AST SERPL-CCNC: 18 U/L (ref 15–37)
BACTERIA URNS QL MICRO: ABNORMAL /HPF
BACTERIA URNS QL MICRO: ABNORMAL /HPF
BILIRUB DIRECT SERPL-MCNC: <0.2 MG/DL (ref 0–0.3)
BILIRUB INDIRECT SERPL-MCNC: ABNORMAL MG/DL (ref 0–1)
BILIRUB SERPL-MCNC: 0.4 MG/DL (ref 0–1)
BILIRUB UR QL STRIP.AUTO: NEGATIVE
BILIRUB UR QL STRIP.AUTO: NEGATIVE
BUN SERPL-MCNC: 11 MG/DL (ref 7–20)
BUN SERPL-MCNC: 13 MG/DL (ref 7–20)
CALCIUM SERPL-MCNC: 7.9 MG/DL (ref 8.3–10.6)
CALCIUM SERPL-MCNC: 8.4 MG/DL (ref 8.3–10.6)
CHLORIDE SERPL-SCNC: 102 MMOL/L (ref 99–110)
CHLORIDE SERPL-SCNC: 103 MMOL/L (ref 99–110)
CLARITY UR: CLEAR
CLARITY UR: CLEAR
CO2 SERPL-SCNC: 24 MMOL/L (ref 21–32)
CO2 SERPL-SCNC: 26 MMOL/L (ref 21–32)
COLOR UR: YELLOW
COLOR UR: YELLOW
CREAT SERPL-MCNC: 0.9 MG/DL (ref 0.8–1.3)
CREAT SERPL-MCNC: 1.1 MG/DL (ref 0.8–1.3)
DEPRECATED RDW RBC AUTO: 15.1 % (ref 12.4–15.4)
EPI CELLS #/AREA URNS HPF: ABNORMAL /HPF (ref 0–5)
GFR SERPLBLD CREATININE-BSD FMLA CKD-EPI: >60 ML/MIN/{1.73_M2}
GFR SERPLBLD CREATININE-BSD FMLA CKD-EPI: >60 ML/MIN/{1.73_M2}
GLUCOSE SERPL-MCNC: 102 MG/DL (ref 70–99)
GLUCOSE SERPL-MCNC: 105 MG/DL (ref 70–99)
GLUCOSE UR STRIP.AUTO-MCNC: NEGATIVE MG/DL
GLUCOSE UR STRIP.AUTO-MCNC: NEGATIVE MG/DL
HCT VFR BLD AUTO: 21.9 % (ref 40.5–52.5)
HGB BLD-MCNC: 7.6 G/DL (ref 13.5–17.5)
HGB UR QL STRIP.AUTO: ABNORMAL
HGB UR QL STRIP.AUTO: ABNORMAL
INR PPP: 1.03 (ref 0.84–1.16)
KETONES UR STRIP.AUTO-MCNC: ABNORMAL MG/DL
KETONES UR STRIP.AUTO-MCNC: ABNORMAL MG/DL
LACTATE BLDV-SCNC: 1 MMOL/L (ref 0.4–2)
LDH SERPL L TO P-CCNC: 166 U/L (ref 100–190)
LEUKOCYTE ESTERASE UR QL STRIP.AUTO: NEGATIVE
LEUKOCYTE ESTERASE UR QL STRIP.AUTO: NEGATIVE
MAGNESIUM SERPL-MCNC: 1.7 MG/DL (ref 1.8–2.4)
MCH RBC QN AUTO: 32.8 PG (ref 26–34)
MCHC RBC AUTO-ENTMCNC: 34.9 G/DL (ref 31–36)
MCV RBC AUTO: 93.9 FL (ref 80–100)
NITRITE UR QL STRIP.AUTO: NEGATIVE
NITRITE UR QL STRIP.AUTO: NEGATIVE
PH UR STRIP.AUTO: 6 [PH] (ref 5–8)
PH UR STRIP.AUTO: 6.5 [PH] (ref 5–8)
PHOSPHATE SERPL-MCNC: 2.7 MG/DL (ref 2.5–4.9)
PLATELET # BLD AUTO: 39 K/UL (ref 135–450)
PMV BLD AUTO: 9.1 FL (ref 5–10.5)
POTASSIUM SERPL-SCNC: 3.8 MMOL/L (ref 3.5–5.1)
POTASSIUM SERPL-SCNC: 3.9 MMOL/L (ref 3.5–5.1)
PROT SERPL-MCNC: 5.1 G/DL (ref 6.4–8.2)
PROT UR STRIP.AUTO-MCNC: 30 MG/DL
PROT UR STRIP.AUTO-MCNC: NEGATIVE MG/DL
PROTHROMBIN TIME: 13.5 SEC (ref 11.5–14.8)
RBC # BLD AUTO: 2.33 M/UL (ref 4.2–5.9)
RBC #/AREA URNS HPF: ABNORMAL /HPF (ref 0–4)
RBC #/AREA URNS HPF: ABNORMAL /HPF (ref 0–4)
SODIUM SERPL-SCNC: 137 MMOL/L (ref 136–145)
SODIUM SERPL-SCNC: 138 MMOL/L (ref 136–145)
SP GR UR STRIP.AUTO: 1.02 (ref 1–1.03)
SP GR UR STRIP.AUTO: >=1.03 (ref 1–1.03)
UA DIPSTICK W REFLEX MICRO PNL UR: YES
UA DIPSTICK W REFLEX MICRO PNL UR: YES
URATE SERPL-MCNC: 2.3 MG/DL (ref 3.5–7.2)
URN SPEC COLLECT METH UR: ABNORMAL
URN SPEC COLLECT METH UR: ABNORMAL
UROBILINOGEN UR STRIP-ACNC: 0.2 E.U./DL
UROBILINOGEN UR STRIP-ACNC: 0.2 E.U./DL
WBC # BLD AUTO: 0 K/UL (ref 4–11)
WBC #/AREA URNS HPF: ABNORMAL /HPF (ref 0–5)
WBC #/AREA URNS HPF: ABNORMAL /HPF (ref 0–5)

## 2023-11-06 PROCEDURE — 85610 PROTHROMBIN TIME: CPT

## 2023-11-06 PROCEDURE — 6370000000 HC RX 637 (ALT 250 FOR IP): Performed by: NURSE PRACTITIONER

## 2023-11-06 PROCEDURE — 87102 FUNGUS ISOLATION CULTURE: CPT

## 2023-11-06 PROCEDURE — 87070 CULTURE OTHR SPECIMN AEROBIC: CPT

## 2023-11-06 PROCEDURE — 84100 ASSAY OF PHOSPHORUS: CPT

## 2023-11-06 PROCEDURE — 6370000000 HC RX 637 (ALT 250 FOR IP): Performed by: STUDENT IN AN ORGANIZED HEALTH CARE EDUCATION/TRAINING PROGRAM

## 2023-11-06 PROCEDURE — 80076 HEPATIC FUNCTION PANEL: CPT

## 2023-11-06 PROCEDURE — 87086 URINE CULTURE/COLONY COUNT: CPT

## 2023-11-06 PROCEDURE — 6360000002 HC RX W HCPCS: Performed by: STUDENT IN AN ORGANIZED HEALTH CARE EDUCATION/TRAINING PROGRAM

## 2023-11-06 PROCEDURE — 84550 ASSAY OF BLOOD/URIC ACID: CPT

## 2023-11-06 PROCEDURE — 87040 BLOOD CULTURE FOR BACTERIA: CPT

## 2023-11-06 PROCEDURE — 2060000000 HC ICU INTERMEDIATE R&B

## 2023-11-06 PROCEDURE — 87252 VIRUS INOCULATION TISSUE: CPT

## 2023-11-06 PROCEDURE — 71045 X-RAY EXAM CHEST 1 VIEW: CPT

## 2023-11-06 PROCEDURE — 83615 LACTATE (LD) (LDH) ENZYME: CPT

## 2023-11-06 PROCEDURE — 87253 VIRUS INOCULATE TISSUE ADDL: CPT

## 2023-11-06 PROCEDURE — 80048 BASIC METABOLIC PNL TOTAL CA: CPT

## 2023-11-06 PROCEDURE — 83605 ASSAY OF LACTIC ACID: CPT

## 2023-11-06 PROCEDURE — 87186 SC STD MICRODIL/AGAR DIL: CPT

## 2023-11-06 PROCEDURE — 85025 COMPLETE CBC W/AUTO DIFF WBC: CPT

## 2023-11-06 PROCEDURE — 81001 URINALYSIS AUTO W/SCOPE: CPT

## 2023-11-06 PROCEDURE — 87205 SMEAR GRAM STAIN: CPT

## 2023-11-06 PROCEDURE — 87106 FUNGI IDENTIFICATION YEAST: CPT

## 2023-11-06 PROCEDURE — 2580000003 HC RX 258: Performed by: NURSE PRACTITIONER

## 2023-11-06 PROCEDURE — 85730 THROMBOPLASTIN TIME PARTIAL: CPT

## 2023-11-06 PROCEDURE — 83735 ASSAY OF MAGNESIUM: CPT

## 2023-11-06 PROCEDURE — 6360000002 HC RX W HCPCS: Performed by: NURSE PRACTITIONER

## 2023-11-06 PROCEDURE — 87150 DNA/RNA AMPLIFIED PROBE: CPT

## 2023-11-06 PROCEDURE — 2580000003 HC RX 258: Performed by: STUDENT IN AN ORGANIZED HEALTH CARE EDUCATION/TRAINING PROGRAM

## 2023-11-06 PROCEDURE — 87103 BLOOD FUNGUS CULTURE: CPT

## 2023-11-06 PROCEDURE — 36592 COLLECT BLOOD FROM PICC: CPT

## 2023-11-06 RX ORDER — ACETAMINOPHEN 325 MG/1
650 TABLET ORAL EVERY 4 HOURS PRN
Status: DISCONTINUED | OUTPATIENT
Start: 2023-11-06 | End: 2023-11-22 | Stop reason: HOSPADM

## 2023-11-06 RX ORDER — ZINC OXIDE 20 %
OINTMENT (GRAM) TOPICAL 4 TIMES DAILY PRN
Status: DISCONTINUED | OUTPATIENT
Start: 2023-11-06 | End: 2023-11-22 | Stop reason: HOSPADM

## 2023-11-06 RX ADMIN — PANTOPRAZOLE SODIUM 40 MG: 40 TABLET, DELAYED RELEASE ORAL at 15:20

## 2023-11-06 RX ADMIN — VALACYCLOVIR HYDROCHLORIDE 500 MG: 500 TABLET, FILM COATED ORAL at 09:15

## 2023-11-06 RX ADMIN — SODIUM CHLORIDE: 900 INJECTION, SOLUTION INTRAVENOUS at 08:23

## 2023-11-06 RX ADMIN — LEVETIRACETAM 500 MG: 500 TABLET, FILM COATED ORAL at 09:14

## 2023-11-06 RX ADMIN — BUDESONIDE 3 MG: 3 CAPSULE, GELATIN COATED ORAL at 21:01

## 2023-11-06 RX ADMIN — SODIUM CHLORIDE 15 ML: 900 IRRIGANT IRRIGATION at 21:13

## 2023-11-06 RX ADMIN — PIPERACILLIN AND TAZOBACTAM 4500 MG: 4; .5 INJECTION, POWDER, LYOPHILIZED, FOR SOLUTION INTRAVENOUS at 10:02

## 2023-11-06 RX ADMIN — DIPHENHYDRAMINE HYDROCHLORIDE 5 ML: 12.5 LIQUID ORAL at 07:35

## 2023-11-06 RX ADMIN — BUDESONIDE 3 MG: 3 CAPSULE, GELATIN COATED ORAL at 09:15

## 2023-11-06 RX ADMIN — SODIUM CHLORIDE, PRESERVATIVE FREE 10 ML: 5 INJECTION INTRAVENOUS at 21:13

## 2023-11-06 RX ADMIN — DIPHENHYDRAMINE HYDROCHLORIDE 5 ML: 12.5 LIQUID ORAL at 16:18

## 2023-11-06 RX ADMIN — FIDAXOMICIN 200 MG: 200 TABLET, FILM COATED ORAL at 21:02

## 2023-11-06 RX ADMIN — ACETAMINOPHEN 650 MG: 325 TABLET ORAL at 10:02

## 2023-11-06 RX ADMIN — PANTOPRAZOLE SODIUM 40 MG: 40 TABLET, DELAYED RELEASE ORAL at 05:56

## 2023-11-06 RX ADMIN — TAMSULOSIN HYDROCHLORIDE 0.4 MG: 0.4 CAPSULE ORAL at 09:15

## 2023-11-06 RX ADMIN — URSODIOL 500 MG: 250 TABLET, FILM COATED ORAL at 21:01

## 2023-11-06 RX ADMIN — PIPERACILLIN AND TAZOBACTAM 4500 MG: 4; .5 INJECTION, POWDER, LYOPHILIZED, FOR SOLUTION INTRAVENOUS at 15:30

## 2023-11-06 RX ADMIN — LEVETIRACETAM 500 MG: 500 TABLET, FILM COATED ORAL at 21:02

## 2023-11-06 RX ADMIN — SERTRALINE HYDROCHLORIDE 100 MG: 100 TABLET ORAL at 09:15

## 2023-11-06 RX ADMIN — BUDESONIDE 3 MG: 3 CAPSULE, GELATIN COATED ORAL at 15:10

## 2023-11-06 RX ADMIN — RUGBY ZINC OXIDE 20%: 20 OINTMENT TOPICAL at 09:17

## 2023-11-06 RX ADMIN — Medication 1 TABLET: at 09:15

## 2023-11-06 RX ADMIN — PROCHLORPERAZINE MALEATE 5 MG: 10 TABLET ORAL at 10:36

## 2023-11-06 RX ADMIN — PROCHLORPERAZINE EDISYLATE 5 MG: 5 INJECTION INTRAMUSCULAR; INTRAVENOUS at 21:10

## 2023-11-06 RX ADMIN — SODIUM CHLORIDE, PRESERVATIVE FREE 10 ML: 5 INJECTION INTRAVENOUS at 09:14

## 2023-11-06 RX ADMIN — ACETAMINOPHEN 650 MG: 325 TABLET ORAL at 21:10

## 2023-11-06 RX ADMIN — URSODIOL 500 MG: 250 TABLET, FILM COATED ORAL at 09:15

## 2023-11-06 RX ADMIN — VALACYCLOVIR HYDROCHLORIDE 500 MG: 500 TABLET, FILM COATED ORAL at 21:02

## 2023-11-06 RX ADMIN — SODIUM CHLORIDE 15 ML: 900 IRRIGANT IRRIGATION at 12:01

## 2023-11-06 RX ADMIN — FIDAXOMICIN 200 MG: 200 TABLET, FILM COATED ORAL at 09:16

## 2023-11-06 RX ADMIN — ACETAMINOPHEN 650 MG: 325 TABLET ORAL at 15:19

## 2023-11-06 RX ADMIN — SODIUM CHLORIDE 15 ML: 900 IRRIGANT IRRIGATION at 16:17

## 2023-11-06 RX ADMIN — FLUCONAZOLE 400 MG: 200 TABLET ORAL at 09:14

## 2023-11-06 ASSESSMENT — PAIN SCALES - GENERAL
PAINLEVEL_OUTOF10: 0
PAINLEVEL_OUTOF10: 0

## 2023-11-07 ENCOUNTER — APPOINTMENT (OUTPATIENT)
Dept: CT IMAGING | Age: 68
DRG: 016 | End: 2023-11-07
Attending: STUDENT IN AN ORGANIZED HEALTH CARE EDUCATION/TRAINING PROGRAM
Payer: MEDICARE

## 2023-11-07 LAB
ANION GAP SERPL CALCULATED.3IONS-SCNC: 12 MMOL/L (ref 3–16)
ANION GAP SERPL CALCULATED.3IONS-SCNC: 13 MMOL/L (ref 3–16)
BACTERIA UR CULT: NORMAL
BUN SERPL-MCNC: 13 MG/DL (ref 7–20)
BUN SERPL-MCNC: 14 MG/DL (ref 7–20)
CALCIUM SERPL-MCNC: 7.9 MG/DL (ref 8.3–10.6)
CALCIUM SERPL-MCNC: 8.2 MG/DL (ref 8.3–10.6)
CHLORIDE SERPL-SCNC: 103 MMOL/L (ref 99–110)
CHLORIDE SERPL-SCNC: 103 MMOL/L (ref 99–110)
CO2 SERPL-SCNC: 21 MMOL/L (ref 21–32)
CO2 SERPL-SCNC: 23 MMOL/L (ref 21–32)
CREAT SERPL-MCNC: 1.2 MG/DL (ref 0.8–1.3)
CREAT SERPL-MCNC: 1.2 MG/DL (ref 0.8–1.3)
DEPRECATED RDW RBC AUTO: 15.3 % (ref 12.4–15.4)
GFR SERPLBLD CREATININE-BSD FMLA CKD-EPI: >60 ML/MIN/{1.73_M2}
GFR SERPLBLD CREATININE-BSD FMLA CKD-EPI: >60 ML/MIN/{1.73_M2}
GLUCOSE SERPL-MCNC: 103 MG/DL (ref 70–99)
GLUCOSE SERPL-MCNC: 124 MG/DL (ref 70–99)
HCT VFR BLD AUTO: 21.5 % (ref 40.5–52.5)
HGB BLD-MCNC: 7.4 G/DL (ref 13.5–17.5)
MCH RBC QN AUTO: 32.5 PG (ref 26–34)
MCHC RBC AUTO-ENTMCNC: 34.3 G/DL (ref 31–36)
MCV RBC AUTO: 95 FL (ref 80–100)
PHOSPHATE SERPL-MCNC: 2.7 MG/DL (ref 2.5–4.9)
PLATELET # BLD AUTO: 13 K/UL (ref 135–450)
PMV BLD AUTO: 8 FL (ref 5–10.5)
POTASSIUM SERPL-SCNC: 3.3 MMOL/L (ref 3.5–5.1)
POTASSIUM SERPL-SCNC: 3.8 MMOL/L (ref 3.5–5.1)
RBC # BLD AUTO: 2.27 M/UL (ref 4.2–5.9)
SODIUM SERPL-SCNC: 137 MMOL/L (ref 136–145)
SODIUM SERPL-SCNC: 138 MMOL/L (ref 136–145)
URATE SERPL-MCNC: 1.7 MG/DL (ref 3.5–7.2)
WBC # BLD AUTO: 0 K/UL (ref 4–11)

## 2023-11-07 PROCEDURE — 6360000002 HC RX W HCPCS: Performed by: NURSE PRACTITIONER

## 2023-11-07 PROCEDURE — 6360000002 HC RX W HCPCS: Performed by: STUDENT IN AN ORGANIZED HEALTH CARE EDUCATION/TRAINING PROGRAM

## 2023-11-07 PROCEDURE — 84100 ASSAY OF PHOSPHORUS: CPT

## 2023-11-07 PROCEDURE — 2580000003 HC RX 258: Performed by: NURSE PRACTITIONER

## 2023-11-07 PROCEDURE — 2580000003 HC RX 258: Performed by: INTERNAL MEDICINE

## 2023-11-07 PROCEDURE — 6360000004 HC RX CONTRAST MEDICATION: Performed by: NURSE PRACTITIONER

## 2023-11-07 PROCEDURE — 36592 COLLECT BLOOD FROM PICC: CPT

## 2023-11-07 PROCEDURE — 6370000000 HC RX 637 (ALT 250 FOR IP): Performed by: STUDENT IN AN ORGANIZED HEALTH CARE EDUCATION/TRAINING PROGRAM

## 2023-11-07 PROCEDURE — 6370000000 HC RX 637 (ALT 250 FOR IP): Performed by: NURSE PRACTITIONER

## 2023-11-07 PROCEDURE — 6370000000 HC RX 637 (ALT 250 FOR IP)

## 2023-11-07 PROCEDURE — 85025 COMPLETE CBC W/AUTO DIFF WBC: CPT

## 2023-11-07 PROCEDURE — 2580000003 HC RX 258: Performed by: STUDENT IN AN ORGANIZED HEALTH CARE EDUCATION/TRAINING PROGRAM

## 2023-11-07 PROCEDURE — 80048 BASIC METABOLIC PNL TOTAL CA: CPT

## 2023-11-07 PROCEDURE — 84550 ASSAY OF BLOOD/URIC ACID: CPT

## 2023-11-07 PROCEDURE — 2060000000 HC ICU INTERMEDIATE R&B

## 2023-11-07 PROCEDURE — 74176 CT ABD & PELVIS W/O CONTRAST: CPT

## 2023-11-07 RX ORDER — GABAPENTIN 100 MG/1
100 CAPSULE ORAL 3 TIMES DAILY
Status: DISCONTINUED | OUTPATIENT
Start: 2023-11-07 | End: 2023-11-08

## 2023-11-07 RX ORDER — 0.9 % SODIUM CHLORIDE 0.9 %
500 INTRAVENOUS SOLUTION INTRAVENOUS ONCE
Status: COMPLETED | OUTPATIENT
Start: 2023-11-07 | End: 2023-11-08

## 2023-11-07 RX ADMIN — FIDAXOMICIN 200 MG: 200 TABLET, FILM COATED ORAL at 08:14

## 2023-11-07 RX ADMIN — URSODIOL 500 MG: 250 TABLET, FILM COATED ORAL at 20:22

## 2023-11-07 RX ADMIN — PANTOPRAZOLE SODIUM 40 MG: 40 TABLET, DELAYED RELEASE ORAL at 06:45

## 2023-11-07 RX ADMIN — SODIUM CHLORIDE 15 ML: 900 IRRIGANT IRRIGATION at 11:56

## 2023-11-07 RX ADMIN — VALACYCLOVIR HYDROCHLORIDE 500 MG: 500 TABLET, FILM COATED ORAL at 20:22

## 2023-11-07 RX ADMIN — BUDESONIDE 3 MG: 3 CAPSULE, GELATIN COATED ORAL at 13:38

## 2023-11-07 RX ADMIN — POTASSIUM CHLORIDE 20 MEQ: 400 INJECTION, SOLUTION INTRAVENOUS at 19:46

## 2023-11-07 RX ADMIN — POTASSIUM CHLORIDE 20 MEQ: 400 INJECTION, SOLUTION INTRAVENOUS at 17:14

## 2023-11-07 RX ADMIN — BUDESONIDE 3 MG: 3 CAPSULE, GELATIN COATED ORAL at 20:22

## 2023-11-07 RX ADMIN — PROCHLORPERAZINE MALEATE 5 MG: 10 TABLET ORAL at 08:19

## 2023-11-07 RX ADMIN — POTASSIUM CHLORIDE 20 MEQ: 400 INJECTION, SOLUTION INTRAVENOUS at 18:25

## 2023-11-07 RX ADMIN — GABAPENTIN 100 MG: 100 CAPSULE ORAL at 20:22

## 2023-11-07 RX ADMIN — PIPERACILLIN AND TAZOBACTAM 4500 MG: 4; .5 INJECTION, POWDER, LYOPHILIZED, FOR SOLUTION INTRAVENOUS at 00:12

## 2023-11-07 RX ADMIN — PIPERACILLIN AND TAZOBACTAM 4500 MG: 4; .5 INJECTION, POWDER, LYOPHILIZED, FOR SOLUTION INTRAVENOUS at 23:43

## 2023-11-07 RX ADMIN — PROCHLORPERAZINE EDISYLATE 5 MG: 5 INJECTION INTRAMUSCULAR; INTRAVENOUS at 23:48

## 2023-11-07 RX ADMIN — GABAPENTIN 100 MG: 100 CAPSULE ORAL at 13:38

## 2023-11-07 RX ADMIN — Medication 1 TABLET: at 08:14

## 2023-11-07 RX ADMIN — LEVETIRACETAM 500 MG: 500 TABLET, FILM COATED ORAL at 08:15

## 2023-11-07 RX ADMIN — URSODIOL 500 MG: 250 TABLET, FILM COATED ORAL at 08:15

## 2023-11-07 RX ADMIN — PIPERACILLIN AND TAZOBACTAM 4500 MG: 4; .5 INJECTION, POWDER, LYOPHILIZED, FOR SOLUTION INTRAVENOUS at 15:47

## 2023-11-07 RX ADMIN — LEVETIRACETAM 500 MG: 500 TABLET, FILM COATED ORAL at 20:22

## 2023-11-07 RX ADMIN — BUDESONIDE 3 MG: 3 CAPSULE, GELATIN COATED ORAL at 08:15

## 2023-11-07 RX ADMIN — TAMSULOSIN HYDROCHLORIDE 0.4 MG: 0.4 CAPSULE ORAL at 08:15

## 2023-11-07 RX ADMIN — FLUCONAZOLE 400 MG: 200 TABLET ORAL at 08:15

## 2023-11-07 RX ADMIN — VALACYCLOVIR HYDROCHLORIDE 500 MG: 500 TABLET, FILM COATED ORAL at 08:15

## 2023-11-07 RX ADMIN — IOHEXOL 50 ML: 300 INJECTION, SOLUTION INTRAVENOUS at 13:46

## 2023-11-07 RX ADMIN — DIPHENHYDRAMINE HYDROCHLORIDE 5 ML: 12.5 LIQUID ORAL at 19:49

## 2023-11-07 RX ADMIN — POTASSIUM CHLORIDE 20 MEQ: 400 INJECTION, SOLUTION INTRAVENOUS at 21:00

## 2023-11-07 RX ADMIN — FIDAXOMICIN 200 MG: 200 TABLET, FILM COATED ORAL at 20:22

## 2023-11-07 RX ADMIN — SODIUM CHLORIDE, PRESERVATIVE FREE 10 ML: 5 INJECTION INTRAVENOUS at 08:14

## 2023-11-07 RX ADMIN — ACETAMINOPHEN 650 MG: 325 TABLET ORAL at 11:54

## 2023-11-07 RX ADMIN — SODIUM CHLORIDE: 900 INJECTION, SOLUTION INTRAVENOUS at 05:23

## 2023-11-07 RX ADMIN — SODIUM CHLORIDE, PRESERVATIVE FREE 10 ML: 5 INJECTION INTRAVENOUS at 20:22

## 2023-11-07 RX ADMIN — SERTRALINE HYDROCHLORIDE 100 MG: 100 TABLET ORAL at 08:15

## 2023-11-07 RX ADMIN — PIPERACILLIN AND TAZOBACTAM 4500 MG: 4; .5 INJECTION, POWDER, LYOPHILIZED, FOR SOLUTION INTRAVENOUS at 07:58

## 2023-11-07 RX ADMIN — SODIUM CHLORIDE 500 ML: 9 INJECTION, SOLUTION INTRAVENOUS at 23:44

## 2023-11-07 RX ADMIN — SODIUM CHLORIDE: 900 INJECTION, SOLUTION INTRAVENOUS at 20:27

## 2023-11-07 RX ADMIN — PANTOPRAZOLE SODIUM 40 MG: 40 TABLET, DELAYED RELEASE ORAL at 15:44

## 2023-11-07 ASSESSMENT — PAIN DESCRIPTION - FREQUENCY: FREQUENCY: INTERMITTENT

## 2023-11-07 ASSESSMENT — PAIN DESCRIPTION - DESCRIPTORS: DESCRIPTORS: PRESSURE;ACHING

## 2023-11-07 ASSESSMENT — PAIN - FUNCTIONAL ASSESSMENT: PAIN_FUNCTIONAL_ASSESSMENT: PREVENTS OR INTERFERES SOME ACTIVE ACTIVITIES AND ADLS

## 2023-11-07 ASSESSMENT — PAIN SCALES - GENERAL
PAINLEVEL_OUTOF10: 8
PAINLEVEL_OUTOF10: 0

## 2023-11-07 ASSESSMENT — PAIN DESCRIPTION - ONSET: ONSET: PROGRESSIVE

## 2023-11-07 ASSESSMENT — PAIN DESCRIPTION - LOCATION: LOCATION: ABDOMEN

## 2023-11-07 ASSESSMENT — PAIN DESCRIPTION - PAIN TYPE: TYPE: ACUTE PAIN

## 2023-11-07 ASSESSMENT — PAIN DESCRIPTION - ORIENTATION: ORIENTATION: LOWER

## 2023-11-08 LAB
ABO + RH BLD: NORMAL
ALBUMIN SERPL-MCNC: 2.8 G/DL (ref 3.4–5)
ALP SERPL-CCNC: 65 U/L (ref 40–129)
ALT SERPL-CCNC: 22 U/L (ref 10–40)
ANION GAP SERPL CALCULATED.3IONS-SCNC: 10 MMOL/L (ref 3–16)
ANION GAP SERPL CALCULATED.3IONS-SCNC: 13 MMOL/L (ref 3–16)
AST SERPL-CCNC: 10 U/L (ref 15–37)
BACTERIA THROAT AEROBE CULT: NORMAL
BILIRUB DIRECT SERPL-MCNC: <0.2 MG/DL (ref 0–0.3)
BILIRUB INDIRECT SERPL-MCNC: ABNORMAL MG/DL (ref 0–1)
BILIRUB SERPL-MCNC: 0.3 MG/DL (ref 0–1)
BLD GP AB SCN SERPL QL: NORMAL
BLOOD BANK DISPENSE STATUS: NORMAL
BLOOD BANK PRODUCT CODE: NORMAL
BPU ID: NORMAL
BUN SERPL-MCNC: 12 MG/DL (ref 7–20)
BUN SERPL-MCNC: 13 MG/DL (ref 7–20)
CALCIUM SERPL-MCNC: 7.6 MG/DL (ref 8.3–10.6)
CALCIUM SERPL-MCNC: 7.8 MG/DL (ref 8.3–10.6)
CHLORIDE SERPL-SCNC: 106 MMOL/L (ref 99–110)
CHLORIDE SERPL-SCNC: 107 MMOL/L (ref 99–110)
CO2 SERPL-SCNC: 19 MMOL/L (ref 21–32)
CO2 SERPL-SCNC: 20 MMOL/L (ref 21–32)
CREAT SERPL-MCNC: 1 MG/DL (ref 0.8–1.3)
CREAT SERPL-MCNC: 1.1 MG/DL (ref 0.8–1.3)
DEPRECATED RDW RBC AUTO: 15.4 % (ref 12.4–15.4)
DESCRIPTION BLOOD BANK: NORMAL
GFR SERPLBLD CREATININE-BSD FMLA CKD-EPI: >60 ML/MIN/{1.73_M2}
GFR SERPLBLD CREATININE-BSD FMLA CKD-EPI: >60 ML/MIN/{1.73_M2}
GLUCOSE SERPL-MCNC: 121 MG/DL (ref 70–99)
GLUCOSE SERPL-MCNC: 153 MG/DL (ref 70–99)
HCT VFR BLD AUTO: 19.9 % (ref 40.5–52.5)
HGB BLD-MCNC: 6.7 G/DL (ref 13.5–17.5)
LDH SERPL L TO P-CCNC: 125 U/L (ref 100–190)
MAGNESIUM SERPL-MCNC: 1.7 MG/DL (ref 1.8–2.4)
MCH RBC QN AUTO: 31.8 PG (ref 26–34)
MCHC RBC AUTO-ENTMCNC: 33.8 G/DL (ref 31–36)
MCV RBC AUTO: 94.1 FL (ref 80–100)
PHOSPHATE SERPL-MCNC: 1.9 MG/DL (ref 2.5–4.9)
PLATELET # BLD AUTO: 7 K/UL (ref 135–450)
PMV BLD AUTO: 9 FL (ref 5–10.5)
POTASSIUM SERPL-SCNC: 3.8 MMOL/L (ref 3.5–5.1)
POTASSIUM SERPL-SCNC: 4 MMOL/L (ref 3.5–5.1)
PROT SERPL-MCNC: 4.8 G/DL (ref 6.4–8.2)
RBC # BLD AUTO: 2.12 M/UL (ref 4.2–5.9)
REPORT: NORMAL
SODIUM SERPL-SCNC: 137 MMOL/L (ref 136–145)
SODIUM SERPL-SCNC: 138 MMOL/L (ref 136–145)
URATE SERPL-MCNC: 1.2 MG/DL (ref 3.5–7.2)
WBC # BLD AUTO: 0 K/UL (ref 4–11)

## 2023-11-08 PROCEDURE — 87040 BLOOD CULTURE FOR BACTERIA: CPT

## 2023-11-08 PROCEDURE — 84100 ASSAY OF PHOSPHORUS: CPT

## 2023-11-08 PROCEDURE — P9040 RBC LEUKOREDUCED IRRADIATED: HCPCS

## 2023-11-08 PROCEDURE — 51798 US URINE CAPACITY MEASURE: CPT

## 2023-11-08 PROCEDURE — 86880 COOMBS TEST DIRECT: CPT

## 2023-11-08 PROCEDURE — 80076 HEPATIC FUNCTION PANEL: CPT

## 2023-11-08 PROCEDURE — 2500000003 HC RX 250 WO HCPCS

## 2023-11-08 PROCEDURE — 85025 COMPLETE CBC W/AUTO DIFF WBC: CPT

## 2023-11-08 PROCEDURE — 2580000003 HC RX 258: Performed by: NURSE PRACTITIONER

## 2023-11-08 PROCEDURE — 84550 ASSAY OF BLOOD/URIC ACID: CPT

## 2023-11-08 PROCEDURE — 6360000002 HC RX W HCPCS: Performed by: STUDENT IN AN ORGANIZED HEALTH CARE EDUCATION/TRAINING PROGRAM

## 2023-11-08 PROCEDURE — 86850 RBC ANTIBODY SCREEN: CPT

## 2023-11-08 PROCEDURE — 6360000002 HC RX W HCPCS

## 2023-11-08 PROCEDURE — 80048 BASIC METABOLIC PNL TOTAL CA: CPT

## 2023-11-08 PROCEDURE — 83735 ASSAY OF MAGNESIUM: CPT

## 2023-11-08 PROCEDURE — 2060000000 HC ICU INTERMEDIATE R&B

## 2023-11-08 PROCEDURE — 6370000000 HC RX 637 (ALT 250 FOR IP): Performed by: NURSE PRACTITIONER

## 2023-11-08 PROCEDURE — 83615 LACTATE (LD) (LDH) ENZYME: CPT

## 2023-11-08 PROCEDURE — 86901 BLOOD TYPING SEROLOGIC RH(D): CPT

## 2023-11-08 PROCEDURE — P9036 PLATELET PHERESIS IRRADIATED: HCPCS

## 2023-11-08 PROCEDURE — 2580000003 HC RX 258

## 2023-11-08 PROCEDURE — 6370000000 HC RX 637 (ALT 250 FOR IP): Performed by: STUDENT IN AN ORGANIZED HEALTH CARE EDUCATION/TRAINING PROGRAM

## 2023-11-08 PROCEDURE — 36430 TRANSFUSION BLD/BLD COMPNT: CPT

## 2023-11-08 PROCEDURE — 86900 BLOOD TYPING SEROLOGIC ABO: CPT

## 2023-11-08 PROCEDURE — 86923 COMPATIBILITY TEST ELECTRIC: CPT

## 2023-11-08 PROCEDURE — 36592 COLLECT BLOOD FROM PICC: CPT

## 2023-11-08 PROCEDURE — 6370000000 HC RX 637 (ALT 250 FOR IP): Performed by: INTERNAL MEDICINE

## 2023-11-08 PROCEDURE — 6370000000 HC RX 637 (ALT 250 FOR IP)

## 2023-11-08 PROCEDURE — 2580000003 HC RX 258: Performed by: STUDENT IN AN ORGANIZED HEALTH CARE EDUCATION/TRAINING PROGRAM

## 2023-11-08 RX ORDER — BACLOFEN 10 MG/1
10 TABLET ORAL EVERY 8 HOURS PRN
Status: DISCONTINUED | OUTPATIENT
Start: 2023-11-08 | End: 2023-11-22 | Stop reason: HOSPADM

## 2023-11-08 RX ORDER — GABAPENTIN 100 MG/1
200 CAPSULE ORAL 3 TIMES DAILY
Status: DISCONTINUED | OUTPATIENT
Start: 2023-11-08 | End: 2023-11-14

## 2023-11-08 RX ORDER — OXYCODONE HYDROCHLORIDE 5 MG/1
5 TABLET ORAL EVERY 4 HOURS PRN
Status: DISCONTINUED | OUTPATIENT
Start: 2023-11-08 | End: 2023-11-22 | Stop reason: HOSPADM

## 2023-11-08 RX ORDER — DICYCLOMINE HYDROCHLORIDE 10 MG/1
10 CAPSULE ORAL 4 TIMES DAILY PRN
Status: DISCONTINUED | OUTPATIENT
Start: 2023-11-08 | End: 2023-11-22 | Stop reason: HOSPADM

## 2023-11-08 RX ORDER — SODIUM CHLORIDE 9 MG/ML
INJECTION, SOLUTION INTRAVENOUS PRN
Status: DISCONTINUED | OUTPATIENT
Start: 2023-11-08 | End: 2023-11-22 | Stop reason: HOSPADM

## 2023-11-08 RX ADMIN — FLUCONAZOLE 400 MG: 200 TABLET ORAL at 08:49

## 2023-11-08 RX ADMIN — FIDAXOMICIN 200 MG: 200 TABLET, FILM COATED ORAL at 08:36

## 2023-11-08 RX ADMIN — PIPERACILLIN AND TAZOBACTAM 4500 MG: 4; .5 INJECTION, POWDER, LYOPHILIZED, FOR SOLUTION INTRAVENOUS at 15:57

## 2023-11-08 RX ADMIN — LEVETIRACETAM 500 MG: 500 TABLET, FILM COATED ORAL at 20:17

## 2023-11-08 RX ADMIN — VALACYCLOVIR HYDROCHLORIDE 500 MG: 500 TABLET, FILM COATED ORAL at 08:49

## 2023-11-08 RX ADMIN — BUDESONIDE 3 MG: 3 CAPSULE, GELATIN COATED ORAL at 08:36

## 2023-11-08 RX ADMIN — SERTRALINE HYDROCHLORIDE 100 MG: 100 TABLET ORAL at 08:36

## 2023-11-08 RX ADMIN — ACETAMINOPHEN 650 MG: 325 TABLET ORAL at 12:21

## 2023-11-08 RX ADMIN — GABAPENTIN 200 MG: 100 CAPSULE ORAL at 20:17

## 2023-11-08 RX ADMIN — SODIUM PHOSPHATE, MONOBASIC, MONOHYDRATE AND SODIUM PHOSPHATE, DIBASIC, ANHYDROUS 20 MMOL: 276; 142 INJECTION, SOLUTION INTRAVENOUS at 13:55

## 2023-11-08 RX ADMIN — PROCHLORPERAZINE MALEATE 5 MG: 10 TABLET ORAL at 20:27

## 2023-11-08 RX ADMIN — OXYCODONE 5 MG: 5 TABLET ORAL at 12:21

## 2023-11-08 RX ADMIN — GABAPENTIN 200 MG: 100 CAPSULE ORAL at 13:52

## 2023-11-08 RX ADMIN — FILGRASTIM-AAFI 300 MCG: 300 INJECTION, SOLUTION SUBCUTANEOUS at 18:15

## 2023-11-08 RX ADMIN — TAMSULOSIN HYDROCHLORIDE 0.4 MG: 0.4 CAPSULE ORAL at 08:36

## 2023-11-08 RX ADMIN — VALACYCLOVIR HYDROCHLORIDE 500 MG: 500 TABLET, FILM COATED ORAL at 20:17

## 2023-11-08 RX ADMIN — SODIUM CHLORIDE, PRESERVATIVE FREE 10 ML: 5 INJECTION INTRAVENOUS at 08:51

## 2023-11-08 RX ADMIN — URSODIOL 500 MG: 250 TABLET, FILM COATED ORAL at 08:36

## 2023-11-08 RX ADMIN — BUDESONIDE 3 MG: 3 CAPSULE, GELATIN COATED ORAL at 13:52

## 2023-11-08 RX ADMIN — PIPERACILLIN AND TAZOBACTAM 4500 MG: 4; .5 INJECTION, POWDER, LYOPHILIZED, FOR SOLUTION INTRAVENOUS at 23:42

## 2023-11-08 RX ADMIN — Medication 1 TABLET: at 08:49

## 2023-11-08 RX ADMIN — SODIUM CHLORIDE 15 ML: 900 IRRIGANT IRRIGATION at 12:21

## 2023-11-08 RX ADMIN — DICYCLOMINE HYDROCHLORIDE 10 MG: 10 CAPSULE ORAL at 04:15

## 2023-11-08 RX ADMIN — PANTOPRAZOLE SODIUM 40 MG: 40 TABLET, DELAYED RELEASE ORAL at 06:38

## 2023-11-08 RX ADMIN — PANTOPRAZOLE SODIUM 40 MG: 40 TABLET, DELAYED RELEASE ORAL at 15:55

## 2023-11-08 RX ADMIN — URSODIOL 500 MG: 250 TABLET, FILM COATED ORAL at 20:17

## 2023-11-08 RX ADMIN — BUDESONIDE 3 MG: 3 CAPSULE, GELATIN COATED ORAL at 20:17

## 2023-11-08 RX ADMIN — SODIUM CHLORIDE 15 ML: 900 IRRIGANT IRRIGATION at 16:52

## 2023-11-08 RX ADMIN — GABAPENTIN 100 MG: 100 CAPSULE ORAL at 08:36

## 2023-11-08 RX ADMIN — PIPERACILLIN AND TAZOBACTAM 4500 MG: 4; .5 INJECTION, POWDER, LYOPHILIZED, FOR SOLUTION INTRAVENOUS at 08:34

## 2023-11-08 RX ADMIN — LEVETIRACETAM 500 MG: 500 TABLET, FILM COATED ORAL at 08:49

## 2023-11-08 RX ADMIN — SODIUM CHLORIDE: 9 INJECTION, SOLUTION INTRAVENOUS at 15:59

## 2023-11-08 RX ADMIN — DIPHENHYDRAMINE HYDROCHLORIDE 5 ML: 12.5 LIQUID ORAL at 08:35

## 2023-11-08 RX ADMIN — VANCOMYCIN HYDROCHLORIDE 1500 MG: 10 INJECTION, POWDER, LYOPHILIZED, FOR SOLUTION INTRAVENOUS at 11:33

## 2023-11-08 RX ADMIN — FIDAXOMICIN 200 MG: 200 TABLET, FILM COATED ORAL at 20:21

## 2023-11-08 ASSESSMENT — PAIN SCALES - GENERAL
PAINLEVEL_OUTOF10: 8
PAINLEVEL_OUTOF10: 0
PAINLEVEL_OUTOF10: 8
PAINLEVEL_OUTOF10: 0

## 2023-11-08 ASSESSMENT — PAIN DESCRIPTION - FREQUENCY
FREQUENCY: INTERMITTENT

## 2023-11-08 ASSESSMENT — PAIN DESCRIPTION - DESCRIPTORS
DESCRIPTORS: ACHING
DESCRIPTORS: PRESSURE
DESCRIPTORS: ACHING
DESCRIPTORS: PRESSURE

## 2023-11-08 ASSESSMENT — PAIN DESCRIPTION - ORIENTATION
ORIENTATION: LOWER

## 2023-11-08 ASSESSMENT — PAIN DESCRIPTION - ONSET
ONSET: PROGRESSIVE
ONSET: PROGRESSIVE
ONSET: ON-GOING
ONSET: PROGRESSIVE

## 2023-11-08 ASSESSMENT — PAIN - FUNCTIONAL ASSESSMENT
PAIN_FUNCTIONAL_ASSESSMENT: PREVENTS OR INTERFERES SOME ACTIVE ACTIVITIES AND ADLS

## 2023-11-08 ASSESSMENT — PAIN DESCRIPTION - LOCATION
LOCATION: ABDOMEN

## 2023-11-08 ASSESSMENT — PAIN DESCRIPTION - PAIN TYPE
TYPE: ACUTE PAIN

## 2023-11-09 LAB
ANION GAP SERPL CALCULATED.3IONS-SCNC: 11 MMOL/L (ref 3–16)
ANION GAP SERPL CALCULATED.3IONS-SCNC: 12 MMOL/L (ref 3–16)
APTT BLD: 24.7 SEC (ref 22.7–35.9)
BUN SERPL-MCNC: 14 MG/DL (ref 7–20)
BUN SERPL-MCNC: 15 MG/DL (ref 7–20)
CALCIUM SERPL-MCNC: 8 MG/DL (ref 8.3–10.6)
CALCIUM SERPL-MCNC: 8.1 MG/DL (ref 8.3–10.6)
CHLORIDE SERPL-SCNC: 108 MMOL/L (ref 99–110)
CHLORIDE SERPL-SCNC: 111 MMOL/L (ref 99–110)
CO2 SERPL-SCNC: 19 MMOL/L (ref 21–32)
CO2 SERPL-SCNC: 20 MMOL/L (ref 21–32)
CREAT SERPL-MCNC: 1 MG/DL (ref 0.8–1.3)
CREAT SERPL-MCNC: 1 MG/DL (ref 0.8–1.3)
DEPRECATED RDW RBC AUTO: 15.8 % (ref 12.4–15.4)
FINAL REPORT: NORMAL
GFR SERPLBLD CREATININE-BSD FMLA CKD-EPI: >60 ML/MIN/{1.73_M2}
GFR SERPLBLD CREATININE-BSD FMLA CKD-EPI: >60 ML/MIN/{1.73_M2}
GLUCOSE SERPL-MCNC: 107 MG/DL (ref 70–99)
GLUCOSE SERPL-MCNC: 114 MG/DL (ref 70–99)
HCT VFR BLD AUTO: 22.8 % (ref 40.5–52.5)
HGB BLD-MCNC: 7.9 G/DL (ref 13.5–17.5)
INR PPP: 1.22 (ref 0.84–1.16)
MCH RBC QN AUTO: 32.1 PG (ref 26–34)
MCHC RBC AUTO-ENTMCNC: 34.8 G/DL (ref 31–36)
MCV RBC AUTO: 92.5 FL (ref 80–100)
PHOSPHATE SERPL-MCNC: 2.5 MG/DL (ref 2.5–4.9)
PLATELET # BLD AUTO: 31 K/UL (ref 135–450)
PMV BLD AUTO: 8.2 FL (ref 5–10.5)
POTASSIUM SERPL-SCNC: 3.6 MMOL/L (ref 3.5–5.1)
POTASSIUM SERPL-SCNC: 3.9 MMOL/L (ref 3.5–5.1)
PRELIMINARY: NORMAL
PROTHROMBIN TIME: 15.4 SEC (ref 11.5–14.8)
RBC # BLD AUTO: 2.47 M/UL (ref 4.2–5.9)
SODIUM SERPL-SCNC: 139 MMOL/L (ref 136–145)
SODIUM SERPL-SCNC: 142 MMOL/L (ref 136–145)
URATE SERPL-MCNC: 1.3 MG/DL (ref 3.5–7.2)
WBC # BLD AUTO: 0 K/UL (ref 4–11)

## 2023-11-09 PROCEDURE — 97530 THERAPEUTIC ACTIVITIES: CPT

## 2023-11-09 PROCEDURE — 2060000000 HC ICU INTERMEDIATE R&B

## 2023-11-09 PROCEDURE — 97165 OT EVAL LOW COMPLEX 30 MIN: CPT

## 2023-11-09 PROCEDURE — 36592 COLLECT BLOOD FROM PICC: CPT

## 2023-11-09 PROCEDURE — 84100 ASSAY OF PHOSPHORUS: CPT

## 2023-11-09 PROCEDURE — 84550 ASSAY OF BLOOD/URIC ACID: CPT

## 2023-11-09 PROCEDURE — 6370000000 HC RX 637 (ALT 250 FOR IP): Performed by: STUDENT IN AN ORGANIZED HEALTH CARE EDUCATION/TRAINING PROGRAM

## 2023-11-09 PROCEDURE — 97116 GAIT TRAINING THERAPY: CPT

## 2023-11-09 PROCEDURE — 85730 THROMBOPLASTIN TIME PARTIAL: CPT

## 2023-11-09 PROCEDURE — 85610 PROTHROMBIN TIME: CPT

## 2023-11-09 PROCEDURE — 97164 PT RE-EVAL EST PLAN CARE: CPT

## 2023-11-09 PROCEDURE — 2580000003 HC RX 258

## 2023-11-09 PROCEDURE — 2580000003 HC RX 258: Performed by: STUDENT IN AN ORGANIZED HEALTH CARE EDUCATION/TRAINING PROGRAM

## 2023-11-09 PROCEDURE — 6360000002 HC RX W HCPCS: Performed by: STUDENT IN AN ORGANIZED HEALTH CARE EDUCATION/TRAINING PROGRAM

## 2023-11-09 PROCEDURE — 6370000000 HC RX 637 (ALT 250 FOR IP): Performed by: NURSE PRACTITIONER

## 2023-11-09 PROCEDURE — 6360000002 HC RX W HCPCS

## 2023-11-09 PROCEDURE — 85025 COMPLETE CBC W/AUTO DIFF WBC: CPT

## 2023-11-09 PROCEDURE — 2580000003 HC RX 258: Performed by: NURSE PRACTITIONER

## 2023-11-09 PROCEDURE — 80048 BASIC METABOLIC PNL TOTAL CA: CPT

## 2023-11-09 RX ADMIN — SERTRALINE HYDROCHLORIDE 100 MG: 100 TABLET ORAL at 08:54

## 2023-11-09 RX ADMIN — PANTOPRAZOLE SODIUM 40 MG: 40 TABLET, DELAYED RELEASE ORAL at 06:54

## 2023-11-09 RX ADMIN — TAMSULOSIN HYDROCHLORIDE 0.4 MG: 0.4 CAPSULE ORAL at 08:55

## 2023-11-09 RX ADMIN — FIDAXOMICIN 200 MG: 200 TABLET, FILM COATED ORAL at 22:06

## 2023-11-09 RX ADMIN — BUDESONIDE 3 MG: 3 CAPSULE, GELATIN COATED ORAL at 08:54

## 2023-11-09 RX ADMIN — SODIUM CHLORIDE, PRESERVATIVE FREE 10 ML: 5 INJECTION INTRAVENOUS at 22:07

## 2023-11-09 RX ADMIN — VANCOMYCIN HYDROCHLORIDE 1500 MG: 10 INJECTION, POWDER, LYOPHILIZED, FOR SOLUTION INTRAVENOUS at 11:09

## 2023-11-09 RX ADMIN — FLUCONAZOLE 400 MG: 200 TABLET ORAL at 08:55

## 2023-11-09 RX ADMIN — LEVETIRACETAM 500 MG: 500 TABLET, FILM COATED ORAL at 22:04

## 2023-11-09 RX ADMIN — URSODIOL 500 MG: 250 TABLET, FILM COATED ORAL at 08:55

## 2023-11-09 RX ADMIN — SODIUM CHLORIDE 15 ML: 900 IRRIGANT IRRIGATION at 22:08

## 2023-11-09 RX ADMIN — LEVETIRACETAM 500 MG: 500 TABLET, FILM COATED ORAL at 08:54

## 2023-11-09 RX ADMIN — BACLOFEN 10 MG: 10 TABLET ORAL at 01:27

## 2023-11-09 RX ADMIN — SODIUM CHLORIDE, PRESERVATIVE FREE 10 ML: 5 INJECTION INTRAVENOUS at 08:55

## 2023-11-09 RX ADMIN — Medication 1 TABLET: at 08:54

## 2023-11-09 RX ADMIN — GABAPENTIN 200 MG: 100 CAPSULE ORAL at 14:17

## 2023-11-09 RX ADMIN — PROCHLORPERAZINE MALEATE 5 MG: 10 TABLET ORAL at 12:13

## 2023-11-09 RX ADMIN — PIPERACILLIN AND TAZOBACTAM 4500 MG: 4; .5 INJECTION, POWDER, LYOPHILIZED, FOR SOLUTION INTRAVENOUS at 08:53

## 2023-11-09 RX ADMIN — BUDESONIDE 3 MG: 3 CAPSULE, GELATIN COATED ORAL at 22:04

## 2023-11-09 RX ADMIN — SODIUM CHLORIDE: 900 INJECTION, SOLUTION INTRAVENOUS at 23:39

## 2023-11-09 RX ADMIN — FILGRASTIM-AAFI 300 MCG: 300 INJECTION, SOLUTION SUBCUTANEOUS at 17:32

## 2023-11-09 RX ADMIN — PIPERACILLIN AND TAZOBACTAM 4500 MG: 4; .5 INJECTION, POWDER, LYOPHILIZED, FOR SOLUTION INTRAVENOUS at 16:50

## 2023-11-09 RX ADMIN — FIDAXOMICIN 200 MG: 200 TABLET, FILM COATED ORAL at 08:55

## 2023-11-09 RX ADMIN — PANTOPRAZOLE SODIUM 40 MG: 40 TABLET, DELAYED RELEASE ORAL at 16:50

## 2023-11-09 RX ADMIN — BUDESONIDE 3 MG: 3 CAPSULE, GELATIN COATED ORAL at 14:17

## 2023-11-09 RX ADMIN — GABAPENTIN 200 MG: 100 CAPSULE ORAL at 08:54

## 2023-11-09 RX ADMIN — PIPERACILLIN AND TAZOBACTAM 4500 MG: 4; .5 INJECTION, POWDER, LYOPHILIZED, FOR SOLUTION INTRAVENOUS at 23:36

## 2023-11-09 RX ADMIN — GABAPENTIN 200 MG: 100 CAPSULE ORAL at 22:04

## 2023-11-09 RX ADMIN — VALACYCLOVIR HYDROCHLORIDE 500 MG: 500 TABLET, FILM COATED ORAL at 08:55

## 2023-11-09 RX ADMIN — VALACYCLOVIR HYDROCHLORIDE 500 MG: 500 TABLET, FILM COATED ORAL at 22:04

## 2023-11-09 RX ADMIN — URSODIOL 500 MG: 250 TABLET, FILM COATED ORAL at 22:03

## 2023-11-09 RX ADMIN — SODIUM CHLORIDE 15 ML: 900 IRRIGANT IRRIGATION at 16:50

## 2023-11-10 LAB
ALBUMIN SERPL-MCNC: 2.8 G/DL (ref 3.4–5)
ALP SERPL-CCNC: 65 U/L (ref 40–129)
ALT SERPL-CCNC: 28 U/L (ref 10–40)
ANION GAP SERPL CALCULATED.3IONS-SCNC: 10 MMOL/L (ref 3–16)
ANION GAP SERPL CALCULATED.3IONS-SCNC: 13 MMOL/L (ref 3–16)
AST SERPL-CCNC: 16 U/L (ref 15–37)
BACTERIA BLD CULT: NORMAL
BILIRUB DIRECT SERPL-MCNC: 0.3 MG/DL (ref 0–0.3)
BILIRUB INDIRECT SERPL-MCNC: 0.1 MG/DL (ref 0–1)
BILIRUB SERPL-MCNC: 0.4 MG/DL (ref 0–1)
BUN SERPL-MCNC: 15 MG/DL (ref 7–20)
BUN SERPL-MCNC: 18 MG/DL (ref 7–20)
CALCIUM SERPL-MCNC: 8.1 MG/DL (ref 8.3–10.6)
CALCIUM SERPL-MCNC: 8.4 MG/DL (ref 8.3–10.6)
CHLORIDE SERPL-SCNC: 109 MMOL/L (ref 99–110)
CHLORIDE SERPL-SCNC: 113 MMOL/L (ref 99–110)
CO2 SERPL-SCNC: 19 MMOL/L (ref 21–32)
CO2 SERPL-SCNC: 19 MMOL/L (ref 21–32)
CREAT SERPL-MCNC: 0.9 MG/DL (ref 0.8–1.3)
CREAT SERPL-MCNC: 1 MG/DL (ref 0.8–1.3)
DEPRECATED RDW RBC AUTO: 15.6 % (ref 12.4–15.4)
GFR SERPLBLD CREATININE-BSD FMLA CKD-EPI: >60 ML/MIN/{1.73_M2}
GFR SERPLBLD CREATININE-BSD FMLA CKD-EPI: >60 ML/MIN/{1.73_M2}
GLUCOSE SERPL-MCNC: 109 MG/DL (ref 70–99)
GLUCOSE SERPL-MCNC: 119 MG/DL (ref 70–99)
HCT VFR BLD AUTO: 22.3 % (ref 40.5–52.5)
HGB BLD-MCNC: 7.6 G/DL (ref 13.5–17.5)
LDH SERPL L TO P-CCNC: 124 U/L (ref 100–190)
MAGNESIUM SERPL-MCNC: 1.7 MG/DL (ref 1.8–2.4)
MCH RBC QN AUTO: 31.7 PG (ref 26–34)
MCHC RBC AUTO-ENTMCNC: 34.2 G/DL (ref 31–36)
MCV RBC AUTO: 92.9 FL (ref 80–100)
PHOSPHATE SERPL-MCNC: 2.3 MG/DL (ref 2.5–4.9)
PLATELET # BLD AUTO: 16 K/UL (ref 135–450)
PMV BLD AUTO: 8.3 FL (ref 5–10.5)
POTASSIUM SERPL-SCNC: 3.6 MMOL/L (ref 3.5–5.1)
POTASSIUM SERPL-SCNC: 3.8 MMOL/L (ref 3.5–5.1)
PROT SERPL-MCNC: 5 G/DL (ref 6.4–8.2)
RBC # BLD AUTO: 2.4 M/UL (ref 4.2–5.9)
SODIUM SERPL-SCNC: 141 MMOL/L (ref 136–145)
SODIUM SERPL-SCNC: 142 MMOL/L (ref 136–145)
URATE SERPL-MCNC: 1.4 MG/DL (ref 3.5–7.2)
VANCOMYCIN TROUGH SERPL-MCNC: 7.3 UG/ML (ref 10–20)
WBC # BLD AUTO: 0.1 K/UL (ref 4–11)

## 2023-11-10 PROCEDURE — 6370000000 HC RX 637 (ALT 250 FOR IP): Performed by: NURSE PRACTITIONER

## 2023-11-10 PROCEDURE — 6360000002 HC RX W HCPCS

## 2023-11-10 PROCEDURE — 36592 COLLECT BLOOD FROM PICC: CPT

## 2023-11-10 PROCEDURE — 2580000003 HC RX 258: Performed by: STUDENT IN AN ORGANIZED HEALTH CARE EDUCATION/TRAINING PROGRAM

## 2023-11-10 PROCEDURE — 6360000002 HC RX W HCPCS: Performed by: NURSE PRACTITIONER

## 2023-11-10 PROCEDURE — 84550 ASSAY OF BLOOD/URIC ACID: CPT

## 2023-11-10 PROCEDURE — 6360000002 HC RX W HCPCS: Performed by: INTERNAL MEDICINE

## 2023-11-10 PROCEDURE — 2580000003 HC RX 258: Performed by: NURSE PRACTITIONER

## 2023-11-10 PROCEDURE — 99222 1ST HOSP IP/OBS MODERATE 55: CPT | Performed by: INTERNAL MEDICINE

## 2023-11-10 PROCEDURE — 80076 HEPATIC FUNCTION PANEL: CPT

## 2023-11-10 PROCEDURE — 6360000002 HC RX W HCPCS: Performed by: STUDENT IN AN ORGANIZED HEALTH CARE EDUCATION/TRAINING PROGRAM

## 2023-11-10 PROCEDURE — 85025 COMPLETE CBC W/AUTO DIFF WBC: CPT

## 2023-11-10 PROCEDURE — 83615 LACTATE (LD) (LDH) ENZYME: CPT

## 2023-11-10 PROCEDURE — 51798 US URINE CAPACITY MEASURE: CPT

## 2023-11-10 PROCEDURE — 2580000003 HC RX 258

## 2023-11-10 PROCEDURE — 83735 ASSAY OF MAGNESIUM: CPT

## 2023-11-10 PROCEDURE — 2060000000 HC ICU INTERMEDIATE R&B

## 2023-11-10 PROCEDURE — 80048 BASIC METABOLIC PNL TOTAL CA: CPT

## 2023-11-10 PROCEDURE — 6370000000 HC RX 637 (ALT 250 FOR IP): Performed by: STUDENT IN AN ORGANIZED HEALTH CARE EDUCATION/TRAINING PROGRAM

## 2023-11-10 PROCEDURE — 84100 ASSAY OF PHOSPHORUS: CPT

## 2023-11-10 PROCEDURE — 80202 ASSAY OF VANCOMYCIN: CPT

## 2023-11-10 RX ORDER — ONDANSETRON 2 MG/ML
8 INJECTION INTRAMUSCULAR; INTRAVENOUS EVERY 8 HOURS PRN
Status: DISCONTINUED | OUTPATIENT
Start: 2023-11-10 | End: 2023-11-17

## 2023-11-10 RX ADMIN — URSODIOL 500 MG: 250 TABLET, FILM COATED ORAL at 20:49

## 2023-11-10 RX ADMIN — BUDESONIDE 3 MG: 3 CAPSULE, GELATIN COATED ORAL at 20:49

## 2023-11-10 RX ADMIN — PIPERACILLIN AND TAZOBACTAM 4500 MG: 4; .5 INJECTION, POWDER, LYOPHILIZED, FOR SOLUTION INTRAVENOUS at 16:59

## 2023-11-10 RX ADMIN — LEVETIRACETAM 500 MG: 500 TABLET, FILM COATED ORAL at 08:11

## 2023-11-10 RX ADMIN — GABAPENTIN 200 MG: 100 CAPSULE ORAL at 14:43

## 2023-11-10 RX ADMIN — PROCHLORPERAZINE EDISYLATE 5 MG: 5 INJECTION INTRAMUSCULAR; INTRAVENOUS at 08:19

## 2023-11-10 RX ADMIN — VALACYCLOVIR HYDROCHLORIDE 500 MG: 500 TABLET, FILM COATED ORAL at 08:11

## 2023-11-10 RX ADMIN — VANCOMYCIN HYDROCHLORIDE 1500 MG: 10 INJECTION, POWDER, LYOPHILIZED, FOR SOLUTION INTRAVENOUS at 11:28

## 2023-11-10 RX ADMIN — FIDAXOMICIN 200 MG: 200 TABLET, FILM COATED ORAL at 08:12

## 2023-11-10 RX ADMIN — PROCHLORPERAZINE MALEATE 5 MG: 10 TABLET ORAL at 17:48

## 2023-11-10 RX ADMIN — PIPERACILLIN AND TAZOBACTAM 4500 MG: 4; .5 INJECTION, POWDER, LYOPHILIZED, FOR SOLUTION INTRAVENOUS at 08:17

## 2023-11-10 RX ADMIN — FILGRASTIM-AAFI 300 MCG: 300 INJECTION, SOLUTION SUBCUTANEOUS at 17:42

## 2023-11-10 RX ADMIN — ONDANSETRON 8 MG: 2 INJECTION INTRAMUSCULAR; INTRAVENOUS at 18:32

## 2023-11-10 RX ADMIN — GABAPENTIN 200 MG: 100 CAPSULE ORAL at 08:11

## 2023-11-10 RX ADMIN — SODIUM CHLORIDE: 900 INJECTION, SOLUTION INTRAVENOUS at 11:13

## 2023-11-10 RX ADMIN — BUDESONIDE 3 MG: 3 CAPSULE, GELATIN COATED ORAL at 14:43

## 2023-11-10 RX ADMIN — LEVETIRACETAM 500 MG: 500 TABLET, FILM COATED ORAL at 20:49

## 2023-11-10 RX ADMIN — FIDAXOMICIN 200 MG: 200 TABLET, FILM COATED ORAL at 21:53

## 2023-11-10 RX ADMIN — GABAPENTIN 200 MG: 100 CAPSULE ORAL at 20:48

## 2023-11-10 RX ADMIN — FLUCONAZOLE 400 MG: 200 TABLET ORAL at 08:11

## 2023-11-10 RX ADMIN — TAMSULOSIN HYDROCHLORIDE 0.4 MG: 0.4 CAPSULE ORAL at 08:11

## 2023-11-10 RX ADMIN — SODIUM CHLORIDE, PRESERVATIVE FREE 10 ML: 5 INJECTION INTRAVENOUS at 21:53

## 2023-11-10 RX ADMIN — PANTOPRAZOLE SODIUM 40 MG: 40 TABLET, DELAYED RELEASE ORAL at 08:11

## 2023-11-10 RX ADMIN — BUDESONIDE 3 MG: 3 CAPSULE, GELATIN COATED ORAL at 08:11

## 2023-11-10 RX ADMIN — SODIUM CHLORIDE 15 ML: 900 IRRIGANT IRRIGATION at 21:54

## 2023-11-10 RX ADMIN — SERTRALINE HYDROCHLORIDE 100 MG: 100 TABLET ORAL at 08:11

## 2023-11-10 RX ADMIN — Medication 1 TABLET: at 08:11

## 2023-11-10 RX ADMIN — PANTOPRAZOLE SODIUM 40 MG: 40 TABLET, DELAYED RELEASE ORAL at 14:43

## 2023-11-10 RX ADMIN — VALACYCLOVIR HYDROCHLORIDE 500 MG: 500 TABLET, FILM COATED ORAL at 20:49

## 2023-11-10 RX ADMIN — URSODIOL 500 MG: 250 TABLET, FILM COATED ORAL at 08:11

## 2023-11-10 NOTE — CARE COORDINATION
Met with patient following rounds. Patient is D+ 7 of Thiotepa Regimen. Patient reports feeling tired not much of an appetite. Reviewed asking nurse for nausea medication when needed as well as pain medication if it is needed. Sitting up in the chair daily will be helpful. Also asked patient to track activity. Patient is working with OT and PT. Patient is currently on bed alarm for safety. Will continue to follow for discharge needs.

## 2023-11-11 ENCOUNTER — APPOINTMENT (OUTPATIENT)
Dept: GENERAL RADIOLOGY | Age: 68
DRG: 016 | End: 2023-11-11
Attending: STUDENT IN AN ORGANIZED HEALTH CARE EDUCATION/TRAINING PROGRAM
Payer: MEDICARE

## 2023-11-11 LAB
ANION GAP SERPL CALCULATED.3IONS-SCNC: 12 MMOL/L (ref 3–16)
ANION GAP SERPL CALCULATED.3IONS-SCNC: 9 MMOL/L (ref 3–16)
ANISOCYTOSIS BLD QL SMEAR: ABNORMAL
BLOOD BANK DISPENSE STATUS: NORMAL
BLOOD BANK DISPENSE STATUS: NORMAL
BLOOD BANK PRODUCT CODE: NORMAL
BLOOD BANK PRODUCT CODE: NORMAL
BPU ID: NORMAL
BPU ID: NORMAL
BUN SERPL-MCNC: 22 MG/DL (ref 7–20)
BUN SERPL-MCNC: 25 MG/DL (ref 7–20)
CALCIUM SERPL-MCNC: 8.2 MG/DL (ref 8.3–10.6)
CALCIUM SERPL-MCNC: 8.4 MG/DL (ref 8.3–10.6)
CHLORIDE SERPL-SCNC: 114 MMOL/L (ref 99–110)
CHLORIDE SERPL-SCNC: 114 MMOL/L (ref 99–110)
CO2 SERPL-SCNC: 19 MMOL/L (ref 21–32)
CO2 SERPL-SCNC: 20 MMOL/L (ref 21–32)
CREAT SERPL-MCNC: 1.1 MG/DL (ref 0.8–1.3)
CREAT SERPL-MCNC: 1.2 MG/DL (ref 0.8–1.3)
DACRYOCYTES BLD QL SMEAR: ABNORMAL
DEPRECATED RDW RBC AUTO: 15.7 % (ref 12.4–15.4)
DEPRECATED RDW RBC AUTO: 16.1 % (ref 12.4–15.4)
DESCRIPTION BLOOD BANK: NORMAL
DESCRIPTION BLOOD BANK: NORMAL
GFR SERPLBLD CREATININE-BSD FMLA CKD-EPI: >60 ML/MIN/{1.73_M2}
GFR SERPLBLD CREATININE-BSD FMLA CKD-EPI: >60 ML/MIN/{1.73_M2}
GLUCOSE SERPL-MCNC: 120 MG/DL (ref 70–99)
GLUCOSE SERPL-MCNC: 128 MG/DL (ref 70–99)
HCT VFR BLD AUTO: 21 % (ref 40.5–52.5)
HCT VFR BLD AUTO: 22.4 % (ref 40.5–52.5)
HGB BLD-MCNC: 7.2 G/DL (ref 13.5–17.5)
HGB BLD-MCNC: 7.5 G/DL (ref 13.5–17.5)
MCH RBC QN AUTO: 31.7 PG (ref 26–34)
MCH RBC QN AUTO: 31.8 PG (ref 26–34)
MCHC RBC AUTO-ENTMCNC: 33.6 G/DL (ref 31–36)
MCHC RBC AUTO-ENTMCNC: 34.3 G/DL (ref 31–36)
MCV RBC AUTO: 92.6 FL (ref 80–100)
MCV RBC AUTO: 94.5 FL (ref 80–100)
OVALOCYTES BLD QL SMEAR: ABNORMAL
PHOSPHATE SERPL-MCNC: 2.8 MG/DL (ref 2.5–4.9)
PLATELET # BLD AUTO: 25 K/UL (ref 135–450)
PLATELET # BLD AUTO: 7 K/UL (ref 135–450)
PLATELET BLD QL SMEAR: ABNORMAL
PMV BLD AUTO: 8 FL (ref 5–10.5)
PMV BLD AUTO: 8.1 FL (ref 5–10.5)
POTASSIUM SERPL-SCNC: 3.4 MMOL/L (ref 3.5–5.1)
POTASSIUM SERPL-SCNC: 3.7 MMOL/L (ref 3.5–5.1)
RBC # BLD AUTO: 2.27 M/UL (ref 4.2–5.9)
RBC # BLD AUTO: 2.37 M/UL (ref 4.2–5.9)
SODIUM SERPL-SCNC: 143 MMOL/L (ref 136–145)
SODIUM SERPL-SCNC: 145 MMOL/L (ref 136–145)
URATE SERPL-MCNC: 1.8 MG/DL (ref 3.5–7.2)
WBC # BLD AUTO: 0.2 K/UL (ref 4–11)
WBC # BLD AUTO: 0.2 K/UL (ref 4–11)

## 2023-11-11 PROCEDURE — 6370000000 HC RX 637 (ALT 250 FOR IP): Performed by: NURSE PRACTITIONER

## 2023-11-11 PROCEDURE — 2580000003 HC RX 258: Performed by: INTERNAL MEDICINE

## 2023-11-11 PROCEDURE — 6370000000 HC RX 637 (ALT 250 FOR IP): Performed by: STUDENT IN AN ORGANIZED HEALTH CARE EDUCATION/TRAINING PROGRAM

## 2023-11-11 PROCEDURE — 6360000002 HC RX W HCPCS: Performed by: NURSE PRACTITIONER

## 2023-11-11 PROCEDURE — 84550 ASSAY OF BLOOD/URIC ACID: CPT

## 2023-11-11 PROCEDURE — 2580000003 HC RX 258

## 2023-11-11 PROCEDURE — 86880 COOMBS TEST DIRECT: CPT

## 2023-11-11 PROCEDURE — 6360000002 HC RX W HCPCS: Performed by: STUDENT IN AN ORGANIZED HEALTH CARE EDUCATION/TRAINING PROGRAM

## 2023-11-11 PROCEDURE — 80048 BASIC METABOLIC PNL TOTAL CA: CPT

## 2023-11-11 PROCEDURE — 71045 X-RAY EXAM CHEST 1 VIEW: CPT

## 2023-11-11 PROCEDURE — 36415 COLL VENOUS BLD VENIPUNCTURE: CPT

## 2023-11-11 PROCEDURE — 85025 COMPLETE CBC W/AUTO DIFF WBC: CPT

## 2023-11-11 PROCEDURE — 87040 BLOOD CULTURE FOR BACTERIA: CPT

## 2023-11-11 PROCEDURE — 2580000003 HC RX 258: Performed by: STUDENT IN AN ORGANIZED HEALTH CARE EDUCATION/TRAINING PROGRAM

## 2023-11-11 PROCEDURE — 84100 ASSAY OF PHOSPHORUS: CPT

## 2023-11-11 PROCEDURE — 2060000000 HC ICU INTERMEDIATE R&B

## 2023-11-11 PROCEDURE — 36430 TRANSFUSION BLD/BLD COMPNT: CPT

## 2023-11-11 PROCEDURE — 2580000003 HC RX 258: Performed by: NURSE PRACTITIONER

## 2023-11-11 PROCEDURE — 6360000002 HC RX W HCPCS

## 2023-11-11 RX ORDER — ACETAMINOPHEN 325 MG/1
650 TABLET ORAL PRN
Status: COMPLETED | OUTPATIENT
Start: 2023-11-11 | End: 2023-11-12

## 2023-11-11 RX ORDER — SODIUM CHLORIDE 9 MG/ML
INJECTION, SOLUTION INTRAVENOUS PRN
Status: DISCONTINUED | OUTPATIENT
Start: 2023-11-11 | End: 2023-11-22 | Stop reason: HOSPADM

## 2023-11-11 RX ORDER — DIPHENHYDRAMINE HCL 25 MG
25 TABLET ORAL PRN
Status: COMPLETED | OUTPATIENT
Start: 2023-11-11 | End: 2023-11-12

## 2023-11-11 RX ORDER — FUROSEMIDE 40 MG/1
40 TABLET ORAL ONCE
Status: COMPLETED | OUTPATIENT
Start: 2023-11-11 | End: 2023-11-11

## 2023-11-11 RX ADMIN — SODIUM CHLORIDE, PRESERVATIVE FREE 10 ML: 5 INJECTION INTRAVENOUS at 09:06

## 2023-11-11 RX ADMIN — FIDAXOMICIN 200 MG: 200 TABLET, FILM COATED ORAL at 09:00

## 2023-11-11 RX ADMIN — VALACYCLOVIR HYDROCHLORIDE 500 MG: 500 TABLET, FILM COATED ORAL at 09:05

## 2023-11-11 RX ADMIN — FUROSEMIDE 40 MG: 40 TABLET ORAL at 10:12

## 2023-11-11 RX ADMIN — VALACYCLOVIR HYDROCHLORIDE 500 MG: 500 TABLET, FILM COATED ORAL at 21:20

## 2023-11-11 RX ADMIN — LEVETIRACETAM 500 MG: 500 TABLET, FILM COATED ORAL at 21:20

## 2023-11-11 RX ADMIN — SERTRALINE HYDROCHLORIDE 100 MG: 100 TABLET ORAL at 08:59

## 2023-11-11 RX ADMIN — GABAPENTIN 200 MG: 100 CAPSULE ORAL at 21:20

## 2023-11-11 RX ADMIN — SODIUM CHLORIDE: 900 INJECTION, SOLUTION INTRAVENOUS at 08:58

## 2023-11-11 RX ADMIN — PIPERACILLIN AND TAZOBACTAM 4500 MG: 4; .5 INJECTION, POWDER, LYOPHILIZED, FOR SOLUTION INTRAVENOUS at 08:58

## 2023-11-11 RX ADMIN — FLUCONAZOLE 400 MG: 200 TABLET ORAL at 08:59

## 2023-11-11 RX ADMIN — PANTOPRAZOLE SODIUM 40 MG: 40 TABLET, DELAYED RELEASE ORAL at 08:59

## 2023-11-11 RX ADMIN — GABAPENTIN 200 MG: 100 CAPSULE ORAL at 08:59

## 2023-11-11 RX ADMIN — GABAPENTIN 200 MG: 100 CAPSULE ORAL at 14:55

## 2023-11-11 RX ADMIN — POTASSIUM CHLORIDE 20 MEQ: 400 INJECTION, SOLUTION INTRAVENOUS at 21:56

## 2023-11-11 RX ADMIN — PIPERACILLIN AND TAZOBACTAM 4500 MG: 4; .5 INJECTION, POWDER, LYOPHILIZED, FOR SOLUTION INTRAVENOUS at 16:05

## 2023-11-11 RX ADMIN — POTASSIUM CHLORIDE 20 MEQ: 400 INJECTION, SOLUTION INTRAVENOUS at 18:28

## 2023-11-11 RX ADMIN — SODIUM CHLORIDE 15 ML: 900 IRRIGANT IRRIGATION at 16:29

## 2023-11-11 RX ADMIN — BUDESONIDE 3 MG: 3 CAPSULE, GELATIN COATED ORAL at 08:58

## 2023-11-11 RX ADMIN — SODIUM CHLORIDE 15 ML: 900 IRRIGANT IRRIGATION at 09:06

## 2023-11-11 RX ADMIN — PIPERACILLIN AND TAZOBACTAM 4500 MG: 4; .5 INJECTION, POWDER, LYOPHILIZED, FOR SOLUTION INTRAVENOUS at 00:30

## 2023-11-11 RX ADMIN — Medication 1 TABLET: at 08:59

## 2023-11-11 RX ADMIN — BUDESONIDE 3 MG: 3 CAPSULE, GELATIN COATED ORAL at 14:55

## 2023-11-11 RX ADMIN — POTASSIUM CHLORIDE 20 MEQ: 400 INJECTION, SOLUTION INTRAVENOUS at 23:04

## 2023-11-11 RX ADMIN — VANCOMYCIN HYDROCHLORIDE 1500 MG: 10 INJECTION, POWDER, LYOPHILIZED, FOR SOLUTION INTRAVENOUS at 12:09

## 2023-11-11 RX ADMIN — BUDESONIDE 3 MG: 3 CAPSULE, GELATIN COATED ORAL at 21:20

## 2023-11-11 RX ADMIN — FILGRASTIM-AAFI 300 MCG: 300 INJECTION, SOLUTION SUBCUTANEOUS at 18:21

## 2023-11-11 RX ADMIN — SODIUM CHLORIDE, PRESERVATIVE FREE 10 ML: 5 INJECTION INTRAVENOUS at 21:20

## 2023-11-11 RX ADMIN — URSODIOL 500 MG: 250 TABLET, FILM COATED ORAL at 21:20

## 2023-11-11 RX ADMIN — TAMSULOSIN HYDROCHLORIDE 0.4 MG: 0.4 CAPSULE ORAL at 08:59

## 2023-11-11 RX ADMIN — URSODIOL 500 MG: 250 TABLET, FILM COATED ORAL at 09:05

## 2023-11-11 RX ADMIN — FIDAXOMICIN 200 MG: 200 TABLET, FILM COATED ORAL at 21:29

## 2023-11-11 RX ADMIN — SODIUM CHLORIDE 15 ML: 900 IRRIGANT IRRIGATION at 21:21

## 2023-11-11 RX ADMIN — PANTOPRAZOLE SODIUM 40 MG: 40 TABLET, DELAYED RELEASE ORAL at 16:06

## 2023-11-11 RX ADMIN — SODIUM CHLORIDE 15 ML: 900 IRRIGANT IRRIGATION at 12:10

## 2023-11-11 RX ADMIN — LEVETIRACETAM 500 MG: 500 TABLET, FILM COATED ORAL at 08:59

## 2023-11-12 LAB
ABO + RH BLD: NORMAL
ANION GAP SERPL CALCULATED.3IONS-SCNC: 11 MMOL/L (ref 3–16)
ANION GAP SERPL CALCULATED.3IONS-SCNC: 12 MMOL/L (ref 3–16)
ANISOCYTOSIS BLD QL SMEAR: ABNORMAL
BACTERIA BLD CULT ORG #2: NORMAL
BACTERIA BLD CULT: NORMAL
BASOPHILS # BLD: 0 K/UL (ref 0–0.2)
BASOPHILS NFR BLD: 0 %
BLD GP AB SCN SERPL QL: NORMAL
BLOOD BANK DISPENSE STATUS: NORMAL
BLOOD BANK PRODUCT CODE: NORMAL
BPU ID: NORMAL
BUN SERPL-MCNC: 25 MG/DL (ref 7–20)
BUN SERPL-MCNC: 26 MG/DL (ref 7–20)
CALCIUM SERPL-MCNC: 8.3 MG/DL (ref 8.3–10.6)
CALCIUM SERPL-MCNC: 8.3 MG/DL (ref 8.3–10.6)
CHLORIDE SERPL-SCNC: 113 MMOL/L (ref 99–110)
CHLORIDE SERPL-SCNC: 115 MMOL/L (ref 99–110)
CO2 SERPL-SCNC: 20 MMOL/L (ref 21–32)
CO2 SERPL-SCNC: 20 MMOL/L (ref 21–32)
CREAT SERPL-MCNC: 1.1 MG/DL (ref 0.8–1.3)
CREAT SERPL-MCNC: 1.2 MG/DL (ref 0.8–1.3)
DACRYOCYTES BLD QL SMEAR: ABNORMAL
DEPRECATED RDW RBC AUTO: 15.9 % (ref 12.4–15.4)
DESCRIPTION BLOOD BANK: NORMAL
EOSINOPHIL # BLD: 0 K/UL (ref 0–0.6)
EOSINOPHIL NFR BLD: 0 %
GFR SERPLBLD CREATININE-BSD FMLA CKD-EPI: >60 ML/MIN/{1.73_M2}
GFR SERPLBLD CREATININE-BSD FMLA CKD-EPI: >60 ML/MIN/{1.73_M2}
GLUCOSE SERPL-MCNC: 105 MG/DL (ref 70–99)
GLUCOSE SERPL-MCNC: 113 MG/DL (ref 70–99)
HCT VFR BLD AUTO: 21.5 % (ref 40.5–52.5)
HGB BLD-MCNC: 7.3 G/DL (ref 13.5–17.5)
LYMPHOCYTES # BLD: 0 K/UL (ref 1–5.1)
LYMPHOCYTES NFR BLD: 2 %
MCH RBC QN AUTO: 31.7 PG (ref 26–34)
MCHC RBC AUTO-ENTMCNC: 34.1 G/DL (ref 31–36)
MCV RBC AUTO: 92.8 FL (ref 80–100)
METAMYELOCYTES NFR BLD MANUAL: 2 %
MICROCYTES BLD QL SMEAR: ABNORMAL
MONOCYTES # BLD: 0.3 K/UL (ref 0–1.3)
MONOCYTES NFR BLD: 19 %
NEUTROPHILS # BLD: 1.1 K/UL (ref 1.7–7.7)
NEUTROPHILS NFR BLD: 76 %
NRBC BLD-RTO: 5 /100 WBC
OVALOCYTES BLD QL SMEAR: ABNORMAL
PHOSPHATE SERPL-MCNC: 2.4 MG/DL (ref 2.5–4.9)
PLATELET # BLD AUTO: 10 K/UL (ref 135–450)
PLATELET BLD QL SMEAR: ABNORMAL
PMV BLD AUTO: 9.1 FL (ref 5–10.5)
POTASSIUM SERPL-SCNC: 3.6 MMOL/L (ref 3.5–5.1)
POTASSIUM SERPL-SCNC: 4.1 MMOL/L (ref 3.5–5.1)
RBC # BLD AUTO: 2.31 M/UL (ref 4.2–5.9)
SODIUM SERPL-SCNC: 145 MMOL/L (ref 136–145)
SODIUM SERPL-SCNC: 146 MMOL/L (ref 136–145)
URATE SERPL-MCNC: 2.5 MG/DL (ref 3.5–7.2)
VARIANT LYMPHS NFR BLD MANUAL: 1 % (ref 0–6)
WBC # BLD AUTO: 1.4 K/UL (ref 4–11)

## 2023-11-12 PROCEDURE — 6360000002 HC RX W HCPCS: Performed by: STUDENT IN AN ORGANIZED HEALTH CARE EDUCATION/TRAINING PROGRAM

## 2023-11-12 PROCEDURE — 85025 COMPLETE CBC W/AUTO DIFF WBC: CPT

## 2023-11-12 PROCEDURE — 6370000000 HC RX 637 (ALT 250 FOR IP): Performed by: NURSE PRACTITIONER

## 2023-11-12 PROCEDURE — 86850 RBC ANTIBODY SCREEN: CPT

## 2023-11-12 PROCEDURE — 80048 BASIC METABOLIC PNL TOTAL CA: CPT

## 2023-11-12 PROCEDURE — 84100 ASSAY OF PHOSPHORUS: CPT

## 2023-11-12 PROCEDURE — 2580000003 HC RX 258: Performed by: STUDENT IN AN ORGANIZED HEALTH CARE EDUCATION/TRAINING PROGRAM

## 2023-11-12 PROCEDURE — 6370000000 HC RX 637 (ALT 250 FOR IP): Performed by: STUDENT IN AN ORGANIZED HEALTH CARE EDUCATION/TRAINING PROGRAM

## 2023-11-12 PROCEDURE — 2580000003 HC RX 258: Performed by: NURSE PRACTITIONER

## 2023-11-12 PROCEDURE — 2060000000 HC ICU INTERMEDIATE R&B

## 2023-11-12 PROCEDURE — 6360000002 HC RX W HCPCS: Performed by: NURSE PRACTITIONER

## 2023-11-12 PROCEDURE — 6360000002 HC RX W HCPCS

## 2023-11-12 PROCEDURE — 84550 ASSAY OF BLOOD/URIC ACID: CPT

## 2023-11-12 PROCEDURE — 2580000003 HC RX 258

## 2023-11-12 PROCEDURE — 86900 BLOOD TYPING SEROLOGIC ABO: CPT

## 2023-11-12 PROCEDURE — 36430 TRANSFUSION BLD/BLD COMPNT: CPT

## 2023-11-12 PROCEDURE — P9036 PLATELET PHERESIS IRRADIATED: HCPCS

## 2023-11-12 PROCEDURE — 86901 BLOOD TYPING SEROLOGIC RH(D): CPT

## 2023-11-12 PROCEDURE — 6370000000 HC RX 637 (ALT 250 FOR IP): Performed by: INTERNAL MEDICINE

## 2023-11-12 PROCEDURE — 2580000003 HC RX 258: Performed by: INTERNAL MEDICINE

## 2023-11-12 RX ORDER — SODIUM CHLORIDE 9 MG/ML
INJECTION, SOLUTION INTRAVENOUS PRN
Status: DISCONTINUED | OUTPATIENT
Start: 2023-11-12 | End: 2023-11-22 | Stop reason: HOSPADM

## 2023-11-12 RX ORDER — CALCIUM CARBONATE 500 MG/1
500 TABLET, CHEWABLE ORAL 3 TIMES DAILY PRN
Status: DISCONTINUED | OUTPATIENT
Start: 2023-11-12 | End: 2023-11-22 | Stop reason: HOSPADM

## 2023-11-12 RX ADMIN — PIPERACILLIN AND TAZOBACTAM 4500 MG: 4; .5 INJECTION, POWDER, LYOPHILIZED, FOR SOLUTION INTRAVENOUS at 08:21

## 2023-11-12 RX ADMIN — GABAPENTIN 200 MG: 100 CAPSULE ORAL at 08:22

## 2023-11-12 RX ADMIN — LEVETIRACETAM 500 MG: 500 TABLET, FILM COATED ORAL at 20:11

## 2023-11-12 RX ADMIN — SODIUM CHLORIDE 15 ML: 900 IRRIGANT IRRIGATION at 11:17

## 2023-11-12 RX ADMIN — SODIUM CHLORIDE, PRESERVATIVE FREE 10 ML: 5 INJECTION INTRAVENOUS at 20:11

## 2023-11-12 RX ADMIN — SODIUM CHLORIDE, PRESERVATIVE FREE 10 ML: 5 INJECTION INTRAVENOUS at 08:23

## 2023-11-12 RX ADMIN — SERTRALINE HYDROCHLORIDE 100 MG: 100 TABLET ORAL at 08:22

## 2023-11-12 RX ADMIN — FIDAXOMICIN 200 MG: 200 TABLET, FILM COATED ORAL at 20:12

## 2023-11-12 RX ADMIN — ANTACID TABLETS 500 MG: 500 TABLET, CHEWABLE ORAL at 11:15

## 2023-11-12 RX ADMIN — VALACYCLOVIR HYDROCHLORIDE 500 MG: 500 TABLET, FILM COATED ORAL at 20:11

## 2023-11-12 RX ADMIN — FIDAXOMICIN 200 MG: 200 TABLET, FILM COATED ORAL at 08:23

## 2023-11-12 RX ADMIN — BUDESONIDE 3 MG: 3 CAPSULE, GELATIN COATED ORAL at 14:40

## 2023-11-12 RX ADMIN — TAMSULOSIN HYDROCHLORIDE 0.4 MG: 0.4 CAPSULE ORAL at 08:22

## 2023-11-12 RX ADMIN — GABAPENTIN 200 MG: 100 CAPSULE ORAL at 14:39

## 2023-11-12 RX ADMIN — GABAPENTIN 200 MG: 100 CAPSULE ORAL at 20:11

## 2023-11-12 RX ADMIN — VALACYCLOVIR HYDROCHLORIDE 500 MG: 500 TABLET, FILM COATED ORAL at 08:22

## 2023-11-12 RX ADMIN — PANTOPRAZOLE SODIUM 40 MG: 40 TABLET, DELAYED RELEASE ORAL at 06:18

## 2023-11-12 RX ADMIN — PANTOPRAZOLE SODIUM 40 MG: 40 TABLET, DELAYED RELEASE ORAL at 17:26

## 2023-11-12 RX ADMIN — SODIUM CHLORIDE 15 ML: 900 IRRIGANT IRRIGATION at 17:28

## 2023-11-12 RX ADMIN — Medication 1 TABLET: at 08:22

## 2023-11-12 RX ADMIN — FLUCONAZOLE 400 MG: 200 TABLET ORAL at 08:21

## 2023-11-12 RX ADMIN — URSODIOL 500 MG: 250 TABLET, FILM COATED ORAL at 20:11

## 2023-11-12 RX ADMIN — PIPERACILLIN AND TAZOBACTAM 4500 MG: 4; .5 INJECTION, POWDER, LYOPHILIZED, FOR SOLUTION INTRAVENOUS at 17:27

## 2023-11-12 RX ADMIN — SODIUM CHLORIDE 15 ML: 900 IRRIGANT IRRIGATION at 06:09

## 2023-11-12 RX ADMIN — VANCOMYCIN HYDROCHLORIDE 1500 MG: 10 INJECTION, POWDER, LYOPHILIZED, FOR SOLUTION INTRAVENOUS at 11:49

## 2023-11-12 RX ADMIN — SODIUM CHLORIDE: 900 INJECTION, SOLUTION INTRAVENOUS at 17:27

## 2023-11-12 RX ADMIN — SODIUM CHLORIDE 15 ML: 900 IRRIGANT IRRIGATION at 20:12

## 2023-11-12 RX ADMIN — PIPERACILLIN AND TAZOBACTAM 4500 MG: 4; .5 INJECTION, POWDER, LYOPHILIZED, FOR SOLUTION INTRAVENOUS at 00:18

## 2023-11-12 RX ADMIN — BUDESONIDE 3 MG: 3 CAPSULE, GELATIN COATED ORAL at 08:21

## 2023-11-12 RX ADMIN — LEVETIRACETAM 500 MG: 500 TABLET, FILM COATED ORAL at 08:22

## 2023-11-12 RX ADMIN — FILGRASTIM-AAFI 300 MCG: 300 INJECTION, SOLUTION SUBCUTANEOUS at 17:33

## 2023-11-12 RX ADMIN — ACETAMINOPHEN 650 MG: 325 TABLET ORAL at 04:48

## 2023-11-12 RX ADMIN — DIPHENHYDRAMINE HYDROCHLORIDE 25 MG: 25 TABLET ORAL at 04:49

## 2023-11-12 RX ADMIN — BUDESONIDE 3 MG: 3 CAPSULE, GELATIN COATED ORAL at 20:11

## 2023-11-12 RX ADMIN — URSODIOL 500 MG: 250 TABLET, FILM COATED ORAL at 08:21

## 2023-11-12 RX ADMIN — POTASSIUM CHLORIDE 20 MEQ: 400 INJECTION, SOLUTION INTRAVENOUS at 00:10

## 2023-11-13 ENCOUNTER — APPOINTMENT (OUTPATIENT)
Dept: INTERVENTIONAL RADIOLOGY/VASCULAR | Age: 68
DRG: 016 | End: 2023-11-13
Attending: STUDENT IN AN ORGANIZED HEALTH CARE EDUCATION/TRAINING PROGRAM
Payer: MEDICARE

## 2023-11-13 ENCOUNTER — APPOINTMENT (OUTPATIENT)
Dept: GENERAL RADIOLOGY | Age: 68
DRG: 016 | End: 2023-11-13
Attending: STUDENT IN AN ORGANIZED HEALTH CARE EDUCATION/TRAINING PROGRAM
Payer: MEDICARE

## 2023-11-13 LAB
ALBUMIN SERPL-MCNC: 2.7 G/DL (ref 3.4–5)
ALP SERPL-CCNC: 79 U/L (ref 40–129)
ALT SERPL-CCNC: 26 U/L (ref 10–40)
ANION GAP SERPL CALCULATED.3IONS-SCNC: 10 MMOL/L (ref 3–16)
ANION GAP SERPL CALCULATED.3IONS-SCNC: 14 MMOL/L (ref 3–16)
ANISOCYTOSIS BLD QL SMEAR: ABNORMAL
APTT BLD: 24.7 SEC (ref 22.7–35.9)
AST SERPL-CCNC: 19 U/L (ref 15–37)
BASOPHILS # BLD: 0 K/UL (ref 0–0.2)
BASOPHILS NFR BLD: 0 %
BILIRUB DIRECT SERPL-MCNC: <0.2 MG/DL (ref 0–0.3)
BILIRUB INDIRECT SERPL-MCNC: ABNORMAL MG/DL (ref 0–1)
BILIRUB SERPL-MCNC: 0.3 MG/DL (ref 0–1)
BUN SERPL-MCNC: 26 MG/DL (ref 7–20)
BUN SERPL-MCNC: 27 MG/DL (ref 7–20)
CALCIUM SERPL-MCNC: 8.3 MG/DL (ref 8.3–10.6)
CALCIUM SERPL-MCNC: 8.5 MG/DL (ref 8.3–10.6)
CHLORIDE SERPL-SCNC: 113 MMOL/L (ref 99–110)
CHLORIDE SERPL-SCNC: 115 MMOL/L (ref 99–110)
CO2 SERPL-SCNC: 18 MMOL/L (ref 21–32)
CO2 SERPL-SCNC: 20 MMOL/L (ref 21–32)
CREAT SERPL-MCNC: 1.2 MG/DL (ref 0.8–1.3)
CREAT SERPL-MCNC: 1.2 MG/DL (ref 0.8–1.3)
DEPRECATED RDW RBC AUTO: 16.1 % (ref 12.4–15.4)
EOSINOPHIL # BLD: 0 K/UL (ref 0–0.6)
EOSINOPHIL NFR BLD: 0 %
GFR SERPLBLD CREATININE-BSD FMLA CKD-EPI: >60 ML/MIN/{1.73_M2}
GFR SERPLBLD CREATININE-BSD FMLA CKD-EPI: >60 ML/MIN/{1.73_M2}
GLUCOSE SERPL-MCNC: 145 MG/DL (ref 70–99)
GLUCOSE SERPL-MCNC: 99 MG/DL (ref 70–99)
HCT VFR BLD AUTO: 23.2 % (ref 40.5–52.5)
HGB BLD-MCNC: 7.9 G/DL (ref 13.5–17.5)
INR PPP: 1.26 (ref 0.84–1.16)
LDH SERPL L TO P-CCNC: 276 U/L (ref 100–190)
LYMPHOCYTES # BLD: 0.5 K/UL (ref 1–5.1)
LYMPHOCYTES NFR BLD: 5 %
MAGNESIUM SERPL-MCNC: 1.7 MG/DL (ref 1.8–2.4)
MCH RBC QN AUTO: 32.2 PG (ref 26–34)
MCHC RBC AUTO-ENTMCNC: 34 G/DL (ref 31–36)
MCV RBC AUTO: 94.6 FL (ref 80–100)
MONOCYTES # BLD: 0.7 K/UL (ref 0–1.3)
MONOCYTES NFR BLD: 7 %
NEUTROPHILS # BLD: 9.4 K/UL (ref 1.7–7.7)
NEUTROPHILS NFR BLD: 85 %
NEUTS BAND NFR BLD MANUAL: 3 % (ref 0–7)
NRBC BLD-RTO: 5 /100 WBC
PHOSPHATE SERPL-MCNC: 2.8 MG/DL (ref 2.5–4.9)
PLATELET # BLD AUTO: 51 K/UL (ref 135–450)
PMV BLD AUTO: 8.4 FL (ref 5–10.5)
POTASSIUM SERPL-SCNC: 3.7 MMOL/L (ref 3.5–5.1)
POTASSIUM SERPL-SCNC: 3.7 MMOL/L (ref 3.5–5.1)
PROT SERPL-MCNC: 4.8 G/DL (ref 6.4–8.2)
PROTHROMBIN TIME: 15.8 SEC (ref 11.5–14.8)
RBC # BLD AUTO: 2.45 M/UL (ref 4.2–5.9)
SODIUM SERPL-SCNC: 145 MMOL/L (ref 136–145)
SODIUM SERPL-SCNC: 145 MMOL/L (ref 136–145)
URATE SERPL-MCNC: 2.7 MG/DL (ref 3.5–7.2)
VANCOMYCIN SERPL-MCNC: 12.9 UG/ML
WBC # BLD AUTO: 10.7 K/UL (ref 4–11)

## 2023-11-13 PROCEDURE — 2580000003 HC RX 258

## 2023-11-13 PROCEDURE — 83615 LACTATE (LD) (LDH) ENZYME: CPT

## 2023-11-13 PROCEDURE — 36592 COLLECT BLOOD FROM PICC: CPT

## 2023-11-13 PROCEDURE — 2500000003 HC RX 250 WO HCPCS

## 2023-11-13 PROCEDURE — 36415 COLL VENOUS BLD VENIPUNCTURE: CPT

## 2023-11-13 PROCEDURE — 84100 ASSAY OF PHOSPHORUS: CPT

## 2023-11-13 PROCEDURE — 80048 BASIC METABOLIC PNL TOTAL CA: CPT

## 2023-11-13 PROCEDURE — 2060000000 HC ICU INTERMEDIATE R&B

## 2023-11-13 PROCEDURE — 71045 X-RAY EXAM CHEST 1 VIEW: CPT

## 2023-11-13 PROCEDURE — 83735 ASSAY OF MAGNESIUM: CPT

## 2023-11-13 PROCEDURE — 6360000002 HC RX W HCPCS: Performed by: NURSE PRACTITIONER

## 2023-11-13 PROCEDURE — 80202 ASSAY OF VANCOMYCIN: CPT

## 2023-11-13 PROCEDURE — 85025 COMPLETE CBC W/AUTO DIFF WBC: CPT

## 2023-11-13 PROCEDURE — 6370000000 HC RX 637 (ALT 250 FOR IP): Performed by: NURSE PRACTITIONER

## 2023-11-13 PROCEDURE — 6370000000 HC RX 637 (ALT 250 FOR IP)

## 2023-11-13 PROCEDURE — 6360000002 HC RX W HCPCS: Performed by: STUDENT IN AN ORGANIZED HEALTH CARE EDUCATION/TRAINING PROGRAM

## 2023-11-13 PROCEDURE — 2580000003 HC RX 258: Performed by: NURSE PRACTITIONER

## 2023-11-13 PROCEDURE — 6370000000 HC RX 637 (ALT 250 FOR IP): Performed by: STUDENT IN AN ORGANIZED HEALTH CARE EDUCATION/TRAINING PROGRAM

## 2023-11-13 PROCEDURE — 87150 DNA/RNA AMPLIFIED PROBE: CPT

## 2023-11-13 PROCEDURE — 85730 THROMBOPLASTIN TIME PARTIAL: CPT

## 2023-11-13 PROCEDURE — 85610 PROTHROMBIN TIME: CPT

## 2023-11-13 PROCEDURE — 84550 ASSAY OF BLOOD/URIC ACID: CPT

## 2023-11-13 PROCEDURE — 6360000002 HC RX W HCPCS

## 2023-11-13 PROCEDURE — 36589 REMOVAL TUNNELED CV CATH: CPT

## 2023-11-13 PROCEDURE — 2580000003 HC RX 258: Performed by: STUDENT IN AN ORGANIZED HEALTH CARE EDUCATION/TRAINING PROGRAM

## 2023-11-13 PROCEDURE — 6370000000 HC RX 637 (ALT 250 FOR IP): Performed by: INTERNAL MEDICINE

## 2023-11-13 PROCEDURE — 6360000002 HC RX W HCPCS: Performed by: INTERNAL MEDICINE

## 2023-11-13 PROCEDURE — 80076 HEPATIC FUNCTION PANEL: CPT

## 2023-11-13 PROCEDURE — 93306 TTE W/DOPPLER COMPLETE: CPT

## 2023-11-13 PROCEDURE — 99232 SBSQ HOSP IP/OBS MODERATE 35: CPT | Performed by: INTERNAL MEDICINE

## 2023-11-13 RX ORDER — MAGNESIUM HYDROXIDE/ALUMINUM HYDROXICE/SIMETHICONE 120; 1200; 1200 MG/30ML; MG/30ML; MG/30ML
30 SUSPENSION ORAL EVERY 6 HOURS PRN
Status: DISCONTINUED | OUTPATIENT
Start: 2023-11-13 | End: 2023-11-22 | Stop reason: HOSPADM

## 2023-11-13 RX ORDER — FUROSEMIDE 10 MG/ML
20 INJECTION INTRAMUSCULAR; INTRAVENOUS ONCE
Status: COMPLETED | OUTPATIENT
Start: 2023-11-13 | End: 2023-11-13

## 2023-11-13 RX ADMIN — PIPERACILLIN AND TAZOBACTAM 4500 MG: 4; .5 INJECTION, POWDER, LYOPHILIZED, FOR SOLUTION INTRAVENOUS at 00:45

## 2023-11-13 RX ADMIN — URSODIOL 500 MG: 250 TABLET, FILM COATED ORAL at 21:10

## 2023-11-13 RX ADMIN — SODIUM CHLORIDE 15 ML: 900 IRRIGANT IRRIGATION at 21:13

## 2023-11-13 RX ADMIN — FUROSEMIDE 20 MG: 10 INJECTION, SOLUTION INTRAMUSCULAR; INTRAVENOUS at 10:42

## 2023-11-13 RX ADMIN — SODIUM CHLORIDE, PRESERVATIVE FREE 10 ML: 5 INJECTION INTRAVENOUS at 21:13

## 2023-11-13 RX ADMIN — BUDESONIDE 3 MG: 3 CAPSULE, GELATIN COATED ORAL at 21:10

## 2023-11-13 RX ADMIN — SODIUM CHLORIDE 15 ML: 900 IRRIGANT IRRIGATION at 17:00

## 2023-11-13 RX ADMIN — FIDAXOMICIN 200 MG: 200 TABLET, FILM COATED ORAL at 21:08

## 2023-11-13 RX ADMIN — SODIUM CHLORIDE, PRESERVATIVE FREE 10 ML: 5 INJECTION INTRAVENOUS at 09:41

## 2023-11-13 RX ADMIN — ANTACID TABLETS 500 MG: 500 TABLET, CHEWABLE ORAL at 05:38

## 2023-11-13 RX ADMIN — SODIUM CHLORIDE 15 ML: 900 IRRIGANT IRRIGATION at 11:00

## 2023-11-13 RX ADMIN — BUDESONIDE 3 MG: 3 CAPSULE, GELATIN COATED ORAL at 14:30

## 2023-11-13 RX ADMIN — PANTOPRAZOLE SODIUM 40 MG: 40 TABLET, DELAYED RELEASE ORAL at 15:53

## 2023-11-13 RX ADMIN — SERTRALINE HYDROCHLORIDE 100 MG: 100 TABLET ORAL at 09:39

## 2023-11-13 RX ADMIN — BUDESONIDE 3 MG: 3 CAPSULE, GELATIN COATED ORAL at 09:39

## 2023-11-13 RX ADMIN — VALACYCLOVIR HYDROCHLORIDE 500 MG: 500 TABLET, FILM COATED ORAL at 09:30

## 2023-11-13 RX ADMIN — GABAPENTIN 200 MG: 100 CAPSULE ORAL at 14:11

## 2023-11-13 RX ADMIN — LEVETIRACETAM 500 MG: 500 TABLET, FILM COATED ORAL at 21:09

## 2023-11-13 RX ADMIN — TAMSULOSIN HYDROCHLORIDE 0.4 MG: 0.4 CAPSULE ORAL at 09:38

## 2023-11-13 RX ADMIN — PANTOPRAZOLE SODIUM 40 MG: 40 TABLET, DELAYED RELEASE ORAL at 05:38

## 2023-11-13 RX ADMIN — GABAPENTIN 200 MG: 100 CAPSULE ORAL at 21:09

## 2023-11-13 RX ADMIN — GABAPENTIN 200 MG: 100 CAPSULE ORAL at 09:30

## 2023-11-13 RX ADMIN — OXYCODONE 5 MG: 5 TABLET ORAL at 09:33

## 2023-11-13 RX ADMIN — URSODIOL 500 MG: 250 TABLET, FILM COATED ORAL at 09:38

## 2023-11-13 RX ADMIN — ONDANSETRON 8 MG: 2 INJECTION INTRAMUSCULAR; INTRAVENOUS at 06:45

## 2023-11-13 RX ADMIN — Medication 1 TABLET: at 09:39

## 2023-11-13 RX ADMIN — LEVETIRACETAM 500 MG: 500 TABLET, FILM COATED ORAL at 09:30

## 2023-11-13 RX ADMIN — FIDAXOMICIN 200 MG: 200 TABLET, FILM COATED ORAL at 09:30

## 2023-11-13 RX ADMIN — ANTACID TABLETS 500 MG: 500 TABLET, CHEWABLE ORAL at 14:30

## 2023-11-13 RX ADMIN — OXYCODONE 5 MG: 5 TABLET ORAL at 14:31

## 2023-11-13 RX ADMIN — ALUMINUM HYDROXIDE, MAGNESIUM HYDROXIDE, AND SIMETHICONE 30 ML: 200; 200; 20 SUSPENSION ORAL at 11:37

## 2023-11-13 RX ADMIN — VALACYCLOVIR HYDROCHLORIDE 500 MG: 500 TABLET, FILM COATED ORAL at 21:10

## 2023-11-13 RX ADMIN — VANCOMYCIN HYDROCHLORIDE 1500 MG: 10 INJECTION, POWDER, LYOPHILIZED, FOR SOLUTION INTRAVENOUS at 11:51

## 2023-11-13 ASSESSMENT — PAIN DESCRIPTION - ORIENTATION
ORIENTATION: MID
ORIENTATION: MID
ORIENTATION: RIGHT;LEFT;MID

## 2023-11-13 ASSESSMENT — PAIN SCALES - GENERAL
PAINLEVEL_OUTOF10: 6
PAINLEVEL_OUTOF10: 9
PAINLEVEL_OUTOF10: 10
PAINLEVEL_OUTOF10: 8
PAINLEVEL_OUTOF10: 6
PAINLEVEL_OUTOF10: 4
PAINLEVEL_OUTOF10: 5

## 2023-11-13 ASSESSMENT — PAIN DESCRIPTION - ONSET
ONSET: GRADUAL
ONSET: GRADUAL

## 2023-11-13 ASSESSMENT — PAIN - FUNCTIONAL ASSESSMENT
PAIN_FUNCTIONAL_ASSESSMENT: ACTIVITIES ARE NOT PREVENTED
PAIN_FUNCTIONAL_ASSESSMENT: PREVENTS OR INTERFERES SOME ACTIVE ACTIVITIES AND ADLS
PAIN_FUNCTIONAL_ASSESSMENT: ACTIVITIES ARE NOT PREVENTED

## 2023-11-13 ASSESSMENT — PAIN DESCRIPTION - PAIN TYPE
TYPE: ACUTE PAIN

## 2023-11-13 ASSESSMENT — PAIN DESCRIPTION - DESCRIPTORS
DESCRIPTORS: DISCOMFORT;BURNING
DESCRIPTORS: ACHING
DESCRIPTORS: BURNING;DISCOMFORT

## 2023-11-13 ASSESSMENT — PAIN DESCRIPTION - FREQUENCY
FREQUENCY: INTERMITTENT

## 2023-11-13 ASSESSMENT — PAIN DESCRIPTION - LOCATION
LOCATION: HEAD
LOCATION: OTHER (COMMENT)
LOCATION: CHEST

## 2023-11-13 NOTE — PROCEDURES
IR Brief Postoperative Note    Flex Pickering  YOB: 1955  [id number]    Pre-operative Diagnosis: infection    Post-operative Diagnosis: Same    Procedure: tunneled line removal    Anesthesia: local    Surgeons/Assistants: gael    Estimated Blood Loss: Minimal    Complications: none    Specimens: were not obtained    See full procedure dictation to follow      Tavares Merida MD MD  11/13/2023

## 2023-11-13 NOTE — DISCHARGE INSTR - COC
Continuity of Care Form    Patient Name: Job Newman   :  1955  MRN:  [id number]    Admit date:  10/25/2023  Discharge date:  ***    Code Status Order: Full Code   Advance Directives:     Admitting Physician:  Be Aranda DO  PCP: MEI Pop CNP    Discharging Nurse: [facility] Unit/Room#: 6682/9106-25  Discharging Unit Phone Number: ***    Emergency Contact:   Extended Emergency Contact Information  Primary Emergency Contact: [facility]  Address: [address] [facility], [address] [facility],[address] [facility] [place] [address] [place] Phone: [phone]  Mobile Phone: [phone]  Relation: Spouse  Secondary Emergency Contact: LopezEddie  Address: [address], [address] [place] [address]  Home Phone: [phone]  Mobile Phone: [phone]  Relation: Child    Past Surgical History:  Past Surgical History:   Procedure Laterality Date    BRONCHOSCOPY N/A 2021    ENDOBRONCHIAL ULTRASOUND WITH EUS performed by Zulay Womack MD at [address] [place] [address] N/A 2022    BRONCHOSCOPY,  ENDOBRONCHIAL ULTRASOUND WITH ANESTHESIA AND PATHOLOGY performed by Jaime Antonio MD at [address] [place] [address]  2022    BRONCHOSCOPY/TRANSBRONCHIAL NEEDLE BIOPSY performed by Jaime Antonio MD at [address] [place] [address]  2022    BRONCHOSCOPY/TRANSBRONCHIAL NEEDLE BIOPSY ADDL LOBE performed by Jaime Antonio MD at [address] [place] [address]  2022    BRONCHOSCOPY DIAGNOSTIC OR CELL [facility] performed by Jaime Antonio MD at [facility] Right 2023    RIGHT PARIETAL STEREOTACTIC BIOPSY OF BRAIN MASS performed by Joaquina Carver MD at [address] [facility] Left 7/3/2023    LEFT PARIETAL CRANIOTOMY FOR OPEN BIOPSY OF BRAIN MASS performed by Joaquina Carver MD at [address] [facility]  2021    CT BIOPSY ABDOMEN RETROPERITONEUM 2021 Sancho Lopez MD [address] [facility]

## 2023-11-14 LAB
ANION GAP SERPL CALCULATED.3IONS-SCNC: 11 MMOL/L (ref 3–16)
ANION GAP SERPL CALCULATED.3IONS-SCNC: 13 MMOL/L (ref 3–16)
ANISOCYTOSIS BLD QL SMEAR: ABNORMAL
BASOPHILS # BLD: 0 K/UL (ref 0–0.2)
BASOPHILS NFR BLD: 0 %
BUN SERPL-MCNC: 31 MG/DL (ref 7–20)
BUN SERPL-MCNC: 35 MG/DL (ref 7–20)
CALCIUM SERPL-MCNC: 8.4 MG/DL (ref 8.3–10.6)
CALCIUM SERPL-MCNC: 8.5 MG/DL (ref 8.3–10.6)
CHLORIDE SERPL-SCNC: 114 MMOL/L (ref 99–110)
CHLORIDE SERPL-SCNC: 115 MMOL/L (ref 99–110)
CO2 SERPL-SCNC: 17 MMOL/L (ref 21–32)
CO2 SERPL-SCNC: 20 MMOL/L (ref 21–32)
CREAT SERPL-MCNC: 1.3 MG/DL (ref 0.8–1.3)
CREAT SERPL-MCNC: 1.4 MG/DL (ref 0.8–1.3)
DEPRECATED RDW RBC AUTO: 16.4 % (ref 12.4–15.4)
EOSINOPHIL # BLD: 0.3 K/UL (ref 0–0.6)
EOSINOPHIL NFR BLD: 2 %
GFR SERPLBLD CREATININE-BSD FMLA CKD-EPI: 54 ML/MIN/{1.73_M2}
GFR SERPLBLD CREATININE-BSD FMLA CKD-EPI: 59 ML/MIN/{1.73_M2}
GLUCOSE SERPL-MCNC: 141 MG/DL (ref 70–99)
GLUCOSE SERPL-MCNC: 143 MG/DL (ref 70–99)
HCT VFR BLD AUTO: 26.2 % (ref 40.5–52.5)
HGB BLD-MCNC: 8.7 G/DL (ref 13.5–17.5)
LYMPHOCYTES # BLD: 9.5 K/UL (ref 1–5.1)
LYMPHOCYTES NFR BLD: 62 %
MACROCYTES BLD QL SMEAR: ABNORMAL
MCH RBC QN AUTO: 31.7 PG (ref 26–34)
MCHC RBC AUTO-ENTMCNC: 33.2 G/DL (ref 31–36)
MCV RBC AUTO: 95.4 FL (ref 80–100)
MICROCYTES BLD QL SMEAR: ABNORMAL
MONOCYTES # BLD: 0.2 K/UL (ref 0–1.3)
MONOCYTES NFR BLD: 1 %
NEUTROPHILS # BLD: 5.4 K/UL (ref 1.7–7.7)
NEUTROPHILS NFR BLD: 35 %
OVALOCYTES BLD QL SMEAR: ABNORMAL
PHOSPHATE SERPL-MCNC: 3.7 MG/DL (ref 2.5–4.9)
PLATELET # BLD AUTO: 46 K/UL (ref 135–450)
PMV BLD AUTO: 10.1 FL (ref 5–10.5)
POLYCHROMASIA BLD QL SMEAR: ABNORMAL
POTASSIUM SERPL-SCNC: 3.8 MMOL/L (ref 3.5–5.1)
POTASSIUM SERPL-SCNC: 3.8 MMOL/L (ref 3.5–5.1)
RBC # BLD AUTO: 2.74 M/UL (ref 4.2–5.9)
SCHISTOCYTES BLD QL SMEAR: ABNORMAL
SODIUM SERPL-SCNC: 143 MMOL/L (ref 136–145)
SODIUM SERPL-SCNC: 147 MMOL/L (ref 136–145)
URATE SERPL-MCNC: 4.4 MG/DL (ref 3.5–7.2)
WBC # BLD AUTO: 15.4 K/UL (ref 4–11)

## 2023-11-14 PROCEDURE — 6370000000 HC RX 637 (ALT 250 FOR IP): Performed by: NURSE PRACTITIONER

## 2023-11-14 PROCEDURE — 97530 THERAPEUTIC ACTIVITIES: CPT

## 2023-11-14 PROCEDURE — 80048 BASIC METABOLIC PNL TOTAL CA: CPT

## 2023-11-14 PROCEDURE — 2580000003 HC RX 258

## 2023-11-14 PROCEDURE — 2580000003 HC RX 258: Performed by: NURSE PRACTITIONER

## 2023-11-14 PROCEDURE — 6370000000 HC RX 637 (ALT 250 FOR IP): Performed by: STUDENT IN AN ORGANIZED HEALTH CARE EDUCATION/TRAINING PROGRAM

## 2023-11-14 PROCEDURE — 84100 ASSAY OF PHOSPHORUS: CPT

## 2023-11-14 PROCEDURE — 84550 ASSAY OF BLOOD/URIC ACID: CPT

## 2023-11-14 PROCEDURE — 36415 COLL VENOUS BLD VENIPUNCTURE: CPT

## 2023-11-14 PROCEDURE — 2060000000 HC ICU INTERMEDIATE R&B

## 2023-11-14 PROCEDURE — 85025 COMPLETE CBC W/AUTO DIFF WBC: CPT

## 2023-11-14 PROCEDURE — 97116 GAIT TRAINING THERAPY: CPT

## 2023-11-14 PROCEDURE — 6360000002 HC RX W HCPCS: Performed by: NURSE PRACTITIONER

## 2023-11-14 PROCEDURE — 6360000002 HC RX W HCPCS

## 2023-11-14 PROCEDURE — 97535 SELF CARE MNGMENT TRAINING: CPT

## 2023-11-14 RX ORDER — SODIUM CHLORIDE 9 MG/ML
25 INJECTION, SOLUTION INTRAVENOUS PRN
Status: DISCONTINUED | OUTPATIENT
Start: 2023-11-14 | End: 2023-11-22 | Stop reason: HOSPADM

## 2023-11-14 RX ORDER — SODIUM CHLORIDE 0.9 % (FLUSH) 0.9 %
5-40 SYRINGE (ML) INJECTION PRN
Status: DISCONTINUED | OUTPATIENT
Start: 2023-11-14 | End: 2023-11-22 | Stop reason: HOSPADM

## 2023-11-14 RX ORDER — SODIUM CHLORIDE 0.9 % (FLUSH) 0.9 %
5-40 SYRINGE (ML) INJECTION EVERY 12 HOURS SCHEDULED
Status: DISCONTINUED | OUTPATIENT
Start: 2023-11-14 | End: 2023-11-22 | Stop reason: HOSPADM

## 2023-11-14 RX ORDER — PANTOPRAZOLE SODIUM 40 MG/1
40 TABLET, DELAYED RELEASE ORAL
Qty: 30 TABLET | Refills: 3 | Status: CANCELLED | OUTPATIENT
Start: 2023-11-14

## 2023-11-14 RX ORDER — LIDOCAINE HYDROCHLORIDE 10 MG/ML
5 INJECTION, SOLUTION EPIDURAL; INFILTRATION; INTRACAUDAL; PERINEURAL ONCE
Status: COMPLETED | OUTPATIENT
Start: 2023-11-14 | End: 2023-11-15

## 2023-11-14 RX ADMIN — URSODIOL 500 MG: 250 TABLET, FILM COATED ORAL at 21:01

## 2023-11-14 RX ADMIN — URSODIOL 500 MG: 250 TABLET, FILM COATED ORAL at 09:49

## 2023-11-14 RX ADMIN — VALACYCLOVIR HYDROCHLORIDE 500 MG: 500 TABLET, FILM COATED ORAL at 09:49

## 2023-11-14 RX ADMIN — BUDESONIDE 3 MG: 3 CAPSULE, GELATIN COATED ORAL at 15:38

## 2023-11-14 RX ADMIN — BUDESONIDE 3 MG: 3 CAPSULE, GELATIN COATED ORAL at 21:01

## 2023-11-14 RX ADMIN — LEVETIRACETAM 500 MG: 500 TABLET, FILM COATED ORAL at 09:49

## 2023-11-14 RX ADMIN — Medication 1 TABLET: at 09:49

## 2023-11-14 RX ADMIN — SODIUM CHLORIDE, PRESERVATIVE FREE 10 ML: 5 INJECTION INTRAVENOUS at 21:01

## 2023-11-14 RX ADMIN — FIDAXOMICIN 200 MG: 200 TABLET, FILM COATED ORAL at 09:48

## 2023-11-14 RX ADMIN — SODIUM CHLORIDE, PRESERVATIVE FREE 10 ML: 5 INJECTION INTRAVENOUS at 09:51

## 2023-11-14 RX ADMIN — GABAPENTIN 200 MG: 100 CAPSULE ORAL at 09:48

## 2023-11-14 RX ADMIN — TAMSULOSIN HYDROCHLORIDE 0.4 MG: 0.4 CAPSULE ORAL at 09:49

## 2023-11-14 RX ADMIN — LEVETIRACETAM 500 MG: 500 TABLET, FILM COATED ORAL at 21:01

## 2023-11-14 RX ADMIN — PROCHLORPERAZINE MALEATE 5 MG: 10 TABLET ORAL at 03:33

## 2023-11-14 RX ADMIN — BUDESONIDE 3 MG: 3 CAPSULE, GELATIN COATED ORAL at 09:49

## 2023-11-14 RX ADMIN — DIPHENHYDRAMINE HYDROCHLORIDE 5 ML: 12.5 LIQUID ORAL at 09:52

## 2023-11-14 RX ADMIN — FIDAXOMICIN 200 MG: 200 TABLET, FILM COATED ORAL at 21:02

## 2023-11-14 RX ADMIN — SODIUM CHLORIDE 15 ML: 900 IRRIGANT IRRIGATION at 06:43

## 2023-11-14 RX ADMIN — PROCHLORPERAZINE EDISYLATE 5 MG: 5 INJECTION INTRAMUSCULAR; INTRAVENOUS at 11:53

## 2023-11-14 RX ADMIN — SERTRALINE HYDROCHLORIDE 100 MG: 100 TABLET ORAL at 09:49

## 2023-11-14 RX ADMIN — PANTOPRAZOLE SODIUM 40 MG: 40 TABLET, DELAYED RELEASE ORAL at 06:39

## 2023-11-14 RX ADMIN — SODIUM CHLORIDE 15 ML: 900 IRRIGANT IRRIGATION at 21:06

## 2023-11-14 RX ADMIN — SODIUM CHLORIDE, PRESERVATIVE FREE 10 ML: 5 INJECTION INTRAVENOUS at 21:02

## 2023-11-14 RX ADMIN — ACETAMINOPHEN 650 MG: 325 TABLET ORAL at 03:32

## 2023-11-14 RX ADMIN — PANTOPRAZOLE SODIUM 40 MG: 40 TABLET, DELAYED RELEASE ORAL at 15:38

## 2023-11-14 RX ADMIN — VALACYCLOVIR HYDROCHLORIDE 500 MG: 500 TABLET, FILM COATED ORAL at 21:01

## 2023-11-14 ASSESSMENT — PAIN DESCRIPTION - ORIENTATION
ORIENTATION: INNER
ORIENTATION: INNER

## 2023-11-14 ASSESSMENT — PAIN DESCRIPTION - PAIN TYPE
TYPE: ACUTE PAIN
TYPE: ACUTE PAIN

## 2023-11-14 ASSESSMENT — PAIN DESCRIPTION - DESCRIPTORS
DESCRIPTORS: DISCOMFORT
DESCRIPTORS: DISCOMFORT

## 2023-11-14 ASSESSMENT — PAIN SCALES - GENERAL
PAINLEVEL_OUTOF10: 10
PAINLEVEL_OUTOF10: 3
PAINLEVEL_OUTOF10: 0
PAINLEVEL_OUTOF10: 6

## 2023-11-14 ASSESSMENT — PAIN DESCRIPTION - ONSET
ONSET: GRADUAL
ONSET: GRADUAL

## 2023-11-14 ASSESSMENT — PAIN DESCRIPTION - LOCATION
LOCATION: HEAD
LOCATION: HEAD

## 2023-11-14 ASSESSMENT — PAIN DESCRIPTION - FREQUENCY
FREQUENCY: INTERMITTENT
FREQUENCY: INTERMITTENT

## 2023-11-14 NOTE — CARE COORDINATION
Dificid script copay $5000.00 New Script will be sent for [facility].  Discharge planned for tomorrow 11/5. Will continue to follow for discharge needs.

## 2023-11-14 NOTE — DISCHARGE INSTRUCTIONS
[address] Discharge Instructions    Call for Questions/Concerns:  [phone] ([phone]) [facility] office  The phone number listed above is available 24 hrs/7 days per week  [facility] Clinic is open M-F 8am-4:30pm; Sat-Sun/Holidays 8am-1pm    Symptoms to Report Immediately:    Fever of 100.5 or greater  Vomiting without relief after use of anti-nausea medication  Severe abdominal cramping  Diarrhea: More than 3 loose, watery bowel movements in a 24 hour period  Unusual or excessive bleeding from your mouth, nose, rectum, bladder or vagina  Sudden onset of shortness of breath or chest pain  Signs/symptoms of infection: redness, warmth, swelling-particularly to central line site    Report to Physician's office within 24 hours:    Pain not relieved by pain medication  Change in urination-odor, cloudiness, frequency, or pain with urination  Flu-like symptoms  Skin changes-rash, hives, redness or peeling of skin    Additional Instructions:    Avoid people with colds, flu-like symptoms, or any sign of infection  Drink plenty of fluids-attempt to consume 2-3 liters ( ounces) of fluids/24 hour period  Continue low microbial diet until instructed by physician to resume normal diet  Bring all of your medications with you to your doctor's appointments  Bring your current medicine list to each hospital and office visit  Please keep a food diary and bring with you to all appointments for provider to review. Home Health Care Agency: [facility] will provide home care needs PT / OT     You are being discharged with IV access due to need for ongoing therapy. Below is pertinent information regarding your IV that your next provider may need to know:  Type:  11/15/23                         Date of placement:  Double Lumen PICC     Plan:continue   Next dressing change due on: 11/27  Cap changes due on: 11/24  CVC care and maintenance was reviewed with patient and spouse.   Pt verbalizes understanding of line care and

## 2023-11-14 NOTE — PROCEDURES
Order noted for PICC, d/t volume of the day d/w AMALIA Treadwell APRN about placement of line tomorrow am and plan approved.

## 2023-11-14 NOTE — DISCHARGE SUMMARY
[facility] Discharge Summary             Attending Physician: Fox Madrid DO    Referring MD: Fox Madrid, [address] [place] [address],  [address] [place] [address] Avenue    Name: Channie Councilman :  1955  MRN:  [id number]    Admission: 10/25/2023   Discharge:  11/15/23    Date: 2023    Diagnosis on admit: CNS Lymphoma     Procedures: Routine chest x-ray, laboratories, EKG, IV fluid hydration, Panculture for fevers, IV antimicrobial therapy, Respiratory therapy, Oxygen therapy, Blood Product Infusions, IV antibiotics, chemotherapy, stem cell infusion, IR line removal, PICC placement, Heparin gtt, Lasix gtt, RUE Venous US    Consultations:   Infectious Disease: Dr. Valeriy Grullon MD.  GI: Dr. Tashi Isidro MD.  Nephrology: Dr. Zander Purcell MD.     Medications:      Medication List        START taking these medications      apixaban 5 MG Tabs tablet  Commonly known as: ELIQUIS  Take 1 tablet by mouth 2 times daily  Notes to patient: Apixaban (Eliquis)  USE--Prevents blood clots and strokes (slows blood clotting time-- \"blood thinner\")  SIDE EFFECTS--  Bleeding or bruising more easily, upset stomach  Blood thinner-- refer to drug information handout     budesonide 3 MG delayed release capsule  Commonly known as: ENTOCORT EC  Take 1 capsule by mouth in the morning, at noon, and at bedtime  Notes to patient:  (Entocort) This medication is a type of steroid, which helps treat diarrhea in patients that have received a stem cell transplant. Side Effects: sore dry throat, stuffy nose, mouth sores.      furosemide 40 MG tablet  Commonly known as: LASIX  Take 1 tablet by mouth daily  Start taking on: 2023  Notes to patient: Furosemide  (Lasix)  USE--  Helps body get rid of extra fluid; \"water pill\"  SIDE EFFECTS--  Increased urination, dizziness     loperamide 2 MG capsule  Commonly known as: IMODIUM  Take 1 capsule by mouth 4 times daily as needed for Diarrhea (After every loose stool)     OLANZapine 2.5 MG

## 2023-11-14 NOTE — CARE COORDINATION
CTN contacted Niya with [facility] [phone]. They have accepted this patient and will pull referral from [facility].  They will contact patient and make arrangements for Abner Carballo by 11/16  Electronically signed by Brenda Fang LPN on 35/34/0847 at 3:08 PM

## 2023-11-15 ENCOUNTER — APPOINTMENT (OUTPATIENT)
Dept: GENERAL RADIOLOGY | Age: 68
DRG: 016 | End: 2023-11-15
Attending: STUDENT IN AN ORGANIZED HEALTH CARE EDUCATION/TRAINING PROGRAM
Payer: MEDICARE

## 2023-11-15 LAB
ALBUMIN SERPL-MCNC: 2.8 G/DL (ref 3.4–5)
ALP SERPL-CCNC: 98 U/L (ref 40–129)
ALT SERPL-CCNC: 68 U/L (ref 10–40)
ANION GAP SERPL CALCULATED.3IONS-SCNC: 10 MMOL/L (ref 3–16)
ANION GAP SERPL CALCULATED.3IONS-SCNC: 12 MMOL/L (ref 3–16)
ANION GAP SERPL CALCULATED.3IONS-SCNC: 14 MMOL/L (ref 3–16)
AST SERPL-CCNC: 122 U/L (ref 15–37)
BACTERIA BLD CULT ORG #2: ABNORMAL
BACTERIA BLD CULT ORG #2: ABNORMAL
BACTERIA BLD CULT ORG #2: NORMAL
BACTERIA BLD CULT: NORMAL
BASOPHILS # BLD: 0 K/UL (ref 0–0.2)
BASOPHILS NFR BLD: 0 %
BILIRUB DIRECT SERPL-MCNC: <0.2 MG/DL (ref 0–0.3)
BILIRUB INDIRECT SERPL-MCNC: ABNORMAL MG/DL (ref 0–1)
BILIRUB SERPL-MCNC: 0.4 MG/DL (ref 0–1)
BUN SERPL-MCNC: 34 MG/DL (ref 7–20)
BUN SERPL-MCNC: 36 MG/DL (ref 7–20)
BUN SERPL-MCNC: 36 MG/DL (ref 7–20)
C DIFF TOX A+B STL QL IA: NORMAL
CALCIUM SERPL-MCNC: 7.7 MG/DL (ref 8.3–10.6)
CALCIUM SERPL-MCNC: 8.4 MG/DL (ref 8.3–10.6)
CALCIUM SERPL-MCNC: 8.6 MG/DL (ref 8.3–10.6)
CHLORIDE SERPL-SCNC: 110 MMOL/L (ref 99–110)
CHLORIDE SERPL-SCNC: 117 MMOL/L (ref 99–110)
CHLORIDE SERPL-SCNC: 119 MMOL/L (ref 99–110)
CO2 SERPL-SCNC: 15 MMOL/L (ref 21–32)
CO2 SERPL-SCNC: 19 MMOL/L (ref 21–32)
CO2 SERPL-SCNC: 20 MMOL/L (ref 21–32)
CREAT SERPL-MCNC: 1.3 MG/DL (ref 0.8–1.3)
CREAT SERPL-MCNC: 1.3 MG/DL (ref 0.8–1.3)
CREAT SERPL-MCNC: 1.4 MG/DL (ref 0.8–1.3)
DEPRECATED RDW RBC AUTO: 17 % (ref 12.4–15.4)
EOSINOPHIL # BLD: 0 K/UL (ref 0–0.6)
EOSINOPHIL NFR BLD: 0 %
GFR SERPLBLD CREATININE-BSD FMLA CKD-EPI: 54 ML/MIN/{1.73_M2}
GFR SERPLBLD CREATININE-BSD FMLA CKD-EPI: 59 ML/MIN/{1.73_M2}
GFR SERPLBLD CREATININE-BSD FMLA CKD-EPI: 59 ML/MIN/{1.73_M2}
GLUCOSE SERPL-MCNC: 140 MG/DL (ref 70–99)
GLUCOSE SERPL-MCNC: 145 MG/DL (ref 70–99)
GLUCOSE SERPL-MCNC: 443 MG/DL (ref 70–99)
HCT VFR BLD AUTO: 27.5 % (ref 40.5–52.5)
HGB BLD-MCNC: 8.8 G/DL (ref 13.5–17.5)
LDH SERPL L TO P-CCNC: 1140 U/L (ref 100–190)
LYMPHOCYTES # BLD: 0.2 K/UL (ref 1–5.1)
LYMPHOCYTES NFR BLD: 1 %
Lab: NORMAL
MAGNESIUM SERPL-MCNC: 2 MG/DL (ref 1.8–2.4)
MCH RBC QN AUTO: 31.7 PG (ref 26–34)
MCHC RBC AUTO-ENTMCNC: 31.8 G/DL (ref 31–36)
MCV RBC AUTO: 99.5 FL (ref 80–100)
METAMYELOCYTES NFR BLD MANUAL: 13 %
MONOCYTES # BLD: 0.2 K/UL (ref 0–1.3)
MONOCYTES NFR BLD: 1 %
MYELOCYTES NFR BLD MANUAL: 20 %
NEUTROPHILS # BLD: 15.1 K/UL (ref 1.7–7.7)
NEUTROPHILS NFR BLD: 64 %
NEUTS BAND NFR BLD MANUAL: 1 % (ref 0–7)
NRBC BLD-RTO: 9 /100 WBC
ORGANISM: ABNORMAL
OSMOLALITY SERPL: 343 MOSM/KG (ref 278–305)
OSMOLALITY UR: 873 MOSM/KG (ref 390–1070)
PATH INTERP BLD-IMP: ABNORMAL
PHOSPHATE SERPL-MCNC: 3.3 MG/DL (ref 2.5–4.9)
PLATELET # BLD AUTO: 38 K/UL (ref 135–450)
PLATELET BLD QL SMEAR: ABNORMAL
PMV BLD AUTO: 10.6 FL (ref 5–10.5)
POTASSIUM SERPL-SCNC: 3.3 MMOL/L (ref 3.5–5.1)
POTASSIUM SERPL-SCNC: 3.9 MMOL/L (ref 3.5–5.1)
POTASSIUM SERPL-SCNC: 4 MMOL/L (ref 3.5–5.1)
PROT SERPL-MCNC: 5.2 G/DL (ref 6.4–8.2)
RBC # BLD AUTO: 2.76 M/UL (ref 4.2–5.9)
REASON FOR REJECTION: NORMAL
REJECTED TEST: NORMAL
REPORT: NORMAL
SLIDE REVIEW: ABNORMAL
SODIUM SERPL-SCNC: 140 MMOL/L (ref 136–145)
SODIUM SERPL-SCNC: 146 MMOL/L (ref 136–145)
SODIUM SERPL-SCNC: 150 MMOL/L (ref 136–145)
SODIUM UR-SCNC: <20 MMOL/L
THIS TEST SENT TO: NORMAL
URATE SERPL-MCNC: 5.6 MG/DL (ref 3.5–7.2)
VANCOMYCIN SERPL-MCNC: 17.2 UG/ML
WBC # BLD AUTO: 15.4 K/UL (ref 4–11)

## 2023-11-15 PROCEDURE — 85025 COMPLETE CBC W/AUTO DIFF WBC: CPT

## 2023-11-15 PROCEDURE — 6370000000 HC RX 637 (ALT 250 FOR IP): Performed by: STUDENT IN AN ORGANIZED HEALTH CARE EDUCATION/TRAINING PROGRAM

## 2023-11-15 PROCEDURE — 2580000003 HC RX 258: Performed by: NURSE PRACTITIONER

## 2023-11-15 PROCEDURE — 87324 CLOSTRIDIUM AG IA: CPT

## 2023-11-15 PROCEDURE — 84100 ASSAY OF PHOSPHORUS: CPT

## 2023-11-15 PROCEDURE — 6360000002 HC RX W HCPCS: Performed by: NURSE PRACTITIONER

## 2023-11-15 PROCEDURE — 6370000000 HC RX 637 (ALT 250 FOR IP): Performed by: NURSE PRACTITIONER

## 2023-11-15 PROCEDURE — 97530 THERAPEUTIC ACTIVITIES: CPT

## 2023-11-15 PROCEDURE — 2060000000 HC ICU INTERMEDIATE R&B

## 2023-11-15 PROCEDURE — 02HV33Z INSERTION OF INFUSION DEVICE INTO SUPERIOR VENA CAVA, PERCUTANEOUS APPROACH: ICD-10-PCS | Performed by: INTERNAL MEDICINE

## 2023-11-15 PROCEDURE — 2580000003 HC RX 258: Performed by: STUDENT IN AN ORGANIZED HEALTH CARE EDUCATION/TRAINING PROGRAM

## 2023-11-15 PROCEDURE — 83935 ASSAY OF URINE OSMOLALITY: CPT

## 2023-11-15 PROCEDURE — 80048 BASIC METABOLIC PNL TOTAL CA: CPT

## 2023-11-15 PROCEDURE — 71045 X-RAY EXAM CHEST 1 VIEW: CPT

## 2023-11-15 PROCEDURE — C1751 CATH, INF, PER/CENT/MIDLINE: HCPCS

## 2023-11-15 PROCEDURE — 83615 LACTATE (LD) (LDH) ENZYME: CPT

## 2023-11-15 PROCEDURE — 87449 NOS EACH ORGANISM AG IA: CPT

## 2023-11-15 PROCEDURE — 84300 ASSAY OF URINE SODIUM: CPT

## 2023-11-15 PROCEDURE — 2500000003 HC RX 250 WO HCPCS: Performed by: NURSE PRACTITIONER

## 2023-11-15 PROCEDURE — 84550 ASSAY OF BLOOD/URIC ACID: CPT

## 2023-11-15 PROCEDURE — 36415 COLL VENOUS BLD VENIPUNCTURE: CPT

## 2023-11-15 PROCEDURE — 80202 ASSAY OF VANCOMYCIN: CPT

## 2023-11-15 PROCEDURE — 97535 SELF CARE MNGMENT TRAINING: CPT

## 2023-11-15 PROCEDURE — 84156 ASSAY OF PROTEIN URINE: CPT

## 2023-11-15 PROCEDURE — 36569 INSJ PICC 5 YR+ W/O IMAGING: CPT

## 2023-11-15 PROCEDURE — 2500000003 HC RX 250 WO HCPCS: Performed by: STUDENT IN AN ORGANIZED HEALTH CARE EDUCATION/TRAINING PROGRAM

## 2023-11-15 PROCEDURE — 6370000000 HC RX 637 (ALT 250 FOR IP): Performed by: INTERNAL MEDICINE

## 2023-11-15 PROCEDURE — 80076 HEPATIC FUNCTION PANEL: CPT

## 2023-11-15 PROCEDURE — 83735 ASSAY OF MAGNESIUM: CPT

## 2023-11-15 PROCEDURE — 82570 ASSAY OF URINE CREATININE: CPT

## 2023-11-15 PROCEDURE — 83930 ASSAY OF BLOOD OSMOLALITY: CPT

## 2023-11-15 RX ORDER — DEXTROSE MONOHYDRATE 50 MG/ML
INJECTION, SOLUTION INTRAVENOUS CONTINUOUS
Status: DISCONTINUED | OUTPATIENT
Start: 2023-11-15 | End: 2023-11-15

## 2023-11-15 RX ORDER — SODIUM CHLORIDE 9 MG/ML
INJECTION, SOLUTION INTRAVENOUS CONTINUOUS
Status: DISCONTINUED | OUTPATIENT
Start: 2023-11-15 | End: 2023-11-15

## 2023-11-15 RX ORDER — SODIUM CHLORIDE, SODIUM LACTATE, POTASSIUM CHLORIDE, CALCIUM CHLORIDE 600; 310; 30; 20 MG/100ML; MG/100ML; MG/100ML; MG/100ML
INJECTION, SOLUTION INTRAVENOUS CONTINUOUS
Status: DISCONTINUED | OUTPATIENT
Start: 2023-11-15 | End: 2023-11-16

## 2023-11-15 RX ORDER — LOPERAMIDE HYDROCHLORIDE 2 MG/1
2 CAPSULE ORAL 4 TIMES DAILY
Status: DISCONTINUED | OUTPATIENT
Start: 2023-11-15 | End: 2023-11-20

## 2023-11-15 RX ORDER — SODIUM BICARBONATE 650 MG/1
1300 TABLET ORAL 3 TIMES DAILY
Status: COMPLETED | OUTPATIENT
Start: 2023-11-15 | End: 2023-11-16

## 2023-11-15 RX ORDER — VANCOMYCIN HYDROCHLORIDE 125 MG/1
125 CAPSULE ORAL 4 TIMES DAILY
Status: DISCONTINUED | OUTPATIENT
Start: 2023-11-15 | End: 2023-11-16

## 2023-11-15 RX ADMIN — POTASSIUM CHLORIDE 20 MEQ: 400 INJECTION, SOLUTION INTRAVENOUS at 14:50

## 2023-11-15 RX ADMIN — VANCOMYCIN HYDROCHLORIDE 125 MG: 125 CAPSULE ORAL at 10:01

## 2023-11-15 RX ADMIN — VALACYCLOVIR HYDROCHLORIDE 500 MG: 500 TABLET, FILM COATED ORAL at 20:48

## 2023-11-15 RX ADMIN — VALACYCLOVIR HYDROCHLORIDE 500 MG: 500 TABLET, FILM COATED ORAL at 10:01

## 2023-11-15 RX ADMIN — SODIUM CHLORIDE 15 ML: 900 IRRIGANT IRRIGATION at 06:37

## 2023-11-15 RX ADMIN — BUDESONIDE 3 MG: 3 CAPSULE, GELATIN COATED ORAL at 10:01

## 2023-11-15 RX ADMIN — SODIUM CHLORIDE, PRESERVATIVE FREE 10 ML: 5 INJECTION INTRAVENOUS at 21:04

## 2023-11-15 RX ADMIN — POTASSIUM CHLORIDE 20 MEQ: 400 INJECTION, SOLUTION INTRAVENOUS at 12:37

## 2023-11-15 RX ADMIN — SERTRALINE HYDROCHLORIDE 100 MG: 100 TABLET ORAL at 10:01

## 2023-11-15 RX ADMIN — SODIUM CHLORIDE 15 ML: 900 IRRIGANT IRRIGATION at 10:03

## 2023-11-15 RX ADMIN — LOPERAMIDE HYDROCHLORIDE 2 MG: 2 CAPSULE ORAL at 20:48

## 2023-11-15 RX ADMIN — SODIUM CHLORIDE, PRESERVATIVE FREE 10 ML: 5 INJECTION INTRAVENOUS at 10:02

## 2023-11-15 RX ADMIN — LEVETIRACETAM 500 MG: 500 TABLET, FILM COATED ORAL at 20:48

## 2023-11-15 RX ADMIN — SODIUM CHLORIDE, POTASSIUM CHLORIDE, SODIUM LACTATE AND CALCIUM CHLORIDE: 600; 310; 30; 20 INJECTION, SOLUTION INTRAVENOUS at 21:09

## 2023-11-15 RX ADMIN — LIDOCAINE HYDROCHLORIDE ANHYDROUS 5 ML: 10 INJECTION, SOLUTION INFILTRATION at 09:30

## 2023-11-15 RX ADMIN — SODIUM BICARBONATE: 84 INJECTION, SOLUTION INTRAVENOUS at 08:57

## 2023-11-15 RX ADMIN — PANTOPRAZOLE SODIUM 40 MG: 40 TABLET, DELAYED RELEASE ORAL at 15:49

## 2023-11-15 RX ADMIN — URSODIOL 500 MG: 250 TABLET, FILM COATED ORAL at 20:48

## 2023-11-15 RX ADMIN — POTASSIUM CHLORIDE 20 MEQ: 400 INJECTION, SOLUTION INTRAVENOUS at 13:45

## 2023-11-15 RX ADMIN — SODIUM CHLORIDE 15 ML: 900 IRRIGANT IRRIGATION at 17:30

## 2023-11-15 RX ADMIN — Medication 1 TABLET: at 10:01

## 2023-11-15 RX ADMIN — LEVETIRACETAM 500 MG: 500 TABLET, FILM COATED ORAL at 10:01

## 2023-11-15 RX ADMIN — SODIUM BICARBONATE 1300 MG: 650 TABLET ORAL at 15:49

## 2023-11-15 RX ADMIN — SODIUM BICARBONATE 1300 MG: 650 TABLET ORAL at 20:44

## 2023-11-15 RX ADMIN — BUDESONIDE 3 MG: 3 CAPSULE, GELATIN COATED ORAL at 14:51

## 2023-11-15 RX ADMIN — SODIUM CHLORIDE 15 ML: 900 IRRIGANT IRRIGATION at 21:04

## 2023-11-15 RX ADMIN — VANCOMYCIN HYDROCHLORIDE 125 MG: 125 CAPSULE ORAL at 20:44

## 2023-11-15 RX ADMIN — VANCOMYCIN HYDROCHLORIDE 125 MG: 125 CAPSULE ORAL at 13:11

## 2023-11-15 RX ADMIN — TAMSULOSIN HYDROCHLORIDE 0.4 MG: 0.4 CAPSULE ORAL at 10:01

## 2023-11-15 RX ADMIN — POTASSIUM CHLORIDE 20 MEQ: 400 INJECTION, SOLUTION INTRAVENOUS at 16:04

## 2023-11-15 RX ADMIN — VANCOMYCIN HYDROCHLORIDE 1250 MG: 10 INJECTION, POWDER, LYOPHILIZED, FOR SOLUTION INTRAVENOUS at 11:56

## 2023-11-15 RX ADMIN — PANTOPRAZOLE SODIUM 40 MG: 40 TABLET, DELAYED RELEASE ORAL at 06:37

## 2023-11-15 RX ADMIN — SODIUM CHLORIDE, PRESERVATIVE FREE 10 ML: 5 INJECTION INTRAVENOUS at 10:01

## 2023-11-15 RX ADMIN — VANCOMYCIN HYDROCHLORIDE 125 MG: 125 CAPSULE ORAL at 17:29

## 2023-11-15 RX ADMIN — BUDESONIDE 3 MG: 3 CAPSULE, GELATIN COATED ORAL at 20:48

## 2023-11-15 RX ADMIN — SODIUM CHLORIDE, PRESERVATIVE FREE 10 ML: 5 INJECTION INTRAVENOUS at 20:43

## 2023-11-15 RX ADMIN — URSODIOL 500 MG: 250 TABLET, FILM COATED ORAL at 10:01

## 2023-11-15 RX ADMIN — SODIUM BICARBONATE: 84 INJECTION, SOLUTION INTRAVENOUS at 14:51

## 2023-11-15 NOTE — PROCEDURES
PICC PROCEDURE NOTE WITH PCXR NEEDED  Chart reviewed for allergies, diagnosis, labs, known contraindications, reason for line placement and planned length of treatment. Informed consent noted to be signed and on chart. Insertion procedure discussed with patient/family member. Three patient identifiers - Patient name,   and MRN -  completed &  confirmed verbally. Time out performed Hat, mask and eye shield donned. PICC site scrubbed with Chloraprep  One-Step applicator for 30 seconds x 1. Hand Hygiene  performed with 3% Chlorhexidine surgical scrub x1 min prior to  Sterile gloves, sterile gown being donned. Patient draped using maximal sterile barrier technique ( head to toe ). PICC site scrubbed a 2nd time with Chloraprep One-Step applicator x 30 sec. Modified Seldinger  technique/ultrasound assisted and tip locating system utilized for insertion. 1% Lidocaine 5 ml injected interdermally pre-insertion. PCXR obtained d/t unable to confirm PICC tip with 3CG, will place note and order with clarification of radiologist reading for clearance to use line when available and notify RN. Positive brisk blood return obtained from all lumens. Valves applied and each lumen flushed with 10 mls  0.9% Sterile Sodium Chloride. All lumens flush easily with no resistance. Catheter secured with non-sutured locking device per hospital protocol. Bio-patch/CHG impregnated sterile tegaderm dressing applied. Alcohol Swab Caps applied to all valves. Sterile field maintained during procedure. Guide wire and positioning wire accounted for post procedure and disposed of in sharps. Appearance of site is Clean dry and intact without bleeding or edema. All edges of Tegaderm occlusive. Site marked with date and initials of RN placing line. Teaching performed to pt/family and noted in education section. Bed placed in low position post-procedure.  Top 2 side rails

## 2023-11-15 NOTE — CARE COORDINATION
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 Pt will have services w/[facility] and [facility]. 11:55 AM    SW was informed dc will not occur today, GI has been cons. SW notified rony at [facility] of delay. SW notified Marco Antonio noe at [facility] of delay. Referrals completed: Home Health Care: [facility] and Infusion: [facility]     Resources/ information provided: Not indicated at this time    IMM Status: IMM Letter given to Patient/Family/Significant other/Guardian/POA/by[de-identified] JULY Pagan to pt at bedside. pt denied wanting the original stating he didn't need one  IMM Letter date given[de-identified] 10/25/23  IMM Letter time given[de-identified] 0875      Cindy Tariq and his family were provided with choice of provider; he and his family are in agreement with the discharge plan.     Electronically signed by GIUSEPPE Edmonds LSW on 11/15/2023 at 12:09 PM  [facility]  Case Management Department  Ph: [phone]

## 2023-11-15 NOTE — PROCEDURES
PCXR obtained cleared to use. Per radiologist reading noted as follows: Placement of a right arm PICC, tip in the lower SVC. Pt RN Jed Chance notified.

## 2023-11-16 LAB
ANION GAP SERPL CALCULATED.3IONS-SCNC: 10 MMOL/L (ref 3–16)
ANION GAP SERPL CALCULATED.3IONS-SCNC: 12 MMOL/L (ref 3–16)
ANISOCYTOSIS BLD QL SMEAR: ABNORMAL
APTT BLD: 25.9 SEC (ref 22.7–35.9)
BASOPHILS # BLD: 0 K/UL (ref 0–0.2)
BASOPHILS NFR BLD: 0.2 %
BUN SERPL-MCNC: 37 MG/DL (ref 7–20)
BUN SERPL-MCNC: 37 MG/DL (ref 7–20)
CALCIUM SERPL-MCNC: 7.8 MG/DL (ref 8.3–10.6)
CALCIUM SERPL-MCNC: 8.5 MG/DL (ref 8.3–10.6)
CHLORIDE SERPL-SCNC: 115 MMOL/L (ref 99–110)
CHLORIDE SERPL-SCNC: 120 MMOL/L (ref 99–110)
CO2 SERPL-SCNC: 19 MMOL/L (ref 21–32)
CO2 SERPL-SCNC: 20 MMOL/L (ref 21–32)
CREAT SERPL-MCNC: 1.4 MG/DL (ref 0.8–1.3)
CREAT SERPL-MCNC: 1.4 MG/DL (ref 0.8–1.3)
DACRYOCYTES BLD QL SMEAR: ABNORMAL
DEPRECATED RDW RBC AUTO: 16.9 % (ref 12.4–15.4)
EOSINOPHIL # BLD: 0 K/UL (ref 0–0.6)
EOSINOPHIL NFR BLD: 0 %
GFR SERPLBLD CREATININE-BSD FMLA CKD-EPI: 54 ML/MIN/{1.73_M2}
GFR SERPLBLD CREATININE-BSD FMLA CKD-EPI: 54 ML/MIN/{1.73_M2}
GLUCOSE SERPL-MCNC: 130 MG/DL (ref 70–99)
GLUCOSE SERPL-MCNC: 133 MG/DL (ref 70–99)
HCT VFR BLD AUTO: 25.8 % (ref 40.5–52.5)
HGB BLD-MCNC: 8.4 G/DL (ref 13.5–17.5)
INR PPP: 1.42 (ref 0.84–1.16)
LYMPHOCYTES # BLD: 0.3 K/UL (ref 1–5.1)
LYMPHOCYTES NFR BLD: 2.4 %
MACROCYTES BLD QL SMEAR: ABNORMAL
MCH RBC QN AUTO: 31.4 PG (ref 26–34)
MCHC RBC AUTO-ENTMCNC: 32.5 G/DL (ref 31–36)
MCV RBC AUTO: 96.6 FL (ref 80–100)
MONOCYTES # BLD: 1 K/UL (ref 0–1.3)
MONOCYTES NFR BLD: 6.6 %
NEUTROPHILS # BLD: 13.4 K/UL (ref 1.7–7.7)
NEUTROPHILS NFR BLD: 90.8 %
OVALOCYTES BLD QL SMEAR: ABNORMAL
PHOSPHATE SERPL-MCNC: 3.1 MG/DL (ref 2.5–4.9)
PLATELET # BLD AUTO: 46 K/UL (ref 135–450)
PMV BLD AUTO: 10.9 FL (ref 5–10.5)
POLYCHROMASIA BLD QL SMEAR: ABNORMAL
POTASSIUM SERPL-SCNC: 3.6 MMOL/L (ref 3.5–5.1)
POTASSIUM SERPL-SCNC: 4.2 MMOL/L (ref 3.5–5.1)
PROTHROMBIN TIME: 17.3 SEC (ref 11.5–14.8)
RBC # BLD AUTO: 2.67 M/UL (ref 4.2–5.9)
REPORT: NORMAL
SODIUM SERPL-SCNC: 146 MMOL/L (ref 136–145)
SODIUM SERPL-SCNC: 150 MMOL/L (ref 136–145)
URATE SERPL-MCNC: 6 MG/DL (ref 3.5–7.2)
WBC # BLD AUTO: 14.8 K/UL (ref 4–11)

## 2023-11-16 PROCEDURE — 6370000000 HC RX 637 (ALT 250 FOR IP): Performed by: NURSE PRACTITIONER

## 2023-11-16 PROCEDURE — 6370000000 HC RX 637 (ALT 250 FOR IP): Performed by: STUDENT IN AN ORGANIZED HEALTH CARE EDUCATION/TRAINING PROGRAM

## 2023-11-16 PROCEDURE — 2580000003 HC RX 258: Performed by: STUDENT IN AN ORGANIZED HEALTH CARE EDUCATION/TRAINING PROGRAM

## 2023-11-16 PROCEDURE — 84100 ASSAY OF PHOSPHORUS: CPT

## 2023-11-16 PROCEDURE — 84550 ASSAY OF BLOOD/URIC ACID: CPT

## 2023-11-16 PROCEDURE — 6370000000 HC RX 637 (ALT 250 FOR IP): Performed by: INTERNAL MEDICINE

## 2023-11-16 PROCEDURE — 85730 THROMBOPLASTIN TIME PARTIAL: CPT

## 2023-11-16 PROCEDURE — 82570 ASSAY OF URINE CREATININE: CPT

## 2023-11-16 PROCEDURE — 82043 UR ALBUMIN QUANTITATIVE: CPT

## 2023-11-16 PROCEDURE — 2580000003 HC RX 258: Performed by: NURSE PRACTITIONER

## 2023-11-16 PROCEDURE — 36592 COLLECT BLOOD FROM PICC: CPT

## 2023-11-16 PROCEDURE — 6360000002 HC RX W HCPCS: Performed by: NURSE PRACTITIONER

## 2023-11-16 PROCEDURE — 85610 PROTHROMBIN TIME: CPT

## 2023-11-16 PROCEDURE — 2060000000 HC ICU INTERMEDIATE R&B

## 2023-11-16 PROCEDURE — 97116 GAIT TRAINING THERAPY: CPT

## 2023-11-16 PROCEDURE — 84156 ASSAY OF PROTEIN URINE: CPT

## 2023-11-16 PROCEDURE — 80048 BASIC METABOLIC PNL TOTAL CA: CPT

## 2023-11-16 PROCEDURE — 97110 THERAPEUTIC EXERCISES: CPT

## 2023-11-16 PROCEDURE — 85025 COMPLETE CBC W/AUTO DIFF WBC: CPT

## 2023-11-16 PROCEDURE — 6360000002 HC RX W HCPCS: Performed by: INTERNAL MEDICINE

## 2023-11-16 RX ORDER — SODIUM BICARBONATE 650 MG/1
1300 TABLET ORAL 3 TIMES DAILY
Status: DISCONTINUED | OUTPATIENT
Start: 2023-11-16 | End: 2023-11-17

## 2023-11-16 RX ORDER — SODIUM CHLORIDE 450 MG/100ML
INJECTION, SOLUTION INTRAVENOUS CONTINUOUS
Status: DISCONTINUED | OUTPATIENT
Start: 2023-11-16 | End: 2023-11-17

## 2023-11-16 RX ORDER — FUROSEMIDE 10 MG/ML
20 INJECTION INTRAMUSCULAR; INTRAVENOUS ONCE
Status: COMPLETED | OUTPATIENT
Start: 2023-11-16 | End: 2023-11-16

## 2023-11-16 RX ORDER — CHOLESTYRAMINE LIGHT 4 G/5.7G
4 POWDER, FOR SUSPENSION ORAL NIGHTLY
Status: DISCONTINUED | OUTPATIENT
Start: 2023-11-16 | End: 2023-11-21

## 2023-11-16 RX ORDER — ONDANSETRON HYDROCHLORIDE 8 MG/1
8 TABLET, FILM COATED ORAL EVERY 8 HOURS
Status: DISCONTINUED | OUTPATIENT
Start: 2023-11-16 | End: 2023-11-17

## 2023-11-16 RX ADMIN — VANCOMYCIN HYDROCHLORIDE 1250 MG: 10 INJECTION, POWDER, LYOPHILIZED, FOR SOLUTION INTRAVENOUS at 11:51

## 2023-11-16 RX ADMIN — SODIUM CHLORIDE, PRESERVATIVE FREE 10 ML: 5 INJECTION INTRAVENOUS at 19:59

## 2023-11-16 RX ADMIN — ONDANSETRON 8 MG: 2 INJECTION INTRAMUSCULAR; INTRAVENOUS at 07:53

## 2023-11-16 RX ADMIN — SODIUM CHLORIDE: 4.5 INJECTION, SOLUTION INTRAVENOUS at 14:31

## 2023-11-16 RX ADMIN — PANTOPRAZOLE SODIUM 40 MG: 40 TABLET, DELAYED RELEASE ORAL at 15:49

## 2023-11-16 RX ADMIN — LEVETIRACETAM 500 MG: 500 TABLET, FILM COATED ORAL at 20:00

## 2023-11-16 RX ADMIN — URSODIOL 500 MG: 250 TABLET, FILM COATED ORAL at 20:00

## 2023-11-16 RX ADMIN — Medication 1 TABLET: at 09:25

## 2023-11-16 RX ADMIN — SERTRALINE HYDROCHLORIDE 100 MG: 100 TABLET ORAL at 09:27

## 2023-11-16 RX ADMIN — SODIUM CHLORIDE, PRESERVATIVE FREE 10 ML: 5 INJECTION INTRAVENOUS at 20:03

## 2023-11-16 RX ADMIN — SODIUM CHLORIDE, POTASSIUM CHLORIDE, SODIUM LACTATE AND CALCIUM CHLORIDE: 600; 310; 30; 20 INJECTION, SOLUTION INTRAVENOUS at 10:31

## 2023-11-16 RX ADMIN — SODIUM CHLORIDE, PRESERVATIVE FREE 10 ML: 5 INJECTION INTRAVENOUS at 09:29

## 2023-11-16 RX ADMIN — PANTOPRAZOLE SODIUM 40 MG: 40 TABLET, DELAYED RELEASE ORAL at 09:25

## 2023-11-16 RX ADMIN — SODIUM BICARBONATE 1300 MG: 650 TABLET ORAL at 09:24

## 2023-11-16 RX ADMIN — VALACYCLOVIR HYDROCHLORIDE 500 MG: 500 TABLET, FILM COATED ORAL at 20:00

## 2023-11-16 RX ADMIN — FUROSEMIDE 20 MG: 10 INJECTION, SOLUTION INTRAMUSCULAR; INTRAVENOUS at 10:24

## 2023-11-16 RX ADMIN — SODIUM BICARBONATE 1300 MG: 650 TABLET ORAL at 14:25

## 2023-11-16 RX ADMIN — SODIUM CHLORIDE 15 ML: 900 IRRIGANT IRRIGATION at 17:04

## 2023-11-16 RX ADMIN — LOPERAMIDE HYDROCHLORIDE 2 MG: 2 CAPSULE ORAL at 13:10

## 2023-11-16 RX ADMIN — VALACYCLOVIR HYDROCHLORIDE 500 MG: 500 TABLET, FILM COATED ORAL at 09:30

## 2023-11-16 RX ADMIN — BUDESONIDE 3 MG: 3 CAPSULE, GELATIN COATED ORAL at 09:26

## 2023-11-16 RX ADMIN — PROCHLORPERAZINE MALEATE 5 MG: 10 TABLET ORAL at 11:19

## 2023-11-16 RX ADMIN — SODIUM BICARBONATE 1300 MG: 650 TABLET ORAL at 20:00

## 2023-11-16 RX ADMIN — LOPERAMIDE HYDROCHLORIDE 2 MG: 2 CAPSULE ORAL at 20:00

## 2023-11-16 RX ADMIN — URSODIOL 500 MG: 250 TABLET, FILM COATED ORAL at 09:26

## 2023-11-16 RX ADMIN — TAMSULOSIN HYDROCHLORIDE 0.4 MG: 0.4 CAPSULE ORAL at 09:25

## 2023-11-16 RX ADMIN — LOPERAMIDE HYDROCHLORIDE 2 MG: 2 CAPSULE ORAL at 17:03

## 2023-11-16 RX ADMIN — BUDESONIDE 3 MG: 3 CAPSULE, GELATIN COATED ORAL at 20:00

## 2023-11-16 RX ADMIN — SODIUM CHLORIDE 15 ML: 900 IRRIGANT IRRIGATION at 20:04

## 2023-11-16 RX ADMIN — SODIUM CHLORIDE 15 ML: 900 IRRIGANT IRRIGATION at 10:33

## 2023-11-16 RX ADMIN — LOPERAMIDE HYDROCHLORIDE 2 MG: 2 CAPSULE ORAL at 09:26

## 2023-11-16 RX ADMIN — SODIUM CHLORIDE 15 ML: 900 IRRIGANT IRRIGATION at 09:31

## 2023-11-16 RX ADMIN — LEVETIRACETAM 500 MG: 500 TABLET, FILM COATED ORAL at 09:25

## 2023-11-16 RX ADMIN — ONDANSETRON HYDROCHLORIDE 8 MG: 8 TABLET, FILM COATED ORAL at 18:29

## 2023-11-16 RX ADMIN — BUDESONIDE 3 MG: 3 CAPSULE, GELATIN COATED ORAL at 14:25

## 2023-11-16 RX ADMIN — SODIUM CHLORIDE, PRESERVATIVE FREE 10 ML: 5 INJECTION INTRAVENOUS at 09:28

## 2023-11-16 RX ADMIN — CHOLESTYRAMINE 4 G: 4 POWDER, FOR SUSPENSION ORAL at 21:09

## 2023-11-16 RX ADMIN — VANCOMYCIN HYDROCHLORIDE 125 MG: 125 CAPSULE ORAL at 13:17

## 2023-11-16 RX ADMIN — VANCOMYCIN HYDROCHLORIDE 125 MG: 125 CAPSULE ORAL at 09:25

## 2023-11-16 NOTE — CARE COORDINATION
Chart review day 22- pt from home with wife. Plan is to dc home with [facility] and IV abx with [facility]. CM spoke with Germaine at [facility]. She has completed teaching with pt's wife. She has run benefits and discussed cost with pt's wife.

## 2023-11-16 NOTE — BH NOTE
Psychology Initial Consultation    Patient Name: Rachana Valencia  MRN: [id number]  Admission Date: 10/25/2023  Today's date: 11/16/2023    Reason for Consult: low mood    HPI:   Rachana Valencia is a 77 y.o.   male with H/O DLBCL with CNS involvement here for autologous stem cell transplant (Day +13). Patient has a long history of generalized anxiety with more recent depressed mood. Subjective History:    Patient reports that he is discouraged related to the challenges that he has had post-transplant. He did not expect to struggle so much with nausea and weakness. Patient states that the smell of food makes him nauseated and diarrhea has been frustrating. Wife is encouraging patient to be more active and eat. Patient has not been utilizing PRN nausea medications, but is not sure why. Current outpatient psychiatric treatment: Sertraline 100mg  Other relevant medications: Keppra (500mg, BID)    PSYCHOSOCIAL HISTORY     Marital Status:  for 48 years              If , describes marriage as: \"positive\"  Race/Ethnicity: \"\"  Lives: [place] ([place] from [facility]) and lives with wife  Housing concerns: Denies  Transportation concerns: Denies  Children: 4 adult children   Employment: retired since 3/2023 approximately, forced into [employer] due to health  Financial concerns related to this procedure: Denies  Childhood history: He was raised by mother and father in [place] and has 1 sibling. Rachana Valencia describes his childhood as \"positive\". Educational history: Denies difficulties in school (e.g., learning disability, accommodations). Highest level of education completed is 2 years college.    Gnosticist/spiritual beliefs: Restoration and Denies Alevism beliefs that would affect his medical care   service: Denies  Legal history (e.g. DUI/DWI, [facility]P/[facility], arrests, probation/parole, bankruptcy): Denies  Current stressors: worrying

## 2023-11-17 ENCOUNTER — APPOINTMENT (OUTPATIENT)
Dept: VASCULAR LAB | Age: 68
DRG: 016 | End: 2023-11-17
Attending: STUDENT IN AN ORGANIZED HEALTH CARE EDUCATION/TRAINING PROGRAM
Payer: MEDICARE

## 2023-11-17 ENCOUNTER — APPOINTMENT (OUTPATIENT)
Dept: GENERAL RADIOLOGY | Age: 68
DRG: 016 | End: 2023-11-17
Attending: STUDENT IN AN ORGANIZED HEALTH CARE EDUCATION/TRAINING PROGRAM
Payer: MEDICARE

## 2023-11-17 LAB
ALBUMIN SERPL-MCNC: 2.9 G/DL (ref 3.4–5)
ALP SERPL-CCNC: 99 U/L (ref 40–129)
ALT SERPL-CCNC: 58 U/L (ref 10–40)
ANION GAP SERPL CALCULATED.3IONS-SCNC: 11 MMOL/L (ref 3–16)
ANION GAP SERPL CALCULATED.3IONS-SCNC: 14 MMOL/L (ref 3–16)
ANISOCYTOSIS BLD QL SMEAR: ABNORMAL
ANTI-XA UNFRAC HEPARIN: 0.52 IU/ML (ref 0.3–0.7)
ANTI-XA UNFRAC HEPARIN: <0.1 IU/ML (ref 0.3–0.7)
APTT BLD: 26.5 SEC (ref 22.7–35.9)
AST SERPL-CCNC: 48 U/L (ref 15–37)
BASOPHILS # BLD: 0 K/UL (ref 0–0.2)
BASOPHILS NFR BLD: 0 %
BILIRUB DIRECT SERPL-MCNC: 0.3 MG/DL (ref 0–0.3)
BILIRUB INDIRECT SERPL-MCNC: 0.1 MG/DL (ref 0–1)
BILIRUB SERPL-MCNC: 0.4 MG/DL (ref 0–1)
BUN SERPL-MCNC: 42 MG/DL (ref 7–20)
BUN SERPL-MCNC: 42 MG/DL (ref 7–20)
CALCIUM SERPL-MCNC: 8.2 MG/DL (ref 8.3–10.6)
CALCIUM SERPL-MCNC: 8.3 MG/DL (ref 8.3–10.6)
CHLORIDE SERPL-SCNC: 112 MMOL/L (ref 99–110)
CHLORIDE SERPL-SCNC: 117 MMOL/L (ref 99–110)
CO2 SERPL-SCNC: 19 MMOL/L (ref 21–32)
CO2 SERPL-SCNC: 21 MMOL/L (ref 21–32)
CREAT SERPL-MCNC: 1.4 MG/DL (ref 0.8–1.3)
CREAT SERPL-MCNC: 1.7 MG/DL (ref 0.8–1.3)
CREAT UR-MCNC: 27.2 MG/DL (ref 39–259)
CREAT UR-MCNC: 27.3 MG/DL (ref 39–259)
DACRYOCYTES BLD QL SMEAR: ABNORMAL
DEPRECATED RDW RBC AUTO: 17 % (ref 12.4–15.4)
DEPRECATED RDW RBC AUTO: 17.2 % (ref 12.4–15.4)
EOSINOPHIL # BLD: 0 K/UL (ref 0–0.6)
EOSINOPHIL NFR BLD: 0 %
GFR SERPLBLD CREATININE-BSD FMLA CKD-EPI: 43 ML/MIN/{1.73_M2}
GFR SERPLBLD CREATININE-BSD FMLA CKD-EPI: 54 ML/MIN/{1.73_M2}
GLUCOSE SERPL-MCNC: 122 MG/DL (ref 70–99)
GLUCOSE SERPL-MCNC: 127 MG/DL (ref 70–99)
HCT VFR BLD AUTO: 24.8 % (ref 40.5–52.5)
HCT VFR BLD AUTO: 26.5 % (ref 40.5–52.5)
HGB BLD-MCNC: 8.1 G/DL (ref 13.5–17.5)
HGB BLD-MCNC: 8.6 G/DL (ref 13.5–17.5)
INR PPP: 1.59 (ref 0.84–1.16)
LDH SERPL L TO P-CCNC: 829 U/L (ref 100–190)
LYMPHOCYTES # BLD: 0.2 K/UL (ref 1–5.1)
LYMPHOCYTES NFR BLD: 2 %
MACROCYTES BLD QL SMEAR: ABNORMAL
MAGNESIUM SERPL-MCNC: 2 MG/DL (ref 1.8–2.4)
MCH RBC QN AUTO: 31.3 PG (ref 26–34)
MCH RBC QN AUTO: 31.8 PG (ref 26–34)
MCHC RBC AUTO-ENTMCNC: 32.2 G/DL (ref 31–36)
MCHC RBC AUTO-ENTMCNC: 32.7 G/DL (ref 31–36)
MCV RBC AUTO: 95.6 FL (ref 80–100)
MCV RBC AUTO: 98.7 FL (ref 80–100)
METAMYELOCYTES NFR BLD MANUAL: 3 %
MICROALBUMIN UR DL<=1MG/L-MCNC: <1.2 MG/DL
MICROALBUMIN/CREAT UR: ABNORMAL MG/G (ref 0–30)
MICROCYTES BLD QL SMEAR: ABNORMAL
MONOCYTES # BLD: 0.7 K/UL (ref 0–1.3)
MONOCYTES NFR BLD: 6 %
MYELOCYTES NFR BLD MANUAL: 1 %
NEUTROPHILS # BLD: 10.4 K/UL (ref 1.7–7.7)
NEUTROPHILS NFR BLD: 88 %
NRBC BLD-RTO: 7 /100 WBC
NT-PROBNP SERPL-MCNC: ABNORMAL PG/ML (ref 0–124)
PHOSPHATE SERPL-MCNC: 4 MG/DL (ref 2.5–4.9)
PLATELET # BLD AUTO: 40 K/UL (ref 135–450)
PLATELET # BLD AUTO: 43 K/UL (ref 135–450)
PLATELET BLD QL SMEAR: ABNORMAL
PLATELET BLD QL SMEAR: ABNORMAL
PMV BLD AUTO: 11.1 FL (ref 5–10.5)
PMV BLD AUTO: 12.3 FL (ref 5–10.5)
POLYCHROMASIA BLD QL SMEAR: ABNORMAL
POTASSIUM SERPL-SCNC: 3.6 MMOL/L (ref 3.5–5.1)
POTASSIUM SERPL-SCNC: 3.7 MMOL/L (ref 3.5–5.1)
PROT SERPL-MCNC: 4.8 G/DL (ref 6.4–8.2)
PROT UR-MCNC: 42.1 MG/DL
PROT/CREAT UR-RTO: 1.5 MG/DL
PROTHROMBIN TIME: 18.9 SEC (ref 11.5–14.8)
RBC # BLD AUTO: 2.6 M/UL (ref 4.2–5.9)
RBC # BLD AUTO: 2.69 M/UL (ref 4.2–5.9)
SCHISTOCYTES BLD QL SMEAR: ABNORMAL
SLIDE REVIEW: ABNORMAL
SODIUM SERPL-SCNC: 145 MMOL/L (ref 136–145)
SODIUM SERPL-SCNC: 149 MMOL/L (ref 136–145)
URATE SERPL-MCNC: 6.9 MG/DL (ref 3.5–7.2)
WBC # BLD AUTO: 11.3 K/UL (ref 4–11)
WBC # BLD AUTO: 12.1 K/UL (ref 4–11)

## 2023-11-17 PROCEDURE — 97535 SELF CARE MNGMENT TRAINING: CPT

## 2023-11-17 PROCEDURE — 83615 LACTATE (LD) (LDH) ENZYME: CPT

## 2023-11-17 PROCEDURE — 6370000000 HC RX 637 (ALT 250 FOR IP): Performed by: INTERNAL MEDICINE

## 2023-11-17 PROCEDURE — 97110 THERAPEUTIC EXERCISES: CPT

## 2023-11-17 PROCEDURE — 51798 US URINE CAPACITY MEASURE: CPT

## 2023-11-17 PROCEDURE — 85610 PROTHROMBIN TIME: CPT

## 2023-11-17 PROCEDURE — 36592 COLLECT BLOOD FROM PICC: CPT

## 2023-11-17 PROCEDURE — 6360000002 HC RX W HCPCS: Performed by: NURSE PRACTITIONER

## 2023-11-17 PROCEDURE — 80048 BASIC METABOLIC PNL TOTAL CA: CPT

## 2023-11-17 PROCEDURE — 97116 GAIT TRAINING THERAPY: CPT

## 2023-11-17 PROCEDURE — 6370000000 HC RX 637 (ALT 250 FOR IP): Performed by: NURSE PRACTITIONER

## 2023-11-17 PROCEDURE — 85025 COMPLETE CBC W/AUTO DIFF WBC: CPT

## 2023-11-17 PROCEDURE — 85520 HEPARIN ASSAY: CPT

## 2023-11-17 PROCEDURE — 2580000003 HC RX 258: Performed by: INTERNAL MEDICINE

## 2023-11-17 PROCEDURE — 93971 EXTREMITY STUDY: CPT

## 2023-11-17 PROCEDURE — 80076 HEPATIC FUNCTION PANEL: CPT

## 2023-11-17 PROCEDURE — 83880 ASSAY OF NATRIURETIC PEPTIDE: CPT

## 2023-11-17 PROCEDURE — 6370000000 HC RX 637 (ALT 250 FOR IP): Performed by: STUDENT IN AN ORGANIZED HEALTH CARE EDUCATION/TRAINING PROGRAM

## 2023-11-17 PROCEDURE — 2580000003 HC RX 258: Performed by: NURSE PRACTITIONER

## 2023-11-17 PROCEDURE — 71045 X-RAY EXAM CHEST 1 VIEW: CPT

## 2023-11-17 PROCEDURE — 84100 ASSAY OF PHOSPHORUS: CPT

## 2023-11-17 PROCEDURE — 6360000002 HC RX W HCPCS: Performed by: INTERNAL MEDICINE

## 2023-11-17 PROCEDURE — 84550 ASSAY OF BLOOD/URIC ACID: CPT

## 2023-11-17 PROCEDURE — 2060000000 HC ICU INTERMEDIATE R&B

## 2023-11-17 PROCEDURE — 85027 COMPLETE CBC AUTOMATED: CPT

## 2023-11-17 PROCEDURE — P9047 ALBUMIN (HUMAN), 25%, 50ML: HCPCS | Performed by: INTERNAL MEDICINE

## 2023-11-17 PROCEDURE — 85730 THROMBOPLASTIN TIME PARTIAL: CPT

## 2023-11-17 PROCEDURE — 83735 ASSAY OF MAGNESIUM: CPT

## 2023-11-17 RX ORDER — HEPARIN SODIUM 1000 [USP'U]/ML
40 INJECTION, SOLUTION INTRAVENOUS; SUBCUTANEOUS PRN
Status: DISCONTINUED | OUTPATIENT
Start: 2023-11-17 | End: 2023-11-20

## 2023-11-17 RX ORDER — VALACYCLOVIR HYDROCHLORIDE 500 MG/1
500 TABLET, FILM COATED ORAL DAILY
Status: DISCONTINUED | OUTPATIENT
Start: 2023-11-18 | End: 2023-11-22 | Stop reason: HOSPADM

## 2023-11-17 RX ORDER — ONDANSETRON 2 MG/ML
8 INJECTION INTRAMUSCULAR; INTRAVENOUS EVERY 8 HOURS
Status: DISCONTINUED | OUTPATIENT
Start: 2023-11-17 | End: 2023-11-21

## 2023-11-17 RX ORDER — FUROSEMIDE 10 MG/ML
60 INJECTION INTRAMUSCULAR; INTRAVENOUS ONCE
Status: COMPLETED | OUTPATIENT
Start: 2023-11-17 | End: 2023-11-17

## 2023-11-17 RX ORDER — HEPARIN SODIUM 10000 [USP'U]/100ML
5-30 INJECTION, SOLUTION INTRAVENOUS CONTINUOUS
Status: DISCONTINUED | OUTPATIENT
Start: 2023-11-17 | End: 2023-11-20

## 2023-11-17 RX ORDER — HEPARIN SODIUM 1000 [USP'U]/ML
80 INJECTION, SOLUTION INTRAVENOUS; SUBCUTANEOUS PRN
Status: DISCONTINUED | OUTPATIENT
Start: 2023-11-17 | End: 2023-11-20

## 2023-11-17 RX ORDER — ALBUMIN (HUMAN) 12.5 G/50ML
25 SOLUTION INTRAVENOUS EVERY 6 HOURS
Status: COMPLETED | OUTPATIENT
Start: 2023-11-17 | End: 2023-11-17

## 2023-11-17 RX ORDER — HEPARIN SODIUM 1000 [USP'U]/ML
80 INJECTION, SOLUTION INTRAVENOUS; SUBCUTANEOUS ONCE
Status: COMPLETED | OUTPATIENT
Start: 2023-11-17 | End: 2023-11-17

## 2023-11-17 RX ADMIN — ONDANSETRON 8 MG: 2 INJECTION INTRAMUSCULAR; INTRAVENOUS at 20:52

## 2023-11-17 RX ADMIN — VANCOMYCIN HYDROCHLORIDE 1250 MG: 10 INJECTION, POWDER, LYOPHILIZED, FOR SOLUTION INTRAVENOUS at 12:02

## 2023-11-17 RX ADMIN — PROCHLORPERAZINE EDISYLATE 5 MG: 5 INJECTION INTRAMUSCULAR; INTRAVENOUS at 04:19

## 2023-11-17 RX ADMIN — FUROSEMIDE 10 MG/HR: 10 INJECTION, SOLUTION INTRAMUSCULAR; INTRAVENOUS at 22:12

## 2023-11-17 RX ADMIN — SODIUM CHLORIDE, PRESERVATIVE FREE 10 ML: 5 INJECTION INTRAVENOUS at 09:01

## 2023-11-17 RX ADMIN — HEPARIN SODIUM 6680 UNITS: 1000 INJECTION INTRAVENOUS; SUBCUTANEOUS at 17:57

## 2023-11-17 RX ADMIN — LEVETIRACETAM 500 MG: 500 TABLET, FILM COATED ORAL at 10:15

## 2023-11-17 RX ADMIN — URSODIOL 500 MG: 250 TABLET, FILM COATED ORAL at 10:15

## 2023-11-17 RX ADMIN — SODIUM CHLORIDE, PRESERVATIVE FREE 10 ML: 5 INJECTION INTRAVENOUS at 20:52

## 2023-11-17 RX ADMIN — LOPERAMIDE HYDROCHLORIDE 2 MG: 2 CAPSULE ORAL at 10:15

## 2023-11-17 RX ADMIN — URSODIOL 500 MG: 250 TABLET, FILM COATED ORAL at 20:52

## 2023-11-17 RX ADMIN — LEVETIRACETAM 500 MG: 500 TABLET, FILM COATED ORAL at 20:53

## 2023-11-17 RX ADMIN — SERTRALINE HYDROCHLORIDE 100 MG: 100 TABLET ORAL at 10:15

## 2023-11-17 RX ADMIN — ALBUMIN (HUMAN) 25 G: 0.25 INJECTION, SOLUTION INTRAVENOUS at 17:55

## 2023-11-17 RX ADMIN — TAMSULOSIN HYDROCHLORIDE 0.4 MG: 0.4 CAPSULE ORAL at 10:15

## 2023-11-17 RX ADMIN — HEPARIN SODIUM 18 UNITS/KG/HR: 10000 INJECTION, SOLUTION INTRAVENOUS at 18:05

## 2023-11-17 RX ADMIN — ONDANSETRON 8 MG: 2 INJECTION INTRAMUSCULAR; INTRAVENOUS at 13:51

## 2023-11-17 RX ADMIN — BUDESONIDE 3 MG: 3 CAPSULE, GELATIN COATED ORAL at 15:51

## 2023-11-17 RX ADMIN — BUDESONIDE 3 MG: 3 CAPSULE, GELATIN COATED ORAL at 20:52

## 2023-11-17 RX ADMIN — PROCHLORPERAZINE EDISYLATE 5 MG: 5 INJECTION INTRAMUSCULAR; INTRAVENOUS at 09:31

## 2023-11-17 RX ADMIN — PANTOPRAZOLE SODIUM 40 MG: 40 TABLET, DELAYED RELEASE ORAL at 15:51

## 2023-11-17 RX ADMIN — LOPERAMIDE HYDROCHLORIDE 2 MG: 2 CAPSULE ORAL at 20:52

## 2023-11-17 RX ADMIN — SODIUM BICARBONATE 1300 MG: 650 TABLET ORAL at 10:15

## 2023-11-17 RX ADMIN — FUROSEMIDE 10 MG/HR: 10 INJECTION, SOLUTION INTRAMUSCULAR; INTRAVENOUS at 13:59

## 2023-11-17 RX ADMIN — Medication 1 TABLET: at 10:15

## 2023-11-17 RX ADMIN — PANTOPRAZOLE SODIUM 40 MG: 40 TABLET, DELAYED RELEASE ORAL at 10:15

## 2023-11-17 RX ADMIN — BUDESONIDE 3 MG: 3 CAPSULE, GELATIN COATED ORAL at 10:15

## 2023-11-17 RX ADMIN — FUROSEMIDE 60 MG: 10 INJECTION, SOLUTION INTRAMUSCULAR; INTRAVENOUS at 13:51

## 2023-11-17 RX ADMIN — LOPERAMIDE HYDROCHLORIDE 2 MG: 2 CAPSULE ORAL at 14:03

## 2023-11-17 RX ADMIN — ALBUMIN (HUMAN) 25 G: 0.25 INJECTION, SOLUTION INTRAVENOUS at 14:05

## 2023-11-17 RX ADMIN — LOPERAMIDE HYDROCHLORIDE 2 MG: 2 CAPSULE ORAL at 17:56

## 2023-11-17 RX ADMIN — CHOLESTYRAMINE 4 G: 4 POWDER, FOR SUSPENSION ORAL at 22:11

## 2023-11-17 RX ADMIN — VALACYCLOVIR HYDROCHLORIDE 500 MG: 500 TABLET, FILM COATED ORAL at 10:15

## 2023-11-17 RX ADMIN — ONDANSETRON HYDROCHLORIDE 8 MG: 8 TABLET, FILM COATED ORAL at 10:15

## 2023-11-17 NOTE — CARE COORDINATION
Met with patient and spouse today to review recovery from transplant. Patient is now c-diff negative. Reviewed evidence based reasoning  why patient cannot come out of isolation at this time. Spouse stated that she understands. Patient may be ready for discharge next week. Will continue to follow for discharge needs.

## 2023-11-17 NOTE — CARE COORDINATION
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 Pt will have services w/[facility] and [facility]. Orders will need to be called in.   cultures pending, neprho following,     Referrals completed: Home Health Care: [facility] and Infusion: [facility]     Resources/ information provided: Not indicated at this time    IMM Status: IMM Letter given to Patient/Family/Significant other/Guardian/POA/by[de-identified] Althea CHIN, MIRACLE-S to pt at bedside. pt denied wanting the original stating he didn't need one  IMM Letter date given[de-identified] 10/25/23  IMM Letter time given[de-identified] 2028      Lakhwinder Bruno and his family were provided with choice of provider; he and his family are in agreement with the discharge plan.     Electronically signed by GIUSEPPE Andre, ERIK on 11/17/2023 at 1:55 PM  [facility]  Case Management Department  Ph: [phone]

## 2023-11-18 LAB
ANION GAP SERPL CALCULATED.3IONS-SCNC: 13 MMOL/L (ref 3–16)
ANION GAP SERPL CALCULATED.3IONS-SCNC: 14 MMOL/L (ref 3–16)
ANTI-XA UNFRAC HEPARIN: 0.47 IU/ML (ref 0.3–0.7)
BACTERIA BPU AEROBE CULT: ABNORMAL
BACTERIA BPU AEROBE CULT: ABNORMAL
BASOPHILS # BLD: 0 K/UL (ref 0–0.2)
BASOPHILS NFR BLD: 0.1 %
BLOOD BANK DISPENSE STATUS: NORMAL
BLOOD BANK PRODUCT CODE: NORMAL
BPU ID: NORMAL
BUN SERPL-MCNC: 45 MG/DL (ref 7–20)
BUN SERPL-MCNC: 45 MG/DL (ref 7–20)
CALCIUM SERPL-MCNC: 8.4 MG/DL (ref 8.3–10.6)
CALCIUM SERPL-MCNC: 8.5 MG/DL (ref 8.3–10.6)
CHLORIDE SERPL-SCNC: 112 MMOL/L (ref 99–110)
CHLORIDE SERPL-SCNC: 113 MMOL/L (ref 99–110)
CO2 SERPL-SCNC: 21 MMOL/L (ref 21–32)
CO2 SERPL-SCNC: 23 MMOL/L (ref 21–32)
CREAT SERPL-MCNC: 1.9 MG/DL (ref 0.8–1.3)
CREAT SERPL-MCNC: 2.1 MG/DL (ref 0.8–1.3)
CREAT UR-MCNC: 27.9 MG/DL (ref 39–259)
DEPRECATED RDW RBC AUTO: 16.8 % (ref 12.4–15.4)
DESCRIPTION BLOOD BANK: NORMAL
EOSINOPHIL # BLD: 0 K/UL (ref 0–0.6)
EOSINOPHIL NFR BLD: 0 %
GFR SERPLBLD CREATININE-BSD FMLA CKD-EPI: 33 ML/MIN/{1.73_M2}
GFR SERPLBLD CREATININE-BSD FMLA CKD-EPI: 37 ML/MIN/{1.73_M2}
GLUCOSE SERPL-MCNC: 115 MG/DL (ref 70–99)
GLUCOSE SERPL-MCNC: 128 MG/DL (ref 70–99)
HCT VFR BLD AUTO: 24.9 % (ref 40.5–52.5)
HGB BLD-MCNC: 8.2 G/DL (ref 13.5–17.5)
LYMPHOCYTES # BLD: 0.2 K/UL (ref 1–5.1)
LYMPHOCYTES NFR BLD: 2.3 %
MCH RBC QN AUTO: 31.8 PG (ref 26–34)
MCHC RBC AUTO-ENTMCNC: 32.9 G/DL (ref 31–36)
MCV RBC AUTO: 96.9 FL (ref 80–100)
MONOCYTES # BLD: 0.8 K/UL (ref 0–1.3)
MONOCYTES NFR BLD: 8 %
NEUTROPHILS # BLD: 9.5 K/UL (ref 1.7–7.7)
NEUTROPHILS NFR BLD: 89.6 %
ORGANISM: ABNORMAL
ORGANISM: ABNORMAL
PHOSPHATE SERPL-MCNC: 4.8 MG/DL (ref 2.5–4.9)
PLATELET # BLD AUTO: 42 K/UL (ref 135–450)
PMV BLD AUTO: 13.5 FL (ref 5–10.5)
POTASSIUM SERPL-SCNC: 3.1 MMOL/L (ref 3.5–5.1)
POTASSIUM SERPL-SCNC: 3.8 MMOL/L (ref 3.5–5.1)
PROT UR-MCNC: <4 MG/DL
PROT/CREAT UR-RTO: ABNORMAL MG/DL
RBC # BLD AUTO: 2.57 M/UL (ref 4.2–5.9)
SODIUM SERPL-SCNC: 147 MMOL/L (ref 136–145)
SODIUM SERPL-SCNC: 149 MMOL/L (ref 136–145)
URATE SERPL-MCNC: 8.6 MG/DL (ref 3.5–7.2)
VANCOMYCIN SERPL-MCNC: 22.3 UG/ML
WBC # BLD AUTO: 10.6 K/UL (ref 4–11)

## 2023-11-18 PROCEDURE — 2580000003 HC RX 258: Performed by: NURSE PRACTITIONER

## 2023-11-18 PROCEDURE — 80202 ASSAY OF VANCOMYCIN: CPT

## 2023-11-18 PROCEDURE — 6370000000 HC RX 637 (ALT 250 FOR IP): Performed by: INTERNAL MEDICINE

## 2023-11-18 PROCEDURE — 6370000000 HC RX 637 (ALT 250 FOR IP): Performed by: STUDENT IN AN ORGANIZED HEALTH CARE EDUCATION/TRAINING PROGRAM

## 2023-11-18 PROCEDURE — 36592 COLLECT BLOOD FROM PICC: CPT

## 2023-11-18 PROCEDURE — P9036 PLATELET PHERESIS IRRADIATED: HCPCS

## 2023-11-18 PROCEDURE — 6360000002 HC RX W HCPCS: Performed by: NURSE PRACTITIONER

## 2023-11-18 PROCEDURE — 2580000003 HC RX 258: Performed by: STUDENT IN AN ORGANIZED HEALTH CARE EDUCATION/TRAINING PROGRAM

## 2023-11-18 PROCEDURE — 84100 ASSAY OF PHOSPHORUS: CPT

## 2023-11-18 PROCEDURE — 6360000002 HC RX W HCPCS

## 2023-11-18 PROCEDURE — 85025 COMPLETE CBC W/AUTO DIFF WBC: CPT

## 2023-11-18 PROCEDURE — 84550 ASSAY OF BLOOD/URIC ACID: CPT

## 2023-11-18 PROCEDURE — 36430 TRANSFUSION BLD/BLD COMPNT: CPT

## 2023-11-18 PROCEDURE — 85520 HEPARIN ASSAY: CPT

## 2023-11-18 PROCEDURE — 2580000003 HC RX 258

## 2023-11-18 PROCEDURE — 6370000000 HC RX 637 (ALT 250 FOR IP): Performed by: NURSE PRACTITIONER

## 2023-11-18 PROCEDURE — 2060000000 HC ICU INTERMEDIATE R&B

## 2023-11-18 PROCEDURE — 80048 BASIC METABOLIC PNL TOTAL CA: CPT

## 2023-11-18 PROCEDURE — 6360000002 HC RX W HCPCS: Performed by: STUDENT IN AN ORGANIZED HEALTH CARE EDUCATION/TRAINING PROGRAM

## 2023-11-18 RX ADMIN — PROCHLORPERAZINE MALEATE 5 MG: 10 TABLET ORAL at 16:40

## 2023-11-18 RX ADMIN — ONDANSETRON 8 MG: 2 INJECTION INTRAMUSCULAR; INTRAVENOUS at 13:01

## 2023-11-18 RX ADMIN — PANTOPRAZOLE SODIUM 40 MG: 40 TABLET, DELAYED RELEASE ORAL at 16:32

## 2023-11-18 RX ADMIN — LEVETIRACETAM 500 MG: 500 TABLET, FILM COATED ORAL at 08:44

## 2023-11-18 RX ADMIN — URSODIOL 500 MG: 250 TABLET, FILM COATED ORAL at 20:28

## 2023-11-18 RX ADMIN — LOPERAMIDE HYDROCHLORIDE 2 MG: 2 CAPSULE ORAL at 16:32

## 2023-11-18 RX ADMIN — LOPERAMIDE HYDROCHLORIDE 2 MG: 2 CAPSULE ORAL at 20:28

## 2023-11-18 RX ADMIN — SERTRALINE HYDROCHLORIDE 100 MG: 100 TABLET ORAL at 08:44

## 2023-11-18 RX ADMIN — HEPARIN SODIUM 18 UNITS/KG/HR: 10000 INJECTION, SOLUTION INTRAVENOUS at 11:03

## 2023-11-18 RX ADMIN — POTASSIUM CHLORIDE 20 MEQ: 400 INJECTION, SOLUTION INTRAVENOUS at 16:44

## 2023-11-18 RX ADMIN — ONDANSETRON 8 MG: 2 INJECTION INTRAMUSCULAR; INTRAVENOUS at 20:28

## 2023-11-18 RX ADMIN — LEVETIRACETAM 500 MG: 500 TABLET, FILM COATED ORAL at 20:28

## 2023-11-18 RX ADMIN — PANTOPRAZOLE SODIUM 40 MG: 40 TABLET, DELAYED RELEASE ORAL at 05:29

## 2023-11-18 RX ADMIN — ONDANSETRON 8 MG: 2 INJECTION INTRAMUSCULAR; INTRAVENOUS at 05:29

## 2023-11-18 RX ADMIN — POTASSIUM CHLORIDE 20 MEQ: 400 INJECTION, SOLUTION INTRAVENOUS at 18:51

## 2023-11-18 RX ADMIN — PROCHLORPERAZINE MALEATE 5 MG: 10 TABLET ORAL at 11:41

## 2023-11-18 RX ADMIN — Medication 1 TABLET: at 08:44

## 2023-11-18 RX ADMIN — VANCOMYCIN HYDROCHLORIDE 1000 MG: 10 INJECTION, POWDER, LYOPHILIZED, FOR SOLUTION INTRAVENOUS at 15:14

## 2023-11-18 RX ADMIN — CHOLESTYRAMINE 4 G: 4 POWDER, FOR SUSPENSION ORAL at 20:28

## 2023-11-18 RX ADMIN — SODIUM CHLORIDE 15 ML: 900 IRRIGANT IRRIGATION at 15:25

## 2023-11-18 RX ADMIN — BUDESONIDE 3 MG: 3 CAPSULE, GELATIN COATED ORAL at 20:31

## 2023-11-18 RX ADMIN — BUDESONIDE 3 MG: 3 CAPSULE, GELATIN COATED ORAL at 15:05

## 2023-11-18 RX ADMIN — LOPERAMIDE HYDROCHLORIDE 2 MG: 2 CAPSULE ORAL at 08:44

## 2023-11-18 RX ADMIN — TAMSULOSIN HYDROCHLORIDE 0.4 MG: 0.4 CAPSULE ORAL at 08:44

## 2023-11-18 RX ADMIN — SODIUM CHLORIDE 15 ML: 900 IRRIGANT IRRIGATION at 11:46

## 2023-11-18 RX ADMIN — SODIUM CHLORIDE, PRESERVATIVE FREE 10 ML: 5 INJECTION INTRAVENOUS at 08:45

## 2023-11-18 RX ADMIN — SODIUM CHLORIDE, PRESERVATIVE FREE 10 ML: 5 INJECTION INTRAVENOUS at 08:44

## 2023-11-18 RX ADMIN — BUDESONIDE 3 MG: 3 CAPSULE, GELATIN COATED ORAL at 08:44

## 2023-11-18 RX ADMIN — FUROSEMIDE 5 MG/HR: 10 INJECTION, SOLUTION INTRAMUSCULAR; INTRAVENOUS at 11:44

## 2023-11-18 RX ADMIN — URSODIOL 500 MG: 250 TABLET, FILM COATED ORAL at 08:44

## 2023-11-18 RX ADMIN — SODIUM CHLORIDE, PRESERVATIVE FREE 10 ML: 5 INJECTION INTRAVENOUS at 20:28

## 2023-11-18 RX ADMIN — POTASSIUM CHLORIDE 20 MEQ: 400 INJECTION, SOLUTION INTRAVENOUS at 17:49

## 2023-11-18 RX ADMIN — POTASSIUM CHLORIDE 20 MEQ: 400 INJECTION, SOLUTION INTRAVENOUS at 20:27

## 2023-11-18 RX ADMIN — VALACYCLOVIR HYDROCHLORIDE 500 MG: 500 TABLET, FILM COATED ORAL at 08:44

## 2023-11-18 RX ADMIN — LOPERAMIDE HYDROCHLORIDE 2 MG: 2 CAPSULE ORAL at 13:01

## 2023-11-19 LAB
ANION GAP SERPL CALCULATED.3IONS-SCNC: 11 MMOL/L (ref 3–16)
ANION GAP SERPL CALCULATED.3IONS-SCNC: 12 MMOL/L (ref 3–16)
ANISOCYTOSIS BLD QL SMEAR: ABNORMAL
ANTI-XA UNFRAC HEPARIN: 0.43 IU/ML (ref 0.3–0.7)
BASOPHILS # BLD: 0 K/UL (ref 0–0.2)
BASOPHILS NFR BLD: 0 %
BUN SERPL-MCNC: 44 MG/DL (ref 7–20)
BUN SERPL-MCNC: 45 MG/DL (ref 7–20)
CALCIUM SERPL-MCNC: 8.4 MG/DL (ref 8.3–10.6)
CALCIUM SERPL-MCNC: 8.5 MG/DL (ref 8.3–10.6)
CHLORIDE SERPL-SCNC: 113 MMOL/L (ref 99–110)
CHLORIDE SERPL-SCNC: 115 MMOL/L (ref 99–110)
CO2 SERPL-SCNC: 23 MMOL/L (ref 21–32)
CO2 SERPL-SCNC: 24 MMOL/L (ref 21–32)
CREAT SERPL-MCNC: 2.1 MG/DL (ref 0.8–1.3)
CREAT SERPL-MCNC: 2.2 MG/DL (ref 0.8–1.3)
DACRYOCYTES BLD QL SMEAR: ABNORMAL
DEPRECATED RDW RBC AUTO: 17.2 % (ref 12.4–15.4)
EOSINOPHIL # BLD: 0 K/UL (ref 0–0.6)
EOSINOPHIL NFR BLD: 0 %
GFR SERPLBLD CREATININE-BSD FMLA CKD-EPI: 31 ML/MIN/{1.73_M2}
GFR SERPLBLD CREATININE-BSD FMLA CKD-EPI: 33 ML/MIN/{1.73_M2}
GLUCOSE SERPL-MCNC: 140 MG/DL (ref 70–99)
GLUCOSE SERPL-MCNC: 145 MG/DL (ref 70–99)
HCT VFR BLD AUTO: 25.9 % (ref 40.5–52.5)
HGB BLD-MCNC: 8.5 G/DL (ref 13.5–17.5)
LYMPHOCYTES # BLD: 0.3 K/UL (ref 1–5.1)
LYMPHOCYTES NFR BLD: 3 %
MACROCYTES BLD QL SMEAR: ABNORMAL
MCH RBC QN AUTO: 32.1 PG (ref 26–34)
MCHC RBC AUTO-ENTMCNC: 32.8 G/DL (ref 31–36)
MCV RBC AUTO: 97.8 FL (ref 80–100)
METAMYELOCYTES NFR BLD MANUAL: 1 %
MICROCYTES BLD QL SMEAR: ABNORMAL
MONOCYTES # BLD: 0.6 K/UL (ref 0–1.3)
MONOCYTES NFR BLD: 6 %
NEUTROPHILS # BLD: 9.5 K/UL (ref 1.7–7.7)
NEUTROPHILS NFR BLD: 88 %
NEUTS BAND NFR BLD MANUAL: 2 % (ref 0–7)
NRBC BLD-RTO: 11 /100 WBC
PHOSPHATE SERPL-MCNC: 4.1 MG/DL (ref 2.5–4.9)
PLATELET # BLD AUTO: 65 K/UL (ref 135–450)
PMV BLD AUTO: 11.9 FL (ref 5–10.5)
POLYCHROMASIA BLD QL SMEAR: ABNORMAL
POTASSIUM SERPL-SCNC: 3.5 MMOL/L (ref 3.5–5.1)
POTASSIUM SERPL-SCNC: 4 MMOL/L (ref 3.5–5.1)
RBC # BLD AUTO: 2.65 M/UL (ref 4.2–5.9)
SCHISTOCYTES BLD QL SMEAR: ABNORMAL
SODIUM SERPL-SCNC: 149 MMOL/L (ref 136–145)
SODIUM SERPL-SCNC: 149 MMOL/L (ref 136–145)
URATE SERPL-MCNC: 9.7 MG/DL (ref 3.5–7.2)
VANCOMYCIN SERPL-MCNC: 22.4 UG/ML
WBC # BLD AUTO: 10.4 K/UL (ref 4–11)

## 2023-11-19 PROCEDURE — 94761 N-INVAS EAR/PLS OXIMETRY MLT: CPT

## 2023-11-19 PROCEDURE — 6360000002 HC RX W HCPCS

## 2023-11-19 PROCEDURE — 80202 ASSAY OF VANCOMYCIN: CPT

## 2023-11-19 PROCEDURE — 6370000000 HC RX 637 (ALT 250 FOR IP): Performed by: STUDENT IN AN ORGANIZED HEALTH CARE EDUCATION/TRAINING PROGRAM

## 2023-11-19 PROCEDURE — 6360000002 HC RX W HCPCS: Performed by: STUDENT IN AN ORGANIZED HEALTH CARE EDUCATION/TRAINING PROGRAM

## 2023-11-19 PROCEDURE — 85025 COMPLETE CBC W/AUTO DIFF WBC: CPT

## 2023-11-19 PROCEDURE — 2580000003 HC RX 258

## 2023-11-19 PROCEDURE — 36592 COLLECT BLOOD FROM PICC: CPT

## 2023-11-19 PROCEDURE — 2580000003 HC RX 258: Performed by: STUDENT IN AN ORGANIZED HEALTH CARE EDUCATION/TRAINING PROGRAM

## 2023-11-19 PROCEDURE — 80048 BASIC METABOLIC PNL TOTAL CA: CPT

## 2023-11-19 PROCEDURE — 84100 ASSAY OF PHOSPHORUS: CPT

## 2023-11-19 PROCEDURE — 6360000002 HC RX W HCPCS: Performed by: NURSE PRACTITIONER

## 2023-11-19 PROCEDURE — 85520 HEPARIN ASSAY: CPT

## 2023-11-19 PROCEDURE — 2060000000 HC ICU INTERMEDIATE R&B

## 2023-11-19 PROCEDURE — 84550 ASSAY OF BLOOD/URIC ACID: CPT

## 2023-11-19 PROCEDURE — 2580000003 HC RX 258: Performed by: NURSE PRACTITIONER

## 2023-11-19 PROCEDURE — 6370000000 HC RX 637 (ALT 250 FOR IP): Performed by: NURSE PRACTITIONER

## 2023-11-19 PROCEDURE — 6370000000 HC RX 637 (ALT 250 FOR IP): Performed by: INTERNAL MEDICINE

## 2023-11-19 RX ADMIN — SERTRALINE HYDROCHLORIDE 100 MG: 100 TABLET ORAL at 09:20

## 2023-11-19 RX ADMIN — ONDANSETRON 8 MG: 2 INJECTION INTRAMUSCULAR; INTRAVENOUS at 03:23

## 2023-11-19 RX ADMIN — SODIUM CHLORIDE, PRESERVATIVE FREE 10 ML: 5 INJECTION INTRAVENOUS at 09:20

## 2023-11-19 RX ADMIN — LEVETIRACETAM 500 MG: 500 TABLET, FILM COATED ORAL at 21:42

## 2023-11-19 RX ADMIN — BUDESONIDE 3 MG: 3 CAPSULE, GELATIN COATED ORAL at 15:49

## 2023-11-19 RX ADMIN — LEVETIRACETAM 500 MG: 500 TABLET, FILM COATED ORAL at 09:19

## 2023-11-19 RX ADMIN — VALACYCLOVIR HYDROCHLORIDE 500 MG: 500 TABLET, FILM COATED ORAL at 09:20

## 2023-11-19 RX ADMIN — PANTOPRAZOLE SODIUM 40 MG: 40 TABLET, DELAYED RELEASE ORAL at 15:49

## 2023-11-19 RX ADMIN — CHLOROTHIAZIDE SODIUM 500 MG: 500 INJECTION, POWDER, LYOPHILIZED, FOR SOLUTION INTRAVENOUS at 05:54

## 2023-11-19 RX ADMIN — PANTOPRAZOLE SODIUM 40 MG: 40 TABLET, DELAYED RELEASE ORAL at 06:04

## 2023-11-19 RX ADMIN — SODIUM CHLORIDE: 9 INJECTION, SOLUTION INTRAVENOUS at 21:57

## 2023-11-19 RX ADMIN — HEPARIN SODIUM 18 UNITS/KG/HR: 10000 INJECTION, SOLUTION INTRAVENOUS at 23:47

## 2023-11-19 RX ADMIN — SODIUM CHLORIDE 15 ML: 900 IRRIGANT IRRIGATION at 11:39

## 2023-11-19 RX ADMIN — ONDANSETRON 8 MG: 2 INJECTION INTRAMUSCULAR; INTRAVENOUS at 21:42

## 2023-11-19 RX ADMIN — HEPARIN SODIUM 18 UNITS/KG/HR: 10000 INJECTION, SOLUTION INTRAVENOUS at 04:54

## 2023-11-19 RX ADMIN — LOPERAMIDE HYDROCHLORIDE 2 MG: 2 CAPSULE ORAL at 21:42

## 2023-11-19 RX ADMIN — TAMSULOSIN HYDROCHLORIDE 0.4 MG: 0.4 CAPSULE ORAL at 09:20

## 2023-11-19 RX ADMIN — URSODIOL 500 MG: 250 TABLET, FILM COATED ORAL at 21:42

## 2023-11-19 RX ADMIN — Medication 1 TABLET: at 09:20

## 2023-11-19 RX ADMIN — URSODIOL 500 MG: 250 TABLET, FILM COATED ORAL at 09:20

## 2023-11-19 RX ADMIN — SODIUM CHLORIDE 15 ML: 900 IRRIGANT IRRIGATION at 16:51

## 2023-11-19 RX ADMIN — SODIUM CHLORIDE, PRESERVATIVE FREE 10 ML: 5 INJECTION INTRAVENOUS at 22:43

## 2023-11-19 RX ADMIN — BUDESONIDE 3 MG: 3 CAPSULE, GELATIN COATED ORAL at 09:20

## 2023-11-19 RX ADMIN — ONDANSETRON 8 MG: 2 INJECTION INTRAMUSCULAR; INTRAVENOUS at 11:35

## 2023-11-19 RX ADMIN — LOPERAMIDE HYDROCHLORIDE 2 MG: 2 CAPSULE ORAL at 11:35

## 2023-11-19 RX ADMIN — LOPERAMIDE HYDROCHLORIDE 2 MG: 2 CAPSULE ORAL at 09:19

## 2023-11-19 RX ADMIN — VANCOMYCIN HYDROCHLORIDE 1000 MG: 10 INJECTION, POWDER, LYOPHILIZED, FOR SOLUTION INTRAVENOUS at 18:32

## 2023-11-19 RX ADMIN — BUDESONIDE 3 MG: 3 CAPSULE, GELATIN COATED ORAL at 21:42

## 2023-11-19 RX ADMIN — LOPERAMIDE HYDROCHLORIDE 2 MG: 2 CAPSULE ORAL at 18:28

## 2023-11-19 RX ADMIN — SODIUM CHLORIDE, PRESERVATIVE FREE 10 ML: 5 INJECTION INTRAVENOUS at 09:21

## 2023-11-19 ASSESSMENT — PAIN SCALES - GENERAL
PAINLEVEL_OUTOF10: 0

## 2023-11-20 ENCOUNTER — APPOINTMENT (OUTPATIENT)
Dept: CT IMAGING | Age: 68
DRG: 016 | End: 2023-11-20
Attending: STUDENT IN AN ORGANIZED HEALTH CARE EDUCATION/TRAINING PROGRAM
Payer: MEDICARE

## 2023-11-20 ENCOUNTER — APPOINTMENT (OUTPATIENT)
Dept: GENERAL RADIOLOGY | Age: 68
DRG: 016 | End: 2023-11-20
Attending: STUDENT IN AN ORGANIZED HEALTH CARE EDUCATION/TRAINING PROGRAM
Payer: MEDICARE

## 2023-11-20 LAB
ABO + RH BLD: NORMAL
ALBUMIN SERPL-MCNC: 3.3 G/DL (ref 3.4–5)
ALP SERPL-CCNC: 117 U/L (ref 40–129)
ALT SERPL-CCNC: 105 U/L (ref 10–40)
ANION GAP SERPL CALCULATED.3IONS-SCNC: 12 MMOL/L (ref 3–16)
ANION GAP SERPL CALCULATED.3IONS-SCNC: 12 MMOL/L (ref 3–16)
ANISOCYTOSIS BLD QL SMEAR: ABNORMAL
ANTI-XA UNFRAC HEPARIN: 0.35 IU/ML (ref 0.3–0.7)
APTT BLD: 99 SEC (ref 22.7–35.9)
AST SERPL-CCNC: 64 U/L (ref 15–37)
BASOPHILS # BLD: 0 K/UL (ref 0–0.2)
BASOPHILS NFR BLD: 0 %
BILIRUB DIRECT SERPL-MCNC: 0.3 MG/DL (ref 0–0.3)
BILIRUB INDIRECT SERPL-MCNC: 0.2 MG/DL (ref 0–1)
BILIRUB SERPL-MCNC: 0.5 MG/DL (ref 0–1)
BILIRUB UR QL STRIP.AUTO: NEGATIVE
BLD GP AB SCN SERPL QL: NORMAL
BUN SERPL-MCNC: 41 MG/DL (ref 7–20)
BUN SERPL-MCNC: 41 MG/DL (ref 7–20)
CALCIUM SERPL-MCNC: 8.6 MG/DL (ref 8.3–10.6)
CALCIUM SERPL-MCNC: 9 MG/DL (ref 8.3–10.6)
CHLORIDE SERPL-SCNC: 108 MMOL/L (ref 99–110)
CHLORIDE SERPL-SCNC: 112 MMOL/L (ref 99–110)
CLARITY UR: CLEAR
CO2 SERPL-SCNC: 24 MMOL/L (ref 21–32)
CO2 SERPL-SCNC: 27 MMOL/L (ref 21–32)
COLOR UR: YELLOW
CREAT SERPL-MCNC: 1.9 MG/DL (ref 0.8–1.3)
CREAT SERPL-MCNC: 2 MG/DL (ref 0.8–1.3)
DACRYOCYTES BLD QL SMEAR: ABNORMAL
DEPRECATED RDW RBC AUTO: 18.5 % (ref 12.4–15.4)
EOSINOPHIL # BLD: 0 K/UL (ref 0–0.6)
EOSINOPHIL NFR BLD: 0 %
GFR SERPLBLD CREATININE-BSD FMLA CKD-EPI: 35 ML/MIN/{1.73_M2}
GFR SERPLBLD CREATININE-BSD FMLA CKD-EPI: 37 ML/MIN/{1.73_M2}
GLUCOSE SERPL-MCNC: 129 MG/DL (ref 70–99)
GLUCOSE SERPL-MCNC: 143 MG/DL (ref 70–99)
GLUCOSE UR STRIP.AUTO-MCNC: NEGATIVE MG/DL
HCT VFR BLD AUTO: 26.7 % (ref 40.5–52.5)
HGB BLD-MCNC: 8.5 G/DL (ref 13.5–17.5)
HGB UR QL STRIP.AUTO: NEGATIVE
INR PPP: 1.51 (ref 0.84–1.16)
KETONES UR STRIP.AUTO-MCNC: NEGATIVE MG/DL
LDH SERPL L TO P-CCNC: 660 U/L (ref 100–190)
LEUKOCYTE ESTERASE UR QL STRIP.AUTO: NEGATIVE
LYMPHOCYTES # BLD: 0.5 K/UL (ref 1–5.1)
LYMPHOCYTES NFR BLD: 5 %
MACROCYTES BLD QL SMEAR: ABNORMAL
MAGNESIUM SERPL-MCNC: 2.1 MG/DL (ref 1.8–2.4)
MCH RBC QN AUTO: 31.5 PG (ref 26–34)
MCHC RBC AUTO-ENTMCNC: 31.9 G/DL (ref 31–36)
MCV RBC AUTO: 98.6 FL (ref 80–100)
MICROCYTES BLD QL SMEAR: ABNORMAL
MONOCYTES # BLD: 0.6 K/UL (ref 0–1.3)
MONOCYTES NFR BLD: 6 %
NEUTROPHILS # BLD: 8.3 K/UL (ref 1.7–7.7)
NEUTROPHILS NFR BLD: 89 %
NITRITE UR QL STRIP.AUTO: NEGATIVE
PH UR STRIP.AUTO: 6 [PH] (ref 5–8)
PHOSPHATE SERPL-MCNC: 3.4 MG/DL (ref 2.5–4.9)
PLATELET # BLD AUTO: 61 K/UL (ref 135–450)
PMV BLD AUTO: 13 FL (ref 5–10.5)
POLYCHROMASIA BLD QL SMEAR: ABNORMAL
POTASSIUM SERPL-SCNC: 3.3 MMOL/L (ref 3.5–5.1)
POTASSIUM SERPL-SCNC: 3.7 MMOL/L (ref 3.5–5.1)
PROT SERPL-MCNC: 5.3 G/DL (ref 6.4–8.2)
PROT UR STRIP.AUTO-MCNC: NEGATIVE MG/DL
PROTHROMBIN TIME: 18.2 SEC (ref 11.5–14.8)
RBC # BLD AUTO: 2.71 M/UL (ref 4.2–5.9)
SCHISTOCYTES BLD QL SMEAR: ABNORMAL
SODIUM SERPL-SCNC: 147 MMOL/L (ref 136–145)
SODIUM SERPL-SCNC: 148 MMOL/L (ref 136–145)
SP GR UR STRIP.AUTO: 1.02 (ref 1–1.03)
STOMATOCYTES BLD QL SMEAR: ABNORMAL
TARGETS BLD QL SMEAR: ABNORMAL
UA DIPSTICK W REFLEX MICRO PNL UR: NORMAL
URATE SERPL-MCNC: 8.1 MG/DL (ref 3.5–7.2)
URN SPEC COLLECT METH UR: NORMAL
UROBILINOGEN UR STRIP-ACNC: 0.2 E.U./DL
VANCOMYCIN SERPL-MCNC: 23.9 UG/ML
WBC # BLD AUTO: 9.3 K/UL (ref 4–11)

## 2023-11-20 PROCEDURE — 86850 RBC ANTIBODY SCREEN: CPT

## 2023-11-20 PROCEDURE — 80076 HEPATIC FUNCTION PANEL: CPT

## 2023-11-20 PROCEDURE — 84100 ASSAY OF PHOSPHORUS: CPT

## 2023-11-20 PROCEDURE — 6370000000 HC RX 637 (ALT 250 FOR IP): Performed by: NURSE PRACTITIONER

## 2023-11-20 PROCEDURE — 85025 COMPLETE CBC W/AUTO DIFF WBC: CPT

## 2023-11-20 PROCEDURE — 6370000000 HC RX 637 (ALT 250 FOR IP): Performed by: INTERNAL MEDICINE

## 2023-11-20 PROCEDURE — 81003 URINALYSIS AUTO W/O SCOPE: CPT

## 2023-11-20 PROCEDURE — 83615 LACTATE (LD) (LDH) ENZYME: CPT

## 2023-11-20 PROCEDURE — 80202 ASSAY OF VANCOMYCIN: CPT

## 2023-11-20 PROCEDURE — 2580000003 HC RX 258: Performed by: INTERNAL MEDICINE

## 2023-11-20 PROCEDURE — 86900 BLOOD TYPING SEROLOGIC ABO: CPT

## 2023-11-20 PROCEDURE — 80048 BASIC METABOLIC PNL TOTAL CA: CPT

## 2023-11-20 PROCEDURE — 70450 CT HEAD/BRAIN W/O DYE: CPT

## 2023-11-20 PROCEDURE — 85730 THROMBOPLASTIN TIME PARTIAL: CPT

## 2023-11-20 PROCEDURE — P9036 PLATELET PHERESIS IRRADIATED: HCPCS

## 2023-11-20 PROCEDURE — 6360000002 HC RX W HCPCS: Performed by: NURSE PRACTITIONER

## 2023-11-20 PROCEDURE — 6360000002 HC RX W HCPCS: Performed by: INTERNAL MEDICINE

## 2023-11-20 PROCEDURE — 85610 PROTHROMBIN TIME: CPT

## 2023-11-20 PROCEDURE — 83735 ASSAY OF MAGNESIUM: CPT

## 2023-11-20 PROCEDURE — 97116 GAIT TRAINING THERAPY: CPT

## 2023-11-20 PROCEDURE — 2060000000 HC ICU INTERMEDIATE R&B

## 2023-11-20 PROCEDURE — P9047 ALBUMIN (HUMAN), 25%, 50ML: HCPCS | Performed by: INTERNAL MEDICINE

## 2023-11-20 PROCEDURE — 2580000003 HC RX 258: Performed by: NURSE PRACTITIONER

## 2023-11-20 PROCEDURE — 85520 HEPARIN ASSAY: CPT

## 2023-11-20 PROCEDURE — 6370000000 HC RX 637 (ALT 250 FOR IP): Performed by: STUDENT IN AN ORGANIZED HEALTH CARE EDUCATION/TRAINING PROGRAM

## 2023-11-20 PROCEDURE — 86901 BLOOD TYPING SEROLOGIC RH(D): CPT

## 2023-11-20 PROCEDURE — 71045 X-RAY EXAM CHEST 1 VIEW: CPT

## 2023-11-20 PROCEDURE — 97110 THERAPEUTIC EXERCISES: CPT

## 2023-11-20 PROCEDURE — 84550 ASSAY OF BLOOD/URIC ACID: CPT

## 2023-11-20 RX ORDER — FUROSEMIDE 10 MG/ML
40 INJECTION INTRAMUSCULAR; INTRAVENOUS 3 TIMES DAILY
Status: COMPLETED | OUTPATIENT
Start: 2023-11-20 | End: 2023-11-20

## 2023-11-20 RX ORDER — ALBUMIN (HUMAN) 12.5 G/50ML
25 SOLUTION INTRAVENOUS ONCE
Status: COMPLETED | OUTPATIENT
Start: 2023-11-20 | End: 2023-11-20

## 2023-11-20 RX ORDER — PANTOPRAZOLE SODIUM 40 MG/1
40 TABLET, DELAYED RELEASE ORAL
Status: DISCONTINUED | OUTPATIENT
Start: 2023-11-21 | End: 2023-11-22 | Stop reason: HOSPADM

## 2023-11-20 RX ORDER — LOPERAMIDE HYDROCHLORIDE 2 MG/1
2 CAPSULE ORAL 4 TIMES DAILY PRN
Status: DISCONTINUED | OUTPATIENT
Start: 2023-11-20 | End: 2023-11-22 | Stop reason: HOSPADM

## 2023-11-20 RX ADMIN — FUROSEMIDE 40 MG: 10 INJECTION, SOLUTION INTRAMUSCULAR; INTRAVENOUS at 15:28

## 2023-11-20 RX ADMIN — SODIUM CHLORIDE, PRESERVATIVE FREE 10 ML: 5 INJECTION INTRAVENOUS at 20:48

## 2023-11-20 RX ADMIN — BUDESONIDE 3 MG: 3 CAPSULE, GELATIN COATED ORAL at 15:28

## 2023-11-20 RX ADMIN — CHOLESTYRAMINE 4 G: 4 POWDER, FOR SUSPENSION ORAL at 04:36

## 2023-11-20 RX ADMIN — URSODIOL 500 MG: 250 TABLET, FILM COATED ORAL at 20:48

## 2023-11-20 RX ADMIN — FUROSEMIDE 40 MG: 10 INJECTION, SOLUTION INTRAMUSCULAR; INTRAVENOUS at 20:48

## 2023-11-20 RX ADMIN — LEVETIRACETAM 500 MG: 500 TABLET, FILM COATED ORAL at 09:12

## 2023-11-20 RX ADMIN — BUDESONIDE 3 MG: 3 CAPSULE, GELATIN COATED ORAL at 09:12

## 2023-11-20 RX ADMIN — SODIUM CHLORIDE 15 ML: 900 IRRIGANT IRRIGATION at 07:20

## 2023-11-20 RX ADMIN — BUDESONIDE 3 MG: 3 CAPSULE, GELATIN COATED ORAL at 20:48

## 2023-11-20 RX ADMIN — FUROSEMIDE 40 MG: 10 INJECTION, SOLUTION INTRAMUSCULAR; INTRAVENOUS at 09:11

## 2023-11-20 RX ADMIN — Medication 1 TABLET: at 09:11

## 2023-11-20 RX ADMIN — URSODIOL 500 MG: 250 TABLET, FILM COATED ORAL at 09:12

## 2023-11-20 RX ADMIN — APIXABAN 5 MG: 5 TABLET, FILM COATED ORAL at 20:48

## 2023-11-20 RX ADMIN — VALACYCLOVIR HYDROCHLORIDE 500 MG: 500 TABLET, FILM COATED ORAL at 09:12

## 2023-11-20 RX ADMIN — APIXABAN 5 MG: 5 TABLET, FILM COATED ORAL at 10:04

## 2023-11-20 RX ADMIN — TAMSULOSIN HYDROCHLORIDE 0.4 MG: 0.4 CAPSULE ORAL at 09:12

## 2023-11-20 RX ADMIN — PANTOPRAZOLE SODIUM 40 MG: 40 TABLET, DELAYED RELEASE ORAL at 06:40

## 2023-11-20 RX ADMIN — LEVETIRACETAM 500 MG: 500 TABLET, FILM COATED ORAL at 20:48

## 2023-11-20 RX ADMIN — CHLOROTHIAZIDE SODIUM 500 MG: 500 INJECTION, POWDER, LYOPHILIZED, FOR SOLUTION INTRAVENOUS at 09:11

## 2023-11-20 RX ADMIN — SODIUM CHLORIDE 15 ML: 900 IRRIGANT IRRIGATION at 01:41

## 2023-11-20 RX ADMIN — SERTRALINE HYDROCHLORIDE 100 MG: 100 TABLET ORAL at 09:12

## 2023-11-20 RX ADMIN — ONDANSETRON 8 MG: 2 INJECTION INTRAMUSCULAR; INTRAVENOUS at 04:36

## 2023-11-20 RX ADMIN — SODIUM CHLORIDE, PRESERVATIVE FREE 10 ML: 5 INJECTION INTRAVENOUS at 09:12

## 2023-11-20 RX ADMIN — SODIUM CHLORIDE 15 ML: 900 IRRIGANT IRRIGATION at 20:55

## 2023-11-20 RX ADMIN — ONDANSETRON 8 MG: 2 INJECTION INTRAMUSCULAR; INTRAVENOUS at 11:40

## 2023-11-20 RX ADMIN — ONDANSETRON 8 MG: 2 INJECTION INTRAMUSCULAR; INTRAVENOUS at 20:48

## 2023-11-20 RX ADMIN — ALBUMIN (HUMAN) 25 G: 0.25 INJECTION, SOLUTION INTRAVENOUS at 09:02

## 2023-11-20 NOTE — CARE COORDINATION
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 Pt will have services w/[facility] and [facility]. Current therapy recs for DC: RW needed-  can't deliver until day of DC. Pt may not need IV abx at DC- pt is on day 13/14. Orders will need to be called in on the day of DC if needed. .   BP stable, calorie count, neprho following,     Referrals completed: Home Health Care: [facility] and Infusion: [facility]     Resources/ information provided: Not indicated at this time    IMM Status: IMM Letter given to Patient/Family/Significant other/Guardian/POA/by[de-identified] Tony CHIN, JULY to pt at bedside. pt denied wanting the original stating he didn't need one  IMM Letter date given[de-identified] 10/25/23  IMM Letter time given[de-identified] 8774      Shila Álvarez and his family were provided with choice of provider; he and his family are in agreement with the discharge plan.     Electronically signed by GIUSEPPE Vargas LSW on 11/20/2023 at 3:11 PM  [facility]  Case Management Department  Ph: [phone]

## 2023-11-20 NOTE — CARE COORDINATION
Met with patient spouse. Update provided regarding . Spouse reports that patient is just off a little more. Memory is an issue and patient seems more confused. Notified Dr. Rinaldo Opitz who states that she will see patient in the morning. This writer also notified Dr. Addison Avalos of spouse concerns. Dr. Markie Rinaldi stated that he will contact the RN and order a scan.

## 2023-11-20 NOTE — CARE COORDINATION
Met with patient spouse today with the unit manager. Reviewed some issues with  over the weekend. Also reviewed the importance of safety as patient is currently on the bed/chair alarm. Spouse acknowledged the reason for this. Spouse discussed the importance of patient being up to the chair during the day, showering each day and getting dressed. This writer agrees this education was presented during the family meeting prior to transplant. Patient is currently on calorie count. Reviewed issues with and problem solved ways for the patient to get food tray each meal.  Also discussed ways to help patient take in more calories. Dietitian will be updated. Discharge plans reviewed to include potential equipment needs for home. PT OT services at home. Will review with therapy team prior to discharge.      Will continue to follow for discharge needs

## 2023-11-21 LAB
ANION GAP SERPL CALCULATED.3IONS-SCNC: 11 MMOL/L (ref 3–16)
ANION GAP SERPL CALCULATED.3IONS-SCNC: 12 MMOL/L (ref 3–16)
BASOPHILS # BLD: 0 K/UL (ref 0–0.2)
BASOPHILS NFR BLD: 0.2 %
BUN SERPL-MCNC: 41 MG/DL (ref 7–20)
BUN SERPL-MCNC: 44 MG/DL (ref 7–20)
CALCIUM SERPL-MCNC: 8.8 MG/DL (ref 8.3–10.6)
CALCIUM SERPL-MCNC: 9.4 MG/DL (ref 8.3–10.6)
CHLORIDE SERPL-SCNC: 105 MMOL/L (ref 99–110)
CHLORIDE SERPL-SCNC: 108 MMOL/L (ref 99–110)
CO2 SERPL-SCNC: 28 MMOL/L (ref 21–32)
CO2 SERPL-SCNC: 30 MMOL/L (ref 21–32)
CREAT SERPL-MCNC: 2 MG/DL (ref 0.8–1.3)
CREAT SERPL-MCNC: 2 MG/DL (ref 0.8–1.3)
DEPRECATED RDW RBC AUTO: 18.8 % (ref 12.4–15.4)
EOSINOPHIL # BLD: 0 K/UL (ref 0–0.6)
EOSINOPHIL NFR BLD: 0 %
GFR SERPLBLD CREATININE-BSD FMLA CKD-EPI: 35 ML/MIN/{1.73_M2}
GFR SERPLBLD CREATININE-BSD FMLA CKD-EPI: 35 ML/MIN/{1.73_M2}
GLUCOSE SERPL-MCNC: 118 MG/DL (ref 70–99)
GLUCOSE SERPL-MCNC: 124 MG/DL (ref 70–99)
HCT VFR BLD AUTO: 25.5 % (ref 40.5–52.5)
HGB BLD-MCNC: 8.3 G/DL (ref 13.5–17.5)
LYMPHOCYTES # BLD: 0.6 K/UL (ref 1–5.1)
LYMPHOCYTES NFR BLD: 6.9 %
MCH RBC QN AUTO: 32 PG (ref 26–34)
MCHC RBC AUTO-ENTMCNC: 32.5 G/DL (ref 31–36)
MCV RBC AUTO: 98.4 FL (ref 80–100)
MONOCYTES # BLD: 0.5 K/UL (ref 0–1.3)
MONOCYTES NFR BLD: 5.9 %
NEUTROPHILS # BLD: 7.4 K/UL (ref 1.7–7.7)
NEUTROPHILS NFR BLD: 87 %
PATHOLOGIST REPORT, TRANSFUSION REACTION: NORMAL
PHOSPHATE SERPL-MCNC: 3.7 MG/DL (ref 2.5–4.9)
PLATELET # BLD AUTO: 51 K/UL (ref 135–450)
PMV BLD AUTO: 11.9 FL (ref 5–10.5)
POTASSIUM SERPL-SCNC: 3.2 MMOL/L (ref 3.5–5.1)
POTASSIUM SERPL-SCNC: 3.8 MMOL/L (ref 3.5–5.1)
RBC # BLD AUTO: 2.59 M/UL (ref 4.2–5.9)
SODIUM SERPL-SCNC: 147 MMOL/L (ref 136–145)
SODIUM SERPL-SCNC: 147 MMOL/L (ref 136–145)
URATE SERPL-MCNC: 8.3 MG/DL (ref 3.5–7.2)
VANCOMYCIN SERPL-MCNC: 14.9 UG/ML
WBC # BLD AUTO: 8.5 K/UL (ref 4–11)

## 2023-11-21 PROCEDURE — 2060000000 HC ICU INTERMEDIATE R&B

## 2023-11-21 PROCEDURE — 6370000000 HC RX 637 (ALT 250 FOR IP): Performed by: STUDENT IN AN ORGANIZED HEALTH CARE EDUCATION/TRAINING PROGRAM

## 2023-11-21 PROCEDURE — 84550 ASSAY OF BLOOD/URIC ACID: CPT

## 2023-11-21 PROCEDURE — 84100 ASSAY OF PHOSPHORUS: CPT

## 2023-11-21 PROCEDURE — P9047 ALBUMIN (HUMAN), 25%, 50ML: HCPCS | Performed by: INTERNAL MEDICINE

## 2023-11-21 PROCEDURE — 36592 COLLECT BLOOD FROM PICC: CPT

## 2023-11-21 PROCEDURE — 6370000000 HC RX 637 (ALT 250 FOR IP): Performed by: NURSE PRACTITIONER

## 2023-11-21 PROCEDURE — 2580000003 HC RX 258: Performed by: NURSE PRACTITIONER

## 2023-11-21 PROCEDURE — 80202 ASSAY OF VANCOMYCIN: CPT

## 2023-11-21 PROCEDURE — 80048 BASIC METABOLIC PNL TOTAL CA: CPT

## 2023-11-21 PROCEDURE — 6360000002 HC RX W HCPCS: Performed by: STUDENT IN AN ORGANIZED HEALTH CARE EDUCATION/TRAINING PROGRAM

## 2023-11-21 PROCEDURE — 85025 COMPLETE CBC W/AUTO DIFF WBC: CPT

## 2023-11-21 PROCEDURE — 6360000002 HC RX W HCPCS: Performed by: NURSE PRACTITIONER

## 2023-11-21 PROCEDURE — 2580000003 HC RX 258: Performed by: INTERNAL MEDICINE

## 2023-11-21 PROCEDURE — 97530 THERAPEUTIC ACTIVITIES: CPT

## 2023-11-21 PROCEDURE — 6360000002 HC RX W HCPCS: Performed by: INTERNAL MEDICINE

## 2023-11-21 PROCEDURE — 2580000003 HC RX 258: Performed by: STUDENT IN AN ORGANIZED HEALTH CARE EDUCATION/TRAINING PROGRAM

## 2023-11-21 PROCEDURE — 97535 SELF CARE MNGMENT TRAINING: CPT

## 2023-11-21 RX ORDER — ONDANSETRON HYDROCHLORIDE 8 MG/1
8 TABLET, FILM COATED ORAL EVERY 8 HOURS
Status: DISCONTINUED | OUTPATIENT
Start: 2023-11-21 | End: 2023-11-22 | Stop reason: HOSPADM

## 2023-11-21 RX ORDER — OLANZAPINE 5 MG/1
2.5 TABLET ORAL NIGHTLY
Status: DISCONTINUED | OUTPATIENT
Start: 2023-11-21 | End: 2023-11-22 | Stop reason: HOSPADM

## 2023-11-21 RX ORDER — CHOLESTYRAMINE LIGHT 4 G/5.7G
4 POWDER, FOR SUSPENSION ORAL DAILY
Qty: 60 PACKET | Refills: 3 | Status: CANCELLED | OUTPATIENT
Start: 2023-11-22

## 2023-11-21 RX ORDER — URSODIOL 250 MG/1
500 TABLET, FILM COATED ORAL 2 TIMES DAILY
Qty: 90 TABLET | Refills: 3 | Status: CANCELLED | OUTPATIENT
Start: 2023-11-21

## 2023-11-21 RX ORDER — DICYCLOMINE HYDROCHLORIDE 10 MG/1
10 CAPSULE ORAL 4 TIMES DAILY PRN
Qty: 120 CAPSULE | Refills: 3 | Status: CANCELLED | OUTPATIENT
Start: 2023-11-21

## 2023-11-21 RX ORDER — SERTRALINE HYDROCHLORIDE 100 MG/1
100 TABLET, FILM COATED ORAL DAILY
Qty: 30 TABLET | Refills: 3 | Status: CANCELLED | OUTPATIENT
Start: 2023-11-21

## 2023-11-21 RX ORDER — BUDESONIDE 3 MG/1
3 CAPSULE, COATED PELLETS ORAL 3 TIMES DAILY
Qty: 30 CAPSULE | Refills: 0 | Status: CANCELLED | OUTPATIENT
Start: 2023-11-21 | End: 2023-12-01

## 2023-11-21 RX ORDER — VALACYCLOVIR HYDROCHLORIDE 500 MG/1
500 TABLET, FILM COATED ORAL DAILY
Status: CANCELLED | OUTPATIENT
Start: 2023-11-21

## 2023-11-21 RX ORDER — VANCOMYCIN HYDROCHLORIDE 125 MG/1
125 CAPSULE ORAL 4 TIMES DAILY
Qty: 40 CAPSULE | Refills: 0 | Status: CANCELLED | OUTPATIENT
Start: 2023-11-21 | End: 2023-12-01

## 2023-11-21 RX ORDER — ALBUMIN (HUMAN) 12.5 G/50ML
25 SOLUTION INTRAVENOUS ONCE
Status: COMPLETED | OUTPATIENT
Start: 2023-11-21 | End: 2023-11-21

## 2023-11-21 RX ORDER — FUROSEMIDE 10 MG/ML
40 INJECTION INTRAMUSCULAR; INTRAVENOUS 3 TIMES DAILY
Status: COMPLETED | OUTPATIENT
Start: 2023-11-21 | End: 2023-11-21

## 2023-11-21 RX ORDER — CHOLESTYRAMINE LIGHT 4 G/5.7G
4 POWDER, FOR SUSPENSION ORAL DAILY
Status: DISCONTINUED | OUTPATIENT
Start: 2023-11-21 | End: 2023-11-22 | Stop reason: HOSPADM

## 2023-11-21 RX ORDER — PANTOPRAZOLE SODIUM 40 MG/1
40 TABLET, DELAYED RELEASE ORAL
Qty: 30 TABLET | Refills: 3 | Status: CANCELLED | OUTPATIENT
Start: 2023-11-22

## 2023-11-21 RX ADMIN — SODIUM CHLORIDE 15 ML: 900 IRRIGANT IRRIGATION at 17:00

## 2023-11-21 RX ADMIN — ALBUMIN (HUMAN) 25 G: 0.25 INJECTION, SOLUTION INTRAVENOUS at 09:54

## 2023-11-21 RX ADMIN — TAMSULOSIN HYDROCHLORIDE 0.4 MG: 0.4 CAPSULE ORAL at 08:25

## 2023-11-21 RX ADMIN — PANTOPRAZOLE SODIUM 40 MG: 40 TABLET, DELAYED RELEASE ORAL at 06:15

## 2023-11-21 RX ADMIN — BUDESONIDE 3 MG: 3 CAPSULE, GELATIN COATED ORAL at 08:25

## 2023-11-21 RX ADMIN — LEVETIRACETAM 500 MG: 500 TABLET, FILM COATED ORAL at 20:16

## 2023-11-21 RX ADMIN — URSODIOL 500 MG: 250 TABLET, FILM COATED ORAL at 08:25

## 2023-11-21 RX ADMIN — BUDESONIDE 3 MG: 3 CAPSULE, GELATIN COATED ORAL at 14:22

## 2023-11-21 RX ADMIN — ONDANSETRON HYDROCHLORIDE 8 MG: 8 TABLET, FILM COATED ORAL at 12:41

## 2023-11-21 RX ADMIN — POTASSIUM CHLORIDE 20 MEQ: 400 INJECTION, SOLUTION INTRAVENOUS at 06:16

## 2023-11-21 RX ADMIN — SODIUM CHLORIDE, PRESERVATIVE FREE 10 ML: 5 INJECTION INTRAVENOUS at 20:16

## 2023-11-21 RX ADMIN — APIXABAN 5 MG: 5 TABLET, FILM COATED ORAL at 20:16

## 2023-11-21 RX ADMIN — FUROSEMIDE 40 MG: 10 INJECTION, SOLUTION INTRAMUSCULAR; INTRAVENOUS at 21:40

## 2023-11-21 RX ADMIN — SODIUM CHLORIDE 15 ML: 900 IRRIGANT IRRIGATION at 20:17

## 2023-11-21 RX ADMIN — SODIUM CHLORIDE, PRESERVATIVE FREE 10 ML: 5 INJECTION INTRAVENOUS at 08:26

## 2023-11-21 RX ADMIN — VANCOMYCIN HYDROCHLORIDE 1000 MG: 10 INJECTION, POWDER, LYOPHILIZED, FOR SOLUTION INTRAVENOUS at 11:00

## 2023-11-21 RX ADMIN — APIXABAN 5 MG: 5 TABLET, FILM COATED ORAL at 08:30

## 2023-11-21 RX ADMIN — FUROSEMIDE 40 MG: 10 INJECTION, SOLUTION INTRAMUSCULAR; INTRAVENOUS at 09:36

## 2023-11-21 RX ADMIN — SODIUM CHLORIDE, PRESERVATIVE FREE 10 ML: 5 INJECTION INTRAVENOUS at 08:27

## 2023-11-21 RX ADMIN — SERTRALINE HYDROCHLORIDE 100 MG: 100 TABLET ORAL at 08:25

## 2023-11-21 RX ADMIN — SODIUM CHLORIDE 15 ML: 900 IRRIGANT IRRIGATION at 06:15

## 2023-11-21 RX ADMIN — ONDANSETRON 8 MG: 2 INJECTION INTRAMUSCULAR; INTRAVENOUS at 04:16

## 2023-11-21 RX ADMIN — LEVETIRACETAM 500 MG: 500 TABLET, FILM COATED ORAL at 08:30

## 2023-11-21 RX ADMIN — FUROSEMIDE 40 MG: 10 INJECTION, SOLUTION INTRAMUSCULAR; INTRAVENOUS at 14:21

## 2023-11-21 RX ADMIN — BUDESONIDE 3 MG: 3 CAPSULE, GELATIN COATED ORAL at 20:16

## 2023-11-21 RX ADMIN — VALACYCLOVIR HYDROCHLORIDE 500 MG: 500 TABLET, FILM COATED ORAL at 08:30

## 2023-11-21 RX ADMIN — POTASSIUM CHLORIDE 20 MEQ: 400 INJECTION, SOLUTION INTRAVENOUS at 09:51

## 2023-11-21 RX ADMIN — Medication 1 TABLET: at 08:25

## 2023-11-21 RX ADMIN — ONDANSETRON HYDROCHLORIDE 8 MG: 8 TABLET, FILM COATED ORAL at 20:16

## 2023-11-21 RX ADMIN — CHOLESTYRAMINE 4 G: 4 POWDER, FOR SUSPENSION ORAL at 12:43

## 2023-11-21 RX ADMIN — CHLOROTHIAZIDE SODIUM 500 MG: 500 INJECTION, POWDER, LYOPHILIZED, FOR SOLUTION INTRAVENOUS at 10:03

## 2023-11-21 RX ADMIN — POTASSIUM CHLORIDE 20 MEQ: 400 INJECTION, SOLUTION INTRAVENOUS at 07:41

## 2023-11-21 RX ADMIN — URSODIOL 500 MG: 250 TABLET, FILM COATED ORAL at 20:16

## 2023-11-21 RX ADMIN — SODIUM CHLORIDE 15 ML: 900 IRRIGANT IRRIGATION at 11:12

## 2023-11-21 RX ADMIN — OLANZAPINE 2.5 MG: 5 TABLET, FILM COATED ORAL at 20:16

## 2023-11-21 RX ADMIN — POTASSIUM CHLORIDE 20 MEQ: 400 INJECTION, SOLUTION INTRAVENOUS at 10:57
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
## 2023-11-21 NOTE — CARE COORDINATION
Case Management                  Date/ Time of Note: 11/21/2023 1:22 PM         Note completed by: GIUSEPPE Carr LSW    Patient Name: Hattie Bonds  YOB: 1955    Diagnosis:Primary CNS lymphoma ([address]) [C85.89]  CNS lymphoma ([address]) [C85.89]  Patient Admission Status: Inpatient  Date of Admission:10/25/2023 10:05 AM    Length of Stay: 32 GLOS: GMLOS: 16.6 Readmission Risk Score: Readmission Risk Score: 26.3    Current Plan of Care:   Plan is to dc home tomorrow. Pt will dc home w/[facility]. Pt no longer needs iv abx at dc. Pt would like a RW, vijay sent referral to [facility]. Mamta Smith will deliver RW to pt. Pt also needs a shower chair, wont be covered by insurance, Mamta Smith will ask if pt wants to private pay for it. Referrals completed: DME: [facility] and Home Health Care: [facility]    Resources/ information provided: Not indicated at this time    IMM Status: IMM Letter given to Patient/Family/Significant other/Guardian/POA/by[de-identified] Ketan Montaño MSW, PETRONAW-S to pt at bedside. pt denied wanting the original stating he didn't need one  IMM Letter date given[de-identified] 10/25/23  IMM Letter time given[de-identified] 7481      Cleave Render and his family were provided with choice of provider; he and his family are in agreement with the discharge plan.     Electronically signed by GIUSEPPE Carr LSW on 11/21/2023 at 1:22 PM  [facility]  Case Management Department  Ph: [phone]

## 2023-11-21 NOTE — CARE COORDINATION
Discharge plan in place. Patient will follow up with dietitian, nephrologist and [facility] upon discharge. Follow up appointments will be made.

## 2023-11-22 VITALS
HEIGHT: 70 IN | BODY MASS INDEX: 23.99 KG/M2 | RESPIRATION RATE: 18 BRPM | HEART RATE: 60 BPM | SYSTOLIC BLOOD PRESSURE: 97 MMHG | TEMPERATURE: 97.8 F | DIASTOLIC BLOOD PRESSURE: 68 MMHG | WEIGHT: 167.55 LBS | OXYGEN SATURATION: 98 %

## 2023-11-22 LAB
ALBUMIN SERPL-MCNC: 3.4 G/DL (ref 3.4–5)
ALP SERPL-CCNC: 106 U/L (ref 40–129)
ALT SERPL-CCNC: 77 U/L (ref 10–40)
ANION GAP SERPL CALCULATED.3IONS-SCNC: 10 MMOL/L (ref 3–16)
AST SERPL-CCNC: 40 U/L (ref 15–37)
BASOPHILS # BLD: 0 K/UL (ref 0–0.2)
BASOPHILS NFR BLD: 0.2 %
BILIRUB DIRECT SERPL-MCNC: <0.2 MG/DL (ref 0–0.3)
BILIRUB INDIRECT SERPL-MCNC: ABNORMAL MG/DL (ref 0–1)
BILIRUB SERPL-MCNC: 0.5 MG/DL (ref 0–1)
BLOOD BANK DISPENSE STATUS: NORMAL
BLOOD BANK PRODUCT CODE: NORMAL
BPU ID: NORMAL
BUN SERPL-MCNC: 40 MG/DL (ref 7–20)
CALCIUM SERPL-MCNC: 9 MG/DL (ref 8.3–10.6)
CHLORIDE SERPL-SCNC: 105 MMOL/L (ref 99–110)
CO2 SERPL-SCNC: 32 MMOL/L (ref 21–32)
CREAT SERPL-MCNC: 1.9 MG/DL (ref 0.8–1.3)
DEPRECATED RDW RBC AUTO: 20.3 % (ref 12.4–15.4)
DESCRIPTION BLOOD BANK: NORMAL
EOSINOPHIL # BLD: 0 K/UL (ref 0–0.6)
EOSINOPHIL NFR BLD: 0 %
FUNGUS SPEC CULT: ABNORMAL
GFR SERPLBLD CREATININE-BSD FMLA CKD-EPI: 37 ML/MIN/{1.73_M2}
GLUCOSE SERPL-MCNC: 108 MG/DL (ref 70–99)
HCT VFR BLD AUTO: 26 % (ref 40.5–52.5)
HGB BLD-MCNC: 8.5 G/DL (ref 13.5–17.5)
LDH SERPL L TO P-CCNC: 495 U/L (ref 100–190)
LOEFFLER MB STN SPEC: ABNORMAL
LYMPHOCYTES # BLD: 0.6 K/UL (ref 1–5.1)
LYMPHOCYTES NFR BLD: 7.7 %
MAGNESIUM SERPL-MCNC: 2 MG/DL (ref 1.8–2.4)
MCH RBC QN AUTO: 31.9 PG (ref 26–34)
MCHC RBC AUTO-ENTMCNC: 32.5 G/DL (ref 31–36)
MCV RBC AUTO: 98.1 FL (ref 80–100)
MONOCYTES # BLD: 0.5 K/UL (ref 0–1.3)
MONOCYTES NFR BLD: 7.1 %
NEUTROPHILS # BLD: 6.3 K/UL (ref 1.7–7.7)
NEUTROPHILS NFR BLD: 85 %
ORGANISM: ABNORMAL
PHOSPHATE SERPL-MCNC: 3.8 MG/DL (ref 2.5–4.9)
PLATELET # BLD AUTO: 49 K/UL (ref 135–450)
PMV BLD AUTO: 11.1 FL (ref 5–10.5)
POTASSIUM SERPL-SCNC: 3.3 MMOL/L (ref 3.5–5.1)
PROT SERPL-MCNC: 5.2 G/DL (ref 6.4–8.2)
RBC # BLD AUTO: 2.65 M/UL (ref 4.2–5.9)
SODIUM SERPL-SCNC: 147 MMOL/L (ref 136–145)
URATE SERPL-MCNC: 8 MG/DL (ref 3.5–7.2)
VANCOMYCIN SERPL-MCNC: 15.9 UG/ML
WBC # BLD AUTO: 7.5 K/UL (ref 4–11)

## 2023-11-22 PROCEDURE — 97530 THERAPEUTIC ACTIVITIES: CPT

## 2023-11-22 PROCEDURE — 6370000000 HC RX 637 (ALT 250 FOR IP): Performed by: STUDENT IN AN ORGANIZED HEALTH CARE EDUCATION/TRAINING PROGRAM

## 2023-11-22 PROCEDURE — 36430 TRANSFUSION BLD/BLD COMPNT: CPT

## 2023-11-22 PROCEDURE — 80202 ASSAY OF VANCOMYCIN: CPT

## 2023-11-22 PROCEDURE — 36592 COLLECT BLOOD FROM PICC: CPT

## 2023-11-22 PROCEDURE — 83615 LACTATE (LD) (LDH) ENZYME: CPT

## 2023-11-22 PROCEDURE — 83735 ASSAY OF MAGNESIUM: CPT

## 2023-11-22 PROCEDURE — 97535 SELF CARE MNGMENT TRAINING: CPT

## 2023-11-22 PROCEDURE — 85025 COMPLETE CBC W/AUTO DIFF WBC: CPT

## 2023-11-22 PROCEDURE — 6360000002 HC RX W HCPCS: Performed by: NURSE PRACTITIONER

## 2023-11-22 PROCEDURE — 97110 THERAPEUTIC EXERCISES: CPT

## 2023-11-22 PROCEDURE — 6370000000 HC RX 637 (ALT 250 FOR IP): Performed by: NURSE PRACTITIONER

## 2023-11-22 PROCEDURE — 84550 ASSAY OF BLOOD/URIC ACID: CPT

## 2023-11-22 PROCEDURE — 2580000003 HC RX 258: Performed by: NURSE PRACTITIONER

## 2023-11-22 PROCEDURE — 84100 ASSAY OF PHOSPHORUS: CPT

## 2023-11-22 PROCEDURE — 80076 HEPATIC FUNCTION PANEL: CPT

## 2023-11-22 PROCEDURE — 80048 BASIC METABOLIC PNL TOTAL CA: CPT

## 2023-11-22 PROCEDURE — 6370000000 HC RX 637 (ALT 250 FOR IP): Performed by: INTERNAL MEDICINE

## 2023-11-22 PROCEDURE — 97116 GAIT TRAINING THERAPY: CPT

## 2023-11-22 RX ORDER — SERTRALINE HYDROCHLORIDE 100 MG/1
100 TABLET, FILM COATED ORAL DAILY
Qty: 30 TABLET | Refills: 3 | Status: SHIPPED | OUTPATIENT
Start: 2023-11-23

## 2023-11-22 RX ORDER — BUDESONIDE 3 MG/1
3 CAPSULE, COATED PELLETS ORAL 3 TIMES DAILY
Qty: 90 CAPSULE | Refills: 0 | Status: SHIPPED | OUTPATIENT
Start: 2023-11-22 | End: 2023-12-22

## 2023-11-22 RX ORDER — VALACYCLOVIR HYDROCHLORIDE 500 MG/1
500 TABLET, FILM COATED ORAL DAILY
Qty: 30 TABLET | Refills: 2 | OUTPATIENT
Start: 2023-11-23

## 2023-11-22 RX ORDER — VALACYCLOVIR HYDROCHLORIDE 500 MG/1
500 TABLET, FILM COATED ORAL DAILY
Qty: 30 TABLET | Refills: 0 | Status: SHIPPED | OUTPATIENT
Start: 2023-11-23

## 2023-11-22 RX ORDER — FUROSEMIDE 40 MG/1
40 TABLET ORAL DAILY
Status: DISCONTINUED | OUTPATIENT
Start: 2023-11-22 | End: 2023-11-22 | Stop reason: HOSPADM

## 2023-11-22 RX ORDER — FUROSEMIDE 40 MG/1
40 TABLET ORAL DAILY
Qty: 60 TABLET | Refills: 3 | Status: SHIPPED | OUTPATIENT
Start: 2023-11-23

## 2023-11-22 RX ORDER — BACLOFEN 10 MG/1
10 TABLET ORAL EVERY 8 HOURS PRN
Qty: 30 TABLET | Refills: 2 | OUTPATIENT
Start: 2023-11-22

## 2023-11-22 RX ORDER — CALCIUM CARBONATE 500 MG/1
500 TABLET, CHEWABLE ORAL 3 TIMES DAILY PRN
COMMUNITY
Start: 2023-11-22 | End: 2023-12-22

## 2023-11-22 RX ORDER — CHOLESTYRAMINE LIGHT 4 G/5.7G
4 POWDER, FOR SUSPENSION ORAL DAILY
Qty: 60 PACKET | Refills: 3 | OUTPATIENT
Start: 2023-11-22

## 2023-11-22 RX ORDER — TAMSULOSIN HYDROCHLORIDE 0.4 MG/1
0.4 CAPSULE ORAL DAILY
Qty: 30 CAPSULE | Refills: 3 | Status: SHIPPED | OUTPATIENT
Start: 2023-11-23

## 2023-11-22 RX ORDER — FUROSEMIDE 40 MG/1
40 TABLET ORAL DAILY
Qty: 60 TABLET | Refills: 3 | OUTPATIENT
Start: 2023-11-23

## 2023-11-22 RX ORDER — BUDESONIDE 3 MG/1
3 CAPSULE, COATED PELLETS ORAL 3 TIMES DAILY
Qty: 30 CAPSULE | Refills: 3 | OUTPATIENT
Start: 2023-11-22 | End: 2023-12-22

## 2023-11-22 RX ORDER — OLANZAPINE 2.5 MG/1
2.5 TABLET, FILM COATED ORAL NIGHTLY
Qty: 30 TABLET | Refills: 3 | OUTPATIENT
Start: 2023-11-22

## 2023-11-22 RX ORDER — SERTRALINE HYDROCHLORIDE 100 MG/1
100 TABLET, FILM COATED ORAL DAILY
Qty: 30 TABLET | Refills: 3 | OUTPATIENT
Start: 2023-11-23

## 2023-11-22 RX ORDER — DICYCLOMINE HYDROCHLORIDE 10 MG/1
10 CAPSULE ORAL 4 TIMES DAILY PRN
Qty: 120 CAPSULE | Refills: 3 | OUTPATIENT
Start: 2023-11-22

## 2023-11-22 RX ORDER — LOPERAMIDE HYDROCHLORIDE 2 MG/1
2 CAPSULE ORAL 4 TIMES DAILY PRN
COMMUNITY
Start: 2023-11-22 | End: 2023-12-02

## 2023-11-22 RX ORDER — OLANZAPINE 2.5 MG/1
2.5 TABLET, FILM COATED ORAL NIGHTLY
Qty: 30 TABLET | Refills: 3 | Status: SHIPPED | OUTPATIENT
Start: 2023-11-22

## 2023-11-22 RX ORDER — ZINC OXIDE 20 %
OINTMENT (GRAM) TOPICAL
Qty: 56 G | Refills: 0 | OUTPATIENT
Start: 2023-11-22

## 2023-11-22 RX ORDER — PANTOPRAZOLE SODIUM 40 MG/1
40 TABLET, DELAYED RELEASE ORAL
Qty: 30 TABLET | Refills: 3 | OUTPATIENT
Start: 2023-11-23

## 2023-11-22 RX ORDER — URSODIOL 250 MG/1
500 TABLET, FILM COATED ORAL 2 TIMES DAILY
Qty: 90 TABLET | Refills: 3 | OUTPATIENT
Start: 2023-11-22

## 2023-11-22 RX ORDER — ONDANSETRON HYDROCHLORIDE 8 MG/1
8 TABLET, FILM COATED ORAL EVERY 8 HOURS PRN
Qty: 30 TABLET | Refills: 0 | Status: SHIPPED | OUTPATIENT
Start: 2023-11-22

## 2023-11-22 RX ADMIN — Medication 1 TABLET: at 08:17

## 2023-11-22 RX ADMIN — SODIUM CHLORIDE, PRESERVATIVE FREE 10 ML: 5 INJECTION INTRAVENOUS at 08:17

## 2023-11-22 RX ADMIN — TAMSULOSIN HYDROCHLORIDE 0.4 MG: 0.4 CAPSULE ORAL at 08:17

## 2023-11-22 RX ADMIN — ONDANSETRON HYDROCHLORIDE 8 MG: 8 TABLET, FILM COATED ORAL at 04:07

## 2023-11-22 RX ADMIN — BUDESONIDE 3 MG: 3 CAPSULE, GELATIN COATED ORAL at 08:17

## 2023-11-22 RX ADMIN — POTASSIUM CHLORIDE 20 MEQ: 400 INJECTION, SOLUTION INTRAVENOUS at 08:04

## 2023-11-22 RX ADMIN — POTASSIUM CHLORIDE 20 MEQ: 400 INJECTION, SOLUTION INTRAVENOUS at 12:07

## 2023-11-22 RX ADMIN — LEVETIRACETAM 500 MG: 500 TABLET, FILM COATED ORAL at 08:17

## 2023-11-22 RX ADMIN — SERTRALINE HYDROCHLORIDE 100 MG: 100 TABLET ORAL at 08:17

## 2023-11-22 RX ADMIN — FUROSEMIDE 40 MG: 40 TABLET ORAL at 08:17

## 2023-11-22 RX ADMIN — VALACYCLOVIR HYDROCHLORIDE 500 MG: 500 TABLET, FILM COATED ORAL at 08:17

## 2023-11-22 RX ADMIN — SODIUM CHLORIDE 15 ML: 900 IRRIGANT IRRIGATION at 06:03

## 2023-11-22 RX ADMIN — PANTOPRAZOLE SODIUM 40 MG: 40 TABLET, DELAYED RELEASE ORAL at 06:03

## 2023-11-22 RX ADMIN — POTASSIUM CHLORIDE 20 MEQ: 400 INJECTION, SOLUTION INTRAVENOUS at 10:30

## 2023-11-22 RX ADMIN — URSODIOL 500 MG: 250 TABLET, FILM COATED ORAL at 08:17

## 2023-11-22 RX ADMIN — ONDANSETRON HYDROCHLORIDE 8 MG: 8 TABLET, FILM COATED ORAL at 12:08

## 2023-11-22 RX ADMIN — POTASSIUM CHLORIDE 20 MEQ: 400 INJECTION, SOLUTION INTRAVENOUS at 06:05

## 2023-11-22 RX ADMIN — APIXABAN 5 MG: 5 TABLET, FILM COATED ORAL at 08:17

## 2023-11-22 NOTE — BH NOTE
Behavioral Health Note    Met with patient due to request from wife. Plan is for discharge home today, but patient told wife that he is afraid that he is not ready. He was not able to verbalize to her why he does not think that he is ready to go home. Met with patient who denied significant physical symptoms. He reported fatigue and decreased appetite, but states no physical complaints that would hinder his discharge home. Educated patient on expected difficulties post-transplant. Patient reports that he is worried about being a burden to his wife at home. Processed patient's concerns and utilized cognitive restructuring to assist patient with these fears. Normalized fear with going home after transplant. Problem-solved some fears and after discussion patient reported feeling more confident about going home. Updated wife and provided support to her. Will follow-up with patient outpatient in shared cancer clinic.     Anders River Psy.D., MSCP  Clinical Psychologist

## 2023-11-22 NOTE — CARE COORDINATION
CTN contacted Niya with [facility] [phone]. They have accepted this patient and will pull referral from [facility].  They will contact patient and make arrangements for [facility] by 11/24  Electronically signed by Chelsey Joseph LPN on 38/76/4565 at 10:17 AM

## 2023-11-22 NOTE — CARE COORDINATION
Patient was unable to sign document but wife signed IMM;.  Patient and wife were okay with it being within the 4 hours.

## 2023-11-22 NOTE — PLAN OF CARE
Goal: Maintains or returns to baseline bowel function  11/17/2023 0520 by Talisha Venegas RN  Outcome: Progressing  Flowsheets (Taken 11/17/2023 0520)  Maintains or returns to baseline bowel function:   Assess bowel function   Administer IV fluids as ordered to ensure adequate hydration   Encourage mobilization and activity   Encourage oral fluids to ensure adequate hydration   Administer ordered medications as needed   Nutrition consult to assist patient with appropriate food choices  Note: Patient is having less frequent bowel movements than previous shifts. He is still nauseous but is getting more rest and feeling better. Plan of care ongoing. Problem: ABCDS Injury Assessment  Goal: Absence of physical injury  11/17/2023 0520 by Talisha Venegas RN  Outcome: Progressing  Flowsheets (Taken 11/17/2023 0520)  Absence of Physical Injury: Implement safety measures based on patient assessment  Note: Pt is a medium fall risk (see Gonzella Lime Fall risk scale). Pt SBA; calls appropriately for assistance when needed. Non-skid footwear on when ambulating, personal items left in reach, and hourly rounding in place. Provided education regarding fall risk status and falls precautions. Pt verbalized and demonstrated understanding of precautions. Will continue to monitor.
Problem: Gastrointestinal - Adult  Goal: Minimal or absence of nausea and vomiting  11/11/2023 1805 by Tamela Nagel RN  Outcome: Progressing- no complaints of nausea or vomiting this shift. Problem: Hematologic - Adult  Goal: Maintains hematologic stability  11/11/2023 1805 by Tamela Nagel RN  Outcome: Progressing  Patient's hemoglobin this AM:   Recent Labs     11/11/23  0738   HGB 7.5*     Patient's platelet count this AM:   Recent Labs     11/11/23  0738   PLT 25*    Thrombocytopenia Precautions in place. Patient showing no signs or symptoms of active bleeding. Patient received transfusions per orders prior to this shift. Patient verbalizes understanding of all instructions. Will continue to assess and implement POC. Call light within reach and hourly rounding in place. Problem: Pain  Goal: Verbalizes/displays adequate comfort level or baseline comfort level  Outcome: Progressing- no complaints of pain this shift. Problem: Nutrition Deficit:  Goal: Optimize nutritional status  Outcome: Progressing- PO intake increasing, encouraging small frequent meals and supplemental drinks.
Problem: Gastrointestinal - Adult  Goal: Minimal or absence of nausea and vomiting  Outcome: Progressing- c/o nausea x1 this shift, PRN compazine given which provided relief. Problem: Infection - Adult  Goal: Absence of infection at discharge  Outcome: Progressing  CVC site remains free of signs/symptoms of infection. No drainage, edema, erythema, pain, or warmth noted at site. Dressing changes continue per protocol and on an as needed basis - see flowsheet. Problem: Metabolic/Fluid and Electrolytes - Adult  Goal: Electrolytes maintained within normal limits  Outcome: Progressing- potassium replaced per orders. Problem: Hematologic - Adult  Goal: Maintains hematologic stability  Outcome: Progressing  Patient's hemoglobin this AM:   Recent Labs     11/02/23  0349   HGB 10.3*     Patient's platelet count this AM:   Recent Labs     11/02/23  0349       Thrombocytopenia not present at this time. Patient showing no signs or symptoms of active bleeding. Transfusion not indicated at this time. Patient verbalizes understanding of all instructions. Will continue to assess and implement POC. Call light within reach and hourly rounding in place. Problem: Pain  Goal: Verbalizes/displays adequate comfort level or baseline comfort level  Outcome: Progressing- c/o esophagus pain, relieved with PRN tylenol and Protonix. CDIFF positive- Dr. Юлия Cunningham notified, continue dificid as ordered.
Problem: Gastrointestinal - Adult  Goal: Minimal or absence of nausea and vomiting  Outcome: Progressing- no complaints of nausea or vomiting this shift, decreased appetite and PO intake, encouraging small frequent meals and supplemental drinks     Problem: Infection - Adult  Goal: Absence of infection at discharge  Outcome: Progressing- tmax 100.5  CVC site remains free of signs/symptoms of infection. No drainage, edema, erythema, pain, or warmth noted at site. Dressing changes continue per protocol and on an as needed basis - see flowsheet. Problem: Hematologic - Adult  Goal: Maintains hematologic stability  Outcome: Progressing  Patient's hemoglobin this AM:   Recent Labs     11/08/23 0315   HGB 6.7*     Patient's platelet count this AM:   Recent Labs     11/08/23 0315   PLT 7*    Thrombocytopenia Precautions in place. Patient showing no signs or symptoms of active bleeding. Patient received transfusions per orders prior to this shift. Patient verbalizes understanding of all instructions. Will continue to assess and implement POC. Call light within reach and hourly rounding in place. Problem: Pain  Goal: Verbalizes/displays adequate comfort level or baseline comfort level  Outcome: Progressing- c/o lower abdominal pain, PRN oxycodone given which resolved pain.
Problem: Gastrointestinal - Adult  Goal: Minimal or absence of nausea and vomiting  Outcome: Progressing- no complaints of nausea or vomiting, c/o hearburn-per Dr. Anup samaniego for tums TID. Tums given which provided relief     Problem: Infection - Adult  Goal: Absence of infection at discharge  Outcome: Progressing  CVC site remains free of signs/symptoms of infection. No drainage, edema, erythema, pain, or warmth noted at site. Dressing changes continue per protocol and on an as needed basis - see flowsheet. Compliant with [facility] Bath Protocol:  Performed CHG bath today per [facility] protocol utilizing CHG solution in the shower. CVC site cleansed with CHG wipe over dressing, skin surrounding dressing, and first 6\" of IV tubing. Pt tolerated well. Continued to encourage daily CHG bathing per [facility] protocol. Problem: Metabolic/Fluid and Electrolytes - Adult  Goal: Electrolytes maintained within normal limits  Outcome: Progressing- no replacements needed this shift. Problem: Hematologic - Adult  Goal: Maintains hematologic stability  Outcome: Progressing  Patient's hemoglobin this AM:   Recent Labs     11/12/23  0420   HGB 7.3*     Patient's platelet count this AM:   Recent Labs     11/12/23  0420   PLT 10*    Thrombocytopenia Precautions in place. Patient showing no signs or symptoms of active bleeding. Patient transfused blood products per orders - see flowsheet. Patient verbalizes understanding of all instructions. Will continue to assess and implement POC. Call light within reach and hourly rounding in place. Problem: Nutrition Deficit:  Goal: Optimize nutritional status  Outcome: Progressing- PO intake improving, will continue to encourage high morris/protein meals.
Problem: Hematologic - Adult  Goal: Maintains hematologic stability  Outcome: Progressing  Note: Patient's hemoglobin this AM:   Recent Labs     10/27/23  0310   HGB 9.7*     Patient's platelet count this AM:   Recent Labs     10/27/23  0310        Thrombocytopenia not present at this time. Patient showing no signs or symptoms of active bleeding. Transfusion not indicated at this time. Patient verbalizes understanding of all instructions. Will continue to assess and implement POC. Call light within reach and hourly rounding in place. Problem: Pain  Goal: Verbalizes/displays adequate comfort level or baseline comfort level  Outcome: Progressing  Note: Patient denies pain this shift. Problem: Infection - Adult  Goal: Absence of infection during hospitalization  Outcome: Progressing  Note: Patient remains afebrile. Problem: Gastrointestinal - Adult  Goal: Minimal or absence of nausea and vomiting  Outcome: Progressing  Note: Patient denies N/V/abdominal cramps. Problem: Safety - Adult  Goal: Free from fall injury  Outcome: Progressing  Note: All safety precautions in place. CLWR. Patient demonstrates appropriate use of call light. Problem: ABCDS Injury Assessment  Goal: Absence of physical injury  Outcome: Progressing  Note: Orthostatic vital signs obtained at start of shift - see flowsheet for details. Pt does not meet criteria for orthostasis. Pt is a Med fall risk. See Danielle Mihai Fall Score and ABCDS Injury Risk assessments. - Screening for Orthostasis AND not a [place] Risk per STEPHENS/ABCDS: Pt bed is in low position, side rails up, call light and belongings are in reach. Fall risk light is on outside pts room. Pt encouraged to call for assistance as needed. Will continue with hourly rounds for PO intake, pain needs, toileting and repositioning as needed.
Problem: Safety - Adult  Goal: Free from fall injury  10/25/2023 2212 by Ryan Velasquez RN  Outcome: Progressing  Flowsheets (Taken 10/25/2023 2207)  Free From Fall Injury: Leonmalloryromina Jose family/caregiver on patient safety  Note: Orthostatic vital signs obtained at start of shift - see flowsheet for details. Pt does not meet criteria for orthostasis. Pt is a Med fall risk. See Le Mask Fall Score and ABCDS Injury Risk assessments. - Screening for Orthostasis AND not a [place] Risk per STEPHENS/ABCDS: Pt bed is in low position, side rails up, call light and belongings are in reach. Fall risk light is on outside pts room. Pt encouraged to call for assistance as needed. Will continue with hourly rounds for PO intake, pain needs, toileting and repositioning as needed. Problem: Infection - Adult  Goal: Absence of infection at discharge  10/25/2023 2212 by Ryan Velasquez RN  Outcome: Progressing  Flowsheets (Taken 10/25/2023 2212)  Absence of infection at discharge:   Assess and monitor for signs and symptoms of infection   Monitor lab/diagnostic results  Note: CVC site and dressing clean, dry and intact. Dressing changed with gauze and paper tape per protocol for thiotepa infusion. Problem: Hematologic - Adult  Goal: Maintains hematologic stability  10/25/2023 2212 by Ryan Velasquez RN  Outcome: Progressing  Flowsheets (Taken 10/25/2023 2212)  Maintains hematologic stability:   Assess for signs and symptoms of bleeding or hemorrhage   Monitor labs for bleeding or clotting disorders   Administer blood products/factors as ordered  Patient's hemoglobin this AM:   Recent Labs     10/26/23  0430   HGB 10.6*     Patient's platelet count this AM:   Recent Labs     10/26/23  0430       Thrombocytopenia Precautions in place. Patient showing no signs or symptoms of active bleeding. Transfusion not indicated at this time. Patient verbalizes understanding of all instructions.
Problem: Safety - Adult  Goal: Free from fall injury  10/26/2023 1108 by Reid Maddox RN  Outcome: Progressing     Problem: ABCDS Injury Assessment  Goal: Absence of physical injury  Outcome: Progressing     Problem: Skin/Tissue Integrity - Adult  Goal: Oral mucous membranes remain intact  Outcome: Progressing     Problem: Gastrointestinal - Adult  Goal: Minimal or absence of nausea and vomiting  Outcome: Progressing     Problem: Infection - Adult  Goal: Absence of infection at discharge  10/26/2023 1108 by Reid Maddox RN  Outcome: Progressing
Problem: Safety - Adult  Goal: Free from fall injury  10/28/2023 1356 by Angelina Jaimes RN  Outcome: Progressing  Note: Orthostatic vital signs obtained at start of shift - see flowsheet for details. Pt does not meet criteria for orthostasis. Pt is a Med fall risk. See Anjana Landerose Fall Score and ABCDS Injury Risk assessments. - Screening for Orthostasis AND not a [place] Risk per STEPHENS/ABCDS: Pt bed is in low position, side rails up, call light and belongings are in reach. Fall risk light is on outside pts room. Pt encouraged to call for assistance as needed. Will continue with hourly rounds for PO intake, pain needs, toileting and repositioning as needed. Problem: Skin/Tissue Integrity - Adult  Goal: Oral mucous membranes remain intact  Outcome: Progressing   Skin clean, dry, intact. Problem: Gastrointestinal - Adult  Goal: Minimal or absence of nausea and vomiting  10/28/2023 1356 by Angelina Jaimes RN  Outcome: Progressing  Patient denies nausea this shift. Problem: Infection - Adult  Goal: Absence of infection at discharge  Outcome: Progressing  Pt afebrile this shift. No isolation. Problem: Metabolic/Fluid and Electrolytes - Adult  Goal: Electrolytes maintained within normal limits  10/28/2023 1356 by Angelina Jaimes RN  Outcome: Progressing  No electrolyte replacements this shift. Problem: Hematologic - Adult  Goal: Maintains hematologic stability  10/28/2023 1356 by Angelina Jaimes RN  Outcome: Progressing  Note: Patient's hemoglobin this AM:   Recent Labs     10/28/23  0421   HGB 9.7*     Patient's platelet count this AM:   Recent Labs     10/28/23  0421       Thrombocytopenia not present at this time. Patient showing no signs or symptoms of active bleeding. Transfusion not indicated at this time. Patient verbalizes understanding of all instructions. Will continue to assess and implement POC. Call light within reach and hourly rounding in place.        Problem:
Problem: Safety - Adult  Goal: Free from fall injury  10/29/2023 1737 by Pamella Gomez RN  Outcome: Progressing    Pt with activity orders for up ad benito. Encouraged pt to be up OOB as much as possible throughout the day and for all meals. Encouraged frequent short naps as necessary to preserve energy but instructed that while awake, pt should be OOB. Encouraged pt to ambulate in halls. Pt seen up ad benito in halls several times this shift. Pt is visualized to be OOB % of the time this shift. Will continue to encourage frequent activity. Problem: Infection - Adult  Goal: Absence of infection at discharge  Outcome: Progressing   CVC site remains free of signs/symptoms of infection. No drainage, edema, erythema, pain, or warmth noted at site. Dressing changes continue per protocol and on an as needed basis - see flowsheet. Performed CHG bath today per [facility] protocol utilizing CHG solution in the shower. CVC site cleansed with CHG wipe over dressing, skin surrounding dressing, and first 6\" of IV tubing. Pt tolerated well. Continued to encourage daily CHG bathing per [facility] protocol. Problem: Hematologic - Adult  Goal: Maintains hematologic stability  10/29/2023 1737 by Pamella Gomez RN  Outcome: Progressing   Patient's hemoglobin this AM:   Recent Labs     10/29/23  0510   HGB 10.0*     Patient's platelet count this AM:   Recent Labs     10/29/23  0510       Thrombocytopenia Precautions in place. Patient showing no signs or symptoms of active bleeding. Transfusion not indicated at this time. Patient verbalizes understanding of all instructions. Will continue to assess and implement POC. Call light within reach and hourly rounding in place. Problem: Skin/Tissue Integrity - Adult  Goal: Oral mucous membranes remain intact  Outcome: Progressing   Skin clean dry intact. Patient has paper tape dressing for central line.      Problem: Gastrointestinal - Adult  Goal: Minimal or
Problem: Safety - Adult  Goal: Free from fall injury  10/30/2023 0503 by Uzma Hernadez RN  Outcome: Progressing  Pt is a Medium fall risk. See Akers Ramal Fall Score and ABCDS Injury Risk assessments. UAL, ortho negative. Pt bed is in low position, side rails up, call light and belongings are in reach. Pt encouraged to call for assistance. Will continue with hourly rounds for PO intake, pain needs as needed. Problem: Skin/Tissue Integrity - Adult  Goal: Oral mucous membranes remain intact  10/30/2023 0503 by Db Martinez RN  Outcome: Progressing  Patient skin remains the same. No skin breakdown developed within this shift. Problem: Gastrointestinal - Adult  Goal: Minimal or absence of nausea and vomiting  10/30/2023 0503 by Db Martinez RN  Outcome: Progressing   Patient denies any n/v within this shift. Problem: Infection - Adult  Goal: Absence of infection during hospitalization  10/30/2023 0503 by Uzma Hernadez RN  Outcome: Progressing   Afebrile. CVC site remains free from infection. Problem: Hematologic - Adult  Goal: Maintains hematologic stability  10/30/2023 0503 by Uzma Hernadez RN  Outcome: Progressing   Patient's hemoglobin this AM:   Recent Labs     10/30/23  0356   HGB 9.7*     Patient's platelet count this AM:   Recent Labs     10/30/23  0356       Thrombocytopenia not present at this time. Patient showing no signs or symptoms of active bleeding. Transfusion not indicated at this time. Patient verbalizes understanding of all instructions. Will continue to assess and implement POC. Call light within reach and hourly rounding in place. Problem: Pain  Goal: Verbalizes/displays adequate comfort level or baseline comfort level  10/30/2023 0503 by Db Martinez RN  Outcome: Progressing   Patient denies pain within this shift. Rested well overnight.
Problem: Safety - Adult  Goal: Free from fall injury  10/31/2023 1455 by Tyshawn Suresh RN  Outcome: Progressing  Orthostatic vital signs obtained at start of shift - see flowsheet for details. Pt does not meet criteria for orthostasis. Pt is a Med fall risk. See Kurt Malcolm Fall Score and ABCDS Injury Risk assessments. Pt bed is in low position, side rails up, call light and belongings are in reach. Fall risk light is on outside pts room. Pt encouraged to call for assistance as needed. Will continue with hourly rounds for PO intake, pain needs, toileting and repositioning as needed. Problem: ABCDS Injury Assessment  Goal: Absence of physical injury  10/31/2023 1455 by Tyshawn Suresh RN  Outcome: Progressing  No new physical injuries noted. Problem: Gastrointestinal - Adult  Goal: Minimal or absence of nausea and vomiting  10/31/2023 1455 by Tyshawn Suresh RN  Outcome: Progressing  Pt educated on importance of calling for antiemetics when nauseous or vomiting. Pt reported nausea this shift. Compazine given PRN. Will continue to monitor. Problem: Hematologic - Adult  Goal: Maintains hematologic stability  10/31/2023 1455 by Tyshawn Suresh RN  Outcome: Progressing  Patient's hemoglobin this AM:   Recent Labs     10/31/23  0310   HGB 9.9*     Patient's platelet count this AM:   Recent Labs     10/31/23  0310       Thrombocytopenia not present at this time. Patient showing no signs or symptoms of active bleeding. Transfusion not indicated at this time. Patient verbalizes understanding of all instructions. Will continue to assess and implement POC. Call light within reach and hourly rounding in place. Problem: Pain  Goal: Verbalizes/displays adequate comfort level or baseline comfort level  10/31/2023 1455 by Tyshawn Suresh RN  Outcome: Progressing  Pt did not report pain this shift. Pt educated on importance of calling for pain meds when in pain. Pt verbalized understanding.
Problem: Safety - Adult  Goal: Free from fall injury  11/1/2023 0553 by Dk Dumont RN  Outcome: Progressing  Orthostatic vitals negative. Pt is a Medium fall risk. See Collier Maggie Fall Score and ABCDS Injury Risk assessments. UAL, ortho negative. Pt bed is in low position, side rails up, call light and belongings are in reach. Pt encouraged to call for assistance. Will continue with hourly rounds for PO intake, pain needs as needed. Problem: Skin/Tissue Integrity - Adult  Goal: Oral mucous membranes remain intact  11/1/2023 0553  by Devon Huang RN  Outcome: Progressing  Patient skin remains the same. No new skin breakdown developed within this shift. Problem: Gastrointestinal - Adult  Goal: Minimal or absence of nausea and vomiting  11/1/2023 0553  by Devon Huang RN  Outcome: Progressing   Patient denies any n/v within this shift. Had an episode of hiccups which was resolved spontaneously without medication. Will continue to monitor. Problem: Infection - Adult  Goal: Absence of infection during hospitalization  11/1/2023 0553  by Dk Dumont RN  Outcome: Progressing   Afebrile. CVC site remains free from s/s of infection. Dressing is c/d/I. Problem: Hematologic - Adult  Goal: Maintains hematologic stability  11/1/2023 0553  by Devon Huang RN  Outcome: Progressing   Patient's hemoglobin this AM:   Recent Labs     11/01/23  0347   HGB 9.4*       Patient's platelet count this AM:   Recent Labs     11/01/23  0347         Thrombocytopenia not present at this time. Patient showing no signs or symptoms of active bleeding. Transfusion not indicated at this time. Patient verbalizes understanding of all instructions. Will continue to assess and implement POC. Call light within reach and hourly rounding in place.       Problem: Pain  Goal: Verbalizes/displays adequate comfort level or baseline comfort level  11/1/2023 0553  by Thalia Boston
Problem: Safety - Adult  Goal: Free from fall injury  11/10/2023 1908 by Aryan Kidd RN  Outcome: Progressing  Note: Pt in bed with bed alarm on, instructed to call for assistance. Verbalized understanding. All fall precautions in place. Problem: Skin/Tissue Integrity - Adult  Goal: Oral mucous membranes remain intact  Outcome: Progressing  Note: Pt to remain free of skin breakdown during stay, pt encouraged to turn and reposition frequently. No evidence of skin breakdown noted. Problem: Gastrointestinal - Adult  Goal: Minimal or absence of nausea and vomiting  Flowsheets (Taken 11/6/2023 0518 by Mohit Russell RN)  Minimal or absence of nausea and vomiting: Administer ordered antiemetic medications as needed  Note: Patient has little intake during shift . Patient given nausea medication per md order.
Problem: Safety - Adult  Goal: Free from fall injury  11/11/2023 2036 by Justina Acosta RN  Outcome: Progressing  11/11/2023 0939 by Kayla Siddiqui RN  Outcome: Progressing  Flowsheets (Taken 11/11/2023 0934)  Free From Fall Injury:   Julio C Gaona family/caregiver on patient safety   Based on caregiver fall risk screen, instruct family/caregiver to ask for assistance with transferring infant if caregiver noted to have fall risk factors  Note: Patient on bed alarm and all fall precaution are in placed. Verbalizes understanding of calling for assistance and call light is within reach. Problem: ABCDS Injury Assessment  Goal: Absence of physical injury  11/11/2023 2036 by Justina Acosta RN  Outcome: Progressing  11/11/2023 0939 by Kayla Siddiqui RN  Outcome: Progressing  Flowsheets (Taken 11/11/2023 0934)  Absence of Physical Injury: Implement safety measures based on patient assessment     Problem: Skin/Tissue Integrity - Adult  Goal: Oral mucous membranes remain intact  Outcome: Progressing     Problem: Gastrointestinal - Adult  Goal: Minimal or absence of nausea and vomiting  11/11/2023 2036 by Justina Acosta RN  Outcome: Progressing  11/11/2023 1805 by Louis Warren RN  Outcome: Progressing  11/11/2023 0939 by Kayla Siddiqui RN  Outcome: Progressing  Flowsheets (Taken 11/11/2023 9036)  Minimal or absence of nausea and vomiting:   Administer IV fluids as ordered to ensure adequate hydration   Nasogastric tube to low intermittent suction as ordered   Provide nonpharmacologic comfort measures as appropriate   Nutrition consult to assist patient with adequate nutrition and appropriate food choices   Maintain NPO status until nausea and vomiting are resolved   Administer ordered antiemetic medications as needed  Note: Patient denies nausea and vomiting during this shift.       Problem: Infection - Adult  Goal: Absence of infection at discharge  11/11/2023 2036 by Justina Acosta RN  Outcome:
Problem: Safety - Adult  Goal: Free from fall injury  11/13/2023 1735 by Vangie Strong RN  Outcome: Progressing  Flowsheets (Taken 11/13/2023 0933)  Free From Fall Injury: Instruct family/caregiver on patient safety  Pt meets criteria for orthostasis. Pt is a High fall risk. See Yusuf Ogles Fall Score and ABCDS Injury Risk assessments.   + Screening for Orthostasis and/or + High Fall Risk per STEPHENS/ABCDS: Explained fall risk precautions to pt and family and rationale behind their use to keep the patient safe. Pt bed is in low position, side rails up, call light and belongings are in reach. Fall wristband applied and present on pts wrist.  Bed alarm on. Pt encouraged to call for assistance. Will continue with hourly rounds for PO intake, pain needs, toileting and repositioning as needed. Problem: Gastrointestinal - Adult  Goal: Minimal or absence of nausea and vomiting  11/13/2023 1735 by Vangie Strong RN  Outcome: Progressing  Flowsheets (Taken 11/13/2023 0933)  Minimal or absence of nausea and vomiting:   Administer IV fluids as ordered to ensure adequate hydration   Administer ordered antiemetic medications as needed   Provide nonpharmacologic comfort measures as appropriate  Note: No complaint of nausea or vomiting during the shift. Problem: Metabolic/Fluid and Electrolytes - Adult  Goal: Electrolytes maintained within normal limits  11/13/2023 1735 by Vangie Strong RN  Outcome: Progressing  Flowsheets (Taken 11/13/2023 0915)  Electrolytes maintained within normal limits:   Monitor labs and assess patient for signs and symptoms of electrolyte imbalances   Administer electrolyte replacement as ordered   Monitor response to electrolyte replacements, including repeat lab results as appropriate  Note: Electrolytes were maintained within normal limits. No replacement required.      Problem: Hematologic - Adult  Goal: Maintains hematologic stability  11/13/2023 1735 by Vangie Strong RN  Outcome:
Problem: Safety - Adult  Goal: Free from fall injury  11/14/2023 0217 by Wandy Almaguer RN  Outcome: Progressing  Pt is a High fall risk. Explained fall risk precautions to pt and rationale behind their use to keep the patient safe. Belongings are in reach. Pt encouraged to notify staff for any and all assistance. Assistance provided when ambulating to restroom utilizing Stay With Me. Problem: Infection - Adult  Goal: Absence of infection during hospitalization  11/14/2023 0217 by Wandy Almaguer RN  Outcome: Progressing   Pt afebrile throughout shift. Problem: Hematologic - Adult  Goal: Maintains hematologic stability  11/14/2023 0217 by Wandy Almaguer RN  Outcome: Progressing  Patient's hemoglobin this AM:   Recent Labs     11/14/23 0419   HGB 8.7*     Patient's platelet count this AM:   Recent Labs     11/14/23 0419   PLT 46*    Thrombocytopenia Precautions in place. Patient showing no signs or symptoms of active bleeding. Transfusion not indicated at this time. Patient verbalizes understanding of all instructions. Will continue to assess and implement POC. Call light within reach and hourly rounding in place.
Problem: Safety - Adult  Goal: Free from fall injury  11/15/2023 0212 by Albino Ying RN  Outcome: Progressing  Note: Pt is a High fall risk. See [place] Fall Score and ABCDS Injury Risk assessments.   + High Fall Risk per STEPHENS/ABCDS: Explained fall risk precautions to pt and family and rationale behind their use to keep the patient safe. Pt bed is in low position, side rails up, call light and belongings are in reach. Bed alarm on. Pt encouraged to call for assistance. Will continue with hourly rounds for PO intake, pain needs, toileting and repositioning as needed. Problem: Gastrointestinal - Adult  Goal: Minimal or absence of nausea and vomiting  11/15/2023 0212 by Albino Ying RN  Outcome: Progressing  Flowsheets (Taken 11/15/2023 0212)  Minimal or absence of nausea and vomiting:   Administer ordered antiemetic medications as needed   Provide nonpharmacologic comfort measures as appropriate  Note: Patient denied nausea this shift. Pt continues to have frequent, liquid stool. Problem: Metabolic/Fluid and Electrolytes - Adult  Goal: Electrolytes maintained within normal limits  Outcome: Progressing  Flowsheets (Taken 11/15/2023 0212)  Electrolytes maintained within normal limits:   Monitor labs and assess patient for signs and symptoms of electrolyte imbalances   Administer electrolyte replacement as ordered   Patient did not require electrolyte replacements this shift. Problem: Hematologic - Adult  Goal: Maintains hematologic stability  Outcome: Progressing  Flowsheets (Taken 11/15/2023 0212)  Maintains hematologic stability:   Assess for signs and symptoms of bleeding or hemorrhage   Monitor labs for bleeding or clotting disorders   Administer blood products/factors as ordered   Patient's hemoglobin this AM:   Recent Labs     11/15/23  0447   HGB 8.8*     Patient's platelet count this AM:   Recent Labs     11/15/23  0447   PLT 38*    Thrombocytopenia Precautions in place.   Patient showing
Problem: Safety - Adult  Goal: Free from fall injury  11/15/2023 1756 by Yessica Madrid RN  Outcome: Progressing  Flowsheets (Taken 11/14/2023 1653 by Narciso Evans, RN)  Free From Fall Injury:   Instruct family/caregiver on patient safety   Based on caregiver fall risk screen, instruct family/caregiver to ask for assistance with transferring infant if caregiver noted to have fall risk factors     Problem: Infection - Adult  Goal: Absence of infection at discharge  11/15/2023 1756 by Yessica Madrid RN  Outcome: Jed Vegajose francisco (Taken 11/14/2023 1653 by Narciso Evans, NICOLÁS)  Absence of infection at discharge:   Assess and monitor for signs and symptoms of infection   Monitor all insertion sites i.e., indwelling lines, tubes and drains     Problem: Nutrition Deficit:  Goal: Optimize nutritional status  11/15/2023 1756 by Yessica Madrid RN  Outcome: Progressing  Flowsheets (Taken 11/15/2023 1756)  Nutrient intake appropriate for improving, restoring, or maintaining nutritional needs:   Assess nutritional status and recommend course of action   Monitor oral intake, labs, and treatment plans   Order, calculate, and assess calorie counts as needed   Recommend appropriate diets, oral nutritional supplements, and vitamin/mineral supplements   Provide specific nutrition education to patient or family as appropriate     Problem: Metabolic/Fluid and Electrolytes - Adult  Goal: Electrolytes maintained within normal limits  11/15/2023 1756 by Yessica Madrid RN  Outcome: Progressing  [address] (Taken 11/15/2023 0212 by Clay Cazares RN)  Electrolytes maintained within normal limits:   Monitor labs and assess patient for signs and symptoms of electrolyte imbalances   Administer electrolyte replacement as ordered
Problem: Safety - Adult  Goal: Free from fall injury  11/2/2023 0926 by Tobias Mendenhall RN  Outcome: Progressing  Orthostatic vitals negative. Pt is a Medium fall risk. See Uriel Johnson Fall Score and ABCDS Injury Risk assessments. UAL, ortho negative. Pt bed is in low position, side rails up, call light and belongings are in reach. Pt encouraged to call for assistance. Will continue with hourly rounds for PO intake, pain needs as needed. Problem: Skin/Tissue Integrity - Adult  Goal: Oral mucous membranes remain intact  11/2/2023 9803  by Tobias Mendenhall RN  Outcome: Progressing  Minimal sacral excoriation noted, Zinc paste applied. Skin remains intact, no skin breakdown. Problem: Gastrointestinal - Adult  Goal: Minimal or absence of nausea and vomiting  11/2/2023 0633  by Mortimer Brisker, RN  Outcome: Progressing   Patient denies any n/v within this shift. Had an episode of hiccups medicated once with oral Compazine. Patient had 3x loose bowel movement overnight. Problem: Infection - Adult  Goal: Absence of infection during hospitalization  11/2/2023 5292  by Tobias Mendenhall RN  Outcome: Progressing   Afebrile. CVC site remains free from s/s of infection. Dressing is c/d/I. Problem: Hematologic - Adult  Goal: Maintains hematologic stability  11/2/2023 0633  by Mortimer Brisker, RN  Outcome: Progressing   Patient's hemoglobin this AM:   Recent Labs     11/02/23  0349   HGB 10.3*       Patient's platelet count this AM:   Recent Labs     11/02/23  0349         Thrombocytopenia not present at this time. Patient showing no signs or symptoms of active bleeding. Transfusion not indicated at this time. Patient verbalizes understanding of all instructions. Will continue to assess and implement POC. Call light within reach and hourly rounding in place.       Problem: Pain  Goal: Verbalizes/displays adequate comfort level or baseline comfort level  11/2/2023
Problem: Safety - Adult  Goal: Free from fall injury  11/20/2023 1829 by Yoshi Ballard RN  Outcome: Progressing  Flowsheets (Taken 11/20/2023 1829)  Free From Fall Injury: Instruct family/caregiver on patient safety  Note: Bed in lowest position, call light within reach, non skid socks on. Patient educated on using call light when needing assistance. Rounded on frequently to make sure all needs are met. Problem: Gastrointestinal - Adult  Goal: Minimal or absence of nausea and vomiting  Outcome: Progressing  Flowsheets (Taken 11/20/2023 1829)  Minimal or absence of nausea and vomiting:   Administer IV fluids as ordered to ensure adequate hydration   Provide nonpharmacologic comfort measures as appropriate   Administer ordered antiemetic medications as needed  Note: Patient reports minimal n/v throughout shift. Patient did not ask for any Prn antiemetics.       Problem: Gastrointestinal - Adult  Goal: Maintains or returns to baseline bowel function  Outcome: Progressing  Flowsheets (Taken 11/20/2023 1829)  Maintains or returns to baseline bowel function:   Assess bowel function   Encourage mobilization and activity  Note: Patient had a formed soft stool and had a decrease in frequency
Problem: Safety - Adult  Goal: Free from fall injury  11/21/2023 6991 by Roxana Narvaez, RN  Outcome: Progressing  Pt is a High fall risk. See Katmakeda Kussmaul Fall Score and ABCDS Injury Risk assessments.   + Screening for Orthostasis and/or + High Fall Risk per STEPHENS/ABCDS: Explained fall risk precautions to pt and family and rationale behind their use to keep the patient safe. Pt bed is in low position, side rails up, call light and belongings are in reach. Fall wristband applied and present on pts wrist.  Bed alarm on. Pt encouraged to call for assistance. Will continue with hourly rounds for PO intake, pain needs, toileting and repositioning as needed. Problem: Skin/Tissue Integrity - Adult  Goal: Oral mucous membranes remain intact  11/21/2023 4581  by Roxana Narvaez, RN  Outcome: Progressing  Skin remains intact, no skin breakdown. Problem: Gastrointestinal - Adult  Goal: Minimal or absence of nausea and vomiting  11/21/2023 0633  by Peri Hale RN  Outcome: Progressing   Patient denies any n/v within this shift. Scheduled IV Zofran given. Problem: Infection - Adult  Goal: Absence of infection during hospitalization  11/21/2023 2688  by Roxana Narvaez RN  Outcome: Progressing   Afebrile. CVC site remains free from s/s of infection. Dressing is c/d/I. Problem: Hematologic - Adult  Goal: Maintains hematologic stability  11/2/2023 0633  by Peri Hale RN  Outcome: Progressing  Patient's hemoglobin this AM:   Recent Labs     11/21/23  0418   HGB 8.3*     Patient's platelet count this AM:   Recent Labs     11/21/23  0418   PLT 51*    Thrombocytopenia Precautions in place. Patient showing no signs or symptoms of active bleeding. Transfusion not indicated at this time. Patient verbalizes understanding of all instructions. Will continue to assess and implement POC. Call light within reach and hourly rounding in place.      Problem: Pain  Goal:
Problem: Safety - Adult  Goal: Free from fall injury  11/22/2023 1136 by Leyla Delgado RN  Outcome: Progressing  Pt is a High fall risk. See [place] Ramming Fall Score and ABCDS Injury Risk assessments.   + Screening for Orthostasis and/or + High Fall Risk per STEPHENS/ABCDS: Explained fall risk precautions to pt and family and rationale behind their use to keep the patient safe. Pt bed is in low position, side rails up, call light and belongings are in reach. Fall wristband applied and present on pts wrist.  Bed alarm on. Pt encouraged to call for assistance. Will continue with hourly rounds for PO intake, pain needs, toileting and repositioning as needed. Problem: Skin/Tissue Integrity - Adult  Goal: Oral mucous membranes remain intact  11/22/2023 1355  by Leyla Delgado RN  Outcome: Progressing  Skin remains intact, no skin breakdown. Problem: Gastrointestinal - Adult  Goal: Minimal or absence of nausea and vomiting  11/22/2023 0633  by Wei Mathews RN  Outcome: Progressing   Patient denies any n/v within this shift. Scheduled IV Zofran given. Problem: Infection - Adult  Goal: Absence of infection during hospitalization  11/22/2023 1573  by Leyla Delgado RN  Outcome: Progressing   Afebrile. CVC site remains free from s/s of infection. Dressing is c/d/I. Problem: Hematologic - Adult  Goal: Maintains hematologic stability  11/22/2023 0633  by Wei Mathews RN  Outcome: Progressing  Patient's hemoglobin this AM:   Recent Labs     11/22/23 0423   HGB 8.5*     Patient's platelet count this AM:   Recent Labs     11/22/23 0423   PLT 49*    Thrombocytopenia Precautions in place. Patient showing no signs or symptoms of active bleeding. Patient transfused blood products per orders - see flowsheet. Patient verbalizes understanding of all instructions. Will continue to assess and implement POC. Call light within reach and hourly rounding in place.
Problem: Safety - Adult  Goal: Free from fall injury  11/5/2023 1738 by Demetris Munoz RN  Outcome: Progressing  Note: Pt scores as medium fall risk. Pt encouraged to call for assistance when needed, verbalized understanding. Problem: ABCDS Injury Assessment  Goal: Absence of physical injury  11/5/2023 1738 by Demetris Munoz RN  Outcome: Progressing  Note: Patient's hemoglobin this AM:   Recent Labs     11/05/23  0348   HGB 7.7*     Patient's platelet count this AM:   Recent Labs     11/05/23  0348   PLT 69*    Thrombocytopenia Precautions in place. Patient showing no signs or symptoms of active bleeding. Transfusion not indicated at this time. Patient verbalizes understanding of all instructions. Will continue to assess and implement POC. Call light within reach and hourly rounding in place. Problem: Skin/Tissue Integrity - Adult  Goal: Oral mucous membranes remain intact  11/5/2023 1738 by Demetris Munoz RN  Outcome: Progressing  Note: Turn/reposition patient a minimum of every 2 hours . Assess skin q shift. Educate on benefits of turning/repositioning to maintain skin integrity and prevent pressure ulcers. Problem: Gastrointestinal - Adult  Goal: Minimal or absence of nausea and vomiting  11/5/2023 1738 by Demetris Munoz RN  Outcome: Progressing  Note: Pt educated on importance of calling for antiemetics when nauseous or vomiting. Pt verbalized understanding. Will continue to monitor. Problem: Infection - Adult  Goal: Absence of infection during hospitalization  11/5/2023 1738 by Demetris Munoz RN  Outcome: Progressing  Note: CVC site remains free of signs/symptoms of infection. No drainage, edema, erythema, pain, or warmth noted at site. Dressing changes continue per protocol and on an as needed basis - see flowsheet. Compliant with [facility] Bath Protocol:  Performed CHG bath today per [facility] protocol utilizing CHG solution in the shower.   CVC site cleansed with CHG
Problem: Safety - Adult  Goal: Free from fall injury  11/6/2023 0518 by Lesly Monroe RN  Outcome: Progressing  Flowsheets (Taken 11/6/2023 0518)  Free From Fall Injury:   Instruct family/caregiver on patient safety   Based on caregiver fall risk screen, instruct family/caregiver to ask for assistance with transferring infant if caregiver noted to have fall risk factors  Note: Orthostatic vital signs obtained at start of shift - see flowsheet for details. Pt does not meet criteria for orthostasis. Pt is a Med fall risk. See Antoinette Gathers Fall Score and ABCDS Injury Risk assessments. - Screening for Orthostasis AND not a [place] Risk per STEPHENS/ABCDS: Pt bed is in low position, side rails up, call light and belongings are in reach. Fall risk light is on outside pts room. Pt encouraged to call for assistance as needed. Will continue with hourly rounds for PO intake, pain needs, toileting and repositioning as needed. Problem: Skin/Tissue Integrity - Adult  Goal: Oral mucous membranes remain intact  11/6/2023 0518 by Lesly Monroe RN  Outcome: Progressing  Flowsheets (Taken 11/6/2023 0518)  Oral Mucous Membranes Remain Intact:   Assess oral mucosa and hygiene practices   Implement preventative oral hygiene regimen  Note: Patient complained of mouth sore under tongue. Magic mouthwash was given to help with pain. Problem: Gastrointestinal - Adult  Goal: Minimal or absence of nausea and vomiting  11/6/2023 0518 by Lesly Monroe RN  Outcome: Progressing  Flowsheets (Taken 11/6/2023 0518)  Minimal or absence of nausea and vomiting: Administer ordered antiemetic medications as needed  Note: Patient had no complaints of nausea throughout night.      Problem: Metabolic/Fluid and Electrolytes - Adult  Goal: Electrolytes maintained within normal limits  11/6/2023 0518 by Lesly Monroe RN  Outcome: Progressing  Flowsheets (Taken 11/6/2023 0518)  Electrolytes maintained within normal limits: Monitor labs and assess
Problem: Safety - Adult  Goal: Free from fall injury  Outcome: Adequate for Discharge     Problem: ABCDS Injury Assessment  Goal: Absence of physical injury  Outcome: Adequate for Discharge     Problem: Skin/Tissue Integrity - Adult  Goal: Oral mucous membranes remain intact  Outcome: Adequate for Discharge     Problem: Gastrointestinal - Adult  Goal: Minimal or absence of nausea and vomiting  Outcome: Adequate for Discharge     Problem: Gastrointestinal - Adult  Goal: Maintains or returns to baseline bowel function  Outcome: Adequate for Discharge     Problem: Infection - Adult  Goal: Absence of infection at discharge  Outcome: Adequate for Discharge     Problem: Infection - Adult  Goal: Absence of infection during hospitalization  Outcome: Adequate for Discharge     Problem: Infection - Adult  Goal: Absence of fever/infection during anticipated neutropenic period  Outcome: Adequate for Discharge     Problem: Metabolic/Fluid and Electrolytes - Adult  Goal: Electrolytes maintained within normal limits  Outcome: Adequate for Discharge     Problem: Hematologic - Adult  Goal: Maintains hematologic stability  Outcome: Adequate for Discharge     Problem: Pain  Goal: Verbalizes/displays adequate comfort level or baseline comfort level  Outcome: Adequate for Discharge     Problem: Nutrition Deficit:  Goal: Optimize nutritional status  Outcome: Adequate for Discharge     Problem: Neurosensory - Adult  Goal: Achieves maximal functionality and self care  Outcome: Adequate for Discharge     Problem: Respiratory - Adult  Goal: Achieves optimal ventilation and oxygenation  Outcome: Adequate for Discharge     Problem: Cardiovascular - Adult  Goal: Maintains optimal cardiac output and hemodynamic stability  Outcome: Adequate for Discharge     Problem: Musculoskeletal - Adult  Goal: Return mobility to safest level of function  Outcome: Adequate for Discharge     Problem: Musculoskeletal - Adult  Goal: Return ADL status to a safe
Problem: Safety - Adult  Goal: Free from fall injury  Outcome: Progressing      Pt with activity orders for up with assist.  Bed and chair alarm in use today. Call light within reach. Encouraged pt to be up OOB as much as possible throughout the day and for all meals. Encouraged frequent short naps as necessary to preserve energy but instructed that while awake, pt should be OOB. Pt in isolation and is unable to ambulate in halls d/t restrictions. Continued to encourage activity in room as much as possible. Pt is visualized to be OOB 26-50% of the time this shift. Will continue to encourage frequent activity. Problem: Skin/Tissue Integrity - Adult  Goal: Oral mucous membranes remain intact  Outcome: Progressing   Pt has some excoriation on buttocks. Zinc cream applied to buttocks today. Problem: Gastrointestinal - Adult  Goal: Minimal or absence of nausea and vomiting  Outcome: Progressing   Pt continues in isolation for cdiff positive. Pt continues with diarrhea. Patient on dificid. Problem: Infection - Adult  Goal: Absence of infection at discharge  Outcome: Progressing   CVC site remains free of signs/symptoms of infection. No drainage, edema, erythema, pain, or warmth noted at site. Dressing changes continue per protocol and on an as needed basis - see flowsheet. Performed CHG bath today per [facility] protocol utilizing CHG solution in the shower. CVC site cleansed with CHG wipe over dressing, skin surrounding dressing, and first 6\" of IV tubing. Pt tolerated well. Continued to encourage daily CHG bathing per [facility] protocol.
Problem: Safety - Adult  Goal: Free from fall injury  Outcome: Progressing     Problem: ABCDS Injury Assessment  Goal: Absence of physical injury  Outcome: Progressing     Problem: Skin/Tissue Integrity - Adult  Goal: Oral mucous membranes remain intact  Outcome: Progressing     Problem: Gastrointestinal - Adult  Goal: Minimal or absence of nausea and vomiting  Outcome: Progressing
Problem: Safety - Adult  Goal: Free from fall injury  Outcome: Progressing     Problem: Skin/Tissue Integrity - Adult  Goal: Oral mucous membranes remain intact  Outcome: Progressing     Problem: Infection - Adult  Goal: Absence of infection at discharge  Outcome: Progressing     Problem: Hematologic - Adult  Goal: Maintains hematologic stability  Outcome: Progressing
Problem: Safety - Adult  Goal: Free from fall injury  Outcome: Progressing    Pt with activity orders for up ad benito. When patient had a fever I put him on the bed alarm. Pt called out appropriately. Encouraged pt to be up OOB as much as possible throughout the day and for all meals. Encouraged frequent short naps as necessary to preserve energy but instructed that while awake, pt should be OOB. Pt is visualized to be OOB 26-50% of the time this shift. Will continue to encourage frequent activity. Problem: Skin/Tissue Integrity - Adult  Goal: Oral mucous membranes remain intact  Outcome: Progressing   Pt has some excoriation to buttocks. Zinc cream applied to area. Pt verbalize relief. Problem: Gastrointestinal - Adult  Goal: Minimal or absence of nausea and vomiting  Outcome: Progressing   Pt in isolation for cdiff positive. Pt on dificid . Pt still having diarrhea. Pt and family verbalize understanding of precautions. Problem: Infection - Adult  Goal: Absence of infection at discharge  Outcome: Progressing   Pt panculture done today. Pt started on zosyn. Problem: Metabolic/Fluid and Electrolytes - Adult  Goal: Electrolytes maintained within normal limits  Outcome: Progressing   Pt on q 12 BMP . Problem: Hematologic - Adult  Goal: Maintains hematologic stability  Outcome: Progressing   Patient's hemoglobin this AM:   Recent Labs     11/06/23  0355   HGB 7.6*     Patient's platelet count this AM:   Recent Labs     11/06/23  0355   PLT 39*    Thrombocytopenia Precautions in place. Patient showing no signs or symptoms of active bleeding. Transfusion not indicated at this time. Patient verbalizes understanding of all instructions. Will continue to assess and implement POC. Call light within reach and hourly rounding in place. Problem: Pain  Goal: Verbalizes/displays adequate comfort level or baseline comfort level  Outcome: Progressing   Pt denies pain at this time.  Pt having
Problem: Safety - Adult  Goal: Free from fall injury  Outcome: Progressing    Pt with activity orders for up ad benito. When patient had a fever I put him on the bed alarm. Pt called out appropriately. I took him off when not having fever. Encouraged pt to be up OOB as much as possible throughout the day and for all meals. Encouraged frequent short naps as necessary to preserve energy but instructed that while awake, pt should be OOB. Pt is visualized to be OOB 26-50% of the time this shift. Will continue to encourage frequent activity. Problem: Skin/Tissue Integrity - Adult  Goal: Oral mucous membranes remain intact  Outcome: Progressing   Pt has some excoriation to buttocks. Zinc cream applied to area. Pt verbalize relief. Problem: Gastrointestinal - Adult  Goal: Minimal or absence of nausea and vomiting  Outcome: Progressing   Pt in isolation for cdiff positive. Pt on dificid . Pt still having diarrhea. Pt and family verbalize understanding of precautions. Problem: Infection - Adult  Goal: Absence of infection at discharge  Outcome: Progressing   Pt febrile today. Pt continues on zosyn. Pt medicated with acetaminophen. Problem: Metabolic/Fluid and Electrolytes - Adult  Goal: Electrolytes maintained within normal limits  Outcome: Progressing   Pt on q 12 BMP . Problem: Hematologic - Adult  Goal: Maintains hematologic stability  Outcome: Progressing   Patient's hemoglobin this AM:   Recent Labs     11/07/23  0324   HGB 7.4*       Patient's platelet count this AM:   Recent Labs     11/07/23  0324   PLT 13*      Thrombocytopenia Precautions in place. Patient showing no signs or symptoms of active bleeding. Transfusion not indicated at this time. Patient verbalizes understanding of all instructions. Will continue to assess and implement POC. Call light within reach and hourly rounding in place.       Problem: Pain  Goal: Verbalizes/displays adequate comfort level or baseline comfort
Problem: Safety - Adult  Goal: Free from fall injury  Outcome: Progressing   Orthostatic vital signs obtained at start of shift - see flowsheet for details. Pt does not meet criteria for orthostasis. Pt is a Med fall risk. See Marisela James Fall Score and ABCDS Injury Risk assessments. - Screening for Orthostasis AND not a [place] Risk per STEPHENS/ABCDS: Pt bed is in low position, side rails up, call light and belongings are in reach. Fall risk light is on outside pts room. Pt encouraged to call for assistance as needed. Will continue with hourly rounds for PO intake, pain needs, toileting and repositioning as needed. Problem: Skin/Tissue Integrity - Adult  Goal: Oral mucous membranes remain intact  Outcome: Progressing   Pt oral mucous membranes intact, no signs of sores or breakdown noted. Problem: Infection - Adult  Goal: Absence of infection during hospitalization  Outcome: Progressing   Pt afebrile this shift. Problem: Hematologic - Adult  Goal: Maintains hematologic stability  Outcome: Progressing   Patient's hemoglobin this AM:   Recent Labs     10/25/23  1052   HGB 11.1*     Patient's platelet count this AM:   Recent Labs     10/25/23  1052       Thrombocytopenia not present at this time. Patient showing no signs or symptoms of active bleeding. Transfusion not indicated at this time. Patient verbalizes understanding of all instructions. Will continue to assess and implement POC. Call light within reach and hourly rounding in place.
Problem: Safety - Adult  Goal: Free from fall injury  Outcome: Progressing  Flowsheets (Taken 10/31/2023 0610)  Free From Fall Injury: Instruct family/caregiver on patient safety  Note: Orthostatic vital signs obtained at start of shift - see flowsheet for details. Pt does not meet criteria for orthostasis. Pt is a Med fall risk. See Brenda Crumb Fall Score and ABCDS Injury Risk assessments. Pt bed is in low position, side rails up, call light and belongings are in reach. Fall risk light is on outside pts room. Pt encouraged to call for assistance as needed. Will continue with hourly rounds for PO intake, pain needs, toileting and repositioning as needed.        Problem: ABCDS Injury Assessment  Goal: Absence of physical injury  Outcome: Progressing  Flowsheets (Taken 10/31/2023 0610)  Absence of Physical Injury: Implement safety measures based on patient assessment     Problem: Skin/Tissue Integrity - Adult  Goal: Oral mucous membranes remain intact  Outcome: Progressing  Flowsheets (Taken 10/31/2023 0610)  Oral Mucous Membranes Remain Intact:   Assess oral mucosa and hygiene practices   Implement oral medicated treatments as ordered   Implement preventative oral hygiene regimen     Problem: Gastrointestinal - Adult  Goal: Minimal or absence of nausea and vomiting  Outcome: Progressing  Flowsheets (Taken 10/31/2023 0610)  Minimal or absence of nausea and vomiting:   Administer IV fluids as ordered to ensure adequate hydration   Administer ordered antiemetic medications as needed   Provide nonpharmacologic comfort measures as appropriate     Problem: Infection - Adult  Goal: Absence of infection at discharge  Outcome: Progressing  Flowsheets (Taken 10/31/2023 0610)  Absence of infection at discharge:   Assess and monitor for signs and symptoms of infection   Monitor lab/diagnostic results   Monitor all insertion sites i.e., indwelling lines, tubes and drains   Identify and instruct in appropriate isolation
Problem: Safety - Adult  Goal: Free from fall injury  Outcome: Progressing  Flowsheets (Taken 11/14/2023 1653)  Free From Fall Injury:   Instruct family/caregiver on patient safety   Based on caregiver fall risk screen, instruct family/caregiver to ask for assistance with transferring infant if caregiver noted to have fall risk factors  Note: Bed in lowest position, call light within reach, and non skid socks placed on patient. Patient educated on when to utilize call light. Rounded on frequently to make sure all needs are met. Problem: Infection - Adult  Goal: Absence of infection at discharge  Outcome: Progressing  Flowsheets (Taken 11/14/2023 1653)  Absence of infection at discharge:   Assess and monitor for signs and symptoms of infection   Monitor all insertion sites i.e., indwelling lines, tubes and drains     Problem: Gastrointestinal - Adult  Goal: Minimal or absence of nausea and vomiting  Flowsheets (Taken 11/14/2023 1653)  Minimal or absence of nausea and vomiting:   Administer IV fluids as ordered to ensure adequate hydration   Provide nonpharmacologic comfort measures as appropriate   Administer ordered antiemetic medications as needed  Note: Patient had one episode of emesis.  Patient given PRN compazine to relieve symptoms of n/v.
Problem: Safety - Adult  Goal: Free from fall injury  Outcome: Progressing  Flowsheets (Taken 11/21/2023 0747)  Free From Fall Injury: Instruct family/caregiver on patient safety  Pt meets criteria for orthostasis. Pt is a High fall risk. See Lora Sunita Fall Score and ABCDS Injury Risk assessments.   + Screening for Orthostasis and/or + High Fall Risk per STEPHENS/ABCDS: Explained fall risk precautions to pt and family and rationale behind their use to keep the patient safe. Pt bed is in low position, side rails up, call light and belongings are in reach. Fall wristband applied and present on pts wrist.  Bed alarm on. Pt encouraged to call for assistance. Will continue with hourly rounds for PO intake, pain needs, toileting and repositioning as needed. Problem: Gastrointestinal - Adult  Goal: Minimal or absence of nausea and vomiting  Outcome: Progressing  Flowsheets (Taken 11/21/2023 0747)  Minimal or absence of nausea and vomiting:   Administer ordered antiemetic medications as needed   Provide nonpharmacologic comfort measures as appropriate  Note: Continues on Zofran 8 mg tablet every 8 hours. No complaint of nausea or vomiting. Problem: Metabolic/Fluid and Electrolytes - Adult  Goal: Electrolytes maintained within normal limits  Outcome: Progressing  Flowsheets (Taken 11/21/2023 0747)  Electrolytes maintained within normal limits:   Monitor labs and assess patient for signs and symptoms of electrolyte imbalances   Administer electrolyte replacement as ordered   Monitor response to electrolyte replacements, including repeat lab results as appropriate  Note: Potassium level optimized this morning.      Problem: Hematologic - Adult  Goal: Maintains hematologic stability  Outcome: Progressing  Flowsheets (Taken 11/21/2023 0747)  Maintains hematologic stability:   Assess for signs and symptoms of bleeding or hemorrhage   Monitor labs for bleeding or clotting disorders   Administer blood products/factors as
Problem: Safety - Adult  Goal: Free from fall injury  Outcome: Progressing  Flowsheets (Taken 11/5/2023 0532)  Free From Fall Injury:   Instruct family/caregiver on patient safety   Based on caregiver fall risk screen, instruct family/caregiver to ask for assistance with transferring infant if caregiver noted to have fall risk factors  Note: Orthostatic vital signs obtained at start of shift - see flowsheet for details. Pt does not meet criteria for orthostasis. Pt is a Med fall risk. See Delman Pages Fall Score and ABCDS Injury Risk assessments. - Screening for Orthostasis AND not a [place] Risk per STEPHENS/ABCDS: Pt bed is in low position, side rails up, call light and belongings are in reach. Fall risk light is on outside pts room. Pt encouraged to call for assistance as needed. Will continue with hourly rounds for PO intake, pain needs, toileting and repositioning as needed. Problem: Gastrointestinal - Adult  Goal: Minimal or absence of nausea and vomiting  Outcome: Progressing  Flowsheets (Taken 11/5/2023 0532)  Minimal or absence of nausea and vomiting:   Provide nonpharmacologic comfort measures as appropriate   Administer ordered antiemetic medications as needed  Note: Patient had no complaints of nausea overnight. Problem: Metabolic/Fluid and Electrolytes - Adult  Goal: Electrolytes maintained within normal limits  Outcome: Progressing  Flowsheets (Taken 11/5/2023 0532)  Electrolytes maintained within normal limits:   Monitor labs and assess patient for signs and symptoms of electrolyte imbalances   Administer electrolyte replacement as ordered   Monitor response to electrolyte replacements, including repeat lab results as appropriate  Note: Patient required potassium replacement this morning.       Problem: Hematologic - Adult  Goal: Maintains hematologic stability  Outcome: Progressing  Flowsheets (Taken 11/5/2023 0532)  Maintains hematologic stability:   Assess for signs and symptoms of
Problem: Safety - Adult  Goal: Free from fall injury  Outcome: Progressing  Free From Fall Injury:   Instruct family/caregiver on patient safety   Based on caregiver fall risk screen, instruct family/caregiver to ask for assistance with transferring infant if caregiver noted to have fall risk factors      Problem: ABCDS Injury Assessment  Goal: Absence of physical injury  Outcome: Progressing  Absence of Physical Injury: Implement safety measures based on patient assessment  Orthostatic vital signs obtained at start of shift - see flowsheet for details. Pt meets criteria for orthostasis. Pt is a Med fall risk. See Denton Hark Fall Score and ABCDS Injury Risk assessments.   + Screening for Orthostasis and/or + High Fall Risk per STEPHENS/ABCDS: Explained fall risk precautions to pt and family and rationale behind their use to keep the patient safe. Pt bed is in low position, side rails up, call light and belongings are in reach. Fall wristband applied and present on pts wrist.  Bed alarm on.        Problem: Infection - Adult  Goal: Absence of infection at discharge  Outcome: Progressing  Absence of infection at discharge:   Assess and monitor for signs and symptoms of infection   Monitor lab/diagnostic results   Monitor all insertion sites i.e., indwelling lines, tubes and drains   Monitor endotracheal (as able) and nasal secretions for changes in amount and color   Administer medications as ordered   Instruct and encourage patient and family to use good hand hygiene technique    Problem: Hematologic - Adult  Goal: Maintains hematologic stability  Outcome: Progressing  Maintains hematologic stability:   Assess for signs and symptoms of bleeding or hemorrhage   Administer blood products/factors as ordered   Monitor labs for bleeding or clotting disorders  Patient's hemoglobin this AM:   Recent Labs     11/10/23  0347   HGB 7.6*     Patient's platelet count this AM:   Recent Labs     11/10/23  0347   PLT 16*
Problem: Safety - Adult  Goal: Free from fall injury  Outcome: Progressing  Free From Fall Injury:   Instruct family/caregiver on patient safety   Based on caregiver fall risk screen, instruct family/caregiver to ask for assistance with transferring infant if caregiver noted to have fall risk factors     Problem: Infection - Adult  Goal: Absence of infection at discharge  Outcome: Progressing  Absence of infection at discharge:   Assess and monitor for signs and symptoms of infection   Monitor lab/diagnostic results   Monitor all insertion sites i.e., indwelling lines, tubes and drains   Identify and instruct in appropriate isolation precautions for identified infection/condition   Instruct and encourage patient and family to use good hand hygiene technique   Administer medications as ordered     Problem: Hematologic - Adult  Goal: Maintains hematologic stability  Outcome: Progressing  Maintains hematologic stability:   Assess for signs and symptoms of bleeding or hemorrhage   Monitor labs for bleeding or clotting disorders     Problem: Pain  Goal: Verbalizes/displays adequate comfort level or baseline comfort level  Outcome: Progressing  Verbalizes/displays adequate comfort level or baseline comfort level:   Encourage patient to monitor pain and request assistance   Assess pain using appropriate pain scale
Problem: Safety - Adult  Goal: Free from fall injury  Outcome: Progressing  Free From Fall Injury:   Instruct family/caregiver on patient safety   Based on caregiver fall risk screen, instruct family/caregiver to ask for assistance with transferring infant if caregiver noted to have fall risk factors  Note: Patient on bed alarm and all fall precaution are in placed. Verbalizes understanding of calling for assistance and call light is within reach. Problem: ABCDS Injury Assessment  Goal: Absence of physical injury  Outcome: Progressing  Absence of Physical Injury: Implement safety measures based on patient assessment  Orthostatic vital signs obtained at start of shift - see flowsheet for details. Pt meets criteria for orthostasis. Pt is a Med fall risk. See Lexie Peel Fall Score and ABCDS Injury Risk assessments.   + Screening for Orthostasis and/or + High Fall Risk per STEPHENS/ABCDS: Explained fall risk precautions to pt and family and rationale behind their use to keep the patient safe. Pt bed is in low position, side rails up, call light and belongings are in reach. Fall wristband applied and present on pts wrist.  Bed alarm on. Pt encouraged to call for assistance. Will continue with hourly rounds for PO intake, pain needs, toileting and repositioning as needed. Problem: Gastrointestinal - Adult  Goal: Minimal or absence of nausea and vomiting  Outcome: Progressing  Flowsheets (Taken 11/11/2023 2455)  Minimal or absence of nausea and vomiting:   Administer IV fluids as ordered to ensure adequate hydration   Nasogastric tube to low intermittent suction as ordered   Provide nonpharmacologic comfort measures as appropriate   Nutrition consult to assist patient with adequate nutrition and appropriate food choices   Maintain NPO status until nausea and vomiting are resolved   Administer ordered antiemetic medications as needed  Note: Patient denies nausea and vomiting during this shift.
Problem: Safety - Adult  Goal: Free from fall injury  Outcome: Progressing  Note: Bed/chair alarm on, call light within reach, non skid socks on, and patient educated on utilizing call light when needing assistance. Patient rounded on frequently to make sure all needs are met. Problem: Gastrointestinal - Adult  Goal: Maintains or returns to baseline bowel function  11/17/2023 1834 by Jerene Dance, RN  Outcome: Progressing  Flowsheets (Taken 11/17/2023 1834)  Maintains or returns to baseline bowel function:   Assess bowel function   Encourage oral fluids to ensure adequate hydration  Note: Patient had a decreased episodes of stool compared to last week. Patient's stool is more formed and less frequent. Will continue to monitor. Problem: Infection - Adult  Goal: Absence of infection at discharge  Outcome: Progressing  Flowsheets (Taken 11/17/2023 1834)  Absence of infection at discharge:   Monitor all insertion sites i.e., indwelling lines, tubes and drains   Assess and monitor for signs and symptoms of infection  Note: CVC site remains free of signs/symptoms of infection. No drainage, edema, erythema, pain, or warmth noted at site. Dressing changes continue per protocol and on an as needed basis - see flowsheet. CVC site cleansed with CHG wipe over dressing, skin surrounding dressing, and first 6\" of IV tubing. Pt tolerated well. Continued to encourage daily CHG bathing per [facility] protocol.
Problem: Safety - Adult  Goal: Free from fall injury  Outcome: Progressing  Note: Orthostatic vital signs obtained at start of shift - see flowsheet for details. Pt does not meet criteria for orthostasis. Pt is a Med fall risk. See Brenda Crumb Fall Score and ABCDS Injury Risk assessments. - Screening for Orthostasis AND not a [place] Risk per STEPHENS/ABCDS: Pt bed is in low position, side rails up, call light and belongings are in reach. Fall risk light is on outside pts room. Pt encouraged to call for assistance as needed. Will continue with hourly rounds for PO intake, pain needs, toileting and repositioning as needed. Problem: Gastrointestinal - Adult  Goal: Minimal or absence of nausea and vomiting  Outcome: Progressing  Flowsheets (Taken 10/28/2023 0403)  Minimal or absence of nausea and vomiting:   Administer IV fluids as ordered to ensure adequate hydration   Administer ordered antiemetic medications as needed  Note: Pt denied nausea this shift. Problem: Metabolic/Fluid and Electrolytes - Adult  Goal: Electrolytes maintained within normal limits  Outcome: Progressing  Flowsheets (Taken 10/28/2023 0403)  Electrolytes maintained within normal limits:   Monitor labs and assess patient for signs and symptoms of electrolyte imbalances   Administer electrolyte replacement as ordered  Note: Pt did not require any electrolyte replacement this shift. Problem: Hematologic - Adult  Goal: Maintains hematologic stability  Outcome: Progressing  Flowsheets (Taken 10/28/2023 0403)  Maintains hematologic stability:   Assess for signs and symptoms of bleeding or hemorrhage   Monitor labs for bleeding or clotting disorders   Administer blood products/factors as ordered  Note: Patient's hemoglobin this AM:   Recent Labs     10/28/23  0421   HGB 9.7*     Patient's platelet count this AM:   Recent Labs     10/28/23  0421       Thrombocytopenia not present at this time.   Patient showing no signs or symptoms of
Problem: Safety - Adult  Goal: Free from fall injury  Outcome: Progressing  Note: Orthostatic vital signs obtained at start of shift - see flowsheet for details. Pt does not meet criteria for orthostasis. Pt is a Med fall risk. See Collier Maggie Fall Score and ABCDS Injury Risk assessments.   + Screening for Orthostasis and/or + High Fall Risk per STEPHENS/ABCDS: Explained fall risk precautions to pt and family and rationale behind their use to keep the patient safe. Pt bed is in low position, side rails up, call light and belongings are in reach. Fall wristband applied and present on pts wrist.  Bed alarm on. Pt encouraged to call for assistance. Will continue with hourly rounds for PO intake, pain needs, toileting and repositioning as needed. Problem: Gastrointestinal - Adult  Goal: Minimal or absence of nausea and vomiting  11/3/2023 0237 by Daxa Delong RN  Note: Patient had no complaint of nausea this shift. Problem: Pain  Goal: Verbalizes/displays adequate comfort level or baseline comfort level  11/3/2023 0237 by Daxa Delong RN  Note: Patient has not had any complaint of pain this shift. Problem: Skin/Tissue Integrity - Adult  Goal: Oral mucous membranes remain intact  Note: Skin and oral mucosa clean dry and intact.
Problem: Safety - Adult  Goal: Free from fall injury  Outcome: Progressing  Note: Orthostatic vital signs obtained at start of shift - see flowsheet for details. Pt does not meet criteria for orthostasis. Pt is a Med fall risk. See Willia Barry Fall Score and ABCDS Injury Risk assessments. - Screening for Orthostasis AND not a [place] Risk per STEPHENS/ABCDS: Pt bed is in low position, side rails up, call light and belongings are in reach. Fall risk light is on outside pts room. Pt encouraged to call for assistance as needed. Will continue with hourly rounds for PO intake, pain needs, toileting and repositioning as needed. Problem: Gastrointestinal - Adult  Goal: Minimal or absence of nausea and vomiting  Outcome: Progressing  Flowsheets (Taken 10/29/2023 0601)  Minimal or absence of nausea and vomiting:   Administer IV fluids as ordered to ensure adequate hydration   Administer ordered antiemetic medications as needed   Provide nonpharmacologic comfort measures as appropriate  Note: Pt denied nausea this shift. Problem: Infection - Adult  Goal: Absence of infection during hospitalization  Outcome: Progressing  Flowsheets (Taken 10/29/2023 0601)  Absence of infection during hospitalization:   Assess and monitor for signs and symptoms of infection   Monitor lab/diagnostic results   Monitor all insertion sites i.e., indwelling lines, tubes and drains   Administer medications as ordered   Instruct and encourage patient and family to use good hand hygiene technique  Note: Pt remained afebrile. No signs or symptoms of infection noted.      Problem: Metabolic/Fluid and Electrolytes - Adult  Goal: Electrolytes maintained within normal limits  Outcome: Progressing   Pt did not require any electrolyte replacement this AM.    Problem: Hematologic - Adult  Goal: Maintains hematologic stability  Outcome: Progressing  Flowsheets (Taken 10/29/2023 0601)  Maintains hematologic stability:   Assess for signs and symptoms of
Problem: Safety - Adult  Goal: Free from fall injury  Outcome: Progressing  Note: Pt is a high fall risk (see Frey Fall Risk assessment). Oriented pt to room and call light. Instructed pt to call for help when needed. Call light and personal items within reach. Bed in lowest position, brakes on, and 2/4 side rails up. Non-skid footwear on. Falls risk stop sign on door; hourly visual checks implemented. Falls risk arm band on pt. Bed alarm is on. Pt using call light appropriately. Will continue to monitor. Problem: ABCDS Injury Assessment  Goal: Absence of physical injury  Outcome: Progressing     Problem: Hematologic - Adult  Goal: Maintains hematologic stability  Outcome: Progressing  Note: Patient's hemoglobin this AM:   Recent Labs     11/20/23  0341   HGB 8.5*     Patient's platelet count this AM:   Recent Labs     11/20/23  0341   PLT 61*    Thrombocytopenia Precautions in place. Patient showing no signs or symptoms of active bleeding. Patient transfused blood products per orders - see flowsheet. Patient verbalizes understanding of all instructions. Will continue to assess and implement POC. Call light within reach and hourly rounding in place.        Problem: Pain  Goal: Verbalizes/displays adequate comfort level or baseline comfort level  Outcome: Progressing     Problem: Respiratory - Adult  Goal: Achieves optimal ventilation and oxygenation  Outcome: Progressing
Problem: Safety - Adult  Goal: Free from fall injury  Outcome: Progressing  Note: Pt scores as medium fall risk. Pt encouraged to call for assistance when needed, verbalized understanding. Problem: ABCDS Injury Assessment  Goal: Absence of physical injury  Outcome: Progressing  Note: Orthostatic vital signs obtained at start of shift - see flowsheet for details. Pt does not meet criteria for orthostasis. Pt is a Med fall risk. See Eliz Parkinson Fall Score and ABCDS Injury Risk assessments. - Screening for Orthostasis AND not a [place] Risk per STEPHENS/ABCDS: Pt bed is in low position, side rails up, call light and belongings are in reach. Fall risk light is on outside pts room. Pt encouraged to call for assistance as needed. Will continue with hourly rounds for PO intake, pain needs, toileting and repositioning as needed. Problem: Skin/Tissue Integrity - Adult  Goal: Oral mucous membranes remain intact  Outcome: Progressing  Note: Encourage pt to turn/reposition patient a minimum of every 2 hours . Assess skin q shift. Educate on benefits of turning/repositioning to maintain skin integrity and prevent pressure ulcers. Problem: Gastrointestinal - Adult  Goal: Minimal or absence of nausea and vomiting  Outcome: Progressing  Note: Pt educated on importance of calling for antiemetics when nauseous or vomiting. Pt verbalized understanding. Will continue to monitor. Compazine administered x1 for emesis. Problem: Infection - Adult  Goal: Absence of infection during hospitalization  Outcome: Progressing  Note: CVC site remains free of signs/symptoms of infection. No drainage, edema, erythema, pain, or warmth noted at site. Dressing changes continue per protocol and on an as needed basis - see flowsheet. Refusing BCC Bath Protocol:  Despite multiple attempts by this RN, pt refusing shower or bed bath with CHG today.   Discussed risks associated with not following BCC bath protocol
Problem: Safety - Adult  Goal: Free from fall injury  Outcome: Progressing  Orthostatic vital signs obtained at start of shift - see flowsheet for details. Pt does not meet criteria for orthostasis. Pt is a Med fall risk. See [place] Ramming Fall Score and ABCDS Injury Risk assessments. - Screening for Orthostasis AND not a [place] Risk per STEPHENS/ABCDS: Pt bed is in low position, side rails up, call light and belongings are in reach. Fall risk light is on outside pts room. Pt encouraged to call for assistance as needed. Will continue with hourly rounds for PO intake, pain needs, toileting and repositioning as needed. Problem: Gastrointestinal - Adult  Goal: Minimal or absence of nausea and vomiting  Outcome: Progressing- no complaints of nausea or vomiting this shift. Pt does have decreased appetite, encouraging small frequent meals and supplemental drinks. Problem: Infection - Adult  Goal: Absence of infection at discharge  Outcome: Progressing  CVC site remains free of signs/symptoms of infection. No drainage, edema, erythema, pain, or warmth noted at site. Dressing changes continue per protocol and on an as needed basis - see flowsheet. Compliant with [facility] Bath Protocol:  Performed CHG bath today per [facility] protocol utilizing CHG solution in the shower. CVC site cleansed with CHG wipe over dressing, skin surrounding dressing, and first 6\" of IV tubing. Pt tolerated well. Continued to encourage daily CHG bathing per [facility] protocol. Problem: Hematologic - Adult  Goal: Maintains hematologic stability  Outcome: Progressing  Patient's hemoglobin this AM:   Recent Labs     11/03/23  0439   HGB 8.6*     Patient's platelet count this AM:   Recent Labs     11/03/23  0439       Thrombocytopenia not present at this time. Patient showing no signs or symptoms of active bleeding. Transfusion not indicated at this time. Patient verbalizes understanding of all instructions.  Will continue to assess
Problem: Safety - Adult  Goal: Free from fall injury  Outcome: Progressing  Orthostatic vital signs obtained at start of shift - see flowsheet for details. Pt meets criteria for orthostasis. Pt is a Med fall risk. See [place]ora Sunita Fall Score and ABCDS Injury Risk assessments.   + Screening for Orthostasis and/or + High Fall Risk per STEPHENS/ABCDS: Explained fall risk precautions to pt and family and rationale behind their use to keep the patient safe. Pt bed is in low position, side rails up, call light and belongings are in reach. Fall wristband applied and present on pts wrist.  Bed alarm on. Pt encouraged to call for assistance. Will continue with hourly rounds for PO intake, pain needs, toileting and repositioning as needed. Problem: Skin/Tissue Integrity - Adult  Goal: Oral mucous membranes remain intact  Outcome: Progressing  Rash to left hip. No new skin issues noted this shift. Problem: Gastrointestinal - Adult  Goal: Minimal or absence of nausea and vomiting  Outcome: Progressing   No nausea or vomiting this shift. Problem: Metabolic/Fluid and Electrolytes - Adult  Goal: Electrolytes maintained within normal limits  Outcome: Progressing  No electrolyte replacements this shift. Problem: Hematologic - Adult  Goal: Maintains hematologic stability  Outcome: Progressing  Patient's hemoglobin this AM:   Recent Labs     11/19/23  0338   HGB 8.5*     Patient's platelet count this AM:   Recent Labs     11/19/23  0338   PLT 65*    Thrombocytopenia not present at this time. Patient showing no signs or symptoms of active bleeding. Transfusion not indicated at this time. Patient verbalizes understanding of all instructions. Will continue to assess and implement POC. Call light within reach and hourly rounding in place.
Optimize nutritional status  Outcome: Progressing   Patient has decreased appetite. Will encourage small frequent meals.
blood products/factors as ordered  Note: Patient's hemoglobin this AM:   Recent Labs     10/30/23  0356   HGB 9.7*     Patient's platelet count this AM:   Recent Labs     10/30/23  0356       Thrombocytopenia not present at this time. Patient showing no signs or symptoms of active bleeding. Transfusion not indicated at this time. Patient verbalizes understanding of all instructions. Will continue to assess and implement POC. Call light within reach and hourly rounding in place. Problem: Pain  Goal: Verbalizes/displays adequate comfort level or baseline comfort level  10/30/2023 1041 by Haider Degroot RN  Outcome: Progressing  Flowsheets (Taken 10/30/2023 1041)  Verbalizes/displays adequate comfort level or baseline comfort level:   Encourage patient to monitor pain and request assistance   Assess pain using appropriate pain scale   Implement non-pharmacological measures as appropriate and evaluate response  Note: Patient with no pain so far this shift.
open sores or lesions in mouth and on mucous membranes. Patient uses the Saline Mouthwash per order and MAR. Plan of care ongoing. Problem: Infection - Adult  Goal: Absence of infection during hospitalization  11/15/2023 2334 by Shakira Fisher RN  Outcome: Progressing  Flowsheets (Taken 11/15/2023 2334)  Absence of infection during hospitalization:   Assess and monitor for signs and symptoms of infection   Monitor lab/diagnostic results   Monitor all insertion sites i.e., indwelling lines, tubes and drains   Monitor endotracheal (as able) and nasal secretions for changes in amount and color   [place] appropriate cooling/warming therapies per order   Administer medications as ordered   Instruct and encourage patient and family to use good hand hygiene technique   Identify and instruct in appropriate isolation precautions for identified infection/condition  Note: Patient has no s/s of fevers and was afebrile during the shift. Plan of care ongoing. Problem: Metabolic/Fluid and Electrolytes - Adult  Goal: Electrolytes maintained within normal limits  11/16/2023 0427 by Shakira Fsiher RN  Outcome: Progressing  Flowsheets (Taken 11/16/2023 5004)  Electrolytes maintained within normal limits:   Monitor labs and assess patient for signs and symptoms of electrolyte imbalances   Administer electrolyte replacement as ordered   Monitor response to electrolyte replacements, including repeat lab results as appropriate   Fluid restriction as ordered   Instruct patient on fluid and nutrition restrictions as appropriate  Note: Patient's electrolyte levels did not meet parameters for replacements. Plan of care ongoing.          Problem: Hematologic - Adult  Goal: Maintains hematologic stability  11/16/2023 0427 by Shakira Fisher RN  Outcome: Progressing  Flowsheets (Taken 11/16/2023 0427)  Maintains hematologic stability:   Assess for signs and symptoms of bleeding or hemorrhage   Administer blood products/factors
place. Patient showing no signs or symptoms of active bleeding. Transfusion not indicated at this time. Patient verbalizes understanding of all instructions. Will continue to assess and implement POC. Call light within reach and hourly rounding in place. Problem: Pain  Goal: Verbalizes/displays adequate comfort level or baseline comfort level  Outcome: Progressing  Note: Patient has no complaints of pain during shift. Will continue to assess comfort and pain on 0-10 number scale and medicate per order while encouraging non pharmacological techniques as well. Call light within reach and hourly rounding in place.
this AM:   Recent Labs     11/16/23  0333   PLT 46*    Thrombocytopenia Precautions in place. Patient showing no signs or symptoms of active bleeding. Transfusion not indicated at this time. Patient verbalizes understanding of all instructions. Will continue to assess and implement POC. Call light within reach and hourly rounding in place. Problem: Musculoskeletal - Adult  Goal: Return mobility to safest level of function  Outcome: Progressing  Note: Pt doing well walking with gait belt and walker. Pt exercised on stationary bicycle today. Problem: Pain  Goal: Verbalizes/displays adequate comfort level or baseline comfort level  Outcome: Progressing  Note: Pt did not report pain this shift. Pt educated on importance of calling for pain meds when in pain. Pt verbalized understanding. Problem: Nutrition Deficit:  Goal: Optimize nutritional status  Outcome: Progressing  Note: Pt drank Ensure for breakfast. Still struggling to eat due to nausea/heaving. Tolerates cold liquids better than solids. Offered popsicles throughout shift.

## 2023-11-22 NOTE — CARE COORDINATION
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]  CNS lymphoma ([address]) [C85.89]   Date / Time: 10/25/2023 10:05 AM    Current PCP: Calin Collins, [address] patient: No    Hospitalization in the last 30 days: No       Advance Directives:  Code Status: Full Code  [place] DNR form completed and on chart: No    Financial:  Payor: Nkechi Aldridge / Plan: Penny Shane ESSENTIAL/PLUS / Product Type: *No Product type* /      Pharmacy:    [facility] [id number] [address] [place] [address]  [address] [place] [address] [address] [facility] [address]  Phone: [phone] Fax: [phone]    [address] [place] [address]  [address] [place] [address] [address] [facility],[address]  Phone: [phone] Fax: [phone]      Assistance purchasing medications?: Potential Assistance Purchasing Medications: No  Assistance provided by Case Management: None at this time    Does patient want to participate in local refill/ meds to beds program?:      Meds To Beds General Rules:  1. Can ONLY be done Monday- Friday between 8:30am-5pm  2. Prescription(s) must be in pharmacy by 3pm to be filled same day  3. Copy of patient's insurance/ prescription drug card and patient face sheet must be sent along with the prescription(s)  4. Cost of Rx cannot be added to hospital bill. If financial assistance is needed, please contact unit  or ;  or  CANNOT provide pharmacy voucher for patients co-pays  5.  Patients can then  the prescription on their way out of the hospital at discharge, or pharmacy can deliver to the bedside if staff is available. (payment due at time of

## 2023-11-22 NOTE — CARE COORDINATION
Reviewed discharge instructions with patient and  spouse. Reviewed discharge medications including dosing, schedule, indication, and adverse reactions. Reviewed which medications were already taken today and next dosage due for each medication. Reviewed signs and symptoms that prompt a call to the physician and appropriate phone numbers. [facility] card given to the patient with explanations of its use. Reviewed follow up appointments that have been made in [facility] and Outpatient Oncology. Low microbial diet, activity restrictions, and increased risk of infection were reviewed. Patient is being discharged with IV access d/t need for ongoing therapy:      Type:  PICC                         Date of placement:  11/15  Plan:continue   Next dressing change due on: 11/27  Cap changes due on: 11/24  CVC care and maintenance was reviewed with patient and spouse. Pt verbalizes understanding of line care and maintenance. Patient verbalized understanding of all instructions and questions were answered to his. satisfaction. Signed discharge instructions were given to the patient and a copy placed in the paper-lite chart. Patient discharged to home per wheelchair with spouse.           Saniya PURVI Beaker

## 2023-11-24 NOTE — ADT AUTH CERT
(11/3/23)  Post-Txp Maintenance: None  Post-Txp F/u: MRI brain w/ & w/out contrast      Day + 18     2. Neuro:   Severe memory impairment and visual changes:  Improved, but not back to baseline. This was d/t CNS mass & vasogenic edema   - S/p Decadron taper (7/4/23 - 7/27/23)  - Cont Keppra BID  Headache:  improved  - Cont Tylenol PRN     3. ID: Afebrile; NF 11/6/23, d/t + Staph Bacteremia, CDiff+  - Cont Valtrex ppx     - CT abdomen/pelvis 11/7/23 w/ colitis w/o dilatation   - Pan Cx 11/6/23: + Micrococcus luteus (waiting on sensitivities)   - Repeat Cx 11/8/23: NGTD; Repeat Cxs 11/11 - NGTD, Repeat Cx from blood transfusion reaction 11/13/23: + Staph Epi   - ID Consult   - Trifusion removed 11/13 - NGTD  - CT A/P 11/7/23: Diffuse colon wall thickening, compatible with colitis. No abnormal colon dilatation.  - CXR 11/17/23: Improving perihilar airspace disease.  - Cont IV Vanco Day + 13/14 (started 11/8/23)      ABx Hx:   - S/p Zosyn x 8 days (11/6/23 - 11/13/23) - CDiff +  - S/p Dificid x 15 days (11/01/23 - 11/16/23)     4. Heme: Anemia & Thrombocytopenia from recent chemotherapy, leukocytosis r/t GCSF  - Transfuse for Hgb < 7 and Platelets < 03F (heparin drip)  - S/p daily G-CSF (11/8-11/13/23)     5. Metabolic / CKDIII: SOB and edema BLE, significant swelling in right arm, SCr elevated: 1.4, HyperNa, HypoMg  - H/o hypoMg and hypoK+  - Stop LR 11/16/23. Lasix 20mg x 1 for significant edema  - Nephrology following: NO IVF, BMP Q12hr  - Begin Lasix drip 11/17/23 per nephrology - d/c 11/19/29 with JOEL  - Bruner placed 11/17/23  - Albumin x2 11/17/23  - Diuril x1 11/19/23 per nephrology  - D/c Bicarb  - BMP q12 hr  - Replace K+ & Mg per PRN orders     6.  GI / Nutrition: H/o constipation, nausea, and GERD  Nutrition:  Appetite and oral intake is adequate, but he is at risk for malnutrition during transplant   - Cont low microbial diet   - Dietician to follow  GERD:  - S/p PPI & Pepcid   - Not currently taking

## 2023-11-27 LAB
FUNGUS BLD CULT: NORMAL
FUNGUS BLD CULT: NORMAL
FUNGUS SPEC CULT: NORMAL
LOEFFLER MB STN SPEC: NORMAL

## 2023-11-28 ENCOUNTER — HOSPITAL ENCOUNTER (OUTPATIENT)
Dept: ONCOLOGY | Age: 68
Discharge: HOME | End: 2023-11-28

## 2023-11-28 NOTE — CONSULTS
Oncology Nutrition Note  Pt no call/no show to RD appt for nutrition follow up post BMT. RD called pt - no answer; message left to reschedule.      Electronically signed by Cynthia Zhong RD, SUGAR

## 2023-12-01 ENCOUNTER — TELEPHONE (OUTPATIENT)
Dept: INTERNAL MEDICINE CLINIC | Age: 68
End: 2023-12-01

## 2023-12-01 NOTE — TELEPHONE ENCOUNTER
Fayette Goltz with [facility] called and said she completed an Evaluation on patient yesterday and they are going to be doing physical therapy  1x week for 7 weeks. Patient and family would like to add Speech Therapy as well. Fayette Goltz needs a verbal order for the Speech Therapy.   Please call Fayette Goltz at [phone]

## 2023-12-04 NOTE — TELEPHONE ENCOUNTER
Left voice mail to [facility] ROGERS PURCELL giving Ronal's authorization for Speech Therapy for patient.

## 2023-12-05 ENCOUNTER — HOSPITAL ENCOUNTER (OUTPATIENT)
Dept: ONCOLOGY | Age: 68
Discharge: HOME | End: 2023-12-05

## 2023-12-05 VITALS — HEIGHT: 70 IN | BODY MASS INDEX: 24.04 KG/M2

## 2023-12-05 NOTE — PROGRESS NOTES
Comprehensive Cancer Clinic Note  The [address]   [facility], [address] [place] [address]  [phone]    Patient Name: Brandie Berkowitz        MRN: [id number]    : 1955  (77 y.o.)  Gender: male     Patient did not attend clinic as scheduled. No Call/No Show.      Oncology/PMH: Day + 31 s/p Thiotepa, Busulfan, and Cytoxan & ASCT (11/3/23) for [facility] NHL w/CNS relapse

## 2023-12-05 NOTE — BH NOTE
Comprehensive Cancer Clinic    Called caregiver due to patient missing appointment in shared clinic. Spoke with wife who reports that they did not know about the appointment. Her phone is the best to call to reach him. Wife reports patient is doing very well with regards to nutrition and emotionally. They would like to skip Day +30 shared clinic appointment, but are agreeable to scheduling for +60 day follow-up. Scheduled for 1/16/2024.

## 2023-12-10 LAB
FUNGUS SPEC CULT: ABNORMAL
LOEFFLER MB STN SPEC: ABNORMAL
ORGANISM: ABNORMAL

## 2023-12-27 ENCOUNTER — CLINICAL DOCUMENTATION (OUTPATIENT)
Dept: ONCOLOGY | Age: 68
End: 2023-12-27

## 2023-12-27 NOTE — PROGRESS NOTES
Psychology Follow-up Consultation    Patient Name: Augusta Laguna  MRN: [id number]  Today: 12/27/2023    Reason for Consult: depression    HPI:   Augusta Laguna is a 77 y.o.  male with PMHx of DLBCL with CNS involvement status post autologous stem cell transplant who has been followed for depression throughout his treatment. Subjective:    Patient reports that he is feeling down and worthless. He states that he recognizes that he is a burden to his wife and he sees how hard she is working. Wife explains that patient is sedentary throughout the day and requires significant prompting to do things such as exercise, drink water, and cognitive exercises. Patient states that he feels that he is trying, but is exhausted. At times he is hopeless about his recovery and feels that he needs to accept his new level of functioning. Current outpatient psychiatric treatment: Sertraline 150mg increased on 12/20/2023  Other relevant medications: Keppra (500mg, BID)    PSYCHOSOCIAL HISTORY     Marital Status:  for 48 years              If , describes marriage as: \"positive\"  Race/Ethnicity: \"\"  Lives: [place] (approximately [place] minutes from [facility] [employer]) and lives with wife  Housing concerns: Denies  Transportation concerns: Denies  Children: 4 adult children   Employment: retired since 3/2023 approximately, forced into MCC due to health  Financial concerns related to this procedure: Denies  Childhood history: He was raised by mother and father in [place], [place] and has 1 sibling. Augusta Laguna describes his childhood as \"positive\". Educational history: Denies difficulties in school (e.g., learning disability, accommodations). Highest level of education completed is 2 years college.    Hinduism/spiritual beliefs: Amish and Denies Shinto beliefs that would affect his medical care   service: Denies  Legal history (e.g. DUI/DWI, half-way/FCI, arrests, probation/parole,

## 2023-12-29 ENCOUNTER — APPOINTMENT (OUTPATIENT)
Dept: CT IMAGING | Age: 68
DRG: 193 | End: 2023-12-29
Attending: STUDENT IN AN ORGANIZED HEALTH CARE EDUCATION/TRAINING PROGRAM
Payer: MEDICARE

## 2023-12-29 ENCOUNTER — HOSPITAL ENCOUNTER (INPATIENT)
Age: 68
LOS: 11 days | Discharge: HOME | DRG: 193 | End: 2024-01-09
Attending: STUDENT IN AN ORGANIZED HEALTH CARE EDUCATION/TRAINING PROGRAM | Admitting: STUDENT IN AN ORGANIZED HEALTH CARE EDUCATION/TRAINING PROGRAM
Payer: MEDICARE

## 2023-12-29 ENCOUNTER — DIRECT ADMIT ORDERS (OUTPATIENT)
Dept: ONCOLOGY | Age: 68
End: 2023-12-29

## 2023-12-29 ENCOUNTER — HOSPITAL ENCOUNTER (OUTPATIENT)
Dept: GENERAL RADIOLOGY | Age: 68
Discharge: HOME | End: 2023-12-29
Payer: MEDICARE

## 2023-12-29 DIAGNOSIS — C83.38 DIFFUSE LARGE B-CELL LYMPHOMA, LYMPH NODES OF MULTIPLE SITES (HCC): ICD-10-CM

## 2023-12-29 PROBLEM — J18.9 MULTIFOCAL PNEUMONIA: Status: ACTIVE | Noted: 2023-12-29

## 2023-12-29 PROBLEM — J18.9 ACUTE PNEUMONIA: Status: ACTIVE | Noted: 2023-12-29

## 2023-12-29 LAB
ALBUMIN SERPL-MCNC: 3.2 G/DL (ref 3.4–5)
ALBUMIN/GLOB SERPL: 1.1 {RATIO} (ref 1.1–2.2)
ALP SERPL-CCNC: 121 U/L (ref 40–129)
ALT SERPL-CCNC: 13 U/L (ref 10–40)
ANION GAP SERPL CALCULATED.3IONS-SCNC: 13 MMOL/L (ref 3–16)
AST SERPL-CCNC: 24 U/L (ref 15–37)
BASOPHILS # BLD: 0.1 K/UL (ref 0–0.2)
BASOPHILS NFR BLD: 0.5 %
BILIRUB SERPL-MCNC: 0.9 MG/DL (ref 0–1)
BUN SERPL-MCNC: 17 MG/DL (ref 7–20)
CALCIUM SERPL-MCNC: 9.1 MG/DL (ref 8.3–10.6)
CHLORIDE SERPL-SCNC: 105 MMOL/L (ref 99–110)
CO2 SERPL-SCNC: 21 MMOL/L (ref 21–32)
CREAT SERPL-MCNC: 0.9 MG/DL (ref 0.8–1.3)
DEPRECATED RDW RBC AUTO: 18.8 % (ref 12.4–15.4)
EOSINOPHIL # BLD: 0 K/UL (ref 0–0.6)
EOSINOPHIL NFR BLD: 0.1 %
GFR SERPLBLD CREATININE-BSD FMLA CKD-EPI: >60 ML/MIN/{1.73_M2}
GLUCOSE SERPL-MCNC: 93 MG/DL (ref 70–99)
HCT VFR BLD AUTO: 35 % (ref 40.5–52.5)
HGB BLD-MCNC: 11.2 G/DL (ref 13.5–17.5)
LYMPHOCYTES # BLD: 0.4 K/UL (ref 1–5.1)
LYMPHOCYTES NFR BLD: 3 %
MCH RBC QN AUTO: 30 PG (ref 26–34)
MCHC RBC AUTO-ENTMCNC: 32 G/DL (ref 31–36)
MCV RBC AUTO: 93.7 FL (ref 80–100)
MONOCYTES # BLD: 0.8 K/UL (ref 0–1.3)
MONOCYTES NFR BLD: 5.9 %
NEUTROPHILS # BLD: 11.6 K/UL (ref 1.7–7.7)
NEUTROPHILS NFR BLD: 90.5 %
NT-PROBNP SERPL-MCNC: ABNORMAL PG/ML (ref 0–124)
PLATELET # BLD AUTO: 95 K/UL (ref 135–450)
PMV BLD AUTO: 11.5 FL (ref 5–10.5)
POTASSIUM SERPL-SCNC: 4.4 MMOL/L (ref 3.5–5.1)
PROCALCITONIN SERPL IA-MCNC: 0.21 NG/ML (ref 0–0.15)
PROT SERPL-MCNC: 6 G/DL (ref 6.4–8.2)
RBC # BLD AUTO: 3.74 M/UL (ref 4.2–5.9)
SODIUM SERPL-SCNC: 139 MMOL/L (ref 136–145)
WBC # BLD AUTO: 12.8 K/UL (ref 4–11)

## 2023-12-29 PROCEDURE — 6370000000 HC RX 637 (ALT 250 FOR IP): Performed by: STUDENT IN AN ORGANIZED HEALTH CARE EDUCATION/TRAINING PROGRAM

## 2023-12-29 PROCEDURE — 85025 COMPLETE CBC W/AUTO DIFF WBC: CPT

## 2023-12-29 PROCEDURE — 80053 COMPREHEN METABOLIC PANEL: CPT

## 2023-12-29 PROCEDURE — 87449 NOS EACH ORGANISM AG IA: CPT

## 2023-12-29 PROCEDURE — 2580000003 HC RX 258: Performed by: STUDENT IN AN ORGANIZED HEALTH CARE EDUCATION/TRAINING PROGRAM

## 2023-12-29 PROCEDURE — 83880 ASSAY OF NATRIURETIC PEPTIDE: CPT

## 2023-12-29 PROCEDURE — 2060000000 HC ICU INTERMEDIATE R&B

## 2023-12-29 PROCEDURE — 71046 X-RAY EXAM CHEST 2 VIEWS: CPT

## 2023-12-29 PROCEDURE — 87040 BLOOD CULTURE FOR BACTERIA: CPT

## 2023-12-29 PROCEDURE — 6360000002 HC RX W HCPCS: Performed by: STUDENT IN AN ORGANIZED HEALTH CARE EDUCATION/TRAINING PROGRAM

## 2023-12-29 PROCEDURE — 84145 PROCALCITONIN (PCT): CPT

## 2023-12-29 PROCEDURE — 71250 CT THORAX DX C-: CPT

## 2023-12-29 RX ORDER — SODIUM CHLORIDE 0.9 % (FLUSH) 0.9 %
5-40 SYRINGE (ML) INJECTION EVERY 12 HOURS SCHEDULED
Status: DISCONTINUED | OUTPATIENT
Start: 2023-12-29 | End: 2024-01-09 | Stop reason: HOSPADM

## 2023-12-29 RX ORDER — TAMSULOSIN HYDROCHLORIDE 0.4 MG/1
0.4 CAPSULE ORAL DAILY
Status: DISCONTINUED | OUTPATIENT
Start: 2023-12-29 | End: 2023-12-29

## 2023-12-29 RX ORDER — M-VIT,TX,IRON,MINS/CALC/FOLIC 27MG-0.4MG
1 TABLET ORAL DAILY
Status: DISCONTINUED | OUTPATIENT
Start: 2023-12-30 | End: 2024-01-09 | Stop reason: HOSPADM

## 2023-12-29 RX ORDER — ONDANSETRON HYDROCHLORIDE 8 MG/1
8 TABLET, FILM COATED ORAL EVERY 8 HOURS PRN
Status: DISCONTINUED | OUTPATIENT
Start: 2023-12-29 | End: 2024-01-09 | Stop reason: HOSPADM

## 2023-12-29 RX ORDER — ACETAMINOPHEN 650 MG/1
650 SUPPOSITORY RECTAL EVERY 6 HOURS PRN
Status: DISCONTINUED | OUTPATIENT
Start: 2023-12-29 | End: 2024-01-09 | Stop reason: HOSPADM

## 2023-12-29 RX ORDER — ACETAMINOPHEN 325 MG/1
650 TABLET ORAL EVERY 6 HOURS PRN
Status: DISCONTINUED | OUTPATIENT
Start: 2023-12-29 | End: 2024-01-09 | Stop reason: HOSPADM

## 2023-12-29 RX ORDER — MAGNESIUM SULFATE IN WATER 40 MG/ML
4000 INJECTION, SOLUTION INTRAVENOUS PRN
Status: DISCONTINUED | OUTPATIENT
Start: 2023-12-29 | End: 2023-12-29 | Stop reason: CLARIF

## 2023-12-29 RX ORDER — LEVETIRACETAM 500 MG/1
500 TABLET ORAL 2 TIMES DAILY
Status: DISCONTINUED | OUTPATIENT
Start: 2023-12-29 | End: 2024-01-09 | Stop reason: HOSPADM

## 2023-12-29 RX ORDER — POLYETHYLENE GLYCOL 3350 17 G/17G
17 POWDER, FOR SOLUTION ORAL DAILY PRN
Status: DISCONTINUED | OUTPATIENT
Start: 2023-12-29 | End: 2024-01-09 | Stop reason: HOSPADM

## 2023-12-29 RX ORDER — POTASSIUM CHLORIDE 29.8 MG/ML
20 INJECTION INTRAVENOUS PRN
Status: DISCONTINUED | OUTPATIENT
Start: 2023-12-29 | End: 2023-12-29 | Stop reason: CLARIF

## 2023-12-29 RX ORDER — FUROSEMIDE 10 MG/ML
40 INJECTION INTRAMUSCULAR; INTRAVENOUS ONCE
Status: COMPLETED | OUTPATIENT
Start: 2023-12-29 | End: 2023-12-29

## 2023-12-29 RX ORDER — FUROSEMIDE 40 MG/1
40 TABLET ORAL DAILY
Status: DISCONTINUED | OUTPATIENT
Start: 2023-12-30 | End: 2023-12-31

## 2023-12-29 RX ORDER — TAMSULOSIN HYDROCHLORIDE 0.4 MG/1
0.4 CAPSULE ORAL DAILY
Status: DISCONTINUED | OUTPATIENT
Start: 2023-12-30 | End: 2024-01-09 | Stop reason: HOSPADM

## 2023-12-29 RX ORDER — SODIUM CHLORIDE 9 MG/ML
INJECTION, SOLUTION INTRAVENOUS PRN
Status: DISCONTINUED | OUTPATIENT
Start: 2023-12-29 | End: 2024-01-09 | Stop reason: HOSPADM

## 2023-12-29 RX ORDER — SERTRALINE HYDROCHLORIDE 100 MG/1
100 TABLET, FILM COATED ORAL DAILY
Status: DISCONTINUED | OUTPATIENT
Start: 2023-12-30 | End: 2024-01-02

## 2023-12-29 RX ORDER — VALACYCLOVIR HYDROCHLORIDE 500 MG/1
500 TABLET, FILM COATED ORAL DAILY
Status: DISCONTINUED | OUTPATIENT
Start: 2023-12-30 | End: 2024-01-04

## 2023-12-29 RX ORDER — SODIUM CHLORIDE 0.9 % (FLUSH) 0.9 %
5-40 SYRINGE (ML) INJECTION PRN
Status: DISCONTINUED | OUTPATIENT
Start: 2023-12-29 | End: 2024-01-09 | Stop reason: HOSPADM

## 2023-12-29 RX ADMIN — LEVETIRACETAM 500 MG: 500 TABLET, FILM COATED ORAL at 21:35

## 2023-12-29 RX ADMIN — SODIUM CHLORIDE, PRESERVATIVE FREE 10 ML: 5 INJECTION INTRAVENOUS at 21:40

## 2023-12-29 RX ADMIN — APIXABAN 5 MG: 5 TABLET, FILM COATED ORAL at 21:35

## 2023-12-29 RX ADMIN — FUROSEMIDE 40 MG: 10 INJECTION, SOLUTION INTRAMUSCULAR; INTRAVENOUS at 18:55

## 2023-12-29 RX ADMIN — PIPERACILLIN AND TAZOBACTAM 4500 MG: 4; .5 INJECTION, POWDER, FOR SOLUTION INTRAVENOUS; PARENTERAL at 19:02

## 2023-12-29 NOTE — H&P
BCC History and Physical       Attending Physician: Penelope Wilhelm DO    Primary Care: Ronal Wahl, APRN - CNP       Referring MD: Penelope Wilhelm DO  [address]  [place] [address]    Name: Zain Lopez :  1955  MRN:  [id number]    Admission: (Not on file)      Date: 2023    Reason for Admission: multifocal pneumonia     History of Present Illness:   Zain Lopez is a 67 y/o male w/ 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 His also has h/o BPH, GERD, HTN, & CHB w/ dual chamber pacemaker.       He was diagnosed w/ relapsed DLBCL w/ CNS involvement only (2023). He initially presented in 2023 w/ confusion.  MRI brain (23) revealed multifocal areas of signal abnormality enhancement involving the splenium of the corpus callosum and adjacent white matter, consistent with primarydiffuse large B-cell lymphoma.  He underwent brain bx (23, Am[place]) that revealed T cell infiltrate but not consistent w/ lymphoma. It was sent to [facility] for second opinion and consistent with reactive t cell population. Clinically he was doing well and was sent to neurology for additional work-up.  He then presented to [facility] (23) w/ increased confusion.  Repeat MRI brain (23) showed significant increase in left periventricular hypercellular enhancing mass and vasogenic edema, which was concerning for primary CNS lymphoma.  Progressed local mass effect with 4 mm left to right midline shift.  The dominant measurable portion of which measures 5.5 x 2.6 cm (series 13, image 76), previously with only stippled areas of enhancement in this region.  He then underwent repeat brain biopsy (7/3/23) that confirmed

## 2023-12-30 ENCOUNTER — APPOINTMENT (OUTPATIENT)
Dept: CT IMAGING | Age: 68
DRG: 193 | End: 2023-12-30
Attending: STUDENT IN AN ORGANIZED HEALTH CARE EDUCATION/TRAINING PROGRAM
Payer: MEDICARE

## 2023-12-30 LAB
ANION GAP SERPL CALCULATED.3IONS-SCNC: 12 MMOL/L (ref 3–16)
BASOPHILS # BLD: 0.3 K/UL (ref 0–0.2)
BASOPHILS NFR BLD: 1.9 %
BUN SERPL-MCNC: 16 MG/DL (ref 7–20)
CALCIUM SERPL-MCNC: 8.4 MG/DL (ref 8.3–10.6)
CHLORIDE SERPL-SCNC: 103 MMOL/L (ref 99–110)
CO2 SERPL-SCNC: 22 MMOL/L (ref 21–32)
CREAT SERPL-MCNC: 1 MG/DL (ref 0.8–1.3)
DEPRECATED RDW RBC AUTO: 18.6 % (ref 12.4–15.4)
EOSINOPHIL # BLD: 0 K/UL (ref 0–0.6)
EOSINOPHIL NFR BLD: 0.3 %
GFR SERPLBLD CREATININE-BSD FMLA CKD-EPI: >60 ML/MIN/{1.73_M2}
GLUCOSE SERPL-MCNC: 115 MG/DL (ref 70–99)
HCT VFR BLD AUTO: 29.8 % (ref 40.5–52.5)
HGB BLD-MCNC: 9.5 G/DL (ref 13.5–17.5)
LEGIONELLA AG UR QL: NORMAL
LYMPHOCYTES # BLD: 0.2 K/UL (ref 1–5.1)
LYMPHOCYTES NFR BLD: 1.2 %
MCH RBC QN AUTO: 30.5 PG (ref 26–34)
MCHC RBC AUTO-ENTMCNC: 32.1 G/DL (ref 31–36)
MCV RBC AUTO: 95.1 FL (ref 80–100)
MONOCYTES # BLD: 0.5 K/UL (ref 0–1.3)
MONOCYTES NFR BLD: 3.8 %
NEUTROPHILS # BLD: 12.6 K/UL (ref 1.7–7.7)
NEUTROPHILS NFR BLD: 92.8 %
PLATELET # BLD AUTO: 98 K/UL (ref 135–450)
PMV BLD AUTO: 12.2 FL (ref 5–10.5)
POTASSIUM SERPL-SCNC: 3.7 MMOL/L (ref 3.5–5.1)
RBC # BLD AUTO: 3.13 M/UL (ref 4.2–5.9)
S PNEUM AG UR QL: NORMAL
SODIUM SERPL-SCNC: 137 MMOL/L (ref 136–145)
WBC # BLD AUTO: 13.6 K/UL (ref 4–11)

## 2023-12-30 PROCEDURE — 87449 NOS EACH ORGANISM AG IA: CPT

## 2023-12-30 PROCEDURE — 80048 BASIC METABOLIC PNL TOTAL CA: CPT

## 2023-12-30 PROCEDURE — 87641 MR-STAPH DNA AMP PROBE: CPT

## 2023-12-30 PROCEDURE — 85025 COMPLETE CBC W/AUTO DIFF WBC: CPT

## 2023-12-30 PROCEDURE — 70490 CT SOFT TISSUE NECK W/O DYE: CPT

## 2023-12-30 PROCEDURE — 2580000003 HC RX 258: Performed by: STUDENT IN AN ORGANIZED HEALTH CARE EDUCATION/TRAINING PROGRAM

## 2023-12-30 PROCEDURE — 87385 HISTOPLASMA CAPSUL AG IA: CPT

## 2023-12-30 PROCEDURE — 87305 ASPERGILLUS AG IA: CPT

## 2023-12-30 PROCEDURE — 36415 COLL VENOUS BLD VENIPUNCTURE: CPT

## 2023-12-30 PROCEDURE — 99223 1ST HOSP IP/OBS HIGH 75: CPT | Performed by: INTERNAL MEDICINE

## 2023-12-30 PROCEDURE — 2060000000 HC ICU INTERMEDIATE R&B

## 2023-12-30 PROCEDURE — 86612 BLASTOMYCES ANTIBODY: CPT

## 2023-12-30 PROCEDURE — 6360000002 HC RX W HCPCS: Performed by: STUDENT IN AN ORGANIZED HEALTH CARE EDUCATION/TRAINING PROGRAM

## 2023-12-30 PROCEDURE — 6370000000 HC RX 637 (ALT 250 FOR IP): Performed by: STUDENT IN AN ORGANIZED HEALTH CARE EDUCATION/TRAINING PROGRAM

## 2023-12-30 RX ADMIN — VALACYCLOVIR HYDROCHLORIDE 500 MG: 500 TABLET, FILM COATED ORAL at 09:23

## 2023-12-30 RX ADMIN — PIPERACILLIN AND TAZOBACTAM 4500 MG: 4; .5 INJECTION, POWDER, FOR SOLUTION INTRAVENOUS; PARENTERAL at 03:08

## 2023-12-30 RX ADMIN — SODIUM CHLORIDE, PRESERVATIVE FREE 10 ML: 5 INJECTION INTRAVENOUS at 09:23

## 2023-12-30 RX ADMIN — PIPERACILLIN AND TAZOBACTAM 4500 MG: 4; .5 INJECTION, POWDER, FOR SOLUTION INTRAVENOUS; PARENTERAL at 11:22

## 2023-12-30 RX ADMIN — PIPERACILLIN AND TAZOBACTAM 3375 MG: 3; .375 INJECTION, POWDER, FOR SOLUTION INTRAVENOUS; PARENTERAL at 18:24

## 2023-12-30 RX ADMIN — LEVETIRACETAM 500 MG: 500 TABLET, FILM COATED ORAL at 20:30

## 2023-12-30 RX ADMIN — APIXABAN 5 MG: 5 TABLET, FILM COATED ORAL at 09:23

## 2023-12-30 RX ADMIN — FUROSEMIDE 40 MG: 40 TABLET ORAL at 09:23

## 2023-12-30 RX ADMIN — Medication 1 TABLET: at 09:23

## 2023-12-30 RX ADMIN — LEVETIRACETAM 500 MG: 500 TABLET, FILM COATED ORAL at 09:23

## 2023-12-30 RX ADMIN — TAMSULOSIN HYDROCHLORIDE 0.4 MG: 0.4 CAPSULE ORAL at 09:23

## 2023-12-30 RX ADMIN — SERTRALINE HYDROCHLORIDE 100 MG: 100 TABLET ORAL at 09:23

## 2023-12-30 RX ADMIN — APIXABAN 5 MG: 5 TABLET, FILM COATED ORAL at 20:30

## 2023-12-30 ASSESSMENT — PAIN SCALES - GENERAL
PAINLEVEL_OUTOF10: 0

## 2023-12-30 NOTE — PROGRESS NOTES
Pharmacy Note - Extended Infusion Beta-Lactam Adjustment    Piperacillin/Tazobactam ordered for treatment of CAP. Per [facility] Extended Infusion Beta-Lactam Policy, Zosyn will be changed to 3375 mg IV EI q8h.     Estimated Creatinine Clearance: Estimated Creatinine Clearance: 71 mL/min (based on SCr of 1 mg/dL).  Dialysis Status, JOEL, CKD: n/a  BMI: Body mass index is 23.33 kg/m².    Rationale for Adjustment: Agent is renally eliminated and demonstrates time-dependent effect on bacterial eradication. Extended-infusion dosing strategy aims to enhance microbiologic and clinical efficacy.    Pharmacy will continue to monitor renal function and adjust dose as necessary.      Please call with questions--  Ankita Ricardo PharmD, BCPS  Wireless: [phone]   12/30/2023 1:09 PM

## 2023-12-30 NOTE — PROGRESS NOTES
oriented.  HEENT: normocephalic, no scleral erythema or icterus, Oral mucosa moist and intact, throat clear  NECK: supple   BACK: Straight   SKIN: warm dry and intact without lesions rashes or masses  CHEST: tachypnea, bilateral rhonchi. Diminished breath sounds one third up right base. Nonproductive cough  CV: Normal S1 S2, RRR, no MRG  ABD: NT ND normoactive BS  EXTREMITIES: without edema, denies calf tenderness  NEURO: grossly intact  CATHETER: PIV    Data    CBC:   Recent Labs     12/29/23 1846 12/30/23  0420   WBC 12.8* 13.6*   HGB 11.2* 9.5*   HCT 35.0* 29.8*   MCV 93.7 95.1   PLT 95* 98*     BMP/Mag:  Recent Labs     12/29/23 1846 12/30/23  0420    137   K 4.4 3.7    103   CO2 21 22   BUN 17 16   CREATININE 0.9 1.0     LIVP:   Recent Labs     12/29/23 1846   AST 24   ALT 13   BILITOT 0.9   ALKPHOS 121     Coags: No results for input(s): \"PROTIME\", \"INR\", \"APTT\" in the last 72 hours.  Uric Acid No results for input(s): \"LABURIC\" in the last 72 hours.    PROBLEM LIST:           1.  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  BPH  5.  GERD  6.  Depressed mood   7.  CHB w/ left SC dual chamber pacemaker  8.  HTN  9. C-Diff  10. Staph Epi Bacteremia  11. DVT  12. JOEL         TREATMENT:              DLBCL:  1. R-CHOP (10/26/21 - 2/11/22)  CNS Relapse (7/2023):  2.  R+MPV x 6 cycles (7/6/23 - 9/14/23)  3.  Thiotepa, Busulfan, and Cytoxan & ASCT (11/3/23)      ASSESSMENT AND PLAN:           1. DLBC NHL w/ CNS relapse: Patient currently in [place]  - CSF (4/21/23): no malignant cells, flow: negative   - Brain bx (4/28/23, [facility]):  T cell infiltrate but not consistent w/ lymphoma, sent to [facility] for second opinion and consistent with reactive t cell population   - CT CAP (4/10/23):  no evidence of systemic disease   - MRI brain (6/1/23): Significantly decreased cerebral  periventricular lymphoma and vasogenic edema compatible with positive response to treatment. Chronic postoperative changes of right parietal lobe periventricular biopsy without evidence for complication  - MRI brain (6/28/23):Significant increase in left periventricular hypercellular enhancing mass and vasogenic edema, which remains concerning for primary CNS lymphoma.  Progressed local mass effect with 4 mm left to right midline shift.  The dominant measurable portion of which measures 5.5 x 2.6 cm (series 13, image 76), previously with only stippled areas of enhancement in this region  - Left periventricular mass biopsy (7/3/23):   - Involved with B-cell lymphoma morphologically with aggressive features.  - MRI brain (8/29/23): Significant interval improvement in the irregular enhancement involving the intra-axial brain in the left hemisphere adjacent to the left lateral ventricle compared to the prior MRI from 6/28/2023. Significant interval improvement in the surrounding vasogenic edema and T2/FLAIR hyperintense signal involving left greater than right hemispheres. Currently, there is residual T2 and FLAIR hyperintense signal seen involving the left greater than right periventricular white matter regions, as well as the posterior corpus callosum. There is a small amount of residual enhancement seen adjacent to the atrium of the left lateral ventricle as described.   - MRI brain (9/29/23): Decreased enhancement centered about the left periatrial region, w/ no significant interval change in confluent FLAIR signal abnormality. These findings are nonspecific, potentially treatment related, although residual lymphoma is difficult to exclude.      - S/p Thiotepa, Busulfan, and Cytoxan & ASCT (11/3/23)  Post-Txp Maintenance: None  Post-Txp F/u: MRI brain w/ & w/out contrast      Day + 57     2. Neuro: Severe memory impairment and visual changes:  Improved, but not back to baseline.  This was d/t CNS mass & vasogenic

## 2023-12-30 NOTE — PROGRESS NOTES
Pharmacy Note - Extended Infusion Beta-Lactam Adjustment    Piperacillin/Tazobactam ordered for treatment of CAP. Per [facility] Extended Infusion Beta-Lactam Policy, zosyn will be changed to 3375 mg every 8 hours.     Estimated Creatinine Clearance: Estimated Creatinine Clearance: 79 mL/min (based on SCr of 0.9 mg/dL).  Dialysis Status, JOEL, CKD: n/A  BMI: Body mass index is 23.33 kg/m².    Rationale for Adjustment: Agent is renally eliminated and demonstrates time-dependent effect on bacterial eradication. Extended-infusion dosing strategy aims to enhance microbiologic and clinical efficacy.    Pharmacy will continue to monitor renal function, cultures and sensitivities (where available) and adjust dose as necessary.      Please call with any questions.    Antonio Jones, PharmD  Main Pharmacy: [id number]

## 2023-12-30 NOTE — CONSULTS
Pt requested oral nutrition supplements. Ordered supplements that he was agreeable to on his previous admission.     Machelle Vuong RD, LD   [place]: [phone]  Office:  [phone]

## 2023-12-30 NOTE — PLAN OF CARE
Problem: Safety - Adult  Goal: Free from fall injury  12/30/2023 1020 by Marva Okeefe RN  Outcome: Progressing     Problem: ABCDS Injury Assessment  Goal: Absence of physical injury  12/30/2023 1020 by Marva Okeefe RN  Outcome: Progressing     Problem: Skin/Tissue Integrity - Adult  Goal: Skin integrity remains intact  12/30/2023 1020 by Marva Okeefe RN  Outcome: Progressing     Problem: Infection - Adult  Goal: Absence of infection during hospitalization  12/30/2023 1020 by Marva Okeefe RN  Outcome: Progressing     Problem: Metabolic/Fluid and Electrolytes - Adult  Goal: Electrolytes maintained within normal limits  12/30/2023 1020 by Marva Okeefe RN  Outcome: Progressing     Problem: Hematologic - Adult  Goal: Maintains hematologic stability  12/30/2023 1020 by Marva Okeefe RN  Outcome: Progressing
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
## 2023-12-30 NOTE — PLAN OF CARE
Problem: Safety - Adult  Goal: Free from fall injury  Outcome: Progressing  Note:   - Screening for Orthostasis AND not a [place] Risk per STEPHENS/ABCDS: Pt bed is in low position, side rails up, call light and belongings are in reach.  Fall risk light is on outside pts room.  Pt encouraged to call for assistance as needed. Will continue with hourly rounds for PO intake, pain needs, toileting and repositioning as needed.        Problem: Skin/Tissue Integrity - Adult  Goal: Skin integrity remains intact  Outcome: Progressing  Note: No skin breakdown noted this shift       Problem: Infection - Adult  Goal: Absence of infection during hospitalization  Outcome: Progressing  Note: Pt was afebrile this shift.     Problem: Metabolic/Fluid and Electrolytes - Adult  Goal: Electrolytes maintained within normal limits  Outcome: Progressing  Note: No electrolyte replacement needed this shift.     Problem: Hematologic - Adult  Goal: Maintains hematologic stability  Outcome: Progressing  Note: Patient's hemoglobin this AM:   Recent Labs     12/30/23  0420   HGB 9.5*     Patient's platelet count this AM:   Recent Labs     12/30/23  0420   PLT 98*    Thrombocytopenia not present at this time.  Patient showing no signs or symptoms of active bleeding.  Transfusion not indicated at this time.  Patient verbalizes understanding of all instructions. Will continue to assess and implement POC. Call light within reach and hourly rounding in place.

## 2023-12-31 ENCOUNTER — APPOINTMENT (OUTPATIENT)
Dept: GENERAL RADIOLOGY | Age: 68
DRG: 193 | End: 2023-12-31
Attending: STUDENT IN AN ORGANIZED HEALTH CARE EDUCATION/TRAINING PROGRAM
Payer: MEDICARE

## 2023-12-31 LAB
ANION GAP SERPL CALCULATED.3IONS-SCNC: 12 MMOL/L (ref 3–16)
BASE EXCESS BLDV CALC-SCNC: 3.8 MMOL/L (ref -2–3)
BASOPHILS # BLD: 0 K/UL (ref 0–0.2)
BASOPHILS NFR BLD: 0.3 %
BUN SERPL-MCNC: 19 MG/DL (ref 7–20)
CALCIUM SERPL-MCNC: 8.4 MG/DL (ref 8.3–10.6)
CHLORIDE SERPL-SCNC: 103 MMOL/L (ref 99–110)
CO2 BLDV-SCNC: 30 MMOL/L
CO2 SERPL-SCNC: 25 MMOL/L (ref 21–32)
COHGB MFR BLDV: 1.5 % (ref 0–1.5)
CREAT SERPL-MCNC: 1 MG/DL (ref 0.8–1.3)
CRP SERPL-MCNC: <3 MG/L (ref 0–5.1)
DEPRECATED RDW RBC AUTO: 18.7 % (ref 12.4–15.4)
DO-HGB MFR BLDV: 49.7 %
EOSINOPHIL # BLD: 0.4 K/UL (ref 0–0.6)
EOSINOPHIL NFR BLD: 3.4 %
GFR SERPLBLD CREATININE-BSD FMLA CKD-EPI: >60 ML/MIN/{1.73_M2}
GLUCOSE SERPL-MCNC: 100 MG/DL (ref 70–99)
HCO3 BLDV-SCNC: 28.7 MMOL/L (ref 24–28)
HCT VFR BLD AUTO: 30.2 % (ref 40.5–52.5)
HGB BLD-MCNC: 9.9 G/DL (ref 13.5–17.5)
LYMPHOCYTES # BLD: 0.2 K/UL (ref 1–5.1)
LYMPHOCYTES NFR BLD: 1.4 %
MCH RBC QN AUTO: 30.3 PG (ref 26–34)
MCHC RBC AUTO-ENTMCNC: 32.8 G/DL (ref 31–36)
MCV RBC AUTO: 92.5 FL (ref 80–100)
METHGB MFR BLDV: 0 % (ref 0–1.5)
MONOCYTES # BLD: 1.1 K/UL (ref 0–1.3)
MONOCYTES NFR BLD: 8.9 %
MRSA DNA SPEC QL NAA+PROBE: NORMAL
NEUTROPHILS # BLD: 11 K/UL (ref 1.7–7.7)
NEUTROPHILS NFR BLD: 86 %
PCO2 BLDV: 44.2 MMHG (ref 41–51)
PH BLDV: 7.42 [PH] (ref 7.35–7.45)
PLATELET # BLD AUTO: 99 K/UL (ref 135–450)
PMV BLD AUTO: 10.7 FL (ref 5–10.5)
PO2 BLDV: 30.1 MMHG (ref 25–40)
POTASSIUM SERPL-SCNC: 3.3 MMOL/L (ref 3.5–5.1)
PROCALCITONIN SERPL IA-MCNC: 0.27 NG/ML (ref 0–0.15)
RBC # BLD AUTO: 3.26 M/UL (ref 4.2–5.9)
SAO2 % BLDV: 50 %
SODIUM SERPL-SCNC: 140 MMOL/L (ref 136–145)
WBC # BLD AUTO: 12.8 K/UL (ref 4–11)

## 2023-12-31 PROCEDURE — 84145 PROCALCITONIN (PCT): CPT

## 2023-12-31 PROCEDURE — 6360000002 HC RX W HCPCS: Performed by: STUDENT IN AN ORGANIZED HEALTH CARE EDUCATION/TRAINING PROGRAM

## 2023-12-31 PROCEDURE — 2580000003 HC RX 258: Performed by: STUDENT IN AN ORGANIZED HEALTH CARE EDUCATION/TRAINING PROGRAM

## 2023-12-31 PROCEDURE — 2060000000 HC ICU INTERMEDIATE R&B

## 2023-12-31 PROCEDURE — 6370000000 HC RX 637 (ALT 250 FOR IP): Performed by: INTERNAL MEDICINE

## 2023-12-31 PROCEDURE — 6370000000 HC RX 637 (ALT 250 FOR IP): Performed by: STUDENT IN AN ORGANIZED HEALTH CARE EDUCATION/TRAINING PROGRAM

## 2023-12-31 PROCEDURE — 82803 BLOOD GASES ANY COMBINATION: CPT

## 2023-12-31 PROCEDURE — 71045 X-RAY EXAM CHEST 1 VIEW: CPT

## 2023-12-31 PROCEDURE — 85025 COMPLETE CBC W/AUTO DIFF WBC: CPT

## 2023-12-31 PROCEDURE — 99233 SBSQ HOSP IP/OBS HIGH 50: CPT | Performed by: INTERNAL MEDICINE

## 2023-12-31 PROCEDURE — 36415 COLL VENOUS BLD VENIPUNCTURE: CPT

## 2023-12-31 PROCEDURE — 80048 BASIC METABOLIC PNL TOTAL CA: CPT

## 2023-12-31 PROCEDURE — A4217 STERILE WATER/SALINE, 500 ML: HCPCS | Performed by: STUDENT IN AN ORGANIZED HEALTH CARE EDUCATION/TRAINING PROGRAM

## 2023-12-31 PROCEDURE — 86140 C-REACTIVE PROTEIN: CPT

## 2023-12-31 RX ORDER — POTASSIUM CHLORIDE 20 MEQ/1
20 TABLET, EXTENDED RELEASE ORAL ONCE
Status: DISCONTINUED | OUTPATIENT
Start: 2023-12-31 | End: 2023-12-31

## 2023-12-31 RX ORDER — POTASSIUM CHLORIDE 7.45 MG/ML
10 INJECTION INTRAVENOUS PRN
Status: DISCONTINUED | OUTPATIENT
Start: 2023-12-31 | End: 2024-01-09 | Stop reason: HOSPADM

## 2023-12-31 RX ORDER — POTASSIUM CHLORIDE 20 MEQ/1
40 TABLET, EXTENDED RELEASE ORAL ONCE
Status: COMPLETED | OUTPATIENT
Start: 2023-12-31 | End: 2023-12-31

## 2023-12-31 RX ORDER — FUROSEMIDE 40 MG/1
40 TABLET ORAL 2 TIMES DAILY
Status: DISCONTINUED | OUTPATIENT
Start: 2023-12-31 | End: 2024-01-04

## 2023-12-31 RX ORDER — POTASSIUM CHLORIDE 20 MEQ/1
40 TABLET, EXTENDED RELEASE ORAL PRN
Status: DISCONTINUED | OUTPATIENT
Start: 2023-12-31 | End: 2024-01-09 | Stop reason: HOSPADM

## 2023-12-31 RX ADMIN — PIPERACILLIN AND TAZOBACTAM 3375 MG: 3; .375 INJECTION, POWDER, FOR SOLUTION INTRAVENOUS; PARENTERAL at 18:57

## 2023-12-31 RX ADMIN — Medication 1 TABLET: at 08:56

## 2023-12-31 RX ADMIN — SODIUM CHLORIDE, PRESERVATIVE FREE 10 ML: 5 INJECTION INTRAVENOUS at 08:56

## 2023-12-31 RX ADMIN — FUROSEMIDE 40 MG: 40 TABLET ORAL at 08:56

## 2023-12-31 RX ADMIN — TAMSULOSIN HYDROCHLORIDE 0.4 MG: 0.4 CAPSULE ORAL at 08:56

## 2023-12-31 RX ADMIN — APIXABAN 5 MG: 5 TABLET, FILM COATED ORAL at 08:56

## 2023-12-31 RX ADMIN — FUROSEMIDE 40 MG: 40 TABLET ORAL at 17:38

## 2023-12-31 RX ADMIN — SERTRALINE HYDROCHLORIDE 100 MG: 100 TABLET ORAL at 08:56

## 2023-12-31 RX ADMIN — PIPERACILLIN AND TAZOBACTAM 3375 MG: 3; .375 INJECTION, POWDER, FOR SOLUTION INTRAVENOUS; PARENTERAL at 11:05

## 2023-12-31 RX ADMIN — SODIUM CHLORIDE, PRESERVATIVE FREE 10 ML: 5 INJECTION INTRAVENOUS at 20:25

## 2023-12-31 RX ADMIN — PIPERACILLIN AND TAZOBACTAM 3375 MG: 3; .375 INJECTION, POWDER, FOR SOLUTION INTRAVENOUS; PARENTERAL at 03:06

## 2023-12-31 RX ADMIN — POTASSIUM CHLORIDE 40 MEQ: 1500 TABLET, EXTENDED RELEASE ORAL at 06:12

## 2023-12-31 RX ADMIN — LEVETIRACETAM 500 MG: 500 TABLET, FILM COATED ORAL at 08:56

## 2023-12-31 RX ADMIN — VALACYCLOVIR HYDROCHLORIDE 500 MG: 500 TABLET, FILM COATED ORAL at 08:56

## 2023-12-31 RX ADMIN — LEVETIRACETAM 500 MG: 500 TABLET, FILM COATED ORAL at 20:25

## 2023-12-31 RX ADMIN — APIXABAN 5 MG: 5 TABLET, FILM COATED ORAL at 20:25

## 2023-12-31 RX ADMIN — SODIUM CHLORIDE 15 ML: 900 IRRIGANT IRRIGATION at 20:28

## 2023-12-31 ASSESSMENT — PAIN SCALES - GENERAL
PAINLEVEL_OUTOF10: 0

## 2023-12-31 NOTE — PLAN OF CARE
Problem: Safety - Adult  Goal: Free from fall injury  Outcome: Progressing  Note:   - Screening for Orthostasis AND not a [place] Risk per STEPHENS/ABCDS: Pt bed is in low position, side rails up, call light and belongings are in reach.  Fall risk light is on outside pts room.  Pt encouraged to call for assistance as needed. Will continue with hourly rounds for PO intake, pain needs, toileting and repositioning as needed.        Problem: Skin/Tissue Integrity - Adult  Goal: Skin integrity remains intact  Outcome: Progressing  Note: No skin break down noted this shift.     Problem: Infection - Adult  Goal: Absence of infection during hospitalization  Outcome: Progressing  Note: Pt remained afebrile this shift     Problem: Metabolic/Fluid and Electrolytes - Adult  Goal: Electrolytes maintained within normal limits  Outcome: Progressing  Note: Pt needed K replacement this shift     Problem: Hematologic - Adult  Goal: Maintains hematologic stability  Outcome: Progressing  Note: Patient's hemoglobin this AM:   Recent Labs     12/31/23  0354   HGB 9.9*     Patient's platelet count this AM:   Recent Labs     12/31/23  0354   PLT 99*    Thrombocytopenia not present at this time.  Patient showing no signs or symptoms of active bleeding.  Transfusion not indicated at this time.  Patient verbalizes understanding of all instructions. Will continue to assess and implement POC. Call light within reach and hourly rounding in place.

## 2023-12-31 NOTE — PROGRESS NOTES
Pulmonary Followup Note    CC: acute hypoxemic respiratory failure, multifocal pneumonia, CHF exacerbation  Subjective:  Patient remains lethargic.  He was saturating well on 3L until he got up to go to the bathroom overnight and his saturations dropped to the 70s and he had to be put on 15L.  For my exam he was saturating in the mid 90s on 10L until I sat him up for my exam, and they dropped to the mid 80s.    ROS:  Denies headache, nausea or chest pain.    24HR INTAKE/OUTPUT:    Intake/Output Summary (Last 24 hours) at 2023 0903  Last data filed at 2023 0635  Gross per 24 hour   Intake 956 ml   Output 1000 ml   Net -44 ml        furosemide  40 mg Oral BID    piperacillin-tazobactam  3,375 mg IntraVENous q8h    apixaban  5 mg Oral BID    levETIRAcetam  500 mg Oral BID    therapeutic multivitamin-minerals  1 tablet Oral Daily    sertraline  100 mg Oral Daily    valACYclovir  500 mg Oral Daily    sodium chloride flush  5-40 mL IntraVENous 2 times per day    Saline Mouthwash  15 mL Swish & Spit 4x Daily AC & HS    tamsulosin  0.4 mg Oral Daily           PHYSICAL EXAMINATION:  /68   Pulse 62   Temp 98 °F (36.7 °C) (Oral)   Resp 24   Ht 1.778 m (5' 10\")   Wt 73.8 kg (162 lb 9.6 oz)   SpO2 90%   BMI 23.33 kg/m²   CURRENT PULSE OXIMETRY:  SpO2: 90 %  24HR PULSE OXIMETRY RANGE:  SpO2  Av.9 %  Min: 80 %  Max: 96 %       Gen: mild distress. Speaking in full sentences with trace accessory muscle use  HEENT: PERRL, EOMI, OP nl  Lung:  Decreased breath sounds at the bases, with cough on deep inspiration  CV: RRR without M/R/R  Abd: +BS, soft, NT/ND  Ext: 2+ LE edema.    DATA  CBC:   Recent Labs     23  1846 23  0420 23  0354   WBC 12.8* 13.6* 12.8*   HGB 11.2* 9.5* 9.9*   HCT 35.0* 29.8* 30.2*   MCV 93.7 95.1 92.5   PLT 95* 98* 99*     BMP:   Recent Labs     23  1846 23  0420 23  0354    137 140   K 4.4 3.7 3.3*   CL

## 2023-12-31 NOTE — PLAN OF CARE
Problem: Safety - Adult  Goal: Free from fall injury  12/31/2023 0941 by Marva Okeefe RN  Outcome: Progressing     Problem: ABCDS Injury Assessment  Goal: Absence of physical injury  12/31/2023 0941 by Marva Okeefe RN  Outcome: Progressing     Problem: Skin/Tissue Integrity - Adult  Goal: Skin integrity remains intact  12/31/2023 0941 by Marva Okeefe RN  Outcome: Progressing      Problem: Infection - Adult  Goal: Absence of infection during hospitalization  12/31/2023 0941 by aMrva Okeefe RN  Outcome: Progressing     Problem: Metabolic/Fluid and Electrolytes - Adult  Goal: Electrolytes maintained within normal limits  12/31/2023 0941 by Marva Okeefe RN  Outcome: Progressing

## 2023-12-31 NOTE — PROGRESS NOTES
Pt had SOB at around 0630, increased O 2 from 4L to 7L , SpO2 went up to 86%. Pt was switched to high flow nasal cannula @ 15 L/M, SpO2 improved.    C. diff sample was sent.   Weight bear as tolerated to the left knee

## 2023-12-31 NOTE — PROGRESS NOTES
Vital Signs:  /66   Pulse 61   Temp 98.1 °F (36.7 °C) (Oral)   Resp 20   Ht 1.778 m (5' 10\")   Wt 73.8 kg (162 lb 9.6 oz)   SpO2 91%   BMI 23.33 kg/m²     Weight:    Wt Readings from Last 3 Encounters:   12/29/23 73.8 kg (162 lb 9.6 oz)   11/22/23 76 kg (167 lb 8.8 oz)   10/18/23 77.2 kg (170 lb 3.1 oz)         General: Awake, alert and oriented.  HEENT: normocephalic, no scleral erythema or icterus, Oral mucosa moist and intact, throat clear  NECK: supple   BACK: Straight   SKIN: warm dry and intact without lesions rashes or masses  CHEST: tachypnea, bilateral rhonchi. Diminished breath sounds one third up right base. Nonproductive cough  CV: Normal S1 S2, RRR, no MRG  ABD: NT ND normoactive BS  EXTREMITIES: without edema, denies calf tenderness  NEURO: grossly intact  CATHETER: PIV    Data    CBC:   Recent Labs     12/29/23  1846 12/30/23  0420 12/31/23  0354   WBC 12.8* 13.6* 12.8*   HGB 11.2* 9.5* 9.9*   HCT 35.0* 29.8* 30.2*   MCV 93.7 95.1 92.5   PLT 95* 98* 99*       BMP/Mag:  Recent Labs     12/29/23  1846 12/30/23  0420 12/31/23  0354    137 140   K 4.4 3.7 3.3*    103 103   CO2 21 22 25   BUN 17 16 19   CREATININE 0.9 1.0 1.0       LIVP:   Recent Labs     12/29/23  1846   AST 24   ALT 13   BILITOT 0.9   ALKPHOS 121       Coags: No results for input(s): \"PROTIME\", \"INR\", \"APTT\" in the last 72 hours.  Uric Acid No results for input(s): \"LABURIC\" in the last 72 hours.    PROBLEM LIST:           1.  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  BPH  5.  GERD  6.  Depressed mood   7.  CHB w/ left SC dual chamber pacemaker  8.  HTN  9. C-Diff  10. Staph Epi Bacteremia  11. DVT  12. JOEL         TREATMENT:              DLBCL:  1. R-CHOP (10/26/21 - 2/11/22)  CNS Relapse (7/2023):  2.  R+MPV x 6 cycles (7/6/23 - 9/14/23)  3.  Thiotepa, Busulfan, and Cytoxan &  CHB w/ left SC dual chamber pacemaker and HTN  - Echo (9/21/23): LVEF 50%. No regional wall motion abnormalities are seen. Normal diastolic function.  CHB:  - Dual chamber pacemaker in place  Prolonged QTc:  - EKG (10/25/23): QTC: 497 - can subtract 50 d/t having ventricular paced pacemaker in place  CHF:  - Right Venous US 11/17/23: + DVT right IJ, brachial, axillary, cephalic veins   - Continue Eliquis started 11/21/23  - Has bilateral LE edema 12/29. Will get pro bnp and start lasix 40 mg daily      9. : H/o BPH  BPH:    - Cont Flomax 0.4 mg daily      10 MSK:   Generalized weakness:  D/t CNS lymphoma and chemotherapy   - Frailty assessment score (9/25/23):  1 - \"intermediate category\" d/t weight loss, decreased strength, endurance, and balance.  - Follow-up with outpatient PT/OT -- Referral to outpatient physical therapy gym placed 12/27/23  - Discussed needing to increase his physical activity and doing tasks throughout the day.     11. Med Onc:  H/o melanoma on right thumb:  - S/p resection 15 yrs ago  H/o Adenoid cystic carcinoma:   - S/p resection (4/2021) & XRT      12. Psych  - Cont Zoloft to 150 mg daily 12/20/23  - He will meet with Dr. Leos today, 12/27/23 as his depression seems to be worsening the further he gets from transplant           - DVT Prophylaxis: Prophylaxis on hold d/t contraindication  Contraindications to pharmacologic prophylaxis: Patient already on therapeutic anticoagulation  Contraindications to mechanical prophylaxis: None     - Disposition: Pulm status is acutely worsening this morning. Bordering on needing more invasive ventilation. May need to move to ICU if oxygenation status cont to worsen.    Annalisa Osorio, APRN - CNP     David Waterhouse, MD, MPH

## 2024-01-01 ENCOUNTER — APPOINTMENT (OUTPATIENT)
Dept: GENERAL RADIOLOGY | Age: 69
DRG: 193 | End: 2024-01-01
Attending: STUDENT IN AN ORGANIZED HEALTH CARE EDUCATION/TRAINING PROGRAM
Payer: MEDICARE

## 2024-01-01 LAB
ALBUMIN SERPL-MCNC: 2.6 G/DL (ref 3.4–5)
ALP SERPL-CCNC: 91 U/L (ref 40–129)
ALT SERPL-CCNC: 11 U/L (ref 10–40)
APTT BLD: 32.3 SEC (ref 22.7–35.9)
AST SERPL-CCNC: 17 U/L (ref 15–37)
BASOPHILS # BLD: 0 K/UL (ref 0–0.2)
BASOPHILS NFR BLD: 0.4 %
BILIRUB DIRECT SERPL-MCNC: 0.3 MG/DL (ref 0–0.3)
BILIRUB INDIRECT SERPL-MCNC: 0.2 MG/DL (ref 0–1)
BILIRUB SERPL-MCNC: 0.5 MG/DL (ref 0–1)
BILIRUB UR QL STRIP.AUTO: NEGATIVE
CLARITY UR: CLEAR
COLOR UR: YELLOW
DEPRECATED RDW RBC AUTO: 18.9 % (ref 12.4–15.4)
EOSINOPHIL # BLD: 0.4 K/UL (ref 0–0.6)
EOSINOPHIL NFR BLD: 3.5 %
GLUCOSE UR STRIP.AUTO-MCNC: NEGATIVE MG/DL
HCT VFR BLD AUTO: 32.5 % (ref 40.5–52.5)
HGB BLD-MCNC: 10.4 G/DL (ref 13.5–17.5)
HGB UR QL STRIP.AUTO: NEGATIVE
INR PPP: 1.53 (ref 0.84–1.16)
KETONES UR STRIP.AUTO-MCNC: NEGATIVE MG/DL
LDH SERPL L TO P-CCNC: 234 U/L (ref 100–190)
LEUKOCYTE ESTERASE UR QL STRIP.AUTO: NEGATIVE
LYMPHOCYTES # BLD: 0.3 K/UL (ref 1–5.1)
LYMPHOCYTES NFR BLD: 2.8 %
MAGNESIUM SERPL-MCNC: 1.6 MG/DL (ref 1.8–2.4)
MCH RBC QN AUTO: 30 PG (ref 26–34)
MCHC RBC AUTO-ENTMCNC: 32 G/DL (ref 31–36)
MCV RBC AUTO: 93.6 FL (ref 80–100)
MONOCYTES # BLD: 0.9 K/UL (ref 0–1.3)
MONOCYTES NFR BLD: 8 %
NEUTROPHILS # BLD: 9.5 K/UL (ref 1.7–7.7)
NEUTROPHILS NFR BLD: 85.3 %
NITRITE UR QL STRIP.AUTO: NEGATIVE
PH UR STRIP.AUTO: 6 [PH] (ref 5–8)
PHOSPHATE SERPL-MCNC: 3.2 MG/DL (ref 2.5–4.9)
PLATELET # BLD AUTO: 106 K/UL (ref 135–450)
PMV BLD AUTO: 10.9 FL (ref 5–10.5)
PROCALCITONIN SERPL IA-MCNC: 0.23 NG/ML (ref 0–0.15)
PROT SERPL-MCNC: 4.8 G/DL (ref 6.4–8.2)
PROT UR STRIP.AUTO-MCNC: NEGATIVE MG/DL
PROTHROMBIN TIME: 18.4 SEC (ref 11.5–14.8)
RBC # BLD AUTO: 3.47 M/UL (ref 4.2–5.9)
SP GR UR STRIP.AUTO: 1.02 (ref 1–1.03)
UA DIPSTICK W REFLEX MICRO PNL UR: NORMAL
URATE SERPL-MCNC: 2.2 MG/DL (ref 3.5–7.2)
URN SPEC COLLECT METH UR: NORMAL
UROBILINOGEN UR STRIP-ACNC: 0.2 E.U./DL
WBC # BLD AUTO: 11.2 K/UL (ref 4–11)

## 2024-01-01 PROCEDURE — 2060000000 HC ICU INTERMEDIATE R&B

## 2024-01-01 PROCEDURE — 99232 SBSQ HOSP IP/OBS MODERATE 35: CPT | Performed by: INTERNAL MEDICINE

## 2024-01-01 PROCEDURE — 84145 PROCALCITONIN (PCT): CPT

## 2024-01-01 PROCEDURE — 84550 ASSAY OF BLOOD/URIC ACID: CPT

## 2024-01-01 PROCEDURE — 36415 COLL VENOUS BLD VENIPUNCTURE: CPT

## 2024-01-01 PROCEDURE — 6360000002 HC RX W HCPCS: Performed by: STUDENT IN AN ORGANIZED HEALTH CARE EDUCATION/TRAINING PROGRAM

## 2024-01-01 PROCEDURE — 81003 URINALYSIS AUTO W/O SCOPE: CPT

## 2024-01-01 PROCEDURE — 6370000000 HC RX 637 (ALT 250 FOR IP): Performed by: STUDENT IN AN ORGANIZED HEALTH CARE EDUCATION/TRAINING PROGRAM

## 2024-01-01 PROCEDURE — 83615 LACTATE (LD) (LDH) ENZYME: CPT

## 2024-01-01 PROCEDURE — 85025 COMPLETE CBC W/AUTO DIFF WBC: CPT

## 2024-01-01 PROCEDURE — 71045 X-RAY EXAM CHEST 1 VIEW: CPT

## 2024-01-01 PROCEDURE — 85610 PROTHROMBIN TIME: CPT

## 2024-01-01 PROCEDURE — 80076 HEPATIC FUNCTION PANEL: CPT

## 2024-01-01 PROCEDURE — 2580000003 HC RX 258: Performed by: STUDENT IN AN ORGANIZED HEALTH CARE EDUCATION/TRAINING PROGRAM

## 2024-01-01 PROCEDURE — 6370000000 HC RX 637 (ALT 250 FOR IP): Performed by: INTERNAL MEDICINE

## 2024-01-01 PROCEDURE — 83735 ASSAY OF MAGNESIUM: CPT

## 2024-01-01 PROCEDURE — 84100 ASSAY OF PHOSPHORUS: CPT

## 2024-01-01 PROCEDURE — 85730 THROMBOPLASTIN TIME PARTIAL: CPT

## 2024-01-01 RX ORDER — GUAIFENESIN/DEXTROMETHORPHAN 100-10MG/5
5 SYRUP ORAL EVERY 4 HOURS PRN
Status: DISCONTINUED | OUTPATIENT
Start: 2024-01-01 | End: 2024-01-09 | Stop reason: HOSPADM

## 2024-01-01 RX ADMIN — PIPERACILLIN AND TAZOBACTAM 3375 MG: 3; .375 INJECTION, POWDER, FOR SOLUTION INTRAVENOUS; PARENTERAL at 03:29

## 2024-01-01 RX ADMIN — PIPERACILLIN AND TAZOBACTAM 3375 MG: 3; .375 INJECTION, POWDER, FOR SOLUTION INTRAVENOUS; PARENTERAL at 11:29

## 2024-01-01 RX ADMIN — GUAIFENESIN SYRUP AND DEXTROMETHORPHAN 5 ML: 100; 10 SYRUP ORAL at 20:04

## 2024-01-01 RX ADMIN — GUAIFENESIN SYRUP AND DEXTROMETHORPHAN 5 ML: 100; 10 SYRUP ORAL at 09:20

## 2024-01-01 RX ADMIN — TAMSULOSIN HYDROCHLORIDE 0.4 MG: 0.4 CAPSULE ORAL at 09:12

## 2024-01-01 RX ADMIN — FUROSEMIDE 40 MG: 40 TABLET ORAL at 17:31

## 2024-01-01 RX ADMIN — LEVETIRACETAM 500 MG: 500 TABLET, FILM COATED ORAL at 19:57

## 2024-01-01 RX ADMIN — FUROSEMIDE 40 MG: 40 TABLET ORAL at 09:12

## 2024-01-01 RX ADMIN — LEVETIRACETAM 500 MG: 500 TABLET, FILM COATED ORAL at 09:12

## 2024-01-01 RX ADMIN — APIXABAN 5 MG: 5 TABLET, FILM COATED ORAL at 09:12

## 2024-01-01 RX ADMIN — Medication 1 TABLET: at 09:12

## 2024-01-01 RX ADMIN — APIXABAN 5 MG: 5 TABLET, FILM COATED ORAL at 19:57

## 2024-01-01 RX ADMIN — GUAIFENESIN SYRUP AND DEXTROMETHORPHAN 5 ML: 100; 10 SYRUP ORAL at 14:05

## 2024-01-01 RX ADMIN — SERTRALINE HYDROCHLORIDE 100 MG: 100 TABLET ORAL at 09:12

## 2024-01-01 RX ADMIN — SODIUM CHLORIDE, PRESERVATIVE FREE 10 ML: 5 INJECTION INTRAVENOUS at 09:13

## 2024-01-01 RX ADMIN — PIPERACILLIN AND TAZOBACTAM 3375 MG: 3; .375 INJECTION, POWDER, FOR SOLUTION INTRAVENOUS; PARENTERAL at 18:58

## 2024-01-01 RX ADMIN — VALACYCLOVIR HYDROCHLORIDE 500 MG: 500 TABLET, FILM COATED ORAL at 09:12

## 2024-01-01 NOTE — PROGRESS NOTES
[facility] Progress Note    2024     Zain Lopez    MRN: [id number]    : 1955    Referring MD: Penelope Wilhelm,   [address]  [facility] [address]  [place] [address]      SUBJECTIVE:      Breathing easier today.  Offers no new disease specific complaints.  Less irritable.  Wife at bedside.  No cough.  No chest pain.  Moderate dyspnea on exertion.    Weaning oxygen. Has weaned down from 15 L to 6 L and oxygenating at approximately 90%    ECOG PS:(2) Ambulatory and capable of self care, unable to carry out work activity, up and about > 50% or waking hours     KPS: 70% Cares for self; unable to carry on normal activity or to do active work    Isolation: None    Medications    Scheduled Meds:   furosemide  40 mg Oral BID    piperacillin-tazobactam  3,375 mg IntraVENous q8h    apixaban  5 mg Oral BID    levETIRAcetam  500 mg Oral BID    therapeutic multivitamin-minerals  1 tablet Oral Daily    sertraline  100 mg Oral Daily    valACYclovir  500 mg Oral Daily    sodium chloride flush  5-40 mL IntraVENous 2 times per day    Saline Mouthwash  15 mL Swish & Spit 4x Daily AC & HS    tamsulosin  0.4 mg Oral Daily     Continuous Infusions:   sodium chloride       PRN Meds:.guaiFENesin-dextromethorphan, potassium chloride **OR** potassium alternative oral replacement **OR** potassium chloride, perflutren lipid microspheres, ondansetron, polyethylene glycol, sodium chloride flush, sodium chloride, magnesium hydroxide, acetaminophen **OR** acetaminophen, Saline Mouthwash, alteplase (CATHFLO) 2 mg in sterile water 2 mL injection    ROS:  As noted above, otherwise remainder of 10-point ROS negative    Physical Exam:     I&O:    Intake/Output Summary (Last 24 hours) at 2024 1315  Last data filed at 2024 0623  Gross per 24 hour   Intake 314 ml   Output --   Net 314 ml         Vital Signs:  BP (!) 97/54   Pulse 68   Temp 97.6 °F (36.4 °C) (Oral)   Resp 19   Ht 1.778 m (5' 10\")   Wt 73.8 kg (162 lb 9.6 oz)   SpO2  involving left greater than right hemispheres. Currently, there is residual T2 and FLAIR hyperintense signal seen involving the left greater than right periventricular white matter regions, as well as the posterior corpus callosum. There is a small amount of residual enhancement seen adjacent to the atrium of the left lateral ventricle as described.   - MRI brain (9/29/23): Decreased enhancement centered about the left periatrial region, w/ no significant interval change in confluent FLAIR signal abnormality. These findings are nonspecific, potentially treatment related, although residual lymphoma is difficult to exclude.      - S/p Thiotepa, Busulfan, and Cytoxan & ASCT (11/3/23)  Post-Txp Maintenance: None  Post-Txp F/u: MRI brain w/ & w/out contrast      Day + 59     2. Neuro: Severe memory impairment and visual changes:  Improved, but not back to baseline.  This was d/t CNS mass & vasogenic edema   - Cont Keppra BID  - Referred to speech therapy -- continues to work, having difficulty with reading and will make appt with eye doctor     3. ID: Multifocal PNA POA, Afebrile. Hypoxic requiring 2lpm  - Procalcitonin 12/29 0.21  - Resp viral panel 12/29 Neg  - Blood cxs 12/29 No growth to date  - strep and legionella antigen 12/29 Neg  - MRSA 12/30 Neg   - CT chest without contrast 12/29: Multifocal bilateral consolidative opacities most compatible with pneumonia. Large right and small left pleural effusions.     - Cont Zosyn Day +4 (12/29/23)  - Cont Valtrex ppx        4. Pulmonary: cough and congestion for few weeks, now with acute respiratory failure of unclear etiology - possibly atypica infection +/- acute HF  - CXR with bilateral infiltrates 12/29. Chest x-ray today largely unchanged.  - CT chest wwithout contrast 12/29 Multifocal bilateral consolidative opacities most compatible with pneumonia. Large right and small left pleural effusions.  - Cont supplemental O2 to maintain SpO2 >92% - currently on 6lpm NC   -

## 2024-01-01 NOTE — PROGRESS NOTES
Pulmonary Followup Note    CC: acute hypoxemic respiratory failure, multifocal pneumonia, CHF exacerbation  Subjective:  Patient more alert this morning.  Weaned from 15L to 6L and saturating between 89-95% for my evaluation.  Still with a non-productive cough.  Afebrile.    ROS:  Denies headache, nausea or chest pain.    24HR INTAKE/OUTPUT:    Intake/Output Summary (Last 24 hours) at 2024 0908  Last data filed at 2024 0623  Gross per 24 hour   Intake 514 ml   Output --   Net 514 ml          furosemide  40 mg Oral BID    piperacillin-tazobactam  3,375 mg IntraVENous q8h    apixaban  5 mg Oral BID    levETIRAcetam  500 mg Oral BID    therapeutic multivitamin-minerals  1 tablet Oral Daily    sertraline  100 mg Oral Daily    valACYclovir  500 mg Oral Daily    sodium chloride flush  5-40 mL IntraVENous 2 times per day    Saline Mouthwash  15 mL Swish & Spit 4x Daily AC & HS    tamsulosin  0.4 mg Oral Daily           PHYSICAL EXAMINATION:  /65   Pulse 74   Temp 98.1 °F (36.7 °C) (Oral)   Resp 20   Ht 1.778 m (5' 10\")   Wt 73.8 kg (162 lb 9.6 oz)   SpO2 93%   BMI 23.33 kg/m²   CURRENT PULSE OXIMETRY:  SpO2: 93 %  24HR PULSE OXIMETRY RANGE:  SpO2  Av.9 %  Min: 88 %  Max: 99 %       Gen: mild distress. Speaking in full sentences with trace accessory muscle use  HEENT: PERRL, EOMI, OP nl  Lung:  Decreased breath sounds at the bases, with cough on deep inspiration  CV: RRR without M/R/R  Abd: +BS, soft, NT/ND  Ext: 2+ LE edema.    DATA  CBC:   Recent Labs     23  035   WBC 13.6* 12.8* 11.2*   HGB 9.5* 9.9* 10.4*   HCT 29.8* 30.2* 32.5*   MCV 95.1 92.5 93.6   PLT 98* 99* 106*       BMP:   Recent Labs     23  1846 23  0420 234 24  035    137 140  --    K 4.4 3.7 3.3*  --     103 103  --    CO2 21 22 25  --    PHOS  --   --   --  3.2   BUN 17 16 19  --    CREATININE 0.9 1.0 1.0  --

## 2024-01-01 NOTE — PLAN OF CARE
Problem: Infection - Adult  Goal: Absence of infection during hospitalization  1/1/2024 1612 by Adelaide Craft, RN  Outcome: Progressing- pt remained afebrile during shift. Care continues.     Problem: Hematologic - Adult  Goal: Maintains hematologic stability  1/1/2024 1611 by Adelaide Craft, RN  Outcome: Progressing  Patient's hemoglobin this AM:   Recent Labs     01/01/24  0351   HGB 10.4*     Patient's platelet count this AM:   Recent Labs     01/01/24  0351   *    Thrombocytopenia Precautions in place.  Patient showing no signs or symptoms of active bleeding.  Transfusion not indicated at this time.  Patient verbalizes understanding of all instructions. Will continue to assess and implement POC. Call light within reach and hourly rounding in place.      Problem: Respiratory - Adult  Goal: Achieves optimal ventilation and oxygenation  Outcome: Progressing- patient maintaining mid 90's saturations on 6L hi flow. Care continues.

## 2024-01-02 LAB
ANION GAP SERPL CALCULATED.3IONS-SCNC: 11 MMOL/L (ref 3–16)
BACTERIA BLD CULT: NORMAL
BASOPHILS # BLD: 0 K/UL (ref 0–0.2)
BASOPHILS NFR BLD: 0.3 %
BUN SERPL-MCNC: 16 MG/DL (ref 7–20)
CALCIUM SERPL-MCNC: 8.6 MG/DL (ref 8.3–10.6)
CHLORIDE SERPL-SCNC: 99 MMOL/L (ref 99–110)
CO2 SERPL-SCNC: 28 MMOL/L (ref 21–32)
CREAT SERPL-MCNC: 1.1 MG/DL (ref 0.8–1.3)
DEPRECATED RDW RBC AUTO: 18.2 % (ref 12.4–15.4)
EOSINOPHIL # BLD: 0.3 K/UL (ref 0–0.6)
EOSINOPHIL NFR BLD: 2.5 %
GFR SERPLBLD CREATININE-BSD FMLA CKD-EPI: >60 ML/MIN/{1.73_M2}
GLUCOSE SERPL-MCNC: 104 MG/DL (ref 70–99)
HCT VFR BLD AUTO: 32 % (ref 40.5–52.5)
HGB BLD-MCNC: 10.3 G/DL (ref 13.5–17.5)
LYMPHOCYTES # BLD: 0.3 K/UL (ref 1–5.1)
LYMPHOCYTES NFR BLD: 2.8 %
MCH RBC QN AUTO: 29.8 PG (ref 26–34)
MCHC RBC AUTO-ENTMCNC: 32.2 G/DL (ref 31–36)
MCV RBC AUTO: 92.5 FL (ref 80–100)
MONOCYTES # BLD: 1 K/UL (ref 0–1.3)
MONOCYTES NFR BLD: 8.2 %
NEUTROPHILS # BLD: 10.6 K/UL (ref 1.7–7.7)
NEUTROPHILS NFR BLD: 86.2 %
PLATELET # BLD AUTO: 122 K/UL (ref 135–450)
PMV BLD AUTO: 10.9 FL (ref 5–10.5)
POTASSIUM SERPL-SCNC: 3.2 MMOL/L (ref 3.5–5.1)
PROCALCITONIN SERPL IA-MCNC: 0.17 NG/ML (ref 0–0.15)
RBC # BLD AUTO: 3.46 M/UL (ref 4.2–5.9)
SODIUM SERPL-SCNC: 138 MMOL/L (ref 136–145)
WBC # BLD AUTO: 12.3 K/UL (ref 4–11)

## 2024-01-02 PROCEDURE — 85025 COMPLETE CBC W/AUTO DIFF WBC: CPT

## 2024-01-02 PROCEDURE — 6370000000 HC RX 637 (ALT 250 FOR IP): Performed by: STUDENT IN AN ORGANIZED HEALTH CARE EDUCATION/TRAINING PROGRAM

## 2024-01-02 PROCEDURE — 80048 BASIC METABOLIC PNL TOTAL CA: CPT

## 2024-01-02 PROCEDURE — 6360000002 HC RX W HCPCS: Performed by: STUDENT IN AN ORGANIZED HEALTH CARE EDUCATION/TRAINING PROGRAM

## 2024-01-02 PROCEDURE — 84145 PROCALCITONIN (PCT): CPT

## 2024-01-02 PROCEDURE — 6370000000 HC RX 637 (ALT 250 FOR IP): Performed by: INTERNAL MEDICINE

## 2024-01-02 PROCEDURE — 93308 TTE F-UP OR LMTD: CPT

## 2024-01-02 PROCEDURE — 99232 SBSQ HOSP IP/OBS MODERATE 35: CPT | Performed by: INTERNAL MEDICINE

## 2024-01-02 PROCEDURE — 6360000004 HC RX CONTRAST MEDICATION: Performed by: INTERNAL MEDICINE

## 2024-01-02 PROCEDURE — 2060000000 HC ICU INTERMEDIATE R&B

## 2024-01-02 PROCEDURE — 36415 COLL VENOUS BLD VENIPUNCTURE: CPT

## 2024-01-02 PROCEDURE — 6370000000 HC RX 637 (ALT 250 FOR IP): Performed by: NURSE PRACTITIONER

## 2024-01-02 PROCEDURE — 2580000003 HC RX 258: Performed by: STUDENT IN AN ORGANIZED HEALTH CARE EDUCATION/TRAINING PROGRAM

## 2024-01-02 RX ORDER — POTASSIUM CHLORIDE 20 MEQ/1
20 TABLET, EXTENDED RELEASE ORAL 2 TIMES DAILY WITH MEALS
Status: DISCONTINUED | OUTPATIENT
Start: 2024-01-02 | End: 2024-01-09 | Stop reason: HOSPADM

## 2024-01-02 RX ADMIN — VALACYCLOVIR HYDROCHLORIDE 500 MG: 500 TABLET, FILM COATED ORAL at 08:58

## 2024-01-02 RX ADMIN — APIXABAN 5 MG: 5 TABLET, FILM COATED ORAL at 20:22

## 2024-01-02 RX ADMIN — FUROSEMIDE 40 MG: 40 TABLET ORAL at 08:58

## 2024-01-02 RX ADMIN — SODIUM CHLORIDE, PRESERVATIVE FREE 10 ML: 5 INJECTION INTRAVENOUS at 20:22

## 2024-01-02 RX ADMIN — PIPERACILLIN AND TAZOBACTAM 3375 MG: 3; .375 INJECTION, POWDER, FOR SOLUTION INTRAVENOUS; PARENTERAL at 03:18

## 2024-01-02 RX ADMIN — LEVETIRACETAM 500 MG: 500 TABLET, FILM COATED ORAL at 20:22

## 2024-01-02 RX ADMIN — TAMSULOSIN HYDROCHLORIDE 0.4 MG: 0.4 CAPSULE ORAL at 08:58

## 2024-01-02 RX ADMIN — GUAIFENESIN SYRUP AND DEXTROMETHORPHAN 5 ML: 100; 10 SYRUP ORAL at 08:57

## 2024-01-02 RX ADMIN — APIXABAN 5 MG: 5 TABLET, FILM COATED ORAL at 08:59

## 2024-01-02 RX ADMIN — GUAIFENESIN SYRUP AND DEXTROMETHORPHAN 5 ML: 100; 10 SYRUP ORAL at 17:30

## 2024-01-02 RX ADMIN — POTASSIUM CHLORIDE 40 MEQ: 1500 TABLET, EXTENDED RELEASE ORAL at 06:52

## 2024-01-02 RX ADMIN — SODIUM CHLORIDE, PRESERVATIVE FREE 10 ML: 5 INJECTION INTRAVENOUS at 08:59

## 2024-01-02 RX ADMIN — Medication 1 TABLET: at 08:58

## 2024-01-02 RX ADMIN — PIPERACILLIN AND TAZOBACTAM 3375 MG: 3; .375 INJECTION, POWDER, FOR SOLUTION INTRAVENOUS; PARENTERAL at 18:34

## 2024-01-02 RX ADMIN — POTASSIUM CHLORIDE 20 MEQ: 1500 TABLET, EXTENDED RELEASE ORAL at 17:29

## 2024-01-02 RX ADMIN — LEVETIRACETAM 500 MG: 500 TABLET, FILM COATED ORAL at 08:58

## 2024-01-02 RX ADMIN — PIPERACILLIN AND TAZOBACTAM 3375 MG: 3; .375 INJECTION, POWDER, FOR SOLUTION INTRAVENOUS; PARENTERAL at 11:19

## 2024-01-02 RX ADMIN — SERTRALINE HYDROCHLORIDE 100 MG: 100 TABLET ORAL at 08:58

## 2024-01-02 RX ADMIN — PERFLUTREN 1.5 ML: 6.52 INJECTION, SUSPENSION INTRAVENOUS at 07:30

## 2024-01-02 RX ADMIN — GUAIFENESIN SYRUP AND DEXTROMETHORPHAN 5 ML: 100; 10 SYRUP ORAL at 12:59

## 2024-01-02 NOTE — PLAN OF CARE
Problem: Safety - Adult  Goal: Free from fall injury  1/2/2024 1451 by Adelaide Craft RN  Outcome: Progressing  Orthostatic vital signs obtained at start of shift - see flowsheet for details.  Pt does not meet criteria for orthostasis.  Pt is a Med fall risk. See Stephens Fall Score and ABCDS Injury Risk assessments.   - Screening for Orthostasis AND not a [place] Risk per STPEHENS/ABCDS: Pt bed is in low position, side rails up, call light and belongings are in reach.  Fall risk light is on outside pts room.  Pt encouraged to call for assistance as needed. Will continue with hourly rounds for PO intake, pain needs, toileting and repositioning as needed.      Problem: Metabolic/Fluid and Electrolytes - Adult  Goal: Electrolytes maintained within normal limits  1/2/2024 1451 by Adelaide Craft RN  Outcome: Progressing- potassium replaced per orders during shift. Care continues.     Problem: Pain  Goal: Verbalizes/displays adequate comfort level or baseline comfort level  1/2/2024 1451 by Adelaide Craft RN  Outcome: Progressing- patient taking PRN robitussin to help manage cough. Patient reports relief of symptoms post medication administration.     Problem: Respiratory - Adult  Goal: Achieves optimal ventilation and oxygenation  1/2/2024 1451 by Adelaide Craft RN  Outcome: Progressing- patient remains on high flow nasal cannula. RN titrated between 3.5-7 L during shift to keep oxygen levels above goal of 90%. Care continues.

## 2024-01-02 NOTE — PROGRESS NOTES
General: Awake, alert and oriented. More comfortable appearing  HEENT: normocephalic, no scleral erythema or icterus, Oral mucosa moist and intact, throat clear  NECK: supple   BACK: Straight   SKIN: warm dry and intact without lesions rashes or masses  CHEST: tachypnea, bilateral rhonchi. Diminished breath sounds one third up right base. Nonproductive cough  CV: Normal S1 S2, RRR, no MRG  ABD: NT ND normoactive BS  EXTREMITIES: without edema, denies calf tenderness  NEURO: grossly intact  CATHETER: PIV    Data    CBC:   Recent Labs     12/31/23  0354 01/01/24  0351 01/02/24 0419   WBC 12.8* 11.2* 12.3*   HGB 9.9* 10.4* 10.3*   HCT 30.2* 32.5* 32.0*   MCV 92.5 93.6 92.5   PLT 99* 106* 122*       BMP/Mag:  Recent Labs     12/31/23  0354 01/01/24  0351 01/02/24 0419     --  138   K 3.3*  --  3.2*     --  99   CO2 25  --  28   PHOS  --  3.2  --    BUN 19  --  16   CREATININE 1.0  --  1.1   MG  --  1.60*  --        LIVP:   Recent Labs     01/01/24 0351   AST 17   ALT 11   BILIDIR 0.3   BILITOT 0.5   ALKPHOS 91       Coags:   Recent Labs     01/01/24 0351   PROTIME 18.4*   INR 1.53*   APTT 32.3       Uric Acid   Recent Labs     01/01/24 0351   LABURIC 2.2*       PROBLEM LIST:           1.  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  BPH  5.  GERD  6.  Depressed mood   7.  CHB w/ left SC dual chamber pacemaker  8.  HTN  9. C-Diff  10. Staph Epi Bacteremia  11. DVT  12. JOEL     TREATMENT:              DLBCL:  1. R-CHOP (10/26/21 - 2/11/22)  CNS Relapse (7/2023):  2.  R+MPV x 6 cycles (7/6/23 - 9/14/23)  3.  Thiotepa, Busulfan, and Cytoxan & ASCT (11/3/23)      ASSESSMENT AND PLAN:           1. DLBC NHL w/ CNS relapse: Patient currently in [place]  - CSF (4/21/23): no malignant cells, flow: negative   - Brain bx (4/28/23, [facility]):  T cell infiltrate but not consistent w/ lymphoma,

## 2024-01-02 NOTE — PROGRESS NOTES
Pulmonary Followup Note    CC: acute hypoxemic respiratory failure, multifocal pneumonia, CHF exacerbation  Subjective:  Patient more alert this morning.  Weaned from 6L to 4L and saturating between 92% for my evaluation.  Still with a non-productive cough.  Afebrile.    ROS:  Denies headache, nausea or chest pain.    24HR INTAKE/OUTPUT:    Intake/Output Summary (Last 24 hours) at 2024 1606  Last data filed at 2024 1119  Gross per 24 hour   Intake 1121 ml   Output 2430 ml   Net -1309 ml          potassium chloride  20 mEq Oral BID WC    [START ON 1/3/2024] sertraline  150 mg Oral Daily    furosemide  40 mg Oral BID    piperacillin-tazobactam  3,375 mg IntraVENous q8h    apixaban  5 mg Oral BID    levETIRAcetam  500 mg Oral BID    therapeutic multivitamin-minerals  1 tablet Oral Daily    valACYclovir  500 mg Oral Daily    sodium chloride flush  5-40 mL IntraVENous 2 times per day    Saline Mouthwash  15 mL Swish & Spit 4x Daily AC & HS    tamsulosin  0.4 mg Oral Daily           PHYSICAL EXAMINATION:  BP 97/60   Pulse 60   Temp 97.9 °F (36.6 °C) (Oral)   Resp 24   Ht 1.778 m (5' 10\")   Wt 73.8 kg (162 lb 9.6 oz)   SpO2 100%   BMI 23.33 kg/m²   CURRENT PULSE OXIMETRY:  SpO2: 100 %  24HR PULSE OXIMETRY RANGE:  SpO2  Av.1 %  Min: 90 %  Max: 100 %       Gen: mild distress. Speaking in full sentences with trace accessory muscle use  HEENT: PERRL, EOMI, OP nl  Lung:  Decreased breath sounds at the bases, with cough on deep inspiration  CV: RRR without M/R/R  Abd: +BS, soft, NT/ND  Ext: 1+ LE edema.    DATA  CBC:   Recent Labs     23   WBC 12.8* 11.2* 12.3*   HGB 9.9* 10.4* 10.3*   HCT 30.2* 32.5* 32.0*   MCV 92.5 93.6 92.5   PLT 99* 106* 122*       BMP:   Recent Labs     23  03524     --  138   K 3.3*  --  3.2*     --  99   CO2 25  --  28   PHOS  --  3.2  --    BUN   --

## 2024-01-02 NOTE — PLAN OF CARE
Problem: Safety - Adult  Goal: Free from fall injury  1/2/2024 0426 by Dre Jackman, RN  Outcome: Progressing  Note:   - Screening for Orthostasis AND not a [place] Risk per STEPHENS/ABCDS: Pt bed is in low position, side rails up, call light and belongings are in reach.  Fall risk light is on outside pts room.  Pt encouraged to call for assistance as needed. Will continue with hourly rounds for PO intake, pain needs, toileting and repositioning as needed.        Problem: Skin/Tissue Integrity - Adult  Goal: Skin integrity remains intact  Outcome: Progressing  Note: No skin breakdown noted this shift.     Problem: Infection - Adult  Goal: Absence of infection during hospitalization  1/2/2024 0426 by Dre Jackman, RN  Outcome: Progressing  Note: Pt was afebrile during the shift.     Problem: Metabolic/Fluid and Electrolytes - Adult  Goal: Electrolytes maintained within normal limits  Outcome: Progressing  K replacement done this AM     Problem: Hematologic - Adult  Goal: Maintains hematologic stability  1/2/2024 0426 by Dre Jackman, RN  Outcome: Progressing   Patient's hemoglobin this AM:   Recent Labs     01/02/24  0419   HGB 10.3*     Patient's platelet count this AM:   Recent Labs     01/02/24  0419   *    Thrombocytopenia not present at this time.  Patient showing no signs or symptoms of active bleeding.  Transfusion not indicated at this time.  Patient verbalizes understanding of all instructions. Will continue to assess and implement POC. Call light within reach and hourly rounding in place.

## 2024-01-03 PROBLEM — I42.0 DILATED CARDIOMYOPATHY (HCC): Status: ACTIVE | Noted: 2024-01-03

## 2024-01-03 LAB
(1,3)-BETA-D-GLUCAN (FUNGITELL) INTERPRETATION: NEGATIVE
1,3 BETA GLUCAN SER-MCNC: 48 PG/ML
ALBUMIN SERPL-MCNC: 2.7 G/DL (ref 3.4–5)
ALP SERPL-CCNC: 99 U/L (ref 40–129)
ALT SERPL-CCNC: 15 U/L (ref 10–40)
ANION GAP SERPL CALCULATED.3IONS-SCNC: 8 MMOL/L (ref 3–16)
AST SERPL-CCNC: 24 U/L (ref 15–37)
BASOPHILS # BLD: 0.1 K/UL (ref 0–0.2)
BASOPHILS NFR BLD: 0.5 %
BILIRUB DIRECT SERPL-MCNC: <0.2 MG/DL (ref 0–0.3)
BILIRUB INDIRECT SERPL-MCNC: ABNORMAL MG/DL (ref 0–1)
BILIRUB SERPL-MCNC: 0.4 MG/DL (ref 0–1)
BUN SERPL-MCNC: 19 MG/DL (ref 7–20)
CALCIUM SERPL-MCNC: 8.7 MG/DL (ref 8.3–10.6)
CHLORIDE SERPL-SCNC: 98 MMOL/L (ref 99–110)
CO2 SERPL-SCNC: 28 MMOL/L (ref 21–32)
CREAT SERPL-MCNC: 1 MG/DL (ref 0.8–1.3)
DEPRECATED RDW RBC AUTO: 18.2 % (ref 12.4–15.4)
EOSINOPHIL # BLD: 0.3 K/UL (ref 0–0.6)
EOSINOPHIL NFR BLD: 2.8 %
GFR SERPLBLD CREATININE-BSD FMLA CKD-EPI: >60 ML/MIN/{1.73_M2}
GLUCOSE SERPL-MCNC: 114 MG/DL (ref 70–99)
HCT VFR BLD AUTO: 32.9 % (ref 40.5–52.5)
HGB BLD-MCNC: 10.4 G/DL (ref 13.5–17.5)
LDH SERPL L TO P-CCNC: 175 U/L (ref 100–190)
LYMPHOCYTES # BLD: 0.4 K/UL (ref 1–5.1)
LYMPHOCYTES NFR BLD: 3.1 %
MAGNESIUM SERPL-MCNC: 1.7 MG/DL (ref 1.8–2.4)
MCH RBC QN AUTO: 29.7 PG (ref 26–34)
MCHC RBC AUTO-ENTMCNC: 31.5 G/DL (ref 31–36)
MCV RBC AUTO: 94.3 FL (ref 80–100)
MONOCYTES # BLD: 0.9 K/UL (ref 0–1.3)
MONOCYTES NFR BLD: 7.9 %
NEUTROPHILS # BLD: 10 K/UL (ref 1.7–7.7)
NEUTROPHILS NFR BLD: 85.7 %
PHOSPHATE SERPL-MCNC: 2.5 MG/DL (ref 2.5–4.9)
PLATELET # BLD AUTO: 125 K/UL (ref 135–450)
PMV BLD AUTO: 10.4 FL (ref 5–10.5)
POTASSIUM SERPL-SCNC: 3.7 MMOL/L (ref 3.5–5.1)
PROCALCITONIN SERPL IA-MCNC: 0.13 NG/ML (ref 0–0.15)
PROT SERPL-MCNC: 5.1 G/DL (ref 6.4–8.2)
RBC # BLD AUTO: 3.49 M/UL (ref 4.2–5.9)
SODIUM SERPL-SCNC: 134 MMOL/L (ref 136–145)
URATE SERPL-MCNC: 2.3 MG/DL (ref 3.5–7.2)
WBC # BLD AUTO: 11.7 K/UL (ref 4–11)

## 2024-01-03 PROCEDURE — 99222 1ST HOSP IP/OBS MODERATE 55: CPT | Performed by: INTERNAL MEDICINE

## 2024-01-03 PROCEDURE — 97530 THERAPEUTIC ACTIVITIES: CPT

## 2024-01-03 PROCEDURE — 80076 HEPATIC FUNCTION PANEL: CPT

## 2024-01-03 PROCEDURE — 97116 GAIT TRAINING THERAPY: CPT

## 2024-01-03 PROCEDURE — 97161 PT EVAL LOW COMPLEX 20 MIN: CPT

## 2024-01-03 PROCEDURE — 80048 BASIC METABOLIC PNL TOTAL CA: CPT

## 2024-01-03 PROCEDURE — 84550 ASSAY OF BLOOD/URIC ACID: CPT

## 2024-01-03 PROCEDURE — 83735 ASSAY OF MAGNESIUM: CPT

## 2024-01-03 PROCEDURE — 97166 OT EVAL MOD COMPLEX 45 MIN: CPT

## 2024-01-03 PROCEDURE — 2580000003 HC RX 258: Performed by: STUDENT IN AN ORGANIZED HEALTH CARE EDUCATION/TRAINING PROGRAM

## 2024-01-03 PROCEDURE — 2060000000 HC ICU INTERMEDIATE R&B

## 2024-01-03 PROCEDURE — 84145 PROCALCITONIN (PCT): CPT

## 2024-01-03 PROCEDURE — 99232 SBSQ HOSP IP/OBS MODERATE 35: CPT | Performed by: INTERNAL MEDICINE

## 2024-01-03 PROCEDURE — 36415 COLL VENOUS BLD VENIPUNCTURE: CPT

## 2024-01-03 PROCEDURE — 97535 SELF CARE MNGMENT TRAINING: CPT

## 2024-01-03 PROCEDURE — 6370000000 HC RX 637 (ALT 250 FOR IP): Performed by: STUDENT IN AN ORGANIZED HEALTH CARE EDUCATION/TRAINING PROGRAM

## 2024-01-03 PROCEDURE — 6370000000 HC RX 637 (ALT 250 FOR IP): Performed by: INTERNAL MEDICINE

## 2024-01-03 PROCEDURE — 85025 COMPLETE CBC W/AUTO DIFF WBC: CPT

## 2024-01-03 PROCEDURE — 84100 ASSAY OF PHOSPHORUS: CPT

## 2024-01-03 PROCEDURE — 6370000000 HC RX 637 (ALT 250 FOR IP): Performed by: NURSE PRACTITIONER

## 2024-01-03 PROCEDURE — 6360000002 HC RX W HCPCS: Performed by: STUDENT IN AN ORGANIZED HEALTH CARE EDUCATION/TRAINING PROGRAM

## 2024-01-03 PROCEDURE — 83615 LACTATE (LD) (LDH) ENZYME: CPT

## 2024-01-03 RX ADMIN — LEVETIRACETAM 500 MG: 500 TABLET, FILM COATED ORAL at 08:48

## 2024-01-03 RX ADMIN — PIPERACILLIN AND TAZOBACTAM 3375 MG: 3; .375 INJECTION, POWDER, FOR SOLUTION INTRAVENOUS; PARENTERAL at 11:46

## 2024-01-03 RX ADMIN — Medication 1 TABLET: at 08:48

## 2024-01-03 RX ADMIN — GUAIFENESIN SYRUP AND DEXTROMETHORPHAN 5 ML: 100; 10 SYRUP ORAL at 03:50

## 2024-01-03 RX ADMIN — PIPERACILLIN AND TAZOBACTAM 3375 MG: 3; .375 INJECTION, POWDER, FOR SOLUTION INTRAVENOUS; PARENTERAL at 03:41

## 2024-01-03 RX ADMIN — APIXABAN 5 MG: 5 TABLET, FILM COATED ORAL at 20:32

## 2024-01-03 RX ADMIN — POTASSIUM CHLORIDE 20 MEQ: 1500 TABLET, EXTENDED RELEASE ORAL at 17:34

## 2024-01-03 RX ADMIN — TAMSULOSIN HYDROCHLORIDE 0.4 MG: 0.4 CAPSULE ORAL at 08:48

## 2024-01-03 RX ADMIN — SERTRALINE HYDROCHLORIDE 150 MG: 100 TABLET ORAL at 08:48

## 2024-01-03 RX ADMIN — GUAIFENESIN SYRUP AND DEXTROMETHORPHAN 5 ML: 100; 10 SYRUP ORAL at 09:03

## 2024-01-03 RX ADMIN — SODIUM CHLORIDE, PRESERVATIVE FREE 10 ML: 5 INJECTION INTRAVENOUS at 20:31

## 2024-01-03 RX ADMIN — LEVETIRACETAM 500 MG: 500 TABLET, FILM COATED ORAL at 20:32

## 2024-01-03 RX ADMIN — POTASSIUM CHLORIDE 20 MEQ: 1500 TABLET, EXTENDED RELEASE ORAL at 08:48

## 2024-01-03 RX ADMIN — VALACYCLOVIR HYDROCHLORIDE 500 MG: 500 TABLET, FILM COATED ORAL at 08:49

## 2024-01-03 RX ADMIN — PIPERACILLIN AND TAZOBACTAM 3375 MG: 3; .375 INJECTION, POWDER, FOR SOLUTION INTRAVENOUS; PARENTERAL at 18:44

## 2024-01-03 RX ADMIN — SODIUM CHLORIDE, PRESERVATIVE FREE 10 ML: 5 INJECTION INTRAVENOUS at 08:49

## 2024-01-03 RX ADMIN — APIXABAN 5 MG: 5 TABLET, FILM COATED ORAL at 08:48

## 2024-01-03 NOTE — PROGRESS NOTES
[facility] Progress [facility]    1/3/2024     Zain Lopez    MRN: [id number]    : 1955    Referring MD: Penelope Wilhelm,   [address]  [facility] [address]  [place] [address]      SUBJECTIVE:  he is doing well, weaned him off O2    ECOG PS:(2) Ambulatory and capable of self care, unable to carry out work activity, up and about > 50% or waking hours     KPS: 70% Cares for self; unable to carry on normal activity or to do active work    Isolation: None    Medications    Scheduled Meds:   potassium chloride  20 mEq Oral BID WC    sertraline  150 mg Oral Daily    furosemide  40 mg Oral BID    piperacillin-tazobactam  3,375 mg IntraVENous q8h    apixaban  5 mg Oral BID    levETIRAcetam  500 mg Oral BID    therapeutic multivitamin-minerals  1 tablet Oral Daily    valACYclovir  500 mg Oral Daily    sodium chloride flush  5-40 mL IntraVENous 2 times per day    Saline Mouthwash  15 mL Swish & Spit 4x Daily AC & HS    tamsulosin  0.4 mg Oral Daily     Continuous Infusions:   sodium chloride       PRN Meds:.guaiFENesin-dextromethorphan, potassium chloride **OR** potassium alternative oral replacement **OR** potassium chloride, perflutren lipid microspheres, ondansetron, polyethylene glycol, sodium chloride flush, sodium chloride, magnesium hydroxide, acetaminophen **OR** acetaminophen, Saline Mouthwash, alteplase (CATHFLO) 2 mg in sterile water 2 mL injection    ROS:  As noted above, otherwise remainder of 10-point ROS negative    Physical Exam:     I&O:    Intake/Output Summary (Last 24 hours) at 1/3/2024 0804  Last data filed at 1/3/2024 0338  Gross per 24 hour   Intake 916 ml   Output 1680 ml   Net -764 ml         Vital Signs:  BP 97/60   Pulse 65   Temp 98.5 °F (36.9 °C) (Oral)   Resp 20   Ht 1.778 m (5' 10\")   Wt 73.8 kg (162 lb 9.6 oz)   SpO2 95%   BMI 23.33 kg/m²     Weight:    Wt Readings from Last 3 Encounters:   23 73.8 kg (162 lb 9.6 oz)   23 76 kg (167 lb 8.8 oz)   10/18/23 77.2 kg (170 lb 3.1
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
## 2024-01-03 NOTE — PLAN OF CARE
Problem: Safety - Adult  Goal: Free from fall injury  1/3/2024 1420 by Briseida Rodriguez, RN  Outcome: Progressing  Flowsheets (Taken 1/3/2024 1420)  Free From Fall Injury: Instruct family/caregiver on patient safety  Note: Orthostatic vital signs obtained at start of shift - see flowsheet for details.  Pt does not meet criteria for orthostasis.  Pt is a Med fall risk. See Stephens Fall Score and ABCDS Injury Risk assessments.     - Screening for Orthostasis AND not a [place] Risk per STEPHENS/ABCDS: Pt bed is in low position, side rails up, call light and belongings are in reach.  Fall risk light is on outside pts room.  Pt encouraged to call for assistance as needed. Will continue with hourly rounds for PO intake, pain needs, toileting and repositioning as needed.      Problem: Respiratory - Adult  Goal: Achieves optimal ventilation and oxygenation  1/3/2024 1420 by Briseida Rodriguez, RN  Outcome: Progressing  Note: Tried to keep pt on RA. Pt was sating in the 88s. Pt is currently on 1L. Pt desats with ambulation but recovers very quickly

## 2024-01-03 NOTE — PLAN OF CARE
Problem: Safety - Adult  Goal: Free from fall injury  Outcome: Progressing     Problem: ABCDS Injury Assessment  Goal: Absence of physical injury  Outcome: Progressing  Flowsheets (Taken 1/3/2024 0805)  Absence of Physical Injury: Implement safety measures based on patient assessment     Problem: Pain  Goal: Verbalizes/displays adequate comfort level or baseline comfort level  Outcome: Progressing  Note: No complains of pain this shift.     Problem: Respiratory - Adult  Goal: Achieves optimal ventilation and oxygenation  Outcome: Progressing  Flowsheets (Taken 1/3/2024 0805)  Achieves optimal ventilation and oxygenation: Assess for changes in respiratory status  Note: Patient is on O2 support via High flow nasal cannula @ 5 L/min.
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
## 2024-01-03 NOTE — PROGRESS NOTES
Physician Progress Note      PATIENT:               CHRIS PRABHAKAR  CSN #:                  [id number]  :                       1955  ADMIT DATE:       2023 5:18 PM  DISCH DATE:  RESPONDING  PROVIDER #:        NICHOLAS Rome CNP          QUERY TEXT:    Pt admitted with Pneumonia and has CHF documented by attending and CHF   exacerbation documented by Pulmonology consult.  Per  ECHO: EF: 40-45%.   If possible, please document in progress notes and discharge summary further   specificity regarding the type and acuity of CHF:    The medical record reflects the following:  Risk Factors: 68 y.o. male with hx of alcohol abuse, CAD, NHL, pericarditis,   3rd degree heart block s/p pacemaker  Clinical Indicators: shortness of breath documented once by pulmonology   consult  Treatment: PO Lasix 40mg twice daily,  Options provided:  -- Chronic Systolic CHF/HFrEF  -- Acute on Chronic Systolic CHF/HFrEF, with acute portion as evidenced by,   Please document supportive clinical indicators.  -- Other - I will add my own diagnosis  -- Disagree - Not applicable / Not valid  -- Disagree - Clinically unable to determine / Unknown  -- Refer to Clinical Documentation Reviewer    PROVIDER RESPONSE TEXT:    This patient is in acute on chronic systolic CHF/HFrEF, with acute portion as   evidenced by SOB, acute pulmonary edema, pleural effusions    Query created by: Eldon Rome on 2024 2:28 PM      Electronically signed by:  NICHOLAS Rome CNP 1/3/2024 1:48 PM

## 2024-01-03 NOTE — PROGRESS NOTES
Pulmonary Followup Note    CC: acute hypoxemic respiratory failure, multifocal pneumonia, CHF exacerbation  Subjective:  Saturating well on 4L so I turned him down to 3.  He remains dyspneic, but he's not been out of bed more than to get to the bedside commode.  Lasix held this morning because of a lower bp.    ROS:  Denies headache, nausea or chest pain.    24HR INTAKE/OUTPUT:    Intake/Output Summary (Last 24 hours) at 1/3/2024 1322  Last data filed at 1/3/2024 0840  Gross per 24 hour   Intake 687 ml   Output 900 ml   Net -213 ml          potassium chloride  20 mEq Oral BID WC    sertraline  150 mg Oral Daily    [Held by provider] furosemide  40 mg Oral BID    piperacillin-tazobactam  3,375 mg IntraVENous q8h    apixaban  5 mg Oral BID    levETIRAcetam  500 mg Oral BID    therapeutic multivitamin-minerals  1 tablet Oral Daily    valACYclovir  500 mg Oral Daily    sodium chloride flush  5-40 mL IntraVENous 2 times per day    Saline Mouthwash  15 mL Swish & Spit 4x Daily AC & HS    tamsulosin  0.4 mg Oral Daily           PHYSICAL EXAMINATION:  BP (!) 87/59   Pulse 61   Temp 97.5 °F (36.4 °C) (Oral)   Resp 25   Ht 1.778 m (5' 10\")   Wt 67 kg (147 lb 12.8 oz)   SpO2 97%   BMI 21.21 kg/m²   CURRENT PULSE OXIMETRY:  SpO2: 97 %  24HR PULSE OXIMETRY RANGE:  SpO2  Av.8 %  Min: 94 %  Max: 100 %       Gen: mild distress. Speaking in full sentences with trace accessory muscle use  HEENT: PERRL, EOMI, OP nl  Lung:  Decreased breath sounds at the right base.  CV: RRR without M/R/R  Abd: +BS, soft, NT/ND  Ext: 1+ LE edema.    DATA  CBC:   Recent Labs     24   WBC 11.2* 12.3* 11.7*   HGB 10.4* 10.3* 10.4*   HCT 32.5* 32.0* 32.9*   MCV 93.6 92.5 94.3   * 122* 125*       BMP:   Recent Labs     24   NA  --  138 134*   K  --  3.2* 3.7   CL  --  99 98*   CO2  --  28 28   PHOS 3.2  --  2.5   BUN   --  16 19   CREATININE  --  1.1 1.0       No results for input(s): \"PHART\", \"ZJN7JVN\", \"PO2ART\" in the last 72 hours.  LIVER PROFILE:   Recent Labs     01/01/24  0351 01/03/24  0429   AST 17 24   ALT 11 15   BILIDIR 0.3 <0.2   BILITOT 0.5 0.4   ALKPHOS 91 99       Procalcitonin:    Lab Results   Component Value Date/Time    PROCAL 0.13 01/03/2024 04:29 AM       CXR REVIEWED BY ME AND SHOWED:  XR CHEST PORTABLE   Final Result   1. As above.      XR CHEST PORTABLE   Final Result      Increased bilateral airspace disease with pleural effusions            CT SOFT TISSUE NECK WO CONTRAST   Final Result      1. No evidence for retained tracheal balloon involving the pharynx, larynx or   visualized trachea      2. No evidence for acute abnormality in the neck      3. Bilateral airspace disease and pleural effusions         CT CHEST WO CONTRAST   Final Result      1.  Multifocal bilateral consolidative opacities most compatible with pneumonia.   2.  Large right and small left pleural effusions.      Electronically signed by Srini Orona MD           ASSESSMENT/PLAN:  This is a 68 y.o. male with acute hypoxemic respiratory failure 2/2 pneumonia vs CHF exacerbation vs both.    acute hypoxemic respiratory failure:  Differential remains decompensated chf and infectious.    ECHO today stable with the one back in November showing hfref.    Continue lasix as tolerated, and antibiotics.  Patient paced at 60 bpm with background rhythm that looked like atrial flutter on telemetry.    Consider cardiology consult to optimize his heart failure.    Dwight Leyva MD

## 2024-01-04 ENCOUNTER — APPOINTMENT (OUTPATIENT)
Dept: GENERAL RADIOLOGY | Age: 69
DRG: 193 | End: 2024-01-04
Attending: STUDENT IN AN ORGANIZED HEALTH CARE EDUCATION/TRAINING PROGRAM
Payer: MEDICARE

## 2024-01-04 PROBLEM — I50.9 PLEURAL EFFUSION DUE TO CHF (CONGESTIVE HEART FAILURE) (HCC): Status: ACTIVE | Noted: 2024-01-04

## 2024-01-04 PROBLEM — J96.01 ACUTE HYPOXEMIC RESPIRATORY FAILURE (HCC): Status: ACTIVE | Noted: 2024-01-04

## 2024-01-04 LAB
ANION GAP SERPL CALCULATED.3IONS-SCNC: 8 MMOL/L (ref 3–16)
APTT BLD: 31.9 SEC (ref 22.7–35.9)
BASOPHILS # BLD: 0.1 K/UL (ref 0–0.2)
BASOPHILS NFR BLD: 0.7 %
BUN SERPL-MCNC: 22 MG/DL (ref 7–20)
CALCIUM SERPL-MCNC: 9 MG/DL (ref 8.3–10.6)
CHLORIDE SERPL-SCNC: 103 MMOL/L (ref 99–110)
CO2 SERPL-SCNC: 29 MMOL/L (ref 21–32)
CREAT SERPL-MCNC: 1.1 MG/DL (ref 0.8–1.3)
DEPRECATED RDW RBC AUTO: 18.5 % (ref 12.4–15.4)
EOSINOPHIL # BLD: 0.3 K/UL (ref 0–0.6)
EOSINOPHIL NFR BLD: 2.9 %
GALACTOMANNAN AG SERPL IA-ACNC: 0.07
GALACTOMANNAN AG SERPL QL IA: NEGATIVE
GFR SERPLBLD CREATININE-BSD FMLA CKD-EPI: >60 ML/MIN/{1.73_M2}
GLUCOSE SERPL-MCNC: 104 MG/DL (ref 70–99)
H CAPSUL AG UR IA-ACNC: NOT DETECTED NG/ML
H CAPSUL AG UR QL IA: NOT DETECTED
HCT VFR BLD AUTO: 32.1 % (ref 40.5–52.5)
HGB BLD-MCNC: 10.1 G/DL (ref 13.5–17.5)
INR PPP: 1.28 (ref 0.84–1.16)
LYMPHOCYTES # BLD: 0.4 K/UL (ref 1–5.1)
LYMPHOCYTES NFR BLD: 3.2 %
MCH RBC QN AUTO: 29.8 PG (ref 26–34)
MCHC RBC AUTO-ENTMCNC: 31.5 G/DL (ref 31–36)
MCV RBC AUTO: 94.7 FL (ref 80–100)
MONOCYTES # BLD: 0.9 K/UL (ref 0–1.3)
MONOCYTES NFR BLD: 8.1 %
NEUTROPHILS # BLD: 9.5 K/UL (ref 1.7–7.7)
NEUTROPHILS NFR BLD: 85.1 %
PLATELET # BLD AUTO: 149 K/UL (ref 135–450)
PMV BLD AUTO: 11 FL (ref 5–10.5)
POTASSIUM SERPL-SCNC: 4.1 MMOL/L (ref 3.5–5.1)
PROCALCITONIN SERPL IA-MCNC: 0.1 NG/ML (ref 0–0.15)
PROTHROMBIN TIME: 16 SEC (ref 11.5–14.8)
RBC # BLD AUTO: 3.39 M/UL (ref 4.2–5.9)
SODIUM SERPL-SCNC: 140 MMOL/L (ref 136–145)
WBC # BLD AUTO: 11.2 K/UL (ref 4–11)

## 2024-01-04 PROCEDURE — 84145 PROCALCITONIN (PCT): CPT

## 2024-01-04 PROCEDURE — 6360000002 HC RX W HCPCS: Performed by: NURSE PRACTITIONER

## 2024-01-04 PROCEDURE — 6370000000 HC RX 637 (ALT 250 FOR IP): Performed by: STUDENT IN AN ORGANIZED HEALTH CARE EDUCATION/TRAINING PROGRAM

## 2024-01-04 PROCEDURE — 99232 SBSQ HOSP IP/OBS MODERATE 35: CPT | Performed by: INTERNAL MEDICINE

## 2024-01-04 PROCEDURE — 80048 BASIC METABOLIC PNL TOTAL CA: CPT

## 2024-01-04 PROCEDURE — 85610 PROTHROMBIN TIME: CPT

## 2024-01-04 PROCEDURE — 6370000000 HC RX 637 (ALT 250 FOR IP): Performed by: NURSE PRACTITIONER

## 2024-01-04 PROCEDURE — 36415 COLL VENOUS BLD VENIPUNCTURE: CPT

## 2024-01-04 PROCEDURE — 2580000003 HC RX 258: Performed by: STUDENT IN AN ORGANIZED HEALTH CARE EDUCATION/TRAINING PROGRAM

## 2024-01-04 PROCEDURE — 6370000000 HC RX 637 (ALT 250 FOR IP): Performed by: INTERNAL MEDICINE

## 2024-01-04 PROCEDURE — 85025 COMPLETE CBC W/AUTO DIFF WBC: CPT

## 2024-01-04 PROCEDURE — 2580000003 HC RX 258: Performed by: NURSE PRACTITIONER

## 2024-01-04 PROCEDURE — 2060000000 HC ICU INTERMEDIATE R&B

## 2024-01-04 PROCEDURE — 71046 X-RAY EXAM CHEST 2 VIEWS: CPT

## 2024-01-04 PROCEDURE — 85730 THROMBOPLASTIN TIME PARTIAL: CPT

## 2024-01-04 RX ORDER — FUROSEMIDE 40 MG/1
40 TABLET ORAL DAILY
Status: DISCONTINUED | OUTPATIENT
Start: 2024-01-05 | End: 2024-01-09 | Stop reason: HOSPADM

## 2024-01-04 RX ORDER — VALACYCLOVIR HYDROCHLORIDE 500 MG/1
500 TABLET, FILM COATED ORAL 2 TIMES DAILY
Status: DISCONTINUED | OUTPATIENT
Start: 2024-01-04 | End: 2024-01-09 | Stop reason: HOSPADM

## 2024-01-04 RX ADMIN — APIXABAN 5 MG: 5 TABLET, FILM COATED ORAL at 09:36

## 2024-01-04 RX ADMIN — POTASSIUM CHLORIDE 20 MEQ: 1500 TABLET, EXTENDED RELEASE ORAL at 09:36

## 2024-01-04 RX ADMIN — SODIUM CHLORIDE, PRESERVATIVE FREE 10 ML: 5 INJECTION INTRAVENOUS at 09:37

## 2024-01-04 RX ADMIN — FUROSEMIDE 40 MG: 40 TABLET ORAL at 10:16

## 2024-01-04 RX ADMIN — PIPERACILLIN AND TAZOBACTAM 3375 MG: 3; .375 INJECTION, POWDER, FOR SOLUTION INTRAVENOUS; PARENTERAL at 12:38

## 2024-01-04 RX ADMIN — LEVETIRACETAM 500 MG: 500 TABLET, FILM COATED ORAL at 19:58

## 2024-01-04 RX ADMIN — SERTRALINE HYDROCHLORIDE 150 MG: 100 TABLET ORAL at 09:36

## 2024-01-04 RX ADMIN — VALACYCLOVIR HYDROCHLORIDE 500 MG: 500 TABLET, FILM COATED ORAL at 09:36

## 2024-01-04 RX ADMIN — PIPERACILLIN AND TAZOBACTAM 3375 MG: 3; .375 INJECTION, POWDER, FOR SOLUTION INTRAVENOUS; PARENTERAL at 20:01

## 2024-01-04 RX ADMIN — POTASSIUM CHLORIDE 20 MEQ: 1500 TABLET, EXTENDED RELEASE ORAL at 16:48

## 2024-01-04 RX ADMIN — VALACYCLOVIR HYDROCHLORIDE 500 MG: 500 TABLET, FILM COATED ORAL at 19:58

## 2024-01-04 RX ADMIN — GUAIFENESIN SYRUP AND DEXTROMETHORPHAN 5 ML: 100; 10 SYRUP ORAL at 02:34

## 2024-01-04 RX ADMIN — SODIUM CHLORIDE 15 ML: 900 IRRIGANT IRRIGATION at 20:01

## 2024-01-04 RX ADMIN — LEVETIRACETAM 500 MG: 500 TABLET, FILM COATED ORAL at 09:36

## 2024-01-04 RX ADMIN — Medication 1 TABLET: at 09:36

## 2024-01-04 RX ADMIN — TAMSULOSIN HYDROCHLORIDE 0.4 MG: 0.4 CAPSULE ORAL at 09:36

## 2024-01-04 RX ADMIN — APIXABAN 5 MG: 5 TABLET, FILM COATED ORAL at 19:58

## 2024-01-04 RX ADMIN — SODIUM CHLORIDE, PRESERVATIVE FREE 10 ML: 5 INJECTION INTRAVENOUS at 20:01

## 2024-01-04 NOTE — PROGRESS NOTES
[facility]  Cardiology Daily Progress Note  1/4/2024,3:04 PM      Aidan Giron MD ,FACC  Ronal Wahl APRN - CNP  Primary cardiologist:    Admit Date:  12/29/2023  Hospital Day: Hospital Day: 7  Subjective:  Mr. Lopez is alert awake stable.  Rhythm is still V-paced mostly.  No chest pains.  Breathing is somewhat improved.  Examination unchanged.  Patient initially admitted with multifocal pneumonic process.  Post bone marrow transplant.    Dual-chamber pacemaker  Cardiac cath 10/10/2023 with clean coronary arteries  History of bone marrow transplant  Cardiomyopathy ejection fraction 40 to 45%  Objective:   BP (!) 99/55   Pulse 73   Temp 98.5 °F (36.9 °C) (Oral)   Resp 18   Ht 1.778 m (5' 10\")   Wt 69 kg (152 lb 1.9 oz)   SpO2 91%   BMI 21.83 kg/m²     Intake/Output Summary (Last 24 hours) at 1/4/2024 1504  Last data filed at 1/4/2024 1330  Gross per 24 hour   Intake 806 ml   Output 275 ml   Net 531 ml       TELEMETRY: Ventricular paced at 70/min.     Physical Examination:    Vitals:    01/04/24 0902 01/04/24 0915 01/04/24 0941 01/04/24 1215   BP:  (!) 106/58  (!) 99/55   Pulse:  60  73   Resp:  20  18   Temp:  98 °F (36.7 °C)  98.5 °F (36.9 °C)   TempSrc:  Oral  Oral   SpO2: 92% 94% 95% 91%   Weight:       Height:            Constitutional and General Appearance:  Healthy. And alert .  HEENT: eyes and ears intact. No nasal masses  THYROID: not enlarged  LUNGS:  Clear to auscultation and percussion  HEART and VASCULAR:  The apical impulses not displaced  Heart tones are crisp and normal  Cervical veins are not engorged  The carotid upstroke is normal in amplitude and contour without delay or bruit  Peripheral pulses are symmetrical and full  There is no clubbing, cyanosis of the extremities.  No peripheral edema  Femoral Arteries: 2+ and equal  Pedal Pulses: 2+ and equal   ABDOMEN::  No masses or tenderness  Liver/Spleen: No Abnormalities Noted  NEUROLOGICAL:  .

## 2024-01-04 NOTE — PROGRESS NOTES
lb 3.1 oz)         General: Awake, alert and oriented. More comfortable appearing  HEENT: normocephalic, no scleral erythema or icterus, Oral mucosa moist and intact, throat clear  NECK: supple   BACK: Straight   SKIN: warm dry and intact without lesions rashes or masses  CHEST: tachypnea, bilateral rhonchi. Diminished breath sounds one third up right base. Nonproductive cough  CV: Normal S1 S2, RRR, no MRG  ABD: NT ND normoactive BS  EXTREMITIES: without edema, denies calf tenderness  NEURO: grossly intact  CATHETER: PIV    Data    CBC:   Recent Labs     01/02/24 0419 01/03/24 0429 01/04/24 0356   WBC 12.3* 11.7* 11.2*   HGB 10.3* 10.4* 10.1*   HCT 32.0* 32.9* 32.1*   MCV 92.5 94.3 94.7   * 125* 149       BMP/Mag:  Recent Labs     01/02/24 0419 01/03/24 0429 01/04/24 0356    134* 140   K 3.2* 3.7 4.1   CL 99 98* 103   CO2 28 28 29   PHOS  --  2.5  --    BUN 16 19 22*   CREATININE 1.1 1.0 1.1   MG  --  1.70*  --        LIVP:   Recent Labs     01/03/24 0429   AST 24   ALT 15   BILIDIR <0.2   BILITOT 0.4   ALKPHOS 99       Coags:   Recent Labs     01/04/24 0355   PROTIME 16.0*   INR 1.28*   APTT 31.9       Uric Acid   Recent Labs     01/03/24 0429   LABURIC 2.3*       PROBLEM LIST:           1.  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  BPH  5.  GERD  6.  Depressed mood   7.  CHB w/ left SC dual chamber pacemaker  8.  HTN  9. C-Diff  10. Staph Epi Bacteremia  11. DVT  12. JOLE     TREATMENT:              DLBCL:  1. R-CHOP (10/26/21 - 2/11/22)  CNS Relapse (7/2023):  2.  R+MPV x 6 cycles (7/6/23 - 9/14/23)  3.  Thiotepa, Busulfan, and Cytoxan & ASCT (11/3/23)      ASSESSMENT AND PLAN:           1. DLBC NHL w/ CNS relapse: Patient currently in [place]  - CSF (4/21/23): no malignant cells, flow: negative   - Brain bx (4/28/23, [facility]):  T cell infiltrate but not consistent w/  Maintenance: None  Post-Txp F/u: MRI brain w/ & w/out contrast      Day + 62     2. Neuro: Severe memory impairment and visual changes:  Improved, but not back to baseline.  This was d/t CNS mass & vasogenic edema   - Cont Keppra BID  - Referred to speech therapy -- continues to work, having difficulty with reading and will make appt with eye doctor     3. ID: Multifocal PNA POA, Afebrile. Hypoxic requiring 4-6lpm  - Procalcitonin 12/29 0.21--0.27--0.23--0.17  - RVP 12/29 Neg  - Blood cxs 12/29 NG  - strep and legionella antigen 12/29 Neg  - MRSA 12/30 Neg   - CT chest without contrast 12/29: Multifocal bilateral consolidative opacities most compatible with pneumonia. Large right and small left pleural effusions.     - s/p Zosyn Day +7 (12/29/23)  - Cont Valtrex ppx        4. Pulmonary: cough and congestion for few weeks, now with acute respiratory failure of unclear etiology - possibly atypica infection +/- acute HF  - CXR with bilateral infiltrates 12/29. Chest x-ray today largely unchanged.  - CT chest wwithout contrast 12/29 Multifocal bilateral consolidative opacities most compatible with pneumonia. Large right and small left pleural effusions.  - Cont supplemental O2 to maintain SpO2 >92% - currently on 3 lpm NC, desats  with exertion  - CXray PA/Lat 1/4/24: Pending  - BNP 12/29 12K - possible component of HF?  - Echo 1/2/24 - EF 40-45%, indeterminate diastolic dysfunction     5. Heme: leukocytosis, likely related to pneumonia. Anemia d/t recent chemo & Thrombocytopenia improved  - Transfuse for Hgb < 7 and Platelets < 50K (anticoagulation)   - No transfusion today      6. Metabolic / CKDIII: HypoK, Hyperglycemia  - Cont lasix 40 mg PO BID   - Start Klor-Con 20mEq BID     7. GI / Nutrition: H/o constipation, nausea, and GERD  Nutrition:  Appetite and oral intake inadequate; losing weight.   - Cont low microbial diet  GERD:  - Cont PPI      8 Cardiac: H/o CHB w/ left SC dual chamber pacemaker and HTN  - Echo

## 2024-01-04 NOTE — PROGRESS NOTES
Pt went to the bathroom around 0230 on 2 L of oxygen and returned back to bed. Patient started coughing, Robitussin administered. Oxygen saturation low 80's, high flow put on patient and patient now requiring 6 L High Flow O2 sat 92%. Dr. ANIL Wilhelm made aware. Dr. ANIL Wilhelm stated if no improvement and unable to wean oxygen back down will place order for chest xray. Pt continues on 6 L of oxygen with O2 saturation at 97%, will continue to wean oxygen. See Flowsheet for further details.

## 2024-01-04 NOTE — PLAN OF CARE
Problem: Respiratory - Adult  Goal: Achieves optimal ventilation and oxygenation  1/4/2024 1726 by Radha Alexis, RN  Outcome: Progressing- was able to wean patient off oxygen to room air with O2 saturations 92-94%. Will closely monitor     Problem: Hematologic - Adult  Goal: Maintains hematologic stability  1/4/2024 1726 by Radha Alexis, RN  Outcome: Progressing  Patient's hemoglobin this AM:   Recent Labs     01/04/24  0356   HGB 10.1*     Patient's platelet count this AM:   Recent Labs     01/04/24  0356       Thrombocytopenia not present at this time.  Patient showing no signs or symptoms of active bleeding.  Transfusion not indicated at this time.  Patient verbalizes understanding of all instructions. Will continue to assess and implement POC. Call light within reach and hourly rounding in place.      Problem: Safety - Adult  Goal: Free from fall injury  1/4/2024 1726 by Radha Alexis, RN  Outcome: Progressing  Orthostatic vital signs obtained at start of shift - see flowsheet for details.  Pt does not meet criteria for orthostasis.  Pt is a Med fall risk. See Stephens Fall Score and ABCDS Injury Risk assessments.     - Screening for Orthostasis AND not a [place] Risk per STEPHENS/ABCDS: Pt bed is in low position, side rails up, call light and belongings are in reach.  Fall risk light is on outside pts room.  Pt encouraged to call for assistance as needed. Will continue with hourly rounds for PO intake, pain needs, toileting and repositioning as needed.

## 2024-01-04 NOTE — PLAN OF CARE
Problem: Safety - Adult  Goal: Free from fall injury  Outcome: Progressing  Note: Orthostatic vital signs obtained at start of shift - see flowsheet for details.  Pt does not meet criteria for orthostasis.  Pt is a Med fall risk. See Stephens Fall Score and ABCDS Injury Risk assessments.     - Screening for Orthostasis AND not a [place] Risk per STEPHENS/ABCDS: Pt bed is in low position, side rails up, call light and belongings are in reach.  Fall risk light is on outside pts room.  Pt encouraged to call for assistance as needed. Will continue with hourly rounds for PO intake, pain needs, toileting and repositioning as needed.       Problem: Skin/Tissue Integrity - Adult  Goal: Skin integrity remains intact  Outcome: Progressing  Note: Pt skin remains clean,dry, and intact.      Problem: Infection - Adult  Goal: Absence of infection during hospitalization  Outcome: Progressing  Note: Pt remains afebrile and competed his IV antibiotic overnight.      Problem: Hematologic - Adult  Goal: Maintains hematologic stability  Outcome: Progressing  Note: Patient's hemoglobin this AM:   Recent Labs     01/03/24  0429   HGB 10.4*     Patient's platelet count this AM:   Recent Labs     01/03/24  0429   *    Thrombocytopenia Precautions in place.  Patient showing no signs or symptoms of active bleeding.  Transfusion not indicated at this time.  Patient verbalizes understanding of all instructions. Will continue to assess and implement POC. Call light within reach and hourly rounding in place.       Problem: Pain  Goal: Verbalizes/displays adequate comfort level or baseline comfort level  Outcome: Progressing  Note: Pt denies pain.      Problem: Respiratory - Adult  Goal: Achieves optimal ventilation and oxygenation  Outcome: Progressing  Note: Patient back down to 2 L with oxygen saturation at 93%.

## 2024-01-04 NOTE — PROGRESS NOTES
Pulmonary Followup Note    CC: acute hypoxemic respiratory failure, multifocal pneumonia, CHF exacerbation  Subjective:  Saturating well on 2L  He continues to diurese gently.  He had a desaturation episode with ambulation that required higher flow to recover, but he's on less oxygen now.      ROS:  Denies headache, nausea or chest pain.    24HR INTAKE/OUTPUT:    Intake/Output Summary (Last 24 hours) at 2024 1549  Last data filed at 2024 1500  Gross per 24 hour   Intake 806 ml   Output 575 ml   Net 231 ml          valACYclovir  500 mg Oral BID    piperacillin-tazobactam  3,375 mg IntraVENous Q8H    [START ON 2024] furosemide  40 mg Oral Daily    potassium chloride  20 mEq Oral BID WC    sertraline  150 mg Oral Daily    apixaban  5 mg Oral BID    levETIRAcetam  500 mg Oral BID    therapeutic multivitamin-minerals  1 tablet Oral Daily    sodium chloride flush  5-40 mL IntraVENous 2 times per day    Saline Mouthwash  15 mL Swish & Spit 4x Daily AC & HS    tamsulosin  0.4 mg Oral Daily           PHYSICAL EXAMINATION:  /72   Pulse 88   Temp 97.9 °F (36.6 °C) (Oral)   Resp 22   Ht 1.778 m (5' 10\")   Wt 69 kg (152 lb 1.9 oz)   SpO2 93%   BMI 21.83 kg/m²   CURRENT PULSE OXIMETRY:  SpO2: 93 %  24HR PULSE OXIMETRY RANGE:  SpO2  Av.8 %  Min: 75 %  Max: 98 %       Gen: mild distress. Speaking in full sentences with trace accessory muscle use  HEENT: PERRL, EOMI, OP nl  Lung:  improving breath sounds at the right base.  CV: RRR without M/R/R  Abd: +BS, soft, NT/ND  Ext: tace LE edema.    DATA  CBC:   Recent Labs     24  035   WBC 12.3* 11.7* 11.2*   HGB 10.3* 10.4* 10.1*   HCT 32.0* 32.9* 32.1*   MCV 92.5 94.3 94.7   * 125* 149       BMP:   Recent Labs     24  035    134* 140   K 3.2* 3.7 4.1   CL 99 98* 103   CO2 28 28 29   PHOS  --  2.5  --    BUN 16 19 22*   CREATININE 1.1

## 2024-01-05 PROBLEM — I42.8 NICM (NONISCHEMIC CARDIOMYOPATHY) (HCC): Status: ACTIVE | Noted: 2024-01-03

## 2024-01-05 LAB
ALBUMIN SERPL-MCNC: 2.7 G/DL (ref 3.4–5)
ALP SERPL-CCNC: 106 U/L (ref 40–129)
ALT SERPL-CCNC: 21 U/L (ref 10–40)
ANION GAP SERPL CALCULATED.3IONS-SCNC: 11 MMOL/L (ref 3–16)
AST SERPL-CCNC: 28 U/L (ref 15–37)
B DERMAT AB SER-ACNC: 0.2 IV
BASOPHILS # BLD: 0 K/UL (ref 0–0.2)
BASOPHILS NFR BLD: 0.4 %
BILIRUB DIRECT SERPL-MCNC: <0.2 MG/DL (ref 0–0.3)
BILIRUB INDIRECT SERPL-MCNC: ABNORMAL MG/DL (ref 0–1)
BILIRUB SERPL-MCNC: 0.4 MG/DL (ref 0–1)
BUN SERPL-MCNC: 22 MG/DL (ref 7–20)
CALCIUM SERPL-MCNC: 8.8 MG/DL (ref 8.3–10.6)
CHLORIDE SERPL-SCNC: 103 MMOL/L (ref 99–110)
CO2 SERPL-SCNC: 26 MMOL/L (ref 21–32)
CREAT SERPL-MCNC: 1.1 MG/DL (ref 0.8–1.3)
DEPRECATED RDW RBC AUTO: 18 % (ref 12.4–15.4)
EOSINOPHIL # BLD: 0.3 K/UL (ref 0–0.6)
EOSINOPHIL NFR BLD: 3.1 %
GFR SERPLBLD CREATININE-BSD FMLA CKD-EPI: >60 ML/MIN/{1.73_M2}
GLUCOSE SERPL-MCNC: 99 MG/DL (ref 70–99)
HCT VFR BLD AUTO: 31 % (ref 40.5–52.5)
HGB BLD-MCNC: 10 G/DL (ref 13.5–17.5)
LDH SERPL L TO P-CCNC: 162 U/L (ref 100–190)
LYMPHOCYTES # BLD: 0.4 K/UL (ref 1–5.1)
LYMPHOCYTES NFR BLD: 4 %
MAGNESIUM SERPL-MCNC: 1.9 MG/DL (ref 1.8–2.4)
MCH RBC QN AUTO: 29.5 PG (ref 26–34)
MCHC RBC AUTO-ENTMCNC: 32.2 G/DL (ref 31–36)
MCV RBC AUTO: 91.7 FL (ref 80–100)
MONOCYTES # BLD: 0.9 K/UL (ref 0–1.3)
MONOCYTES NFR BLD: 9 %
NEUTROPHILS # BLD: 8.3 K/UL (ref 1.7–7.7)
NEUTROPHILS NFR BLD: 83.5 %
PHOSPHATE SERPL-MCNC: 2.5 MG/DL (ref 2.5–4.9)
PLATELET # BLD AUTO: 145 K/UL (ref 135–450)
PMV BLD AUTO: 10.3 FL (ref 5–10.5)
POTASSIUM SERPL-SCNC: 4.1 MMOL/L (ref 3.5–5.1)
PROT SERPL-MCNC: 5.1 G/DL (ref 6.4–8.2)
RBC # BLD AUTO: 3.38 M/UL (ref 4.2–5.9)
SODIUM SERPL-SCNC: 140 MMOL/L (ref 136–145)
URATE SERPL-MCNC: 2.2 MG/DL (ref 3.5–7.2)
WBC # BLD AUTO: 9.9 K/UL (ref 4–11)

## 2024-01-05 PROCEDURE — 2060000000 HC ICU INTERMEDIATE R&B

## 2024-01-05 PROCEDURE — 6360000002 HC RX W HCPCS: Performed by: NURSE PRACTITIONER

## 2024-01-05 PROCEDURE — 83615 LACTATE (LD) (LDH) ENZYME: CPT

## 2024-01-05 PROCEDURE — 2580000003 HC RX 258: Performed by: STUDENT IN AN ORGANIZED HEALTH CARE EDUCATION/TRAINING PROGRAM

## 2024-01-05 PROCEDURE — 97530 THERAPEUTIC ACTIVITIES: CPT

## 2024-01-05 PROCEDURE — 97535 SELF CARE MNGMENT TRAINING: CPT

## 2024-01-05 PROCEDURE — 6370000000 HC RX 637 (ALT 250 FOR IP): Performed by: NURSE PRACTITIONER

## 2024-01-05 PROCEDURE — 80076 HEPATIC FUNCTION PANEL: CPT

## 2024-01-05 PROCEDURE — 36415 COLL VENOUS BLD VENIPUNCTURE: CPT

## 2024-01-05 PROCEDURE — 99233 SBSQ HOSP IP/OBS HIGH 50: CPT | Performed by: NURSE PRACTITIONER

## 2024-01-05 PROCEDURE — 2580000003 HC RX 258: Performed by: NURSE PRACTITIONER

## 2024-01-05 PROCEDURE — 80048 BASIC METABOLIC PNL TOTAL CA: CPT

## 2024-01-05 PROCEDURE — 6370000000 HC RX 637 (ALT 250 FOR IP): Performed by: STUDENT IN AN ORGANIZED HEALTH CARE EDUCATION/TRAINING PROGRAM

## 2024-01-05 PROCEDURE — 85025 COMPLETE CBC W/AUTO DIFF WBC: CPT

## 2024-01-05 PROCEDURE — 99232 SBSQ HOSP IP/OBS MODERATE 35: CPT | Performed by: INTERNAL MEDICINE

## 2024-01-05 PROCEDURE — 84100 ASSAY OF PHOSPHORUS: CPT

## 2024-01-05 PROCEDURE — 84550 ASSAY OF BLOOD/URIC ACID: CPT

## 2024-01-05 PROCEDURE — 83735 ASSAY OF MAGNESIUM: CPT

## 2024-01-05 RX ADMIN — PIPERACILLIN AND TAZOBACTAM 3375 MG: 3; .375 INJECTION, POWDER, FOR SOLUTION INTRAVENOUS; PARENTERAL at 04:23

## 2024-01-05 RX ADMIN — APIXABAN 5 MG: 5 TABLET, FILM COATED ORAL at 08:28

## 2024-01-05 RX ADMIN — LEVETIRACETAM 500 MG: 500 TABLET, FILM COATED ORAL at 20:25

## 2024-01-05 RX ADMIN — POTASSIUM CHLORIDE 20 MEQ: 1500 TABLET, EXTENDED RELEASE ORAL at 08:28

## 2024-01-05 RX ADMIN — SODIUM CHLORIDE 15 ML: 900 IRRIGANT IRRIGATION at 10:38

## 2024-01-05 RX ADMIN — FUROSEMIDE 40 MG: 40 TABLET ORAL at 08:29

## 2024-01-05 RX ADMIN — APIXABAN 5 MG: 5 TABLET, FILM COATED ORAL at 20:25

## 2024-01-05 RX ADMIN — SODIUM CHLORIDE 15 ML: 900 IRRIGANT IRRIGATION at 15:49

## 2024-01-05 RX ADMIN — SODIUM CHLORIDE, PRESERVATIVE FREE 10 ML: 5 INJECTION INTRAVENOUS at 08:29

## 2024-01-05 RX ADMIN — SODIUM CHLORIDE, PRESERVATIVE FREE 10 ML: 5 INJECTION INTRAVENOUS at 20:26

## 2024-01-05 RX ADMIN — SERTRALINE HYDROCHLORIDE 150 MG: 100 TABLET ORAL at 08:28

## 2024-01-05 RX ADMIN — PIPERACILLIN AND TAZOBACTAM 3375 MG: 3; .375 INJECTION, POWDER, FOR SOLUTION INTRAVENOUS; PARENTERAL at 20:24

## 2024-01-05 RX ADMIN — LEVETIRACETAM 500 MG: 500 TABLET, FILM COATED ORAL at 08:28

## 2024-01-05 RX ADMIN — POTASSIUM CHLORIDE 20 MEQ: 1500 TABLET, EXTENDED RELEASE ORAL at 17:00

## 2024-01-05 RX ADMIN — VALACYCLOVIR HYDROCHLORIDE 500 MG: 500 TABLET, FILM COATED ORAL at 08:29

## 2024-01-05 RX ADMIN — Medication 1 TABLET: at 08:28

## 2024-01-05 RX ADMIN — SODIUM CHLORIDE 15 ML: 900 IRRIGANT IRRIGATION at 20:27

## 2024-01-05 RX ADMIN — TAMSULOSIN HYDROCHLORIDE 0.4 MG: 0.4 CAPSULE ORAL at 08:28

## 2024-01-05 RX ADMIN — VALACYCLOVIR HYDROCHLORIDE 500 MG: 500 TABLET, FILM COATED ORAL at 20:25

## 2024-01-05 RX ADMIN — PIPERACILLIN AND TAZOBACTAM 3375 MG: 3; .375 INJECTION, POWDER, FOR SOLUTION INTRAVENOUS; PARENTERAL at 11:52

## 2024-01-05 ASSESSMENT — PAIN SCALES - GENERAL: PAINLEVEL_OUTOF10: 0

## 2024-01-05 NOTE — PROGRESS NOTES
Restrictions  Position Activity Restriction  Other position/activity restrictions: Up as tolerated, If platelet count equal to or less than 10 but the patient has received a platelet transfusion, physical therapy or occupational therapy may proceed with treatment.    Subjective   General  Chart Reviewed: Yes  Patient assessed for rehabilitation services?: Yes  Additional Pertinent Hx: Pt admitted 12/29/23 with c/o difficulty breathing. He also noticed bilateral lower extremity edema.  Patient was hypoxic to 88-89% on presentation.  He was tachypneic.      CT chest= Multifocal bilateral consolidative opacities most compatible with pneumonia. 2.Large right and small left pleural effusions.  Family / Caregiver Present: Yes (wife)  Referring Practitioner: WHITLEY Perez  Diagnosis: multifocal pneumonia  Subjective  Subjective: Pt in bed upon entry.  Pt agreeable to activity.   Pain: 0/10     Social/Functional History  Social/Functional History  Lives With: Spouse  Type of Home: House  Home Layout: One level  Home Access: Stairs to enter without rails  Entrance Stairs - Number of Steps: 1+1  Bathroom Shower/Tub: Tub/Shower unit  Bathroom Toilet: Standard  Bathroom Equipment: Grab bars in shower, Shower chair  Home Equipment: Walker, rolling  Has the patient had two or more falls in the past year or any fall with injury in the past year?: No  ADL Assistance: Independent  Homemaking Assistance: Independent  Ambulation Assistance: Independent  Transfer Assistance: Independent  Active : No  Occupation: Retired  Type of Occupation: Office/warehouse  Leisure & Hobbies: Fishing, biking       Objective   Pulse: 67  Heart Rate Source: Telemetry  BP: (!) 105/55  BP Location: Left upper arm  BP Method: Automatic  Patient Position: Sitting  MAP (Calculated): 72  Respirations: 17  SpO2: 95 %  O2 Device: High flow nasal cannula  Temp: 97.5 °F (36.4 °C)             Safety Devices  Type of Devices: All fall risk    Time Out 1508         Minutes 28         Timed Code Treatment Minutes: 28 Minutes       Brisa Mills, OT

## 2024-01-05 NOTE — PLAN OF CARE
Problem: Safety - Adult  Goal: Free from fall injury  1/5/2024 1409 by Osman Cuevas, RN  Note: Orthostatic vital signs obtained at start of shift - see flowsheet for details.  Pt does not meet criteria for orthostasis.  Pt is a Med fall risk. See Stephens Fall Score and ABCDS Injury Risk assessments.     - Screening for Orthostasis AND not a [place] Risk per STEPHENS/ABCDS: Pt bed is in low position, side rails up, call light and belongings are in reach.  Fall risk light is on outside pts room.  Pt encouraged to call for assistance as needed. Will continue with hourly rounds for PO intake, pain needs, toileting and repositioning as needed.      Problem: Skin/Tissue Integrity - Adult  Goal: Skin integrity remains intact  Note: Pt up independently this shift and using urinals.  Pt continent of stool and urine, turns self frequently while in bed.  ADLs and hygiene performed independently throughout shift. Instructed pt on importance of maintaining good hygiene and activity levels.  No signs or symptoms of new skin breakdown noted.      Problem: Infection - Adult  Goal: Absence of infection during hospitalization  1/5/2024 1409 by Osman Cuevas, RN  Note: CVC site remains free of signs/symptoms of infection. No drainage, edema, erythema, pain, or warmth noted at site. Dressing changes continue per protocol and on an as needed basis - see flowsheet.     Compliant with [facility] Bath Protocol:  Performed CHG bath today per [facility] protocol utilizing CHG solution in the shower.  CVC site cleansed with CHG wipe over dressing, skin surrounding dressing, and first 6\" of IV tubing.  Pt tolerated well.  Continued to encourage daily CHG bathing per [facility] protocol.         Problem: Hematologic - Adult  Goal: Maintains hematologic stability  1/5/2024 1409 by Osman Cuevas, RN  Note: Patient's hemoglobin this AM:   Recent Labs     01/05/24  0358   HGB 10.0*     Patient's platelet count this AM:   Recent Labs     01/05/24  0358        Thrombocytopenia Precautions in place.  Patient showing no signs or symptoms of active bleeding.  Transfusion not indicated at this time.  Patient verbalizes understanding of all instructions. Will continue to assess and implement POC. Call light within reach and hourly rounding in place.

## 2024-01-05 NOTE — PROGRESS NOTES
[facility] Progress Note    2024     Zain Lopez    MRN: [id number]    : 1955    Referring MD: Penelope Wilhelm,   [address]  [facility] [address]  [place] [address]      SUBJECTIVE:  He is on 2L of O2, gets worse with ambulation. He is depressed and discouraged. \" I just want to feel better\"    ECOG PS:(2) Ambulatory and capable of self care, unable to carry out work activity, up and about > 50% or waking hours     KPS: 70% Cares for self; unable to carry on normal activity or to do active work    Isolation: None    Medications    Scheduled Meds:   valACYclovir  500 mg Oral BID    piperacillin-tazobactam  3,375 mg IntraVENous Q8H    furosemide  40 mg Oral Daily    potassium chloride  20 mEq Oral BID WC    sertraline  150 mg Oral Daily    apixaban  5 mg Oral BID    levETIRAcetam  500 mg Oral BID    therapeutic multivitamin-minerals  1 tablet Oral Daily    sodium chloride flush  5-40 mL IntraVENous 2 times per day    Saline Mouthwash  15 mL Swish & Spit 4x Daily AC & HS    tamsulosin  0.4 mg Oral Daily     Continuous Infusions:   sodium chloride       PRN Meds:.guaiFENesin-dextromethorphan, potassium chloride **OR** potassium alternative oral replacement **OR** potassium chloride, perflutren lipid microspheres, ondansetron, polyethylene glycol, sodium chloride flush, sodium chloride, magnesium hydroxide, acetaminophen **OR** acetaminophen, Saline Mouthwash, alteplase (CATHFLO) 2 mg in sterile water 2 mL injection    ROS:  As noted above, otherwise remainder of 10-point ROS negative    Physical Exam:     I&O:    Intake/Output Summary (Last 24 hours) at 2024 1040  Last data filed at 2024 0833  Gross per 24 hour   Intake 703 ml   Output 1225 ml   Net -522 ml         Vital Signs:  /69   Pulse 61   Temp 98 °F (36.7 °C) (Oral)   Resp 20   Ht 1.778 m (5' 10\")   Wt 66.2 kg (146 lb)   SpO2 93%   BMI 20.95 kg/m²     Weight:    Wt Readings from Last 3 Encounters:   24 66.2 kg (146 lb)

## 2024-01-05 NOTE — PROGRESS NOTES
Physician Progress Note      PATIENT:               CHRIS PRABHAKAR  CSN #:                  [id number]  :                       1955  ADMIT DATE:       2023 5:18 PM  DISCH DATE:  RESPONDING  PROVIDER #:        Penelope Wilhelm DO          QUERY TEXT:    Pt admitted  with Pneumonia.  Pt being treated with Zosyn for > 5 days.    If possible, please document in the progress notes and discharge summary if   you are evaluating and/or treating any of the following:    Note: CAP and HCAP indicate where the pneumonia was acquired, not a specific   type.    The medical record reflects the following:  Risk Factors: 68 y.o. male with hx of HTN, CHF, CKD, DLBCL, receiving Chemo   for lymphoma  Clinical Indicators: WBC increasing, was 12.8 day of admission.  Developed   upper respiratory symptoms a few weeks prior to presentation. Presented with   difficulty breathing, pleuritic chest pain, with productive cough of yellow to   green sputum.  Treatment: Zosyn  Options provided:  -- Gram negative pneumonia  -- Other - I will add my own diagnosis  -- Disagree - Not applicable / Not valid  -- Disagree - Clinically unable to determine / Unknown  -- Refer to Clinical Documentation Reviewer    PROVIDER RESPONSE TEXT:    Provider is clinically unable to determine a response to this query.    Query created by: Eldon Rome on 2024 6:31 AM      Electronically signed by:  Penelope Wilhelm DO 2024 8:15 AM

## 2024-01-05 NOTE — PROGRESS NOTES
[facility]   Cardiology  Note   Dr SHELLY Giron MD, FACC   Marina Henrdon RN, FNP APRN CVNP  Date: 1/5/2024  Admit Date: 12/29/2023       CC: acute hypoxemic respiratory failure multifocal pneumonia and \"CHF exacerbation.       Following cardiology today for:   irregular heart rhythm  SOB    With [facility] 10/23/23 normal cors  TTE  EF 40 to 45% and that seems stable.    AV dual-chamber PPM /PAF   bone marrow transplant / NHL     Interval Hx /  Subjective:Today, he is resting in bed   SOB with activity  and on 2l n/c 02  / VSS / HR 60s     Medical Decision Making:  Discussion of patient care with other providers  Patient seen and examined. Clinical notes reviewed. Telemetry reviewed / Pertinent labs, diagnostic, device, and imaging results reviewed as a part of this visit  I spent a total of 50 minutes and greater than 50% of the time was spent counseling with patient  coordinating care regarding her diagnosis, treatments and plan of care    Scheduled Meds:   valACYclovir  500 mg Oral BID    piperacillin-tazobactam  3,375 mg IntraVENous Q8H    furosemide  40 mg Oral Daily    potassium chloride  20 mEq Oral BID WC    sertraline  150 mg Oral Daily    apixaban  5 mg Oral BID    levETIRAcetam  500 mg Oral BID    therapeutic multivitamin-minerals  1 tablet Oral Daily    sodium chloride flush  5-40 mL IntraVENous 2 times per day    Saline Mouthwash  15 mL Swish & Spit 4x Daily AC & HS    tamsulosin  0.4 mg Oral Daily     Vitals:    01/05/24 1145   BP: 112/75   Pulse:    Resp:    Temp: 97.7 °F (36.5 °C)   SpO2: 92%      In: 1063 [P.O.:900; I.V.:163]  Out: 1625    Wt Readings from Last 3 Encounters:   01/05/24 66.2 kg (146 lb)   11/22/23 76 kg (167 lb 8.8 oz)   10/18/23 77.2 kg (170 lb 3.1 oz)       Intake/Output Summary (Last 24 hours) at 1/5/2024 1214  Last data filed at 1/5/2024 1145  Gross per 24 hour   Intake 703 ml   Output 1625 ml   Net -922 ml       Telemetry: Personally Reviewed    Constitutional: Cooperative

## 2024-01-05 NOTE — PROGRESS NOTES
Pulmonary Followup Note    CC: acute hypoxemic respiratory failure, multifocal pneumonia, CHF exacerbation  Subjective:  Saturating well on 2L  and sitting in a chair for the first time in a week.      ROS:  Denies headache, nausea or chest pain.    24HR INTAKE/OUTPUT:    Intake/Output Summary (Last 24 hours) at 2024 1636  Last data filed at 2024 1506  Gross per 24 hour   Intake 823 ml   Output 1650 ml   Net -827 ml          valACYclovir  500 mg Oral BID    piperacillin-tazobactam  3,375 mg IntraVENous Q8H    furosemide  40 mg Oral Daily    potassium chloride  20 mEq Oral BID WC    sertraline  150 mg Oral Daily    apixaban  5 mg Oral BID    levETIRAcetam  500 mg Oral BID    therapeutic multivitamin-minerals  1 tablet Oral Daily    sodium chloride flush  5-40 mL IntraVENous 2 times per day    Saline Mouthwash  15 mL Swish & Spit 4x Daily AC & HS    tamsulosin  0.4 mg Oral Daily           PHYSICAL EXAMINATION:  BP (!) 105/55   Pulse 67   Temp 97.5 °F (36.4 °C) (Oral)   Resp 17   Ht 1.778 m (5' 10\")   Wt 66.2 kg (146 lb)   SpO2 95%   BMI 20.95 kg/m²   CURRENT PULSE OXIMETRY:  SpO2: 95 %  24HR PULSE OXIMETRY RANGE:  SpO2  Av.4 %  Min: 87 %  Max: 95 %       Gen: mild distress. Speaking in full sentences with trace accessory muscle use  HEENT: PERRL, EOMI, OP nl  Lung:  improving breath sounds at the right base.  CV: RRR without M/R/R  Abd: +BS, soft, NT/ND  Ext: tace LE edema.    DATA  CBC:   Recent Labs     24   WBC 11.7* 11.2* 9.9   HGB 10.4* 10.1* 10.0*   HCT 32.9* 32.1* 31.0*   MCV 94.3 94.7 91.7   * 149 145       BMP:   Recent Labs     246 24   * 140 140   K 3.7 4.1 4.1   CL 98* 103 103   CO2 28 29 26   PHOS 2.5  --  2.5   BUN 19 22* 22*   CREATININE 1.0 1.1 1.1       No results for input(s): \"PHART\", \"IVH6UFH\", \"PO2ART\" in the last 72 hours.  LIVER PROFILE:   Recent

## 2024-01-05 NOTE — BH NOTE
Behavioral Health Note    Met with patient and reminded him of goals set at outpatient behavioral health visit including developing a daily schedule to help cue him with regards to goals. Patient states that he has not created this yet. Wife was not present, but will be back later. Encouraged patient to discuss this with his wife and identify a plan that he can keep at bedside. Patient agreeable. Will continue to follow.    Nereida Leos Psy.D., MSCP  Clinical Psychologist

## 2024-01-05 NOTE — PLAN OF CARE
Problem: Safety - Adult  Goal: Free from fall injury  1/5/2024 0641 by Flavia Lugo RN  Outcome: Progressing  Note: Orthostatic vital signs obtained at start of shift - see flowsheet for details.  Pt does not meet criteria for orthostasis.  Pt is a Low fall risk. See Stephens Fall Score and ABCDS Injury Risk assessments.     - Screening for Orthostasis AND not a [place] Risk per STEPHENS/ABCDS: Pt bed is in low position, side rails up, call light and belongings are in reach.  Fall risk light is on outside pts room.  Pt encouraged to call for assistance as needed. Will continue with hourly rounds for PO intake, pain needs, toileting and repositioning as needed.       Problem: ABCDS Injury Assessment  Goal: Absence of physical injury  Outcome: Progressing     Problem: Infection - Adult  Goal: Absence of infection during hospitalization  1/5/2024 0641 by Flavia Lugo RN  Outcome: Progressing  Note: Pt afebrile overnight. Plan of care ongoing.     Problem: Hematologic - Adult  Goal: Maintains hematologic stability  1/5/2024 0641 by Flavia Lugo RN  Outcome: Progressing  Note: Patient's hemoglobin this AM:   Recent Labs     01/05/24  0358   HGB 10.0*     Patient's platelet count this AM:   Recent Labs     01/05/24  0358       Thrombocytopenia not present at this time.  Patient showing no signs or symptoms of active bleeding.  Transfusion not indicated at this time.  Patient verbalizes understanding of all instructions. Will continue to assess and implement POC. Call light within reach and hourly rounding in place.       Problem: Pain  Goal: Verbalizes/displays adequate comfort level or baseline comfort level  Outcome: Progressing  Note: Patient with no complaints of pain throughout shift. Plan of care ongoing.      Problem: Respiratory - Adult  Goal: Achieves optimal ventilation and oxygenation  1/5/2024 0641 by Flavia Lugo RN  Outcome: Progressing  Note: Pt back on oxygen overnight in order to

## 2024-01-06 LAB
ANION GAP SERPL CALCULATED.3IONS-SCNC: 11 MMOL/L (ref 3–16)
BASOPHILS # BLD: 0.1 K/UL (ref 0–0.2)
BASOPHILS NFR BLD: 0.8 %
BUN SERPL-MCNC: 22 MG/DL (ref 7–20)
CALCIUM SERPL-MCNC: 9 MG/DL (ref 8.3–10.6)
CHLORIDE SERPL-SCNC: 100 MMOL/L (ref 99–110)
CO2 SERPL-SCNC: 26 MMOL/L (ref 21–32)
CREAT SERPL-MCNC: 1 MG/DL (ref 0.8–1.3)
DEPRECATED RDW RBC AUTO: 17.8 % (ref 12.4–15.4)
EOSINOPHIL # BLD: 0.3 K/UL (ref 0–0.6)
EOSINOPHIL NFR BLD: 3 %
GFR SERPLBLD CREATININE-BSD FMLA CKD-EPI: >60 ML/MIN/{1.73_M2}
GLUCOSE SERPL-MCNC: 99 MG/DL (ref 70–99)
HCT VFR BLD AUTO: 31.2 % (ref 40.5–52.5)
HGB BLD-MCNC: 10.2 G/DL (ref 13.5–17.5)
LYMPHOCYTES # BLD: 0.3 K/UL (ref 1–5.1)
LYMPHOCYTES NFR BLD: 3.3 %
MCH RBC QN AUTO: 29.6 PG (ref 26–34)
MCHC RBC AUTO-ENTMCNC: 32.8 G/DL (ref 31–36)
MCV RBC AUTO: 90.4 FL (ref 80–100)
MONOCYTES # BLD: 0.9 K/UL (ref 0–1.3)
MONOCYTES NFR BLD: 9 %
NEUTROPHILS # BLD: 8.2 K/UL (ref 1.7–7.7)
NEUTROPHILS NFR BLD: 83.9 %
PLATELET # BLD AUTO: 169 K/UL (ref 135–450)
PMV BLD AUTO: 10.3 FL (ref 5–10.5)
POTASSIUM SERPL-SCNC: 4.1 MMOL/L (ref 3.5–5.1)
RBC # BLD AUTO: 3.45 M/UL (ref 4.2–5.9)
SODIUM SERPL-SCNC: 137 MMOL/L (ref 136–145)
WBC # BLD AUTO: 9.8 K/UL (ref 4–11)

## 2024-01-06 PROCEDURE — 6360000002 HC RX W HCPCS: Performed by: NURSE PRACTITIONER

## 2024-01-06 PROCEDURE — 36415 COLL VENOUS BLD VENIPUNCTURE: CPT

## 2024-01-06 PROCEDURE — 6370000000 HC RX 637 (ALT 250 FOR IP): Performed by: STUDENT IN AN ORGANIZED HEALTH CARE EDUCATION/TRAINING PROGRAM

## 2024-01-06 PROCEDURE — 2580000003 HC RX 258: Performed by: NURSE PRACTITIONER

## 2024-01-06 PROCEDURE — 85025 COMPLETE CBC W/AUTO DIFF WBC: CPT

## 2024-01-06 PROCEDURE — 6370000000 HC RX 637 (ALT 250 FOR IP): Performed by: NURSE PRACTITIONER

## 2024-01-06 PROCEDURE — 2580000003 HC RX 258: Performed by: STUDENT IN AN ORGANIZED HEALTH CARE EDUCATION/TRAINING PROGRAM

## 2024-01-06 PROCEDURE — 80048 BASIC METABOLIC PNL TOTAL CA: CPT

## 2024-01-06 PROCEDURE — 2060000000 HC ICU INTERMEDIATE R&B

## 2024-01-06 RX ADMIN — SERTRALINE HYDROCHLORIDE 150 MG: 100 TABLET ORAL at 09:44

## 2024-01-06 RX ADMIN — LEVETIRACETAM 500 MG: 500 TABLET, FILM COATED ORAL at 20:39

## 2024-01-06 RX ADMIN — PIPERACILLIN AND TAZOBACTAM 3375 MG: 3; .375 INJECTION, POWDER, FOR SOLUTION INTRAVENOUS; PARENTERAL at 20:28

## 2024-01-06 RX ADMIN — SODIUM CHLORIDE, PRESERVATIVE FREE 10 ML: 5 INJECTION INTRAVENOUS at 09:45

## 2024-01-06 RX ADMIN — PIPERACILLIN AND TAZOBACTAM 3375 MG: 3; .375 INJECTION, POWDER, FOR SOLUTION INTRAVENOUS; PARENTERAL at 05:00

## 2024-01-06 RX ADMIN — Medication 1 TABLET: at 09:44

## 2024-01-06 RX ADMIN — POTASSIUM CHLORIDE 20 MEQ: 1500 TABLET, EXTENDED RELEASE ORAL at 09:44

## 2024-01-06 RX ADMIN — TAMSULOSIN HYDROCHLORIDE 0.4 MG: 0.4 CAPSULE ORAL at 09:45

## 2024-01-06 RX ADMIN — PIPERACILLIN AND TAZOBACTAM 3375 MG: 3; .375 INJECTION, POWDER, FOR SOLUTION INTRAVENOUS; PARENTERAL at 12:30

## 2024-01-06 RX ADMIN — LEVETIRACETAM 500 MG: 500 TABLET, FILM COATED ORAL at 09:44

## 2024-01-06 RX ADMIN — FUROSEMIDE 40 MG: 40 TABLET ORAL at 09:44

## 2024-01-06 RX ADMIN — APIXABAN 5 MG: 5 TABLET, FILM COATED ORAL at 09:45

## 2024-01-06 RX ADMIN — POTASSIUM CHLORIDE 20 MEQ: 1500 TABLET, EXTENDED RELEASE ORAL at 16:43

## 2024-01-06 RX ADMIN — APIXABAN 5 MG: 5 TABLET, FILM COATED ORAL at 20:39

## 2024-01-06 RX ADMIN — VALACYCLOVIR HYDROCHLORIDE 500 MG: 500 TABLET, FILM COATED ORAL at 20:39

## 2024-01-06 RX ADMIN — VALACYCLOVIR HYDROCHLORIDE 500 MG: 500 TABLET, FILM COATED ORAL at 09:45

## 2024-01-06 RX ADMIN — SODIUM CHLORIDE, PRESERVATIVE FREE 10 ML: 5 INJECTION INTRAVENOUS at 20:41

## 2024-01-06 NOTE — PLAN OF CARE
Problem: Safety - Adult  Goal: Free from fall injury  Outcome: Progressing  Note: Patient remained free of falls during shift. Patient is a Frey Fall Risk: Medium (25-44); uses call light appropriately, ambulates with a steady gait and no assistive devices. Orthostatic blood pressures assessed and patient remains negative. Will continue to encourage ambulation and implement POC. Call light within reach and hourly rounding in place. Patient remained free of falls during shift. Patient is a Frey Fall Risk: Medium (25-44); uses call light appropriately, ambulates with a steady gait and no assistive devices. Orthostatic blood pressures assessed and patient remains negative. Will continue to encourage ambulation and implement POC. Call light within reach and hourly rounding in place.      Problem: ABCDS Injury Assessment  Goal: Absence of physical injury  Outcome: Progressing     Problem: Skin/Tissue Integrity - Adult  Goal: Skin integrity remains intact  Outcome: Progressing  Note: Patient is free of new skin breakdown. Will continue to encourage position changes every 2 hours, ambulation, and implement POC. Call light within reach and hourly rounding in place.      Problem: Infection - Adult  Goal: Absence of infection during hospitalization  Outcome: Progressing  Note: Patient is showing no signs or symptoms of nosocomial infections. Patient's temp during shift are as follows: Temp (8hrs), Av.8 °F (36.6 °C), Min:97.5 °F (36.4 °C), Max:98.1 °F (36.7 °C)  . Patient placed in private room and instructed on importance of handwashing and when to wear a mask when ambulating in hallway. Will continue to assess for s/s of infection and implement POC. Call light within reach and hourly rounding in place.      Problem: Pain  Goal: Verbalizes/displays adequate comfort level or baseline comfort level  Outcome: Progressing  Note: Patient has no complaints of pain during shift. Will continue to assess comfort and pain on  0-10 number scale and medicate per order while encouraging non pharmacological techniques as well. Call light within reach and hourly rounding in place.      Problem: Respiratory - Adult  Goal: Achieves optimal ventilation and oxygenation  Outcome: Not Progressing  Note: Patient denies respiratory distress, SaO2 maintained for most of the night until early this am when O2 had to be increased from 2L n/c  to 3 L to maintain level 90 and above.     Problem: Nutrition Deficit:  Goal: Optimize nutritional status  Outcome: Progressing  Note: Pt with adequate nutritional intake this shift. See doc flowsheets. Will continue to monitor and encourage PO consumption.      Problem: Respiratory - Adult  Goal: Achieves optimal ventilation and oxygenation  Outcome: Not Progressing  Note: Patient denies respiratory distress, SaO2 maintained for most of the night until early this am when O2 had to be increased from 2L n/c  to 3 L to maintain level 90 and above.

## 2024-01-06 NOTE — PLAN OF CARE
Problem: Safety - Adult  Goal: Free from fall injury  1/6/2024 1405 by Lorraine Cruz, RN  Outcome: Progressing  Orthostatic vital signs obtained at start of shift - see flowsheet for details.  Pt does not meet criteria for orthostasis.  Pt is a Med fall risk. See Frey Fall Score and ABCDS Injury Risk assessments. Pt bed is in low position, side rails up, call light and belongings are in reach.  Fall risk light is on outside pts room.  Pt encouraged to call for assistance as needed. Will continue with hourly rounds for PO intake, pain needs, toileting and repositioning as needed.       Problem: ABCDS Injury Assessment  Goal: Absence of physical injury  1/6/2024 1405 by Lorraine Cruz RN  Outcome: Progressing  No new physical injuries noted.      Problem: Hematologic - Adult  Goal: Maintains hematologic stability  1/6/2024 1405 by Lorraine Cruz, RN  Outcome: Progressing  Patient's hemoglobin this AM:   Recent Labs     01/06/24  0558   HGB 10.2*     Patient's platelet count this AM:   Recent Labs     01/06/24  0558       Thrombocytopenia not present at this time.  Patient showing no signs or symptoms of active bleeding.  Transfusion not indicated at this time.  Patient verbalizes understanding of all instructions. Will continue to assess and implement POC. Call light within reach and hourly rounding in place.       Problem: Nutrition Deficit:  Goal: Optimize nutritional status  1/6/2024 1405 by Lorraine Cruz, RN  Outcome: Progressing  Pt educated on importance of staying hydrated and having as much intake as possible. Verbalized understanding. Will continue to monitor.

## 2024-01-06 NOTE — PROGRESS NOTES
[facility] Progress Note    2024     Zain Lopez    MRN: [id number]    : 1955    Referring MD: Penelope Wilhelm,   [address]  [facility] [address]  [place] [address]      SUBJECTIVE:  He is on RA. He is depressed and discouraged. \" I just want to feel better\"  Strongly encouraged to get up and moving.    ECOG PS:(2) Ambulatory and capable of self care, unable to carry out work activity, up and about > 50% or waking hours     KPS: 70% Cares for self; unable to carry on normal activity or to do active work    Isolation: None    Medications    Scheduled Meds:   valACYclovir  500 mg Oral BID    piperacillin-tazobactam  3,375 mg IntraVENous Q8H    furosemide  40 mg Oral Daily    potassium chloride  20 mEq Oral BID WC    sertraline  150 mg Oral Daily    apixaban  5 mg Oral BID    levETIRAcetam  500 mg Oral BID    therapeutic multivitamin-minerals  1 tablet Oral Daily    sodium chloride flush  5-40 mL IntraVENous 2 times per day    Saline Mouthwash  15 mL Swish & Spit 4x Daily AC & HS    tamsulosin  0.4 mg Oral Daily     Continuous Infusions:   sodium chloride       PRN Meds:.guaiFENesin-dextromethorphan, potassium chloride **OR** potassium alternative oral replacement **OR** potassium chloride, perflutren lipid microspheres, ondansetron, polyethylene glycol, sodium chloride flush, sodium chloride, magnesium hydroxide, acetaminophen **OR** acetaminophen, Saline Mouthwash, alteplase (CATHFLO) 2 mg in sterile water 2 mL injection    ROS:  As noted above, otherwise remainder of 10-point ROS negative    Physical Exam:     I&O:    Intake/Output Summary (Last 24 hours) at 2024 1004  Last data filed at 2024 0936  Gross per 24 hour   Intake 695 ml   Output 2750 ml   Net -2055 ml         Vital Signs:  BP (!) 97/56   Pulse 66   Temp 97.6 °F (36.4 °C) (Oral)   Resp 25   Ht 1.778 m (5' 10\")   Wt 65.5 kg (144 lb 6.4 oz)   SpO2 93%   BMI 20.72 kg/m²     Weight:    Wt Readings from Last 3 Encounters:   24       - S/p Thiotepa, Busulfan, and Cytoxan & ASCT (11/3/23)  Post-Txp Maintenance: None  Post-Txp F/u: MRI brain w/ & w/out contrast      Day + 64     2. Neuro: Severe memory impairment and visual changes:  Improved, but not back to baseline.  This was d/t CNS mass & vasogenic edema   - Cont Keppra BID  - Referred to speech therapy -- continues to work, having difficulty with reading and will make appt with eye doctor     3. ID: Multifocal PNA POA, Afebrile. Hypoxia improved  - Procalcitonin 12/29 0.21--0.27--0.23--0.17  - RVP 12/29 Neg  - Blood cxs 12/29 NG  - strep and legionella antigen 12/29 Neg  - MRSA 12/30 Neg   - CT chest without contrast 12/29: Multifocal bilateral consolidative opacities most compatible with pneumonia. Large right and small left pleural effusions.     - Cont Zosyn Day + 9/10 (12/29/23)  - Cont Valtrex ppx        4. Pulmonary: cough and congestion for few weeks, now with acute respiratory failure of unclear etiology - possibly atypica infection +/- acute HF  - CXR with bilateral infiltrates 12/29. Chest x-ray today largely unchanged.  - CT chest wwithout contrast 12/29 Multifocal bilateral consolidative opacities most compatible with pneumonia. Large right and small left pleural effusions.  - Cont supplemental O2 to maintain SpO2 >92% - currently on room air  - CXray PA/Lat 1/4/24: Coarse bilateral perihilar consolidation without change.     Small loculated right pleural effusion again noted.     - BNP 12/29 12K - possible component of HF?  - Echo 1/2/24 - EF 40-45%, indeterminate diastolic dysfunction     5. Heme: leukocytosis, likely related to pneumonia improved. Anemia d/t recent chemo & Thrombocytopenia improved  - Transfuse for Hgb < 7 and Platelets < 50K (anticoagulation)   - No transfusion today      6. Metabolic / CKDIII: Stable  - Cont lasix 40 mg PO BID   - Start Klor-Con 20mEq BID     7. GI / Nutrition: H/o constipation, nausea, and GERD  Nutrition:  Appetite and oral intake

## 2024-01-07 ENCOUNTER — APPOINTMENT (OUTPATIENT)
Dept: GENERAL RADIOLOGY | Age: 69
DRG: 193 | End: 2024-01-07
Attending: STUDENT IN AN ORGANIZED HEALTH CARE EDUCATION/TRAINING PROGRAM
Payer: MEDICARE

## 2024-01-07 PROBLEM — I50.23 ACUTE ON CHRONIC SYSTOLIC CONGESTIVE HEART FAILURE (HCC): Status: ACTIVE | Noted: 2024-01-07

## 2024-01-07 LAB
ANION GAP SERPL CALCULATED.3IONS-SCNC: 11 MMOL/L (ref 3–16)
BASOPHILS # BLD: 0.1 K/UL (ref 0–0.2)
BASOPHILS NFR BLD: 1 %
BUN SERPL-MCNC: 27 MG/DL (ref 7–20)
CALCIUM SERPL-MCNC: 8.9 MG/DL (ref 8.3–10.6)
CHLORIDE SERPL-SCNC: 101 MMOL/L (ref 99–110)
CO2 SERPL-SCNC: 27 MMOL/L (ref 21–32)
CREAT SERPL-MCNC: 1.3 MG/DL (ref 0.8–1.3)
DEPRECATED RDW RBC AUTO: 17.9 % (ref 12.4–15.4)
EOSINOPHIL # BLD: 0.3 K/UL (ref 0–0.6)
EOSINOPHIL NFR BLD: 3.8 %
GFR SERPLBLD CREATININE-BSD FMLA CKD-EPI: 59 ML/MIN/{1.73_M2}
GLUCOSE SERPL-MCNC: 101 MG/DL (ref 70–99)
HCT VFR BLD AUTO: 30.6 % (ref 40.5–52.5)
HGB BLD-MCNC: 10 G/DL (ref 13.5–17.5)
LYMPHOCYTES # BLD: 0.5 K/UL (ref 1–5.1)
LYMPHOCYTES NFR BLD: 5.5 %
MCH RBC QN AUTO: 29.5 PG (ref 26–34)
MCHC RBC AUTO-ENTMCNC: 32.6 G/DL (ref 31–36)
MCV RBC AUTO: 90.6 FL (ref 80–100)
MONOCYTES # BLD: 0.9 K/UL (ref 0–1.3)
MONOCYTES NFR BLD: 10.2 %
NEUTROPHILS # BLD: 6.6 K/UL (ref 1.7–7.7)
NEUTROPHILS NFR BLD: 79.5 %
NT-PROBNP SERPL-MCNC: 5088 PG/ML (ref 0–124)
PLATELET # BLD AUTO: 187 K/UL (ref 135–450)
PMV BLD AUTO: 10.3 FL (ref 5–10.5)
POTASSIUM SERPL-SCNC: 4.2 MMOL/L (ref 3.5–5.1)
RBC # BLD AUTO: 3.37 M/UL (ref 4.2–5.9)
SODIUM SERPL-SCNC: 139 MMOL/L (ref 136–145)
WBC # BLD AUTO: 8.4 K/UL (ref 4–11)

## 2024-01-07 PROCEDURE — 6360000002 HC RX W HCPCS: Performed by: INTERNAL MEDICINE

## 2024-01-07 PROCEDURE — 85025 COMPLETE CBC W/AUTO DIFF WBC: CPT

## 2024-01-07 PROCEDURE — 6360000002 HC RX W HCPCS: Performed by: NURSE PRACTITIONER

## 2024-01-07 PROCEDURE — 71046 X-RAY EXAM CHEST 2 VIEWS: CPT

## 2024-01-07 PROCEDURE — 6370000000 HC RX 637 (ALT 250 FOR IP): Performed by: NURSE PRACTITIONER

## 2024-01-07 PROCEDURE — 99233 SBSQ HOSP IP/OBS HIGH 50: CPT | Performed by: INTERNAL MEDICINE

## 2024-01-07 PROCEDURE — 2580000003 HC RX 258: Performed by: NURSE PRACTITIONER

## 2024-01-07 PROCEDURE — 6370000000 HC RX 637 (ALT 250 FOR IP): Performed by: STUDENT IN AN ORGANIZED HEALTH CARE EDUCATION/TRAINING PROGRAM

## 2024-01-07 PROCEDURE — 2580000003 HC RX 258: Performed by: STUDENT IN AN ORGANIZED HEALTH CARE EDUCATION/TRAINING PROGRAM

## 2024-01-07 PROCEDURE — 36415 COLL VENOUS BLD VENIPUNCTURE: CPT

## 2024-01-07 PROCEDURE — 83880 ASSAY OF NATRIURETIC PEPTIDE: CPT

## 2024-01-07 PROCEDURE — 80048 BASIC METABOLIC PNL TOTAL CA: CPT

## 2024-01-07 PROCEDURE — 2060000000 HC ICU INTERMEDIATE R&B

## 2024-01-07 RX ORDER — FUROSEMIDE 10 MG/ML
40 INJECTION INTRAMUSCULAR; INTRAVENOUS ONCE
Status: COMPLETED | OUTPATIENT
Start: 2024-01-07 | End: 2024-01-07

## 2024-01-07 RX ADMIN — LEVETIRACETAM 500 MG: 500 TABLET, FILM COATED ORAL at 10:14

## 2024-01-07 RX ADMIN — SODIUM CHLORIDE, PRESERVATIVE FREE 10 ML: 5 INJECTION INTRAVENOUS at 19:55

## 2024-01-07 RX ADMIN — POTASSIUM CHLORIDE 20 MEQ: 1500 TABLET, EXTENDED RELEASE ORAL at 16:42

## 2024-01-07 RX ADMIN — APIXABAN 5 MG: 5 TABLET, FILM COATED ORAL at 10:14

## 2024-01-07 RX ADMIN — VALACYCLOVIR HYDROCHLORIDE 500 MG: 500 TABLET, FILM COATED ORAL at 19:59

## 2024-01-07 RX ADMIN — APIXABAN 5 MG: 5 TABLET, FILM COATED ORAL at 19:59

## 2024-01-07 RX ADMIN — SERTRALINE HYDROCHLORIDE 150 MG: 100 TABLET ORAL at 10:14

## 2024-01-07 RX ADMIN — SODIUM CHLORIDE, PRESERVATIVE FREE 10 ML: 5 INJECTION INTRAVENOUS at 10:14

## 2024-01-07 RX ADMIN — Medication 1 TABLET: at 10:14

## 2024-01-07 RX ADMIN — FUROSEMIDE 40 MG: 10 INJECTION, SOLUTION INTRAMUSCULAR; INTRAVENOUS at 12:11

## 2024-01-07 RX ADMIN — FUROSEMIDE 40 MG: 40 TABLET ORAL at 10:14

## 2024-01-07 RX ADMIN — TAMSULOSIN HYDROCHLORIDE 0.4 MG: 0.4 CAPSULE ORAL at 10:14

## 2024-01-07 RX ADMIN — PIPERACILLIN AND TAZOBACTAM 3375 MG: 3; .375 INJECTION, POWDER, FOR SOLUTION INTRAVENOUS; PARENTERAL at 19:54

## 2024-01-07 RX ADMIN — PIPERACILLIN AND TAZOBACTAM 3375 MG: 3; .375 INJECTION, POWDER, FOR SOLUTION INTRAVENOUS; PARENTERAL at 12:16

## 2024-01-07 RX ADMIN — POTASSIUM CHLORIDE 20 MEQ: 1500 TABLET, EXTENDED RELEASE ORAL at 10:14

## 2024-01-07 RX ADMIN — VALACYCLOVIR HYDROCHLORIDE 500 MG: 500 TABLET, FILM COATED ORAL at 10:14

## 2024-01-07 RX ADMIN — LEVETIRACETAM 500 MG: 500 TABLET, FILM COATED ORAL at 19:59

## 2024-01-07 RX ADMIN — PIPERACILLIN AND TAZOBACTAM 3375 MG: 3; .375 INJECTION, POWDER, FOR SOLUTION INTRAVENOUS; PARENTERAL at 03:27

## 2024-01-07 NOTE — PLAN OF CARE
Problem: Safety - Adult  Goal: Free from fall injury  1/7/2024 0102 by Lavern Nick, RN  Outcome: Progressing  Orthostatic vital signs obtained at start of shift - see flowsheet for details.  Pt does not meet criteria for orthostasis.  Pt is a Med fall risk. See Stephens Fall Score and ABCDS Injury Risk assessments.   - Screening for Orthostasis AND not a [place] Risk per STEPHENS/ABCDS: Pt bed is in low position, side rails up, call light and belongings are in reach.  Fall risk light is on outside pts room.  Pt encouraged to call for assistance as needed. Will continue with hourly rounds for PO intake, pain needs, toileting and repositioning as needed.      Problem: Infection - Adult  Goal: Absence of infection during hospitalization  Outcome: Progressing  Pt afebrile throughout shift.      Problem: Metabolic/Fluid and Electrolytes - Adult  Goal: Electrolytes maintained within normal limits  Outcome: Progressing  Lab Results   Component Value Date     01/07/2024    K 4.2 01/07/2024     01/07/2024    CO2 27 01/07/2024   No replacements at this time per orders.      Problem: Hematologic - Adult  Goal: Maintains hematologic stability  1/7/2024 0102 by Lavern Nick, RN  Outcome: Progressing  Patient's hemoglobin this AM:   Recent Labs     01/07/24  0316   HGB 10.0*     Patient's platelet count this AM:   Recent Labs     01/07/24  0316       Thrombocytopenia not present at this time.  Patient showing no signs or symptoms of active bleeding.  Transfusion not indicated at this time.  Patient verbalizes understanding of all instructions. Will continue to assess and implement POC. Call light within reach and hourly rounding in place.

## 2024-01-07 NOTE — PROGRESS NOTES
[facility] Daily Progress Note      Admit Date:  12/29/2023    Subjective:  Mr. Lopez was seem and examined. F/U CHF/PNA.  Noted more sob this am and increase in O2 requirements.  No chest pain.    Objective:   BP (!) 104/58   Pulse 72   Temp 97.6 °F (36.4 °C) (Oral)   Resp 12   Ht 1.778 m (5' 10\")   Wt 65 kg (143 lb 3.2 oz)   SpO2 95%   BMI 20.55 kg/m²     Intake/Output Summary (Last 24 hours) at 1/7/2024 1131  Last data filed at 1/7/2024 0657  Gross per 24 hour   Intake 231 ml   Output 1600 ml   Net -1369 ml       TELEMETRY: Sinus     Physical Exam:  General:  Awake, alert, NAD  Skin:  Warm and dry  Neck:  JVP difficult  Chest:  relatively clear, respiration normal  Cardiovascular:  RRR S1S2  Abdomen:  Soft nontender  Extremities:  no edema    Medications:    valACYclovir  500 mg Oral BID    piperacillin-tazobactam  3,375 mg IntraVENous Q8H    furosemide  40 mg Oral Daily    potassium chloride  20 mEq Oral BID WC    sertraline  150 mg Oral Daily    apixaban  5 mg Oral BID    levETIRAcetam  500 mg Oral BID    therapeutic multivitamin-minerals  1 tablet Oral Daily    sodium chloride flush  5-40 mL IntraVENous 2 times per day    Saline Mouthwash  15 mL Swish & Spit 4x Daily AC & HS    tamsulosin  0.4 mg Oral Daily      sodium chloride       guaiFENesin-dextromethorphan, potassium chloride **OR** potassium alternative oral replacement **OR** potassium chloride, perflutren lipid microspheres, ondansetron, polyethylene glycol, sodium chloride flush, sodium chloride, magnesium hydroxide, acetaminophen **OR** acetaminophen, Saline Mouthwash, alteplase (CATHFLO) 2 mg in sterile water 2 mL injection    Lab Data:  CBC:   Recent Labs     01/05/24  0358 01/06/24 0558 01/07/24 0316   WBC 9.9 9.8 8.4   HGB 10.0* 10.2* 10.0*   HCT 31.0* 31.2* 30.6*   MCV 91.7 90.4 90.6    169 187     BMP:   Recent Labs     01/05/24  0358 01/06/24 0558 01/07/24 0316    137 139   K 4.1 4.1 4.2    100 101    CO2 26 26 27   PHOS 2.5  --   --    BUN 22* 22* 27*   CREATININE 1.1 1.0 1.3     LIVER PROFILE:   Recent Labs     01/05/24  0358   AST 28   ALT 21   BILIDIR <0.2   BILITOT 0.4   ALKPHOS 106     PT/INR: No results for input(s): \"PROTIME\", \"INR\" in the last 72 hours.  APTT: No results for input(s): \"APTT\" in the last 72 hours.  BNP:  No results for input(s): \"BNP\" in the last 72 hours.  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    Admission for antineoplastic chemotherapy 07/20/2023    Lymphoma, unspecified body region, unspecified lymphoma type (HCC) 07/20/2023    Neoplasm of unspecified behavior of brain (HCC) 07/04/2023    Cognitive impairment 06/29/2023    Brain mass 06/28/2023    Confusion 06/28/2023    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  Check BNP.  Cxr.  Will give empiric dose IV lasix.  Check EKG.       Core Measures:  Discharge

## 2024-01-07 NOTE — PROGRESS NOTES
[facility] Progress Note    2024     Zain Lopez    MRN: [id number]    : 1955    Referring MD: Penelope Wilhelm,   [address]  [facility] [address]  [place] [address]      SUBJECTIVE:  Back on O2.  Not fluid overloaded.      ECOG PS:(2) Ambulatory and capable of self care, unable to carry out work activity, up and about > 50% or waking hours     KPS: 70% Cares for self; unable to carry on normal activity or to do active work    Isolation: None    Medications    Scheduled Meds:   valACYclovir  500 mg Oral BID    piperacillin-tazobactam  3,375 mg IntraVENous Q8H    furosemide  40 mg Oral Daily    potassium chloride  20 mEq Oral BID WC    sertraline  150 mg Oral Daily    apixaban  5 mg Oral BID    levETIRAcetam  500 mg Oral BID    therapeutic multivitamin-minerals  1 tablet Oral Daily    sodium chloride flush  5-40 mL IntraVENous 2 times per day    Saline Mouthwash  15 mL Swish & Spit 4x Daily AC & HS    tamsulosin  0.4 mg Oral Daily     Continuous Infusions:   sodium chloride       PRN Meds:.guaiFENesin-dextromethorphan, potassium chloride **OR** potassium alternative oral replacement **OR** potassium chloride, perflutren lipid microspheres, ondansetron, polyethylene glycol, sodium chloride flush, sodium chloride, magnesium hydroxide, acetaminophen **OR** acetaminophen, Saline Mouthwash, alteplase (CATHFLO) 2 mg in sterile water 2 mL injection    ROS:  As noted above, otherwise remainder of 10-point ROS negative    Physical Exam:     I&O:    Intake/Output Summary (Last 24 hours) at 2024 0929  Last data filed at 2024 0657  Gross per 24 hour   Intake 231 ml   Output 1800 ml   Net -1569 ml         Vital Signs:  /74   Pulse 69   Temp 98 °F (36.7 °C) (Oral)   Resp 23   Ht 1.778 m (5' 10\")   Wt 65.5 kg (144 lb 6.4 oz)   SpO2 90%   BMI 20.72 kg/m²     Weight:    Wt Readings from Last 3 Encounters:   24 65.5 kg (144 lb 6.4 oz)   23 76 kg (167 lb 8.8 oz)   10/18/23 77.2 kg (170 lb 3.1

## 2024-01-07 NOTE — PLAN OF CARE
Problem: Safety - Adult  Goal: Free from fall injury  1/6/2024 1405 by Lorraine Cruz, RN  Outcome: Progressing  Orthostatic vital signs obtained at start of shift - see flowsheet for details.  Pt does not meet criteria for orthostasis.  Pt is a Med fall risk. See Frey Fall Score and ABCDS Injury Risk assessments. Pt bed is in low position, side rails up, call light and belongings are in reach.  Fall risk light is on outside pts room.  Pt encouraged to call for assistance as needed. Will continue with hourly rounds for PO intake, pain needs, toileting and repositioning as needed.       Problem: ABCDS Injury Assessment  Goal: Absence of physical injury  1/6/2024 1405 by Lorraine Cruz RN  Outcome: Progressing  No new physical injuries noted.      Problem: Hematologic - Adult  Goal: Maintains hematologic stability  1/6/2024 1405 by Lorraine Cruz, RN  Outcome: Progressing  Patient's hemoglobin this AM:   Recent Labs     01/07/24  0316   HGB 10.0*       Patient's platelet count this AM:   Recent Labs     01/07/24  0316         Thrombocytopenia not present at this time.  Patient showing no signs or symptoms of active bleeding.  Transfusion not indicated at this time.  Patient verbalizes understanding of all instructions. Will continue to assess and implement POC. Call light within reach and hourly rounding in place.       Problem: Nutrition Deficit:  Goal: Optimize nutritional status  1/6/2024 1405 by Lorraine Cruz, RN  Outcome: Progressing  Pt educated on importance of staying hydrated and having as much intake as possible. Verbalized understanding. Will continue to monitor.

## 2024-01-08 ENCOUNTER — APPOINTMENT (OUTPATIENT)
Dept: GENERAL RADIOLOGY | Age: 69
DRG: 193 | End: 2024-01-08
Attending: STUDENT IN AN ORGANIZED HEALTH CARE EDUCATION/TRAINING PROGRAM
Payer: MEDICARE

## 2024-01-08 LAB
ALBUMIN SERPL-MCNC: 3 G/DL (ref 3.4–5)
ALP SERPL-CCNC: 128 U/L (ref 40–129)
ALT SERPL-CCNC: 29 U/L (ref 10–40)
ANION GAP SERPL CALCULATED.3IONS-SCNC: 13 MMOL/L (ref 3–16)
APTT BLD: 31.8 SEC (ref 22.7–35.9)
AST SERPL-CCNC: 34 U/L (ref 15–37)
BASOPHILS # BLD: 0.1 K/UL (ref 0–0.2)
BASOPHILS NFR BLD: 0.6 %
BILIRUB DIRECT SERPL-MCNC: <0.2 MG/DL (ref 0–0.3)
BILIRUB INDIRECT SERPL-MCNC: ABNORMAL MG/DL (ref 0–1)
BILIRUB SERPL-MCNC: 0.4 MG/DL (ref 0–1)
BILIRUB UR QL STRIP.AUTO: NEGATIVE
BUN SERPL-MCNC: 28 MG/DL (ref 7–20)
CALCIUM SERPL-MCNC: 9.3 MG/DL (ref 8.3–10.6)
CHLORIDE SERPL-SCNC: 99 MMOL/L (ref 99–110)
CLARITY UR: CLEAR
CO2 SERPL-SCNC: 27 MMOL/L (ref 21–32)
COLOR UR: YELLOW
CREAT SERPL-MCNC: 1.2 MG/DL (ref 0.8–1.3)
DEPRECATED RDW RBC AUTO: 18.1 % (ref 12.4–15.4)
EOSINOPHIL # BLD: 0.3 K/UL (ref 0–0.6)
EOSINOPHIL NFR BLD: 3.9 %
GFR SERPLBLD CREATININE-BSD FMLA CKD-EPI: >60 ML/MIN/{1.73_M2}
GLUCOSE SERPL-MCNC: 93 MG/DL (ref 70–99)
GLUCOSE UR STRIP.AUTO-MCNC: NEGATIVE MG/DL
HCT VFR BLD AUTO: 30.8 % (ref 40.5–52.5)
HGB BLD-MCNC: 10.2 G/DL (ref 13.5–17.5)
HGB UR QL STRIP.AUTO: NEGATIVE
INR PPP: 1.33 (ref 0.84–1.16)
KETONES UR STRIP.AUTO-MCNC: NEGATIVE MG/DL
LDH SERPL L TO P-CCNC: 177 U/L (ref 100–190)
LEUKOCYTE ESTERASE UR QL STRIP.AUTO: NEGATIVE
LYMPHOCYTES # BLD: 0.6 K/UL (ref 1–5.1)
LYMPHOCYTES NFR BLD: 7.1 %
MAGNESIUM SERPL-MCNC: 2.2 MG/DL (ref 1.8–2.4)
MCH RBC QN AUTO: 30.1 PG (ref 26–34)
MCHC RBC AUTO-ENTMCNC: 33.1 G/DL (ref 31–36)
MCV RBC AUTO: 91.2 FL (ref 80–100)
MONOCYTES # BLD: 0.9 K/UL (ref 0–1.3)
MONOCYTES NFR BLD: 11.1 %
NEUTROPHILS # BLD: 6.3 K/UL (ref 1.7–7.7)
NEUTROPHILS NFR BLD: 77.3 %
NITRITE UR QL STRIP.AUTO: NEGATIVE
PH UR STRIP.AUTO: 7 [PH] (ref 5–8)
PHOSPHATE SERPL-MCNC: 3.6 MG/DL (ref 2.5–4.9)
PLATELET # BLD AUTO: 199 K/UL (ref 135–450)
PMV BLD AUTO: 9.9 FL (ref 5–10.5)
POTASSIUM SERPL-SCNC: 4.2 MMOL/L (ref 3.5–5.1)
PROT SERPL-MCNC: 5.7 G/DL (ref 6.4–8.2)
PROT UR STRIP.AUTO-MCNC: NEGATIVE MG/DL
PROTHROMBIN TIME: 16.5 SEC (ref 11.5–14.8)
RBC # BLD AUTO: 3.38 M/UL (ref 4.2–5.9)
SODIUM SERPL-SCNC: 139 MMOL/L (ref 136–145)
SP GR UR STRIP.AUTO: 1.02 (ref 1–1.03)
UA DIPSTICK W REFLEX MICRO PNL UR: NORMAL
URATE SERPL-MCNC: 2.6 MG/DL (ref 3.5–7.2)
URN SPEC COLLECT METH UR: NORMAL
UROBILINOGEN UR STRIP-ACNC: 0.2 E.U./DL
WBC # BLD AUTO: 8.1 K/UL (ref 4–11)

## 2024-01-08 PROCEDURE — 85025 COMPLETE CBC W/AUTO DIFF WBC: CPT

## 2024-01-08 PROCEDURE — 36415 COLL VENOUS BLD VENIPUNCTURE: CPT

## 2024-01-08 PROCEDURE — 80048 BASIC METABOLIC PNL TOTAL CA: CPT

## 2024-01-08 PROCEDURE — 83735 ASSAY OF MAGNESIUM: CPT

## 2024-01-08 PROCEDURE — 83615 LACTATE (LD) (LDH) ENZYME: CPT

## 2024-01-08 PROCEDURE — 71045 X-RAY EXAM CHEST 1 VIEW: CPT

## 2024-01-08 PROCEDURE — 97116 GAIT TRAINING THERAPY: CPT

## 2024-01-08 PROCEDURE — 84100 ASSAY OF PHOSPHORUS: CPT

## 2024-01-08 PROCEDURE — 6370000000 HC RX 637 (ALT 250 FOR IP): Performed by: STUDENT IN AN ORGANIZED HEALTH CARE EDUCATION/TRAINING PROGRAM

## 2024-01-08 PROCEDURE — 81003 URINALYSIS AUTO W/O SCOPE: CPT

## 2024-01-08 PROCEDURE — 80076 HEPATIC FUNCTION PANEL: CPT

## 2024-01-08 PROCEDURE — 84550 ASSAY OF BLOOD/URIC ACID: CPT

## 2024-01-08 PROCEDURE — 99233 SBSQ HOSP IP/OBS HIGH 50: CPT | Performed by: NURSE PRACTITIONER

## 2024-01-08 PROCEDURE — 6370000000 HC RX 637 (ALT 250 FOR IP): Performed by: NURSE PRACTITIONER

## 2024-01-08 PROCEDURE — 93005 ELECTROCARDIOGRAM TRACING: CPT | Performed by: INTERNAL MEDICINE

## 2024-01-08 PROCEDURE — 2580000003 HC RX 258: Performed by: STUDENT IN AN ORGANIZED HEALTH CARE EDUCATION/TRAINING PROGRAM

## 2024-01-08 PROCEDURE — 97110 THERAPEUTIC EXERCISES: CPT

## 2024-01-08 PROCEDURE — 2060000000 HC ICU INTERMEDIATE R&B

## 2024-01-08 PROCEDURE — 85610 PROTHROMBIN TIME: CPT

## 2024-01-08 PROCEDURE — 2580000003 HC RX 258: Performed by: NURSE PRACTITIONER

## 2024-01-08 PROCEDURE — 6360000002 HC RX W HCPCS: Performed by: INTERNAL MEDICINE

## 2024-01-08 PROCEDURE — 85730 THROMBOPLASTIN TIME PARTIAL: CPT

## 2024-01-08 PROCEDURE — 6360000002 HC RX W HCPCS: Performed by: NURSE PRACTITIONER

## 2024-01-08 PROCEDURE — 97530 THERAPEUTIC ACTIVITIES: CPT

## 2024-01-08 RX ORDER — DIGOXIN 0.25 MG/ML
250 INJECTION INTRAMUSCULAR; INTRAVENOUS ONCE
Status: COMPLETED | OUTPATIENT
Start: 2024-01-08 | End: 2024-01-08

## 2024-01-08 RX ORDER — DIGOXIN 125 MCG
125 TABLET ORAL DAILY
Status: DISCONTINUED | OUTPATIENT
Start: 2024-01-09 | End: 2024-01-09

## 2024-01-08 RX ORDER — DIGOXIN 0.25 MG/ML
500 INJECTION INTRAMUSCULAR; INTRAVENOUS ONCE
Status: COMPLETED | OUTPATIENT
Start: 2024-01-08 | End: 2024-01-08

## 2024-01-08 RX ADMIN — TAMSULOSIN HYDROCHLORIDE 0.4 MG: 0.4 CAPSULE ORAL at 08:59

## 2024-01-08 RX ADMIN — PIPERACILLIN AND TAZOBACTAM 3375 MG: 3; .375 INJECTION, POWDER, FOR SOLUTION INTRAVENOUS; PARENTERAL at 03:49

## 2024-01-08 RX ADMIN — POTASSIUM CHLORIDE 20 MEQ: 1500 TABLET, EXTENDED RELEASE ORAL at 17:15

## 2024-01-08 RX ADMIN — FUROSEMIDE 40 MG: 40 TABLET ORAL at 08:59

## 2024-01-08 RX ADMIN — SERTRALINE HYDROCHLORIDE 150 MG: 100 TABLET ORAL at 08:58

## 2024-01-08 RX ADMIN — POTASSIUM CHLORIDE 20 MEQ: 1500 TABLET, EXTENDED RELEASE ORAL at 08:59

## 2024-01-08 RX ADMIN — SODIUM CHLORIDE 15 ML: 900 IRRIGANT IRRIGATION at 11:10

## 2024-01-08 RX ADMIN — APIXABAN 5 MG: 5 TABLET, FILM COATED ORAL at 08:59

## 2024-01-08 RX ADMIN — LEVETIRACETAM 500 MG: 500 TABLET, FILM COATED ORAL at 19:41

## 2024-01-08 RX ADMIN — VALACYCLOVIR HYDROCHLORIDE 500 MG: 500 TABLET, FILM COATED ORAL at 19:41

## 2024-01-08 RX ADMIN — APIXABAN 5 MG: 5 TABLET, FILM COATED ORAL at 19:41

## 2024-01-08 RX ADMIN — DIGOXIN 250 MCG: 250 INJECTION, SOLUTION INTRAMUSCULAR; INTRAVENOUS at 13:49

## 2024-01-08 RX ADMIN — VALACYCLOVIR HYDROCHLORIDE 500 MG: 500 TABLET, FILM COATED ORAL at 08:59

## 2024-01-08 RX ADMIN — SODIUM CHLORIDE 15 ML: 900 IRRIGANT IRRIGATION at 17:15

## 2024-01-08 RX ADMIN — Medication 1 TABLET: at 08:58

## 2024-01-08 RX ADMIN — LEVETIRACETAM 500 MG: 500 TABLET, FILM COATED ORAL at 08:59

## 2024-01-08 RX ADMIN — SODIUM CHLORIDE, PRESERVATIVE FREE 10 ML: 5 INJECTION INTRAVENOUS at 09:00

## 2024-01-08 RX ADMIN — SODIUM CHLORIDE, PRESERVATIVE FREE 10 ML: 5 INJECTION INTRAVENOUS at 19:42

## 2024-01-08 RX ADMIN — DIGOXIN 500 MCG: 0.25 INJECTION INTRAMUSCULAR; INTRAVENOUS at 10:02

## 2024-01-08 NOTE — PLAN OF CARE
Problem: Safety - Adult  Goal: Free from fall injury  Outcome: Progressing  Orthostatic vital signs obtained at start of shift - see flowsheet for details.  Pt does not meet criteria for orthostasis.  Pt is a Med fall risk. See Stephens Fall Score and ABCDS Injury Risk assessments.   - Screening for Orthostasis AND not a [place] Risk per STEPHENS/ABCDS: Pt bed is in low position, side rails up, call light and belongings are in reach. Pt encouraged to call for assistance as needed. Will continue with hourly rounds for PO intake, pain needs, toileting and repositioning as needed.     Problem: Infection - Adult  Goal: Absence of infection during hospitalization  Outcome: Progressing  Pt afebrile throughout shift.      Problem: Hematologic - Adult  Goal: Maintains hematologic stability  Outcome: Progressing  Patient's hemoglobin this AM:   Recent Labs     01/08/24  0343   HGB 10.2*     Patient's platelet count this AM:   Recent Labs     01/08/24  0343       Thrombocytopenia not present at this time.  Patient showing no signs or symptoms of active bleeding.  Transfusion not indicated at this time.  Patient verbalizes understanding of all instructions. Will continue to assess and implement POC. Call light within reach and hourly rounding in place.

## 2024-01-08 NOTE — PROGRESS NOTES
[facility]   Cardiology  Note   Dr SHELLY Giron MD, FACC   Marina Herndon RN, FNP APRN CVNP  Date: 1/8/2024  Admit Date: 12/29/2023       CC: acute hypoxemic respiratory failure multifocal pneumonia and \"CHF exacerbation.        Following cardiology today for: Following cardiology today for:   irregular heart rhythm  SOB    With [facility] 10/23/23 normal cors  TTE  EF 40 to 45% and that seems stable.    AV dual-chamber PPM /PAF   bone marrow transplant / NHL     Interval Hx /  Subjective:Today, he is up in the chair, visitor in the room  VSS in aflutter V Paced  rate 63 / 02 1 liter  96% 02 n/c   Walked in kirby and off 02 /doing better   No new complaints today. No major events overnight.   EKG today aflutter V rate 65   Given dig 250 mcg at 2pm today / 500mcg   at 9:45am   [facility]       Medical Decision Making:  Discussion of patient care with other providers  Patient seen and examined. Clinical notes reviewed. Telemetry reviewed / Pertinent labs, diagnostic, device, and imaging results reviewed as a part of this visit  I spent a total of 50 minutes and greater than 50% of the time was spent counseling with patient  coordinating care regarding her diagnosis, treatments and plan of care      Scheduled Meds:   digoxin  250 mcg IntraVENous Once    valACYclovir  500 mg Oral BID    furosemide  40 mg Oral Daily    potassium chloride  20 mEq Oral BID WC    sertraline  150 mg Oral Daily    apixaban  5 mg Oral BID    levETIRAcetam  500 mg Oral BID    therapeutic multivitamin-minerals  1 tablet Oral Daily    sodium chloride flush  5-40 mL IntraVENous 2 times per day    Saline Mouthwash  15 mL Swish & Spit 4x Daily AC & HS    tamsulosin  0.4 mg Oral Daily     Physical Examination:  Vitals:    01/08/24 1107   BP: 109/62   Pulse: 63   Resp: 18   Temp: 97.7 °F (36.5 °C)   SpO2: 96%      In: 337 [P.O.:100; I.V.:237]  Out: 2740    Wt Readings from Last 3 Encounters:   01/08/24 62.9 kg (138 lb 9.6 oz)   11/22/23 76 kg (167 lb  8.8 oz)   10/18/23 77.2 kg (170 lb 3.1 oz)       Intake/Output Summary (Last 24 hours) at 1/8/2024 1244  Last data filed at 1/8/2024 0904  Gross per 24 hour   Intake 337 ml   Output 2740 ml   Net -2403 ml       Telemetry: Personally Reviewed    Constitutional: Cooperative and in no apparent distress, and appears well nourished  Skin: Warm and pink; no pallor, cyanosis, clubbing, or bruising   HEENT: Symmetric and normocephalic.   Cardiovascular: Regular rate and rhythm. S1/S2   Respiratory: Respirations symmetric and unlabored. Lungs clear to auscultation bilaterally, no wheezing, crackles, or rhonchi  Gastrointestinal: Abdomen soft and round. Bowel sounds normoactive without tenderness or masses.  Musculoskeletal: Bilateral upper and lower extremity strength 5/5 with full ROM  Neurologic/Psych: Awake and orientated to person, place and time. 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 ([facility]) 06/03/2022    Marijuana use 06/03/2022    Acute on chronic systolic congestive heart failure ([facility]) 01/07/2024    Acute hypoxemic respiratory failure ([facility]) 01/04/2024    Pleural effusion due to CHF (congestive heart failure) ([facility]) 01/04/2024    NICM (nonischemic cardiomyopathy) ([facility]) 01/03/2024    Multifocal pneumonia 12/29/2023    Acute pneumonia 12/29/2023    Stem cell donor 10/13/2023    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    Right atrial mass 10/03/2023    Lymphoma malignant, immunoblastic ([facility]) 08/31/2023    Primary CNS lymphoma ([facility]) 08/17/2023    Admission for antineoplastic chemotherapy 07/20/2023    Lymphoma, unspecified body region, unspecified lymphoma type ([facility]) 07/20/2023    Neoplasm of unspecified behavior of brain ([facility]) 07/04/2023    Cognitive impairment 06/29/2023    Brain mass 06/28/2023    Confusion 06/28/2023    CNS lymphoma ([facility]) 04/28/2023    S/P craniotomy 04/28/2023    CAD in native artery 04/05/2022

## 2024-01-08 NOTE — DISCHARGE INSTRUCTIONS
Extra Heart Failure sites:     https://HÃ¶vding.com/publication/?x=289847   --- this is American Heart Association interactive Healthier Living with Heart Failure guidebook.  Please click hyperlink or copy / paste link into search bar. Use your mouse to scroll through the pages.  Lots of information about weight monitoring, diet tips, activity, meds, etc    HF [place] kae  -- this is a free smart phone kae available for iPhone and Android download.  Use your phone to track sodium / fluid intake, zone tool symptom tracking, weights, medications, etc. Click on this hyperlink  HF [place] Kae   for QR code for easy download.      DASH (Dietary Approach to Stop Hypertension) diet --  https://www.nhlbi.nih.gov/education/dash-eating-plan -- this diet is a flexible eating plan that promotes heart healthy eating style.  Click on hyperlink or copy / paste link into search bar.  Lots of low sodium recipes and tips.    https://www.BlueStacks.[employer] s[employer] express/recipes  -- more free recipes

## 2024-01-08 NOTE — PROGRESS NOTES
This RN contacted on call Cardiology team to notify about pt transferring to atrial flutter and HR jumping around from 90s to 140 BMP while at rest. See new orders, NP Jo notified, plan of care ongoing.

## 2024-01-08 NOTE — PLAN OF CARE
Problem: Safety - Adult  Goal: Free from fall injury  Outcome: Progressing  Flowsheets (Taken 1/8/2024 1834)  Free From Fall Injury: Instruct family/caregiver on patient safety  Note: Orthostatic vital signs obtained at start of shift - see flowsheet for details.  Pt does not meet criteria for orthostasis.  Pt is a Low fall risk. See Stephens Fall Score and ABCDS Injury Risk assessments.     - Screening for Orthostasis AND not a [place] Risk per STEPHENS/ABCDS: Pt bed is in low position, side rails up, call light and belongings are in reach.  Fall risk light is on outside pts room.  Pt encouraged to call for assistance as needed. Will continue with hourly rounds for PO intake, pain needs, toileting and repositioning as needed.       Problem: Skin/Tissue Integrity - Adult  Goal: Skin integrity remains intact  Outcome: Progressing  Flowsheets (Taken 1/8/2024 1834)  Skin Integrity Remains Intact: Monitor for areas of redness and/or skin breakdown  Note: No new incidence of skin breakdown during shift. [place] assessed.      Problem: Infection - Adult  Goal: Absence of infection during hospitalization  Outcome: Progressing  Flowsheets (Taken 1/8/2024 1834)  Absence of infection during hospitalization:   Assess and monitor for signs and symptoms of infection   Monitor lab/diagnostic results   Monitor all insertion sites i.e., indwelling lines, tubes and drains  Note: No new incidence of infection during shift. Pt afebrile during shift.      Problem: Metabolic/Fluid and Electrolytes - Adult  Goal: Electrolytes maintained within normal limits  Outcome: Progressing  Flowsheets (Taken 1/8/2024 1834)  Electrolytes maintained within normal limits: Monitor labs and assess patient for signs and symptoms of electrolyte imbalances  Note:   Lab Results   Component Value Date     01/08/2024    K 4.2 01/08/2024    CL 99 01/08/2024    CO2 27 01/08/2024         Problem: Hematologic - Adult  Goal: Maintains hematologic  stability  Outcome: Progressing  Flowsheets (Taken 1/8/2024 1834)  Maintains hematologic stability:   Assess for signs and symptoms of bleeding or hemorrhage   Monitor labs for bleeding or clotting disorders  Note: Patient's hemoglobin this AM:   Recent Labs     01/08/24  0343   HGB 10.2*     Patient's platelet count this AM:   Recent Labs     01/08/24  0343       Thrombocytopenia not present at this time.  Patient showing no signs or symptoms of active bleeding.  Transfusion not indicated at this time.  Patient verbalizes understanding of all instructions. Will continue to assess and implement POC. Call light within reach and hourly rounding in place.       Problem: Pain  Goal: Verbalizes/displays adequate comfort level or baseline comfort level  Outcome: Progressing  Flowsheets (Taken 1/8/2024 1834)  Verbalizes/displays adequate comfort level or baseline comfort level:   Encourage patient to monitor pain and request assistance   Assess pain using appropriate pain scale   Implement non-pharmacological measures as appropriate and evaluate response  Note: Pt encouraged to be active participant in pain management during shift. No complaints of pain during shift.      Problem: Respiratory - Adult  Goal: Achieves optimal ventilation and oxygenation  Outcome: Progressing  Flowsheets (Taken 1/8/2024 1834)  Achieves optimal ventilation and oxygenation:   Assess for changes in respiratory status   Assess for changes in mentation and behavior   Position to facilitate oxygenation and minimize respiratory effort  Note: Pt respiratory system assessed, pt went from 2LPM via NC to RA during shift.      Problem: Cardiovascular - Adult  Goal: Absence of cardiac dysrhythmias or at baseline  Outcome: Not Progressing  Flowsheets (Taken 1/8/2024 1834)  Absence of cardiac dysrhythmias or at baseline: Monitor cardiac rate and rhythm  Note: Pt went into A flutter during shift, Cardiology notified, PO digoxin ordered.

## 2024-01-08 NOTE — PROGRESS NOTES
lb 8.8 oz)   10/18/23 77.2 kg (170 lb 3.1 oz)         General: Awake, alert and oriented. More comfortable appearing  HEENT: normocephalic, no scleral erythema or icterus, Oral mucosa moist and intact, throat clear  NECK: supple   BACK: Straight   SKIN: warm dry and intact without lesions rashes or masses  CHEST: tachypnea, bilateral rhonchi. Diminished breath sounds one third up right base. Nonproductive cough  CV: Normal S1 S2, RRR, no MRG  ABD: NT ND normoactive BS  EXTREMITIES: without edema, denies calf tenderness  NEURO: grossly intact  CATHETER: PIV    Data    CBC:   Recent Labs     01/06/24  0558 01/07/24  0316 01/08/24  0343   WBC 9.8 8.4 8.1   HGB 10.2* 10.0* 10.2*   HCT 31.2* 30.6* 30.8*   MCV 90.4 90.6 91.2    187 199       BMP/Mag:  Recent Labs     01/06/24  0558 01/07/24  0316 01/08/24  0343    139 139   K 4.1 4.2 4.2    101 99   CO2 26 27 27   PHOS  --   --  3.6   BUN 22* 27* 28*   CREATININE 1.0 1.3 1.2   MG  --   --  2.20       LIVP:   Recent Labs     01/08/24  0343   AST 34   ALT 29   BILIDIR <0.2   BILITOT 0.4   ALKPHOS 128       Coags:   Recent Labs     01/08/24  0343   PROTIME 16.5*   INR 1.33*   APTT 31.8       Uric Acid   Recent Labs     01/08/24  0343   LABURIC 2.6*       PROBLEM LIST:           1.  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  BPH  5.  GERD  6.  Depressed mood   7.  CHB w/ left SC dual chamber pacemaker  8.  HTN  9. C-Diff  10. Staph Epi Bacteremia  11. DVT  12. JOEL     TREATMENT:              DLBCL:  1. R-CHOP (10/26/21 - 2/11/22)  CNS Relapse (7/2023):  2.  R+MPV x 6 cycles (7/6/23 - 9/14/23)  3.  Thiotepa, Busulfan, and Cytoxan & ASCT (11/3/23)      ASSESSMENT AND PLAN:           1. DLBC NHL w/ CNS relapse: Patient currently in [place]  - CSF (4/21/23): no malignant cells, flow: negative   - Brain bx (4/28/23, [place]):  T cell infiltrate  subtract 50 d/t having ventricular paced pacemaker in place  New Onset Atrial Flutter:  - Digoxin 500-250 1/8/24  CHF:  - Right Venous US 11/17/23: + DVT right IJ, brachial, axillary, cephalic veins   - Continue Eliquis started 11/21/23  - Has bilateral LE edema 12/29. Will get pro bnp and start lasix 40 mg daily   - Echo 1/2/24 - EF 40-45%     9. : H/o BPH  BPH:    - Cont Flomax 0.4 mg daily      10 MSK:   Generalized weakness:  D/t CNS lymphoma and chemotherapy   - Frailty assessment score (9/25/23):  1 - \"intermediate category\" d/t weight loss, decreased strength, endurance, and balance.  - Follow-up with outpatient PT/OT -- Referral to outpatient physical therapy gym placed 12/27/23  - Discussed needing to increase his physical activity and doing tasks throughout the day.     11. Med Onc:  H/o melanoma on right thumb:  - S/p resection 15 yrs ago  H/o Adenoid cystic carcinoma:   - S/p resection (4/2021) & XRT      12. Psych: Depression  - Cont Zoloft to 150 mg daily 12/20/23  - He will meet with Dr. Leos as his depression seems to be worsening the further he gets from transplant        - DVT Prophylaxis: Prophylaxis on hold d/t contraindication  Contraindications to pharmacologic prophylaxis: Patient already on therapeutic anticoagulation  Contraindications to mechanical prophylaxis: None     - Disposition: Uncertain at this time. Home once off supplemental O2    Annalisa Osorio, APRN - CNP      Penelope Wilhelm
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
## 2024-01-09 ENCOUNTER — TELEPHONE (OUTPATIENT)
Dept: INTERNAL MEDICINE CLINIC | Age: 69
End: 2024-01-09

## 2024-01-09 VITALS
WEIGHT: 137.4 LBS | HEIGHT: 70 IN | SYSTOLIC BLOOD PRESSURE: 97 MMHG | BODY MASS INDEX: 19.67 KG/M2 | OXYGEN SATURATION: 93 % | HEART RATE: 90 BPM | RESPIRATION RATE: 18 BRPM | DIASTOLIC BLOOD PRESSURE: 64 MMHG | TEMPERATURE: 97.4 F

## 2024-01-09 LAB
ANION GAP SERPL CALCULATED.3IONS-SCNC: 9 MMOL/L (ref 3–16)
BASOPHILS # BLD: 0 K/UL (ref 0–0.2)
BASOPHILS NFR BLD: 0.5 %
BUN SERPL-MCNC: 27 MG/DL (ref 7–20)
CALCIUM SERPL-MCNC: 9.3 MG/DL (ref 8.3–10.6)
CHLORIDE SERPL-SCNC: 100 MMOL/L (ref 99–110)
CO2 SERPL-SCNC: 28 MMOL/L (ref 21–32)
CREAT SERPL-MCNC: 1.2 MG/DL (ref 0.8–1.3)
DEPRECATED RDW RBC AUTO: 17.6 % (ref 12.4–15.4)
EKG ATRIAL RATE: 271 BPM
EKG DIAGNOSIS: NORMAL
EKG Q-T INTERVAL: 558 MS
EKG QRS DURATION: 162 MS
EKG QTC CALCULATION (BAZETT): 580 MS
EKG R AXIS: -78 DEGREES
EKG T AXIS: 101 DEGREES
EKG VENTRICULAR RATE: 65 BPM
EOSINOPHIL # BLD: 0.4 K/UL (ref 0–0.6)
EOSINOPHIL NFR BLD: 3.8 %
GFR SERPLBLD CREATININE-BSD FMLA CKD-EPI: >60 ML/MIN/{1.73_M2}
GLUCOSE SERPL-MCNC: 93 MG/DL (ref 70–99)
HCT VFR BLD AUTO: 33.2 % (ref 40.5–52.5)
HGB BLD-MCNC: 10.8 G/DL (ref 13.5–17.5)
LYMPHOCYTES # BLD: 0.5 K/UL (ref 1–5.1)
LYMPHOCYTES NFR BLD: 5.3 %
MCH RBC QN AUTO: 29.4 PG (ref 26–34)
MCHC RBC AUTO-ENTMCNC: 32.5 G/DL (ref 31–36)
MCV RBC AUTO: 90.4 FL (ref 80–100)
MONOCYTES # BLD: 1.1 K/UL (ref 0–1.3)
MONOCYTES NFR BLD: 11.6 %
NEUTROPHILS # BLD: 7.3 K/UL (ref 1.7–7.7)
NEUTROPHILS NFR BLD: 78.8 %
PLATELET # BLD AUTO: 240 K/UL (ref 135–450)
PMV BLD AUTO: 10.1 FL (ref 5–10.5)
POTASSIUM SERPL-SCNC: 4.2 MMOL/L (ref 3.5–5.1)
RBC # BLD AUTO: 3.67 M/UL (ref 4.2–5.9)
SODIUM SERPL-SCNC: 137 MMOL/L (ref 136–145)
WBC # BLD AUTO: 9.2 K/UL (ref 4–11)

## 2024-01-09 PROCEDURE — 85025 COMPLETE CBC W/AUTO DIFF WBC: CPT

## 2024-01-09 PROCEDURE — 80048 BASIC METABOLIC PNL TOTAL CA: CPT

## 2024-01-09 PROCEDURE — 93005 ELECTROCARDIOGRAM TRACING: CPT | Performed by: NURSE PRACTITIONER

## 2024-01-09 PROCEDURE — 36415 COLL VENOUS BLD VENIPUNCTURE: CPT

## 2024-01-09 PROCEDURE — 93010 ELECTROCARDIOGRAM REPORT: CPT | Performed by: INTERNAL MEDICINE

## 2024-01-09 PROCEDURE — 6370000000 HC RX 637 (ALT 250 FOR IP): Performed by: NURSE PRACTITIONER

## 2024-01-09 PROCEDURE — 2580000003 HC RX 258: Performed by: STUDENT IN AN ORGANIZED HEALTH CARE EDUCATION/TRAINING PROGRAM

## 2024-01-09 PROCEDURE — 99233 SBSQ HOSP IP/OBS HIGH 50: CPT | Performed by: NURSE PRACTITIONER

## 2024-01-09 PROCEDURE — 6370000000 HC RX 637 (ALT 250 FOR IP): Performed by: STUDENT IN AN ORGANIZED HEALTH CARE EDUCATION/TRAINING PROGRAM

## 2024-01-09 RX ORDER — DIGOXIN 125 MCG
125 TABLET ORAL DAILY
Qty: 30 TABLET | Refills: 3 | Status: SHIPPED | OUTPATIENT
Start: 2024-01-10

## 2024-01-09 RX ORDER — POTASSIUM CHLORIDE 20 MEQ/1
20 TABLET, EXTENDED RELEASE ORAL 2 TIMES DAILY WITH MEALS
Qty: 60 TABLET | Refills: 3 | Status: SHIPPED | OUTPATIENT
Start: 2024-01-09

## 2024-01-09 RX ORDER — GUAIFENESIN/DEXTROMETHORPHAN 100-10MG/5
5 SYRUP ORAL EVERY 4 HOURS PRN
Qty: 120 ML | Refills: 0 | Status: SHIPPED | OUTPATIENT
Start: 2024-01-09 | End: 2024-01-19

## 2024-01-09 RX ORDER — VALACYCLOVIR HYDROCHLORIDE 500 MG/1
500 TABLET, FILM COATED ORAL 2 TIMES DAILY
Qty: 60 TABLET | Refills: 2 | Status: SHIPPED | OUTPATIENT
Start: 2024-01-09

## 2024-01-09 RX ADMIN — POTASSIUM CHLORIDE 20 MEQ: 1500 TABLET, EXTENDED RELEASE ORAL at 08:37

## 2024-01-09 RX ADMIN — FUROSEMIDE 40 MG: 40 TABLET ORAL at 08:37

## 2024-01-09 RX ADMIN — SODIUM CHLORIDE, PRESERVATIVE FREE 10 ML: 5 INJECTION INTRAVENOUS at 08:41

## 2024-01-09 RX ADMIN — LEVETIRACETAM 500 MG: 500 TABLET, FILM COATED ORAL at 08:37

## 2024-01-09 RX ADMIN — SERTRALINE HYDROCHLORIDE 150 MG: 100 TABLET ORAL at 08:37

## 2024-01-09 RX ADMIN — SODIUM CHLORIDE 15 ML: 900 IRRIGANT IRRIGATION at 09:36

## 2024-01-09 RX ADMIN — DIGOXIN 125 MCG: 125 TABLET ORAL at 09:36

## 2024-01-09 RX ADMIN — TAMSULOSIN HYDROCHLORIDE 0.4 MG: 0.4 CAPSULE ORAL at 08:37

## 2024-01-09 RX ADMIN — APIXABAN 5 MG: 5 TABLET, FILM COATED ORAL at 08:37

## 2024-01-09 RX ADMIN — VALACYCLOVIR HYDROCHLORIDE 500 MG: 500 TABLET, FILM COATED ORAL at 08:37

## 2024-01-09 RX ADMIN — Medication 1 TABLET: at 08:37

## 2024-01-09 ASSESSMENT — PAIN SCALES - GENERAL: PAINLEVEL_OUTOF10: 0

## 2024-01-09 NOTE — PROGRESS NOTES
Pt PIV removed prior to discharge. Discharge instructions read to pt and spouse, given opportunity to ask questions, no further questions at this time. All pt belongings gathered and escorted to front lobby with pt via wheel chair. Pt assisted into private vehicle driven by pt spouse and driven to private residence. Pt room torn down.

## 2024-01-09 NOTE — PROGRESS NOTES
This RN messaged on call cardiology  Good morning, pt has digoxin 125mcg scheduled for 0900, his HR is a bit lower, like low 60's. Pressures are okay though, are we still good to give or would you like to hold?\" Verbal clarification given by NP to hold if HR below 60. Plan of care ongoing.

## 2024-01-09 NOTE — TELEPHONE ENCOUNTER
Care Transitions Initial Follow Up Call    Outreach made within 2 business days of discharge: Yes    Patient: Zain Lopez Patient : 1955   MRN: [id number]  Reason for Admission: There are no discharge diagnoses documented for the most recent discharge.  Discharge Date: 24       Spoke with: AMRIT     Discharge department/facility: [facility]      Scheduled appointment with PCP within 7-14 days    Follow Up  Future Appointments   Date Time Provider Department Center   2024 12:00 PM Cierra Dai RD, LD [facility] INF None   2024  7:00 AM Ronal Wahl APRN - CNP [facility] [place]

## 2024-01-09 NOTE — PROGRESS NOTES
Physical Therapy    Pt seen up ambulating with kirby independently with wife.  Spoke with wife (pt meeting with psychologist).  Wife reports pt off O2 and moving well.  Reports they are hoping to go home today.  Will sign off.  Please re-order should need arise.    Cierra Mujica, PT [id number]

## 2024-01-09 NOTE — PROGRESS NOTES
[facility]   Cardiology  Note   Dr SHELLY Giron MD, FACC   Marina Herndon RN, FNP APRN CVNP  Date: 1/9/2024  Admit Date: 12/29/2023       CC: acute hypoxemic respiratory failure multifocal pneumonia and \"CHF exacerbation.        Following cardiology today for: Following cardiology today for:   irregular heart rhythm /  SOB    With [facility] 10/23/23 normal cors  TTE  EF 40 to 45% and that seems stable.    AV dual-chamber PPM /PAF   bone marrow transplant / NHL     Interval Hx /  Subjective:Today, he is resting quietly   Was walking in kirby with -02 off doing better  Wanting to go home soon    Monitor HR 60 V paced afib   No new complaints today. No major events overnight.   Given dig 250 mcg at 2pm  / 500mcg  at 9:45am on 1/8/2024   Today stop daily po dig for now HR 60  afib   Mag wnl     Medical Decision Making:  Discussion of patient care with other providers  Patient seen and examined. Clinical notes reviewed. Telemetry reviewed / Pertinent labs, diagnostic, device, and imaging results reviewed as a part of this visit  I spent a total of 50 minutes and greater than 50% of the time was spent counseling with patient  coordinating care regarding her diagnosis, treatments and plan of care      Scheduled Meds:   valACYclovir  500 mg Oral BID    furosemide  40 mg Oral Daily    potassium chloride  20 mEq Oral BID WC    sertraline  150 mg Oral Daily    apixaban  5 mg Oral BID    levETIRAcetam  500 mg Oral BID    therapeutic multivitamin-minerals  1 tablet Oral Daily    sodium chloride flush  5-40 mL IntraVENous 2 times per day    Saline Mouthwash  15 mL Swish & Spit 4x Daily AC & HS    tamsulosin  0.4 mg Oral Daily     Physical Examination:  Vitals:    01/09/24 1157   BP:    Pulse: 90   Resp:    Temp:    SpO2: 93%      In: 1497 [P.O.:1370; I.V.:127]  Out: 2050    Wt Readings from Last 3 Encounters:   01/09/24 62.3 kg (137 lb 6.4 oz)   11/22/23 76 kg (167 lb 8.8 oz)   10/18/23 77.2 kg (170 lb 3.1 oz)

## 2024-01-09 NOTE — DISCHARGE SUMMARY
[facility] Discharge Summary             Attending Physician: Penelope Wilhelm DO    Referring MD: Penelope Wilhelm DO  [address]   [address]  [place] [id number]    Name: Zain Lopez :  1955  MRN:  [id number]    Admission: 2023   Discharge: 24    Date: 2024    Diagnosis on admit: DLBCL     Procedures: Routine chest x-ray, laboratories, EKG, IV fluid hydration, Panculture for fevers, IV antimicrobial therapy, Respiratory therapy, Oxygen therapy, Blood Product Infusions    Consultations:   Cardiology  Pulmonology    Medications:      Medication List        START taking these medications      digoxin 125 MCG tablet  Commonly known as: LANOXIN  Take 1 tablet by mouth daily  Start taking on: January 10, 2024     guaiFENesin-dextromethorphan 100-10 MG/5ML syrup  Commonly known as: ROBITUSSIN DM  Take 5 mLs by mouth every 4 hours as needed for Cough     potassium chloride 20 MEQ extended release tablet  Commonly known as: KLOR-CON M  Take 1 tablet by mouth 2 times daily (with meals)            CHANGE how you take these medications      sertraline 50 MG tablet  Commonly known as: ZOLOFT  Take 3 tablets by mouth daily  Start taking on: January 10, 2024  What changed:   medication strength  how much to take     valACYclovir 500 MG tablet  Commonly known as: VALTREX  Take 1 tablet by mouth 2 times daily  What changed: when to take this            CONTINUE taking these medications      apixaban 5 MG Tabs tablet  Commonly known as: ELIQUIS  Take 1 tablet by mouth 2 times daily     furosemide 40 MG tablet  Commonly known as: LASIX  Take 1 tablet by mouth daily     levETIRAcetam 500 MG tablet  Commonly known as: KEPPRA  Take 1 tablet by mouth 2 times daily     ondansetron 8 MG tablet  Commonly known as: ZOFRAN  Take 1 tablet by mouth every 8 hours as needed for Nausea or Vomiting     polyethylene glycol 17 g packet  Commonly known as: GLYCOLAX  Take 1 packet by mouth daily as needed for Constipation

## 2024-01-10 LAB
EKG ATRIAL RATE: 264 BPM
EKG DIAGNOSIS: NORMAL
EKG Q-T INTERVAL: 504 MS
EKG QRS DURATION: 150 MS
EKG QTC CALCULATION (BAZETT): 528 MS
EKG R AXIS: -85 DEGREES
EKG T AXIS: 103 DEGREES
EKG VENTRICULAR RATE: 66 BPM

## 2024-01-10 PROCEDURE — 93010 ELECTROCARDIOGRAM REPORT: CPT | Performed by: INTERNAL MEDICINE

## 2024-01-12 ENCOUNTER — TELEPHONE (OUTPATIENT)
Dept: ONCOLOGY | Age: 69
End: 2024-01-12

## 2024-01-12 NOTE — TELEPHONE ENCOUNTER
Called pt for reminder/confirmation of 12/16/24 shared visit. No answer, left message.     Electronically signed by Cierra Dai RD, LD on 1/12/2024 at 1:42 PM

## 2024-01-14 LAB
H CAPSUL AG SER IA-ACNC: NORMAL
H CAPSUL AG SER QL IA: NORMAL

## 2024-01-16 ENCOUNTER — HOSPITAL ENCOUNTER (OUTPATIENT)
Dept: ONCOLOGY | Age: 69
Setting detail: INFUSION SERIES
Discharge: HOME | End: 2024-01-16

## 2024-01-16 VITALS — BODY MASS INDEX: 19.71 KG/M2 | HEIGHT: 70 IN

## 2024-01-16 RX ORDER — FUROSEMIDE 40 MG/1
20 TABLET ORAL DAILY
Qty: 60 TABLET | Refills: 3
Start: 2024-01-16

## 2024-01-16 ASSESSMENT — PATIENT HEALTH QUESTIONNAIRE - PHQ9
7. TROUBLE CONCENTRATING ON THINGS, SUCH AS READING THE NEWSPAPER OR WATCHING TELEVISION: 2
SUM OF ALL RESPONSES TO PHQ QUESTIONS 1-9: 15
1. LITTLE INTEREST OR PLEASURE IN DOING THINGS: 2
SUM OF ALL RESPONSES TO PHQ QUESTIONS 1-9: 16
3. TROUBLE FALLING OR STAYING ASLEEP: 1
SUM OF ALL RESPONSES TO PHQ9 QUESTIONS 1 & 2: 4
5. POOR APPETITE OR OVEREATING: 0
2. FEELING DOWN, DEPRESSED OR HOPELESS: 2
6. FEELING BAD ABOUT YOURSELF - OR THAT YOU ARE A FAILURE OR HAVE LET YOURSELF OR YOUR FAMILY DOWN: 3
9. THOUGHTS THAT YOU WOULD BE BETTER OFF DEAD, OR OF HURTING YOURSELF: 1
SUM OF ALL RESPONSES TO PHQ QUESTIONS 1-9: 16
8. MOVING OR SPEAKING SO SLOWLY THAT OTHER PEOPLE COULD HAVE NOTICED. OR THE OPPOSITE, BEING SO FIGETY OR RESTLESS THAT YOU HAVE BEEN MOVING AROUND A LOT MORE THAN USUAL: 2
SUM OF ALL RESPONSES TO PHQ QUESTIONS 1-9: 16
4. FEELING TIRED OR HAVING LITTLE ENERGY: 3

## 2024-01-16 ASSESSMENT — ANXIETY QUESTIONNAIRES
5. BEING SO RESTLESS THAT IT IS HARD TO SIT STILL: 0-NOT AT ALL
GAD7 TOTAL SCORE: 13
6. BECOMING EASILY ANNOYED OR IRRITABLE: 3-NEARLY EVERY DAY
3. WORRYING TOO MUCH ABOUT DIFFERENT THINGS: 3-NEARLY EVERY DAY
1. FEELING NERVOUS, ANXIOUS, OR ON EDGE: 3
2. NOT BEING ABLE TO STOP OR CONTROL WORRYING: 3-NEARLY EVERY DAY
7. FEELING AFRAID AS IF SOMETHING AWFUL MIGHT HAPPEN: 1-SEVERAL DAYS
4. TROUBLE RELAXING: 0-NOT AT ALL

## 2024-01-16 NOTE — PROGRESS NOTES
Comprehensive Cancer Clinic Note  [facility]   [address]  [place] [id number] [phone]    Patient Name: Zain Lopez        MRN: [id number]    : 1955  (68 y.o.)  Gender: male     The patient participated in the shared oncology clinic held on 2024 at 12pm.     Patient Goals/Recommendations:   Nutrition: Meets ASPEN criteria for severe malnutrition. Aggressive nutrition intervention should be considered, up to and including alternative means of nutrition/hydration, if nutrition status can not improve.  ONS - Switch to Very High Calorie Boost and Increase to TID. Use Ensure Complete TID until able to get Boost VHC.   Food Log/Food Diary- maintain daily food log (wife to assist). Provided martinez tracker instruction; may use paper log as well.   High Calorie/High Protein diet handouts provided; encourage eating every 3-4 hours; alternate foods w/ONS.   Alternative means of nutrition should be strongly considered if aggressive tx desired.  To return for close dietary monitoring in 1 week - 24.     Psych:   Patient reports passive thoughts of death, but denies suicidal ideation. Recommending securing all firearms in the home. Wife agreeable  Patient continues to struggle with stressors associated with poor cognitive functioning. Treatment team could consider cognitive rehabilitation or support group for patient/caregiver for people with cognitive dysfunction.  Psychologist following closely due to significant anxiety, depression, and caregiver strain.     Pharmacy:  Reviewed labs in G2. Would consider stopping oral potassium supplement for potassium 4.5 on   Reviewed medication list with patient. No changes needed to medication list in G2.  Recommended patient try Med Sync program (available through [employer]) to consolidate medication refill timing from pharmacy    Pt to return for individual follow-up with

## 2024-01-19 PROCEDURE — 93294 REM INTERROG EVL PM/LDLS PM: CPT | Performed by: INTERNAL MEDICINE

## 2024-01-19 PROCEDURE — 93296 REM INTERROG EVL PM/IDS: CPT | Performed by: INTERNAL MEDICINE

## 2024-01-22 ENCOUNTER — TELEPHONE (OUTPATIENT)
Dept: ONCOLOGY | Age: 69
End: 2024-01-22

## 2024-01-22 NOTE — TELEPHONE ENCOUNTER
Wife Brie called b/c she received [employer] message for appt 1/22/24; requested to move as [facility] appt is 1/23/24. RD confirmed 1/23 12pm w/Brie.     Electronically signed by Cierra Dai RD, LD on 1/22/2024 at 9:45 AM

## 2024-01-23 ENCOUNTER — HOSPITAL ENCOUNTER (OUTPATIENT)
Dept: ONCOLOGY | Age: 69
Discharge: HOME | End: 2024-01-23

## 2024-01-23 ENCOUNTER — HOSPITAL ENCOUNTER (OUTPATIENT)
Dept: GENERAL RADIOLOGY | Age: 69
Discharge: HOME | End: 2024-01-23
Payer: MEDICARE

## 2024-01-23 ENCOUNTER — HOSPITAL ENCOUNTER (INPATIENT)
Age: 69
LOS: 17 days | End: 2024-02-09
Attending: STUDENT IN AN ORGANIZED HEALTH CARE EDUCATION/TRAINING PROGRAM | Admitting: STUDENT IN AN ORGANIZED HEALTH CARE EDUCATION/TRAINING PROGRAM
Payer: MEDICARE

## 2024-01-23 DIAGNOSIS — J93.9 PNEUMOTHORAX, UNSPECIFIED TYPE: ICD-10-CM

## 2024-01-23 DIAGNOSIS — I50.23 ACUTE ON CHRONIC SYSTOLIC CONGESTIVE HEART FAILURE (HCC): ICD-10-CM

## 2024-01-23 DIAGNOSIS — J96.21 ACUTE ON CHRONIC RESPIRATORY FAILURE WITH HYPOXEMIA (HCC): ICD-10-CM

## 2024-01-23 DIAGNOSIS — R09.02 HYPOXIA: Primary | ICD-10-CM

## 2024-01-23 DIAGNOSIS — E87.8 ELECTROLYTE ABNORMALITY: ICD-10-CM

## 2024-01-23 DIAGNOSIS — C83.38 DIFFUSE LARGE B-CELL LYMPHOMA OF LYMPH NODES OF MULTIPLE SITES (HCC): ICD-10-CM

## 2024-01-23 LAB
APTT BLD: 29.6 SEC (ref 22.7–35.9)
INR PPP: 1.23 (ref 0.84–1.16)
PROTHROMBIN TIME: 15.5 SEC (ref 11.5–14.8)

## 2024-01-23 PROCEDURE — 71046 X-RAY EXAM CHEST 2 VIEWS: CPT

## 2024-01-23 PROCEDURE — 6370000000 HC RX 637 (ALT 250 FOR IP): Performed by: NURSE PRACTITIONER

## 2024-01-23 PROCEDURE — 2060000000 HC ICU INTERMEDIATE R&B

## 2024-01-23 PROCEDURE — 0202U NFCT DS 22 TRGT SARS-COV-2: CPT

## 2024-01-23 PROCEDURE — 2580000003 HC RX 258: Performed by: NURSE PRACTITIONER

## 2024-01-23 PROCEDURE — 85610 PROTHROMBIN TIME: CPT

## 2024-01-23 PROCEDURE — 87040 BLOOD CULTURE FOR BACTERIA: CPT

## 2024-01-23 PROCEDURE — 6360000002 HC RX W HCPCS: Performed by: NURSE PRACTITIONER

## 2024-01-23 PROCEDURE — 36415 COLL VENOUS BLD VENIPUNCTURE: CPT

## 2024-01-23 PROCEDURE — 85730 THROMBOPLASTIN TIME PARTIAL: CPT

## 2024-01-23 PROCEDURE — 93005 ELECTROCARDIOGRAM TRACING: CPT | Performed by: NURSE PRACTITIONER

## 2024-01-23 RX ORDER — POLYETHYLENE GLYCOL 3350 17 G/17G
17 POWDER, FOR SOLUTION ORAL DAILY PRN
Status: DISCONTINUED | OUTPATIENT
Start: 2024-01-23 | End: 2024-01-29

## 2024-01-23 RX ORDER — ONDANSETRON HYDROCHLORIDE 8 MG/1
8 TABLET, FILM COATED ORAL EVERY 8 HOURS PRN
Status: DISCONTINUED | OUTPATIENT
Start: 2024-01-23 | End: 2024-02-02

## 2024-01-23 RX ORDER — POTASSIUM CHLORIDE 7.45 MG/ML
10 INJECTION INTRAVENOUS PRN
Status: DISCONTINUED | OUTPATIENT
Start: 2024-01-23 | End: 2024-02-01

## 2024-01-23 RX ORDER — MAGNESIUM SULFATE IN WATER 40 MG/ML
4000 INJECTION, SOLUTION INTRAVENOUS PRN
Status: DISCONTINUED | OUTPATIENT
Start: 2024-01-23 | End: 2024-01-28 | Stop reason: SDUPTHER

## 2024-01-23 RX ORDER — DIPHENHYDRAMINE HCL 25 MG
25 TABLET ORAL NIGHTLY PRN
Status: DISCONTINUED | OUTPATIENT
Start: 2024-01-23 | End: 2024-02-09

## 2024-01-23 RX ORDER — POTASSIUM CHLORIDE 20 MEQ/1
20 TABLET, EXTENDED RELEASE ORAL 2 TIMES DAILY WITH MEALS
Status: DISCONTINUED | OUTPATIENT
Start: 2024-01-23 | End: 2024-01-26

## 2024-01-23 RX ORDER — DIGOXIN 125 MCG
125 TABLET ORAL DAILY
Status: DISCONTINUED | OUTPATIENT
Start: 2024-01-24 | End: 2024-01-28

## 2024-01-23 RX ORDER — SODIUM CHLORIDE 9 MG/ML
INJECTION, SOLUTION INTRAVENOUS PRN
Status: DISCONTINUED | OUTPATIENT
Start: 2024-01-23 | End: 2024-02-09

## 2024-01-23 RX ORDER — TAMSULOSIN HYDROCHLORIDE 0.4 MG/1
0.4 CAPSULE ORAL DAILY
Status: DISCONTINUED | OUTPATIENT
Start: 2024-01-24 | End: 2024-02-08

## 2024-01-23 RX ORDER — FUROSEMIDE 20 MG/1
20 TABLET ORAL DAILY
Status: DISCONTINUED | OUTPATIENT
Start: 2024-01-24 | End: 2024-01-24

## 2024-01-23 RX ORDER — LEVETIRACETAM 500 MG/1
500 TABLET ORAL 2 TIMES DAILY
Status: DISCONTINUED | OUTPATIENT
Start: 2024-01-23 | End: 2024-02-05

## 2024-01-23 RX ORDER — VALACYCLOVIR HYDROCHLORIDE 500 MG/1
500 TABLET, FILM COATED ORAL 2 TIMES DAILY
Status: DISCONTINUED | OUTPATIENT
Start: 2024-01-23 | End: 2024-02-08

## 2024-01-23 RX ORDER — SODIUM CHLORIDE 9 MG/ML
INJECTION, SOLUTION INTRAVENOUS CONTINUOUS
Status: DISCONTINUED | OUTPATIENT
Start: 2024-01-23 | End: 2024-02-08

## 2024-01-23 RX ORDER — M-VIT,TX,IRON,MINS/CALC/FOLIC 27MG-0.4MG
1 TABLET ORAL DAILY
Status: DISPENSED | OUTPATIENT
Start: 2024-01-23 | End: 2024-02-03

## 2024-01-23 RX ORDER — SODIUM CHLORIDE 0.9 % (FLUSH) 0.9 %
5-40 SYRINGE (ML) INJECTION EVERY 12 HOURS SCHEDULED
Status: DISCONTINUED | OUTPATIENT
Start: 2024-01-23 | End: 2024-02-09

## 2024-01-23 RX ORDER — SODIUM CHLORIDE 0.9 % (FLUSH) 0.9 %
5-40 SYRINGE (ML) INJECTION PRN
Status: DISCONTINUED | OUTPATIENT
Start: 2024-01-23 | End: 2024-02-09

## 2024-01-23 RX ADMIN — SODIUM CHLORIDE: 9 INJECTION, SOLUTION INTRAVENOUS at 18:24

## 2024-01-23 RX ADMIN — CEFEPIME 2000 MG: 2 INJECTION, POWDER, FOR SOLUTION INTRAVENOUS at 18:37

## 2024-01-23 RX ADMIN — VALACYCLOVIR HYDROCHLORIDE 500 MG: 500 TABLET, FILM COATED ORAL at 20:01

## 2024-01-23 RX ADMIN — APIXABAN 5 MG: 5 TABLET, FILM COATED ORAL at 20:01

## 2024-01-23 RX ADMIN — LEVETIRACETAM 500 MG: 500 TABLET, FILM COATED ORAL at 20:01

## 2024-01-23 RX ADMIN — POTASSIUM CHLORIDE 20 MEQ: 1500 TABLET, EXTENDED RELEASE ORAL at 18:25

## 2024-01-23 NOTE — H&P
BCC History and Physical       Attending Physician: Penelope Wilhelm DO    Primary Care: Ronal Wahl, APRN - CNP       Referring MD: Penelope Wilhelm DO  [address]   [address]  [place] [address]    Name: Zain Lopez :  1955  MRN:  [id number]    Admission: 2024      Date: 2024    Reason for Admission: multifocal pneumonia     History of Present Illness:   Zain Lopez is a 69 y/o male w/ 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 His also has h/o BPH, GERD, HTN, & CHB w/ dual chamber pacemaker.       He was diagnosed w/ relapsed DLBCL w/ CNS involvement only (2023). He initially presented in 2023 w/ confusion.  MRI brain (23) revealed multifocal areas of signal abnormality enhancement involving the splenium of the corpus callosum and adjacent white matter, consistent with primarydiffuse large B-cell lymphoma.  He underwent brain bx (23, Amelia) that revealed T cell infiltrate but not consistent w/ lymphoma. It was sent to [facility] for second opinion and consistent with reactive t cell population. Clinically he was doing well and was sent to neurology for additional work-up.  He then presented to [facility] (23) w/ increased confusion.  Repeat MRI brain (23) showed significant increase in left periventricular hypercellular enhancing mass and vasogenic edema, which was concerning for primary CNS lymphoma.  Progressed local mass effect with 4 mm left to right midline shift.  The dominant measurable portion of which measures 5.5 x 2.6 cm (series 13, image 76), previously with only stippled areas of enhancement in this region.  He then underwent repeat brain biopsy (7/3/23) that confirmed involvement w/

## 2024-01-23 NOTE — PROGRESS NOTES
TELEHEALTH: Pt unable to schedule in-person RD eval, therefore telehealth eval provided.     Called patient for telehealth appointment. Per wife currently pt waiting in xray at [facility]. Will defer appt and callback at a later date to reschedule.     Electronically signed by Cierra Dai RD, LD on 1/23/2024 at 12:03 PM

## 2024-01-23 NOTE — DISCHARGE INSTRUCTIONS
Extra Heart Failure sites:     https://Relatient.com/publication/?r=841521   --- this is American Heart Association interactive Healthier Living with Heart Failure guidebook.  Please click hyperlink or copy / paste link into search bar. Use your mouse to scroll through the pages.  Lots of information about weight monitoring, diet tips, activity, meds, etc    HF Otis kae  -- this is a free smart phone kae available for iPhone and Android download.  Use your phone to track sodium / fluid intake, zone tool symptom tracking, weights, medications, etc. Click on this hyperlink  HF Otis Kae   for QR code for easy download.       DASH (Dietary Approach to Stop Hypertension) diet --  https://www.nhlbi.nih.gov/education/dash-eating-plan -- this diet is a flexible eating plan that promotes heart healthy eating style.  Click on hyperlink or copy / paste link into search bar.  Lots of low sodium recipes and tips.    https://www.Gumiyo.ProPublica/recipes  -- more free recipes

## 2024-01-24 ENCOUNTER — APPOINTMENT (OUTPATIENT)
Dept: GENERAL RADIOLOGY | Age: 69
End: 2024-01-24
Attending: STUDENT IN AN ORGANIZED HEALTH CARE EDUCATION/TRAINING PROGRAM
Payer: MEDICARE

## 2024-01-24 ENCOUNTER — APPOINTMENT (OUTPATIENT)
Dept: CT IMAGING | Age: 69
End: 2024-01-24
Attending: STUDENT IN AN ORGANIZED HEALTH CARE EDUCATION/TRAINING PROGRAM
Payer: MEDICARE

## 2024-01-24 LAB
ALBUMIN SERPL-MCNC: 3 G/DL (ref 3.4–5)
ALP SERPL-CCNC: 123 U/L (ref 40–129)
ALT SERPL-CCNC: 19 U/L (ref 10–40)
AMORPH SED URNS QL MICRO: ABNORMAL /HPF
ANION GAP SERPL CALCULATED.3IONS-SCNC: 11 MMOL/L (ref 3–16)
AST SERPL-CCNC: 24 U/L (ref 15–37)
BACTERIA URNS QL MICRO: ABNORMAL /HPF
BASE EXCESS BLDV CALC-SCNC: 1 MMOL/L (ref -3–3)
BASOPHILS # BLD: 0 K/UL (ref 0–0.2)
BASOPHILS NFR BLD: 0.4 %
BILIRUB DIRECT SERPL-MCNC: <0.2 MG/DL (ref 0–0.3)
BILIRUB INDIRECT SERPL-MCNC: ABNORMAL MG/DL (ref 0–1)
BILIRUB SERPL-MCNC: 0.4 MG/DL (ref 0–1)
BILIRUB UR QL STRIP.AUTO: NEGATIVE
BUN SERPL-MCNC: 22 MG/DL (ref 7–20)
CALCIUM SERPL-MCNC: 8.9 MG/DL (ref 8.3–10.6)
CHLORIDE SERPL-SCNC: 104 MMOL/L (ref 99–110)
CLARITY UR: CLEAR
CO2 BLDV-SCNC: 27 MMOL/L
CO2 SERPL-SCNC: 21 MMOL/L (ref 21–32)
COLOR UR: YELLOW
CORTIS SERPL-MCNC: 20.7 UG/DL
CREAT SERPL-MCNC: 0.9 MG/DL (ref 0.8–1.3)
DEPRECATED RDW RBC AUTO: 18.6 % (ref 12.4–15.4)
EKG ATRIAL RATE: 267 BPM
EKG DIAGNOSIS: NORMAL
EKG P AXIS: 47 DEGREES
EKG Q-T INTERVAL: 460 MS
EKG QRS DURATION: 148 MS
EKG QTC CALCULATION (BAZETT): 486 MS
EKG R AXIS: 219 DEGREES
EKG T AXIS: 15 DEGREES
EKG VENTRICULAR RATE: 67 BPM
EOSINOPHIL # BLD: 0.2 K/UL (ref 0–0.6)
EOSINOPHIL NFR BLD: 1.8 %
EPI CELLS #/AREA URNS HPF: ABNORMAL /HPF (ref 0–5)
FERRITIN SERPL IA-MCNC: 398.1 NG/ML (ref 30–400)
GFR SERPLBLD CREATININE-BSD FMLA CKD-EPI: >60 ML/MIN/{1.73_M2}
GLUCOSE SERPL-MCNC: 99 MG/DL (ref 70–99)
GLUCOSE UR STRIP.AUTO-MCNC: NEGATIVE MG/DL
HCO3 BLDV-SCNC: 25.8 MMOL/L (ref 23–29)
HCT VFR BLD AUTO: 31.6 % (ref 40.5–52.5)
HGB BLD-MCNC: 9.9 G/DL (ref 13.5–17.5)
HGB UR QL STRIP.AUTO: NEGATIVE
IGG SERPL-MCNC: 219 MG/DL (ref 700–1600)
KETONES UR STRIP.AUTO-MCNC: NEGATIVE MG/DL
LDH SERPL L TO P-CCNC: 188 U/L (ref 100–190)
LEUKOCYTE ESTERASE UR QL STRIP.AUTO: ABNORMAL
LYMPHOCYTES # BLD: 0.4 K/UL (ref 1–5.1)
LYMPHOCYTES NFR BLD: 3.9 %
MAGNESIUM SERPL-MCNC: 1.8 MG/DL (ref 1.8–2.4)
MCH RBC QN AUTO: 29.2 PG (ref 26–34)
MCHC RBC AUTO-ENTMCNC: 31.4 G/DL (ref 31–36)
MCV RBC AUTO: 93 FL (ref 80–100)
MONOCYTES # BLD: 1.1 K/UL (ref 0–1.3)
MONOCYTES NFR BLD: 11.4 %
NEUTROPHILS # BLD: 8.1 K/UL (ref 1.7–7.7)
NEUTROPHILS NFR BLD: 82.5 %
NITRITE UR QL STRIP.AUTO: NEGATIVE
NT-PROBNP SERPL-MCNC: 5834 PG/ML (ref 0–124)
PCO2 BLDV: 42.4 MM HG (ref 40–50)
PERFORMED ON: NORMAL
PH BLDV: 7.39 [PH] (ref 7.35–7.45)
PH UR STRIP.AUTO: 6.5 [PH] (ref 5–8)
PHOSPHATE SERPL-MCNC: 3.4 MG/DL (ref 2.5–4.9)
PLATELET # BLD AUTO: 237 K/UL (ref 135–450)
PMV BLD AUTO: 9.2 FL (ref 5–10.5)
PO2 BLDV: 20 MM HG
POC SAMPLE TYPE: NORMAL
POTASSIUM SERPL-SCNC: 4.4 MMOL/L (ref 3.5–5.1)
PROCALCITONIN SERPL IA-MCNC: 0.17 NG/ML (ref 0–0.15)
PROT SERPL-MCNC: 5.4 G/DL (ref 6.4–8.2)
PROT UR STRIP.AUTO-MCNC: NEGATIVE MG/DL
RBC # BLD AUTO: 3.4 M/UL (ref 4.2–5.9)
RBC #/AREA URNS HPF: ABNORMAL /HPF (ref 0–4)
REPORT: NORMAL
RESP PATH DNA+RNA PNL NPH NAA+NON-PROBE: NORMAL
SAO2 % BLDV: 30 %
SODIUM SERPL-SCNC: 136 MMOL/L (ref 136–145)
SP GR UR STRIP.AUTO: 1.02 (ref 1–1.03)
UA DIPSTICK W REFLEX MICRO PNL UR: YES
URN SPEC COLLECT METH UR: ABNORMAL
UROBILINOGEN UR STRIP-ACNC: 0.2 E.U./DL
WBC # BLD AUTO: 9.8 K/UL (ref 4–11)
WBC #/AREA URNS HPF: ABNORMAL /HPF (ref 0–5)

## 2024-01-24 PROCEDURE — 86698 HISTOPLASMA ANTIBODY: CPT

## 2024-01-24 PROCEDURE — 80048 BASIC METABOLIC PNL TOTAL CA: CPT

## 2024-01-24 PROCEDURE — 84443 ASSAY THYROID STIM HORMONE: CPT

## 2024-01-24 PROCEDURE — 2060000000 HC ICU INTERMEDIATE R&B

## 2024-01-24 PROCEDURE — 82746 ASSAY OF FOLIC ACID SERUM: CPT

## 2024-01-24 PROCEDURE — 87305 ASPERGILLUS AG IA: CPT

## 2024-01-24 PROCEDURE — 6360000002 HC RX W HCPCS: Performed by: NURSE PRACTITIONER

## 2024-01-24 PROCEDURE — 36415 COLL VENOUS BLD VENIPUNCTURE: CPT

## 2024-01-24 PROCEDURE — 86612 BLASTOMYCES ANTIBODY: CPT

## 2024-01-24 PROCEDURE — 2580000003 HC RX 258: Performed by: NURSE PRACTITIONER

## 2024-01-24 PROCEDURE — 87385 HISTOPLASMA CAPSUL AG IA: CPT

## 2024-01-24 PROCEDURE — 71250 CT THORAX DX C-: CPT

## 2024-01-24 PROCEDURE — 81001 URINALYSIS AUTO W/SCOPE: CPT

## 2024-01-24 PROCEDURE — 6360000004 HC RX CONTRAST MEDICATION: Performed by: INTERNAL MEDICINE

## 2024-01-24 PROCEDURE — 87449 NOS EACH ORGANISM AG IA: CPT

## 2024-01-24 PROCEDURE — 82803 BLOOD GASES ANY COMBINATION: CPT

## 2024-01-24 PROCEDURE — 80076 HEPATIC FUNCTION PANEL: CPT

## 2024-01-24 PROCEDURE — 6360000002 HC RX W HCPCS

## 2024-01-24 PROCEDURE — 2580000003 HC RX 258: Performed by: STUDENT IN AN ORGANIZED HEALTH CARE EDUCATION/TRAINING PROGRAM

## 2024-01-24 PROCEDURE — 2700000000 HC OXYGEN THERAPY PER DAY

## 2024-01-24 PROCEDURE — 87641 MR-STAPH DNA AMP PROBE: CPT

## 2024-01-24 PROCEDURE — 82533 TOTAL CORTISOL: CPT

## 2024-01-24 PROCEDURE — 83615 LACTATE (LD) (LDH) ENZYME: CPT

## 2024-01-24 PROCEDURE — 84145 PROCALCITONIN (PCT): CPT

## 2024-01-24 PROCEDURE — 6360000002 HC RX W HCPCS: Performed by: INTERNAL MEDICINE

## 2024-01-24 PROCEDURE — 2580000003 HC RX 258

## 2024-01-24 PROCEDURE — 93308 TTE F-UP OR LMTD: CPT

## 2024-01-24 PROCEDURE — 94761 N-INVAS EAR/PLS OXIMETRY MLT: CPT

## 2024-01-24 PROCEDURE — 82607 VITAMIN B-12: CPT

## 2024-01-24 PROCEDURE — 82784 ASSAY IGA/IGD/IGG/IGM EACH: CPT

## 2024-01-24 PROCEDURE — 83880 ASSAY OF NATRIURETIC PEPTIDE: CPT

## 2024-01-24 PROCEDURE — 93010 ELECTROCARDIOGRAM REPORT: CPT | Performed by: INTERNAL MEDICINE

## 2024-01-24 PROCEDURE — 6360000002 HC RX W HCPCS: Performed by: STUDENT IN AN ORGANIZED HEALTH CARE EDUCATION/TRAINING PROGRAM

## 2024-01-24 PROCEDURE — 82728 ASSAY OF FERRITIN: CPT

## 2024-01-24 PROCEDURE — 71045 X-RAY EXAM CHEST 1 VIEW: CPT

## 2024-01-24 PROCEDURE — 84100 ASSAY OF PHOSPHORUS: CPT

## 2024-01-24 PROCEDURE — 82306 VITAMIN D 25 HYDROXY: CPT

## 2024-01-24 PROCEDURE — 6370000000 HC RX 637 (ALT 250 FOR IP): Performed by: NURSE PRACTITIONER

## 2024-01-24 PROCEDURE — 85025 COMPLETE CBC W/AUTO DIFF WBC: CPT

## 2024-01-24 PROCEDURE — A4217 STERILE WATER/SALINE, 500 ML: HCPCS | Performed by: NURSE PRACTITIONER

## 2024-01-24 PROCEDURE — 99223 1ST HOSP IP/OBS HIGH 75: CPT | Performed by: INTERNAL MEDICINE

## 2024-01-24 PROCEDURE — 83735 ASSAY OF MAGNESIUM: CPT

## 2024-01-24 RX ORDER — FUROSEMIDE 10 MG/ML
40 INJECTION INTRAMUSCULAR; INTRAVENOUS ONCE
Status: COMPLETED | OUTPATIENT
Start: 2024-01-24 | End: 2024-01-24

## 2024-01-24 RX ORDER — LEVOFLOXACIN 5 MG/ML
750 INJECTION, SOLUTION INTRAVENOUS EVERY 24 HOURS
Status: DISCONTINUED | OUTPATIENT
Start: 2024-01-24 | End: 2024-01-28

## 2024-01-24 RX ORDER — FUROSEMIDE 40 MG/1
40 TABLET ORAL DAILY
Status: DISCONTINUED | OUTPATIENT
Start: 2024-01-25 | End: 2024-01-25

## 2024-01-24 RX ADMIN — VALACYCLOVIR HYDROCHLORIDE 500 MG: 500 TABLET, FILM COATED ORAL at 10:14

## 2024-01-24 RX ADMIN — FUROSEMIDE 40 MG: 10 INJECTION, SOLUTION INTRAMUSCULAR; INTRAVENOUS at 10:16

## 2024-01-24 RX ADMIN — CEFEPIME 2000 MG: 2 INJECTION, POWDER, FOR SOLUTION INTRAVENOUS at 06:36

## 2024-01-24 RX ADMIN — VALACYCLOVIR HYDROCHLORIDE 500 MG: 500 TABLET, FILM COATED ORAL at 20:44

## 2024-01-24 RX ADMIN — SODIUM CHLORIDE: 9 INJECTION, SOLUTION INTRAVENOUS at 13:41

## 2024-01-24 RX ADMIN — APIXABAN 5 MG: 5 TABLET, FILM COATED ORAL at 10:14

## 2024-01-24 RX ADMIN — SODIUM CHLORIDE, PRESERVATIVE FREE 10 ML: 5 INJECTION INTRAVENOUS at 10:47

## 2024-01-24 RX ADMIN — DIGOXIN 125 MCG: 125 TABLET ORAL at 10:15

## 2024-01-24 RX ADMIN — SODIUM CHLORIDE, PRESERVATIVE FREE 10 ML: 5 INJECTION INTRAVENOUS at 20:45

## 2024-01-24 RX ADMIN — LEVETIRACETAM 500 MG: 500 TABLET, FILM COATED ORAL at 10:14

## 2024-01-24 RX ADMIN — FUROSEMIDE 20 MG: 20 TABLET ORAL at 10:14

## 2024-01-24 RX ADMIN — MEROPENEM 1000 MG: 1 INJECTION INTRAVENOUS at 17:18

## 2024-01-24 RX ADMIN — LEVOFLOXACIN 750 MG: 5 INJECTION, SOLUTION INTRAVENOUS at 14:19

## 2024-01-24 RX ADMIN — PERFLUTREN 1.5 ML: 6.52 INJECTION, SUSPENSION INTRAVENOUS at 15:30

## 2024-01-24 RX ADMIN — SODIUM CHLORIDE 15 ML: 900 IRRIGANT IRRIGATION at 20:45

## 2024-01-24 RX ADMIN — VANCOMYCIN HYDROCHLORIDE 1500 MG: 10 INJECTION, POWDER, LYOPHILIZED, FOR SOLUTION INTRAVENOUS at 12:03

## 2024-01-24 RX ADMIN — TAMSULOSIN HYDROCHLORIDE 0.4 MG: 0.4 CAPSULE ORAL at 10:14

## 2024-01-24 RX ADMIN — SERTRALINE HYDROCHLORIDE 150 MG: 100 TABLET ORAL at 10:13

## 2024-01-24 RX ADMIN — Medication 1 TABLET: at 10:13

## 2024-01-24 RX ADMIN — APIXABAN 5 MG: 5 TABLET, FILM COATED ORAL at 20:44

## 2024-01-24 RX ADMIN — LEVETIRACETAM 500 MG: 500 TABLET, FILM COATED ORAL at 20:44

## 2024-01-24 RX ADMIN — POTASSIUM CHLORIDE 20 MEQ: 1500 TABLET, EXTENDED RELEASE ORAL at 17:18

## 2024-01-24 RX ADMIN — POTASSIUM CHLORIDE 20 MEQ: 1500 TABLET, EXTENDED RELEASE ORAL at 10:14

## 2024-01-24 RX ADMIN — MEROPENEM 1000 MG: 1 INJECTION INTRAVENOUS at 09:39

## 2024-01-24 RX ADMIN — VANCOMYCIN HYDROCHLORIDE 750 MG: 10 INJECTION, POWDER, LYOPHILIZED, FOR SOLUTION INTRAVENOUS at 23:35

## 2024-01-24 NOTE — CONSULTS
Initial Pulmonary & Critical Care Consult Note      Reason for Consult: Possible pneumonia with needed bronchoscopy  Requesting Physician: Dr. Negrete    Subjective:   CHIEF COMPLAINT / HPI:                The patient is a 68 y.o. male that I am asked to see for possibility of bronch. 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 His also has h/o BPH, GERD, HTN, & CHB w/ dual chamber pacemaker.       He was diagnosed w/ relapsed DLBCL w/ CNS involvement only (7/2023). He initially presented in April 2023 w/ confusion.  MRI brain (4/13/23) revealed multifocal areas of signal abnormality enhancement involving the splenium of the corpus callosum and adjacent white matter, consistent with primarydiffuse large B-cell lymphoma.  He underwent brain bx (4/28/23, Amelia) that revealed T cell infiltrate but not consistent w/ lymphoma. It was sent to [facility] for second opinion and consistent with reactive t cell population. Clinically he was doing well and was sent to neurology for additional work-up.  He then presented to [facility] (6/28/23) w/ increased confusion.  Repeat MRI brain (6/28/23) showed significant increase in left periventricular hypercellular enhancing mass and vasogenic edema, which was concerning for primary CNS lymphoma.  Progressed local mass effect with 4 mm left to right midline shift.  The dominant measurable portion of which measures 5.5 x 2.6 cm (series 13, image 76), previously with only stippled areas of enhancement in this region.  He then underwent repeat brain biopsy (7/3/23) that confirmed involvement w/ B-cell lymphoma morphologically with  8.9 01/24/2024 03:58 AM    GFRAA >60 12/02/2021 11:15 AM    GFRAA >60 05/16/2011 10:38 AM    LABGLOM >60 01/24/2024 03:58 AM    GLUCOSE 99 01/24/2024 03:58 AM     Hepatic Function Panel:    Lab Results   Component Value Date/Time    ALKPHOS 123 01/24/2024 03:58 AM    ALT 19 01/24/2024 03:58 AM    AST 24 01/24/2024 03:58 AM    PROT 5.4 01/24/2024 03:58 AM    PROT 7.4 05/16/2011 10:38 AM    BILITOT 0.4 01/24/2024 03:58 AM    BILIDIR <0.2 01/24/2024 03:58 AM    IBILI see below 01/24/2024 03:58 AM     INR 1.23  ProBNP 5834  Urine Legionella antigen: Pending  IgG: Pending  Strep pneumo antigen: Pending    Cultures:   Blood Culture:  In lab        Respiratory Panel PCR Respiratory Pathogens Panel PCR Result: Not Detected  See additional report for complete Respiratory Pathogens Panel     Radiology Review:  All pertinent images / reports were reviewed as a part of this visit. Ct Chest 1/24/2024 reveals the following:  Comparison study is dated 12/29/2023.     Implanted conduction device is redemonstrated. Generator is located in the left anterior thoracic subcutaneous tissues.     The thyroid gland demonstrates normal size and density. No evidence of axillary mass or adenopathy. Mild fullness of left axilla and associated fat stranding could be related to possible subclavian stenosis.     No evidence of superior mediastinal mass. Subcentimeter mediastinal nodes appear unchanged. No evidence of paratracheal adenopathy.     Pulmonary marychuy demonstrate no specific abnormality.     Thoracic aorta appears normal in diameter. Main pulmonary arteries appear normal in size and configuration.     A few coronary calcifications appear similar to the previous examination. The heart is normal in size and configuration. No evidence of pericardial effusion.     Confluent irregular focus of groundglass attenuation with superimposed patchy airspace foci are identified in the right upper lobe without significant change. Similar findings are

## 2024-01-24 NOTE — PROGRESS NOTES
[facility] Progress Note    2024     Zain Lopez    MRN: [id number]    : 1955    Referring MD: Penelope Wilhelm,   [address]  [facility] [address]  [place] [address]      SUBJECTIVE:    More dyspneic   Desats this AM  Able to walk    ECOG PS:  (2) Ambulatory and capable of self care, unable to carry out work activity, up and about > 50% or waking hours    KPS: 60% Requires occasional assistance, but is able to care for most of his personal needs    Isolation: None    Medications    Scheduled Meds:   apixaban  5 mg Oral BID    digoxin  125 mcg Oral Daily    furosemide  20 mg Oral Daily    levETIRAcetam  500 mg Oral BID    therapeutic multivitamin-minerals  1 tablet Oral Daily    potassium chloride  20 mEq Oral BID WC    sertraline  150 mg Oral Daily    tamsulosin  0.4 mg Oral Daily    valACYclovir  500 mg Oral BID    sodium chloride flush  5-40 mL IntraVENous 2 times per day    Saline Mouthwash  15 mL Swish & Spit 4x Daily AC & HS    cefepime  2,000 mg IntraVENous Q12H     Continuous Infusions:   sodium chloride 50 mL/hr at 24 1824    sodium chloride       PRN Meds:.ondansetron, polyethylene glycol, diphenhydrAMINE, sodium chloride flush, sodium chloride, potassium chloride, magnesium sulfate, magnesium hydroxide, Saline Mouthwash, alteplase (CATHFLO) 2 mg in sterile water 2 mL injection    ROS:  As noted above, otherwise remainder of 10-point ROS negative    Physical Exam:     I&O:    Intake/Output Summary (Last 24 hours) at 2024 0750  Last data filed at 2024 0636  Gross per 24 hour   Intake 768 ml   Output 225 ml   Net 543 ml       Vital Signs:  BP 96/62   Pulse 61   Temp 98.4 °F (36.9 °C) (Oral)   Resp 16   Ht 1.778 m (5' 10\")   Wt 64.7 kg (142 lb 9.6 oz)   SpO2 (!) 89%   BMI 20.46 kg/m²     Weight:    Wt Readings from Last 3 Encounters:   24 64.7 kg (142 lb 9.6 oz)   24 62.3 kg (137 lb 6.4 oz)   23 76 kg (167 lb 8.8 oz)         General: Awake, alert and

## 2024-01-24 NOTE — PLAN OF CARE
Problem: Respiratory - Adult  Goal: Achieves optimal ventilation and oxygenation  1/24/2024 1855 by Opal Mcdonough, RN  Outcome: Not Progressing  1/24/2024 1739 by Opal Mcdonough, RN  Outcome: Not Progressing   Pt requiring 5-6L of high flow oxygen this shift, with increased demands up to 12L with ambulation. Utilizing bedside commode.     Problem: Safety - Adult  Goal: Free from fall injury  1/24/2024 1855 by Opal Mcdonough, RN  Outcome: Progressing   Pt free from falls this shift. All safety precautions in place.     Problem: Hematologic - Adult  Goal: Maintains hematologic stability  1/24/2024 1855 by Opal Mcdonough, RN  Outcome: Progressing   Patient's hemoglobin this AM:   Recent Labs     01/24/24  0358   HGB 9.9*     Patient's platelet count this AM:   Recent Labs     01/24/24  0358       Thrombocytopenia not present at this time.  Patient showing no signs or symptoms of active bleeding.  Transfusion not indicated at this time.  Patient verbalizes understanding of all instructions. Will continue to assess and implement POC. Call light within reach and hourly rounding in place.

## 2024-01-24 NOTE — PROGRESS NOTES
01/24/24 1439   Encounter Summary   Encounter Overview/Reason  Rituals, Rites and Sacraments;Spiritual/Emotional Needs   Service Provided For: Patient;Significant other   Referral/Consult From: Nurse  (Schoenhoft, Tracy, APRN, CNP)   Support System Spouse;Children;Scientologist/cash community   Last Encounter  01/24/24   Complexity of Encounter Moderate   Begin Time 1410   End Time  1442   Total Time Calculated 32 min   Spiritual/Emotional needs   Type Spiritual Support   Rituals, Rites and Sacraments   Type Bahai Communion;Blessings   Assessment/Intervention/Outcome   Assessment Calm;Coping;Hopeful;Passive;Peaceful   Intervention Active listening;Discussed belief system/Sabianism practices/cash;Discussed illness injury and it’s impact;Discussed relationship with God;Empowerment;Explored/Affirmed feelings, thoughts, concerns;Explored Coping Skills/Resources;Life review/Legacy;Nurtured Hope;Prayer (assurance of)/Victoria;Sustaining Presence/Ministry of presence   Outcome Acceptance;Comfort;Concerns relieved;Connection/Belonging;Coping;Encouraged;Engaged in conversation;Expressed feelings, needs, and concerns;Expressed feelings of Laura, Peace and/or Love;Expressed Gratitude;Less anxious, Less agitated;Optimistic;Peace;Receptive;Restored Hope   Plan and Referrals   Plan/Referrals Continue to visit, (comment)  (Please add to Bahai Communion list.)      Intern Radhika Luz EdD, MIKE SANDERSON ([facility])

## 2024-01-24 NOTE — PROGRESS NOTES
Upon morning assessment, pt SOB, coughing, and stating it feels hard to take a deep breath. Pt on 3L O2, with sats in low 80s, other VSS. Pt placed on non-rebreather to recover, then switched to high flow nasal cannula at 5-6L, O2 sats now in mid 90s. Pt lungs sounds with bilateral rhonchi. Notified NP Annalisa Osorio and Dr. Mcintosh with pulmonology. See new orders. Pt status stable at this time, resting comfortably in bed, plan of care ongoing.

## 2024-01-24 NOTE — PSYCHOTHERAPY
Psychology Follow-up Consultation    Patient Name: Zain Lopez  MRN: [id number]  Today: 1/24/2024    Reason for Consult: depression    HPI:   Zain Lopze is a 68 y.o.  male with PMHx of DLBCL with CNS involvement status post autologous stem cell transplant (11/3/2023) who has been followed for depression throughout his treatment. Patient has been increasingly depressed with continued cognitive impairment post-transplant.     Subjective:    Patient reports that he continues to be down regarding his cognitive deficits. He feels useless. Evaluated patient's recall. He was oriented to current person, time, and circumstance. However, he could not recall writer's name, speciality, or most recent visit. Patient has a difficult time engaging in cognitive behavioral intervention due to difficulties with memory.     Current outpatient psychiatric treatment: Sertraline 150mg increased on 12/20/2023  Other relevant medications: Keppra (500mg, BID)    PSYCHOSOCIAL HISTORY     Marital Status:  for 50 years              If , describes marriage as: \"positive\"  Race/Ethnicity: \"\"  Lives: [place], [place] (approximately [place] minutes from [facility]) and lives with wife  Housing concerns: Denies  Transportation concerns: Denies  Children: 4 adult children   Employment: retired since 3/2023 approximately, forced into long-term due to health. Worked as an   Financial concerns related to this procedure: Denies  Childhood history: He was raised by mother and father in [place], [place] and has 1 sibling. Zain Lopez describes his childhood as \"positive\".  Educational history: Denies difficulties in school (e.g., learning disability, accommodations). Highest level of education completed is 2 years college.   Adventism/spiritual beliefs: Orthodox and Denies Mosque beliefs that would affect his medical care   service: Denies  Legal history (e.g. DUI/DWI, senior living/snf, arrests,

## 2024-01-24 NOTE — PROGRESS NOTES
4 Eyes Skin Assessment     NAME:  Zain Lopez  YOB: 1955  MEDICAL RECORD NUMBER:  [id number]    The patient is being assessed for  Admission    I agree that at least one RN has performed a thorough Head to Toe Skin Assessment on the patient. ALL assessment sites listed below have been assessed.      Areas assessed by both nurses:    Head, Face, Ears, Shoulders, Back, Chest, Arms, Elbows, Hands, Sacrum. Buttock, Coccyx, Ischium, Legs. Feet and Heels, and Under Medical Devices         Does the Patient have a Wound? No noted wound(s)       Juan Prevention initiated by RN: No  Wound Care Orders initiated by RN: No    Pressure Injury (Stage 3,4, Unstageable, DTI, NWPT, and Complex wounds) if present, place Wound referral order by RN under : No    New Ostomies, if present place, Ostomy referral order under : No     Nurse 1 eSignature: Electronically signed by Opal Mcdonough RN on 1/23/24 at 7:05 PM EST    **SHARE this note so that the co-signing nurse can place an eSignature**    Nurse 2 eSignature: Electronically signed by Briseida Rodriguez RN on 1/23/24 at 7:08 PM EST

## 2024-01-24 NOTE — PROGRESS NOTES
Occupational Therapy/Physical Therapy    Referrals received, chart reviewed, discussed with nurse.  Nurse advises to hold therapy at this time secondary to pt with increased O2 demands.  Will follow up later today as schedule permits.     CORINA Allen, OTR/L [id number]     Adelaide Smith, PT DPT

## 2024-01-24 NOTE — CONSULTS
Clinical Pharmacy Progress Note    Vancomycin - Management by Pharmacy    Consult Date(s): 1/24/24  Consulting Provider(s): BLAKE Augustine    Assessment / Plan  CAP - Vancomycin  Concurrent Antimicrobials: meropenem, levofloxacin, ppx: valacyclovir  Day of Vanc Therapy / Ordered Duration: Day 1  Current Dosing Method: Bayesian-Guided AUC Dosing  Therapeutic Goal: -600 mg/L*hr  Current Dose / Plan:   Scr is 0.9 mg/dL (baseline 1-1.1 mg/dL)   Pt received a loading dose of vancomycin 1500 mg x1 today   Maintenance dose of vancomycin 750 mg q12h ordered to start tomorrow   Predicated AUC = 464 mg/L.hr w/ a trough of 13.2 mg/L  Random level ordered for tomorrow in the AM  Will continue to monitor clinical condition and make adjustments to regimen as appropriate.    Thank you for consulting pharmacy,    Caitlin Okeefe, PharmD  PGY1 Resident   Ext. [phone]  1/24/2024 3:30 PM      Interval update: Currently on 5L of O2, otherwise stable.    Subjective/Objective:   Zain Lopez is a 68 y.o. 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 He is admitted with multifocal pneumonia and worsening depression.     Pharmacy is consulted to dose vancomycin.    Ht Readings from Last 1 Encounters:   01/23/24 1.778 m (5' 10\")     Wt Readings from Last 1 Encounters:   01/24/24 64.6 kg (142 lb 6.4 oz)     Current & Prior Antimicrobial Regimen(s):  Meropenem (1/24-present)  Levofloxacin (1/24-present)  Vancomycin   1500 mg x1 (1/24)  750 mg q12h (1/25-ordered)    Vancomycin Level(s) / Doses:    Date Time Dose Type of Level / Level Interpretation   1/25 0600  Random pend            Note: Serum levels

## 2024-01-24 NOTE — PLAN OF CARE
Problem: Safety - Adult  Goal: Free from fall injury  Outcome: Progressing     Problem: ABCDS Injury Assessment  Goal: Absence of physical injury  Outcome: Progressing     Problem: Metabolic/Fluid and Electrolytes - Adult  Goal: Electrolytes maintained within normal limits  Outcome: Progressing

## 2024-01-25 ENCOUNTER — ANESTHESIA EVENT (OUTPATIENT)
Dept: ENDOSCOPY | Age: 69
End: 2024-01-25
Payer: MEDICARE

## 2024-01-25 ENCOUNTER — APPOINTMENT (OUTPATIENT)
Dept: GENERAL RADIOLOGY | Age: 69
End: 2024-01-25
Attending: STUDENT IN AN ORGANIZED HEALTH CARE EDUCATION/TRAINING PROGRAM
Payer: MEDICARE

## 2024-01-25 ENCOUNTER — ANESTHESIA (OUTPATIENT)
Dept: ENDOSCOPY | Age: 69
End: 2024-01-25
Payer: MEDICARE

## 2024-01-25 LAB
25(OH)D3 SERPL-MCNC: 49.3 NG/ML
ANION GAP SERPL CALCULATED.3IONS-SCNC: 11 MMOL/L (ref 3–16)
APPEARANCE BRONCH: ABNORMAL
APTT BLD: 33.3 SEC (ref 22.7–35.9)
BASE EXCESS BLDA CALC-SCNC: -1 MMOL/L (ref -3–3)
BASE EXCESS BLDA CALC-SCNC: -6 MMOL/L (ref -3–3)
BASOPHILS # BLD: 0 K/UL (ref 0–0.2)
BASOPHILS NFR BLD: 0.3 %
BUN SERPL-MCNC: 24 MG/DL (ref 7–20)
CA-I BLD-SCNC: 0.95 MMOL/L (ref 1.12–1.32)
CA-I BLD-SCNC: 1.21 MMOL/L (ref 1.12–1.32)
CALCIUM SERPL-MCNC: 8.9 MG/DL (ref 8.3–10.6)
CHLORIDE SERPL-SCNC: 104 MMOL/L (ref 99–110)
CLOT SPEC QL: ABNORMAL
CO2 BLDA-SCNC: 19 MMOL/L
CO2 BLDA-SCNC: 24 MMOL/L
CO2 SERPL-SCNC: 23 MMOL/L (ref 21–32)
COLOR BRONCH: ABNORMAL
CREAT SERPL-MCNC: 1.1 MG/DL (ref 0.8–1.3)
DEPRECATED RDW RBC AUTO: 18.6 % (ref 12.4–15.4)
EOSINOPHIL # BLD: 0.1 K/UL (ref 0–0.6)
EOSINOPHIL NFR BLD: 0.5 %
FOLATE SERPL-MCNC: >20 NG/ML (ref 4.78–24.2)
GFR SERPLBLD CREATININE-BSD FMLA CKD-EPI: >60 ML/MIN/{1.73_M2}
GLUCOSE BLD-MCNC: 114 MG/DL (ref 70–99)
GLUCOSE BLD-MCNC: 117 MG/DL (ref 70–99)
GLUCOSE SERPL-MCNC: 117 MG/DL (ref 70–99)
HCO3 BLDA-SCNC: 17.9 MMOL/L (ref 21–29)
HCO3 BLDA-SCNC: 23.2 MMOL/L (ref 21–29)
HCT VFR BLD AUTO: 28.3 % (ref 40.5–52.5)
HGB BLD-MCNC: 8.9 G/DL (ref 13.5–17.5)
INR PPP: 1.59 (ref 0.84–1.16)
LACTATE BLD-SCNC: 0.98 MMOL/L (ref 0.4–2)
LACTATE BLD-SCNC: 3.97 MMOL/L (ref 0.4–2)
LEGIONELLA AG UR QL: NORMAL
LYMPHOCYTES # BLD: 0.3 K/UL (ref 1–5.1)
LYMPHOCYTES NFR BLD: 2.4 %
LYMPHOCYTES NFR BRONCH MANUAL: 32 % (ref 5–10)
MACROPHAGES NFR BRONCH MANUAL: 22 % (ref 90–95)
MCH RBC QN AUTO: 28.7 PG (ref 26–34)
MCHC RBC AUTO-ENTMCNC: 31.3 G/DL (ref 31–36)
MCV RBC AUTO: 91.7 FL (ref 80–100)
MONOCYTES # BLD: 1.2 K/UL (ref 0–1.3)
MONOCYTES NFR BLD: 10.3 %
MONOCYTES NFR BRONCH MANUAL: 3 %
MRSA DNA SPEC QL NAA+PROBE: NORMAL
NEUTROPHILS # BLD: 9.9 K/UL (ref 1.7–7.7)
NEUTROPHILS NFR BLD: 86.5 %
NEUTROPHILS NFR BRONCH MANUAL: 43 % (ref 5–10)
NUC CELL # BRONCH MANUAL: 366 /CUMM
PCO2 BLDA: 25.2 MM HG (ref 35–45)
PCO2 BLDA: 35.5 MM HG (ref 35–45)
PERFORMED ON: ABNORMAL
PERFORMED ON: ABNORMAL
PH BLDA: 7.42 [PH] (ref 7.35–7.45)
PH BLDA: 7.46 [PH] (ref 7.35–7.45)
PHOSPHATE SERPL-MCNC: 3 MG/DL (ref 2.5–4.9)
PLATELET # BLD AUTO: 223 K/UL (ref 135–450)
PMV BLD AUTO: 9.2 FL (ref 5–10.5)
PO2 BLDA: 108.1 MM HG (ref 75–108)
PO2 BLDA: 190.5 MM HG (ref 75–108)
POC SAMPLE TYPE: ABNORMAL
POC SAMPLE TYPE: ABNORMAL
POTASSIUM BLD-SCNC: 3.8 MMOL/L (ref 3.5–5.1)
POTASSIUM BLD-SCNC: 4.1 MMOL/L (ref 3.5–5.1)
POTASSIUM SERPL-SCNC: 4.2 MMOL/L (ref 3.5–5.1)
PROTHROMBIN TIME: 19 SEC (ref 11.5–14.8)
RBC # BLD AUTO: 3.09 M/UL (ref 4.2–5.9)
RBC # FLD MANUAL: 3000 /CUMM
REPORT: NORMAL
RESP PATH DNA+RNA PNL L RESP NAA+NON-PRB: NORMAL
S PNEUM AG UR QL: NORMAL
SAO2 % BLDA: 100 % (ref 93–100)
SAO2 % BLDA: 99 % (ref 93–100)
SODIUM BLD-SCNC: 139 MMOL/L (ref 136–145)
SODIUM BLD-SCNC: 139 MMOL/L (ref 136–145)
SODIUM SERPL-SCNC: 138 MMOL/L (ref 136–145)
TOTAL CELLS COUNTED BRONCH: 100
TSH SERPL DL<=0.005 MIU/L-ACNC: 3.18 UIU/ML (ref 0.27–4.2)
VANCOMYCIN SERPL-MCNC: 15.7 UG/ML
VIT B12 SERPL-MCNC: 536 PG/ML (ref 211–911)
WBC # BLD AUTO: 11.4 K/UL (ref 4–11)

## 2024-01-25 PROCEDURE — 84132 ASSAY OF SERUM POTASSIUM: CPT

## 2024-01-25 PROCEDURE — 87081 CULTURE SCREEN ONLY: CPT

## 2024-01-25 PROCEDURE — 02HV33Z INSERTION OF INFUSION DEVICE INTO SUPERIOR VENA CAVA, PERCUTANEOUS APPROACH: ICD-10-PCS

## 2024-01-25 PROCEDURE — 6370000000 HC RX 637 (ALT 250 FOR IP): Performed by: ANESTHESIOLOGY

## 2024-01-25 PROCEDURE — 88312 SPECIAL STAINS GROUP 1: CPT

## 2024-01-25 PROCEDURE — 3700000001 HC ADD 15 MINUTES (ANESTHESIA): Performed by: INTERNAL MEDICINE

## 2024-01-25 PROCEDURE — 87633 RESP VIRUS 12-25 TARGETS: CPT

## 2024-01-25 PROCEDURE — 85610 PROTHROMBIN TIME: CPT

## 2024-01-25 PROCEDURE — 7100000000 HC PACU RECOVERY - FIRST 15 MIN: Performed by: INTERNAL MEDICINE

## 2024-01-25 PROCEDURE — 87252 VIRUS INOCULATION TISSUE: CPT

## 2024-01-25 PROCEDURE — 87206 SMEAR FLUORESCENT/ACID STAI: CPT

## 2024-01-25 PROCEDURE — 31624 DX BRONCHOSCOPE/LAVAGE: CPT | Performed by: INTERNAL MEDICINE

## 2024-01-25 PROCEDURE — 36556 INSERT NON-TUNNEL CV CATH: CPT

## 2024-01-25 PROCEDURE — 99291 CRITICAL CARE FIRST HOUR: CPT | Performed by: INTERNAL MEDICINE

## 2024-01-25 PROCEDURE — 87106 FUNGI IDENTIFICATION YEAST: CPT

## 2024-01-25 PROCEDURE — 71045 X-RAY EXAM CHEST 1 VIEW: CPT

## 2024-01-25 PROCEDURE — 2580000003 HC RX 258: Performed by: INTERNAL MEDICINE

## 2024-01-25 PROCEDURE — 2500000003 HC RX 250 WO HCPCS

## 2024-01-25 PROCEDURE — 6360000002 HC RX W HCPCS: Performed by: INTERNAL MEDICINE

## 2024-01-25 PROCEDURE — 87798 DETECT AGENT NOS DNA AMP: CPT

## 2024-01-25 PROCEDURE — 82947 ASSAY GLUCOSE BLOOD QUANT: CPT

## 2024-01-25 PROCEDURE — 87102 FUNGUS ISOLATION CULTURE: CPT

## 2024-01-25 PROCEDURE — 89051 BODY FLUID CELL COUNT: CPT

## 2024-01-25 PROCEDURE — 36415 COLL VENOUS BLD VENIPUNCTURE: CPT

## 2024-01-25 PROCEDURE — 2000000000 HC ICU R&B

## 2024-01-25 PROCEDURE — 2500000003 HC RX 250 WO HCPCS: Performed by: NURSE PRACTITIONER

## 2024-01-25 PROCEDURE — 3700000000 HC ANESTHESIA ATTENDED CARE: Performed by: INTERNAL MEDICINE

## 2024-01-25 PROCEDURE — 2580000003 HC RX 258: Performed by: NURSE PRACTITIONER

## 2024-01-25 PROCEDURE — 87070 CULTURE OTHR SPECIMN AEROBIC: CPT

## 2024-01-25 PROCEDURE — 84100 ASSAY OF PHOSPHORUS: CPT

## 2024-01-25 PROCEDURE — 2500000003 HC RX 250 WO HCPCS: Performed by: INTERNAL MEDICINE

## 2024-01-25 PROCEDURE — 31500 INSERT EMERGENCY AIRWAY: CPT

## 2024-01-25 PROCEDURE — 87305 ASPERGILLUS AG IA: CPT

## 2024-01-25 PROCEDURE — 84295 ASSAY OF SERUM SODIUM: CPT

## 2024-01-25 PROCEDURE — C9113 INJ PANTOPRAZOLE SODIUM, VIA: HCPCS | Performed by: NURSE PRACTITIONER

## 2024-01-25 PROCEDURE — 31622 DX BRONCHOSCOPE/WASH: CPT

## 2024-01-25 PROCEDURE — 88305 TISSUE EXAM BY PATHOLOGIST: CPT

## 2024-01-25 PROCEDURE — 2580000003 HC RX 258

## 2024-01-25 PROCEDURE — 87253 VIRUS INOCULATE TISSUE ADDL: CPT

## 2024-01-25 PROCEDURE — 0BH17EZ INSERTION OF ENDOTRACHEAL AIRWAY INTO TRACHEA, VIA NATURAL OR ARTIFICIAL OPENING: ICD-10-PCS | Performed by: INTERNAL MEDICINE

## 2024-01-25 PROCEDURE — 85730 THROMBOPLASTIN TIME PARTIAL: CPT

## 2024-01-25 PROCEDURE — 31500 INSERT EMERGENCY AIRWAY: CPT | Performed by: INTERNAL MEDICINE

## 2024-01-25 PROCEDURE — 94640 AIRWAY INHALATION TREATMENT: CPT

## 2024-01-25 PROCEDURE — 94002 VENT MGMT INPAT INIT DAY: CPT

## 2024-01-25 PROCEDURE — 3609010800 HC BRONCHOSCOPY ALVEOLAR LAVAGE: Performed by: INTERNAL MEDICINE

## 2024-01-25 PROCEDURE — 2700000000 HC OXYGEN THERAPY PER DAY

## 2024-01-25 PROCEDURE — 85025 COMPLETE CBC W/AUTO DIFF WBC: CPT

## 2024-01-25 PROCEDURE — 82803 BLOOD GASES ANY COMBINATION: CPT

## 2024-01-25 PROCEDURE — 83605 ASSAY OF LACTIC ACID: CPT

## 2024-01-25 PROCEDURE — 6370000000 HC RX 637 (ALT 250 FOR IP): Performed by: NURSE PRACTITIONER

## 2024-01-25 PROCEDURE — 80202 ASSAY OF VANCOMYCIN: CPT

## 2024-01-25 PROCEDURE — 0B9F8ZX DRAINAGE OF RIGHT LOWER LUNG LOBE, VIA NATURAL OR ARTIFICIAL OPENING ENDOSCOPIC, DIAGNOSTIC: ICD-10-PCS | Performed by: INTERNAL MEDICINE

## 2024-01-25 PROCEDURE — 87116 MYCOBACTERIA CULTURE: CPT

## 2024-01-25 PROCEDURE — 87015 SPECIMEN INFECT AGNT CONCNTJ: CPT

## 2024-01-25 PROCEDURE — 6360000002 HC RX W HCPCS: Performed by: NURSE PRACTITIONER

## 2024-01-25 PROCEDURE — 87254 VIRUS INOCULATION SHELL VIA: CPT

## 2024-01-25 PROCEDURE — 6360000002 HC RX W HCPCS

## 2024-01-25 PROCEDURE — 88112 CYTOPATH CELL ENHANCE TECH: CPT

## 2024-01-25 PROCEDURE — 36620 INSERTION CATHETER ARTERY: CPT

## 2024-01-25 PROCEDURE — 94761 N-INVAS EAR/PLS OXIMETRY MLT: CPT

## 2024-01-25 PROCEDURE — 7100000001 HC PACU RECOVERY - ADDTL 15 MIN: Performed by: INTERNAL MEDICINE

## 2024-01-25 PROCEDURE — 5A1955Z RESPIRATORY VENTILATION, GREATER THAN 96 CONSECUTIVE HOURS: ICD-10-PCS | Performed by: INTERNAL MEDICINE

## 2024-01-25 PROCEDURE — 0202U NFCT DS 22 TRGT SARS-COV-2: CPT

## 2024-01-25 PROCEDURE — 6370000000 HC RX 637 (ALT 250 FOR IP): Performed by: INTERNAL MEDICINE

## 2024-01-25 PROCEDURE — 80048 BASIC METABOLIC PNL TOTAL CA: CPT

## 2024-01-25 PROCEDURE — 82330 ASSAY OF CALCIUM: CPT

## 2024-01-25 PROCEDURE — 87205 SMEAR GRAM STAIN: CPT

## 2024-01-25 RX ORDER — ETOMIDATE 2 MG/ML
20 INJECTION INTRAVENOUS ONCE
Status: COMPLETED | OUTPATIENT
Start: 2024-01-25 | End: 2024-01-25

## 2024-01-25 RX ORDER — SODIUM CHLORIDE, SODIUM LACTATE, POTASSIUM CHLORIDE, AND CALCIUM CHLORIDE .6; .31; .03; .02 G/100ML; G/100ML; G/100ML; G/100ML
500 INJECTION, SOLUTION INTRAVENOUS ONCE
Status: DISCONTINUED | OUTPATIENT
Start: 2024-01-25 | End: 2024-01-27

## 2024-01-25 RX ORDER — SULFAMETHOXAZOLE AND TRIMETHOPRIM 800; 160 MG/1; MG/1
1 TABLET ORAL ONCE
Status: DISCONTINUED | OUTPATIENT
Start: 2024-01-25 | End: 2024-01-25

## 2024-01-25 RX ORDER — SODIUM CHLORIDE 0.9 % (FLUSH) 0.9 %
5-40 SYRINGE (ML) INJECTION EVERY 12 HOURS SCHEDULED
Status: DISCONTINUED | OUTPATIENT
Start: 2024-01-25 | End: 2024-01-25 | Stop reason: HOSPADM

## 2024-01-25 RX ORDER — PROPOFOL 10 MG/ML
5-80 INJECTION, EMULSION INTRAVENOUS CONTINUOUS
Status: DISCONTINUED | OUTPATIENT
Start: 2024-01-25 | End: 2024-02-04

## 2024-01-25 RX ORDER — SULFAMETHOXAZOLE AND TRIMETHOPRIM 800; 160 MG/1; MG/1
1 TABLET ORAL
Status: DISCONTINUED | OUTPATIENT
Start: 2024-01-26 | End: 2024-01-25

## 2024-01-25 RX ORDER — SODIUM CHLORIDE 9 MG/ML
INJECTION, SOLUTION INTRAVENOUS CONTINUOUS PRN
Status: DISCONTINUED | OUTPATIENT
Start: 2024-01-25 | End: 2024-01-25 | Stop reason: SDUPTHER

## 2024-01-25 RX ORDER — PHENYLEPHRINE HYDROCHLORIDE 10 MG/ML
INJECTION INTRAVENOUS PRN
Status: DISCONTINUED | OUTPATIENT
Start: 2024-01-25 | End: 2024-01-25 | Stop reason: SDUPTHER

## 2024-01-25 RX ORDER — LIDOCAINE HYDROCHLORIDE 20 MG/ML
INJECTION, SOLUTION INTRAVENOUS PRN
Status: DISCONTINUED | OUTPATIENT
Start: 2024-01-25 | End: 2024-01-25 | Stop reason: SDUPTHER

## 2024-01-25 RX ORDER — IPRATROPIUM BROMIDE AND ALBUTEROL SULFATE 2.5; .5 MG/3ML; MG/3ML
1 SOLUTION RESPIRATORY (INHALATION) ONCE
Status: COMPLETED | OUTPATIENT
Start: 2024-01-25 | End: 2024-01-25

## 2024-01-25 RX ORDER — PROPOFOL 10 MG/ML
INJECTION, EMULSION INTRAVENOUS
Status: COMPLETED
Start: 2024-01-25 | End: 2024-01-25

## 2024-01-25 RX ORDER — SODIUM CHLORIDE 9 MG/ML
INJECTION, SOLUTION INTRAVENOUS PRN
Status: DISCONTINUED | OUTPATIENT
Start: 2024-01-25 | End: 2024-01-25 | Stop reason: HOSPADM

## 2024-01-25 RX ORDER — IPRATROPIUM BROMIDE AND ALBUTEROL SULFATE 2.5; .5 MG/3ML; MG/3ML
1 SOLUTION RESPIRATORY (INHALATION)
Status: DISCONTINUED | OUTPATIENT
Start: 2024-01-25 | End: 2024-01-26

## 2024-01-25 RX ORDER — ETOMIDATE 2 MG/ML
INJECTION INTRAVENOUS
Status: COMPLETED
Start: 2024-01-25 | End: 2024-01-25

## 2024-01-25 RX ORDER — FENTANYL CITRATE-0.9 % NACL/PF 10 MCG/ML
25-200 PLASTIC BAG, INJECTION (ML) INTRAVENOUS CONTINUOUS
Status: DISCONTINUED | OUTPATIENT
Start: 2024-01-25 | End: 2024-02-02 | Stop reason: SDUPTHER

## 2024-01-25 RX ORDER — SODIUM CHLORIDE 0.9 % (FLUSH) 0.9 %
5-40 SYRINGE (ML) INJECTION PRN
Status: DISCONTINUED | OUTPATIENT
Start: 2024-01-25 | End: 2024-01-25 | Stop reason: HOSPADM

## 2024-01-25 RX ORDER — ALBUTEROL SULFATE 2.5 MG/3ML
2.5 SOLUTION RESPIRATORY (INHALATION)
Status: DISCONTINUED | OUTPATIENT
Start: 2024-01-25 | End: 2024-01-25

## 2024-01-25 RX ORDER — ALBUTEROL SULFATE 2.5 MG/3ML
2.5 SOLUTION RESPIRATORY (INHALATION) EVERY 4 HOURS PRN
Status: DISCONTINUED | OUTPATIENT
Start: 2024-01-25 | End: 2024-02-09

## 2024-01-25 RX ORDER — IPRATROPIUM BROMIDE AND ALBUTEROL SULFATE 2.5; .5 MG/3ML; MG/3ML
1 SOLUTION RESPIRATORY (INHALATION)
Status: DISCONTINUED | OUTPATIENT
Start: 2024-01-25 | End: 2024-01-25

## 2024-01-25 RX ORDER — PROPOFOL 10 MG/ML
INJECTION, EMULSION INTRAVENOUS PRN
Status: DISCONTINUED | OUTPATIENT
Start: 2024-01-25 | End: 2024-01-25 | Stop reason: SDUPTHER

## 2024-01-25 RX ORDER — ACETAMINOPHEN 325 MG/1
650 TABLET ORAL ONCE
Status: COMPLETED | OUTPATIENT
Start: 2024-01-26 | End: 2024-01-26

## 2024-01-25 RX ORDER — DIPHENHYDRAMINE HCL 25 MG
25 TABLET ORAL ONCE
Status: COMPLETED | OUTPATIENT
Start: 2024-01-26 | End: 2024-01-26

## 2024-01-25 RX ORDER — FUROSEMIDE 10 MG/ML
40 INJECTION INTRAMUSCULAR; INTRAVENOUS DAILY
Status: DISCONTINUED | OUTPATIENT
Start: 2024-01-25 | End: 2024-01-25

## 2024-01-25 RX ORDER — SULFAMETHOXAZOLE AND TRIMETHOPRIM 800; 160 MG/1; MG/1
1 TABLET ORAL EVERY 12 HOURS SCHEDULED
Status: DISCONTINUED | OUTPATIENT
Start: 2024-01-25 | End: 2024-01-25

## 2024-01-25 RX ORDER — IPRATROPIUM BROMIDE AND ALBUTEROL SULFATE 2.5; .5 MG/3ML; MG/3ML
SOLUTION RESPIRATORY (INHALATION)
Status: DISPENSED
Start: 2024-01-25 | End: 2024-01-25

## 2024-01-25 RX ORDER — PANTOPRAZOLE SODIUM 40 MG/10ML
40 INJECTION, POWDER, LYOPHILIZED, FOR SOLUTION INTRAVENOUS DAILY
Status: DISCONTINUED | OUTPATIENT
Start: 2024-01-25 | End: 2024-02-09

## 2024-01-25 RX ADMIN — IPRATROPIUM BROMIDE AND ALBUTEROL SULFATE 1 DOSE: 2.5; .5 SOLUTION RESPIRATORY (INHALATION) at 23:03

## 2024-01-25 RX ADMIN — SULFAMETHOXAZOLE AND TRIMETHOPRIM 323.2 MG: 80; 16 INJECTION, SOLUTION, CONCENTRATE INTRAVENOUS at 15:05

## 2024-01-25 RX ADMIN — IPRATROPIUM BROMIDE AND ALBUTEROL SULFATE 1 DOSE: 2.5; .5 SOLUTION RESPIRATORY (INHALATION) at 13:08

## 2024-01-25 RX ADMIN — MEROPENEM 1000 MG: 1 INJECTION INTRAVENOUS at 18:14

## 2024-01-25 RX ADMIN — PROPOFOL 50 MG: 10 INJECTION, EMULSION INTRAVENOUS at 08:21

## 2024-01-25 RX ADMIN — Medication 125 MCG/HR: at 20:36

## 2024-01-25 RX ADMIN — LEVETIRACETAM 500 MG: 500 TABLET, FILM COATED ORAL at 20:37

## 2024-01-25 RX ADMIN — VORICONAZOLE 390 MG: 10 INJECTION, POWDER, LYOPHILIZED, FOR SOLUTION INTRAVENOUS at 16:37

## 2024-01-25 RX ADMIN — PANTOPRAZOLE SODIUM 40 MG: 40 INJECTION, POWDER, FOR SOLUTION INTRAVENOUS at 13:32

## 2024-01-25 RX ADMIN — PROPOFOL 60 MCG/KG/MIN: 10 INJECTION, EMULSION INTRAVENOUS at 14:41

## 2024-01-25 RX ADMIN — LIDOCAINE HYDROCHLORIDE 60 MG: 20 INJECTION, SOLUTION INTRAVENOUS at 08:11

## 2024-01-25 RX ADMIN — SULFAMETHOXAZOLE AND TRIMETHOPRIM 323.2 MG: 80; 16 INJECTION, SOLUTION, CONCENTRATE INTRAVENOUS at 23:57

## 2024-01-25 RX ADMIN — PROPOFOL 20 MCG/KG/MIN: 10 INJECTION, EMULSION INTRAVENOUS at 11:54

## 2024-01-25 RX ADMIN — SODIUM CHLORIDE 5 MCG/MIN: 9 INJECTION, SOLUTION INTRAVENOUS at 12:40

## 2024-01-25 RX ADMIN — PROPOFOL 100 MG: 10 INJECTION, EMULSION INTRAVENOUS at 08:12

## 2024-01-25 RX ADMIN — PROPOFOL 50 MG: 10 INJECTION, EMULSION INTRAVENOUS at 11:53

## 2024-01-25 RX ADMIN — SODIUM CHLORIDE: 900 INJECTION, SOLUTION INTRAVENOUS at 07:56

## 2024-01-25 RX ADMIN — Medication 50 MCG/HR: at 12:39

## 2024-01-25 RX ADMIN — IPRATROPIUM BROMIDE AND ALBUTEROL SULFATE 1 DOSE: 2.5; .5 SOLUTION RESPIRATORY (INHALATION) at 20:24

## 2024-01-25 RX ADMIN — SODIUM CHLORIDE, PRESERVATIVE FREE 10 ML: 5 INJECTION INTRAVENOUS at 20:38

## 2024-01-25 RX ADMIN — PHENYLEPHRINE HYDROCHLORIDE 100 MCG: 10 INJECTION INTRAVENOUS at 08:14

## 2024-01-25 RX ADMIN — MEROPENEM 1000 MG: 1 INJECTION INTRAVENOUS at 02:11

## 2024-01-25 RX ADMIN — PROPOFOL 70 MCG/KG/MIN: 10 INJECTION, EMULSION INTRAVENOUS at 12:34

## 2024-01-25 RX ADMIN — IPRATROPIUM BROMIDE AND ALBUTEROL SULFATE 1 DOSE: 2.5; .5 SOLUTION RESPIRATORY (INHALATION) at 17:20

## 2024-01-25 RX ADMIN — WATER 40 MG: 1 INJECTION INTRAMUSCULAR; INTRAVENOUS; SUBCUTANEOUS at 20:37

## 2024-01-25 RX ADMIN — LEVETIRACETAM 500 MG: 500 TABLET, FILM COATED ORAL at 10:34

## 2024-01-25 RX ADMIN — VALACYCLOVIR HYDROCHLORIDE 500 MG: 500 TABLET, FILM COATED ORAL at 20:38

## 2024-01-25 RX ADMIN — ETOMIDATE 20 MG: 2 INJECTION INTRAVENOUS at 11:51

## 2024-01-25 RX ADMIN — MEROPENEM 1000 MG: 1 INJECTION INTRAVENOUS at 12:57

## 2024-01-25 RX ADMIN — PROPOFOL 50 MCG/KG/MIN: 10 INJECTION, EMULSION INTRAVENOUS at 20:23

## 2024-01-25 RX ADMIN — FUROSEMIDE 40 MG: 10 INJECTION, SOLUTION INTRAMUSCULAR; INTRAVENOUS at 11:22

## 2024-01-25 RX ADMIN — HYDROCORTISONE SODIUM SUCCINATE 100 MG: 100 INJECTION, POWDER, FOR SOLUTION INTRAMUSCULAR; INTRAVENOUS at 11:22

## 2024-01-25 RX ADMIN — PROPOFOL 50 MG: 10 INJECTION, EMULSION INTRAVENOUS at 08:14

## 2024-01-25 RX ADMIN — LEVOFLOXACIN 750 MG: 5 INJECTION, SOLUTION INTRAVENOUS at 13:33

## 2024-01-25 RX ADMIN — SODIUM CHLORIDE, PRESERVATIVE FREE 10 ML: 5 INJECTION INTRAVENOUS at 10:36

## 2024-01-25 RX ADMIN — IPRATROPIUM BROMIDE AND ALBUTEROL SULFATE 1 DOSE: 2.5; .5 SOLUTION RESPIRATORY (INHALATION) at 09:23

## 2024-01-25 ASSESSMENT — PULMONARY FUNCTION TESTS
PIF_VALUE: 19
PIF_VALUE: 20
PIF_VALUE: 15
PIF_VALUE: 19
PIF_VALUE: 23
PIF_VALUE: 18
PIF_VALUE: 18
PIF_VALUE: 19
PIF_VALUE: 22
PIF_VALUE: 21
PIF_VALUE: 16
PIF_VALUE: 19
PIF_VALUE: 18
PIF_VALUE: 19
PIF_VALUE: 21
PIF_VALUE: 19
PIF_VALUE: 22
PIF_VALUE: 19
PIF_VALUE: 20
PIF_VALUE: 25

## 2024-01-25 ASSESSMENT — PAIN SCALES - GENERAL
PAINLEVEL_OUTOF10: 0

## 2024-01-25 ASSESSMENT — ENCOUNTER SYMPTOMS: SHORTNESS OF BREATH: 1

## 2024-01-25 NOTE — PROGRESS NOTES
Physical Therapy/Occupational Therapy    Orders received and chart reviewed.  Pt currently off floor.  Will f/u later this date as schedule allows.      Cierra Mujica, PT [id number]  CORINA Allen, OTR/L [id number]

## 2024-01-25 NOTE — ANESTHESIA POSTPROCEDURE EVALUATION
Department of Anesthesiology  Postprocedure Note    Patient: Zain Lopez  MRN: [id number]  YOB: 1955  Date of evaluation: 1/25/2024    Procedure Summary       Date: 01/25/24 Room / Location: [facility] ENDO  / [facility]    Anesthesia Start: 0800 Anesthesia Stop: 0841    Procedure: BRONCHOSCOPY ALVEOLAR LAVAGE Diagnosis:       Acute respiratory failure, unspecified whether with hypoxia or hypercapnia (HCC)      (Acute respiratory failure, unspecified whether with hypoxia or hypercapnia (HCC) [J96.00])    Surgeons: Wilfredo Mcintosh MD Responsible Provider: Ghanshyam Edwards MD    Anesthesia Type: General ASA Status: 4            Anesthesia Type: General    Aly Phase I: Aly Score: 5    Aly Phase II:      Anesthesia Post Evaluation    Patient location during evaluation: PACU  Patient participation: complete - patient participated  Level of consciousness: awake  Pain score: 0  Airway patency: patent  Nausea & Vomiting: no nausea  Cardiovascular status: hemodynamically stable  Respiratory status: acceptable  Hydration status: stable  Pain management: satisfactory to patient    No notable events documented.
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
## 2024-01-25 NOTE — PROGRESS NOTES
PACU Transfer to [facility]    Vitals:    01/25/24 0955   BP:    Pulse: 73   Resp: 23   Temp:    SpO2: 96%         Intake/Output Summary (Last 24 hours) at 1/25/2024 1001  Last data filed at 1/25/2024 0840  Gross per 24 hour   Intake 1785 ml   Output 3090 ml   Net -1305 ml       Pain assessment:  none       Patient transferred via bed per Elsi and myself to care of Elsi 3BCC RN.

## 2024-01-25 NOTE — PROGRESS NOTES
Pt received from OR to PACU # 10 via bed.     Post: Procedure(s):  BRONCHOSCOPY ALVEOLAR LAVAGE     Report received from Endo RN and LIBORIO Carver CRNA.    Per report pt was on 5L prior to bronch.      Respirations tachypnic.  Pt is drowsy.       Attached to PACU monitoring system. Alarms and parameters set    Pain none and no complaints of nausea.

## 2024-01-25 NOTE — PROGRESS NOTES
[facility] Progress Note    2024     Zain Lopez    MRN: [id number]    : 1955    Referring MD: Penelope Wilhelm,   [address]  [address]  [place] [address]      SUBJECTIVE:    More dyspneic   Desats this AM after Bronch needing 15l O2 by NRM with 84% Sp02. Also having more cough.   Able to walk    ECOG PS:  (2) Ambulatory and capable of self care, unable to carry out work activity, up and about > 50% or waking hours    KPS: 60% Requires occasional assistance, but is able to care for most of his personal needs    Isolation: None    Medications    Scheduled Meds:   meropenem  1,000 mg IntraVENous Q8H    furosemide  40 mg Oral Daily    levofloxacin  750 mg IntraVENous Q24H    vancomycin  750 mg IntraVENous Q12H    apixaban  5 mg Oral BID    digoxin  125 mcg Oral Daily    levETIRAcetam  500 mg Oral BID    therapeutic multivitamin-minerals  1 tablet Oral Daily    potassium chloride  20 mEq Oral BID WC    sertraline  150 mg Oral Daily    tamsulosin  0.4 mg Oral Daily    valACYclovir  500 mg Oral BID    sodium chloride flush  5-40 mL IntraVENous 2 times per day    Saline Mouthwash  15 mL Swish & Spit 4x Daily AC & HS     Continuous Infusions:   sodium chloride 50 mL/hr at 24 1341    sodium chloride       PRN Meds:.ondansetron, polyethylene glycol, diphenhydrAMINE, sodium chloride flush, sodium chloride, potassium chloride, magnesium sulfate, magnesium hydroxide, Saline Mouthwash, alteplase (CATHFLO) 2 mg in sterile water 2 mL injection    ROS:  As noted above, otherwise remainder of 10-point ROS negative    Physical Exam:     I&O:    Intake/Output Summary (Last 24 hours) at 2024 0841  Last data filed at 2024 0840  Gross per 24 hour   Intake 1785 ml   Output 3090 ml   Net -1305 ml         Vital Signs:  BP (!) 105/58   Pulse 60   Temp 98.4 °F (36.9 °C) (Oral)   Resp 18   Ht 1.778 m (5' 10\")   Wt 64.6 kg (142 lb 6.4 oz)   SpO2 94%   BMI 20.43 kg/m²     Weight:    Wt Readings from  None    - Disposition: Unknown    The patient was seen and examined by Dr. Heaven Augustine, APRN - CNP    Silverio Collado MD
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
## 2024-01-25 NOTE — PROGRESS NOTES
Pulmonary Followup Note    Indication for visit: KENNEDY, acute hypoxemic resp failure, abnl CT Chest    Subjective:    Oxygen requirement unchanged at 6 L  Dyspnea with minimal movement unchanged.  Has dry cough  No fevers     meropenem  1,000 mg IntraVENous Q8H    furosemide  40 mg Oral Daily    levofloxacin  750 mg IntraVENous Q24H    vancomycin  750 mg IntraVENous Q12H    apixaban  5 mg Oral BID    digoxin  125 mcg Oral Daily    levETIRAcetam  500 mg Oral BID    therapeutic multivitamin-minerals  1 tablet Oral Daily    potassium chloride  20 mEq Oral BID WC    sertraline  150 mg Oral Daily    tamsulosin  0.4 mg Oral Daily    valACYclovir  500 mg Oral BID    sodium chloride flush  5-40 mL IntraVENous 2 times per day    Saline Mouthwash  15 mL Swish & Spit 4x Daily AC & HS       Continuous Infusions:   sodium chloride 50 mL/hr at 01/24/24 1341    sodium chloride       PHYSICAL EXAMINATION:  BP (!) 105/58   Pulse 60   Temp 98.4 °F (36.9 °C) (Oral)   Resp 18   Ht 1.778 m (5' 10\")   Wt 64.6 kg (142 lb 6.4 oz)   SpO2 96%   BMI 20.43 kg/m²   Tm 99.3  6 L NC  Gen: Well developed, well nourished male in no acute distress. Speaking in full sentences with out using accessory resp muscles  Eyes: PERRL, EOMI, normal conjunctivae  Ears, Nose, Mouth and Throat: Hearing is normal. Oropharynx is normal  Neck: No lymphadenopathy  Respiratory: CTA. Breathing comfortably  CV: RRR without M/R/R  Abd: +BS, soft, NT/ND  Musculoskeletal: No cyanosis, clubbing, or edema.  Skin: No rashes, ulcers, or subcutaneous nodules  Psychiatric: Alert and oriented to time place and person    DATA  CBC:   Recent Labs     01/24/24  0358 01/25/24 0437   WBC 9.8 11.4*   HGB 9.9* 8.9*   HCT 31.6* 28.3*   MCV 93.0 91.7    223     BMP:   Recent Labs     01/24/24  0358 01/25/24 0437    138   K 4.4 4.2    104   CO2 21 23   PHOS 3.4 3.0   BUN 22* 24*   CREATININE 0.9 1.1     No results for  vein.   No other evidence of acute deep or superficial venous thrombosis of the   right upper extremity or contralateral subclavian vein.     Assessment       1.  Acute hypoxemic respiratory failure  2.  Multifocal airspace disease -some areas better some area is unchanged some areas worse compared to CT chest December 2023  3.  Bilateral pleural effusions -significantly less than CT chest December 2023  4. DLBC NHL w/ CNS relapse: Patient currently in [place]   5. HFrEF - EF 40%.  Appears close to euvolemia  6.  DVT right IJ, brachial, axillary, cephalic veins     Plan     I reviewed his previous hospitalization and current clinical course and in comparison with CT chest from yesterday at this point I favor pneumonia as cause of his symptoms and CT abnormalities.  While he still does have some pleural effusion is significantly better and given lack of peripheral edema and orthopnea I think his volume status is not a significant issue.      Given his immunosuppression I do think he is at a high risk for pneumonia including opportunistic pathogens.  I discussed bronchoscopy with Kevin and his wife and they are amenable to proceed which I will do this morning. Plan is for Bronch with BAL of lower lobe as most involved area on CT Chest yesterday     Await fungal serologies. Continue meropenem and levaquin. Vancomycin can be likely be discontinued based on negative MRSA nasal swab but would also be reasonable to wait on Pneumonia molcular panel from BAL before discontinuing which should be back tmrw       Wilfredo Mcintosh MD

## 2024-01-25 NOTE — PROGRESS NOTES
Occupational Therapy/Physical Therapy  Chart reviewed.  Earlier events noted.  Pt is now intubated.  Will sign off for PT and OT.  Please re-refer as appropriate.   CORINA Allen, OTR/L [id number]   Cierra Mujica, PT [phone]
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
## 2024-01-25 NOTE — ANESTHESIA PRE PROCEDURE
Department of Anesthesiology  Preprocedure Note       Name:  Zain Lopez   Age:  68 y.o.  :  1955                                          MRN:  [id number]         Date:  2024      Surgeon: Surgeon(s):  Wilfredo Mcintosh MD    Procedure: Procedure(s):  BRONCHOSCOPY ALVEOLAR LAVAGE    Medications prior to admission:   Prior to Admission medications    Medication Sig Start Date End Date Taking? Authorizing Provider   furosemide (LASIX) 40 MG tablet Take 0.5 tablets by mouth daily 24   Manuela Treadwell APRN - NP   sertraline (ZOLOFT) 50 MG tablet Take 3 tablets by mouth daily 1/10/24   Penelope Wilhelm DO   valACYclovir (VALTREX) 500 MG tablet Take 1 tablet by mouth 2 times daily 24   Penelope Wilhelm DO   digoxin (LANOXIN) 125 MCG tablet Take 1 tablet by mouth daily 1/10/24   Penelope Wilhelm DO   potassium chloride (KLOR-CON M) 20 MEQ extended release tablet Take 1 tablet by mouth 2 times daily (with meals) 24   Penelope Wilhelm DO   apixaban (ELIQUIS) 5 MG TABS tablet Take 1 tablet by mouth 2 times daily 23   Sacha Wilhelm MD   tamsulosin (FLOMAX) 0.4 MG capsule Take 1 capsule by mouth daily 23   Sacha Wilhelm MD   ondansetron (ZOFRAN) 8 MG tablet Take 1 tablet by mouth every 8 hours as needed for Nausea or Vomiting 23   Sacha Wilhelm MD   polyethylene glycol (GLYCOLAX) 17 g packet Take 1 packet by mouth daily as needed for Constipation 23   Sacha Wilhelm MD   levETIRAcetam (KEPPRA) 500 MG tablet Take 1 tablet by mouth 2 times daily 23   Annalisa Osorio APRN - CNP   Multiple Vitamins-Minerals (THERAPEUTIC MULTIVITAMIN-MINERALS) tablet Take 1 tablet by mouth daily 23   Mary Saeed APRN - NP       Current medications:    Current Facility-Administered Medications   Medication Dose Route Frequency Provider Last Rate Last Admin    meropenem (MERREM) 1,000 mg in sodium chloride 0.9 % 100 mL IVPB (mini-bag)  1,000 mg IntraVENous Q8H Yenifer Augustine, APRN - CNP

## 2024-01-25 NOTE — PROGRESS NOTES
Pt returned from [facility] and started to desat into the 80s. Dr. Mcintosh at bedside. ABG drawn by NICOLÁS Tran. Decision made to intubate. Pt intubated by Dr. Mcintosh with 20 of Etomidate and 50 of Propofol and started on Propofol gtt- see MAR. Restraints applied and OF inserted per order. Fentanyl added for pain control. BP became low post intubation and patient was started on Levo. Pt resting in bed. Wife at bedside updated on plan of care.

## 2024-01-25 NOTE — PROCEDURES
PROCEDURE:  BRONCHOSCOPY      Procedure was done emergently for acute hypoxemic respiratory failure    DESCRIPTION OF PROCEDURE: A time out was taken.     Type of sedation used: Previously sedated    The scope was passed with ease via the ETT, which terminated in mid trachea several centimeters above the vance.  A complete airway inspection was performed.  No endobronchial lesions were identified.  No significant secretions.  No cause of acute hypoxemic respiratory failure identified based on larger airways on bronchoscopy    EBL 0    Wilfredo Mcintosh MD

## 2024-01-25 NOTE — PROCEDURES
PROCEDURE:  BRONCHOSCOPY WITH BRONCHOALVEOLAR LAVAGE RIGHT LOWER LOBE     The risks and benefits as well as alternatives to the procedure have been discussed with the patient. The patient understands and agrees to proceed.     DESCRIPTION OF PROCEDURE: A time out was taken.     Type of sedation used: MAC per anesthesia    The scope was passed with ease via the LMA.  A complete airway inspection of the right bronchial tree was performed.  No endobronchial lesions were identified. There were no secretions visualized in the airways.  A Bronchoalveolar lavage was obtained from the right lower lobe with 120 ml instilled and 40 ml aimee-brown fluid recovered. BAL was sent for immunocompromised panel    EBL 0    The patient tolerated the procedure well. Recovery will be per endoscopy protocol.    Wilfredo Mcintosh MD

## 2024-01-25 NOTE — PROCEDURES
ENDOTRACHEAL INTUBATION    INDICATION: Life threatening respiratory failure    TIME OUT: taken    SEDATION: Etomidate 20 mg IV    PROCEDURE: Patient was preoxygenated with 100% FiO2 via an AmbuBag.  After placing patient in optimal position, video laryngoscopy was used to identify the vocal cords. A 8 mm endotracheal tube was place directly past the cords and into mid trachea.  Endotracheal tube was at 23 at the lips. Breath sounds were auscultated bilaterally without sounds over abdomen.  Appropriate CO2 waveform was confirmed on the monitor.  CXR shows endotracheal tube in mid trachea with unchanged bilateral airspace disease without pneumothorax. Vent settings are 100% FIO2, PEEP 8, Tidal Volume 500, Rate 16.  Will obtain ABG in 1 to 2 hours    No significant complications with intubation    EBL 0    Wilfredo Mcintosh MD

## 2024-01-25 NOTE — PROGRESS NOTES
Took over care of patient around 1500.     Patient remains on ventilator, BSWR and sedation in place. Pressors for BP support.     Art line and CVC placed at bedside by surgery team without complications.     Bruner in place. Adequate output.     Will monitor.

## 2024-01-25 NOTE — CONSULTS
Clinical Pharmacy Progress Note    Vancomycin has been discontinued - Pharmacy will sign off on dosing  If resumed, please re-consult Pharmacy     Please call with questions:  [phone] (Main Pharmacy)    Jennyfer Choi  Pharm.D. BCPS    1/25/2024 11:21 AM

## 2024-01-25 NOTE — PLAN OF CARE
Problem: Respiratory - Adult  Goal: Achieves optimal ventilation and oxygenation  1/25/2024 0806 by Flavia Lugo RN  Note: Pt on 6L of HFNC at rest overnight. Pt needing nonrebreather to recover from activity. Plan of care ongoing.      Problem: Safety - Adult  Goal: Free from fall injury  1/25/2024 0806 by Flavia Lugo RN  Outcome: Progressing     Problem: ABCDS Injury Assessment  Goal: Absence of physical injury  1/25/2024 0806 by Flavia Lugo RN  Outcome: Progressing     Problem: Hematologic - Adult  Goal: Maintains hematologic stability  1/25/2024 0806 by Flavia Lugo RN  Outcome: Progressing  Note: Patient's hemoglobin this AM:   Recent Labs     01/25/24  0437   HGB 8.9*     Patient's platelet count this AM:   Recent Labs     01/25/24  0437       Thrombocytopenia not present at this time.  Patient showing no signs or symptoms of active bleeding.  Transfusion not indicated at this time.  Patient verbalizes understanding of all instructions. Will continue to assess and implement POC. Call light within reach and hourly rounding in place.       Problem: Cardiovascular - Adult  Goal: Maintains optimal cardiac output and hemodynamic stability  1/25/2024 0806 by Flavia Lugo RN  Outcome: Progressing     Problem: Respiratory - Adult  Goal: Achieves optimal ventilation and oxygenation  1/25/2024 0806 by Flavia Lugo RN  Note: Pt on 6L of HFNC at rest overnight. Pt needing nonrebreather to recover from activity. Plan of care ongoing.   1/24/2024 1855 by Opal Mcdonough RN  Outcome: Not Progressing

## 2024-01-25 NOTE — SIGNIFICANT EVENT
Within 10 minutes of arriving back on [facility] after bronchoscopy he developed increasing dyspnea and hypoxemia requiring 15 L nasal cannula mask under a 100% NRB    Pulse oximetry waveform did not appear accurate although patient did appear dusky with significant increased work of breathing with a respiratory rate close to 40.  Oncology gave hydrocortisone 100 mg IV x 1 and Lasix 40 mg IV x 1 without improvement    RT was called and placed patient on Vapotherm with NRB over top.  ABG was drawn with the following results: 7.46/25/108    Stat chest x-ray revealed diffuse bilateral airspace disease unchanged from previous.    Lung exam was significant for bilateral rhonchi, crackles and wheezing but had decent air movement.    I tried to give him an albuterol nebulizer but he did not tolerate coming off the NRB.    Decision was made to intubate.  This was done without complication.  Please see procedure note    He had significant anxiety and vent synchrony requiring high-dose propofol and now fentanyl.  He is now hypotensive and Levophed has been started.      Plan:    Ventilator: Tidal volume 500, rate 16, FiO2 100%, and PEEP of 8  ABG in 1 hour  Place OG  Fentanyl and propofol for goal RASS -2 to -3  Consult surgery for central line and arterial line placement  I am concerned about fungal pneumonia specifically Aspergillus.  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  Will start voriconazole 6 mg/kg IV every 12 hours x 2 then 4 mg/kg IV q 12 hrs  Discontinue Lasix given hypotension   mL bolus  Cont Solu-Medrol 40 IV q 12 hrs  Hold Eliquis for procedures      Pt has a high probability of imminent or life-threatening deterioration requiring close monitoring, and highly complex decision-making and/or interventions of high intensity to assess, manipulate, and support his critical organ systems to prevent a likely

## 2024-01-26 LAB
ALBUMIN SERPL-MCNC: 2.8 G/DL (ref 3.4–5)
ALP SERPL-CCNC: 119 U/L (ref 40–129)
ALT SERPL-CCNC: 15 U/L (ref 10–40)
ANION GAP SERPL CALCULATED.3IONS-SCNC: 13 MMOL/L (ref 3–16)
AST SERPL-CCNC: 28 U/L (ref 15–37)
BASE EXCESS BLDA CALC-SCNC: -1 MMOL/L (ref -3–3)
BASOPHILS # BLD: 0.1 K/UL (ref 0–0.2)
BASOPHILS NFR BLD: 0.5 %
BILIRUB DIRECT SERPL-MCNC: <0.2 MG/DL (ref 0–0.3)
BILIRUB INDIRECT SERPL-MCNC: ABNORMAL MG/DL (ref 0–1)
BILIRUB SERPL-MCNC: 0.3 MG/DL (ref 0–1)
BUN SERPL-MCNC: 19 MG/DL (ref 7–20)
CALCIUM SERPL-MCNC: 9.5 MG/DL (ref 8.3–10.6)
CHLORIDE SERPL-SCNC: 104 MMOL/L (ref 99–110)
CO2 BLDA-SCNC: 26 MMOL/L
CO2 SERPL-SCNC: 24 MMOL/L (ref 21–32)
CREAT SERPL-MCNC: 1 MG/DL (ref 0.8–1.3)
DEPRECATED RDW RBC AUTO: 18.4 % (ref 12.4–15.4)
EOSINOPHIL # BLD: 0 K/UL (ref 0–0.6)
EOSINOPHIL NFR BLD: 0 %
GFR SERPLBLD CREATININE-BSD FMLA CKD-EPI: >60 ML/MIN/{1.73_M2}
GLUCOSE SERPL-MCNC: 176 MG/DL (ref 70–99)
HCO3 BLDA-SCNC: 24.8 MMOL/L (ref 21–29)
HCT VFR BLD AUTO: 29.3 % (ref 40.5–52.5)
HGB BLD-MCNC: 9.2 G/DL (ref 13.5–17.5)
LDH SERPL L TO P-CCNC: 293 U/L (ref 100–190)
LYMPHOCYTES # BLD: 0.1 K/UL (ref 1–5.1)
LYMPHOCYTES NFR BLD: 0.8 %
MAGNESIUM SERPL-MCNC: 1.9 MG/DL (ref 1.8–2.4)
MCH RBC QN AUTO: 28.7 PG (ref 26–34)
MCHC RBC AUTO-ENTMCNC: 31.5 G/DL (ref 31–36)
MCV RBC AUTO: 91.1 FL (ref 80–100)
MONOCYTES # BLD: 0.6 K/UL (ref 0–1.3)
MONOCYTES NFR BLD: 3.7 %
NEUTROPHILS # BLD: 14.7 K/UL (ref 1.7–7.7)
NEUTROPHILS NFR BLD: 95 %
PCO2 BLDA: 44.7 MM HG (ref 35–45)
PERFORMED ON: ABNORMAL
PH BLDA: 7.35 [PH] (ref 7.35–7.45)
PHOSPHATE SERPL-MCNC: 3.4 MG/DL (ref 2.5–4.9)
PLATELET # BLD AUTO: 267 K/UL (ref 135–450)
PMV BLD AUTO: 9.1 FL (ref 5–10.5)
PO2 BLDA: 68.2 MM HG (ref 75–108)
POC SAMPLE TYPE: ABNORMAL
POTASSIUM SERPL-SCNC: 4.1 MMOL/L (ref 3.5–5.1)
PROT SERPL-MCNC: 5.4 G/DL (ref 6.4–8.2)
RBC # BLD AUTO: 3.22 M/UL (ref 4.2–5.9)
REPORT: NORMAL
RESP PATH DNA+RNA PNL NPH NAA+NON-PROBE: NORMAL
SAO2 % BLDA: 92 % (ref 93–100)
SODIUM SERPL-SCNC: 141 MMOL/L (ref 136–145)
WBC # BLD AUTO: 15.5 K/UL (ref 4–11)

## 2024-01-26 PROCEDURE — C9113 INJ PANTOPRAZOLE SODIUM, VIA: HCPCS | Performed by: NURSE PRACTITIONER

## 2024-01-26 PROCEDURE — 2000000000 HC ICU R&B

## 2024-01-26 PROCEDURE — 83615 LACTATE (LD) (LDH) ENZYME: CPT

## 2024-01-26 PROCEDURE — 82803 BLOOD GASES ANY COMBINATION: CPT

## 2024-01-26 PROCEDURE — 6360000002 HC RX W HCPCS: Performed by: NURSE PRACTITIONER

## 2024-01-26 PROCEDURE — 6370000000 HC RX 637 (ALT 250 FOR IP): Performed by: NURSE PRACTITIONER

## 2024-01-26 PROCEDURE — 84100 ASSAY OF PHOSPHORUS: CPT

## 2024-01-26 PROCEDURE — 2500000003 HC RX 250 WO HCPCS: Performed by: NURSE PRACTITIONER

## 2024-01-26 PROCEDURE — 85025 COMPLETE CBC W/AUTO DIFF WBC: CPT

## 2024-01-26 PROCEDURE — 80076 HEPATIC FUNCTION PANEL: CPT

## 2024-01-26 PROCEDURE — 83735 ASSAY OF MAGNESIUM: CPT

## 2024-01-26 PROCEDURE — 36592 COLLECT BLOOD FROM PICC: CPT

## 2024-01-26 PROCEDURE — 6370000000 HC RX 637 (ALT 250 FOR IP): Performed by: STUDENT IN AN ORGANIZED HEALTH CARE EDUCATION/TRAINING PROGRAM

## 2024-01-26 PROCEDURE — 6360000002 HC RX W HCPCS: Performed by: INTERNAL MEDICINE

## 2024-01-26 PROCEDURE — 94640 AIRWAY INHALATION TREATMENT: CPT

## 2024-01-26 PROCEDURE — 2700000000 HC OXYGEN THERAPY PER DAY

## 2024-01-26 PROCEDURE — 2500000003 HC RX 250 WO HCPCS: Performed by: INTERNAL MEDICINE

## 2024-01-26 PROCEDURE — 99291 CRITICAL CARE FIRST HOUR: CPT | Performed by: INTERNAL MEDICINE

## 2024-01-26 PROCEDURE — 2580000003 HC RX 258: Performed by: NURSE PRACTITIONER

## 2024-01-26 PROCEDURE — 51702 INSERT TEMP BLADDER CATH: CPT

## 2024-01-26 PROCEDURE — 94003 VENT MGMT INPAT SUBQ DAY: CPT

## 2024-01-26 PROCEDURE — 94761 N-INVAS EAR/PLS OXIMETRY MLT: CPT

## 2024-01-26 PROCEDURE — 2580000003 HC RX 258: Performed by: INTERNAL MEDICINE

## 2024-01-26 PROCEDURE — 36620 INSERTION CATHETER ARTERY: CPT

## 2024-01-26 PROCEDURE — 80048 BASIC METABOLIC PNL TOTAL CA: CPT

## 2024-01-26 PROCEDURE — 6370000000 HC RX 637 (ALT 250 FOR IP): Performed by: INTERNAL MEDICINE

## 2024-01-26 RX ORDER — IPRATROPIUM BROMIDE AND ALBUTEROL SULFATE 2.5; .5 MG/3ML; MG/3ML
1 SOLUTION RESPIRATORY (INHALATION)
Status: DISCONTINUED | OUTPATIENT
Start: 2024-01-26 | End: 2024-01-28

## 2024-01-26 RX ORDER — VORICONAZOLE 40 MG/ML
260 POWDER, FOR SUSPENSION ORAL EVERY 12 HOURS SCHEDULED
Status: DISCONTINUED | OUTPATIENT
Start: 2024-01-26 | End: 2024-01-28

## 2024-01-26 RX ADMIN — SULFAMETHOXAZOLE AND TRIMETHOPRIM 323.2 MG: 80; 16 INJECTION, SOLUTION, CONCENTRATE INTRAVENOUS at 08:44

## 2024-01-26 RX ADMIN — MEROPENEM 1000 MG: 1 INJECTION INTRAVENOUS at 17:38

## 2024-01-26 RX ADMIN — WATER 40 MG: 1 INJECTION INTRAMUSCULAR; INTRAVENOUS; SUBCUTANEOUS at 21:21

## 2024-01-26 RX ADMIN — VALACYCLOVIR HYDROCHLORIDE 500 MG: 500 TABLET, FILM COATED ORAL at 08:07

## 2024-01-26 RX ADMIN — WATER 40 MG: 1 INJECTION INTRAMUSCULAR; INTRAVENOUS; SUBCUTANEOUS at 08:06

## 2024-01-26 RX ADMIN — SODIUM CHLORIDE, PRESERVATIVE FREE 10 ML: 5 INJECTION INTRAVENOUS at 21:21

## 2024-01-26 RX ADMIN — APIXABAN 5 MG: 5 TABLET, FILM COATED ORAL at 10:53

## 2024-01-26 RX ADMIN — DIGOXIN 125 MCG: 125 TABLET ORAL at 08:13

## 2024-01-26 RX ADMIN — SERTRALINE HYDROCHLORIDE 150 MG: 100 TABLET ORAL at 08:06

## 2024-01-26 RX ADMIN — IPRATROPIUM BROMIDE AND ALBUTEROL SULFATE 1 DOSE: 2.5; .5 SOLUTION RESPIRATORY (INHALATION) at 04:37

## 2024-01-26 RX ADMIN — DIPHENHYDRAMINE HYDROCHLORIDE 25 MG: 25 TABLET ORAL at 11:37

## 2024-01-26 RX ADMIN — Medication 100 MCG/HR: at 08:02

## 2024-01-26 RX ADMIN — PANTOPRAZOLE SODIUM 40 MG: 40 INJECTION, POWDER, FOR SOLUTION INTRAVENOUS at 08:06

## 2024-01-26 RX ADMIN — SODIUM CHLORIDE 6 MCG/MIN: 9 INJECTION, SOLUTION INTRAVENOUS at 13:58

## 2024-01-26 RX ADMIN — IMMUNE GLOBULIN (HUMAN) 30 G: 10 INJECTION INTRAVENOUS; SUBCUTANEOUS at 13:03

## 2024-01-26 RX ADMIN — IPRATROPIUM BROMIDE AND ALBUTEROL SULFATE 1 DOSE: 2.5; .5 SOLUTION RESPIRATORY (INHALATION) at 08:47

## 2024-01-26 RX ADMIN — IPRATROPIUM BROMIDE AND ALBUTEROL SULFATE 1 DOSE: 2.5; .5 SOLUTION RESPIRATORY (INHALATION) at 12:10

## 2024-01-26 RX ADMIN — MEROPENEM 1000 MG: 1 INJECTION INTRAVENOUS at 03:26

## 2024-01-26 RX ADMIN — SODIUM CHLORIDE, PRESERVATIVE FREE 10 ML: 5 INJECTION INTRAVENOUS at 08:08

## 2024-01-26 RX ADMIN — VORICONAZOLE 390 MG: 10 INJECTION, POWDER, LYOPHILIZED, FOR SOLUTION INTRAVENOUS at 01:28

## 2024-01-26 RX ADMIN — VALACYCLOVIR HYDROCHLORIDE 500 MG: 500 TABLET, FILM COATED ORAL at 21:21

## 2024-01-26 RX ADMIN — APIXABAN 5 MG: 5 TABLET, FILM COATED ORAL at 21:21

## 2024-01-26 RX ADMIN — IPRATROPIUM BROMIDE AND ALBUTEROL SULFATE 1 DOSE: 2.5; .5 SOLUTION RESPIRATORY (INHALATION) at 21:01

## 2024-01-26 RX ADMIN — LEVETIRACETAM 500 MG: 500 TABLET, FILM COATED ORAL at 08:07

## 2024-01-26 RX ADMIN — LEVETIRACETAM 500 MG: 500 TABLET, FILM COATED ORAL at 21:21

## 2024-01-26 RX ADMIN — SULFAMETHOXAZOLE AND TRIMETHOPRIM 323.2 MG: 80; 16 INJECTION, SOLUTION, CONCENTRATE INTRAVENOUS at 15:44

## 2024-01-26 RX ADMIN — MEROPENEM 1000 MG: 1 INJECTION INTRAVENOUS at 10:55

## 2024-01-26 RX ADMIN — VORICONAZOLE 260 MG: 40 POWDER, FOR SUSPENSION ORAL at 21:24

## 2024-01-26 RX ADMIN — ACETAMINOPHEN 650 MG: 325 TABLET ORAL at 11:37

## 2024-01-26 RX ADMIN — PROPOFOL 20 MCG/KG/MIN: 10 INJECTION, EMULSION INTRAVENOUS at 02:34

## 2024-01-26 RX ADMIN — LEVOFLOXACIN 750 MG: 5 INJECTION, SOLUTION INTRAVENOUS at 14:29

## 2024-01-26 RX ADMIN — VORICONAZOLE 260 MG: 40 POWDER, FOR SUSPENSION ORAL at 11:37

## 2024-01-26 ASSESSMENT — PULMONARY FUNCTION TESTS
PIF_VALUE: 24
PIF_VALUE: 21
PIF_VALUE: 21
PIF_VALUE: 19
PIF_VALUE: 14
PIF_VALUE: 20
PIF_VALUE: 20
PIF_VALUE: 13
PIF_VALUE: 22
PIF_VALUE: 22
PIF_VALUE: 18
PIF_VALUE: 20
PIF_VALUE: 21
PIF_VALUE: 21
PIF_VALUE: 12
PIF_VALUE: 19

## 2024-01-26 ASSESSMENT — PAIN SCALES - GENERAL
PAINLEVEL_OUTOF10: 0
PAINLEVEL_OUTOF10: 0

## 2024-01-26 NOTE — PROGRESS NOTES
Vasopressor dosing equivalent score = 12. For VDE >12 trophic TF only (10 mL/hr).    Comprehensive Nutrition Assessment    RECOMMENDATIONS:  PO Diet: Continue NPO  Nutrition Education: No recommendation at this time   3.   Nutrition Support:  Start Peptide-Based  Vital AF 1.2 @ 10 ml/hr via OGT  Water flush of 30 ml every 4 hours.   Daily Labs: Phos, K+ and Mg to monitor for signs of refeeding.     NUTRITION ASSESSMENT:   Nutritional summary & status: Consult for Tube Feedings Order and Management. Pt intubated 1/25 due to acute hypoxic respiratory failure. Sedated on fentanyl and propofol(205 kcal). Hypotensive, on Levo. VDE=12, indicating trophic feedings only. Pt with hx of decreased po intake past several months following BMT AUTO. Severe wt loss of 15% in 1 month. Subcutaneous fat/muscle mass loss noted on observation. Severe malnutrition identified per ASPEN criteria. Pt with po intake at 51-75% and % prior to intubation. TF orders placed for trophic feedings via OG tube.Continue to follow closely.   Admission // PMH: Hypoxia//DLBC w/CNS, Thyroid disease, acute CHF, CAD    MALNUTRITION ASSESSMENT  Context of Malnutrition: Chronic Illness   Malnutrition Status: Severe malnutrition  Findings of the 6 clinical characteristics of malnutrition (Minimum of 2 out of 6 clinical characteristics is required to make the diagnosis of moderate or severe Protein Calorie Malnutrition based on AND/ASPEN Guidelines):  Energy Intake:  75% or less estimated energy requirements for 1 month or longer  Weight Loss:  Greater than 5% over 1 month (15% in 1 month)     Body Fat Loss:  Severe body fat loss Orbital, Buccal region   Muscle Mass Loss:  Severe muscle mass loss Temples (temporalis), Clavicles (pectoralis & deltoids)  Fluid Accumulation:  No significant fluid accumulation     Strength:  Not Performed    NUTRITION DIAGNOSIS   Inadequate oral intake related to impaired respiratory function as evidenced by

## 2024-01-26 NOTE — RT PROTOCOL NOTE
RT Inhaler-Nebulizer Bronchodilator Protocol Note    There is a bronchodilator order in the chart from a provider indicating to follow the RT Bronchodilator Protocol and there is an “Initiate RT Inhaler-Nebulizer Bronchodilator Protocol” order as well (see protocol at bottom of note).    CXR Findings:  XR CHEST PORTABLE    Result Date: 1/25/2024  Lines and tubes as above. Extensive bilateral airspace disease, similar to prior studies. Electronically signed by Srini Orona MD    XR CHEST PORTABLE    Result Date: 1/25/2024  Satisfactory positioning of support devices. No significant change from prior study.    XR CHEST PORTABLE    Result Date: 1/25/2024  Bilateral airspace disease, right greater than left and similar to the prior examination.    XR CHEST PORTABLE    Result Date: 1/24/2024  Worsening of multifocal perihilar opacities with relative sparing of the left pulmonary apex. This raises the possibility of superimposed pulmonary edema in comparison to 1/23/2024. Borderline cardiomegaly with left subclavian dual-lead pacemaker.      The findings from the last RT Protocol Assessment were as follows:   History Pulmonary Disease: None or smoker <15 pack years  Respiratory Pattern: Regular pattern and RR 12-20 bpm  Breath Sounds: Slightly diminished and/or crackles  Cough: Unable to generate effective cough  Indication for Bronchodilator Therapy: Decreased or absent breath sounds  Bronchodilator Assessment Score: 6    Aerosolized bronchodilator medication orders have been revised according to the RT Inhaler-Nebulizer Bronchodilator Protocol below.    Respiratory Therapist to perform RT Therapy Protocol Assessment initially then follow the protocol.  Repeat RT Therapy Protocol Assessment PRN for score 0-3 or on second treatment, BID, and PRN for scores above 3.    No Indications - adjust the frequency to every 6 hours PRN wheezing or bronchospasm, if no treatments needed after 48 hours then discontinue using Per Protocol

## 2024-01-26 NOTE — PROGRESS NOTES
[facility] Progress Note    2024     Zain Lopez    MRN: [id number]    : 1955    Referring MD: Penelope Wilhelm DO  [address]  [facility] [address]  [place] [address]      SUBJECTIVE:    Got intubated yesterday for acute hypoxic respiratory failure. Fi02 came down to 50% down from 80%, Currently on Levo.     ECOG PS: (4) Completely disabled, unable to carry out self-care and confined to bed or chair     KPS: 20% Very sick; hospital admission necessary; active supportive treatment necessary    Isolation: Droplet Plus    Medications    Scheduled Meds:   immune globulin (human)  30 g IntraVENous Once    acetaminophen  650 mg Oral Once    diphenhydrAMINE  25 mg Oral Once    sulfamethoxazole-trimethoprim (BACTRIM) 323.2 mg in dextrose 5 % 500 mL IVPB  5 mg/kg IntraVENous Q8H    pantoprazole  40 mg IntraVENous Daily    voriconazole  4 mg/kg IntraVENous Q12H    lactated ringers bolus  500 mL IntraVENous Once    methylPREDNISolone  40 mg IntraVENous Q12H    ipratropium 0.5 mg-albuterol 2.5 mg  1 Dose Inhalation Q4H RT    meropenem  1,000 mg IntraVENous Q8H    levofloxacin  750 mg IntraVENous Q24H    [Held by provider] apixaban  5 mg Oral BID    digoxin  125 mcg Oral Daily    levETIRAcetam  500 mg Oral BID    [Held by provider] therapeutic multivitamin-minerals  1 tablet Oral Daily    [Held by provider] potassium chloride  20 mEq Oral BID WC    sertraline  150 mg Oral Daily    tamsulosin  0.4 mg Oral Daily    valACYclovir  500 mg Oral BID    sodium chloride flush  5-40 mL IntraVENous 2 times per day    Saline Mouthwash  15 mL Swish & Spit 4x Daily AC & HS     Continuous Infusions:   propofol 20 mcg/kg/min (24 0234)    fentaNYL 100 mcg/hr (24)    norepinephrine 8 mcg/min (24)    sodium chloride Stopped (24 1224)    sodium chloride       PRN Meds:.albuterol, ondansetron, polyethylene glycol, diphenhydrAMINE, sodium chloride flush, sodium chloride, potassium chloride, magnesium  MD Heaven

## 2024-01-26 NOTE — RT PROTOCOL NOTE
RT Inhaler-Nebulizer Bronchodilator Protocol Note    There is a bronchodilator order in the chart from a provider indicating to follow the RT Bronchodilator Protocol and there is an “Initiate RT Inhaler-Nebulizer Bronchodilator Protocol” order as well (see protocol at bottom of note).    CXR Findings:  XR CHEST PORTABLE    Result Date: 1/25/2024  Lines and tubes as above. Extensive bilateral airspace disease, similar to prior studies. Electronically signed by Srini Orona MD    XR CHEST PORTABLE    Result Date: 1/25/2024  Satisfactory positioning of support devices. No significant change from prior study.    XR CHEST PORTABLE    Result Date: 1/25/2024  Bilateral airspace disease, right greater than left and similar to the prior examination.      The findings from the last RT Protocol Assessment were as follows:   History Pulmonary Disease: None or smoker <15 pack years  Respiratory Pattern: Regular pattern and RR 12-20 bpm  Breath Sounds: Slightly diminished and/or crackles  Cough: Strong, spontaneous, non-productive  Indication for Bronchodilator Therapy: None  Bronchodilator Assessment Score: 2  Will convert to BID .    Aerosolized bronchodilator medication orders have been revised according to the RT Inhaler-Nebulizer Bronchodilator Protocol below.    Respiratory Therapist to perform RT Therapy Protocol Assessment initially then follow the protocol.  Repeat RT Therapy Protocol Assessment PRN for score 0-3 or on second treatment, BID, and PRN for scores above 3.    No Indications - adjust the frequency to every 6 hours PRN wheezing or bronchospasm, if no treatments needed after 48 hours then discontinue using Per Protocol order mode.     If indication present, adjust the RT bronchodilator orders based on the Bronchodilator Assessment Score as indicated below.  Use Inhaler orders unless patient has one or more of the following: on home nebulizer, not able to hold breath for 10 seconds, is not alert and oriented,

## 2024-01-26 NOTE — PROGRESS NOTES
Patient has been successfully weaned from Mechanical Ventilation.  Patient extubated and placed on 6 liters/min via nasal cannula.  Post extubation SpO2 is 94% with HR  61 bpm and RR 16 breaths/min.  Patient had mild cough that was non-productive.  Extubation Well tolerated by patient..

## 2024-01-26 NOTE — PROGRESS NOTES
Pulmonary Followup Note    Indication for visit: KENNEDY, acute hypoxemic resp failure, abnl CT Chest    Subjective:    Mehrdad developed acute hypoxemic resp failure 10 mins after returning from PACU. Despite vapotherm, lasix, and solumedrol he continued to have significant work of breathing and hypoxemia and required intubation.    This morning he is down to 50% with O2 sat 93 and passed a SBT. He has normal WOB.  Of note wife tested + for covid yesterday     voriconazole  260 mg Oral 2 times per day    immune globulin (human)  30 g IntraVENous Once    sulfamethoxazole-trimethoprim (BACTRIM) 323.2 mg in dextrose 5 % 500 mL IVPB  5 mg/kg IntraVENous Q8H    pantoprazole  40 mg IntraVENous Daily    lactated ringers bolus  500 mL IntraVENous Once    methylPREDNISolone  40 mg IntraVENous Q12H    ipratropium 0.5 mg-albuterol 2.5 mg  1 Dose Inhalation Q4H RT    meropenem  1,000 mg IntraVENous Q8H    levofloxacin  750 mg IntraVENous Q24H    apixaban  5 mg Oral BID    digoxin  125 mcg Oral Daily    levETIRAcetam  500 mg Oral BID    [Held by provider] therapeutic multivitamin-minerals  1 tablet Oral Daily    sertraline  150 mg Oral Daily    tamsulosin  0.4 mg Oral Daily    valACYclovir  500 mg Oral BID    sodium chloride flush  5-40 mL IntraVENous 2 times per day    Saline Mouthwash  15 mL Swish & Spit 4x Daily AC & HS       Continuous Infusions:   propofol Stopped (24 0945)    fentaNYL 50 mcg/hr (24 0945)    norepinephrine 7 mcg/min (24 1148)    sodium chloride Stopped (24 1224)    sodium chloride       PHYSICAL EXAMINATION:  /64   Pulse 64   Temp 97.2 °F (36.2 °C) (Bladder)   Resp 14   Ht 1.778 m (5' 10\")   Wt 62.4 kg (137 lb 9.1 oz)   SpO2 91%   BMI 19.74 kg/m²     Vent: PRVC Vt 500, RR 16, PEEP 5, FIO2 40%  SBT: RSBI 10, MV 7.8, RR 10 Vt 780  Post SBT AB.35/45/68    Tm 99.1    Gen: Intubated. Wakes up and follows commands.   Eyes: PERRL, EOMI,  Bronch. Lavage Updated: 01/25/24 1829    Pneumonia Panel Molecular --    Pneumonia Pathogens Panel PCR Result: Not Detected  See additional report for complete Pneumonia Pathogens Panel       Radiology Review:  All pertinent images / reports were reviewed as a part of this visit. Ct Chest 1/24/2024 reveals the following:  Comparison study is dated 12/29/2023.     Implanted conduction device is redemonstrated. Generator is located in the left anterior thoracic subcutaneous tissues.     The thyroid gland demonstrates normal size and density. No evidence of axillary mass or adenopathy. Mild fullness of left axilla and associated fat stranding could be related to possible subclavian stenosis.     No evidence of superior mediastinal mass. Subcentimeter mediastinal nodes appear unchanged. No evidence of paratracheal adenopathy.     Pulmonary marychuy demonstrate no specific abnormality.     Thoracic aorta appears normal in diameter. Main pulmonary arteries appear normal in size and configuration.     A few coronary calcifications appear similar to the previous examination. The heart is normal in size and configuration. No evidence of pericardial effusion.     Confluent irregular focus of groundglass attenuation with superimposed patchy airspace foci are identified in the right upper lobe without significant change. Similar findings are noted in the left lower lobe and bilateral lower lobes with a greater   degree of consolidative patchy opacities in the bilateral lower lobes and overall interval worsening in appearance of the right lower lobe in particular when compared to the previous study. No evidence of pulmonary nodule. A small to moderate right   pleural effusion is identified, decreased when compared to the previous study. A small left pleural effusion is identified also decreased when compared to the previous study.     The airways are patent. Mild bilateral peribronchial thickening noted. No evidence of

## 2024-01-26 NOTE — PROGRESS NOTES
Telephone order with read back from Dr Mcintosh to complete a bedside swallow evaluation, patient passed charted in epic. Will continue to monitor.

## 2024-01-27 ENCOUNTER — APPOINTMENT (OUTPATIENT)
Dept: GENERAL RADIOLOGY | Age: 69
End: 2024-01-27
Attending: STUDENT IN AN ORGANIZED HEALTH CARE EDUCATION/TRAINING PROGRAM
Payer: MEDICARE

## 2024-01-27 LAB
(1,3)-BETA-D-GLUCAN (FUNGITELL) INTERPRETATION: NEGATIVE
1,3 BETA GLUCAN SER-MCNC: 33 PG/ML
ANION GAP SERPL CALCULATED.3IONS-SCNC: 12 MMOL/L (ref 3–16)
ASPERGILLUS GALACTO AG: NEGATIVE
BACTERIA BLD CULT: NORMAL
BACTERIA SPEC RESP CULT: NORMAL
BASOPHILS # BLD: 0.1 K/UL (ref 0–0.2)
BASOPHILS NFR BLD: 0.5 %
BILIRUB UR QL STRIP.AUTO: NEGATIVE
BUN SERPL-MCNC: 21 MG/DL (ref 7–20)
CALCIUM SERPL-MCNC: 9.5 MG/DL (ref 8.3–10.6)
CHLORIDE SERPL-SCNC: 104 MMOL/L (ref 99–110)
CLARITY UR: CLEAR
CO2 SERPL-SCNC: 25 MMOL/L (ref 21–32)
COLOR UR: YELLOW
CREAT SERPL-MCNC: 1 MG/DL (ref 0.8–1.3)
DEPRECATED RDW RBC AUTO: 18.1 % (ref 12.4–15.4)
EOSINOPHIL # BLD: 0 K/UL (ref 0–0.6)
EOSINOPHIL NFR BLD: 0 %
FINAL REPORT: NORMAL
GALACTOMANNAN AG SERPL IA-ACNC: 0.17
GALACTOMANNAN AG SERPL IA-ACNC: 0.27
GALACTOMANNAN AG SERPL QL IA: NEGATIVE
GFR SERPLBLD CREATININE-BSD FMLA CKD-EPI: >60 ML/MIN/{1.73_M2}
GLUCOSE SERPL-MCNC: 134 MG/DL (ref 70–99)
GLUCOSE UR STRIP.AUTO-MCNC: NEGATIVE MG/DL
GRAM STN SPEC: NORMAL
HCT VFR BLD AUTO: 26.6 % (ref 40.5–52.5)
HGB BLD-MCNC: 8.5 G/DL (ref 13.5–17.5)
HGB UR QL STRIP.AUTO: NEGATIVE
KETONES UR STRIP.AUTO-MCNC: NEGATIVE MG/DL
LACTATE BLDV-SCNC: 3.2 MMOL/L (ref 0.4–2)
LEUKOCYTE ESTERASE UR QL STRIP.AUTO: NEGATIVE
LYMPHOCYTES # BLD: 1.4 K/UL (ref 1–5.1)
LYMPHOCYTES NFR BLD: 9.2 %
MCH RBC QN AUTO: 29 PG (ref 26–34)
MCHC RBC AUTO-ENTMCNC: 32 G/DL (ref 31–36)
MCV RBC AUTO: 90.6 FL (ref 80–100)
MONOCYTES # BLD: 0.6 K/UL (ref 0–1.3)
MONOCYTES NFR BLD: 3.5 %
NEUTROPHILS # BLD: 13.4 K/UL (ref 1.7–7.7)
NEUTROPHILS NFR BLD: 86.8 %
NITRITE UR QL STRIP.AUTO: NEGATIVE
PH UR STRIP.AUTO: 6 [PH] (ref 5–8)
PHOSPHATE SERPL-MCNC: 2.8 MG/DL (ref 2.5–4.9)
PLATELET # BLD AUTO: 220 K/UL (ref 135–450)
PMV BLD AUTO: 9.8 FL (ref 5–10.5)
POTASSIUM SERPL-SCNC: 4.1 MMOL/L (ref 3.5–5.1)
PRELIMINARY: NORMAL
PROCALCITONIN SERPL IA-MCNC: 4.27 NG/ML (ref 0–0.15)
PROT UR STRIP.AUTO-MCNC: NEGATIVE MG/DL
RBC # BLD AUTO: 2.94 M/UL (ref 4.2–5.9)
SODIUM SERPL-SCNC: 141 MMOL/L (ref 136–145)
SP GR UR STRIP.AUTO: >=1.03 (ref 1–1.03)
UA DIPSTICK W REFLEX MICRO PNL UR: NORMAL
URN SPEC COLLECT METH UR: NORMAL
UROBILINOGEN UR STRIP-ACNC: 0.2 E.U./DL
WBC # BLD AUTO: 15.5 K/UL (ref 4–11)

## 2024-01-27 PROCEDURE — 2500000003 HC RX 250 WO HCPCS: Performed by: NURSE PRACTITIONER

## 2024-01-27 PROCEDURE — 71045 X-RAY EXAM CHEST 1 VIEW: CPT

## 2024-01-27 PROCEDURE — 6370000000 HC RX 637 (ALT 250 FOR IP): Performed by: NURSE PRACTITIONER

## 2024-01-27 PROCEDURE — 6360000002 HC RX W HCPCS: Performed by: NURSE PRACTITIONER

## 2024-01-27 PROCEDURE — 80048 BASIC METABOLIC PNL TOTAL CA: CPT

## 2024-01-27 PROCEDURE — 99291 CRITICAL CARE FIRST HOUR: CPT | Performed by: INTERNAL MEDICINE

## 2024-01-27 PROCEDURE — 85025 COMPLETE CBC W/AUTO DIFF WBC: CPT

## 2024-01-27 PROCEDURE — 6360000002 HC RX W HCPCS: Performed by: INTERNAL MEDICINE

## 2024-01-27 PROCEDURE — 2700000000 HC OXYGEN THERAPY PER DAY

## 2024-01-27 PROCEDURE — 84145 PROCALCITONIN (PCT): CPT

## 2024-01-27 PROCEDURE — 81003 URINALYSIS AUTO W/O SCOPE: CPT

## 2024-01-27 PROCEDURE — 94640 AIRWAY INHALATION TREATMENT: CPT

## 2024-01-27 PROCEDURE — 84100 ASSAY OF PHOSPHORUS: CPT

## 2024-01-27 PROCEDURE — 6370000000 HC RX 637 (ALT 250 FOR IP): Performed by: INTERNAL MEDICINE

## 2024-01-27 PROCEDURE — 87103 BLOOD FUNGUS CULTURE: CPT

## 2024-01-27 PROCEDURE — 2580000003 HC RX 258: Performed by: NURSE PRACTITIONER

## 2024-01-27 PROCEDURE — C9113 INJ PANTOPRAZOLE SODIUM, VIA: HCPCS | Performed by: NURSE PRACTITIONER

## 2024-01-27 PROCEDURE — 2000000000 HC ICU R&B

## 2024-01-27 PROCEDURE — 83605 ASSAY OF LACTIC ACID: CPT

## 2024-01-27 PROCEDURE — 2580000003 HC RX 258: Performed by: INTERNAL MEDICINE

## 2024-01-27 PROCEDURE — 6370000000 HC RX 637 (ALT 250 FOR IP): Performed by: STUDENT IN AN ORGANIZED HEALTH CARE EDUCATION/TRAINING PROGRAM

## 2024-01-27 PROCEDURE — 94761 N-INVAS EAR/PLS OXIMETRY MLT: CPT

## 2024-01-27 PROCEDURE — 87086 URINE CULTURE/COLONY COUNT: CPT

## 2024-01-27 PROCEDURE — 36592 COLLECT BLOOD FROM PICC: CPT

## 2024-01-27 PROCEDURE — 36415 COLL VENOUS BLD VENIPUNCTURE: CPT

## 2024-01-27 PROCEDURE — 87040 BLOOD CULTURE FOR BACTERIA: CPT

## 2024-01-27 RX ORDER — FUROSEMIDE 10 MG/ML
20 INJECTION INTRAMUSCULAR; INTRAVENOUS ONCE
Status: COMPLETED | OUTPATIENT
Start: 2024-01-27 | End: 2024-01-27

## 2024-01-27 RX ORDER — FUROSEMIDE 10 MG/ML
40 INJECTION INTRAMUSCULAR; INTRAVENOUS 3 TIMES DAILY
Status: DISCONTINUED | OUTPATIENT
Start: 2024-01-27 | End: 2024-01-28

## 2024-01-27 RX ORDER — FUROSEMIDE 10 MG/ML
20 INJECTION INTRAMUSCULAR; INTRAVENOUS 3 TIMES DAILY
Status: DISCONTINUED | OUTPATIENT
Start: 2024-01-27 | End: 2024-01-27

## 2024-01-27 RX ADMIN — SULFAMETHOXAZOLE AND TRIMETHOPRIM 323.2 MG: 80; 16 INJECTION, SOLUTION, CONCENTRATE INTRAVENOUS at 07:04

## 2024-01-27 RX ADMIN — VANCOMYCIN HYDROCHLORIDE 750 MG: 10 INJECTION, POWDER, LYOPHILIZED, FOR SOLUTION INTRAVENOUS at 23:11

## 2024-01-27 RX ADMIN — TAMSULOSIN HYDROCHLORIDE 0.4 MG: 0.4 CAPSULE ORAL at 08:20

## 2024-01-27 RX ADMIN — SULFAMETHOXAZOLE AND TRIMETHOPRIM 323.2 MG: 80; 16 INJECTION, SOLUTION, CONCENTRATE INTRAVENOUS at 23:08

## 2024-01-27 RX ADMIN — FUROSEMIDE 40 MG: 10 INJECTION, SOLUTION INTRAMUSCULAR; INTRAVENOUS at 21:08

## 2024-01-27 RX ADMIN — WATER 40 MG: 1 INJECTION INTRAMUSCULAR; INTRAVENOUS; SUBCUTANEOUS at 08:14

## 2024-01-27 RX ADMIN — VALACYCLOVIR HYDROCHLORIDE 500 MG: 500 TABLET, FILM COATED ORAL at 08:20

## 2024-01-27 RX ADMIN — LEVETIRACETAM 500 MG: 500 TABLET, FILM COATED ORAL at 08:20

## 2024-01-27 RX ADMIN — VORICONAZOLE 260 MG: 40 POWDER, FOR SUSPENSION ORAL at 08:21

## 2024-01-27 RX ADMIN — PANTOPRAZOLE SODIUM 40 MG: 40 INJECTION, POWDER, FOR SOLUTION INTRAVENOUS at 08:16

## 2024-01-27 RX ADMIN — MEROPENEM 1000 MG: 1 INJECTION INTRAVENOUS at 18:38

## 2024-01-27 RX ADMIN — VALACYCLOVIR HYDROCHLORIDE 500 MG: 500 TABLET, FILM COATED ORAL at 21:08

## 2024-01-27 RX ADMIN — IPRATROPIUM BROMIDE AND ALBUTEROL SULFATE 1 DOSE: 2.5; .5 SOLUTION RESPIRATORY (INHALATION) at 09:34

## 2024-01-27 RX ADMIN — LEVOFLOXACIN 750 MG: 5 INJECTION, SOLUTION INTRAVENOUS at 13:45

## 2024-01-27 RX ADMIN — SODIUM CHLORIDE, PRESERVATIVE FREE 10 ML: 5 INJECTION INTRAVENOUS at 21:13

## 2024-01-27 RX ADMIN — FUROSEMIDE 40 MG: 10 INJECTION, SOLUTION INTRAMUSCULAR; INTRAVENOUS at 13:45

## 2024-01-27 RX ADMIN — FUROSEMIDE 20 MG: 10 INJECTION, SOLUTION INTRAMUSCULAR; INTRAVENOUS at 08:38

## 2024-01-27 RX ADMIN — MEROPENEM 1000 MG: 1 INJECTION INTRAVENOUS at 08:41

## 2024-01-27 RX ADMIN — LEVETIRACETAM 500 MG: 500 TABLET, FILM COATED ORAL at 21:08

## 2024-01-27 RX ADMIN — IPRATROPIUM BROMIDE AND ALBUTEROL SULFATE 1 DOSE: 2.5; .5 SOLUTION RESPIRATORY (INHALATION) at 20:50

## 2024-01-27 RX ADMIN — VORICONAZOLE 260 MG: 40 POWDER, FOR SUSPENSION ORAL at 21:14

## 2024-01-27 RX ADMIN — DIGOXIN 125 MCG: 125 TABLET ORAL at 08:21

## 2024-01-27 RX ADMIN — SODIUM CHLORIDE 15 ML: 900 IRRIGANT IRRIGATION at 21:13

## 2024-01-27 RX ADMIN — APIXABAN 5 MG: 5 TABLET, FILM COATED ORAL at 21:08

## 2024-01-27 RX ADMIN — SULFAMETHOXAZOLE AND TRIMETHOPRIM 323.2 MG: 80; 16 INJECTION, SOLUTION, CONCENTRATE INTRAVENOUS at 18:14

## 2024-01-27 RX ADMIN — MEROPENEM 1000 MG: 1 INJECTION INTRAVENOUS at 02:04

## 2024-01-27 RX ADMIN — SULFAMETHOXAZOLE AND TRIMETHOPRIM 323.2 MG: 80; 16 INJECTION, SOLUTION, CONCENTRATE INTRAVENOUS at 00:30

## 2024-01-27 RX ADMIN — APIXABAN 5 MG: 5 TABLET, FILM COATED ORAL at 08:20

## 2024-01-27 RX ADMIN — SERTRALINE HYDROCHLORIDE 150 MG: 100 TABLET ORAL at 08:20

## 2024-01-27 RX ADMIN — WATER 40 MG: 1 INJECTION INTRAMUSCULAR; INTRAVENOUS; SUBCUTANEOUS at 21:08

## 2024-01-27 RX ADMIN — VANCOMYCIN HYDROCHLORIDE 1250 MG: 10 INJECTION, POWDER, LYOPHILIZED, FOR SOLUTION INTRAVENOUS at 11:40

## 2024-01-27 RX ADMIN — SODIUM CHLORIDE, PRESERVATIVE FREE 10 ML: 5 INJECTION INTRAVENOUS at 08:16

## 2024-01-27 ASSESSMENT — PAIN SCALES - GENERAL
PAINLEVEL_OUTOF10: 0

## 2024-01-27 NOTE — RT PROTOCOL NOTE
RT Inhaler-Nebulizer Bronchodilator Protocol Note    There is a bronchodilator order in the chart from a provider indicating to follow the RT Bronchodilator Protocol and there is an “Initiate RT Inhaler-Nebulizer Bronchodilator Protocol” order as well (see protocol at bottom of note).    CXR Findings:  XR CHEST PORTABLE    Result Date: 1/25/2024  Lines and tubes as above. Extensive bilateral airspace disease, similar to prior studies. Electronically signed by Srini Orona MD    XR CHEST PORTABLE    Result Date: 1/25/2024  Satisfactory positioning of support devices. No significant change from prior study.    XR CHEST PORTABLE    Result Date: 1/25/2024  Bilateral airspace disease, right greater than left and similar to the prior examination.      The findings from the last RT Protocol Assessment were as follows:   History Pulmonary Disease: None or smoker <15 pack years  Respiratory Pattern: Regular pattern and RR 12-20 bpm  Breath Sounds: Slightly diminished and/or crackles  Cough: Strong, spontaneous, non-productive  Indication for Bronchodilator Therapy: None  Bronchodilator Assessment Score: 2    Aerosolized bronchodilator medication orders have been revised according to the RT Inhaler-Nebulizer Bronchodilator Protocol below.    Respiratory Therapist to perform RT Therapy Protocol Assessment initially then follow the protocol.  Repeat RT Therapy Protocol Assessment PRN for score 0-3 or on second treatment, BID, and PRN for scores above 3.    No Indications - adjust the frequency to every 6 hours PRN wheezing or bronchospasm, if no treatments needed after 48 hours then discontinue using Per Protocol order mode.     If indication present, adjust the RT bronchodilator orders based on the Bronchodilator Assessment Score as indicated below.  Use Inhaler orders unless patient has one or more of the following: on home nebulizer, not able to hold breath for 10 seconds, is not alert and oriented, cannot activate and use MDI

## 2024-01-27 NOTE — RT PROTOCOL NOTE
RT Nebulizer Bronchodilator Protocol Note    There is a bronchodilator order in the chart from a provider indicating to follow the RT Bronchodilator Protocol and there is an “Initiate RT Bronchodilator Protocol” order as well (see protocol at bottom of note).    CXR Findings:  XR CHEST PORTABLE    Result Date: 1/27/2024  No change in bilateral airspace disease right greater than left.    XR CHEST PORTABLE    Result Date: 1/25/2024  Lines and tubes as above. Extensive bilateral airspace disease, similar to prior studies. Electronically signed by Srini Orona MD    XR CHEST PORTABLE    Result Date: 1/25/2024  Satisfactory positioning of support devices. No significant change from prior study.    XR CHEST PORTABLE    Result Date: 1/25/2024  Bilateral airspace disease, right greater than left and similar to the prior examination.      The findings from the last RT Protocol Assessment were as follows:  Smoking: None or smoker <15 pack years  Respiratory Pattern: Dyspnea on exertion or RR 21-25 bpm  Breath Sounds: Clear breath sounds  Cough: Strong, spontaneous, non-productive  Indication for Bronchodilator Therapy: None  Bronchodilator Assessment Score: 2    Aerosolized bronchodilator medication orders have been revised according to the RT Nebulizer Bronchodilator Protocol below.    Respiratory Therapist to perform RT Therapy Protocol Assessment initially then follow the protocol.  Repeat RT Therapy Protocol Assessment PRN for score 0-3 or on second treatment, BID, and PRN for scores above 3.    No Indications - adjust the frequency to every 6 hours PRN wheezing or bronchospasm, if no treatments needed after 48 hours then discontinue using Per Protocol order mode.     If indication present, adjust the RT bronchodilator orders based on the Bronchodilator Assessment Score as indicated below.  If a patient is on this medication at home then do not decrease Frequency below that used at home.    0-3 - enter or revise RT

## 2024-01-27 NOTE — CONSULTS
Clinical Pharmacy Progress Note    Vancomycin - Management by Pharmacy    Consult Date(s): 1/27/24  Consulting Provider(s): BLAKE Augustine    Assessment / Plan  Sepsis - Vancomycin  Concurrent Antimicrobials: meropenem, levofloxacin, Bactrim, ppx: valacyclovir  Day of Vanc Therapy / Ordered Duration: Day 1  Current Dosing Method: Bayesian-Guided AUC Dosing  Therapeutic Goal: -600 mg/L*hr  Current Dose / Plan:   Scr is 1.0 mg/dL (baseline 1-1.1 mg/dL)   UOP adequate at 0.9 mL/kg/hr  Ordered loading dose of vanc 1250 mg   Maintenance dose of vancomycin 750 mg q12h ordered to start later tonight  Predicated AUC = 458 mg/L*hr, Tr = 13.1 mg/L.  Random level ordered for tomorrow in the AM  Will continue to monitor clinical condition and make adjustments to regimen as appropriate.    Thank you for consulting pharmacy,    Felix Saenz, PharmD  PGY1 Pharmacy Resident  Phone: [phone]  Main Pharmacy: [id number]  1/27/2024 11:12 AM      Interval update: Increasing O2 requirements - currently 12 L.     Subjective/Objective:   Zain Lopez is a 68 y.o. 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 He is admitted with multifocal pneumonia and worsening depression.     Pharmacy is consulted to dose vancomycin.    Ht Readings from Last 1 Encounters:   01/23/24 1.778 m (5' 10\")     Wt Readings from Last 1 Encounters:   01/26/24 62.4 kg (137 lb 9.1 oz)     Current & Prior Antimicrobial Regimen(s):  Meropenem (1/24-present)  Levofloxacin (1/24-present)  Bactrim IV (1/25 - current) - treating empirically for PJP  Vancomycin   1250 mg x 1 (1/27)  750 mg q12h (1/27 - current)    Vancomycin Level(s) /

## 2024-01-27 NOTE — PROGRESS NOTES
[facility] Progress Note    2024     Zain Lopez    MRN: [id number]    : 1955    Referring MD: Penelope Wilhelm DO  [address]  [facility] [address]  [place] [address]      SUBJECTIVE:   Remains on high flow O2.  Hypoxia worsening.  Pressors coming down.  Complains of SOB but otherwise no c/o.      ECOG PS: (4) Completely disabled, unable to carry out self-care and confined to bed or chair     KPS: 20% Very sick; hospital admission necessary; active supportive treatment necessary    Isolation: Droplet Plus    Medications    Scheduled Meds:   voriconazole  260 mg Oral 2 times per day    ipratropium 0.5 mg-albuterol 2.5 mg  1 Dose Inhalation BID RT    sulfamethoxazole-trimethoprim (BACTRIM) 323.2 mg in dextrose 5 % 500 mL IVPB  5 mg/kg IntraVENous Q8H    pantoprazole  40 mg IntraVENous Daily    lactated ringers bolus  500 mL IntraVENous Once    methylPREDNISolone  40 mg IntraVENous Q12H    meropenem  1,000 mg IntraVENous Q8H    levofloxacin  750 mg IntraVENous Q24H    apixaban  5 mg Oral BID    digoxin  125 mcg Oral Daily    levETIRAcetam  500 mg Oral BID    [Held by provider] therapeutic multivitamin-minerals  1 tablet Oral Daily    sertraline  150 mg Oral Daily    tamsulosin  0.4 mg Oral Daily    valACYclovir  500 mg Oral BID    sodium chloride flush  5-40 mL IntraVENous 2 times per day    Saline Mouthwash  15 mL Swish & Spit 4x Daily AC & HS     Continuous Infusions:   propofol Stopped (24 0945)    fentaNYL Stopped (24 1158)    norepinephrine 6 mcg/min (24 0432)    sodium chloride Stopped (24 1224)    sodium chloride       PRN Meds:.albuterol, ondansetron, polyethylene glycol, diphenhydrAMINE, sodium chloride flush, sodium chloride, potassium chloride, magnesium sulfate, magnesium hydroxide, Saline Mouthwash, alteplase (CATHFLO) 2 mg in sterile water 2 mL injection    ROS:  As noted above, otherwise remainder of 10-point ROS negative    Physical Exam:     I&O:    Intake/Output  Metabolic / CKDIII: E-lytes & renal fxn stable  - IVFs at KVO  - Replace K+ & Mg per PRN orders     6. GI / Nutrition: H/o constipation, nausea, and GERD  Nutrition:  Intubated   - Start EN via OG tube 1/26- currently at 10 mL/hr  GERD:  - Cont PPI     7. Cardiac: H/o CHB w/ left SC dual chamber pacemaker and HTN  - Echo (9/21/23): LVEF 50%. No regional wall motion abnormalities are seen. Normal diastolic function.  CHB:  - Dual chamber pacemaker in place  Prolonged QTc:  - EKG (10/25/23): QTC: 497 - can subtract 50 d/t having ventricular paced pacemaker in place  New Onset Atrial Flutter:  - Digoxin 500-250 1/8/24; Cont 125mcg daily   HFrEF:  - BNP 1/24/24 5834  - Echo 1/24/24- EF 40-45%, Indeterminate diastolic function  - Patient + 2 L in 24 hours-   Shock: Unclear etiology - possibly cardiogenic?  - Cont levo to maintain MAP >65 - currently on 6mcg/min despite being off sedation    8. : H/o BPH  BPH:    - Cont Flomax 0.4 mg daily      9. MSK: Generalized weakness:  D/t CNS lymphoma and chemotherapy  - Reconsult PT/OT/SLP once off the vent     10. 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 Neuro: Severe memory impairment and visual changes: Improved, but not back to baseline.  This was d/t CNS mass & vasogenic edema.   - Cont Keppra BID  - Referred to speech therapy -- continues to work, having difficulty with reading and will make appt with eye doctor  - Referral to Dr. Joseph Garcia 1/11/24    12. Psych: Depression  - Cont Zoloft 150 mg daily 12/20/23      - DVT Prophylaxis: Eliquis. Hold for plt count < 50k.  Contraindications to pharmacologic prophylaxis: None  Contraindications to mechanical prophylaxis: None    - Disposition: Remains critically ill    Yenifer Augustine, APRN - CNP     Ankit Vogt MD  [facility]  [phone]

## 2024-01-27 NOTE — PROGRESS NOTES
Pulmonary Medicine Follow-up Note    CC: KENNEDY, acute hypoxemic resp failure, abnl CT Chest     SUBJECTIVE / INTERVAL HISTORY:  Interval worsening O2 requirements during the night. Currently on 13LPM (from 6-7LPM yesterda afternoon after extubation). Remains on vasopressor support. I/Os +2L in the past 24h. Lasix 20 mg IV x1 ordered earlier today.   Was able to wean to 10 LPM and he was able to maintain SpO2 >90% however it drops with minimal mobilization.   CXR reviewed at bedside noted improved aeration and more consolidated RLL infiltrate.     ROS:  Denies headache, nausea or chest pain.    24HR INTAKE/OUTPUT:    Intake/Output Summary (Last 24 hours) at 2024 0831  Last data filed at 2024 0705  Gross per 24 hour   Intake 3527.46 ml   Output 1415 ml   Net 2112.46 ml        furosemide  20 mg IntraVENous Once    voriconazole  260 mg Oral 2 times per day    ipratropium 0.5 mg-albuterol 2.5 mg  1 Dose Inhalation BID RT    sulfamethoxazole-trimethoprim (BACTRIM) 323.2 mg in dextrose 5 % 500 mL IVPB  5 mg/kg IntraVENous Q8H    pantoprazole  40 mg IntraVENous Daily    methylPREDNISolone  40 mg IntraVENous Q12H    meropenem  1,000 mg IntraVENous Q8H    levofloxacin  750 mg IntraVENous Q24H    apixaban  5 mg Oral BID    digoxin  125 mcg Oral Daily    levETIRAcetam  500 mg Oral BID    [Held by provider] therapeutic multivitamin-minerals  1 tablet Oral Daily    sertraline  150 mg Oral Daily    tamsulosin  0.4 mg Oral Daily    valACYclovir  500 mg Oral BID    sodium chloride flush  5-40 mL IntraVENous 2 times per day    Saline Mouthwash  15 mL Swish & Spit 4x Daily AC & HS       PHYSICAL EXAMINATION:  /73   Pulse 96   Temp 97.7 °F (36.5 °C) (Bladder)   Resp 18   Ht 1.778 m (5' 10\")   Wt 62.4 kg (137 lb 9.1 oz)   SpO2 95%   BMI 19.74 kg/m²   CURRENT PULSE OXIMETRY:  SpO2: 95 %  24HR PULSE OXIMETRY RANGE:  SpO2  Av.8 %  Min: 85 %  Max: 100 %     Gen: Mild distress. Speaking in full sentences with no  life threatening organ failure.     Nikolay Jose \"Regulo\" Rito Garza MD  Pulmonary and Critical Care Medicine
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
## 2024-01-28 ENCOUNTER — APPOINTMENT (OUTPATIENT)
Dept: GENERAL RADIOLOGY | Age: 69
End: 2024-01-28
Attending: STUDENT IN AN ORGANIZED HEALTH CARE EDUCATION/TRAINING PROGRAM
Payer: MEDICARE

## 2024-01-28 PROBLEM — U07.1 COVID-19: Status: ACTIVE | Noted: 2024-01-28

## 2024-01-28 PROBLEM — D84.9 IMMUNOCOMPROMISED (HCC): Status: ACTIVE | Noted: 2024-01-28

## 2024-01-28 LAB
ALBUMIN SERPL-MCNC: 3.1 G/DL (ref 3.4–5)
ALP SERPL-CCNC: 143 U/L (ref 40–129)
ALT SERPL-CCNC: 34 U/L (ref 10–40)
ANION GAP SERPL CALCULATED.3IONS-SCNC: 11 MMOL/L (ref 3–16)
ANION GAP SERPL CALCULATED.3IONS-SCNC: 22 MMOL/L (ref 3–16)
ANISOCYTOSIS BLD QL SMEAR: ABNORMAL
AST SERPL-CCNC: 73 U/L (ref 15–37)
B DERMAT AB SER-ACNC: 0.2 IV
BACTERIA UR CULT: NORMAL
BASE EXCESS BLDA CALC-SCNC: -1 MMOL/L (ref -3–3)
BASOPHILS # BLD: 0 K/UL (ref 0–0.2)
BASOPHILS NFR BLD: 0 %
BILIRUB DIRECT SERPL-MCNC: <0.2 MG/DL (ref 0–0.3)
BILIRUB INDIRECT SERPL-MCNC: ABNORMAL MG/DL (ref 0–1)
BILIRUB SERPL-MCNC: <0.2 MG/DL (ref 0–1)
BUN SERPL-MCNC: 34 MG/DL (ref 7–20)
BUN SERPL-MCNC: 36 MG/DL (ref 7–20)
CALCIUM SERPL-MCNC: 9.2 MG/DL (ref 8.3–10.6)
CALCIUM SERPL-MCNC: 9.4 MG/DL (ref 8.3–10.6)
CHLORIDE SERPL-SCNC: 101 MMOL/L (ref 99–110)
CHLORIDE SERPL-SCNC: 96 MMOL/L (ref 99–110)
CO2 BLDA-SCNC: 25 MMOL/L
CO2 SERPL-SCNC: 19 MMOL/L (ref 21–32)
CO2 SERPL-SCNC: 28 MMOL/L (ref 21–32)
CREAT SERPL-MCNC: 1.3 MG/DL (ref 0.8–1.3)
CREAT SERPL-MCNC: 1.4 MG/DL (ref 0.8–1.3)
DEPRECATED RDW RBC AUTO: 19.2 % (ref 12.4–15.4)
EOSINOPHIL # BLD: 0 K/UL (ref 0–0.6)
EOSINOPHIL NFR BLD: 0 %
GFR SERPLBLD CREATININE-BSD FMLA CKD-EPI: 54 ML/MIN/{1.73_M2}
GFR SERPLBLD CREATININE-BSD FMLA CKD-EPI: 59 ML/MIN/{1.73_M2}
GLUCOSE BLD-MCNC: 123 MG/DL (ref 70–99)
GLUCOSE BLD-MCNC: 188 MG/DL (ref 70–99)
GLUCOSE BLD-MCNC: 207 MG/DL (ref 70–99)
GLUCOSE SERPL-MCNC: 121 MG/DL (ref 70–99)
GLUCOSE SERPL-MCNC: 240 MG/DL (ref 70–99)
H CAPSUL AG UR IA-ACNC: NOT DETECTED NG/ML
H CAPSUL AG UR QL IA: NOT DETECTED
HCO3 BLDA-SCNC: 23.9 MMOL/L (ref 21–29)
HCT VFR BLD AUTO: 32.5 % (ref 40.5–52.5)
HGB BLD-MCNC: 9.8 G/DL (ref 13.5–17.5)
INR PPP: 1.54 (ref 0.84–1.16)
LYMPHOCYTES # BLD: 1.2 K/UL (ref 1–5.1)
LYMPHOCYTES NFR BLD: 5 %
MAGNESIUM SERPL-MCNC: 2.3 MG/DL (ref 1.8–2.4)
MCH RBC QN AUTO: 27.9 PG (ref 26–34)
MCHC RBC AUTO-ENTMCNC: 30.1 G/DL (ref 31–36)
MCV RBC AUTO: 92.7 FL (ref 80–100)
METAMYELOCYTES NFR BLD MANUAL: 1 %
MONOCYTES # BLD: 0 K/UL (ref 0–1.3)
MONOCYTES NFR BLD: 0 %
NEUTROPHILS # BLD: 18.8 K/UL (ref 1.7–7.7)
NEUTROPHILS NFR BLD: 91 %
NEUTS BAND NFR BLD MANUAL: 2 % (ref 0–7)
NEUTS HYPERSEG BLD QL SMEAR: PRESENT
NRBC BLD-RTO: 1 /100 WBC
OVALOCYTES BLD QL SMEAR: ABNORMAL
PATH INTERP BLD-IMP: NO
PCO2 BLDA: 36.6 MM HG (ref 35–45)
PERFORMED ON: ABNORMAL
PH BLDA: 7.42 [PH] (ref 7.35–7.45)
PHOSPHATE SERPL-MCNC: 4.3 MG/DL (ref 2.5–4.9)
PLATELET # BLD AUTO: 244 K/UL (ref 135–450)
PMV BLD AUTO: 10.4 FL (ref 5–10.5)
PO2 BLDA: 59.1 MM HG (ref 75–108)
POC SAMPLE TYPE: ABNORMAL
POTASSIUM SERPL-SCNC: 3.6 MMOL/L (ref 3.5–5.1)
POTASSIUM SERPL-SCNC: 3.9 MMOL/L (ref 3.5–5.1)
PROCALCITONIN SERPL IA-MCNC: 3.41 NG/ML (ref 0–0.15)
PROT SERPL-MCNC: 5.9 G/DL (ref 6.4–8.2)
PROTHROMBIN TIME: 18.5 SEC (ref 11.5–14.8)
RBC # BLD AUTO: 3.5 M/UL (ref 4.2–5.9)
SAO2 % BLDA: 91 % (ref 93–100)
SARS-COV-2 RDRP RESP QL NAA+PROBE: DETECTED
SLIDE REVIEW: ABNORMAL
SODIUM SERPL-SCNC: 137 MMOL/L (ref 136–145)
SODIUM SERPL-SCNC: 140 MMOL/L (ref 136–145)
VANCOMYCIN SERPL-MCNC: 18.7 UG/ML
VARIANT LYMPHS NFR BLD MANUAL: 1 % (ref 0–6)
WBC # BLD AUTO: 20 K/UL (ref 4–11)

## 2024-01-28 PROCEDURE — 6360000002 HC RX W HCPCS: Performed by: NURSE PRACTITIONER

## 2024-01-28 PROCEDURE — 84100 ASSAY OF PHOSPHORUS: CPT

## 2024-01-28 PROCEDURE — 6360000002 HC RX W HCPCS: Performed by: INTERNAL MEDICINE

## 2024-01-28 PROCEDURE — 99291 CRITICAL CARE FIRST HOUR: CPT | Performed by: INTERNAL MEDICINE

## 2024-01-28 PROCEDURE — 6370000000 HC RX 637 (ALT 250 FOR IP): Performed by: NURSE PRACTITIONER

## 2024-01-28 PROCEDURE — 2500000003 HC RX 250 WO HCPCS: Performed by: INTERNAL MEDICINE

## 2024-01-28 PROCEDURE — 36592 COLLECT BLOOD FROM PICC: CPT

## 2024-01-28 PROCEDURE — 0DH67UZ INSERTION OF FEEDING DEVICE INTO STOMACH, VIA NATURAL OR ARTIFICIAL OPENING: ICD-10-PCS | Performed by: RADIOLOGY

## 2024-01-28 PROCEDURE — 86698 HISTOPLASMA ANTIBODY: CPT

## 2024-01-28 PROCEDURE — 2580000003 HC RX 258: Performed by: INTERNAL MEDICINE

## 2024-01-28 PROCEDURE — 74018 RADEX ABDOMEN 1 VIEW: CPT

## 2024-01-28 PROCEDURE — 71045 X-RAY EXAM CHEST 1 VIEW: CPT

## 2024-01-28 PROCEDURE — 94761 N-INVAS EAR/PLS OXIMETRY MLT: CPT

## 2024-01-28 PROCEDURE — 2500000003 HC RX 250 WO HCPCS: Performed by: NURSE PRACTITIONER

## 2024-01-28 PROCEDURE — 2580000003 HC RX 258: Performed by: NURSE PRACTITIONER

## 2024-01-28 PROCEDURE — 36415 COLL VENOUS BLD VENIPUNCTURE: CPT

## 2024-01-28 PROCEDURE — 31500 INSERT EMERGENCY AIRWAY: CPT

## 2024-01-28 PROCEDURE — 80076 HEPATIC FUNCTION PANEL: CPT

## 2024-01-28 PROCEDURE — 85610 PROTHROMBIN TIME: CPT

## 2024-01-28 PROCEDURE — 2500000003 HC RX 250 WO HCPCS

## 2024-01-28 PROCEDURE — 2000000000 HC ICU R&B

## 2024-01-28 PROCEDURE — 80048 BASIC METABOLIC PNL TOTAL CA: CPT

## 2024-01-28 PROCEDURE — 93005 ELECTROCARDIOGRAM TRACING: CPT

## 2024-01-28 PROCEDURE — 85025 COMPLETE CBC W/AUTO DIFF WBC: CPT

## 2024-01-28 PROCEDURE — 94640 AIRWAY INHALATION TREATMENT: CPT

## 2024-01-28 PROCEDURE — 86612 BLASTOMYCES ANTIBODY: CPT

## 2024-01-28 PROCEDURE — 6370000000 HC RX 637 (ALT 250 FOR IP): Performed by: INTERNAL MEDICINE

## 2024-01-28 PROCEDURE — 87635 SARS-COV-2 COVID-19 AMP PRB: CPT

## 2024-01-28 PROCEDURE — 6370000000 HC RX 637 (ALT 250 FOR IP): Performed by: STUDENT IN AN ORGANIZED HEALTH CARE EDUCATION/TRAINING PROGRAM

## 2024-01-28 PROCEDURE — 84145 PROCALCITONIN (PCT): CPT

## 2024-01-28 PROCEDURE — 94003 VENT MGMT INPAT SUBQ DAY: CPT

## 2024-01-28 PROCEDURE — 92950 HEART/LUNG RESUSCITATION CPR: CPT

## 2024-01-28 PROCEDURE — 99223 1ST HOSP IP/OBS HIGH 75: CPT | Performed by: INTERNAL MEDICINE

## 2024-01-28 PROCEDURE — 83735 ASSAY OF MAGNESIUM: CPT

## 2024-01-28 PROCEDURE — 87385 HISTOPLASMA CAPSUL AG IA: CPT

## 2024-01-28 PROCEDURE — XW033E5 INTRODUCTION OF REMDESIVIR ANTI-INFECTIVE INTO PERIPHERAL VEIN, PERCUTANEOUS APPROACH, NEW TECHNOLOGY GROUP 5: ICD-10-PCS | Performed by: INTERNAL MEDICINE

## 2024-01-28 PROCEDURE — 82803 BLOOD GASES ANY COMBINATION: CPT

## 2024-01-28 PROCEDURE — 80202 ASSAY OF VANCOMYCIN: CPT

## 2024-01-28 PROCEDURE — C9113 INJ PANTOPRAZOLE SODIUM, VIA: HCPCS | Performed by: NURSE PRACTITIONER

## 2024-01-28 PROCEDURE — 2700000000 HC OXYGEN THERAPY PER DAY

## 2024-01-28 RX ORDER — METOPROLOL TARTRATE 1 MG/ML
2.5 INJECTION, SOLUTION INTRAVENOUS EVERY 8 HOURS
Status: DISCONTINUED | OUTPATIENT
Start: 2024-01-28 | End: 2024-02-08

## 2024-01-28 RX ORDER — DEXTROSE MONOHYDRATE 100 MG/ML
INJECTION, SOLUTION INTRAVENOUS CONTINUOUS PRN
Status: DISCONTINUED | OUTPATIENT
Start: 2024-01-28 | End: 2024-02-09

## 2024-01-28 RX ORDER — POTASSIUM CHLORIDE 7.45 MG/ML
10 INJECTION INTRAVENOUS ONCE
Status: COMPLETED | OUTPATIENT
Start: 2024-01-28 | End: 2024-01-28

## 2024-01-28 RX ORDER — MAGNESIUM SULFATE IN WATER 40 MG/ML
2000 INJECTION, SOLUTION INTRAVENOUS PRN
Status: DISCONTINUED | OUTPATIENT
Start: 2024-01-28 | End: 2024-02-08 | Stop reason: SDUPTHER

## 2024-01-28 RX ORDER — 0.9 % SODIUM CHLORIDE 0.9 %
30 INTRAVENOUS SOLUTION INTRAVENOUS PRN
Status: DISCONTINUED | OUTPATIENT
Start: 2024-01-28 | End: 2024-02-09

## 2024-01-28 RX ORDER — ETOMIDATE 2 MG/ML
INJECTION INTRAVENOUS
Status: COMPLETED
Start: 2024-01-28 | End: 2024-01-28

## 2024-01-28 RX ORDER — GLUCAGON 1 MG/ML
1 KIT INJECTION PRN
Status: DISCONTINUED | OUTPATIENT
Start: 2024-01-28 | End: 2024-02-08 | Stop reason: SDUPTHER

## 2024-01-28 RX ORDER — INSULIN LISPRO 100 [IU]/ML
0-4 INJECTION, SOLUTION INTRAVENOUS; SUBCUTANEOUS EVERY 6 HOURS
Status: DISCONTINUED | OUTPATIENT
Start: 2024-01-28 | End: 2024-02-05

## 2024-01-28 RX ORDER — IPRATROPIUM BROMIDE AND ALBUTEROL SULFATE 2.5; .5 MG/3ML; MG/3ML
1 SOLUTION RESPIRATORY (INHALATION)
Status: DISCONTINUED | OUTPATIENT
Start: 2024-01-28 | End: 2024-02-05

## 2024-01-28 RX ADMIN — MEROPENEM 1000 MG: 1 INJECTION INTRAVENOUS at 17:08

## 2024-01-28 RX ADMIN — ETOMIDATE 20 MG: 20 INJECTION, SOLUTION INTRAVENOUS at 02:01

## 2024-01-28 RX ADMIN — Medication 50 MCG/HR: at 02:37

## 2024-01-28 RX ADMIN — DIGOXIN 125 MCG: 125 TABLET ORAL at 09:34

## 2024-01-28 RX ADMIN — MEROPENEM 1000 MG: 1 INJECTION INTRAVENOUS at 08:24

## 2024-01-28 RX ADMIN — REMDESIVIR 200 MG: 100 INJECTION, POWDER, LYOPHILIZED, FOR SOLUTION INTRAVENOUS at 20:15

## 2024-01-28 RX ADMIN — METOPROLOL TARTRATE 2.5 MG: 1 INJECTION, SOLUTION INTRAVENOUS at 20:20

## 2024-01-28 RX ADMIN — FUROSEMIDE 40 MG: 10 INJECTION, SOLUTION INTRAMUSCULAR; INTRAVENOUS at 08:18

## 2024-01-28 RX ADMIN — PROPOFOL 20 MCG/KG/MIN: 10 INJECTION, EMULSION INTRAVENOUS at 02:09

## 2024-01-28 RX ADMIN — PROPOFOL 45 MCG/KG/MIN: 10 INJECTION, EMULSION INTRAVENOUS at 06:54

## 2024-01-28 RX ADMIN — IPRATROPIUM BROMIDE AND ALBUTEROL SULFATE 1 DOSE: 2.5; .5 SOLUTION RESPIRATORY (INHALATION) at 09:08

## 2024-01-28 RX ADMIN — IPRATROPIUM BROMIDE AND ALBUTEROL SULFATE 1 DOSE: 2.5; .5 SOLUTION RESPIRATORY (INHALATION) at 19:55

## 2024-01-28 RX ADMIN — SODIUM CHLORIDE, PRESERVATIVE FREE 10 ML: 5 INJECTION INTRAVENOUS at 08:10

## 2024-01-28 RX ADMIN — IPRATROPIUM BROMIDE AND ALBUTEROL SULFATE 1 DOSE: 2.5; .5 SOLUTION RESPIRATORY (INHALATION) at 15:56

## 2024-01-28 RX ADMIN — METOPROLOL TARTRATE 2.5 MG: 1 INJECTION, SOLUTION INTRAVENOUS at 13:09

## 2024-01-28 RX ADMIN — VALACYCLOVIR HYDROCHLORIDE 500 MG: 500 TABLET, FILM COATED ORAL at 09:32

## 2024-01-28 RX ADMIN — WATER 40 MG: 1 INJECTION INTRAMUSCULAR; INTRAVENOUS; SUBCUTANEOUS at 21:33

## 2024-01-28 RX ADMIN — SODIUM CHLORIDE, PRESERVATIVE FREE 10 ML: 5 INJECTION INTRAVENOUS at 20:23

## 2024-01-28 RX ADMIN — SULFAMETHOXAZOLE AND TRIMETHOPRIM 323.2 MG: 80; 16 INJECTION, SOLUTION, CONCENTRATE INTRAVENOUS at 16:08

## 2024-01-28 RX ADMIN — SODIUM CHLORIDE 6 MCG/MIN: 9 INJECTION, SOLUTION INTRAVENOUS at 19:28

## 2024-01-28 RX ADMIN — VALACYCLOVIR HYDROCHLORIDE 500 MG: 500 TABLET, FILM COATED ORAL at 20:24

## 2024-01-28 RX ADMIN — SULFAMETHOXAZOLE AND TRIMETHOPRIM 323.2 MG: 80; 16 INJECTION, SOLUTION, CONCENTRATE INTRAVENOUS at 06:47

## 2024-01-28 RX ADMIN — POTASSIUM CHLORIDE 10 MEQ: 10 INJECTION, SOLUTION INTRAVENOUS at 09:05

## 2024-01-28 RX ADMIN — MEROPENEM 1000 MG: 1 INJECTION INTRAVENOUS at 01:34

## 2024-01-28 RX ADMIN — LEVETIRACETAM 500 MG: 500 TABLET, FILM COATED ORAL at 09:32

## 2024-01-28 RX ADMIN — PROPOFOL 40 MCG/KG/MIN: 10 INJECTION, EMULSION INTRAVENOUS at 13:05

## 2024-01-28 RX ADMIN — INSULIN LISPRO 1 UNITS: 100 INJECTION, SOLUTION INTRAVENOUS; SUBCUTANEOUS at 08:39

## 2024-01-28 RX ADMIN — APIXABAN 5 MG: 5 TABLET, FILM COATED ORAL at 20:24

## 2024-01-28 RX ADMIN — WATER 40 MG: 1 INJECTION INTRAMUSCULAR; INTRAVENOUS; SUBCUTANEOUS at 08:10

## 2024-01-28 RX ADMIN — PROPOFOL 35 MCG/KG/MIN: 10 INJECTION, EMULSION INTRAVENOUS at 21:22

## 2024-01-28 RX ADMIN — LEVETIRACETAM 500 MG: 500 TABLET, FILM COATED ORAL at 20:24

## 2024-01-28 RX ADMIN — APIXABAN 5 MG: 5 TABLET, FILM COATED ORAL at 09:32

## 2024-01-28 RX ADMIN — PANTOPRAZOLE SODIUM 40 MG: 40 INJECTION, POWDER, FOR SOLUTION INTRAVENOUS at 08:27

## 2024-01-28 RX ADMIN — ALBUTEROL SULFATE 2.5 MG: 2.5 SOLUTION RESPIRATORY (INHALATION) at 12:47

## 2024-01-28 RX ADMIN — VANCOMYCIN HYDROCHLORIDE 750 MG: 10 INJECTION, POWDER, LYOPHILIZED, FOR SOLUTION INTRAVENOUS at 11:08

## 2024-01-28 RX ADMIN — SULFAMETHOXAZOLE AND TRIMETHOPRIM 323.2 MG: 80; 16 INJECTION, SOLUTION, CONCENTRATE INTRAVENOUS at 23:19

## 2024-01-28 ASSESSMENT — PULMONARY FUNCTION TESTS
PIF_VALUE: 23
PIF_VALUE: 15
PIF_VALUE: 18
PIF_VALUE: 15
PIF_VALUE: 33
PIF_VALUE: 18
PIF_VALUE: 11
PIF_VALUE: 29
PIF_VALUE: 26
PIF_VALUE: 12
PIF_VALUE: 19
PIF_VALUE: 15
PIF_VALUE: 19
PIF_VALUE: 11
PIF_VALUE: 18
PIF_VALUE: 11
PIF_VALUE: 18
PIF_VALUE: 18
PIF_VALUE: 15
PIF_VALUE: 12
PIF_VALUE: 28
PIF_VALUE: 12
PIF_VALUE: 27
PIF_VALUE: 23
PIF_VALUE: 11
PIF_VALUE: 34
PIF_VALUE: 21
PIF_VALUE: 11
PIF_VALUE: 11
PIF_VALUE: 18
PIF_VALUE: 11
PIF_VALUE: 12
PIF_VALUE: 15
PIF_VALUE: 18
PIF_VALUE: 11
PIF_VALUE: 14
PIF_VALUE: 20
PIF_VALUE: 11
PIF_VALUE: 24
PIF_VALUE: 24
PIF_VALUE: 20
PIF_VALUE: 11
PIF_VALUE: 27
PIF_VALUE: 22
PIF_VALUE: 18
PIF_VALUE: 11
PIF_VALUE: 18
PIF_VALUE: 11
PIF_VALUE: 26
PIF_VALUE: 11
PIF_VALUE: 20
PIF_VALUE: 10
PIF_VALUE: 22
PIF_VALUE: 22
PIF_VALUE: 11
PIF_VALUE: 30
PIF_VALUE: 18
PIF_VALUE: 11
PIF_VALUE: 19
PIF_VALUE: 21
PIF_VALUE: 11
PIF_VALUE: 28
PIF_VALUE: 24
PIF_VALUE: 23
PIF_VALUE: 26
PIF_VALUE: 31
PIF_VALUE: 18
PIF_VALUE: 11
PIF_VALUE: 24
PIF_VALUE: 18
PIF_VALUE: 28
PIF_VALUE: 19
PIF_VALUE: 11
PIF_VALUE: 26
PIF_VALUE: 24
PIF_VALUE: 18
PIF_VALUE: 15
PIF_VALUE: 18
PIF_VALUE: 15
PIF_VALUE: 15
PIF_VALUE: 12
PIF_VALUE: 21
PIF_VALUE: 34
PIF_VALUE: 11
PIF_VALUE: 21
PIF_VALUE: 31
PIF_VALUE: 18
PIF_VALUE: 12
PIF_VALUE: 11
PIF_VALUE: 11
PIF_VALUE: 34
PIF_VALUE: 11
PIF_VALUE: 24
PIF_VALUE: 11
PIF_VALUE: 18

## 2024-01-28 ASSESSMENT — PAIN SCALES - GENERAL
PAINLEVEL_OUTOF10: 0

## 2024-01-28 NOTE — PROGRESS NOTES
Pt intubated. Size 4.0 santos larygoscope used to visualize vocal chords. Size 8.0mm ET tube placed through the vocal chords to 24 cm at the teeth. ET tube cuff inflated to MOV. Positive color change on the etco2 detector and waveform capnography of 24 mmHg with good waveform noted. Bilateral breath sounds heard. X-ray called to confirm placement. Pt placed on mechanical ventilator with ordered settings. No visible trauma noted. Pt resting comfortably.

## 2024-01-28 NOTE — PROGRESS NOTES
Spoke with pt's wife, updated. Also, wife would really like a call from the MD. Will also pass this along to oncoming day shift nurse.     0017 - Occasionally, pt wakes with some anxiety. Desats with any activity (gown change this time) requiring NRB and/or NRB blowby along with 15LHFNC. Recovers fairly well when relaxed and sleeping.     0151 - CODE BLUE: pt went into v-fib. Called code and immediately began CPR and bagging patient. Pt shocked x2 (200J and then 300J), epi given x2, rapid sequence intubation initiated with 20mg of etomidate. ROSC achieved at 0200. Pt's wife updated by ICU resident via phone call. Upon review of the monitor EKG, initial rhythm was torsades for a very short time then going into V-Fib by the time compressions were initiated. Sedation titrated until optimal ventilator tolerance achieved (pt was fighting the vent RR in the 30s sats in the mid 80s). Pt restrained for safety after orders received by ICU MD at the bedside.     0440 - ICU residents called for cardiac electrolyte replacement orders.     0800 - Wife called for update. Dr. Vogt also followed up with a call to the wife for updates as requested by the wife.     0825 - Son at the bedside updated by Dr. Vogt.     0828 - Cardiology paged re: code

## 2024-01-28 NOTE — SIGNIFICANT EVENT
Rapid response followed immediately by CODE BLUE called 0152, compressions started immediately. Initial rhythm on telemetry Vfib, pads being attached, compressions continued epi given 0155. 0157 pulse check, still Vfib, shock given 0157, compressions resumed, pulse check 0159, epi given, now V tach, ROSC 0200, etomidate 0201 and successfully intubated subsequently, propofol and fentanyl started. Patient remains tachycardic but with strong femoral pulses. CXR done to verify placement, to be advanced 2 cm, messaged respiratory regarding this and ordered new CXR for verification. Will continue to follow clinical course.  Rocio Craft MD, PGY-1  1/28/2024  2:30 AM

## 2024-01-28 NOTE — PROGRESS NOTES
Pulmonary Medicine Follow-up Note    CC: KENNEDY, acute hypoxemic resp failure, abnl CT Chest     SUBJECTIVE / INTERVAL HISTORY:  Patient went into VT/VF arrest, ROSC achieved within 10 minutes of ACLS being initiated, on vasopressors currently sedated.  Serum creatinine and BUN elevated and patient is in metabolic acidosis, leukocytosis to go jump as well.  Urine output continues to be appropriate while on diuretic    ROS:  Denies headache, nausea or chest pain.    24HR INTAKE/OUTPUT:    Intake/Output Summary (Last 24 hours) at 2024 0846  Last data filed at 2024 0800  Gross per 24 hour   Intake 2578.01 ml   Output 4175 ml   Net -1596.99 ml        insulin lispro  0-4 Units SubCUTAneous Q6H    potassium chloride  10 mEq IntraVENous Once    vancomycin  750 mg IntraVENous Q12H    furosemide  40 mg IntraVENous TID    ipratropium 0.5 mg-albuterol 2.5 mg  1 Dose Inhalation BID RT    sulfamethoxazole-trimethoprim (BACTRIM) 323.2 mg in dextrose 5 % 500 mL IVPB  5 mg/kg IntraVENous Q8H    pantoprazole  40 mg IntraVENous Daily    methylPREDNISolone  40 mg IntraVENous Q12H    meropenem  1,000 mg IntraVENous Q8H    levofloxacin  750 mg IntraVENous Q24H    apixaban  5 mg Oral BID    digoxin  125 mcg Oral Daily    levETIRAcetam  500 mg Oral BID    [Held by provider] therapeutic multivitamin-minerals  1 tablet Oral Daily    sertraline  150 mg Oral Daily    tamsulosin  0.4 mg Oral Daily    valACYclovir  500 mg Oral BID    sodium chloride flush  5-40 mL IntraVENous 2 times per day    Saline Mouthwash  15 mL Swish & Spit 4x Daily AC & HS       PHYSICAL EXAMINATION:  /65   Pulse 76   Temp (!) 95.2 °F (35.1 °C) (Core)   Resp 15   Ht 1.778 m (5' 10\")   Wt 62.4 kg (137 lb 9.1 oz)   SpO2 99%   BMI 19.74 kg/m²   CURRENT PULSE OXIMETRY:  SpO2: 99 %  24HR PULSE OXIMETRY RANGE:  SpO2  Av %  Min: 74 %  Max: 99 %     Ventilator settings  - Vent Mode: AC/PRVC  - Ventilator Day(s): 0  - Vt (Set, mL): 450 mL  - PEEP/CPAP    Satisfactory positioning of support devices. No significant change from prior   study.      XR CHEST PORTABLE   Final Result   Bilateral airspace disease, right greater than left and similar to the prior   examination.      CT CHEST WO CONTRAST   Final Result      1. Persistent bilateral groundglass densities and parenchymal opacities as described with interval worsening in appearance in the right lower lobe.   2. Right greater than left bilateral pleural effusions demonstrate interval decrease in size when compared to previous study.   3. Subtle fullness and mild edematous appearance of the left axilla could relate to stenosis from conduction leads.            Electronically signed by MD Brendan Manzano      XR CHEST PORTABLE   Final Result      Worsening of multifocal perihilar opacities with relative sparing of the left   pulmonary apex. This raises the possibility of superimposed pulmonary edema in   comparison to 1/23/2024.      Borderline cardiomegaly with left subclavian dual-lead pacemaker.      XR CHEST PORTABLE    (Results Pending)   XR ABDOMEN FOR NG/OG/NE TUBE PLACEMENT    (Results Pending)        ASSESSMENT:  Acute hypoxemic respiratory failure - required intubation after Bronch 1/25, extubated 1/26  Multifocal airspace disease -some areas better some area is unchanged some areas worse compared to CT chest December 2023  S/p VT/VF arrest 1/28, ROSC within 10 minutes  Bilateral pleural effusions -significantly less than CT chest December 2023  DLBC NHL w/ CNS relapse: Patient currently in [place]   HFrEF - EF 40%.  DVT right IJ, brachial, axillary, cephalic veins    PLAN:  Ventilator settings were reviewed, PRVC/450/8/100%, continue lung protective strategies.   Provide O2 supplementation to keep SpO2 between 88-92% if needed.    Titrate sedation to maintain RASS -2/-3.  Careful monitoring of fentanyl dose while on voriconazole.  Ppx tx per Oncology   Continue broad spectrum antibiotics -

## 2024-01-28 NOTE — PROGRESS NOTES
intern responded to initial page. Remained on site until pt's son arrived. Offered pt's son spiritual resources available to him and his family. Provided blessing. Will continue to follow.    Marni Reilly    intern     01/28/24 0828   Encounter Summary   Encounter Overview/Reason  Spiritual/Emotional Needs   Service Provided For: Patient and family together   Referral/Consult From: Nurse   Support System Family members   Last Encounter  01/28/24   Complexity of Encounter Low   Begin Time 0820   End Time  0829   Total Time Calculated 9 min   Crisis   Type Code Blue   Spiritual/Emotional needs   Type   (pt coded overnight.  intern checked in with pt son and said a prayer for pt.)   Rituals, Rites and Sacraments   Type Blessings   Assessment/Intervention/Outcome   Assessment Anxious;Coping  (pt unresponsive. note refers to pt son -sima)   Intervention Active listening;Empowerment;Explored Coping Skills/Resources;Sustaining Presence/Ministry of presence   Outcome Coping;Engaged in conversation

## 2024-01-28 NOTE — CONSULTS
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  S/p autologous SCT 11/3/23  Post-transplant course complicated by CDI and bacteremia (micrococcus, possible contaminant).    Admission 12/29/23 with pneumonia, respiratory failure.  Treated both for volume overload and empirically with abx, with some clinical response.  DC home 1/9/24.      Admitted 1/23/24 with worsening SOB, hypoxemia.  Imaging with persistent multifocal opacities and suspicion of pna / OI.      Admitted for evaluation  S/p bronchoscopy 1/25/24  All studies negative to date, see below for details  Was given IVIG on 1/26/24.    He has been treated empirically with levofloxacin, meropenem, Bactrim, vancomycin, valacyclovir, voriconazole.  Also solumedrol.     Overnight, developed torsade and then VT arrest.  Levofloxacin and vori were stopped. ID is consulted for e/m.  Patient is intubated and unable to contribute to the history.     Wife tested positive for COVID on 1/25/24.  Symptom onset ~1/24/24.  Today patient also tested positive for COVID by NAAT    Patient is intubated, sedated.   Son at bedside.                  Patient was born in [place] and moved to [place] around age 1 or 1.  Lived is [facility] otherwise   No other notable exposures/travel        Current abx:  Meropenem 1g q8  Bactrim 5mg/kg/8 IV  Valtrex 500 po BID  Vanc 750 q12    Solumedrol        Past Surgical History:       Diagnosis Date    Acute on chronic systolic congestive heart failure (HCC) 01/07/2024    Alcohol abuse 04/16/2021    CAD (coronary artery disease)

## 2024-01-28 NOTE — PROGRESS NOTES
Fent 25mcg/hr (Requested to stay at this rate per intensivist).. Prop 35mcg/kg/min. Levo 6mcg/min. See MAR for details. Pt squeezing hands and wiggling toes to command. Wife has been updated via phone call.

## 2024-01-28 NOTE — PROGRESS NOTES
Cardiology Consult Service  Daily Progress Note        Admit Date:  1/23/2024  Primary cardiologist: Dr Anderson    Reason for Consultation/Chief Complaint: r/o AHF    Subjective:     Zain Lopez is a 68 y.o. 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 NHL with CNS relapse s/p chemo, s/p BMT 10/2023 c/b infections and DVTs, with recent pneumonia and acute heart failure.      LHC 10/2023: normal coronaries.      Echo 01/2024: nl LV, EF 40-45%, indeterminate diastolic, normal RV and valves (compared to echo 11/2021, normal echo).      Patient came to the emergency room on 1/23 with progressively worse shortness of breath and hypoxia.  He was admitted for multifocal healthcare related pneumonia in the setting of immunocompromise.  Pulmonology was consulted, patient had a bronchoscopy on 1/25 which was complicated by acute hypoxia and patient was intubated.  BAL consistent with budding yeast therefore patient was placed on voriconazole.  Patient was extubated on 1/26 however overnight developed severe hypoxia requiring 15 L of supplemental oxygen up from 3 L of supplemental oxygen.  Cardiology was consulted today to assess patient for possible concurrent acute heart failure.  Patient received 1 dose of Lasix 20 mg IV x 1.  He remains on norepinephrine at 4 mcg/min stable.     Interval history:  At 2 AM today patient had VT arrest, ROSC achieved within 10 minutes of ACLS being initiated, on vasopressors currently sedated and intubated.  WBC 20 with neutrophilic predominance, creatinine up at 1.4 from 1.0.  Voriconazole and Levaquin stopped due to concern for QT prolongation and torsades, patient was placed on different IV antibiotics and antifungal agents.  Patient is currently on norepinephrine 8 mics per minute.    ECG consistent with NSR, V pacing, QTc 513 ms    Telemetry  input(s): \"BNP\" in the last 72 hours.  No results for input(s): \"NTPROBNP\" in the last 72 hours.  No results for input(s): \"TSH\" in the last 72 hours.    Imaging:       Assessment & Plan:     1.  Sepsis due to healthcare associated pneumonia, cannot exclude fungal pneumonia.  On broad-spectrum IV antibiotics and antifungal therapy per ICU team.  Patient remains on low-dose of norepinephrine at this time.  2.  Rule out acute on chronic HFrEF.  I cannot exclude contribution by volume overload.  Systolic heart failure is nonischemic in etiology in view of normal coronaries on [facility] (possible causes include chemotherapy, AF RVR).  3.  NHL status post chemotherapy and bone marrow transplant.  Per [facility].   4.  Persistent AF.  5.  Complete heart block status post pacer  6.  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  7.  History of DVT.  8.  COVID infection  9.  Torsades de pointes / PMVT.  Likely due to Levaquin and voriconazole infusions. QT prolongation masked by V pacing.  10.  JOEL.  Most likely due to VT           - Will stop Lasix  - Will start Lopressor 2.5 mg IV every 8 hours  - Will stop digoxin in view of JOEL  - Strict I's and O's every shift and standing weights if possible, low-salt diet, daily BMP with reflex to Mg (correct lytes for goals K >4.0 and Mg > 2.0) and wean supplemental oxygen to off (or down to baseline supplemental oxygen requirements) for sats greater than 90%.  -Continue Eliquis  -Continue with broad-spectrum IV antibiotics and antifungals per ICU team.        Due to the high probability of clinically significant life threating deterioration of the patient's condition that required my urgent intervention, a total critical care time 35 minutes was used. This time excludes any time that may have been spent performing procedures. This includes but not limited to vital sign monitoring, telemetry monitoring, continuous pulse oximety, IV medication, clinical response to the IV

## 2024-01-28 NOTE — CONSULTS
Clinical Pharmacy Progress Note    Vancomycin - Management by Pharmacy    Consult Date(s): 1/27/24  Consulting Provider(s): Yenifer Augustine APRN - CNP     Assessment / Plan  Sepsis - Vancomycin  Concurrent Antimicrobials: meropenem, levofloxacin, Bactrim, ppx: valacyclovir  Day of Vanc Therapy / Ordered Duration: Day 2  Current Dosing Method: Bayesian-Guided AUC Dosing  Therapeutic Goal: -600 mg/L*hr  Current Dose / Plan:   Scr is 1.0 --> 1.4 mg/dL (baseline 1-1.1 mg/dL)   UOP adequate at 2.2 mL/kg/hr  Currently on vancomycin 750 mg q12h  Now predicted supratherapeutic due to SCr rise  Will decrease to 1000 mg q24h  Predicated AUC = 434 mg/L*hr, Tr = 11.3 mg/L.  Follow-up level ordered for tomorrow AM.  Will continue to monitor clinical condition and make adjustments to regimen as appropriate.    Thank you for consulting pharmacy,    Felix Saenz PharmD  PGY1 Pharmacy Resident  Phone: [phone]  Main Pharmacy: [id number]  1/28/2024 11:54 AM      Interval update: Multiple codes overnight. Patient now intubated and sedated, on pressors. Levaquin and vori d/c due to Torsades.    Subjective/Objective:   Zain Lopez is a 68 y.o. 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 He is admitted with multifocal pneumonia and worsening depression.     Pharmacy is consulted to dose vancomycin.    Ht Readings from Last 1 Encounters:   01/23/24 1.778 m (5' 10\")     Wt Readings from Last 1 Encounters:   01/26/24 62.4 kg (137 lb 9.1 oz)     Current & Prior Antimicrobial Regimen(s):  Meropenem (1/24-present)  Levofloxacin (1/24 - 1/28)  Bactrim IV (1/25 - current) - treating empirically for

## 2024-01-28 NOTE — PLAN OF CARE
Problem: Safety - Adult  Goal: Free from fall injury  Outcome: Progressing     Problem: ABCDS Injury Assessment  Goal: Absence of physical injury  Outcome: Progressing     Problem: Chronic Conditions and Co-morbidities  Goal: Patient's chronic conditions and co-morbidity symptoms are monitored and maintained or improved  Outcome: Progressing     Problem: Respiratory - Adult  Goal: Achieves optimal ventilation and oxygenation  Outcome: Progressing

## 2024-01-28 NOTE — RT PROTOCOL NOTE
RT Inhaler-Nebulizer Bronchodilator Protocol Note    There is a bronchodilator order in the chart from a provider indicating to follow the RT Bronchodilator Protocol and there is an “Initiate RT Inhaler-Nebulizer Bronchodilator Protocol” order as well (see protocol at bottom of note).    CXR Findings:  XR CHEST PORTABLE    Result Date: 1/27/2024  No change in bilateral airspace disease right greater than left.      The findings from the last RT Protocol Assessment were as follows:   History Pulmonary Disease: None or smoker <15 pack years  Respiratory Pattern: Dyspnea on exertion or RR 21-25 bpm  Breath Sounds: Clear breath sounds  Cough: Strong, spontaneous, non-productive  Indication for Bronchodilator Therapy: None  Bronchodilator Assessment Score: 2    Aerosolized bronchodilator medication orders have been revised according to the RT Inhaler-Nebulizer Bronchodilator Protocol below.    Respiratory Therapist to perform RT Therapy Protocol Assessment initially then follow the protocol.  Repeat RT Therapy Protocol Assessment PRN for score 0-3 or on second treatment, BID, and PRN for scores above 3.    No Indications - adjust the frequency to every 6 hours PRN wheezing or bronchospasm, if no treatments needed after 48 hours then discontinue using Per Protocol order mode.     If indication present, adjust the RT bronchodilator orders based on the Bronchodilator Assessment Score as indicated below.  Use Inhaler orders unless patient has one or more of the following: on home nebulizer, not able to hold breath for 10 seconds, is not alert and oriented, cannot activate and use MDI correctly, or respiratory rate 25 breaths per minute or more, then use the equivalent nebulizer order(s) with same Frequency and PRN reasons based on the score.  If a patient is on this medication at home then do not decrease Frequency below that used at home.    0-3 - enter or revise RT bronchodilator order(s) to equivalent RT Bronchodilator  order with Frequency of every 4 hours PRN for wheezing or increased work of breathing using Per Protocol order mode.        4-6 - enter or revise RT Bronchodilator order(s) to two equivalent RT bronchodilator orders with one order with BID Frequency and one order with Frequency of every 4 hours PRN wheezing or increased work of breathing using Per Protocol order mode.        7-10 - enter or revise RT Bronchodilator order(s) to two equivalent RT bronchodilator orders with one order with TID Frequency and one order with Frequency of every 4 hours PRN wheezing or increased work of breathing using Per Protocol order mode.       11-13 - enter or revise RT Bronchodilator order(s) to one equivalent RT bronchodilator order with QID Frequency and an Albuterol order with Frequency of every 4 hours PRN wheezing or increased work of breathing using Per Protocol order mode.      Greater than 13 - enter or revise RT Bronchodilator order(s) to one equivalent RT bronchodilator order with every 4 hours Frequency and an Albuterol order with Frequency of every 2 hours PRN wheezing or increased work of breathing using Per Protocol order mode.     RT to enter RT Home Evaluation for COPD & MDI Assessment order using Per Protocol order mode.    Electronically signed by Lang Parks RCP on 1/27/2024 at 8:56 PM

## 2024-01-28 NOTE — PLAN OF CARE
Problem: Safety - Adult  Goal: Free from fall injury  Outcome: Progressing  Pt is a fall risk. Fall risk protocol in place. See Frey Fall Score. Pt bed is in low position, bed alarm is on, side rails up, fall risk bracelet applied., non-skid footwear in use. Patient/family educated on fall risk protocol, instructed to call for assistance when needed and belongings are in reach.assistance. Will continue with hourly rounds for po intake, pain needs, toileting and repositioning as needed. Will continue to monitor for needs.    Problem: ABCDS Injury Assessment  Goal: Absence of physical injury  Outcome: Progressing     Problem: Chronic Conditions and Co-morbidities  Goal: Patient's chronic conditions and co-morbidity symptoms are monitored and maintained or improved  Outcome: Progressing     Problem: Respiratory - Adult  Goal: Achieves optimal ventilation and oxygenation  Outcome: Progressing       Problem: Safety - Medical Restraint  Goal: Remains free of injury from restraints (Restraint for Interference with Medical Device)  Description: INTERVENTIONS:  1. Determine that other, less restrictive measures have been tried or would not be effective before applying the restraint  2. Evaluate the patient's condition at the time of restraint application  3. Inform patient/family regarding the reason for restraint  4. Q2H: Monitor safety, psychosocial status, comfort, nutrition and hydration

## 2024-01-28 NOTE — PROGRESS NOTES
[facility] Progress Note    2024     Zain Lopez    MRN: [id number]    : 1955    Referring MD: Penelope Wilhelm,   [address]  [address]  [place] [address]      SUBJECTIVE:   Coded yesterday evening.  Went into torsade and subsequently VF.  Coded for 8-10 minutes with recovery of rhythmn.  Now entubated on pressors, Cardiology and Critical Care following.       ECOG PS: (4) Completely disabled, unable to carry out self-care and confined to bed or chair     KPS: 20% Very sick; hospital admission necessary; active supportive treatment necessary    Isolation: Droplet Plus    Medications    Scheduled Meds:   insulin lispro  0-4 Units SubCUTAneous Q6H    vancomycin  750 mg IntraVENous Q12H    furosemide  40 mg IntraVENous TID    voriconazole  260 mg Oral 2 times per day    ipratropium 0.5 mg-albuterol 2.5 mg  1 Dose Inhalation BID RT    sulfamethoxazole-trimethoprim (BACTRIM) 323.2 mg in dextrose 5 % 500 mL IVPB  5 mg/kg IntraVENous Q8H    pantoprazole  40 mg IntraVENous Daily    methylPREDNISolone  40 mg IntraVENous Q12H    meropenem  1,000 mg IntraVENous Q8H    levofloxacin  750 mg IntraVENous Q24H    apixaban  5 mg Oral BID    digoxin  125 mcg Oral Daily    levETIRAcetam  500 mg Oral BID    [Held by provider] therapeutic multivitamin-minerals  1 tablet Oral Daily    sertraline  150 mg Oral Daily    tamsulosin  0.4 mg Oral Daily    valACYclovir  500 mg Oral BID    sodium chloride flush  5-40 mL IntraVENous 2 times per day    Saline Mouthwash  15 mL Swish & Spit 4x Daily AC & HS     Continuous Infusions:   dextrose      propofol 45 mcg/kg/min (24 0654)    fentaNYL 75 mcg/hr (24 0307)    norepinephrine 8 mcg/min (24 0200)    sodium chloride Stopped (24 1224)    sodium chloride       PRN Meds:.glucose, dextrose bolus **OR** dextrose bolus, glucagon (rDNA), dextrose, albuterol, ondansetron, polyethylene glycol, diphenhydrAMINE, sodium chloride flush, sodium chloride, potassium  - extubated 1/26/24  - Re-intubated during CODE 1/28/24  - Vent Day +1 (1/247/24):  PEEP 8, FiO2 100%  - Sedation:  Fentanyl and Propofol       4. Heme: Anemia d/t recent chemo, leukocytosis d/t infection and steroids  - Transfuse for Hgb < 7 and Platelets < 50K (anticoagulation)  - No transfusion today   Coagulopathy:   - Right Venous US 11/17/23: + DVT right IJ, brachial, axillary, cephalic veins  - Continue Eliquis started 11/21/23.  - Monitor INR daily     5. Metabolic / CKDIII:Metabolic acidosis, JOEL likely r/t hypotension + HD Bactrim, steroid induced hyperglycemia  - IVFs at KVO  - Lasix 40 mg IV TID per cardiology  - Replace K+ & Mg per PRN orders     6. GI / Nutrition: H/o constipation, nausea, and GERD  Nutrition:  Intubated   - Start EN via OG tub- will need to re-initiate  - Cont PPI     7. Cardiac: H/o CHB w/ left SC dual chamber pacemaker and HTN.  CODE 1/28/24  CODE Blue 1/28/24:  - Initially torsades-> Vfib-> compressions, shock and epi-> ROSC  - Echo (9/21/23): LVEF 50%. No regional wall motion abnormalities are seen. Normal diastolic function.  CHB:  - Dual chamber pacemaker in place  Prolonged QTc:  - EKG (1/23/24): QTC: 486 - can subtract 50 d/t having ventricular paced pacemaker in place  New Onset Atrial Flutter:  - Digoxin 500-250 1/8/24; Cont 125mcg daily   HFrEF:  - BNP 1/24/24 5834  - Echo 1/24/24- EF 40-45%, Indeterminate diastolic function  - Lasix 40 mg IV TID per cardiology   - Cardiology consulted, appreciate recs  Shock: Unclear etiology - possibly cardiogenic or sepsis?  - Cont levo to maintain MAP >65 - currently on 8mcg/min    8. : H/o BPH  BPH:    - Cont Flomax 0.4 mg daily      9. MSK: Generalized weakness:  D/t CNS lymphoma and chemotherapy  - Reconsult PT/OT/SLP once off the vent     10. 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 Neuro: Severe memory impairment and visual changes:

## 2024-01-28 NOTE — RT PROTOCOL NOTE
RT Nebulizer Bronchodilator Protocol Note    There is a bronchodilator order in the chart from a provider indicating to follow the RT Bronchodilator Protocol and there is an “Initiate RT Bronchodilator Protocol” order as well (see protocol at bottom of note).    CXR Findings:  XR CHEST PORTABLE    Result Date: 1/28/2024  Endotracheal tube in standard position. Examination is otherwise unchanged. Electronically signed by Yazan Car MD    XR CHEST PORTABLE    Result Date: 1/28/2024  Endotracheal tube is 6 cm above the vance and could be advanced 2 cm. Worsening multifocal consolidation, right greater than left. Cardiomegaly with left subclavian pacemaker. Right jugular central venous catheter without change. Gastric distention. Electronically signed by Yazan Car MD    XR CHEST PORTABLE    Result Date: 1/27/2024  No change in bilateral airspace disease right greater than left.      The findings from the last RT Protocol Assessment were as follows:  Smoking: None or smoker <15 pack years  Respiratory Pattern: Mild dyspnea at rest, irregular pattern, or RR 21-25 bpm  Breath Sounds: Slightly diminished and/or crackles  Cough: Weak, productive  Indication for Bronchodilator Therapy: Decreased or absent breath sounds  Bronchodilator Assessment Score: 8    Aerosolized bronchodilator medication orders have been revised according to the RT Nebulizer Bronchodilator Protocol below.    Respiratory Therapist to perform RT Therapy Protocol Assessment initially then follow the protocol.  Repeat RT Therapy Protocol Assessment PRN for score 0-3 or on second treatment, BID, and PRN for scores above 3.    No Indications - adjust the frequency to every 6 hours PRN wheezing or bronchospasm, if no treatments needed after 48 hours then discontinue using Per Protocol order mode.     If indication present, adjust the RT bronchodilator orders based on the Bronchodilator Assessment Score as indicated below.  If a patient is on

## 2024-01-29 ENCOUNTER — APPOINTMENT (OUTPATIENT)
Dept: GENERAL RADIOLOGY | Age: 69
End: 2024-01-29
Attending: STUDENT IN AN ORGANIZED HEALTH CARE EDUCATION/TRAINING PROGRAM
Payer: MEDICARE

## 2024-01-29 LAB
ACANTHOCYTES BLD QL SMEAR: ABNORMAL
ALBUMIN SERPL-MCNC: 2.7 G/DL (ref 3.4–5)
ALP SERPL-CCNC: 110 U/L (ref 40–129)
ALT SERPL-CCNC: 22 U/L (ref 10–40)
ANION GAP SERPL CALCULATED.3IONS-SCNC: 10 MMOL/L (ref 3–16)
ANION GAP SERPL CALCULATED.3IONS-SCNC: 11 MMOL/L (ref 3–16)
ANISOCYTOSIS BLD QL SMEAR: ABNORMAL
AST SERPL-CCNC: 30 U/L (ref 15–37)
BASE EXCESS BLDA CALC-SCNC: 4.8 MMOL/L (ref -3–3)
BASOPHILS # BLD: 0 K/UL (ref 0–0.2)
BASOPHILS NFR BLD: 0 %
BILIRUB DIRECT SERPL-MCNC: <0.2 MG/DL (ref 0–0.3)
BILIRUB INDIRECT SERPL-MCNC: ABNORMAL MG/DL (ref 0–1)
BILIRUB SERPL-MCNC: 0.3 MG/DL (ref 0–1)
BUN SERPL-MCNC: 30 MG/DL (ref 7–20)
BUN SERPL-MCNC: 31 MG/DL (ref 7–20)
CALCIUM SERPL-MCNC: 9 MG/DL (ref 8.3–10.6)
CALCIUM SERPL-MCNC: 9.2 MG/DL (ref 8.3–10.6)
CHLORIDE SERPL-SCNC: 102 MMOL/L (ref 99–110)
CHLORIDE SERPL-SCNC: 98 MMOL/L (ref 99–110)
CO2 BLDA-SCNC: 32 MMOL/L
CO2 SERPL-SCNC: 27 MMOL/L (ref 21–32)
CO2 SERPL-SCNC: 28 MMOL/L (ref 21–32)
COHGB MFR BLDA: 1.4 % (ref 0–1.5)
CREAT SERPL-MCNC: 1 MG/DL (ref 0.8–1.3)
CREAT SERPL-MCNC: 1.1 MG/DL (ref 0.8–1.3)
DACRYOCYTES BLD QL SMEAR: ABNORMAL
DEPRECATED RDW RBC AUTO: 18.8 % (ref 12.4–15.4)
EKG ATRIAL RATE: 77 BPM
EKG DIAGNOSIS: NORMAL
EKG P AXIS: 34 DEGREES
EKG P-R INTERVAL: 196 MS
EKG Q-T INTERVAL: 454 MS
EKG QRS DURATION: 166 MS
EKG QTC CALCULATION (BAZETT): 513 MS
EKG R AXIS: -83 DEGREES
EKG T AXIS: 79 DEGREES
EKG VENTRICULAR RATE: 77 BPM
EOSINOPHIL # BLD: 0 K/UL (ref 0–0.6)
EOSINOPHIL NFR BLD: 0 %
FINAL REPORT: NORMAL
GFR SERPLBLD CREATININE-BSD FMLA CKD-EPI: >60 ML/MIN/{1.73_M2}
GFR SERPLBLD CREATININE-BSD FMLA CKD-EPI: >60 ML/MIN/{1.73_M2}
GLUCOSE BLD-MCNC: 131 MG/DL (ref 70–99)
GLUCOSE BLD-MCNC: 139 MG/DL (ref 70–99)
GLUCOSE BLD-MCNC: 140 MG/DL (ref 70–99)
GLUCOSE BLD-MCNC: 155 MG/DL (ref 70–99)
GLUCOSE SERPL-MCNC: 121 MG/DL (ref 70–99)
GLUCOSE SERPL-MCNC: 122 MG/DL (ref 70–99)
H CAPSUL AG SER IA-ACNC: NOT DETECTED
H CAPSUL AG SER QL IA: NOT DETECTED
HCO3 BLDA-SCNC: 30 MMOL/L (ref 21–29)
HCT VFR BLD AUTO: 23.9 % (ref 40.5–52.5)
HGB BLD-MCNC: 7.8 G/DL (ref 13.5–17.5)
HGB BLDA-MCNC: 7.5 G/DL
HYPERCHROMIA BLD QL SMEAR: ABNORMAL
INR PPP: 1.74 (ref 0.84–1.16)
LDH SERPL L TO P-CCNC: 457 U/L (ref 100–190)
LYMPHOCYTES # BLD: 0.3 K/UL (ref 1–5.1)
LYMPHOCYTES NFR BLD: 3 %
MAGNESIUM SERPL-MCNC: 2 MG/DL (ref 1.8–2.4)
MCH RBC QN AUTO: 29.3 PG (ref 26–34)
MCHC RBC AUTO-ENTMCNC: 32.8 G/DL (ref 31–36)
MCV RBC AUTO: 89.3 FL (ref 80–100)
METAMYELOCYTES NFR BLD MANUAL: 2 %
METHGB MFR BLDA: 0 % (ref 0–1.4)
MONOCYTES # BLD: 0.2 K/UL (ref 0–1.3)
MONOCYTES NFR BLD: 2 %
NEUTROPHILS # BLD: 9.8 K/UL (ref 1.7–7.7)
NEUTROPHILS NFR BLD: 93 %
NEUTS HYPERSEG BLD QL SMEAR: PRESENT
NRBC BLD-RTO: 1 /100 WBC
PATH INTERP BLD-IMP: NO
PCO2 BLDA: 48.3 MMHG (ref 35–45)
PERFORMED ON: ABNORMAL
PH BLDA: 7.4 [PH] (ref 7.35–7.45)
PHOSPHATE SERPL-MCNC: 3.1 MG/DL (ref 2.5–4.9)
PLATELET # BLD AUTO: 167 K/UL (ref 135–450)
PLATELET BLD QL SMEAR: ADEQUATE
PMV BLD AUTO: 9.6 FL (ref 5–10.5)
PO2 BLDA: 71.1 MMHG (ref 75–108)
POIKILOCYTOSIS BLD QL SMEAR: ABNORMAL
POTASSIUM SERPL-SCNC: 4.1 MMOL/L (ref 3.5–5.1)
POTASSIUM SERPL-SCNC: 4.1 MMOL/L (ref 3.5–5.1)
PRELIMINARY: NORMAL
PROCALCITONIN SERPL IA-MCNC: 1.6 NG/ML (ref 0–0.15)
PROT SERPL-MCNC: 5 G/DL (ref 6.4–8.2)
PROTHROMBIN TIME: 20.3 SEC (ref 11.5–14.8)
RBC # BLD AUTO: 2.68 M/UL (ref 4.2–5.9)
SAO2 % BLDA: 94 % (ref 93–100)
SLIDE REVIEW: ABNORMAL
SODIUM SERPL-SCNC: 136 MMOL/L (ref 136–145)
SODIUM SERPL-SCNC: 140 MMOL/L (ref 136–145)
STOMATOCYTES BLD QL SMEAR: ABNORMAL
VANCOMYCIN SERPL-MCNC: 11.8 UG/ML
WBC # BLD AUTO: 10.3 K/UL (ref 4–11)

## 2024-01-29 PROCEDURE — 99232 SBSQ HOSP IP/OBS MODERATE 35: CPT | Performed by: INTERNAL MEDICINE

## 2024-01-29 PROCEDURE — 2700000000 HC OXYGEN THERAPY PER DAY

## 2024-01-29 PROCEDURE — 85025 COMPLETE CBC W/AUTO DIFF WBC: CPT

## 2024-01-29 PROCEDURE — 83735 ASSAY OF MAGNESIUM: CPT

## 2024-01-29 PROCEDURE — 2580000003 HC RX 258: Performed by: NURSE PRACTITIONER

## 2024-01-29 PROCEDURE — 2500000003 HC RX 250 WO HCPCS: Performed by: INTERNAL MEDICINE

## 2024-01-29 PROCEDURE — 2580000003 HC RX 258: Performed by: INTERNAL MEDICINE

## 2024-01-29 PROCEDURE — C9113 INJ PANTOPRAZOLE SODIUM, VIA: HCPCS | Performed by: NURSE PRACTITIONER

## 2024-01-29 PROCEDURE — 83615 LACTATE (LD) (LDH) ENZYME: CPT

## 2024-01-29 PROCEDURE — 82803 BLOOD GASES ANY COMBINATION: CPT

## 2024-01-29 PROCEDURE — 94761 N-INVAS EAR/PLS OXIMETRY MLT: CPT

## 2024-01-29 PROCEDURE — 71045 X-RAY EXAM CHEST 1 VIEW: CPT

## 2024-01-29 PROCEDURE — 94003 VENT MGMT INPAT SUBQ DAY: CPT

## 2024-01-29 PROCEDURE — 6360000002 HC RX W HCPCS: Performed by: NURSE PRACTITIONER

## 2024-01-29 PROCEDURE — 94640 AIRWAY INHALATION TREATMENT: CPT

## 2024-01-29 PROCEDURE — 80076 HEPATIC FUNCTION PANEL: CPT

## 2024-01-29 PROCEDURE — 6370000000 HC RX 637 (ALT 250 FOR IP): Performed by: NURSE PRACTITIONER

## 2024-01-29 PROCEDURE — 36415 COLL VENOUS BLD VENIPUNCTURE: CPT

## 2024-01-29 PROCEDURE — 6360000002 HC RX W HCPCS: Performed by: INTERNAL MEDICINE

## 2024-01-29 PROCEDURE — 6370000000 HC RX 637 (ALT 250 FOR IP): Performed by: INTERNAL MEDICINE

## 2024-01-29 PROCEDURE — 2500000003 HC RX 250 WO HCPCS: Performed by: NURSE PRACTITIONER

## 2024-01-29 PROCEDURE — 99291 CRITICAL CARE FIRST HOUR: CPT | Performed by: INTERNAL MEDICINE

## 2024-01-29 PROCEDURE — 80048 BASIC METABOLIC PNL TOTAL CA: CPT

## 2024-01-29 PROCEDURE — 85610 PROTHROMBIN TIME: CPT

## 2024-01-29 PROCEDURE — 2000000000 HC ICU R&B

## 2024-01-29 PROCEDURE — 80202 ASSAY OF VANCOMYCIN: CPT

## 2024-01-29 PROCEDURE — 84145 PROCALCITONIN (PCT): CPT

## 2024-01-29 PROCEDURE — 93010 ELECTROCARDIOGRAM REPORT: CPT | Performed by: INTERNAL MEDICINE

## 2024-01-29 PROCEDURE — 84100 ASSAY OF PHOSPHORUS: CPT

## 2024-01-29 RX ORDER — SULFAMETHOXAZOLE AND TRIMETHOPRIM 800; 160 MG/1; MG/1
1 TABLET ORAL
Status: DISCONTINUED | OUTPATIENT
Start: 2024-01-29 | End: 2024-01-30

## 2024-01-29 RX ORDER — POLYETHYLENE GLYCOL 3350 17 G/17G
17 POWDER, FOR SOLUTION ORAL DAILY
Status: DISCONTINUED | OUTPATIENT
Start: 2024-01-29 | End: 2024-02-09

## 2024-01-29 RX ORDER — GAUZE BANDAGE 2" X 2"
100 BANDAGE TOPICAL DAILY
Status: COMPLETED | OUTPATIENT
Start: 2024-01-29 | End: 2024-02-04

## 2024-01-29 RX ORDER — SENNA AND DOCUSATE SODIUM 50; 8.6 MG/1; MG/1
2 TABLET, FILM COATED ORAL DAILY
Status: DISCONTINUED | OUTPATIENT
Start: 2024-01-29 | End: 2024-02-09

## 2024-01-29 RX ADMIN — MEROPENEM 1000 MG: 1 INJECTION INTRAVENOUS at 02:00

## 2024-01-29 RX ADMIN — METOPROLOL TARTRATE 2.5 MG: 1 INJECTION, SOLUTION INTRAVENOUS at 20:25

## 2024-01-29 RX ADMIN — PROPOFOL 35 MCG/KG/MIN: 10 INJECTION, EMULSION INTRAVENOUS at 03:06

## 2024-01-29 RX ADMIN — SULFAMETHOXAZOLE AND TRIMETHOPRIM 323.2 MG: 80; 16 INJECTION, SOLUTION, CONCENTRATE INTRAVENOUS at 07:02

## 2024-01-29 RX ADMIN — VANCOMYCIN HYDROCHLORIDE 750 MG: 10 INJECTION, POWDER, LYOPHILIZED, FOR SOLUTION INTRAVENOUS at 21:52

## 2024-01-29 RX ADMIN — SODIUM CHLORIDE 2.5 MCG/MIN: 9 INJECTION, SOLUTION INTRAVENOUS at 03:01

## 2024-01-29 RX ADMIN — WATER 60 MG: 1 INJECTION INTRAMUSCULAR; INTRAVENOUS; SUBCUTANEOUS at 09:43

## 2024-01-29 RX ADMIN — REMDESIVIR 100 MG: 100 INJECTION, POWDER, LYOPHILIZED, FOR SOLUTION INTRAVENOUS at 20:23

## 2024-01-29 RX ADMIN — IPRATROPIUM BROMIDE AND ALBUTEROL SULFATE 1 DOSE: 2.5; .5 SOLUTION RESPIRATORY (INHALATION) at 08:27

## 2024-01-29 RX ADMIN — SULFAMETHOXAZOLE AND TRIMETHOPRIM 1 TABLET: 800; 160 TABLET ORAL at 10:39

## 2024-01-29 RX ADMIN — LEVETIRACETAM 500 MG: 500 TABLET, FILM COATED ORAL at 20:25

## 2024-01-29 RX ADMIN — Medication 100 MG: at 13:28

## 2024-01-29 RX ADMIN — MEROPENEM 1000 MG: 1 INJECTION INTRAVENOUS at 11:03

## 2024-01-29 RX ADMIN — VALACYCLOVIR HYDROCHLORIDE 500 MG: 500 TABLET, FILM COATED ORAL at 20:24

## 2024-01-29 RX ADMIN — POLYETHYLENE GLYCOL 3350 17 G: 17 POWDER, FOR SOLUTION ORAL at 13:26

## 2024-01-29 RX ADMIN — SODIUM CHLORIDE 15 ML: 900 IRRIGANT IRRIGATION at 20:25

## 2024-01-29 RX ADMIN — PROPOFOL 40 MCG/KG/MIN: 10 INJECTION, EMULSION INTRAVENOUS at 09:18

## 2024-01-29 RX ADMIN — MEROPENEM 1000 MG: 1 INJECTION INTRAVENOUS at 17:47

## 2024-01-29 RX ADMIN — VANCOMYCIN HYDROCHLORIDE 750 MG: 10 INJECTION, POWDER, LYOPHILIZED, FOR SOLUTION INTRAVENOUS at 10:39

## 2024-01-29 RX ADMIN — PROPOFOL 20 MCG/KG/MIN: 10 INJECTION, EMULSION INTRAVENOUS at 18:13

## 2024-01-29 RX ADMIN — METOPROLOL TARTRATE 2.5 MG: 1 INJECTION, SOLUTION INTRAVENOUS at 13:26

## 2024-01-29 RX ADMIN — SENNOSIDES AND DOCUSATE SODIUM 2 TABLET: 50; 8.6 TABLET ORAL at 13:28

## 2024-01-29 RX ADMIN — METOPROLOL TARTRATE 2.5 MG: 1 INJECTION, SOLUTION INTRAVENOUS at 04:05

## 2024-01-29 RX ADMIN — IPRATROPIUM BROMIDE AND ALBUTEROL SULFATE 1 DOSE: 2.5; .5 SOLUTION RESPIRATORY (INHALATION) at 20:20

## 2024-01-29 RX ADMIN — APIXABAN 5 MG: 5 TABLET, FILM COATED ORAL at 20:24

## 2024-01-29 RX ADMIN — IPRATROPIUM BROMIDE AND ALBUTEROL SULFATE 1 DOSE: 2.5; .5 SOLUTION RESPIRATORY (INHALATION) at 15:58

## 2024-01-29 RX ADMIN — SODIUM CHLORIDE, PRESERVATIVE FREE 10 ML: 5 INJECTION INTRAVENOUS at 20:24

## 2024-01-29 RX ADMIN — Medication 25 MCG/HR: at 03:12

## 2024-01-29 RX ADMIN — IPRATROPIUM BROMIDE AND ALBUTEROL SULFATE 1 DOSE: 2.5; .5 SOLUTION RESPIRATORY (INHALATION) at 12:18

## 2024-01-29 RX ADMIN — SODIUM CHLORIDE, PRESERVATIVE FREE 10 ML: 5 INJECTION INTRAVENOUS at 09:23

## 2024-01-29 RX ADMIN — VALACYCLOVIR HYDROCHLORIDE 500 MG: 500 TABLET, FILM COATED ORAL at 09:22

## 2024-01-29 RX ADMIN — LEVETIRACETAM 500 MG: 500 TABLET, FILM COATED ORAL at 09:21

## 2024-01-29 RX ADMIN — PANTOPRAZOLE SODIUM 40 MG: 40 INJECTION, POWDER, FOR SOLUTION INTRAVENOUS at 09:22

## 2024-01-29 RX ADMIN — APIXABAN 5 MG: 5 TABLET, FILM COATED ORAL at 09:22

## 2024-01-29 ASSESSMENT — PULMONARY FUNCTION TESTS
PIF_VALUE: 10
PIF_VALUE: 25
PIF_VALUE: 10
PIF_VALUE: 11
PIF_VALUE: 20
PIF_VALUE: 19
PIF_VALUE: 11
PIF_VALUE: 18
PIF_VALUE: 32
PIF_VALUE: 11
PIF_VALUE: 11
PIF_VALUE: 10
PIF_VALUE: 18
PIF_VALUE: 11
PIF_VALUE: 11
PIF_VALUE: 10
PIF_VALUE: 18
PIF_VALUE: 11
PIF_VALUE: 20
PIF_VALUE: 20
PIF_VALUE: 17
PIF_VALUE: 10
PIF_VALUE: 10

## 2024-01-29 ASSESSMENT — PAIN SCALES - GENERAL
PAINLEVEL_OUTOF10: 0

## 2024-01-29 NOTE — PROGRESS NOTES
ID Follow-up NOTE    CC:   VT arrest  Antibiotics: Remdesivir, Bactrim, vancomycin, meropenem, valacyclovir    Admit Date: 1/23/2024  Hospital Day: 7    Subjective:     Patient continues to be on mechanical ventilation, on pressors.  Not having many secretions per nursing, no diarrhea noted      Objective:     Patient Vitals for the past 8 hrs:   BP Temp Temp src Pulse Resp SpO2   01/29/24 0938 -- -- -- 87 14 98 %   01/29/24 0827 -- -- -- 66 15 99 %   01/29/24 0800 110/71 97.9 °F (36.6 °C) Bladder 67 14 99 %   01/29/24 0611 -- 98.2 °F (36.8 °C) -- -- -- --   01/29/24 0400 (!) 91/54 98.4 °F (36.9 °C) Bladder 72 17 97 %   01/29/24 0345 101/68 -- -- 71 16 98 %   01/29/24 0330 101/66 -- -- 73 15 98 %   01/29/24 0315 (!) 88/54 -- -- 73 15 96 %   01/29/24 0300 (!) 95/59 -- -- 72 16 97 %   01/29/24 0245 97/62 -- -- 77 18 96 %   01/29/24 0230 (!) 97/59 -- -- 70 15 97 %   01/29/24 0215 (!) 95/54 -- -- 71 16 97 %   01/29/24 0200 (!) 94/59 99.7 °F (37.6 °C) -- 72 15 97 %     I/O last 3 completed shifts:  In: 5827.1 [P.O.:594; I.V.:5108.1; NG/GT:125]  Out: 4545 [Urine:4545]  I/O this shift:  In: -   Out: 375 [Urine:375]    EXAM:  GENERAL: Sedated, on mechanical ventilation hypothermic on warming blanket  HEENT: ET tube in place, NG tube in place  NECK:  Supple, no lymphadenopathy  LUNGS: Intubated, on mechanical ventilation, no wheezes or crackles  CARDIAC: RRR, no murmur appreciated, pacemaker in left upper chest wall, no induration in pocket  ABD:  + BS, soft / NT, Bruner in place  EXT:  No rash, no edema, no lesions  NEURO: Sedated  PSYCH: Unable to assess  LINES:  Right IJ, PIV       Data Review:  Lab Results   Component Value Date    WBC 10.3 01/29/2024    HGB 7.8 (L) 01/29/2024    HCT 23.9 (L) 01/29/2024    MCV 89.3 01/29/2024     01/29/2024     Lab Results   Component Value Date    CREATININE 1.1 01/29/2024    BUN 31 (H) 01/29/2024     01/29/2024    K 4.1 01/29/2024    CL 98 (L) 01/29/2024    CO2 27

## 2024-01-29 NOTE — PROGRESS NOTES
[facility] Progress Note    2024     Zain Lopez    MRN: [id number]    : 1955    Referring MD: Penelope Wilhelm,   [address]  [facility] [address]  [place] [address]      SUBJECTIVE:  Coded yesterday evening.  Went into torsade and subsequently VF.  Coded for 8-10 minutes with recovery of rhythmn.  Now intubated on pressors, Cardiology and Critical Care following.       ECOG PS: (4) Completely disabled, unable to carry out self-care and confined to bed or chair     KPS: 20% Very sick; hospital admission necessary; active supportive treatment necessary    Isolation: Droplet Plus    Medications    Scheduled Meds:   insulin lispro  0-4 Units SubCUTAneous Q6H    ipratropium 0.5 mg-albuterol 2.5 mg  1 Dose Inhalation 4x Daily RT    vancomycin  1,000 mg IntraVENous Q24H    metoprolol  2.5 mg IntraVENous Q8H    remdesivir 100 mg in sodium chloride 0.9 % 250 mL IVPB  100 mg IntraVENous Q24H    sulfamethoxazole-trimethoprim (BACTRIM) 323.2 mg in dextrose 5 % 500 mL IVPB  5 mg/kg IntraVENous Q8H    pantoprazole  40 mg IntraVENous Daily    methylPREDNISolone  40 mg IntraVENous Q12H    meropenem  1,000 mg IntraVENous Q8H    apixaban  5 mg Oral BID    levETIRAcetam  500 mg Oral BID    [Held by provider] therapeutic multivitamin-minerals  1 tablet Oral Daily    tamsulosin  0.4 mg Oral Daily    valACYclovir  500 mg Oral BID    sodium chloride flush  5-40 mL IntraVENous 2 times per day    Saline Mouthwash  15 mL Swish & Spit 4x Daily AC & HS     Continuous Infusions:   dextrose      propofol 35 mcg/kg/min (24 0306)    fentaNYL 25 mcg/hr (24 0312)    norepinephrine 3 mcg/min (24)    sodium chloride Stopped (24 1224)    sodium chloride       PRN Meds:.glucose, dextrose bolus **OR** dextrose bolus, glucagon (rDNA), dextrose, magnesium sulfate, sodium chloride, albuterol, ondansetron, polyethylene glycol, diphenhydrAMINE, sodium chloride flush, sodium chloride, potassium chloride, magnesium  during CODE 1/28/24  - Vent Day +5 (1/24/24):  PEEP 8, FiO2 65%  - Sedation: Fentanyl and Propofol  - Shock: Cont levo at 3mcg/min     4. Heme: Anemia d/t recent chemo  - Transfuse for Hgb < 7 and Platelets < 50K (anticoagulation)  - No transfusion today   Coagulopathy:   - Right Venous US 11/17/23: + DVT right IJ, brachial, axillary, cephalic veins  - Cont Eliquis (11/21/23)  - Monitor INR daily     5. Metabolic / CKDIII: Metabolic acidosis, JOEL likely r/t hypotension + HD Bactrim, steroid induced hyperglycemia  - IVFs at KVO  - Replace K+ & Mg per PRN orders     6. GI / Nutrition: H/o constipation, nausea, and GERD  Nutrition:  Intubated   - Cont EN via OG tube  - Cont PPI     7. Cardiac: H/o CHB w/ left SC dual chamber pacemaker and HTN.  CODE 1/28/24  CODE Blue 1/28/24:  - Initially torsades-> Vfib-> compressions, shock and epi-> ROSC  Torsades de pointes / PMVT.  Likely due to Levaquin and voriconazole infusions. QT prolongation masked by V pacing.   - Echo (9/21/23): LVEF 50%. No regional wall motion abnormalities are seen. Normal diastolic function.  CHB:  - Dual chamber pacemaker in place  Prolonged QTc:  - EKG (1/23/24): QTC: 486 - can subtract 50 d/t having ventricular paced pacemaker in place  New Onset Atrial Flutter:  - s/p digoxin  - Cont lopressor 2.5mg IV q8h  HFrEF:  - BNP 1/24/24 5834  - Echo 1/24/24- EF 40-45%, Indeterminate diastolic function  - Cardiology consulted, appreciate recs. Diurese prn with FL even to -500 over 24 hours.    8. : H/o BPH  BPH:    - Cont Flomax 0.4 mg daily      9. MSK: Generalized weakness:  D/t CNS lymphoma and chemotherapy  - Reconsult PT/OT/SLP once off the vent     10. 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 Neuro: Severe memory impairment and visual changes: Improved, but not back to baseline.  This was d/t CNS mass & vasogenic edema.   - Cont Keppra BID  - Referred

## 2024-01-29 NOTE — PROGRESS NOTES
Vasopressor dosing equivalent score = 4. For VDE >12 trophic TF only (10 mL/hr).    Comprehensive Nutrition Assessment    RECOMMENDATIONS:  PO Diet: Continue NPO  Nutrition Education: No recommendation at this time   Nutrition Support:  Patient at HIGH RISK of Refeeding. Start daily MV + 100 mg Thiamin for 5-7 days (start 1/29 - end 2/01).   EN Day 1: Start Peptide-Based  Vital AF 1.2 @ 10  ml/hr x 24 hours  EN Day 2: If no signs of refeeding, advance by 10 ml every 8 hours until goal rate of 45 ml/hr.   Water flush of 30 ml every 4 hours.   Daily Labs: Phos, K+ and Mg to monitor for signs of refeeding.   Recommend initiation of scheduled bowel regimen.    NUTRITION ASSESSMENT:   Nutritional summary & status: Consult for Tube Feedings Order and Management. Pt extubated on 1/26/24 and reintubated 1/28/24. Hypotensive, on levo, VDE:4. Sedated on fentanyl and propofol at 7.8 ml/hr providing 205 kcal. OG tube in place.  Pt at risk of refeeding due to minimal po intake x 5 days. Recommend Thiamine and MV, NP notified. LBM noted 1/24. Recommend begin senna-s Bid and glycolax daily. TF orders placed.   Admission // PMH: Hypoxia//DLBC w/CNS, Thyroid disease, acute CHF, CAD    MALNUTRITION ASSESSMENT  Context of Malnutrition: Chronic Illness   Malnutrition Status: Severe malnutrition  Findings of the 6 clinical characteristics of malnutrition (Minimum of 2 out of 6 clinical characteristics is required to make the diagnosis of moderate or severe Protein Calorie Malnutrition based on AND/ASPEN Guidelines):  Energy Intake:  75% or less estimated energy requirements for 1 month or longer  Weight Loss:  Greater than 5% over 1 month (15% in 1 month)     Body Fat Loss:  Severe body fat loss Orbital, Buccal region   Muscle Mass Loss:  Severe muscle mass loss Temples (temporalis), Clavicles (pectoralis & deltoids)  Fluid Accumulation:  No significant fluid accumulation     Strength:  Not Performed    NUTRITION DIAGNOSIS    Inadequate oral intake related to impaired respiratory function as evidenced by intubation    Nutrition Monitoring and Evaluation:   Food/Nutrient Intake Outcomes:  Enteral Nutrition Intake/Tolerance  Physical Signs/Symptoms Outcomes:  Biochemical Data, Hemodynamic Status, Nutrition Focused Physical Findings, Weight     OBJECTIVE DATA: Significant to nutrition assessment  Nutrition Related Findings: LBM 1/24. BLE +1 edema. Glu 155.  Wounds: None  Nutrition Goals: Initiate nutrition support, Tolerate nutrition support at goal rate, by next RD assessment     CURRENT NUTRITION THERAPIES  Diet NPO  ADULT TUBE FEEDING; Orogastric; Peptide Based; Continuous; 10; No; 30; Q 4 hours  Current Tube Feeding (TF) Orders:  Feeding Route: Orogastric  Formula: Peptide Based High Protein  Schedule: Continuous  Additives/Modulars: None  Water Flushes: 30 ml Q 4 hrs  Goal TF & Flush Orders Provides: Vital AF 1.2 @ 45 ml/hr to provide 1080 ml TV, 1296 kcal, 81 gm pro, 779 ml FW  PO Intake: NPO   PO Supplement Intake:NPO  Additional Sources of Calories/IVF:propofol 7.8 ml/hr provides 205 kcal     COMPARATIVE STANDARDS  Energy (kcal):  8879-5460 (23-25 kcal/CBW)     Protein (g):   (1.2-2.0 gm/CBW)       Fluid (ml/day):  1 ml/kcal or per MD    ANTHROPOMETRICS  Current Height: 177.8 cm (5' 10\")  Current Weight - Scale: 62.4 kg (137 lb 9.1 oz)    Admission weight: 64.7 kg (142 lb 9.6 oz)    The patient will be monitored per nutrition standards of care. Consult dietitian if additional nutrition interventions are needed prior to RD reassessment.     Jonathan Walker RD  [place]:  [phone]  Office:  [phone]

## 2024-01-29 NOTE — PROGRESS NOTES
Patient follow commands: squeezes hands and wiggles toes bilaterally. Patient Levo weaned to 5 so far. Access appropriate. Patient meeting UOP goal. No BM's. RASS -2. Safety measures in place. Family called for update and was thankful for ongoing care.

## 2024-01-29 NOTE — PROGRESS NOTES
Pulmonary Medicine Follow-up Note    CC: KENNEDY, acute hypoxemic resp failure, abnl CT Chest     SUBJECTIVE / INTERVAL HISTORY:  Tested positive for COVID yesterday, currently on airborne precautions.  Patient having difficulty ventilating today, RN reported poor tidal volumes reportedly mainly due to dyssynchrony.  During my evaluation he was able to tolerate pressure support ventilation without problems, asked RN to wean sedation, minimal vasopressor requirements, stable paced rhythm.    ROS:  Denies headache, nausea or chest pain.    24HR INTAKE/OUTPUT:    Intake/Output Summary (Last 24 hours) at 2024 1049  Last data filed at 2024 0956  Gross per 24 hour   Intake 4142.08 ml   Output 2295 ml   Net 1847.08 ml          vancomycin  750 mg IntraVENous Q12H    sulfamethoxazole-trimethoprim  1 tablet Oral Once per day on     methylPREDNISolone  60 mg IntraVENous Daily    insulin lispro  0-4 Units SubCUTAneous Q6H    ipratropium 0.5 mg-albuterol 2.5 mg  1 Dose Inhalation 4x Daily RT    metoprolol  2.5 mg IntraVENous Q8H    remdesivir 100 mg in sodium chloride 0.9 % 250 mL IVPB  100 mg IntraVENous Q24H    pantoprazole  40 mg IntraVENous Daily    meropenem  1,000 mg IntraVENous Q8H    apixaban  5 mg Oral BID    levETIRAcetam  500 mg Oral BID    [Held by provider] therapeutic multivitamin-minerals  1 tablet Oral Daily    [Held by provider] tamsulosin  0.4 mg Oral Daily    valACYclovir  500 mg Oral BID    sodium chloride flush  5-40 mL IntraVENous 2 times per day    Saline Mouthwash  15 mL Swish & Spit 4x Daily AC & HS       PHYSICAL EXAMINATION:  /71   Pulse 87   Temp 97.9 °F (36.6 °C) (Bladder)   Resp 14   Ht 1.778 m (5' 10\")   Wt 62.4 kg (137 lb 9.1 oz)   SpO2 98%   BMI 19.74 kg/m²   CURRENT PULSE OXIMETRY:  SpO2: 98 %  24HR PULSE OXIMETRY RANGE:  SpO2  Av.4 %  Min: 94 %  Max: 100 %     Ventilator settings  - Vent Mode: AC/PRVC  -    - Vt (Set, mL): 450 mL  - PEEP/CPAP (cmH2O): 8  -

## 2024-01-29 NOTE — PLAN OF CARE
Problem: Safety - Adult  Goal: Free from fall injury  1/28/2024 2219 by Antonio Briceño RN  Outcome: Progressing     Problem: ABCDS Injury Assessment  Goal: Absence of physical injury  1/28/2024 2219 by Antonio Briceño RN  Outcome: Progressing     Problem: Chronic Conditions and Co-morbidities  Goal: Patient's chronic conditions and co-morbidity symptoms are monitored and maintained or improved  1/28/2024 2219 by Antonio Briceño RN  Outcome: Progressing     Problem: Respiratory - Adult  Goal: Achieves optimal ventilation and oxygenation  1/28/2024 2219 by Antonio Briceño RN  Outcome: Progressing     Problem: Cardiovascular - Adult  Goal: Maintains optimal cardiac output and hemodynamic stability  Outcome: Progressing     Problem: Cardiovascular - Adult  Goal: Absence of cardiac dysrhythmias or at baseline  Outcome: Progressing     Problem: Metabolic/Fluid and Electrolytes - Adult  Goal: Electrolytes maintained within normal limits  Outcome: Progressing     Problem: Metabolic/Fluid and Electrolytes - Adult  Goal: Hemodynamic stability and optimal renal function maintained  Outcome: Progressing     Problem: Neurosensory - Adult  Goal: Achieves stable or improved neurological status  Outcome: Progressing     Problem: Neurosensory - Adult  Goal: Absence of seizures  Outcome: Progressing     Problem: Hematologic - Adult  Goal: Maintains hematologic stability  Outcome: Progressing     Problem: Safety - Medical Restraint  Goal: Remains free of injury from restraints (Restraint for Interference with Medical Device)  Description: INTERVENTIONS:  1. Determine that other, less restrictive measures have been tried or would not be effective before applying the restraint  2. Evaluate the patient's condition at the time of restraint application  3. Inform patient/family regarding the reason for restraint  4. Q2H: Monitor safety, psychosocial status, comfort, nutrition and hydration  Outcome: Progressing     Problem: Pain  Goal:

## 2024-01-29 NOTE — CONSULTS
Clinical Pharmacy Progress Note    Vancomycin - Management by Pharmacy    Consult Date(s): 1/27/24  Consulting Provider(s): Yenifer Augustine APRN - CNP     Assessment / Plan  Sepsis - Vancomycin  Concurrent Antimicrobials: meropenem, remdesivir; ppx: valacyclovir, bactrim  Day of Vanc Therapy / Ordered Duration: Day 2  Current Dosing Method: Bayesian-Guided AUC Dosing  Therapeutic Goal: -600 mg/L*hr  Current Dose / Plan:   Scr is 1.0 --> 1.4 mg/dL --> 1.1  (back to baseline 1-1.1 mg/dL)   UOP adequate at 2.5 mL/kg/hr  Currently on vancomycin 1000 mg q24h - decreased due to decline in CrCl  With return to baseline renal function, will resume 750 mg q12h  Random level this AM = 11.8 mg/dL  Predicated AUC = 505 mg/L*hr, Tr = 15.2 mg/L.  Will check level in ~48 hours  Will continue to monitor clinical condition and make adjustments to regimen as appropriate.    Thank you for consulting pharmacy,    Amna Purcell, PharmD  [facility] Clinical Pharmacist  Phone: [phone]    1/29/2024 12:55 PM      Interval update: Patient now intubated and sedated, on pressors. Possible extubation today per pulm. Levaquin and vori d/c due to Torsades. Rhythm stable today.    Subjective/Objective:   Zain Lopez is a 68 y.o. 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 He is admitted with multifocal pneumonia and worsening depression.     Pharmacy is consulted to dose vancomycin.    Ht Readings from Last 1 Encounters:   01/23/24 1.778 m (5' 10\")     Wt Readings from Last 1 Encounters:   01/26/24 62.4 kg (137 lb 9.1 oz)       Vancomycin Level(s) / Doses:    Date Time Dose Type of Level / Level  Interpretation   1/25 0438  Random = 15.7 mg/L Vanc d/c   1/28 0215 750 mg q12h Random = 18.7 mg/L 3 hr level  Decrease to 1000 mg q24h   1/29 0400 1000 mg q24h Random = 11.8 mg/L Increase to 750 mg q12h   Note: Serum levels collected for AUC-based dosing may be high if collected in close proximity to the dose administered. This is not necessarily indicative of toxicity.    Cultures & Sensitivities:    Date Site Micro Susceptibility / Result   1/23 Blood  NGTD    1/23 Resp PCR (PJP) Sent - [facility]    1/24 MRSA nares  Negative     1/24 Strep/legionella Negative    1/25 AFB Negative    1/27 Blood x 2 NGTD    1/27 Urine Cx NGTD    1/27 Blood fungal x2 NGTD    1/28 Covid rapid Positive      Recent Labs     01/27/24  0425 01/28/24  0215 01/28/24  1314 01/29/24  0400   CREATININE 1.0 1.4* 1.3 1.1   BUN 21* 34* 36* 31*   WBC 15.5* 20.0*  --  10.3         Estimated Creatinine Clearance: 55 mL/min (based on SCr of 1.1 mg/dL).    Additional Lab Values / Findings of Note:    Recent Labs     01/27/24  0923 01/28/24  0215 01/29/24  0400   PROCAL 4.27* 3.41* 1.60*

## 2024-01-30 LAB
ALBUMIN SERPL-MCNC: 2.6 G/DL (ref 3.4–5)
ALP SERPL-CCNC: 115 U/L (ref 40–129)
ALT SERPL-CCNC: 18 U/L (ref 10–40)
ANION GAP SERPL CALCULATED.3IONS-SCNC: 10 MMOL/L (ref 3–16)
ANION GAP SERPL CALCULATED.3IONS-SCNC: 8 MMOL/L (ref 3–16)
ANISOCYTOSIS BLD QL SMEAR: ABNORMAL
AST SERPL-CCNC: 25 U/L (ref 15–37)
BASOPHILS # BLD: 0 K/UL (ref 0–0.2)
BASOPHILS NFR BLD: 0 %
BILIRUB DIRECT SERPL-MCNC: <0.2 MG/DL (ref 0–0.3)
BILIRUB INDIRECT SERPL-MCNC: ABNORMAL MG/DL (ref 0–1)
BILIRUB SERPL-MCNC: <0.2 MG/DL (ref 0–1)
BUN SERPL-MCNC: 31 MG/DL (ref 7–20)
BUN SERPL-MCNC: 36 MG/DL (ref 7–20)
CALCIUM SERPL-MCNC: 8.8 MG/DL (ref 8.3–10.6)
CALCIUM SERPL-MCNC: 9 MG/DL (ref 8.3–10.6)
CHLORIDE SERPL-SCNC: 103 MMOL/L (ref 99–110)
CHLORIDE SERPL-SCNC: 104 MMOL/L (ref 99–110)
CO2 SERPL-SCNC: 27 MMOL/L (ref 21–32)
CO2 SERPL-SCNC: 31 MMOL/L (ref 21–32)
CREAT SERPL-MCNC: 0.9 MG/DL (ref 0.8–1.3)
CREAT SERPL-MCNC: 0.9 MG/DL (ref 0.8–1.3)
DACRYOCYTES BLD QL SMEAR: ABNORMAL
DEPRECATED RDW RBC AUTO: 18.8 % (ref 12.4–15.4)
EOSINOPHIL # BLD: 0 K/UL (ref 0–0.6)
EOSINOPHIL NFR BLD: 0 %
GFR SERPLBLD CREATININE-BSD FMLA CKD-EPI: >60 ML/MIN/{1.73_M2}
GFR SERPLBLD CREATININE-BSD FMLA CKD-EPI: >60 ML/MIN/{1.73_M2}
GLUCOSE BLD-MCNC: 138 MG/DL (ref 70–99)
GLUCOSE BLD-MCNC: 150 MG/DL (ref 70–99)
GLUCOSE BLD-MCNC: 87 MG/DL (ref 70–99)
GLUCOSE SERPL-MCNC: 106 MG/DL (ref 70–99)
GLUCOSE SERPL-MCNC: 163 MG/DL (ref 70–99)
H CAPSUL AB TITR SER ID: NOT DETECTED {TITER}
HCT VFR BLD AUTO: 24.3 % (ref 40.5–52.5)
HGB BLD-MCNC: 7.8 G/DL (ref 13.5–17.5)
INR PPP: 1.52 (ref 0.84–1.16)
LYMPHOCYTES # BLD: 0.1 K/UL (ref 1–5.1)
LYMPHOCYTES NFR BLD: 1 %
MCH RBC QN AUTO: 29 PG (ref 26–34)
MCHC RBC AUTO-ENTMCNC: 32.2 G/DL (ref 31–36)
MCV RBC AUTO: 90.2 FL (ref 80–100)
MICROCYTES BLD QL SMEAR: ABNORMAL
MONOCYTES # BLD: 0.2 K/UL (ref 0–1.3)
MONOCYTES NFR BLD: 2 %
NEUTROPHILS # BLD: 10.4 K/UL (ref 1.7–7.7)
NEUTROPHILS NFR BLD: 97 %
NRBC BLD-RTO: 2 /100 WBC
OVALOCYTES BLD QL SMEAR: ABNORMAL
P JIROVECII DNA SPEC QL NAA+PROBE: ABNORMAL
PERFORMED ON: ABNORMAL
PERFORMED ON: ABNORMAL
PERFORMED ON: NORMAL
PHOSPHATE SERPL-MCNC: 2.9 MG/DL (ref 2.5–4.9)
PLATELET # BLD AUTO: 163 K/UL (ref 135–450)
PMV BLD AUTO: 9.8 FL (ref 5–10.5)
POTASSIUM SERPL-SCNC: 4.2 MMOL/L (ref 3.5–5.1)
POTASSIUM SERPL-SCNC: 4.3 MMOL/L (ref 3.5–5.1)
PROCALCITONIN SERPL IA-MCNC: 0.91 NG/ML (ref 0–0.15)
PROT SERPL-MCNC: 5 G/DL (ref 6.4–8.2)
PROTHROMBIN TIME: 18.3 SEC (ref 11.5–14.8)
RBC # BLD AUTO: 2.7 M/UL (ref 4.2–5.9)
SCHISTOCYTES BLD QL SMEAR: ABNORMAL
SODIUM SERPL-SCNC: 141 MMOL/L (ref 136–145)
SODIUM SERPL-SCNC: 142 MMOL/L (ref 136–145)
SPECIMEN SOURCE: ABNORMAL
STOMATOCYTES BLD QL SMEAR: ABNORMAL
TRIGL SERPL-MCNC: 89 MG/DL (ref 0–150)
WBC # BLD AUTO: 10.7 K/UL (ref 4–11)

## 2024-01-30 PROCEDURE — 80048 BASIC METABOLIC PNL TOTAL CA: CPT

## 2024-01-30 PROCEDURE — 6360000002 HC RX W HCPCS: Performed by: INTERNAL MEDICINE

## 2024-01-30 PROCEDURE — 2580000003 HC RX 258: Performed by: NURSE PRACTITIONER

## 2024-01-30 PROCEDURE — 85025 COMPLETE CBC W/AUTO DIFF WBC: CPT

## 2024-01-30 PROCEDURE — 2500000003 HC RX 250 WO HCPCS: Performed by: INTERNAL MEDICINE

## 2024-01-30 PROCEDURE — 6370000000 HC RX 637 (ALT 250 FOR IP): Performed by: NURSE PRACTITIONER

## 2024-01-30 PROCEDURE — 99232 SBSQ HOSP IP/OBS MODERATE 35: CPT | Performed by: INTERNAL MEDICINE

## 2024-01-30 PROCEDURE — 6360000002 HC RX W HCPCS: Performed by: NURSE PRACTITIONER

## 2024-01-30 PROCEDURE — 84478 ASSAY OF TRIGLYCERIDES: CPT

## 2024-01-30 PROCEDURE — 94003 VENT MGMT INPAT SUBQ DAY: CPT

## 2024-01-30 PROCEDURE — 2000000000 HC ICU R&B

## 2024-01-30 PROCEDURE — 2500000003 HC RX 250 WO HCPCS: Performed by: NURSE PRACTITIONER

## 2024-01-30 PROCEDURE — 2700000000 HC OXYGEN THERAPY PER DAY

## 2024-01-30 PROCEDURE — C9113 INJ PANTOPRAZOLE SODIUM, VIA: HCPCS | Performed by: NURSE PRACTITIONER

## 2024-01-30 PROCEDURE — 94761 N-INVAS EAR/PLS OXIMETRY MLT: CPT

## 2024-01-30 PROCEDURE — 84145 PROCALCITONIN (PCT): CPT

## 2024-01-30 PROCEDURE — 2580000003 HC RX 258: Performed by: INTERNAL MEDICINE

## 2024-01-30 PROCEDURE — 6370000000 HC RX 637 (ALT 250 FOR IP): Performed by: INTERNAL MEDICINE

## 2024-01-30 PROCEDURE — 84100 ASSAY OF PHOSPHORUS: CPT

## 2024-01-30 PROCEDURE — 99291 CRITICAL CARE FIRST HOUR: CPT | Performed by: INTERNAL MEDICINE

## 2024-01-30 PROCEDURE — 94640 AIRWAY INHALATION TREATMENT: CPT

## 2024-01-30 PROCEDURE — 80076 HEPATIC FUNCTION PANEL: CPT

## 2024-01-30 PROCEDURE — 85610 PROTHROMBIN TIME: CPT

## 2024-01-30 RX ORDER — FUROSEMIDE 10 MG/ML
40 INJECTION INTRAMUSCULAR; INTRAVENOUS 2 TIMES DAILY
Status: DISCONTINUED | OUTPATIENT
Start: 2024-01-30 | End: 2024-01-31

## 2024-01-30 RX ADMIN — VALACYCLOVIR HYDROCHLORIDE 500 MG: 500 TABLET, FILM COATED ORAL at 10:52

## 2024-01-30 RX ADMIN — METOPROLOL TARTRATE 2.5 MG: 1 INJECTION, SOLUTION INTRAVENOUS at 15:13

## 2024-01-30 RX ADMIN — SULFAMETHOXAZOLE AND TRIMETHOPRIM 400 MG: 80; 16 INJECTION, SOLUTION, CONCENTRATE INTRAVENOUS at 15:11

## 2024-01-30 RX ADMIN — MEROPENEM 1000 MG: 1 INJECTION INTRAVENOUS at 02:01

## 2024-01-30 RX ADMIN — WATER 60 MG: 1 INJECTION INTRAMUSCULAR; INTRAVENOUS; SUBCUTANEOUS at 09:19

## 2024-01-30 RX ADMIN — SODIUM CHLORIDE 3 MCG/MIN: 9 INJECTION, SOLUTION INTRAVENOUS at 11:21

## 2024-01-30 RX ADMIN — SODIUM CHLORIDE 15 ML: 900 IRRIGANT IRRIGATION at 12:53

## 2024-01-30 RX ADMIN — SULFAMETHOXAZOLE AND TRIMETHOPRIM 400 MG: 80; 16 INJECTION, SOLUTION, CONCENTRATE INTRAVENOUS at 21:14

## 2024-01-30 RX ADMIN — PANTOPRAZOLE SODIUM 40 MG: 40 INJECTION, POWDER, FOR SOLUTION INTRAVENOUS at 10:53

## 2024-01-30 RX ADMIN — Medication 20 MCG/HR: at 08:52

## 2024-01-30 RX ADMIN — SENNOSIDES AND DOCUSATE SODIUM 2 TABLET: 50; 8.6 TABLET ORAL at 10:51

## 2024-01-30 RX ADMIN — APIXABAN 5 MG: 5 TABLET, FILM COATED ORAL at 10:51

## 2024-01-30 RX ADMIN — POLYETHYLENE GLYCOL 3350 17 G: 17 POWDER, FOR SOLUTION ORAL at 10:51

## 2024-01-30 RX ADMIN — Medication 1 TABLET: at 10:51

## 2024-01-30 RX ADMIN — PROPOFOL 30 MCG/KG/MIN: 10 INJECTION, EMULSION INTRAVENOUS at 17:21

## 2024-01-30 RX ADMIN — IPRATROPIUM BROMIDE AND ALBUTEROL SULFATE 1 DOSE: 2.5; .5 SOLUTION RESPIRATORY (INHALATION) at 12:13

## 2024-01-30 RX ADMIN — FUROSEMIDE 40 MG: 10 INJECTION, SOLUTION INTRAMUSCULAR; INTRAVENOUS at 17:22

## 2024-01-30 RX ADMIN — IPRATROPIUM BROMIDE AND ALBUTEROL SULFATE 1 DOSE: 2.5; .5 SOLUTION RESPIRATORY (INHALATION) at 20:07

## 2024-01-30 RX ADMIN — PROPOFOL 20 MCG/KG/MIN: 10 INJECTION, EMULSION INTRAVENOUS at 07:52

## 2024-01-30 RX ADMIN — VANCOMYCIN HYDROCHLORIDE 750 MG: 10 INJECTION, POWDER, LYOPHILIZED, FOR SOLUTION INTRAVENOUS at 20:39

## 2024-01-30 RX ADMIN — LEVETIRACETAM 500 MG: 500 TABLET, FILM COATED ORAL at 20:43

## 2024-01-30 RX ADMIN — SODIUM CHLORIDE 2 MCG/MIN: 9 INJECTION, SOLUTION INTRAVENOUS at 12:53

## 2024-01-30 RX ADMIN — MEROPENEM 1000 MG: 1 INJECTION INTRAVENOUS at 17:23

## 2024-01-30 RX ADMIN — SODIUM CHLORIDE 15 ML: 900 IRRIGANT IRRIGATION at 17:26

## 2024-01-30 RX ADMIN — IPRATROPIUM BROMIDE AND ALBUTEROL SULFATE 1 DOSE: 2.5; .5 SOLUTION RESPIRATORY (INHALATION) at 16:09

## 2024-01-30 RX ADMIN — METOPROLOL TARTRATE 2.5 MG: 1 INJECTION, SOLUTION INTRAVENOUS at 20:43

## 2024-01-30 RX ADMIN — IPRATROPIUM BROMIDE AND ALBUTEROL SULFATE 1 DOSE: 2.5; .5 SOLUTION RESPIRATORY (INHALATION) at 08:04

## 2024-01-30 RX ADMIN — Medication 100 MG: at 10:51

## 2024-01-30 RX ADMIN — APIXABAN 5 MG: 5 TABLET, FILM COATED ORAL at 20:43

## 2024-01-30 RX ADMIN — VALACYCLOVIR HYDROCHLORIDE 500 MG: 500 TABLET, FILM COATED ORAL at 20:43

## 2024-01-30 RX ADMIN — REMDESIVIR 100 MG: 100 INJECTION, POWDER, LYOPHILIZED, FOR SOLUTION INTRAVENOUS at 20:38

## 2024-01-30 RX ADMIN — SODIUM CHLORIDE 15 ML: 900 IRRIGANT IRRIGATION at 20:56

## 2024-01-30 RX ADMIN — LEVETIRACETAM 500 MG: 500 TABLET, FILM COATED ORAL at 10:52

## 2024-01-30 RX ADMIN — VANCOMYCIN HYDROCHLORIDE 750 MG: 10 INJECTION, POWDER, LYOPHILIZED, FOR SOLUTION INTRAVENOUS at 09:39

## 2024-01-30 RX ADMIN — SODIUM CHLORIDE 15 ML: 900 IRRIGANT IRRIGATION at 05:27

## 2024-01-30 RX ADMIN — SODIUM CHLORIDE, PRESERVATIVE FREE 10 ML: 5 INJECTION INTRAVENOUS at 20:42

## 2024-01-30 RX ADMIN — METOPROLOL TARTRATE 2.5 MG: 1 INJECTION, SOLUTION INTRAVENOUS at 05:27

## 2024-01-30 RX ADMIN — MEROPENEM 1000 MG: 1 INJECTION INTRAVENOUS at 09:19

## 2024-01-30 RX ADMIN — FUROSEMIDE 40 MG: 10 INJECTION, SOLUTION INTRAMUSCULAR; INTRAVENOUS at 09:36

## 2024-01-30 RX ADMIN — SODIUM CHLORIDE, PRESERVATIVE FREE 10 ML: 5 INJECTION INTRAVENOUS at 09:23

## 2024-01-30 ASSESSMENT — PULMONARY FUNCTION TESTS
PIF_VALUE: 14
PIF_VALUE: 17
PIF_VALUE: 15
PIF_VALUE: 10
PIF_VALUE: 15
PIF_VALUE: 11
PIF_VALUE: 15
PIF_VALUE: 14
PIF_VALUE: 15
PIF_VALUE: 17
PIF_VALUE: 15
PIF_VALUE: 16
PIF_VALUE: 18
PIF_VALUE: 17
PIF_VALUE: 16
PIF_VALUE: 16
PIF_VALUE: 15
PIF_VALUE: 16
PIF_VALUE: 17
PIF_VALUE: 15
PIF_VALUE: 16
PIF_VALUE: 18
PIF_VALUE: 15
PIF_VALUE: 15
PIF_VALUE: 16
PIF_VALUE: 18
PIF_VALUE: 15
PIF_VALUE: 20
PIF_VALUE: 18

## 2024-01-30 ASSESSMENT — PAIN SCALES - GENERAL
PAINLEVEL_OUTOF10: 0
PAINLEVEL_OUTOF10: 0

## 2024-01-30 NOTE — PROGRESS NOTES
1.54* 1.74* 1.52*       Uric Acid No results for input(s): \"LABURIC\" in the last 72 hours.    PROBLEM LIST:           1.  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  BPH  5.  GERD  6.  Depressed mood   7.  CHB w/ left SC dual chamber pacemaker  8.  HTN  9. C-Diff  10. Staph Epi Bacteremia  11. DVT  12. JOEL      TREATMENT:            DLBCL:  1. R-CHOP (10/26/21 - 2/11/22)  CNS Relapse (7/2023):  2.  R+MPV x 6 cycles (7/6/23 - 9/14/23)  3.  Thiotepa, Busulfan, and Cytoxan & ASCT (11/3/23)      ASSESSMENT AND PLAN:           1. DLBC NHL w/ CNS relapse: Patient currently in [place]  - CSF (4/21/23): no malignant cells, flow: negative  - Brain bx (4/28/23, [facility]):  T cell infiltrate but not consistent w/ lymphoma, sent to [facility] for second opinion and consistent with reactive t cell population  - CT CAP (4/10/23):  no evidence of systemic disease  - MRI brain (6/1/23): Significantly decreased cerebral periventricular lymphoma and vasogenic edema compatible with positive response to treatment. Chronic postoperative changes of right parietal lobe periventricular biopsy without evidence for complication  - MRI brain (6/28/23):Significant increase in left periventricular hypercellular enhancing mass and vasogenic edema, which remains concerning for primary CNS lymphoma.  Progressed local mass effect with 4 mm left to right midline shift.  The dominant measurable portion of which measures 5.5 x 2.6 cm (series 13, image 76), previously with only stippled areas of enhancement in this region  - Left periventricular mass biopsy (7/3/23):  - Involved with B-cell lymphoma morphologically with aggressive features.  - MRI brain (8/29/23): Significant interval improvement in the irregular enhancement involving the intra-axial brain in the left hemisphere adjacent to the left lateral ventricle compared to the  prior MRI from 6/28/2023. Significant interval improvement in the surrounding vasogenic edema and T2/FLAIR hyperintense signal involving left greater than right hemispheres. Currently, there is residual T2 and FLAIR hyperintense signal seen involving the left greater than right periventricular white matter regions, as well as the posterior corpus callosum. There is a small amount of residual enhancement seen adjacent to the atrium of the left lateral ventricle as described.  - MRI brain (9/29/23): Decreased enhancement centered about the left periatrial region, w/ no significant interval change in confluent FLAIR signal abnormality. These findings are nonspecific, potentially treatment related, although residual lymphoma is difficult to exclude.     - S/p Thiotepa, Busulfan, and Cytoxan & ASCT (11/3/23)  Post-Txp Maintenance: None  Post-Txp F/u: MRI brain w/ & w/out contrast (First week Feb)     Day + 88    2. ID: Multifocal PNA POA, Afebrile but continued hypoxia. COVID19+  - ID following  - Blood cx (1/23/24): NG; Pan-cx 1/27/24 with sepsis NGTD  - RVP (1/23/24): Neg; Rapid COVID 1/28 POS  - MRSA nasal swab 1/24/24: Neg  - Strep and Legionella UrAg 1/24/24: Neg  - S/p bronchoscopy 1/25: Cxs NG, CMV neg, cytology neg for fungal or PJP,   - Fungal serologies 1/24: histo, blasto, fungitel, & galactomannan all neg  - Repeat 1/28 - pending  - Procalcitonin 1/27 - 4.27--3.41--1.6--0.91    - Cont remdesivir Day +3 (1/28/24)  - Cont to Merrem Day +7 (1/24/24)  - Cont IV Vanco Day +7 (1/24/24)    - Cont solumedrol:  - 40mg BID (1mg/kg) Day +5 (1/25/24)-  - Reduce the dose to 1mg/kg of Solumedrol - 60mg daily 1/29/24  - Cont Valtrex & Bactrim DS MWF ppx  - IgG 1/24 219. S/p IVIG 1/26/24    Abx Hx:  Levaquin 1/24-1/28/24  IV Vanco 1/24-  IV Tx Dose Bactrim 1/25-1/29/24  Voriconazole 1/25-1/28/24    3. Pulmonary: acute respiratory failure POA - pneumonia  - ID eval & tx as above  - Pulm following, s/p bronch 1/25/24  - S/p

## 2024-01-30 NOTE — PROGRESS NOTES
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  Has not improved with antibacterials prior to this admission   S/p bronch 1/25/24, GNR and budding yeast seen on stains from BAL on 1/25,  secondary slide review showing the same.    Cytology GMS stain negative for fungus and PJP organisms, PJP PCR indeterminate      New diagnosis COVID by [facility] this admit, neg on recent BAL PCR on 1/25 and now pos per rapid swab on 1/28.  Wife ill since 1/23/24 or 1/24/24 and likely source of infection.  This is not suspected to be culprit for the worsening hypoxemia in the last several days, though he is at high risk of complication      Torsades and VT arrest 1/28/24  Was on FQ, azole, both of which have been stopped 1/28  Leukocytosis and JOEL post-arrest      Hypogammaglobulinemia s/p IVIG      History of CDI 11/2023      No abx allergies         Recs:     - with indeterminate PCP PCR, would recommend continue treatment dose bactrim and steroid taper for 21 days followed by secondary prophy dose after completion of treatment dose as long as he remains neutropenic.   - consider stopping other antibiotics vanco, ananda tomorrow as BAL cx finalizes without growth.    -repeat tests for histo and other dimorphs.  Risk of empiric azoles here is high in light of recent torsades and VT arrest and agree with stopping. No growth in any BAL fungal or AFB  cx.    -high risk of complication from COVID.  resumed Remdesivir x 5 day course.    - following BAL results.             Medical Decision Making:  The following items were considered in medical decision making:  Discussion of patient care with other providers  Reviewed clinical lab tests  Reviewed radiology tests  Reviewed other diagnostic tests/interventions  Independent review of radiologic images  Microbiology cultures and other micro tests reviewed      Discussed with wife and RN at bedside, primary team.    Anamika Grullon MD

## 2024-01-30 NOTE — PLAN OF CARE
Problem: Safety - Adult  Goal: Free from fall injury  Outcome: Progressing     Problem: ABCDS Injury Assessment  Goal: Absence of physical injury  Outcome: Progressing     Problem: Chronic Conditions and Co-morbidities  Goal: Patient's chronic conditions and co-morbidity symptoms are monitored and maintained or improved  Outcome: Progressing     Problem: Respiratory - Adult  Goal: Achieves optimal ventilation and oxygenation  Outcome: Progressing  Flowsheets (Taken 1/30/2024 0800)  Achieves optimal ventilation and oxygenation:   Assess for changes in respiratory status   Assess for changes in mentation and behavior   Position to facilitate oxygenation and minimize respiratory effort   Oxygen supplementation based on oxygen saturation or arterial blood gases   Encourage broncho-pulmonary hygiene including cough, deep breathe, incentive spirometry     Problem: Cardiovascular - Adult  Goal: Maintains optimal cardiac output and hemodynamic stability  Outcome: Progressing  Goal: Absence of cardiac dysrhythmias or at baseline  Outcome: Progressing     Problem: Metabolic/Fluid and Electrolytes - Adult  Goal: Electrolytes maintained within normal limits  Outcome: Progressing  Goal: Hemodynamic stability and optimal renal function maintained  Outcome: Progressing     Problem: Neurosensory - Adult  Goal: Achieves stable or improved neurological status  Outcome: Progressing  Flowsheets (Taken 1/30/2024 0800)  Achieves stable or improved neurological status:   Assess for and report changes in neurological status   Initiate measures to prevent increased intracranial pressure   Maintain blood pressure and fluid volume within ordered parameters to optimize cerebral perfusion and minimize risk of hemorrhage   Monitor temperature, glucose, and sodium. Initiate appropriate interventions as ordered  Goal: Absence of seizures  Outcome: Progressing  Flowsheets (Taken 1/30/2024 0800)  Absence of seizures:   Monitor for seizure

## 2024-01-30 NOTE — PROGRESS NOTES
Pulmonary Medicine Follow-up Note    CC: KENNEDY, acute hypoxemic resp failure, abnl CT Chest     SUBJECTIVE / INTERVAL HISTORY:  Failed SBT earlier this morning, fluid balance net positive yesterday.     ROS:  Denies headache, nausea or chest pain.    24HR INTAKE/OUTPUT:    Intake/Output Summary (Last 24 hours) at 2024 0850  Last data filed at 2024 0641  Gross per 24 hour   Intake 1940.08 ml   Output 1237 ml   Net 703.08 ml          vancomycin  750 mg IntraVENous Q12H    sulfamethoxazole-trimethoprim  1 tablet Oral Once per day on     methylPREDNISolone  60 mg IntraVENous Daily    thiamine  100 mg Oral Daily    sennosides-docusate sodium  2 tablet Oral Daily    polyethylene glycol  17 g Oral Daily    insulin lispro  0-4 Units SubCUTAneous Q6H    ipratropium 0.5 mg-albuterol 2.5 mg  1 Dose Inhalation 4x Daily RT    metoprolol  2.5 mg IntraVENous Q8H    remdesivir 100 mg in sodium chloride 0.9 % 250 mL IVPB  100 mg IntraVENous Q24H    pantoprazole  40 mg IntraVENous Daily    meropenem  1,000 mg IntraVENous Q8H    apixaban  5 mg Oral BID    levETIRAcetam  500 mg Oral BID    therapeutic multivitamin-minerals  1 tablet Oral Daily    [Held by provider] tamsulosin  0.4 mg Oral Daily    valACYclovir  500 mg Oral BID    sodium chloride flush  5-40 mL IntraVENous 2 times per day    Saline Mouthwash  15 mL Swish & Spit 4x Daily AC & HS       PHYSICAL EXAMINATION:  /60   Pulse 81   Temp 98.2 °F (36.8 °C)   Resp 24   Ht 1.778 m (5' 10\")   Wt 63.8 kg (140 lb 10.5 oz)   SpO2 94%   BMI 20.18 kg/m²   CURRENT PULSE OXIMETRY:  SpO2: 94 %  24HR PULSE OXIMETRY RANGE:  SpO2  Av.9 %  Min: 90 %  Max: 98 %     Ventilator settings  - Vent Mode: AC/PRVC  - Ventilator Day(s): 2  - Vt (Set, mL): 450 mL  - PEEP/CPAP (cmH2O): 5  - FiO2 : 60 %  - Resp Rate (Set): 16 bpm  - Peak Inspiratory Pressure (cmH2O): 16 cmH2O  - Plateau Pressure (cm H2O): 27 cm H2O    Gen: Intubated.   HEENT: PERRL, EOMI, OP nl  Lung:

## 2024-01-30 NOTE — PLAN OF CARE
Problem: Safety - Adult  Goal: Free from fall injury  Outcome: Progressing     Problem: ABCDS Injury Assessment  Goal: Absence of physical injury  Outcome: Progressing     Problem: Respiratory - Adult  Goal: Achieves optimal ventilation and oxygenation  Outcome: Progressing     Problem: Cardiovascular - Adult  Goal: Maintains optimal cardiac output and hemodynamic stability  Outcome: Progressing     Problem: Metabolic/Fluid and Electrolytes - Adult  Goal: Electrolytes maintained within normal limits  Outcome: Progressing     Problem: Neurosensory - Adult  Goal: Achieves stable or improved neurological status  Outcome: Progressing     Problem: Hematologic - Adult  Goal: Maintains hematologic stability  Outcome: Progressing     Problem: Safety - Medical Restraint  Goal: Remains free of injury from restraints (Restraint for Interference with Medical Device)  Description: INTERVENTIONS:  1. Determine that other, less restrictive measures have been tried or would not be effective before applying the restraint  2. Evaluate the patient's condition at the time of restraint application  3. Inform patient/family regarding the reason for restraint  4. Q2H: Monitor safety, psychosocial status, comfort, nutrition and hydration  Outcome: Progressing     Problem: Pain  Goal: Verbalizes/displays adequate comfort level or baseline comfort level  Outcome: Progressing     Problem: Nutrition Deficit:  Goal: Optimize nutritional status  Outcome: Progressing     Problem: Skin/Tissue Integrity  Goal: Absence of new skin breakdown  Description: 1.  Monitor for areas of redness and/or skin breakdown  2.  Assess vascular access sites hourly  3.  Every 4-6 hours minimum:  Change oxygen saturation probe site  4.  Every 4-6 hours:  If on nasal continuous positive airway pressure, respiratory therapy assess nares and determine need for appliance change or resting period.  Outcome: Progressing

## 2024-01-30 NOTE — PROGRESS NOTES
Pt weight 65.3 kg. Bed scale, bed includes lift pad, one flat sheet, one pillow, and Bruner empty.     Up from 63.8 kg on 1/29/24 per Flowsheets. 40 mEq Lasix administered, good urine output, >650 mL at this time. RN will continue monitoring closely.

## 2024-01-30 NOTE — RT PROTOCOL NOTE
RT Nebulizer Bronchodilator Protocol Note    There is a bronchodilator order in the chart from a provider indicating to follow the RT Bronchodilator Protocol and there is an “Initiate RT Bronchodilator Protocol” order as well (see protocol at bottom of note).    CXR Findings:  XR CHEST PORTABLE    Result Date: 1/29/2024  Stable appearance of the chest. Multifocal bilateral pulmonary opacities persist.      The findings from the last RT Protocol Assessment were as follows:  Smoking: None or smoker <15 pack years  Respiratory Pattern: Dyspnea on exertion or RR 21-25 bpm  Breath Sounds: Slightly diminished and/or crackles  Cough: Weak, productive  Indication for Bronchodilator Therapy: Decreased or absent breath sounds  Bronchodilator Assessment Score: 6    Aerosolized bronchodilator medication orders have been revised according to the RT Nebulizer Bronchodilator Protocol below.    Respiratory Therapist to perform RT Therapy Protocol Assessment initially then follow the protocol.  Repeat RT Therapy Protocol Assessment PRN for score 0-3 or on second treatment, BID, and PRN for scores above 3.    No Indications - adjust the frequency to every 6 hours PRN wheezing or bronchospasm, if no treatments needed after 48 hours then discontinue using Per Protocol order mode.     If indication present, adjust the RT bronchodilator orders based on the Bronchodilator Assessment Score as indicated below.  If a patient is on this medication at home then do not decrease Frequency below that used at home.    0-3 - enter or revise RT bronchodilator order(s) to equivalent RT Bronchodilator order with Frequency of every 4 hours PRN for wheezing or increased work of breathing using Per Protocol order mode.       4-6 - enter or revise RT Bronchodilator order(s) to two equivalent RT bronchodilator orders with one order with BID Frequency and one order with Frequency of every 4 hours PRN wheezing or increased work of breathing using Per  Protocol order mode.         7-10 - enter or revise RT Bronchodilator order(s) to two equivalent RT bronchodilator orders with one order with TID Frequency and one order with Frequency of every 4 hours PRN wheezing or increased work of breathing using Per Protocol order mode.       11-13 - enter or revise RT Bronchodilator order(s) to one equivalent RT bronchodilator order with QID Frequency and an Albuterol order with Frequency of every 4 hours PRN wheezing or increased work of breathing using Per Protocol order mode.      Greater than 13 - enter or revise RT Bronchodilator order(s) to one equivalent RT bronchodilator order with every 4 hours Frequency and an Albuterol order with Frequency of every 2 hours PRN wheezing or increased work of breathing using Per Protocol order mode.     RT to enter RT Home Evaluation for COPD & MDI Assessment order using Per Protocol order mode.    Electronically signed by Nadia Cantu RCP on 1/30/2024 at 8:25 AM

## 2024-01-31 LAB
ALBUMIN SERPL-MCNC: 2.7 G/DL (ref 3.4–5)
ALP SERPL-CCNC: 130 U/L (ref 40–129)
ALT SERPL-CCNC: 31 U/L (ref 10–40)
ANION GAP SERPL CALCULATED.3IONS-SCNC: 11 MMOL/L (ref 3–16)
ANION GAP SERPL CALCULATED.3IONS-SCNC: 11 MMOL/L (ref 3–16)
ANISOCYTOSIS BLD QL SMEAR: ABNORMAL
AST SERPL-CCNC: 62 U/L (ref 15–37)
BACTERIA BLD CULT ORG #2: NORMAL
BACTERIA BLD CULT: NORMAL
BASOPHILS # BLD: 0 K/UL (ref 0–0.2)
BASOPHILS NFR BLD: 0 %
BILIRUB DIRECT SERPL-MCNC: <0.2 MG/DL (ref 0–0.3)
BILIRUB INDIRECT SERPL-MCNC: ABNORMAL MG/DL (ref 0–1)
BILIRUB SERPL-MCNC: 0.4 MG/DL (ref 0–1)
BUN SERPL-MCNC: 36 MG/DL (ref 7–20)
BUN SERPL-MCNC: 39 MG/DL (ref 7–20)
CALCIUM SERPL-MCNC: 8.7 MG/DL (ref 8.3–10.6)
CALCIUM SERPL-MCNC: 8.9 MG/DL (ref 8.3–10.6)
CHLORIDE SERPL-SCNC: 100 MMOL/L (ref 99–110)
CHLORIDE SERPL-SCNC: 96 MMOL/L (ref 99–110)
CO2 SERPL-SCNC: 30 MMOL/L (ref 21–32)
CO2 SERPL-SCNC: 31 MMOL/L (ref 21–32)
CREAT SERPL-MCNC: 1 MG/DL (ref 0.8–1.3)
CREAT SERPL-MCNC: 1.1 MG/DL (ref 0.8–1.3)
DACRYOCYTES BLD QL SMEAR: ABNORMAL
DEPRECATED RDW RBC AUTO: 19.2 % (ref 12.4–15.4)
EOSINOPHIL # BLD: 0.3 K/UL (ref 0–0.6)
EOSINOPHIL NFR BLD: 4 %
GFR SERPLBLD CREATININE-BSD FMLA CKD-EPI: >60 ML/MIN/{1.73_M2}
GFR SERPLBLD CREATININE-BSD FMLA CKD-EPI: >60 ML/MIN/{1.73_M2}
GLUCOSE BLD-MCNC: 151 MG/DL (ref 70–99)
GLUCOSE BLD-MCNC: 98 MG/DL (ref 70–99)
GLUCOSE SERPL-MCNC: 142 MG/DL (ref 70–99)
GLUCOSE SERPL-MCNC: 92 MG/DL (ref 70–99)
HCT VFR BLD AUTO: 26.1 % (ref 40.5–52.5)
HGB BLD-MCNC: 8.5 G/DL (ref 13.5–17.5)
INR PPP: 1.54 (ref 0.84–1.16)
LDH SERPL L TO P-CCNC: 443 U/L (ref 100–190)
LYMPHOCYTES # BLD: 0.4 K/UL (ref 1–5.1)
LYMPHOCYTES NFR BLD: 5 %
MAGNESIUM SERPL-MCNC: 2.1 MG/DL (ref 1.8–2.4)
MCH RBC QN AUTO: 29 PG (ref 26–34)
MCHC RBC AUTO-ENTMCNC: 32.6 G/DL (ref 31–36)
MCV RBC AUTO: 89.1 FL (ref 80–100)
MICROCYTES BLD QL SMEAR: ABNORMAL
MONOCYTES # BLD: 0.2 K/UL (ref 0–1.3)
MONOCYTES NFR BLD: 3 %
NEUTROPHILS # BLD: 7.2 K/UL (ref 1.7–7.7)
NEUTROPHILS NFR BLD: 88 %
NRBC BLD-RTO: 7 /100 WBC
OVALOCYTES BLD QL SMEAR: ABNORMAL
PERFORMED ON: ABNORMAL
PERFORMED ON: NORMAL
PHOSPHATE SERPL-MCNC: 3.4 MG/DL (ref 2.5–4.9)
PLATELET # BLD AUTO: 122 K/UL (ref 135–450)
PMV BLD AUTO: 9 FL (ref 5–10.5)
POIKILOCYTOSIS BLD QL SMEAR: ABNORMAL
POTASSIUM SERPL-SCNC: 3.9 MMOL/L (ref 3.5–5.1)
POTASSIUM SERPL-SCNC: 4.5 MMOL/L (ref 3.5–5.1)
PROT SERPL-MCNC: 5 G/DL (ref 6.4–8.2)
PROTHROMBIN TIME: 18.5 SEC (ref 11.5–14.8)
RBC # BLD AUTO: 2.93 M/UL (ref 4.2–5.9)
SCHISTOCYTES BLD QL SMEAR: ABNORMAL
SODIUM SERPL-SCNC: 138 MMOL/L (ref 136–145)
SODIUM SERPL-SCNC: 141 MMOL/L (ref 136–145)
WBC # BLD AUTO: 8.2 K/UL (ref 4–11)

## 2024-01-31 PROCEDURE — 6370000000 HC RX 637 (ALT 250 FOR IP): Performed by: INTERNAL MEDICINE

## 2024-01-31 PROCEDURE — 94003 VENT MGMT INPAT SUBQ DAY: CPT

## 2024-01-31 PROCEDURE — 2500000003 HC RX 250 WO HCPCS: Performed by: NURSE PRACTITIONER

## 2024-01-31 PROCEDURE — 2580000003 HC RX 258: Performed by: NURSE PRACTITIONER

## 2024-01-31 PROCEDURE — 99232 SBSQ HOSP IP/OBS MODERATE 35: CPT | Performed by: INTERNAL MEDICINE

## 2024-01-31 PROCEDURE — 80048 BASIC METABOLIC PNL TOTAL CA: CPT

## 2024-01-31 PROCEDURE — 85025 COMPLETE CBC W/AUTO DIFF WBC: CPT

## 2024-01-31 PROCEDURE — 6370000000 HC RX 637 (ALT 250 FOR IP): Performed by: NURSE PRACTITIONER

## 2024-01-31 PROCEDURE — 83735 ASSAY OF MAGNESIUM: CPT

## 2024-01-31 PROCEDURE — 2500000003 HC RX 250 WO HCPCS: Performed by: INTERNAL MEDICINE

## 2024-01-31 PROCEDURE — C9113 INJ PANTOPRAZOLE SODIUM, VIA: HCPCS | Performed by: NURSE PRACTITIONER

## 2024-01-31 PROCEDURE — 2000000000 HC ICU R&B

## 2024-01-31 PROCEDURE — 85610 PROTHROMBIN TIME: CPT

## 2024-01-31 PROCEDURE — 6360000002 HC RX W HCPCS: Performed by: NURSE PRACTITIONER

## 2024-01-31 PROCEDURE — 6360000002 HC RX W HCPCS: Performed by: INTERNAL MEDICINE

## 2024-01-31 PROCEDURE — 2700000000 HC OXYGEN THERAPY PER DAY

## 2024-01-31 PROCEDURE — 2580000003 HC RX 258: Performed by: INTERNAL MEDICINE

## 2024-01-31 PROCEDURE — 94640 AIRWAY INHALATION TREATMENT: CPT

## 2024-01-31 PROCEDURE — 84100 ASSAY OF PHOSPHORUS: CPT

## 2024-01-31 PROCEDURE — 94761 N-INVAS EAR/PLS OXIMETRY MLT: CPT

## 2024-01-31 PROCEDURE — 99291 CRITICAL CARE FIRST HOUR: CPT | Performed by: INTERNAL MEDICINE

## 2024-01-31 PROCEDURE — 83615 LACTATE (LD) (LDH) ENZYME: CPT

## 2024-01-31 PROCEDURE — 80076 HEPATIC FUNCTION PANEL: CPT

## 2024-01-31 RX ORDER — FUROSEMIDE 10 MG/ML
60 INJECTION INTRAMUSCULAR; INTRAVENOUS 2 TIMES DAILY
Status: DISCONTINUED | OUTPATIENT
Start: 2024-01-31 | End: 2024-01-31

## 2024-01-31 RX ORDER — BISACODYL 10 MG
10 SUPPOSITORY, RECTAL RECTAL ONCE
Status: COMPLETED | OUTPATIENT
Start: 2024-01-31 | End: 2024-01-31

## 2024-01-31 RX ORDER — FUROSEMIDE 10 MG/ML
20 INJECTION INTRAMUSCULAR; INTRAVENOUS ONCE
Status: COMPLETED | OUTPATIENT
Start: 2024-01-31 | End: 2024-01-31

## 2024-01-31 RX ORDER — FUROSEMIDE 10 MG/ML
40 INJECTION INTRAMUSCULAR; INTRAVENOUS DAILY
Status: DISCONTINUED | OUTPATIENT
Start: 2024-02-01 | End: 2024-02-02

## 2024-01-31 RX ADMIN — SULFAMETHOXAZOLE AND TRIMETHOPRIM 400 MG: 80; 16 INJECTION, SOLUTION, CONCENTRATE INTRAVENOUS at 13:40

## 2024-01-31 RX ADMIN — VALACYCLOVIR HYDROCHLORIDE 500 MG: 500 TABLET, FILM COATED ORAL at 08:23

## 2024-01-31 RX ADMIN — IPRATROPIUM BROMIDE AND ALBUTEROL SULFATE 1 DOSE: 2.5; .5 SOLUTION RESPIRATORY (INHALATION) at 12:01

## 2024-01-31 RX ADMIN — FUROSEMIDE 20 MG: 10 INJECTION, SOLUTION INTRAMUSCULAR; INTRAVENOUS at 10:39

## 2024-01-31 RX ADMIN — POLYETHYLENE GLYCOL 3350 17 G: 17 POWDER, FOR SOLUTION ORAL at 08:24

## 2024-01-31 RX ADMIN — SULFAMETHOXAZOLE AND TRIMETHOPRIM 400 MG: 80; 16 INJECTION, SOLUTION, CONCENTRATE INTRAVENOUS at 20:35

## 2024-01-31 RX ADMIN — WATER 60 MG: 1 INJECTION INTRAMUSCULAR; INTRAVENOUS; SUBCUTANEOUS at 08:23

## 2024-01-31 RX ADMIN — METOPROLOL TARTRATE 2.5 MG: 1 INJECTION, SOLUTION INTRAVENOUS at 05:15

## 2024-01-31 RX ADMIN — VALACYCLOVIR HYDROCHLORIDE 500 MG: 500 TABLET, FILM COATED ORAL at 20:33

## 2024-01-31 RX ADMIN — MEROPENEM 1000 MG: 1 INJECTION INTRAVENOUS at 08:29

## 2024-01-31 RX ADMIN — REMDESIVIR 100 MG: 100 INJECTION, POWDER, LYOPHILIZED, FOR SOLUTION INTRAVENOUS at 18:51

## 2024-01-31 RX ADMIN — VANCOMYCIN HYDROCHLORIDE 750 MG: 10 INJECTION, POWDER, LYOPHILIZED, FOR SOLUTION INTRAVENOUS at 08:20

## 2024-01-31 RX ADMIN — BISACODYL 10 MG: 10 SUPPOSITORY RECTAL at 18:00

## 2024-01-31 RX ADMIN — Medication 100 MG: at 08:23

## 2024-01-31 RX ADMIN — SODIUM CHLORIDE 15 ML: 900 IRRIGANT IRRIGATION at 17:09

## 2024-01-31 RX ADMIN — IPRATROPIUM BROMIDE AND ALBUTEROL SULFATE 1 DOSE: 2.5; .5 SOLUTION RESPIRATORY (INHALATION) at 08:08

## 2024-01-31 RX ADMIN — LEVETIRACETAM 500 MG: 500 TABLET, FILM COATED ORAL at 08:23

## 2024-01-31 RX ADMIN — APIXABAN 5 MG: 5 TABLET, FILM COATED ORAL at 08:23

## 2024-01-31 RX ADMIN — SODIUM CHLORIDE 15 ML: 900 IRRIGANT IRRIGATION at 11:02

## 2024-01-31 RX ADMIN — IPRATROPIUM BROMIDE AND ALBUTEROL SULFATE 1 DOSE: 2.5; .5 SOLUTION RESPIRATORY (INHALATION) at 20:12

## 2024-01-31 RX ADMIN — APIXABAN 5 MG: 5 TABLET, FILM COATED ORAL at 20:33

## 2024-01-31 RX ADMIN — MEROPENEM 1000 MG: 1 INJECTION INTRAVENOUS at 01:18

## 2024-01-31 RX ADMIN — SENNOSIDES AND DOCUSATE SODIUM 2 TABLET: 50; 8.6 TABLET ORAL at 08:23

## 2024-01-31 RX ADMIN — PROPOFOL 10 MCG/KG/MIN: 10 INJECTION, EMULSION INTRAVENOUS at 20:33

## 2024-01-31 RX ADMIN — PROPOFOL 25 MCG/KG/MIN: 10 INJECTION, EMULSION INTRAVENOUS at 10:14

## 2024-01-31 RX ADMIN — IPRATROPIUM BROMIDE AND ALBUTEROL SULFATE 1 DOSE: 2.5; .5 SOLUTION RESPIRATORY (INHALATION) at 15:43

## 2024-01-31 RX ADMIN — SULFAMETHOXAZOLE AND TRIMETHOPRIM 400 MG: 80; 16 INJECTION, SOLUTION, CONCENTRATE INTRAVENOUS at 05:17

## 2024-01-31 RX ADMIN — LEVETIRACETAM 500 MG: 500 TABLET, FILM COATED ORAL at 20:33

## 2024-01-31 RX ADMIN — METOPROLOL TARTRATE 2.5 MG: 1 INJECTION, SOLUTION INTRAVENOUS at 13:45

## 2024-01-31 RX ADMIN — SODIUM CHLORIDE, PRESERVATIVE FREE 10 ML: 5 INJECTION INTRAVENOUS at 20:33

## 2024-01-31 RX ADMIN — PANTOPRAZOLE SODIUM 40 MG: 40 INJECTION, POWDER, FOR SOLUTION INTRAVENOUS at 08:22

## 2024-01-31 RX ADMIN — METOPROLOL TARTRATE 2.5 MG: 1 INJECTION, SOLUTION INTRAVENOUS at 20:33

## 2024-01-31 RX ADMIN — SODIUM CHLORIDE 15 ML: 900 IRRIGANT IRRIGATION at 05:55

## 2024-01-31 RX ADMIN — Medication 1 TABLET: at 08:23

## 2024-01-31 RX ADMIN — FUROSEMIDE 40 MG: 10 INJECTION, SOLUTION INTRAMUSCULAR; INTRAVENOUS at 08:23

## 2024-01-31 RX ADMIN — PROPOFOL 25 MCG/KG/MIN: 10 INJECTION, EMULSION INTRAVENOUS at 01:43

## 2024-01-31 RX ADMIN — SODIUM CHLORIDE 15 ML: 900 IRRIGANT IRRIGATION at 20:34

## 2024-01-31 ASSESSMENT — PULMONARY FUNCTION TESTS
PIF_VALUE: 15
PIF_VALUE: 18
PIF_VALUE: 15
PIF_VALUE: 15
PIF_VALUE: 16
PIF_VALUE: 15
PIF_VALUE: 14
PIF_VALUE: 16
PIF_VALUE: 18
PIF_VALUE: 16
PIF_VALUE: 15
PIF_VALUE: 17
PIF_VALUE: 15
PIF_VALUE: 16
PIF_VALUE: 15
PIF_VALUE: 15
PIF_VALUE: 16
PIF_VALUE: 15
PIF_VALUE: 15
PIF_VALUE: 18
PIF_VALUE: 15
PIF_VALUE: 13
PIF_VALUE: 15
PIF_VALUE: 18
PIF_VALUE: 16
PIF_VALUE: 15
PIF_VALUE: 16
PIF_VALUE: 15
PIF_VALUE: 15
PIF_VALUE: 17
PIF_VALUE: 15
PIF_VALUE: 21
PIF_VALUE: 19
PIF_VALUE: 15
PIF_VALUE: 15
PIF_VALUE: 16
PIF_VALUE: 15
PIF_VALUE: 17
PIF_VALUE: 15
PIF_VALUE: 15
PIF_VALUE: 8
PIF_VALUE: 15
PIF_VALUE: 16
PIF_VALUE: 15
PIF_VALUE: 14
PIF_VALUE: 15
PIF_VALUE: 15
PIF_VALUE: 8
PIF_VALUE: 15
PIF_VALUE: 7
PIF_VALUE: 15
PIF_VALUE: 15
PIF_VALUE: 14
PIF_VALUE: 14
PIF_VALUE: 15
PIF_VALUE: 14
PIF_VALUE: 14
PIF_VALUE: 18
PIF_VALUE: 15
PIF_VALUE: 15

## 2024-01-31 NOTE — PROGRESS NOTES
Pulmonary Medicine Follow-up Note    CC: KENNEDY, acute hypoxemic resp failure, abnl CT Chest     SUBJECTIVE / INTERVAL HISTORY:  Still I/O's positive. On lasix and having appropriate UOP. Off vasopressors.   No overnight issues per RN.     ROS:  Denies headache, nausea or chest pain.    24HR INTAKE/OUTPUT:    Intake/Output Summary (Last 24 hours) at 2024 1042  Last data filed at 2024 0945  Gross per 24 hour   Intake 4659.82 ml   Output 3370 ml   Net 1289.82 ml          [START ON 2024] methylPREDNISolone  30 mg IntraVENous Daily    furosemide  60 mg IntraVENous BID    sulfamethoxazole-trimethoprim (BACTRIM) 400 mg in dextrose 5 % 500 mL IVPB  400 mg IntraVENous Q8H    thiamine  100 mg Oral Daily    sennosides-docusate sodium  2 tablet Oral Daily    polyethylene glycol  17 g Oral Daily    insulin lispro  0-4 Units SubCUTAneous Q6H    ipratropium 0.5 mg-albuterol 2.5 mg  1 Dose Inhalation 4x Daily RT    metoprolol  2.5 mg IntraVENous Q8H    remdesivir 100 mg in sodium chloride 0.9 % 250 mL IVPB  100 mg IntraVENous Q24H    pantoprazole  40 mg IntraVENous Daily    apixaban  5 mg Oral BID    levETIRAcetam  500 mg Oral BID    therapeutic multivitamin-minerals  1 tablet Oral Daily    [Held by provider] tamsulosin  0.4 mg Oral Daily    valACYclovir  500 mg Oral BID    sodium chloride flush  5-40 mL IntraVENous 2 times per day    Saline Mouthwash  15 mL Swish & Spit 4x Daily AC & HS       PHYSICAL EXAMINATION:  BP 99/62   Pulse 71   Temp 98.8 °F (37.1 °C) (Bladder)   Resp 18   Ht 1.778 m (5' 10\")   Wt 68.1 kg (150 lb 2.1 oz)   SpO2 93%   BMI 21.54 kg/m²   CURRENT PULSE OXIMETRY:  SpO2: 93 %  24HR PULSE OXIMETRY RANGE:  SpO2  Av.9 %  Min: 90 %  Max: 99 %     Ventilator settings  - Vent Mode: AC/PRVC  -    - Vt (Set, mL): 450 mL  - PEEP/CPAP (cmH2O): 5  -    - Resp Rate (Set): 16 bpm  - Peak Inspiratory Pressure (cmH2O): 15 cmH2O  -      Gen: Intubated.   HEENT: PERRL, EOMI, OP nl  Lung:  Crackles at both  bilateral airspace disease, similar to prior studies.      Electronically signed by Srini Orona MD      XR CHEST PORTABLE   Final Result   Satisfactory positioning of support devices. No significant change from prior   study.      XR CHEST PORTABLE   Final Result   Bilateral airspace disease, right greater than left and similar to the prior   examination.      CT CHEST WO CONTRAST   Final Result      1. Persistent bilateral groundglass densities and parenchymal opacities as described with interval worsening in appearance in the right lower lobe.   2. Right greater than left bilateral pleural effusions demonstrate interval decrease in size when compared to previous study.   3. Subtle fullness and mild edematous appearance of the left axilla could relate to stenosis from conduction leads.            Electronically signed by MD Brendan Manzano      XR CHEST PORTABLE   Final Result      Worsening of multifocal perihilar opacities with relative sparing of the left   pulmonary apex. This raises the possibility of superimposed pulmonary edema in   comparison to 1/23/2024.      Borderline cardiomegaly with left subclavian dual-lead pacemaker.      XR CHEST PORTABLE    (Results Pending)        ASSESSMENT:  Acute hypoxemic respiratory failure - required intubation after Bronch 1/25, extubated 1/26  Multifocal airspace disease -some areas better some area is unchanged some areas worse compared to CT chest December 2023  S/p VT/VF arrest 1/28, ROSC within 10 minutes  DLBC NHL w/ CNS relapse: Patient currently in [place]   HFrEF - EF 40%.  DVT right IJ, brachial, axillary, cephalic veins  COVID-positive    PLAN:  Ventilator settings were reviewed, as above, continue lung protective strategies.   Provide O2 supplementation to keep SpO2 between 88-92% if needed.    Titrate sedation to maintain RASS 0/-1.  Careful monitoring of fentanyl dose while on voriconazole.  Ppx tx per Oncology   Continue broad spectrum antibiotics under ID guidance-

## 2024-01-31 NOTE — PLAN OF CARE
Problem: Safety - Adult  Goal: Free from fall injury  1/31/2024 0310 by Salinas Nolen RN  Outcome: Progressing  1/30/2024 1624 by Cristine Tripathi RN  Outcome: Progressing     Problem: ABCDS Injury Assessment  Goal: Absence of physical injury  1/31/2024 0310 by Salinas Nolen RN  Outcome: Progressing  1/30/2024 1624 by Cristine Tripathi RN  Outcome: Progressing     Problem: Chronic Conditions and Co-morbidities  Goal: Patient's chronic conditions and co-morbidity symptoms are monitored and maintained or improved  1/31/2024 0310 by Salinas Nolen RN  Outcome: Progressing  Flowsheets (Taken 1/30/2024 2000)  Care Plan - Patient's Chronic Conditions and Co-Morbidity Symptoms are Monitored and Maintained or Improved:   Collaborate with multidisciplinary team to address chronic and comorbid conditions and prevent exacerbation or deterioration   Monitor and assess patient's chronic conditions and comorbid symptoms for stability, deterioration, or improvement  1/30/2024 1624 by Cristine Tripathi RN  Outcome: Progressing     Problem: Respiratory - Adult  Goal: Achieves optimal ventilation and oxygenation  1/31/2024 0310 by Salinas Nolen RN  Outcome: Progressing  1/30/2024 1624 by Cristine Tripathi RN  Outcome: Progressing  Flowsheets (Taken 1/30/2024 0800)  Achieves optimal ventilation and oxygenation:   Assess for changes in respiratory status   Assess for changes in mentation and behavior   Position to facilitate oxygenation and minimize respiratory effort   Oxygen supplementation based on oxygen saturation or arterial blood gases   Encourage broncho-pulmonary hygiene including cough, deep breathe, incentive spirometry     Problem: Cardiovascular - Adult  Goal: Maintains optimal cardiac output and hemodynamic stability  1/31/2024 0310 by Salinas Nolen RN  Outcome: Progressing  1/30/2024 1624 by Cristine Tripathi RN  Outcome: Progressing  Goal: Absence of cardiac dysrhythmias or at baseline  1/31/2024 0310 by Salinas Nolen RN  Outcome:  studies as ordered     Problem: Hematologic - Adult  Goal: Maintains hematologic stability  1/31/2024 0310 by Salinas Nolen RN  Outcome: Progressing  Flowsheets (Taken 1/30/2024 2000)  Maintains hematologic stability: Assess for signs and symptoms of bleeding or hemorrhage  1/30/2024 1624 by Cristine Tripathi RN  Outcome: Progressing     Problem: Safety - Medical Restraint  Goal: Remains free of injury from restraints (Restraint for Interference with Medical Device)  Description: INTERVENTIONS:  1. Determine that other, less restrictive measures have been tried or would not be effective before applying the restraint  2. Evaluate the patient's condition at the time of restraint application  3. Inform patient/family regarding the reason for restraint  4. Q2H: Monitor safety, psychosocial status, comfort, nutrition and hydration  1/31/2024 0310 by Salinas Nolen RN  Outcome: Progressing  Flowsheets  Taken 1/31/2024 0200  Remains free of injury from restraints (restraint for interference with medical device):   Determine that other, less restrictive measures have been tried or would not be effective before applying the restraint   Every 2 hours: Monitor safety, psychosocial status, comfort, nutrition and hydration  Taken 1/31/2024 0036  Remains free of injury from restraints (restraint for interference with medical device):   Determine that other, less restrictive measures have been tried or would not be effective before applying the restraint   Every 2 hours: Monitor safety, psychosocial status, comfort, nutrition and hydration  Taken 1/31/2024 0000  Remains free of injury from restraints (restraint for interference with medical device):   Determine that other, less restrictive measures have been tried or would not be effective before applying the restraint   Every 2 hours: Monitor safety, psychosocial status, comfort, nutrition and hydration  Taken 1/30/2024 2200  Remains free of injury from restraints (restraint for

## 2024-01-31 NOTE — PROGRESS NOTES
ID Follow-up NOTE    CC:   VT arrest  Antibiotics: Remdesivir, Bactrim, vancomycin, meropenem, valacyclovir    Admit Date: 1/23/2024  Hospital Day: 9    Subjective:     Patient continues to be on mechanical ventilation, off pressors.  Not having many secretions per nursing, no diarrhea noted. Tmax 99.       Objective:     Patient Vitals for the past 8 hrs:   BP Temp Temp src Pulse Resp SpO2 Weight   01/31/24 1030 99/62 -- -- 71 18 93 % --   01/31/24 1029 -- -- -- -- -- -- 68.1 kg (150 lb 2.1 oz)   01/31/24 1015 (!) 88/59 -- -- 66 16 90 % --   01/31/24 0945 (!) 87/55 -- -- 60 14 91 % --   01/31/24 0930 (!) 89/57 -- -- 60 14 92 % --   01/31/24 0915 (!) 96/54 -- -- 67 15 92 % --   01/31/24 0900 (!) 98/58 -- -- 66 17 93 % --   01/31/24 0845 102/63 -- -- 68 17 94 % --   01/31/24 0830 99/60 -- -- 77 16 93 % --   01/31/24 0815 (!) 94/54 98.8 °F (37.1 °C) Bladder 71 16 96 % --   01/31/24 0630 (!) 92/55 -- -- 68 16 93 % --   01/31/24 0615 90/60 -- -- 67 15 93 % --   01/31/24 0600 95/60 -- -- 75 20 93 % --   01/31/24 0545 95/61 -- -- 67 17 92 % --   01/31/24 0530 100/60 -- -- 67 17 94 % --   01/31/24 0515 100/61 -- -- 71 20 93 % --   01/31/24 0500 (!) 99/57 -- -- 73 19 95 % --   01/31/24 0445 101/66 -- -- 74 19 95 % --   01/31/24 0430 103/66 -- -- 66 18 96 % --   01/31/24 0415 100/60 -- -- 68 21 94 % --   01/31/24 0400 (!) 95/58 99 °F (37.2 °C) Bladder 73 25 96 % --   01/31/24 0345 (!) 95/58 -- -- 71 18 95 % --       I/O last 3 completed shifts:  In: 5969.9 [I.V.:2143.1; NG/GT:976; IV Piggyback:2850.8]  Out: 3565 [Urine:3565]  I/O this shift:  In: -   Out: 400 [Urine:400]    EXAM:  GENERAL: Sedated, on mechanical ventilation  HEENT: ET tube in place, NG tube in place  NECK:  Supple, no lymphadenopathy  LUNGS: Intubated, on mechanical ventilation, no wheezes or crackles  CARDIAC: RRR, no murmur appreciated, pacemaker in left upper chest wall, no induration in pocket  ABD:  + BS, soft / NT, Bruner in place  EXT:  No rash, no

## 2024-01-31 NOTE — PROGRESS NOTES
Assessment completed and charted, See flowsheets. Intubated & sedated w/ propofol/fentanyl. Tolerating vent. CPOT score of 0. Opens eyes to stimulation and sometimes voice. Follows commands in uppers. FiO2 at 50%. OG has Vital AF running at 30 mL/hr. Increase q8 to goal of 45 ml/hr. BP goal of MAP 65. Levo running at 2. VSS. Bruner in place with adequate output. No BM. Family at bedside. Updated and all questions answered. Standard safety measures in place.

## 2024-01-31 NOTE — PROGRESS NOTES
[facility] Progress Note    2024     Zain Lopez    MRN: [id number]    : 1955    Referring MD: Penelope Wilhelm DO  [address]  [facility] [address]  [place] [address]      SUBJECTIVE:  Currentlly on Fi02 45%, off Levo, and responding to commands.    ECOG PS: (4) Completely disabled, unable to carry out self-care and confined to bed or chair     KPS: 20% Very sick; hospital admission necessary; active supportive treatment necessary    Isolation: Droplet Plus    Medications    Scheduled Meds:   furosemide  60 mg IntraVENous BID    [START ON 2024] methylPREDNISolone  30 mg IntraVENous Daily    sulfamethoxazole-trimethoprim (BACTRIM) 400 mg in dextrose 5 % 500 mL IVPB  400 mg IntraVENous Q8H    thiamine  100 mg Oral Daily    sennosides-docusate sodium  2 tablet Oral Daily    polyethylene glycol  17 g Oral Daily    insulin lispro  0-4 Units SubCUTAneous Q6H    ipratropium 0.5 mg-albuterol 2.5 mg  1 Dose Inhalation 4x Daily RT    metoprolol  2.5 mg IntraVENous Q8H    remdesivir 100 mg in sodium chloride 0.9 % 250 mL IVPB  100 mg IntraVENous Q24H    pantoprazole  40 mg IntraVENous Daily    apixaban  5 mg Oral BID    levETIRAcetam  500 mg Oral BID    therapeutic multivitamin-minerals  1 tablet Oral Daily    [Held by provider] tamsulosin  0.4 mg Oral Daily    valACYclovir  500 mg Oral BID    sodium chloride flush  5-40 mL IntraVENous 2 times per day    Saline Mouthwash  15 mL Swish & Spit 4x Daily AC & HS     Continuous Infusions:   dextrose      propofol 25 mcg/kg/min (24 0143)    fentaNYL 25 mcg/hr (24 2330)    norepinephrine Stopped (24 0535)    sodium chloride Stopped (24 1224)    sodium chloride       PRN Meds:.glucose, dextrose bolus **OR** dextrose bolus, glucagon (rDNA), dextrose, magnesium sulfate, sodium chloride, albuterol, ondansetron, diphenhydrAMINE, sodium chloride flush, sodium chloride, potassium chloride, magnesium hydroxide, Saline Mouthwash, alteplase (CATHFLO) 2 mg  01/30/24  0449 01/31/24  0404   PROTIME 20.3* 18.3* 18.5*   INR 1.74* 1.52* 1.54*       Uric Acid No results for input(s): \"LABURIC\" in the last 72 hours.    PROBLEM LIST:           1.  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  BPH  5.  GERD  6.  Depressed mood   7.  CHB w/ left SC dual chamber pacemaker  8.  HTN  9. C-Diff  10. Staph Epi Bacteremia  11. DVT  12. JOEL      TREATMENT:            DLBCL:  1. R-CHOP (10/26/21 - 2/11/22)  CNS Relapse (7/2023):  2.  R+MPV x 6 cycles (7/6/23 - 9/14/23)  3.  Thiotepa, Busulfan, and Cytoxan & ASCT (11/3/23)      ASSESSMENT AND PLAN:           1. DLBC NHL w/ CNS relapse: Patient currently in CT  - CSF (4/21/23): no malignant cells, flow: negative  - Brain bx (4/28/23, Gozal):  T cell infiltrate but not consistent w/ lymphoma, sent to [facility] for second opinion and consistent with reactive t cell population  - CT CAP (4/10/23):  no evidence of systemic disease  - MRI brain (6/1/23): Significantly decreased cerebral periventricular lymphoma and vasogenic edema compatible with positive response to treatment. Chronic postoperative changes of right parietal lobe periventricular biopsy without evidence for complication  - MRI brain (6/28/23):Significant increase in left periventricular hypercellular enhancing mass and vasogenic edema, which remains concerning for primary CNS lymphoma.  Progressed local mass effect with 4 mm left to right midline shift.  The dominant measurable portion of which measures 5.5 x 2.6 cm (series 13, image 76), previously with only stippled areas of enhancement in this region  - Left periventricular mass biopsy (7/3/23):  - Involved with B-cell lymphoma morphologically with aggressive features.  - MRI brain (8/29/23): Significant interval improvement in the irregular enhancement involving the intra-axial brain in the left

## 2024-01-31 NOTE — PROGRESS NOTES
Pt intubated/sedated on ventilator. CPOT 0, Prop 30 Fent 25 Levo 3. VSS on 50% fiO2 5 PEEP. Restraints in place order renewed, Q2 checks. Tolerating TF advanced to 30, no BM. Bruner exchanged, Lasix given, extremely good urine output. CVC dressing changed, CDI. Wife at bedside, CLWR, all safety measures in place, nursing rounding at least hourly. Will continue to monitor.

## 2024-01-31 NOTE — PROGRESS NOTES
[facility] Daily Progress Note      Admit Date:  1/23/2024    Subjective:  Zain Lopez is a 68 y.o. 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     Cardiology consulted 1/27 for cardiac history and persistent AF.     BPs have been soft in the 80s/50s, HR stable in the 60s. On ventilator; unable to provide ROS.     Objective:   BP (!) 92/57   Pulse 60   Temp 99.1 °F (37.3 °C) (Bladder)   Resp 18   Ht 1.778 m (5' 10\")   Wt 68.1 kg (150 lb 2.1 oz)   SpO2 92%   BMI 21.54 kg/m²     Intake/Output Summary (Last 24 hours) at 1/31/2024 1510  Last data filed at 1/31/2024 1313  Gross per 24 hour   Intake 4208.31 ml   Output 4020 ml   Net 188.31 ml     TELEMETRY: Atrial flutter     Physical Exam:  General:  sedated, intubated   Skin:  Warm and dry  Neck:  no JVP  Chest:  mechanical breath sounds. Some rhonchi to bilateral upper lung fields.   Cardiovascular:  RRR S1S2  Abdomen:  Soft  Extremities:  LUE edema, 2+, to the upper arm. No RUE or BLE edema.     Medications:    [START ON 2/1/2024] methylPREDNISolone  30 mg IntraVENous Daily    [START ON 2/1/2024] furosemide  40 mg IntraVENous Daily    sulfamethoxazole-trimethoprim (BACTRIM) 400 mg in dextrose 5 % 500 mL IVPB  400 mg IntraVENous Q8H    thiamine  100 mg Oral Daily    sennosides-docusate sodium  2 tablet Oral Daily    polyethylene glycol  17 g Oral Daily    insulin lispro  0-4 Units SubCUTAneous Q6H    ipratropium 0.5 mg-albuterol 2.5 mg  1 Dose Inhalation 4x Daily RT    metoprolol  2.5 mg IntraVENous Q8H    remdesivir 100 mg in sodium chloride 0.9 % 250 mL IVPB  100 mg IntraVENous Q24H    pantoprazole  40 mg IntraVENous Daily    apixaban  5 mg Oral BID    levETIRAcetam  500 mg Oral BID

## 2024-01-31 NOTE — PROGRESS NOTES
Physician Progress Note      PATIENT:               CHRIS PRABHAKAR  CSN #:                  [id number]  :                       1955  ADMIT DATE:       2024 4:39 PM  DISCH DATE:  RESPONDING  PROVIDER #:        NICHOLAS Rome CNP          QUERY TEXT:    Pt admitted with Pneumonia.  If possible, please document in the progress   notes and discharge summary if you are evaluating and/or treating any of the   following:      Note: CAP and HCAP indicate where the pneumonia was acquired, not a specific   type.    The medical record reflects the following:  Risk Factors: 67 y/o male wit6h DLBC NHL w/CNS relapse: in [place], COVID  Clinical Indicators: H&P: \" Pulmonary: acute respiratory failure POA 2/2   bacterial PNA - gm-pos vs gm-neg +/- acute HF. \"   CT chest without contrast   :1. Persistent bilateral groundglass densities and parenchymal opacities   as described with interval worsening in appearance in the right lower lobe.2.   Right greater than left bilateral pleural effusions demonstrate interval   decrease in size when compared to previous study. COVID + lab on  was   exposed to his wife PTA  Treatment: Remdesivir, Bactrim, vancomycin, meropenem, valacyclovir,   Methylprednisolone 60 mg daily, Bacrtim, no fluid bolus d/t CHF  Options provided:  -- Gram negative pneumonia  -- Aspiration pneumonia  -- Other - I will add my own diagnosis  -- Disagree - Not applicable / Not valid  -- Disagree - Clinically unable to determine / Unknown  -- Refer to Clinical Documentation Reviewer    PROVIDER RESPONSE TEXT:    This patient has gram negative pneumonia.    Query created by: Fallon Kuo on 2024 4:21 PM      Electronically signed by:  NICHOLAS Roem CNP 2024 10:31 AM           Letter by Priscilla Lou BSW at      Author: Priscilla Lou BSW Service: -- Author Type: --    Filed:  Encounter Date: 9/25/2020 Status: (Other)       CARE COORDINATION  [facility]  [address] [facility] [address], [address]  [place] [address]    September 25, 2020    [facility] ARLEY Karan  [address] [place] [address]      Dear Hsa,  Your Care Team congratulates you on your journey to maintain wellness. This document will help guide you on your journey to maintain a healthy lifestyle.  You can use this to help you overcome any barriers you may encounter.  If you should have any questions or concerns, you can contact the members of your Care Team or contact your Primary Care Clinic for assistance.    Health Maintenance  Health Maintenance Reviewed:      My Access Plan  Medical Emergency 911   Primary Clinic Line Peggy Ortiz MD - [phone]   24 Hour Appointment Line [phone] or  [phone] ([phone]) (toll-free)   24 Hour Nurse Line [phone]   Preferred Urgent Care     Preferred Hospital     Preferred Pharmacy [id number] - [place], [place] - [address]     Behavioral Health Crisis Line The National Suicide Prevention Lifeline at 1-275.476.8046 or 881     My Care Team Members  Patient Care Team       Relationship Specialty Notifications Start End    Peggy Ortiz MD PCP - General Family Medicine Abnormal results only, Admissions 6/15/20     Phone: [phone] Fax: [phone]         [address] [facility] [address] [facility] [address]    Peggy Ortiz MD Assigned PCP   6/20/20     Phone: [phone] Fax: [phone]         [address] [facility] [address] [facility] [address]    Ivania Jonas  Behavioral Health  7/28/20     ARM worker through [facility]    Phone: [phone]         Kayla Lantigua  Behavioral Health  7/28/20     In-home therapist through [facility]    Phone: [phone]                   Goals         Patient Stated    ? COMPLETED: Medication 1 (pt-stated)      Goal statement: I would like to meet with [facility] RN for medication education/teaching within the next 60 days.    Date goal set: 6/19/20  Barriers: Language barrier, poor historian, memory issues/concerns, limited insight into disease process and medications  Strengths: Appears willing to accept support  Date to achieve by: 8/19/20  Patient expressed understanding of goal: Yes    Action steps to achieve this goal:  1.  I will answer the phone when the [facility] RN calls me on 6/29 to review my medications and determine if I need home health med management or MTM services.      ? COMPLETED: Mental Health Management 1 (pt-stated)      Goal statement: I would like to be referred for ARM and in-home therapy within the next 60 days.    Date goal set: 6/19/20  Barriers: Language barrier, poor historian, memory issues/concerns, limited insight into disease process and medications  Strengths: Appears willing to accept support  Date to achieve by: 8/19/20  Patient expressed understanding of goal: Yes    Personal Plan:  1. I will continue to work with Kayla for therapy through [facility] and she can be reached at [phone].  2. I will continue to work with my [facility] Worker Ivania and she can be reached at [phone].      ? COMPLETED: Mental Health Management 2 (pt-stated)      Per TCCPW due to patient's legal trouble he is better of receiving support with his citizenship process from legal services. [facility] worker is aware of this. GOAL HAS BEEN COMPLETED AT THIS TIME.    Goal statement: I would like to schedule an [id number] waiver evaluation and neuropsychological testing through [facility] within the next 90 days.    Date goal set: 6/19/20  Barriers: Language barrier, poor historian, memory issues/concerns, limited insight into disease process and medications  Strengths: Appears willing to accept support  Date to achieve by:  9/19/20  Patient expressed understanding of goal: Yes    Action steps to achieve this goal:  1.  I will sign and return the [employer] TCCPW mailed to me so they can obtain records from [facility].                 Advance Care Plans/Directives Type:      We notice that you do not have an Advance Directive on file. Upon completion of your Health Care Directive, please bring a copy with you to your next office visit.    It has been your Clinic Care Team's pleasure to work with you on your goals.    Regards,  Your Clinic Care Team

## 2024-01-31 NOTE — PROGRESS NOTES
Comprehensive Nutrition Assessment    RECOMMENDATIONS:  PO Diet: Continue NPO  Nutrition Education: No recommendation at this time   Nutrition Support:   Continue Vital AF 1.2 @ 45 ml/hr  Prosource 1 via TF  30 ml water q 4 hrs    NUTRITION ASSESSMENT:   Nutritional summary & status: Follow-up. Pt tolerating Vital AF 1.2 @ goal of 40 ml/hr. No longer on levo. Sedated on propofol (providing 256 kcal). Continues without BM. On Senna-s bid and Glycolax. If no BM today, NP to add more aggressive regimen. Will continue to follow closely.   Admission // PMH: Hypoxia//DLBC w/CNS, Thyroid disease, acute CHF, CAD    MALNUTRITION ASSESSMENT  Context of Malnutrition: Chronic Illness   Malnutrition Status: Severe malnutrition  Findings of the 6 clinical characteristics of malnutrition (Minimum of 2 out of 6 clinical characteristics is required to make the diagnosis of moderate or severe Protein Calorie Malnutrition based on AND/ASPEN Guidelines):  Energy Intake:  75% or less estimated energy requirements for 1 month or longer  Weight Loss:  Greater than 5% over 1 month (15% in 1 month)     Body Fat Loss:  Severe body fat loss Orbital, Buccal region   Muscle Mass Loss:  Severe muscle mass loss Temples (temporalis), Clavicles (pectoralis & deltoids)  Fluid Accumulation:  No significant fluid accumulation     Strength:  Not Performed    NUTRITION DIAGNOSIS   Inadequate oral intake related to impaired respiratory function as evidenced by intubation    Nutrition Monitoring and Evaluation:   Food/Nutrient Intake Outcomes:  Enteral Nutrition Intake/Tolerance  Physical Signs/Symptoms Outcomes:  Biochemical Data, Hemodynamic Status, Nutrition Focused Physical Findings, Weight     OBJECTIVE DATA: Significant to nutrition assessment  Nutrition Related Findings: LBM1/24. BUE trace;BLE +1 edema. Labs reviewed.  Wounds: None  Nutrition Goals: Tolerate nutrition support at goal rate, by next RD assessment     CURRENT NUTRITION

## 2024-02-01 ENCOUNTER — APPOINTMENT (OUTPATIENT)
Dept: GENERAL RADIOLOGY | Age: 69
End: 2024-02-01
Attending: STUDENT IN AN ORGANIZED HEALTH CARE EDUCATION/TRAINING PROGRAM
Payer: MEDICARE

## 2024-02-01 ENCOUNTER — APPOINTMENT (OUTPATIENT)
Dept: VASCULAR LAB | Age: 69
End: 2024-02-01
Attending: STUDENT IN AN ORGANIZED HEALTH CARE EDUCATION/TRAINING PROGRAM
Payer: MEDICARE

## 2024-02-01 LAB
ALBUMIN SERPL-MCNC: 2.5 G/DL (ref 3.4–5)
ALP SERPL-CCNC: 131 U/L (ref 40–129)
ALT SERPL-CCNC: 38 U/L (ref 10–40)
ANION GAP SERPL CALCULATED.3IONS-SCNC: 7 MMOL/L (ref 3–16)
ANION GAP SERPL CALCULATED.3IONS-SCNC: 9 MMOL/L (ref 3–16)
ANISOCYTOSIS BLD QL SMEAR: ABNORMAL
AST SERPL-CCNC: 53 U/L (ref 15–37)
B DERMAT AB SER-ACNC: 0.2 IV
BACTERIA URNS QL MICRO: ABNORMAL /HPF
BASOPHILS # BLD: 0 K/UL (ref 0–0.2)
BASOPHILS NFR BLD: 0 %
BILIRUB DIRECT SERPL-MCNC: <0.2 MG/DL (ref 0–0.3)
BILIRUB INDIRECT SERPL-MCNC: ABNORMAL MG/DL (ref 0–1)
BILIRUB SERPL-MCNC: 0.3 MG/DL (ref 0–1)
BILIRUB UR QL STRIP.AUTO: NEGATIVE
BUN SERPL-MCNC: 37 MG/DL (ref 7–20)
BUN SERPL-MCNC: 40 MG/DL (ref 7–20)
CALCIUM SERPL-MCNC: 8.4 MG/DL (ref 8.3–10.6)
CALCIUM SERPL-MCNC: 8.5 MG/DL (ref 8.3–10.6)
CHLORIDE SERPL-SCNC: 100 MMOL/L (ref 99–110)
CHLORIDE SERPL-SCNC: 98 MMOL/L (ref 99–110)
CLARITY UR: CLEAR
CO2 SERPL-SCNC: 31 MMOL/L (ref 21–32)
CO2 SERPL-SCNC: 32 MMOL/L (ref 21–32)
COLOR UR: YELLOW
CREAT SERPL-MCNC: 0.9 MG/DL (ref 0.8–1.3)
CREAT SERPL-MCNC: 1 MG/DL (ref 0.8–1.3)
DEPRECATED RDW RBC AUTO: 19.4 % (ref 12.4–15.4)
EOSINOPHIL # BLD: 0.2 K/UL (ref 0–0.6)
EOSINOPHIL NFR BLD: 3 %
FUNGUS SPEC CULT: ABNORMAL
GFR SERPLBLD CREATININE-BSD FMLA CKD-EPI: >60 ML/MIN/{1.73_M2}
GFR SERPLBLD CREATININE-BSD FMLA CKD-EPI: >60 ML/MIN/{1.73_M2}
GLUCOSE BLD-MCNC: 111 MG/DL (ref 70–99)
GLUCOSE BLD-MCNC: 116 MG/DL (ref 70–99)
GLUCOSE BLD-MCNC: 132 MG/DL (ref 70–99)
GLUCOSE SERPL-MCNC: 104 MG/DL (ref 70–99)
GLUCOSE SERPL-MCNC: 122 MG/DL (ref 70–99)
GLUCOSE UR STRIP.AUTO-MCNC: NEGATIVE MG/DL
H CAPSUL AG SER IA-ACNC: NOT DETECTED
H CAPSUL AG SER QL IA: NOT DETECTED
H CAPSUL AG UR IA-ACNC: NOT DETECTED NG/ML
H CAPSUL AG UR QL IA: NOT DETECTED
HCT VFR BLD AUTO: 25.7 % (ref 40.5–52.5)
HGB BLD-MCNC: 8.4 G/DL (ref 13.5–17.5)
HGB UR QL STRIP.AUTO: ABNORMAL
HYPOCHROMIA BLD QL SMEAR: ABNORMAL
INR PPP: 1.32 (ref 0.84–1.16)
KETONES UR STRIP.AUTO-MCNC: NEGATIVE MG/DL
LEUKOCYTE ESTERASE UR QL STRIP.AUTO: NEGATIVE
LOEFFLER MB STN SPEC: ABNORMAL
LYMPHOCYTES # BLD: 0.3 K/UL (ref 1–5.1)
LYMPHOCYTES NFR BLD: 4 %
MAGNESIUM SERPL-MCNC: 2 MG/DL (ref 1.8–2.4)
MAGNESIUM SERPL-MCNC: 2.9 MG/DL (ref 1.8–2.4)
MCH RBC QN AUTO: 29 PG (ref 26–34)
MCHC RBC AUTO-ENTMCNC: 32.5 G/DL (ref 31–36)
MCV RBC AUTO: 89 FL (ref 80–100)
MONOCYTES # BLD: 0.4 K/UL (ref 0–1.3)
MONOCYTES NFR BLD: 5 %
NEUTROPHILS # BLD: 7 K/UL (ref 1.7–7.7)
NEUTROPHILS NFR BLD: 87 %
NEUTS BAND NFR BLD MANUAL: 1 % (ref 0–7)
NITRITE UR QL STRIP.AUTO: NEGATIVE
NRBC BLD-RTO: 1 /100 WBC
NT-PROBNP SERPL-MCNC: ABNORMAL PG/ML (ref 0–124)
ORGANISM: ABNORMAL
PERFORMED ON: ABNORMAL
PH UR STRIP.AUTO: 7 [PH] (ref 5–8)
PHOSPHATE SERPL-MCNC: 2.8 MG/DL (ref 2.5–4.9)
PLATELET # BLD AUTO: 94 K/UL (ref 135–450)
PMV BLD AUTO: 9.2 FL (ref 5–10.5)
POTASSIUM SERPL-SCNC: 3.9 MMOL/L (ref 3.5–5.1)
POTASSIUM SERPL-SCNC: 5.7 MMOL/L (ref 3.5–5.1)
PROT SERPL-MCNC: 4.9 G/DL (ref 6.4–8.2)
PROT UR STRIP.AUTO-MCNC: NEGATIVE MG/DL
PROTHROMBIN TIME: 16.4 SEC (ref 11.5–14.8)
RBC # BLD AUTO: 2.88 M/UL (ref 4.2–5.9)
RBC #/AREA URNS HPF: ABNORMAL /HPF (ref 0–4)
SODIUM SERPL-SCNC: 138 MMOL/L (ref 136–145)
SODIUM SERPL-SCNC: 139 MMOL/L (ref 136–145)
SP GR UR STRIP.AUTO: 1.01 (ref 1–1.03)
UA DIPSTICK W REFLEX MICRO PNL UR: YES
URN SPEC COLLECT METH UR: ABNORMAL
UROBILINOGEN UR STRIP-ACNC: 0.2 E.U./DL
WBC # BLD AUTO: 8 K/UL (ref 4–11)
WBC #/AREA URNS HPF: ABNORMAL /HPF (ref 0–5)

## 2024-02-01 PROCEDURE — 93005 ELECTROCARDIOGRAM TRACING: CPT | Performed by: NURSE PRACTITIONER

## 2024-02-01 PROCEDURE — 84100 ASSAY OF PHOSPHORUS: CPT

## 2024-02-01 PROCEDURE — 6370000000 HC RX 637 (ALT 250 FOR IP): Performed by: NURSE PRACTITIONER

## 2024-02-01 PROCEDURE — 87103 BLOOD FUNGUS CULTURE: CPT

## 2024-02-01 PROCEDURE — 83735 ASSAY OF MAGNESIUM: CPT

## 2024-02-01 PROCEDURE — 80048 BASIC METABOLIC PNL TOTAL CA: CPT

## 2024-02-01 PROCEDURE — 2500000003 HC RX 250 WO HCPCS: Performed by: INTERNAL MEDICINE

## 2024-02-01 PROCEDURE — 94640 AIRWAY INHALATION TREATMENT: CPT

## 2024-02-01 PROCEDURE — 2580000003 HC RX 258: Performed by: NURSE PRACTITIONER

## 2024-02-01 PROCEDURE — 80076 HEPATIC FUNCTION PANEL: CPT

## 2024-02-01 PROCEDURE — 87040 BLOOD CULTURE FOR BACTERIA: CPT

## 2024-02-01 PROCEDURE — 6370000000 HC RX 637 (ALT 250 FOR IP): Performed by: INTERNAL MEDICINE

## 2024-02-01 PROCEDURE — 6360000002 HC RX W HCPCS: Performed by: NURSE PRACTITIONER

## 2024-02-01 PROCEDURE — 2000000000 HC ICU R&B

## 2024-02-01 PROCEDURE — 2700000000 HC OXYGEN THERAPY PER DAY

## 2024-02-01 PROCEDURE — 2580000003 HC RX 258: Performed by: INTERNAL MEDICINE

## 2024-02-01 PROCEDURE — 94761 N-INVAS EAR/PLS OXIMETRY MLT: CPT

## 2024-02-01 PROCEDURE — 6360000002 HC RX W HCPCS: Performed by: INTERNAL MEDICINE

## 2024-02-01 PROCEDURE — 2500000003 HC RX 250 WO HCPCS: Performed by: NURSE PRACTITIONER

## 2024-02-01 PROCEDURE — 36592 COLLECT BLOOD FROM PICC: CPT

## 2024-02-01 PROCEDURE — 36415 COLL VENOUS BLD VENIPUNCTURE: CPT

## 2024-02-01 PROCEDURE — 83880 ASSAY OF NATRIURETIC PEPTIDE: CPT

## 2024-02-01 PROCEDURE — 6360000002 HC RX W HCPCS

## 2024-02-01 PROCEDURE — 85610 PROTHROMBIN TIME: CPT

## 2024-02-01 PROCEDURE — C9113 INJ PANTOPRAZOLE SODIUM, VIA: HCPCS | Performed by: NURSE PRACTITIONER

## 2024-02-01 PROCEDURE — 93971 EXTREMITY STUDY: CPT

## 2024-02-01 PROCEDURE — 71045 X-RAY EXAM CHEST 1 VIEW: CPT

## 2024-02-01 PROCEDURE — 85025 COMPLETE CBC W/AUTO DIFF WBC: CPT

## 2024-02-01 PROCEDURE — 99291 CRITICAL CARE FIRST HOUR: CPT | Performed by: INTERNAL MEDICINE

## 2024-02-01 PROCEDURE — 81001 URINALYSIS AUTO W/SCOPE: CPT

## 2024-02-01 PROCEDURE — 94003 VENT MGMT INPAT SUBQ DAY: CPT

## 2024-02-01 RX ORDER — MAGNESIUM SULFATE IN WATER 40 MG/ML
4000 INJECTION, SOLUTION INTRAVENOUS ONCE
Status: DISCONTINUED | OUTPATIENT
Start: 2024-02-01 | End: 2024-02-01

## 2024-02-01 RX ORDER — SULFAMETHOXAZOLE AND TRIMETHOPRIM 200; 40 MG/5ML; MG/5ML
400 SUSPENSION ORAL EVERY 8 HOURS
Status: DISCONTINUED | OUTPATIENT
Start: 2024-02-01 | End: 2024-02-08

## 2024-02-01 RX ORDER — MAGNESIUM SULFATE IN WATER 40 MG/ML
4000 INJECTION, SOLUTION INTRAVENOUS PRN
Status: DISCONTINUED | OUTPATIENT
Start: 2024-02-01 | End: 2024-02-08 | Stop reason: SDUPTHER

## 2024-02-01 RX ORDER — MIDODRINE HYDROCHLORIDE 5 MG/1
5 TABLET ORAL 2 TIMES DAILY WITH MEALS
Status: DISCONTINUED | OUTPATIENT
Start: 2024-02-01 | End: 2024-02-02

## 2024-02-01 RX ORDER — POTASSIUM CHLORIDE 29.8 MG/ML
20 INJECTION INTRAVENOUS PRN
Status: DISCONTINUED | OUTPATIENT
Start: 2024-02-01 | End: 2024-02-08 | Stop reason: SDUPTHER

## 2024-02-01 RX ORDER — MAGNESIUM SULFATE IN WATER 40 MG/ML
4000 INJECTION, SOLUTION INTRAVENOUS ONCE
Status: COMPLETED | OUTPATIENT
Start: 2024-02-01 | End: 2024-02-01

## 2024-02-01 RX ORDER — MAGNESIUM SULFATE IN WATER 40 MG/ML
2000 INJECTION, SOLUTION INTRAVENOUS PRN
Status: DISCONTINUED | OUTPATIENT
Start: 2024-02-01 | End: 2024-02-01 | Stop reason: SDUPTHER

## 2024-02-01 RX ADMIN — SULFAMETHOXAZOLE AND TRIMETHOPRIM 400 MG: 80; 16 INJECTION, SOLUTION, CONCENTRATE INTRAVENOUS at 06:21

## 2024-02-01 RX ADMIN — VALACYCLOVIR HYDROCHLORIDE 500 MG: 500 TABLET, FILM COATED ORAL at 21:25

## 2024-02-01 RX ADMIN — WATER 30 MG: 1 INJECTION INTRAMUSCULAR; INTRAVENOUS; SUBCUTANEOUS at 08:30

## 2024-02-01 RX ADMIN — METOPROLOL TARTRATE 2.5 MG: 1 INJECTION, SOLUTION INTRAVENOUS at 05:28

## 2024-02-01 RX ADMIN — LEVETIRACETAM 500 MG: 500 TABLET, FILM COATED ORAL at 21:25

## 2024-02-01 RX ADMIN — IPRATROPIUM BROMIDE AND ALBUTEROL SULFATE 1 DOSE: 2.5; .5 SOLUTION RESPIRATORY (INHALATION) at 12:05

## 2024-02-01 RX ADMIN — IPRATROPIUM BROMIDE AND ALBUTEROL SULFATE 1 DOSE: 2.5; .5 SOLUTION RESPIRATORY (INHALATION) at 06:46

## 2024-02-01 RX ADMIN — POTASSIUM CHLORIDE 20 MEQ: 400 INJECTION, SOLUTION INTRAVENOUS at 13:59

## 2024-02-01 RX ADMIN — POTASSIUM CHLORIDE 20 MEQ: 400 INJECTION, SOLUTION INTRAVENOUS at 12:56

## 2024-02-01 RX ADMIN — MIDODRINE HYDROCHLORIDE 5 MG: 5 TABLET ORAL at 12:51

## 2024-02-01 RX ADMIN — DEXMEDETOMIDINE 1 MCG/KG/HR: 100 INJECTION, SOLUTION INTRAVENOUS at 22:47

## 2024-02-01 RX ADMIN — LEVETIRACETAM 500 MG: 500 TABLET, FILM COATED ORAL at 08:29

## 2024-02-01 RX ADMIN — SULFAMETHOXAZOLE AND TRIMETHOPRIM 50 ML: 200; 40 SUSPENSION ORAL at 15:29

## 2024-02-01 RX ADMIN — REMDESIVIR 100 MG: 100 INJECTION, POWDER, LYOPHILIZED, FOR SOLUTION INTRAVENOUS at 21:25

## 2024-02-01 RX ADMIN — SODIUM CHLORIDE 15 ML: 900 IRRIGANT IRRIGATION at 05:53

## 2024-02-01 RX ADMIN — SODIUM CHLORIDE 5 MCG/MIN: 9 INJECTION, SOLUTION INTRAVENOUS at 15:33

## 2024-02-01 RX ADMIN — MIDODRINE HYDROCHLORIDE 5 MG: 5 TABLET ORAL at 17:22

## 2024-02-01 RX ADMIN — SULFAMETHOXAZOLE AND TRIMETHOPRIM 50 ML: 200; 40 SUSPENSION ORAL at 22:31

## 2024-02-01 RX ADMIN — SODIUM CHLORIDE, PRESERVATIVE FREE 10 ML: 5 INJECTION INTRAVENOUS at 08:31

## 2024-02-01 RX ADMIN — DEXMEDETOMIDINE 0.4 MCG/KG/HR: 100 INJECTION, SOLUTION INTRAVENOUS at 13:41

## 2024-02-01 RX ADMIN — Medication 1 TABLET: at 08:29

## 2024-02-01 RX ADMIN — APIXABAN 5 MG: 5 TABLET, FILM COATED ORAL at 21:25

## 2024-02-01 RX ADMIN — METOPROLOL TARTRATE 2.5 MG: 1 INJECTION, SOLUTION INTRAVENOUS at 21:25

## 2024-02-01 RX ADMIN — IPRATROPIUM BROMIDE AND ALBUTEROL SULFATE 1 DOSE: 2.5; .5 SOLUTION RESPIRATORY (INHALATION) at 15:53

## 2024-02-01 RX ADMIN — SENNOSIDES AND DOCUSATE SODIUM 2 TABLET: 50; 8.6 TABLET ORAL at 08:30

## 2024-02-01 RX ADMIN — MAGNESIUM SULFATE IN WATER FOR 4000 MG: 40 INJECTION INTRAVENOUS at 15:28

## 2024-02-01 RX ADMIN — IPRATROPIUM BROMIDE AND ALBUTEROL SULFATE 1 DOSE: 2.5; .5 SOLUTION RESPIRATORY (INHALATION) at 19:57

## 2024-02-01 RX ADMIN — APIXABAN 5 MG: 5 TABLET, FILM COATED ORAL at 08:30

## 2024-02-01 RX ADMIN — SODIUM CHLORIDE, PRESERVATIVE FREE 10 ML: 5 INJECTION INTRAVENOUS at 21:26

## 2024-02-01 RX ADMIN — PANTOPRAZOLE SODIUM 40 MG: 40 INJECTION, POWDER, FOR SOLUTION INTRAVENOUS at 08:30

## 2024-02-01 RX ADMIN — Medication 100 MG: at 08:29

## 2024-02-01 RX ADMIN — VALACYCLOVIR HYDROCHLORIDE 500 MG: 500 TABLET, FILM COATED ORAL at 08:29

## 2024-02-01 RX ADMIN — POLYETHYLENE GLYCOL 3350 17 G: 17 POWDER, FOR SOLUTION ORAL at 08:29

## 2024-02-01 RX ADMIN — PROPOFOL 5 MCG/KG/MIN: 10 INJECTION, EMULSION INTRAVENOUS at 14:53

## 2024-02-01 ASSESSMENT — PULMONARY FUNCTION TESTS
PIF_VALUE: 15
PIF_VALUE: 14
PIF_VALUE: 15
PIF_VALUE: 16
PIF_VALUE: 15
PIF_VALUE: 15
PIF_VALUE: 8
PIF_VALUE: 8
PIF_VALUE: 15
PIF_VALUE: 16
PIF_VALUE: 15
PIF_VALUE: 15
PIF_VALUE: 16
PIF_VALUE: 15
PIF_VALUE: 8
PIF_VALUE: 8
PIF_VALUE: 14
PIF_VALUE: 15
PIF_VALUE: 16
PIF_VALUE: 16
PIF_VALUE: 15
PIF_VALUE: 15
PIF_VALUE: 14
PIF_VALUE: 15
PIF_VALUE: 7
PIF_VALUE: 15
PIF_VALUE: 15
PIF_VALUE: 16
PIF_VALUE: 15
PIF_VALUE: 14
PIF_VALUE: 15
PIF_VALUE: 16
PIF_VALUE: 16
PIF_VALUE: 15
PIF_VALUE: 18
PIF_VALUE: 15
PIF_VALUE: 17
PIF_VALUE: 17
PIF_VALUE: 15
PIF_VALUE: 16
PIF_VALUE: 15
PIF_VALUE: 11
PIF_VALUE: 16
PIF_VALUE: 15
PIF_VALUE: 15
PIF_VALUE: 18
PIF_VALUE: 14
PIF_VALUE: 14
PIF_VALUE: 15
PIF_VALUE: 8
PIF_VALUE: 16
PIF_VALUE: 15
PIF_VALUE: 17
PIF_VALUE: 17

## 2024-02-01 ASSESSMENT — PAIN SCALES - GENERAL
PAINLEVEL_OUTOF10: 0

## 2024-02-01 NOTE — PROGRESS NOTES
FiO2 down to 45% on ventilator. Pt tolerating. O2 saturation 95%.    Electronically signed by Salinas Nolen RN on 2/1/2024 at 5:49 AM

## 2024-02-01 NOTE — PROGRESS NOTES
[facility] Progress Note    2024     Zain Lopez    MRN: [id number]    : 1955    Referring MD: Penelope Wilhelm DO  [address]  [address]  [place] [address]      SUBJECTIVE:  Currentlly on Fi02 45%, off Levo, and sedated. Had 15 runs of NSVT last night.     ECOG PS: (4) Completely disabled, unable to carry out self-care and confined to bed or chair     KPS: 20% Very sick; hospital admission necessary; active supportive treatment necessary    Isolation: Droplet Plus    Medications    Scheduled Meds:   methylPREDNISolone  30 mg IntraVENous Daily    furosemide  40 mg IntraVENous Daily    sulfamethoxazole-trimethoprim (BACTRIM) 400 mg in dextrose 5 % 500 mL IVPB  400 mg IntraVENous Q8H    thiamine  100 mg Oral Daily    sennosides-docusate sodium  2 tablet Oral Daily    polyethylene glycol  17 g Oral Daily    insulin lispro  0-4 Units SubCUTAneous Q6H    ipratropium 0.5 mg-albuterol 2.5 mg  1 Dose Inhalation 4x Daily RT    metoprolol  2.5 mg IntraVENous Q8H    remdesivir 100 mg in sodium chloride 0.9 % 250 mL IVPB  100 mg IntraVENous Q24H    pantoprazole  40 mg IntraVENous Daily    apixaban  5 mg Oral BID    levETIRAcetam  500 mg Oral BID    therapeutic multivitamin-minerals  1 tablet Oral Daily    [Held by provider] tamsulosin  0.4 mg Oral Daily    valACYclovir  500 mg Oral BID    sodium chloride flush  5-40 mL IntraVENous 2 times per day    Saline Mouthwash  15 mL Swish & Spit 4x Daily AC & HS     Continuous Infusions:   dextrose      propofol 10 mcg/kg/min (24)    fentaNYL Stopped (24 1559)    norepinephrine Stopped (24 0520)    sodium chloride Stopped (24 1224)    sodium chloride       PRN Meds:.glucose, dextrose bolus **OR** dextrose bolus, glucagon (rDNA), dextrose, magnesium sulfate, sodium chloride, albuterol, ondansetron, diphenhydrAMINE, sodium chloride flush, sodium chloride, potassium chloride, magnesium hydroxide, Saline Mouthwash, alteplase (CATHFLO) 2 mg in  Tx Dose Bactrim 1/25-1/29/24  Voriconazole 1/25-1/28/24    3. Pulmonary: acute respiratory failure POA - pneumonia  - ID eval & tx as above  - Pulm following, s/p bronch 1/25/24  - S/p Intubated 1/25/24 following bronchoscopy - extubated 1/26/24  - Re-intubated during CODE 1/28/24  - Vent Day +9 (1/24/24):  PEEP 5, FiO2 45%  - Sedation: Propofol  - Shock: Levo off now     4. Heme: Anemia & Thrombocytopenia d/t recent chemo and medications  - Transfuse for Hgb < 7 and Platelets < 50K (anticoagulation)  - No transfusion today - monitor plt closely, trending down likely r/t bactrim and COVID  Coagulopathy:   - Right Venous US 11/17/23: + DVT right IJ, brachial, axillary, cephalic veins  - Cont Eliquis (11/21/23)  - Monitor INR daily     5. Metabolic / CKDIII: JOEL likely r/t hypotension + HD Bactrim improved, steroid induced hyperglycemia improved  - IVFs at KVO  - Replace K+ & Mg per PRN orders  - Decrease lasix 40 mg IV daily     6. GI / Nutrition: H/o constipation, nausea, and GERD  Nutrition:  Intubated   - Cont EN via OG tube- at goal.  Vital AF 1.2 @ 45 mL/hr  - Cont PPI     7. Cardiac: H/o CHB w/ left SC dual chamber pacemaker and HTN. [id number] 1/28/24  CODE Blue 1/28/24:  - Initially torsades-> Vfib-> compressions, shock and epi-> ROSC  Torsades de pointes / PMVT.  Likely due to Levaquin and voriconazole infusions. QT prolongation masked by V pacing.   - Echo (9/21/23): LVEF 50%. No regional wall motion abnormalities are seen. Normal diastolic function.  -Had NSVT last night. Will keep K > 4 and Mg > 2. Will discuss with Cardiology.  CHB:  - Dual chamber pacemaker in place  Prolonged QTc:  - EKG (1/23/24): QTC: 486 - can subtract 50 d/t having ventricular paced pacemaker in place  New Onset Atrial Flutter:  - s/p digoxin  - Cont lopressor 2.5mg IV q8h  HFrEF:  - BNP 1/24/24 5834  - Echo 1/24/24- EF 40-45%, Indeterminate diastolic function  - Cardiology consulted, appreciate recs. Kristye prn with FL

## 2024-02-01 NOTE — PROGRESS NOTES
Pulmonary Medicine Follow-up Note    CC: KENNEDY, acute hypoxemic resp failure, abnl CT Chest     SUBJECTIVE / INTERVAL HISTORY:  Off pressors, BP on the lower end. Wt has increased and has a positive balance.   Failed SBT today, became restless and tachypneic.     ROS:  Denies headache, nausea or chest pain.    24HR INTAKE/OUTPUT:    Intake/Output Summary (Last 24 hours) at 2024 1129  Last data filed at 2024 0800  Gross per 24 hour   Intake 1526.67 ml   Output 1980 ml   Net -453.33 ml          sulfamethoxazole-trimethoprim  400 mg Oral Q8H    methylPREDNISolone  30 mg IntraVENous Daily    furosemide  40 mg IntraVENous Daily    thiamine  100 mg Oral Daily    sennosides-docusate sodium  2 tablet Oral Daily    polyethylene glycol  17 g Oral Daily    insulin lispro  0-4 Units SubCUTAneous Q6H    ipratropium 0.5 mg-albuterol 2.5 mg  1 Dose Inhalation 4x Daily RT    metoprolol  2.5 mg IntraVENous Q8H    remdesivir 100 mg in sodium chloride 0.9 % 250 mL IVPB  100 mg IntraVENous Q24H    pantoprazole  40 mg IntraVENous Daily    apixaban  5 mg Oral BID    levETIRAcetam  500 mg Oral BID    therapeutic multivitamin-minerals  1 tablet Oral Daily    [Held by provider] tamsulosin  0.4 mg Oral Daily    valACYclovir  500 mg Oral BID    sodium chloride flush  5-40 mL IntraVENous 2 times per day    Saline Mouthwash  15 mL Swish & Spit 4x Daily AC & HS       PHYSICAL EXAMINATION:  BP (!) 94/58   Pulse 60   Temp 99.3 °F (37.4 °C) (Bladder)   Resp 25   Ht 1.778 m (5' 10\")   Wt 68.1 kg (150 lb 2.1 oz)   SpO2 92%   BMI 21.54 kg/m²   CURRENT PULSE OXIMETRY:  SpO2: 92 %  24HR PULSE OXIMETRY RANGE:  SpO2  Av.6 %  Min: 85 %  Max: 100 %     Ventilator settings  - Vent Mode: AC/PRVC  - Ventilator Day(s): 4  - Vt (Set, mL): 450 mL  - PEEP/CPAP (cmH2O): 5  - FiO2 : 45 %  - Resp Rate (Set): 16 bpm  - Peak Inspiratory Pressure (cmH2O): 15 cmH2O  - Plateau Pressure (cm H2O): 22 cm H2O    Gen: Intubated.   HEENT: PERRL, EOMI, OP

## 2024-02-01 NOTE — PROGRESS NOTES
Patient remains intubated & sedated. He has been hypotensive, lasix and metoprolol held. Propofol decreased and started precedex gtt to try to get BP to go up. He began waking up and sitting up in the bed and was still hypotensive. Levophed restarted and sedation increased for comfort and safety. Wife was educated multiple times to decrease stimulation to patient from both RN and RT. Will continue to monitor

## 2024-02-01 NOTE — PROGRESS NOTES
[facility] Daily Progress Note      Admit Date:  1/23/2024    Subjective:  Zain Lopez is a 68 y.o. male with a past medical history of persistent AF, acute pericarditis, HFrEF, CHB s/p dual chamber pacer, RA atrial / interatrial septal mass (myxoma vs met) by TTE and [facility] 08/2021, [facility] with [facility] relapse s/p chemo, s/p BMT 10/2023 c/b infections and DVTs, with recent pneumonia and acute heart failure. Cardiology consulted 1/27 for cardiac history and persistent AF.     Interval history:   BPs soft in the 90s/50s, but MAPs >69. I/O -1.2L x24 hrs after 60mg IV lasix total yesterday. K 3.9; Mg 2.0. BNP 64803z. Furosemide 40mg not given this morning d/t hypotension.     Objective:   /61   Pulse 60   Temp 98.4 °F (36.9 °C)   Resp 24   Ht 1.778 m (5' 10\")   Wt 68.1 kg (150 lb 2.1 oz)   SpO2 96%   BMI 21.54 kg/m²     Intake/Output Summary (Last 24 hours) at 2/1/2024 1338  Last data filed at 2/1/2024 1200  Gross per 24 hour   Intake 1526.67 ml   Output 1880 ml   Net -353.33 ml     TELEMETRY: Atrial flutter     Physical Exam:  General:  sedated, intubated   Skin:  Warm and dry  Neck:  no JVP  Chest:  mechanical breath sounds. Some rhonchi to bilateral upper lung fields.   Cardiovascular:  RRR S1S2  Abdomen:  Soft  Extremities:  LUE edema, 2+, to the upper arm. No RUE or BLE edema.     Medications:    sulfamethoxazole-trimethoprim  400 mg Oral Q8H    [START ON 2/2/2024] methylPREDNISolone  20 mg IntraVENous Daily    midodrine  5 mg Oral BID WC    furosemide  40 mg IntraVENous Daily    thiamine  100 mg Oral Daily    sennosides-docusate sodium  2 tablet Oral Daily    polyethylene glycol  17 g Oral Daily    insulin lispro  0-4 Units SubCUTAneous Q6H    ipratropium 0.5 mg-albuterol 2.5 mg  1 Dose Inhalation 4x Daily RT    metoprolol  2.5 mg IntraVENous Q8H    remdesivir 100 mg in sodium chloride 0.9 % 250 mL IVPB  100 mg IntraVENous Q24H    pantoprazole  40 mg IntraVENous Daily    apixaban  5 mg Oral

## 2024-02-01 NOTE — PROGRESS NOTES
Pharmacy called about 0500 dose of Bactrim. Pharmacy going to reach out to provider for possible dosage changes. Will run dose when available.     Electronically signed by Salinas Nolen RN on 2/1/2024 at 5:20 AM

## 2024-02-01 NOTE — PROGRESS NOTES
Assessment completed and charted. See flowsheets. Awakens to voice. Weakly follows commands. VSS. On prop 15. Maintaining MAP goals off levophed. Tolerating vent & settings. Q2T. TF running at goal. 1 small BM. Standard safety measures in place.     Electronically signed by Salinas Nolen RN on 2/1/2024 at 4:31 AM

## 2024-02-01 NOTE — PLAN OF CARE
Problem: Safety - Adult  Goal: Free from fall injury  Outcome: Progressing     Problem: ABCDS Injury Assessment  Goal: Absence of physical injury  Outcome: Progressing     Problem: Chronic Conditions and Co-morbidities  Goal: Patient's chronic conditions and co-morbidity symptoms are monitored and maintained or improved  Outcome: Progressing  Flowsheets (Taken 1/31/2024 2000)  Care Plan - Patient's Chronic Conditions and Co-Morbidity Symptoms are Monitored and Maintained or Improved: Monitor and assess patient's chronic conditions and comorbid symptoms for stability, deterioration, or improvement     Problem: Respiratory - Adult  Goal: Achieves optimal ventilation and oxygenation  Outcome: Progressing     Problem: Cardiovascular - Adult  Goal: Maintains optimal cardiac output and hemodynamic stability  Outcome: Progressing  Goal: Absence of cardiac dysrhythmias or at baseline  Outcome: Progressing     Problem: Metabolic/Fluid and Electrolytes - Adult  Goal: Electrolytes maintained within normal limits  Outcome: Progressing  Flowsheets (Taken 1/31/2024 2000)  Electrolytes maintained within normal limits: Monitor labs and assess patient for signs and symptoms of electrolyte imbalances  Goal: Hemodynamic stability and optimal renal function maintained  Outcome: Progressing  Flowsheets (Taken 1/31/2024 2000)  Hemodynamic stability and optimal renal function maintained: Monitor labs and assess for signs and symptoms of volume excess or deficit     Problem: Hematologic - Adult  Goal: Maintains hematologic stability  Recent Flowsheet Documentation  Taken 1/31/2024 2000 by Salinas Nolen RN  Maintains hematologic stability: Assess for signs and symptoms of bleeding or hemorrhage     Problem: Safety - Medical Restraint  Goal: Remains free of injury from restraints (Restraint for Interference with Medical Device)  Description: INTERVENTIONS:  1. Determine that other, less restrictive measures have been tried or would not be  status, comfort, nutrition and hydration

## 2024-02-01 NOTE — PROGRESS NOTES
4 Eyes Skin Assessment     NAME:  Zain Lopez  YOB: 1955  MEDICAL RECORD NUMBER:  [id number]    The patient is being assessed for  Shift Handoff    I agree that at least one RN has performed a thorough Head to Toe Skin Assessment on the patient. ALL assessment sites listed below have been assessed.      Areas assessed by both nurses:    Head, Face, Ears, Shoulders, Back, Chest, Arms, Elbows, Hands, Sacrum. Buttock, Coccyx, Ischium, Legs. Feet and Heels, and Under Medical Devices         Does the Patient have a Wound? Yes wound(s) were present on assessment. LDA wound assessment was Initiated and completed by RN       Juan Prevention initiated by RN: Yes  Wound Care Orders initiated by RN: No    Pressure Injury (Stage 3,4, Unstageable, DTI, NWPT, and Complex wounds) if present, place Wound referral order by RN under : No    New Ostomies, if present place, Ostomy referral order under : No     Nurse 1 eSignature: Electronically signed by Symone Werner RN on 2/1/24 at 6:57 PM EST    **SHARE this note so that the co-signing nurse can place an eSignature**    Nurse 2 eSignature: Electronically signed by Tasha Carlson RN on 2/1/24 at 6:58 PM EST

## 2024-02-01 NOTE — PROGRESS NOTES
Central line dressing changed per twice weekly dressing change policy. Dressing changed using sterile technique. Central line insertion site is clean, dry and intact with no signs or symptoms of infection.

## 2024-02-02 LAB
ALBUMIN SERPL-MCNC: 2.6 G/DL (ref 3.4–5)
ALBUMIN SERPL-MCNC: 2.7 G/DL (ref 3.4–5)
ALP SERPL-CCNC: 133 U/L (ref 40–129)
ALT SERPL-CCNC: 29 U/L (ref 10–40)
ANION GAP SERPL CALCULATED.3IONS-SCNC: 7 MMOL/L (ref 3–16)
ANION GAP SERPL CALCULATED.3IONS-SCNC: 8 MMOL/L (ref 3–16)
ANISOCYTOSIS BLD QL SMEAR: ABNORMAL
AST SERPL-CCNC: 33 U/L (ref 15–37)
BASOPHILS # BLD: 0 K/UL (ref 0–0.2)
BASOPHILS NFR BLD: 0 %
BILIRUB DIRECT SERPL-MCNC: <0.2 MG/DL (ref 0–0.3)
BILIRUB INDIRECT SERPL-MCNC: ABNORMAL MG/DL (ref 0–1)
BILIRUB SERPL-MCNC: 0.3 MG/DL (ref 0–1)
BUN SERPL-MCNC: 34 MG/DL (ref 7–20)
BUN SERPL-MCNC: 36 MG/DL (ref 7–20)
CALCIUM SERPL-MCNC: 8.4 MG/DL (ref 8.3–10.6)
CALCIUM SERPL-MCNC: 8.5 MG/DL (ref 8.3–10.6)
CHLORIDE SERPL-SCNC: 102 MMOL/L (ref 99–110)
CHLORIDE SERPL-SCNC: 98 MMOL/L (ref 99–110)
CO2 SERPL-SCNC: 30 MMOL/L (ref 21–32)
CO2 SERPL-SCNC: 30 MMOL/L (ref 21–32)
CREAT SERPL-MCNC: 0.8 MG/DL (ref 0.8–1.3)
CREAT SERPL-MCNC: 0.9 MG/DL (ref 0.8–1.3)
DEPRECATED RDW RBC AUTO: 19.5 % (ref 12.4–15.4)
EKG ATRIAL RATE: 258 BPM
EKG DIAGNOSIS: NORMAL
EKG P AXIS: 19 DEGREES
EKG Q-T INTERVAL: 506 MS
EKG QRS DURATION: 152 MS
EKG QTC CALCULATION (BAZETT): 506 MS
EKG R AXIS: -83 DEGREES
EKG T AXIS: 107 DEGREES
EKG VENTRICULAR RATE: 60 BPM
EOSINOPHIL # BLD: 0.2 K/UL (ref 0–0.6)
EOSINOPHIL NFR BLD: 2 %
FINAL REPORT: NORMAL
GFR SERPLBLD CREATININE-BSD FMLA CKD-EPI: >60 ML/MIN/{1.73_M2}
GFR SERPLBLD CREATININE-BSD FMLA CKD-EPI: >60 ML/MIN/{1.73_M2}
GLUCOSE BLD-MCNC: 111 MG/DL (ref 70–99)
GLUCOSE BLD-MCNC: 136 MG/DL (ref 70–99)
GLUCOSE BLD-MCNC: 136 MG/DL (ref 70–99)
GLUCOSE SERPL-MCNC: 123 MG/DL (ref 70–99)
GLUCOSE SERPL-MCNC: 168 MG/DL (ref 70–99)
H CAPSUL AB TITR SER ID: NOT DETECTED {TITER}
HCT VFR BLD AUTO: 25.6 % (ref 40.5–52.5)
HGB BLD-MCNC: 8.4 G/DL (ref 13.5–17.5)
INR PPP: 1.35 (ref 0.84–1.16)
LDH SERPL L TO P-CCNC: 469 U/L (ref 100–190)
LYMPHOCYTES # BLD: 0.3 K/UL (ref 1–5.1)
LYMPHOCYTES NFR BLD: 3 %
MACROCYTES BLD QL SMEAR: ABNORMAL
MAGNESIUM SERPL-MCNC: 2.3 MG/DL (ref 1.8–2.4)
MAGNESIUM SERPL-MCNC: 2.6 MG/DL (ref 1.8–2.4)
MCH RBC QN AUTO: 29.2 PG (ref 26–34)
MCHC RBC AUTO-ENTMCNC: 32.9 G/DL (ref 31–36)
MCV RBC AUTO: 89 FL (ref 80–100)
METAMYELOCYTES NFR BLD MANUAL: 1 %
MICROCYTES BLD QL SMEAR: ABNORMAL
MONOCYTES # BLD: 0.4 K/UL (ref 0–1.3)
MONOCYTES NFR BLD: 5 %
NEUTROPHILS # BLD: 8 K/UL (ref 1.7–7.7)
NEUTROPHILS NFR BLD: 89 %
NRBC BLD-RTO: 1 /100 WBC
PERFORMED ON: ABNORMAL
PHOSPHATE SERPL-MCNC: 2.4 MG/DL (ref 2.5–4.9)
PHOSPHATE SERPL-MCNC: 3.3 MG/DL (ref 2.5–4.9)
PLATELET # BLD AUTO: 81 K/UL (ref 135–450)
PLATELET BLD QL SMEAR: ABNORMAL
PMV BLD AUTO: 9.1 FL (ref 5–10.5)
POTASSIUM SERPL-SCNC: 4.6 MMOL/L (ref 3.5–5.1)
POTASSIUM SERPL-SCNC: 4.8 MMOL/L (ref 3.5–5.1)
PRELIMINARY: NORMAL
PROT SERPL-MCNC: 4.9 G/DL (ref 6.4–8.2)
PROTHROMBIN TIME: 16.6 SEC (ref 11.5–14.8)
RBC # BLD AUTO: 2.88 M/UL (ref 4.2–5.9)
SCHISTOCYTES BLD QL SMEAR: ABNORMAL
SODIUM SERPL-SCNC: 136 MMOL/L (ref 136–145)
SODIUM SERPL-SCNC: 139 MMOL/L (ref 136–145)
WBC # BLD AUTO: 8.9 K/UL (ref 4–11)

## 2024-02-02 PROCEDURE — 6370000000 HC RX 637 (ALT 250 FOR IP): Performed by: INTERNAL MEDICINE

## 2024-02-02 PROCEDURE — 6370000000 HC RX 637 (ALT 250 FOR IP): Performed by: NURSE PRACTITIONER

## 2024-02-02 PROCEDURE — 2500000003 HC RX 250 WO HCPCS: Performed by: INTERNAL MEDICINE

## 2024-02-02 PROCEDURE — 36592 COLLECT BLOOD FROM PICC: CPT

## 2024-02-02 PROCEDURE — 83615 LACTATE (LD) (LDH) ENZYME: CPT

## 2024-02-02 PROCEDURE — 84100 ASSAY OF PHOSPHORUS: CPT

## 2024-02-02 PROCEDURE — C9113 INJ PANTOPRAZOLE SODIUM, VIA: HCPCS | Performed by: NURSE PRACTITIONER

## 2024-02-02 PROCEDURE — 94003 VENT MGMT INPAT SUBQ DAY: CPT

## 2024-02-02 PROCEDURE — 85610 PROTHROMBIN TIME: CPT

## 2024-02-02 PROCEDURE — 99291 CRITICAL CARE FIRST HOUR: CPT | Performed by: INTERNAL MEDICINE

## 2024-02-02 PROCEDURE — 85025 COMPLETE CBC W/AUTO DIFF WBC: CPT

## 2024-02-02 PROCEDURE — 80076 HEPATIC FUNCTION PANEL: CPT

## 2024-02-02 PROCEDURE — 84478 ASSAY OF TRIGLYCERIDES: CPT

## 2024-02-02 PROCEDURE — 83735 ASSAY OF MAGNESIUM: CPT

## 2024-02-02 PROCEDURE — 6360000002 HC RX W HCPCS

## 2024-02-02 PROCEDURE — 6360000002 HC RX W HCPCS: Performed by: INTERNAL MEDICINE

## 2024-02-02 PROCEDURE — 2000000000 HC ICU R&B

## 2024-02-02 PROCEDURE — 2580000003 HC RX 258: Performed by: INTERNAL MEDICINE

## 2024-02-02 PROCEDURE — 94761 N-INVAS EAR/PLS OXIMETRY MLT: CPT

## 2024-02-02 PROCEDURE — 80069 RENAL FUNCTION PANEL: CPT

## 2024-02-02 PROCEDURE — 6360000002 HC RX W HCPCS: Performed by: NURSE PRACTITIONER

## 2024-02-02 PROCEDURE — 2700000000 HC OXYGEN THERAPY PER DAY

## 2024-02-02 PROCEDURE — 94640 AIRWAY INHALATION TREATMENT: CPT

## 2024-02-02 PROCEDURE — 93010 ELECTROCARDIOGRAM REPORT: CPT | Performed by: INTERNAL MEDICINE

## 2024-02-02 PROCEDURE — 2580000003 HC RX 258: Performed by: NURSE PRACTITIONER

## 2024-02-02 RX ORDER — FENTANYL CITRATE-0.9 % NACL/PF 10 MCG/ML
25-200 PLASTIC BAG, INJECTION (ML) INTRAVENOUS CONTINUOUS
Status: DISCONTINUED | OUTPATIENT
Start: 2024-02-02 | End: 2024-02-04

## 2024-02-02 RX ORDER — FUROSEMIDE 10 MG/ML
40 INJECTION INTRAMUSCULAR; INTRAVENOUS 2 TIMES DAILY
Status: DISCONTINUED | OUTPATIENT
Start: 2024-02-02 | End: 2024-02-04

## 2024-02-02 RX ORDER — MAGNESIUM SULFATE 1 G/100ML
1000 INJECTION INTRAVENOUS ONCE
Status: COMPLETED | OUTPATIENT
Start: 2024-02-02 | End: 2024-02-02

## 2024-02-02 RX ORDER — MIDODRINE HYDROCHLORIDE 5 MG/1
5 TABLET ORAL
Status: DISCONTINUED | OUTPATIENT
Start: 2024-02-02 | End: 2024-02-09

## 2024-02-02 RX ADMIN — SULFAMETHOXAZOLE AND TRIMETHOPRIM 50 ML: 200; 40 SUSPENSION ORAL at 21:30

## 2024-02-02 RX ADMIN — POLYETHYLENE GLYCOL 3350 17 G: 17 POWDER, FOR SOLUTION ORAL at 08:03

## 2024-02-02 RX ADMIN — IPRATROPIUM BROMIDE AND ALBUTEROL SULFATE 1 DOSE: 2.5; .5 SOLUTION RESPIRATORY (INHALATION) at 08:36

## 2024-02-02 RX ADMIN — LEVETIRACETAM 500 MG: 500 TABLET, FILM COATED ORAL at 21:29

## 2024-02-02 RX ADMIN — Medication 1 TABLET: at 08:03

## 2024-02-02 RX ADMIN — SENNOSIDES AND DOCUSATE SODIUM 2 TABLET: 50; 8.6 TABLET ORAL at 08:03

## 2024-02-02 RX ADMIN — Medication 100 MG: at 08:03

## 2024-02-02 RX ADMIN — VALACYCLOVIR HYDROCHLORIDE 500 MG: 500 TABLET, FILM COATED ORAL at 21:29

## 2024-02-02 RX ADMIN — WATER 20 MG: 1 INJECTION INTRAMUSCULAR; INTRAVENOUS; SUBCUTANEOUS at 08:02

## 2024-02-02 RX ADMIN — DEXMEDETOMIDINE 1.5 MCG/KG/HR: 100 INJECTION, SOLUTION INTRAVENOUS at 03:57

## 2024-02-02 RX ADMIN — PANTOPRAZOLE SODIUM 40 MG: 40 INJECTION, POWDER, FOR SOLUTION INTRAVENOUS at 08:02

## 2024-02-02 RX ADMIN — FUROSEMIDE 40 MG: 10 INJECTION, SOLUTION INTRAMUSCULAR; INTRAVENOUS at 18:11

## 2024-02-02 RX ADMIN — MAGNESIUM SULFATE HEPTAHYDRATE 1000 MG: 1 INJECTION, SOLUTION INTRAVENOUS at 14:50

## 2024-02-02 RX ADMIN — DEXMEDETOMIDINE 1.5 MCG/KG/HR: 100 INJECTION, SOLUTION INTRAVENOUS at 08:02

## 2024-02-02 RX ADMIN — IPRATROPIUM BROMIDE AND ALBUTEROL SULFATE 1 DOSE: 2.5; .5 SOLUTION RESPIRATORY (INHALATION) at 12:06

## 2024-02-02 RX ADMIN — APIXABAN 5 MG: 5 TABLET, FILM COATED ORAL at 21:29

## 2024-02-02 RX ADMIN — DEXMEDETOMIDINE 1.5 MCG/KG/HR: 100 INJECTION, SOLUTION INTRAVENOUS at 21:16

## 2024-02-02 RX ADMIN — MIDODRINE HYDROCHLORIDE 5 MG: 5 TABLET ORAL at 11:51

## 2024-02-02 RX ADMIN — IPRATROPIUM BROMIDE AND ALBUTEROL SULFATE 1 DOSE: 2.5; .5 SOLUTION RESPIRATORY (INHALATION) at 15:46

## 2024-02-02 RX ADMIN — IPRATROPIUM BROMIDE AND ALBUTEROL SULFATE 1 DOSE: 2.5; .5 SOLUTION RESPIRATORY (INHALATION) at 20:13

## 2024-02-02 RX ADMIN — SODIUM CHLORIDE, PRESERVATIVE FREE 10 ML: 5 INJECTION INTRAVENOUS at 21:16

## 2024-02-02 RX ADMIN — DEXMEDETOMIDINE 1.5 MCG/KG/HR: 100 INJECTION, SOLUTION INTRAVENOUS at 12:05

## 2024-02-02 RX ADMIN — DEXMEDETOMIDINE 1.5 MCG/KG/HR: 100 INJECTION, SOLUTION INTRAVENOUS at 16:39

## 2024-02-02 RX ADMIN — VALACYCLOVIR HYDROCHLORIDE 500 MG: 500 TABLET, FILM COATED ORAL at 08:03

## 2024-02-02 RX ADMIN — LEVETIRACETAM 500 MG: 500 TABLET, FILM COATED ORAL at 08:03

## 2024-02-02 RX ADMIN — METOPROLOL TARTRATE 2.5 MG: 1 INJECTION, SOLUTION INTRAVENOUS at 21:29

## 2024-02-02 RX ADMIN — SULFAMETHOXAZOLE AND TRIMETHOPRIM 50 ML: 200; 40 SUSPENSION ORAL at 06:23

## 2024-02-02 RX ADMIN — APIXABAN 5 MG: 5 TABLET, FILM COATED ORAL at 08:03

## 2024-02-02 RX ADMIN — METOPROLOL TARTRATE 2.5 MG: 1 INJECTION, SOLUTION INTRAVENOUS at 05:24

## 2024-02-02 RX ADMIN — SULFAMETHOXAZOLE AND TRIMETHOPRIM 50 ML: 200; 40 SUSPENSION ORAL at 16:18

## 2024-02-02 RX ADMIN — FUROSEMIDE 40 MG: 10 INJECTION, SOLUTION INTRAMUSCULAR; INTRAVENOUS at 08:02

## 2024-02-02 RX ADMIN — FENTANYL CITRATE 25 MCG/HR: 0.05 INJECTION, SOLUTION INTRAMUSCULAR; INTRAVENOUS at 12:52

## 2024-02-02 RX ADMIN — MIDODRINE HYDROCHLORIDE 5 MG: 5 TABLET ORAL at 18:11

## 2024-02-02 RX ADMIN — MIDODRINE HYDROCHLORIDE 5 MG: 5 TABLET ORAL at 08:03

## 2024-02-02 ASSESSMENT — PULMONARY FUNCTION TESTS
PIF_VALUE: 18
PIF_VALUE: 7
PIF_VALUE: 15
PIF_VALUE: 14
PIF_VALUE: 15
PIF_VALUE: 16
PIF_VALUE: 15
PIF_VALUE: 17
PIF_VALUE: 8
PIF_VALUE: 15
PIF_VALUE: 18
PIF_VALUE: 8
PIF_VALUE: 15
PIF_VALUE: 15
PIF_VALUE: 16
PIF_VALUE: 7
PIF_VALUE: 9
PIF_VALUE: 15
PIF_VALUE: 8
PIF_VALUE: 14
PIF_VALUE: 9
PIF_VALUE: 14
PIF_VALUE: 17
PIF_VALUE: 15
PIF_VALUE: 8
PIF_VALUE: 8
PIF_VALUE: 15
PIF_VALUE: 13
PIF_VALUE: 15
PIF_VALUE: 8
PIF_VALUE: 15
PIF_VALUE: 15
PIF_VALUE: 14
PIF_VALUE: 8
PIF_VALUE: 15
PIF_VALUE: 8
PIF_VALUE: 15
PIF_VALUE: 7
PIF_VALUE: 16
PIF_VALUE: 8
PIF_VALUE: 8
PIF_VALUE: 15
PIF_VALUE: 8
PIF_VALUE: 15
PIF_VALUE: 8
PIF_VALUE: 15
PIF_VALUE: 8
PIF_VALUE: 14

## 2024-02-02 ASSESSMENT — PAIN SCALES - GENERAL
PAINLEVEL_OUTOF10: 0

## 2024-02-02 NOTE — PROGRESS NOTES
No significant events to note overnight. Vpaced per monitor. Tolerated vent settings well. Levophed weaned to 2mcg/min. Propofol weaned off. Precedex ! 1.5 mcg/kg/hr for RASS 0/ -1. Pt able to follow commands with equal strength in all extremities. Adequate UOP per vasquez. TF infusing at goal rate with no s/s intolerance to note. Denies any pain. Bath given, linens changed. Family at bedside overnight. Will cont to monitor.

## 2024-02-02 NOTE — PROGRESS NOTES
Pulmonary Medicine Follow-up Note    CC: KENNEDY, acute hypoxemic resp failure, abnl CT Chest     SUBJECTIVE / INTERVAL HISTORY:  Had some desaturation overnight when trying to wean to 40%, on low dose levophed. I/Os still net (+).     ROS:  Denies headache, nausea or chest pain.    24HR INTAKE/OUTPUT:    Intake/Output Summary (Last 24 hours) at 2024 1118  Last data filed at 2024 0520  Gross per 24 hour   Intake 2272.8 ml   Output 2145 ml   Net 127.8 ml          [START ON 2/3/2024] methylPREDNISolone  20 mg IntraVENous Daily    sulfamethoxazole-trimethoprim  400 mg Oral Q8H    midodrine  5 mg Oral BID WC    furosemide  40 mg IntraVENous Daily    thiamine  100 mg Oral Daily    sennosides-docusate sodium  2 tablet Oral Daily    polyethylene glycol  17 g Oral Daily    insulin lispro  0-4 Units SubCUTAneous Q6H    ipratropium 0.5 mg-albuterol 2.5 mg  1 Dose Inhalation 4x Daily RT    metoprolol  2.5 mg IntraVENous Q8H    pantoprazole  40 mg IntraVENous Daily    apixaban  5 mg Oral BID    levETIRAcetam  500 mg Oral BID    therapeutic multivitamin-minerals  1 tablet Oral Daily    [Held by provider] tamsulosin  0.4 mg Oral Daily    valACYclovir  500 mg Oral BID    sodium chloride flush  5-40 mL IntraVENous 2 times per day    Saline Mouthwash  15 mL Swish & Spit 4x Daily AC & HS       PHYSICAL EXAMINATION:  /66   Pulse 60   Temp 98.2 °F (36.8 °C) (Bladder)   Resp 21   Ht 1.778 m (5' 10\")   Wt 63.2 kg (139 lb 5.3 oz)   SpO2 (!) 89%   BMI 19.99 kg/m²   CURRENT PULSE OXIMETRY:  SpO2: (!) 89 %  24HR PULSE OXIMETRY RANGE:  SpO2  Av.8 %  Min: 85 %  Max: 100 %     Ventilator settings  - Vent Mode: AC/PRVC  -    - Vt (Set, mL): 450 mL  - PEEP/CPAP (cmH2O): 5  - FiO2 : 45 %  - Resp Rate (Set): 16 bpm  - Peak Inspiratory Pressure (cmH2O): 15 cmH2O  - Plateau Pressure (cm H2O): 25 cm H2O    Gen: Intubated.   HEENT: PERRL, EOMI, OP nl  Lung:  Crackles at both bases posteriorly.   CV: RRR without M/R/R  Abd: +BS,  0/-1. Currently on Precedex only.    Ppx tx per Oncology   Continue broad spectrum antibiotics under ID guidance- merrem/valtrex, follow cultures  Okay to continue remdesivir  Vasopressor support to keep MAP >/= 65 mmHg   Albuterol PRN  Methylprednisolone 20 mg daily for 2 more days then 10 mg daily for 5 more days.   Strict I/Os, Lasix increased to 40 mg BID, goal net negative balance -250ml/d  Midodrine 5 mg increased to TID from BID  AED @ home dose  Electrolyte replacement  Diet: NPO  IVF: N/A  GI prophylaxis: elevate HOB 30 degrees, PPI  DVT prophylaxis: on therapeutic Eliquis  Bowel regimen: N/A  Abx: Empiric coverage for now    Pt has a high probability of imminent or life-threatening deterioration requiring close monitoring, and highly complex decision-making and/or interventions of high intensity to assess, manipulate, and support his critical organ systems to prevent a likely inevitable decline which could occur if left untreated.     A total critical care time 31 minutes was used. This includes but not limited to examining patient, collaborating with other physicians, monitoring vital signs, telemetry, continuous pulse oximetry, and clinical response to IV medications, documentation time, review and interpretation of laboratory and radiological data, review of nursing notes and old record review. This time excludes any time that may have been spent performing procedures for life threatening organ failure.     Nikolay Jose \"Regulo\" Rito Garza MD  Pulmonary and Critical Care Medicine

## 2024-02-02 NOTE — PLAN OF CARE
Problem: Safety - Adult  Goal: Free from fall injury  Outcome: Progressing     Problem: ABCDS Injury Assessment  Goal: Absence of physical injury  Outcome: Progressing     Problem: Chronic Conditions and Co-morbidities  Goal: Patient's chronic conditions and co-morbidity symptoms are monitored and maintained or improved  Outcome: Progressing  Flowsheets (Taken 2/1/2024 2000)  Care Plan - Patient's Chronic Conditions and Co-Morbidity Symptoms are Monitored and Maintained or Improved:   Monitor and assess patient's chronic conditions and comorbid symptoms for stability, deterioration, or improvement   Update acute care plan with appropriate goals if chronic or comorbid symptoms are exacerbated and prevent overall improvement and discharge     Problem: Safety - Medical Restraint  Goal: Remains free of injury from restraints (Restraint for Interference with Medical Device)  Description: INTERVENTIONS:  1. Determine that other, less restrictive measures have been tried or would not be effective before applying the restraint  2. Evaluate the patient's condition at the time of restraint application  3. Inform patient/family regarding the reason for restraint  4. Q2H: Monitor safety, psychosocial status, comfort, nutrition and hydration  Outcome: Progressing  Flowsheets (Taken 2/1/2024 2000)  Remains free of injury from restraints (restraint for interference with medical device):   Determine that other, less restrictive measures have been tried or would not be effective before applying the restraint   Evaluate the patient's condition at the time of restraint application   Inform patient/family regarding the reason for restraint   Every 2 hours: Monitor safety, psychosocial status, comfort, nutrition and hydration     Problem: Nutrition Deficit:  Goal: Optimize nutritional status  Outcome: Progressing

## 2024-02-02 NOTE — PROGRESS NOTES
Vasopressor dosing equivalent score = 3. For VDE >12 trophic TF only (10 mL/hr).    Comprehensive Nutrition Assessment    RECOMMENDATIONS:  PO Diet: Continue NPO  2.  Nutrition Education:   3.  Nutrition Support:   Continue Vital AF 1.2 @ 45 ml/hr  Prosource 1 via TF  30 ml water q 4 hrs        NUTRITION ASSESSMENT:   Nutritional summary & status: Follow-up. Pt remains intubated. Sedated on precedex, propofol stopped. Levo trending down, VDE 3. Pt tolerating Vital AF 1.2 @ goal rate of 45 ml/hr.  BM on 2/01. Continues on scheduled bowel regimen. Continue with current TF regimen and monitor closely.   Admission // PMH: Hypoxia//DLBC w/CNS, Thyroid disease, acute CHF, CAD    MALNUTRITION ASSESSMENT  Context of Malnutrition: Chronic Illness   Malnutrition Status: Severe malnutrition  Findings of the 6 clinical characteristics of malnutrition (Minimum of 2 out of 6 clinical characteristics is required to make the diagnosis of moderate or severe Protein Calorie Malnutrition based on AND/ASPEN Guidelines):  Energy Intake:  75% or less estimated energy requirements for 1 month or longer  Weight Loss:  Greater than 5% over 1 month (15% in 1 month)     Body Fat Loss:  Severe body fat loss Orbital, Buccal region   Muscle Mass Loss:  Severe muscle mass loss Temples (temporalis), Clavicles (pectoralis & deltoids)  Fluid Accumulation:  No significant fluid accumulation     Strength:  Not Performed    NUTRITION DIAGNOSIS   Inadequate oral intake related to impaired respiratory function as evidenced by intubation    Nutrition Monitoring and Evaluation:   Food/Nutrient Intake Outcomes:  Enteral Nutrition Intake/Tolerance  Physical Signs/Symptoms Outcomes:  Biochemical Data, Hemodynamic Status, Nutrition Focused Physical Findings, Weight     OBJECTIVE DATA: Significant to nutrition assessment  Nutrition Related Findings: LBM2/01.Na 139 Glu 111 phos 2.4. RUE;trace;LUE+2 pitting;BLE +1 edema.  Wounds: None  Nutrition Goals:

## 2024-02-02 NOTE — PLAN OF CARE
Problem: Safety - Adult  Goal: Free from fall injury  2/2/2024 0949 by Deysi Mosher RN  Outcome: Progressing  2/2/2024 0230 by Faby Serrano RN  Outcome: Progressing     Problem: ABCDS Injury Assessment  Goal: Absence of physical injury  2/2/2024 0949 by Deysi Mosher RN  Outcome: Progressing  2/2/2024 0230 by Faby Serrano RN  Outcome: Progressing     Problem: Chronic Conditions and Co-morbidities  Goal: Patient's chronic conditions and co-morbidity symptoms are monitored and maintained or improved  2/2/2024 0949 by Deysi Mosher RN  Outcome: Progressing  2/2/2024 0230 by Faby Serrano RN  Outcome: Progressing  Flowsheets (Taken 2/1/2024 2000)  Care Plan - Patient's Chronic Conditions and Co-Morbidity Symptoms are Monitored and Maintained or Improved:   Monitor and assess patient's chronic conditions and comorbid symptoms for stability, deterioration, or improvement   Update acute care plan with appropriate goals if chronic or comorbid symptoms are exacerbated and prevent overall improvement and discharge     Problem: Respiratory - Adult  Goal: Achieves optimal ventilation and oxygenation  Outcome: Progressing  Flowsheets (Taken 2/1/2024 2000 by Faby Serrano RN)  Achieves optimal ventilation and oxygenation:   Assess for changes in respiratory status   Assess for changes in mentation and behavior   Position to facilitate oxygenation and minimize respiratory effort   Assess the need for suctioning and aspirate as needed   Respiratory therapy support as indicated     Problem: Cardiovascular - Adult  Goal: Maintains optimal cardiac output and hemodynamic stability  Outcome: Progressing  Flowsheets (Taken 2/1/2024 2000 by Faby Serrano RN)  Maintains optimal cardiac output and hemodynamic stability:   Monitor blood pressure and heart rate   Monitor urine output and notify Licensed Independent Practitioner for values outside of normal range   Assess for signs of decreased cardiac output  Goal:  by Deysi Mosher, RN  Outcome: Progressing  2/2/2024 0230 by Faby Serrano, RN  Outcome: Progressing  Flowsheets (Taken 2/1/2024 2000)  Remains free of injury from restraints (restraint for interference with medical device):   Determine that other, less restrictive measures have been tried or would not be effective before applying the restraint   Evaluate the patient's condition at the time of restraint application   Inform patient/family regarding the reason for restraint   Every 2 hours: Monitor safety, psychosocial status, comfort, nutrition and hydration     Problem: Pain  Goal: Verbalizes/displays adequate comfort level or baseline comfort level  Outcome: Progressing     Problem: Nutrition Deficit:  Goal: Optimize nutritional status  2/2/2024 0949 by Deysi Mosher, RN  Outcome: Progressing  2/2/2024 0230 by Faby Serrano, RN  Outcome: Progressing     Problem: Skin/Tissue Integrity  Goal: Absence of new skin breakdown  Description: 1.  Monitor for areas of redness and/or skin breakdown  2.  Assess vascular access sites hourly  3.  Every 4-6 hours minimum:  Change oxygen saturation probe site  4.  Every 4-6 hours:  If on nasal continuous positive airway pressure, respiratory therapy assess nares and determine need for appliance change or resting period.  Outcome: Progressing

## 2024-02-02 NOTE — PROGRESS NOTES
[facility] Progress Note    2024     Zain Lopez    MRN: [id number]    : 1955    Referring MD: Penelope Wilhelm DO  [address]  [facility] [address]  [place] [address]      SUBJECTIVE:  Currently on Fi02 40%, low dose Levo, and sedated.     ECOG PS: (4) Completely disabled, unable to carry out self-care and confined to bed or chair     KPS: 20% Very sick; hospital admission necessary; active supportive treatment necessary    Isolation: Droplet Plus    Medications    Scheduled Meds:   sulfamethoxazole-trimethoprim  400 mg Oral Q8H    methylPREDNISolone  20 mg IntraVENous Daily    midodrine  5 mg Oral BID WC    furosemide  40 mg IntraVENous Daily    thiamine  100 mg Oral Daily    sennosides-docusate sodium  2 tablet Oral Daily    polyethylene glycol  17 g Oral Daily    insulin lispro  0-4 Units SubCUTAneous Q6H    ipratropium 0.5 mg-albuterol 2.5 mg  1 Dose Inhalation 4x Daily RT    metoprolol  2.5 mg IntraVENous Q8H    pantoprazole  40 mg IntraVENous Daily    apixaban  5 mg Oral BID    levETIRAcetam  500 mg Oral BID    therapeutic multivitamin-minerals  1 tablet Oral Daily    [Held by provider] tamsulosin  0.4 mg Oral Daily    valACYclovir  500 mg Oral BID    sodium chloride flush  5-40 mL IntraVENous 2 times per day    Saline Mouthwash  15 mL Swish & Spit 4x Daily AC & HS     Continuous Infusions:   dexmedeTOMIDine (PRECEDEX) 400 mcg in sodium chloride 0.9 % 100 mL infusion 1.5 mcg/kg/hr (24 0520)    dextrose      propofol Stopped (24 2247)    fentaNYL Stopped (24 1557)    norepinephrine 2 mcg/min (24 0520)    sodium chloride Stopped (24 1224)    sodium chloride       PRN Meds:.potassium chloride, potassium chloride, magnesium sulfate, glucose, dextrose bolus **OR** dextrose bolus, glucagon (rDNA), dextrose, magnesium sulfate, sodium chloride, albuterol, ondansetron, diphenhydrAMINE, sodium chloride flush, sodium chloride, magnesium hydroxide, Saline Mouthwash, alteplase  lateral ventricle compared to the prior MRI from 6/28/2023. Significant interval improvement in the surrounding vasogenic edema and T2/FLAIR hyperintense signal involving left greater than right hemispheres. Currently, there is residual T2 and FLAIR hyperintense signal seen involving the left greater than right periventricular white matter regions, as well as the posterior corpus callosum. There is a small amount of residual enhancement seen adjacent to the atrium of the left lateral ventricle as described.  - MRI brain (9/29/23): Decreased enhancement centered about the left periatrial region, w/ no significant interval change in confluent FLAIR signal abnormality. These findings are nonspecific, potentially treatment related, although residual lymphoma is difficult to exclude.     - S/p Thiotepa, Busulfan, and Cytoxan & ASCT (11/3/23)  Post-Txp Maintenance: None  Post-Txp F/u: MRI brain w/ & w/out contrast (First week Feb)     Day + 91    2. ID: Multifocal PNA POA, Afebrile but continued hypoxia. COVID19+ but not source of acute respiratory failure, ? PJP  - ID following  - Blood cx (1/23/24): NG; Pan-cx 1/27/24 with sepsis NG  - RVP (1/23/24): Neg; Rapid COVID 1/28 POS  - MRSA nasal swab 1/24/24: Neg  - Strep and Legionella UrAg 1/24/24: Neg  - S/p bronchoscopy 1/25: Cxs NG, CMV neg, cytology neg for fungal or PJP,   - Fungal serologies 1/24: histo, blasto, fungitel, & galactomannan all neg  - Repeat 1/28 - pending, Ur Ag neg  - Procalcitonin 1/27 - 4.27--3.41--1.6--0.91    - Tx Dose Bactrim Day +9 (1/25-1/28; 1/30/24) - restarted with indeterminate PJP PCR    - Cont solumedrol for PJP pneumonia : Planned treatment duration- 21 days.  - 40mg BID (1mg/kg) (1/25/24)  - Reduce the dose to 1mg/kg of Solumedrol - 60mg daily 1/29/24  - Reduce to solumedrol 30mg daily (1/31/24)  - Reduce to solumedrol 20mg daily 2/2/24, continue with 20 mg qd until pt has been extubated.  - Cont Valtrex ppx  - IgG 1/24 219. S/p IVIG

## 2024-02-02 NOTE — PROGRESS NOTES
[facility] Daily Progress Note      Admit Date:  1/23/2024    Subjective:  Zain Lopez is a 68 y.o. 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     Cardiology consulted 1/27 for cardiac history and persistent AF.     Interval history:   Restarted norepinephrine 1500 on 2/1/24 d/t soft BP. Metoprolol IV was held d/t soft BP on norepinephrine. I/o -200mL from yesterday (lasix held) but did receive lasix 40mg today. Pt now only on precedex and able to nod/shake his head to some questions. He denies pain.       Objective:   BP (!) 93/59   Pulse 60   Temp 97.9 °F (36.6 °C) (Bladder)   Resp 20   Ht 1.778 m (5' 10\")   Wt 63.2 kg (139 lb 5.3 oz)   SpO2 93%   BMI 19.99 kg/m²     Intake/Output Summary (Last 24 hours) at 2/2/2024 1341  Last data filed at 2/2/2024 1200  Gross per 24 hour   Intake 2272.8 ml   Output 2395 ml   Net -122.2 ml     TELEMETRY: Atrial flutter     Physical Exam:  General:  sedated, intubated, but opens eyes to command and follows simple commands    Skin:  Warm and dry  Neck:  no JVP  Chest:  mechanical breath sounds. End inspiratory wheeze to the left mid and lower lung   Cardiovascular:  RRR S1S2  Abdomen:  Soft  Extremities:  LUE edema, 2+, to the upper arm. No RUE or BLE edema.     Medications:    [START ON 2/3/2024] methylPREDNISolone  20 mg IntraVENous Daily    midodrine  5 mg Oral TID WC    furosemide  40 mg IntraVENous BID    magnesium sulfate  1,000 mg IntraVENous Once    sulfamethoxazole-trimethoprim  400 mg Oral Q8H    thiamine  100 mg Oral Daily    sennosides-docusate sodium  2 tablet Oral Daily    polyethylene glycol  17 g Oral Daily    insulin lispro  0-4 Units SubCUTAneous Q6H    ipratropium 0.5 mg-albuterol

## 2024-02-03 ENCOUNTER — APPOINTMENT (OUTPATIENT)
Dept: GENERAL RADIOLOGY | Age: 69
End: 2024-02-03
Attending: STUDENT IN AN ORGANIZED HEALTH CARE EDUCATION/TRAINING PROGRAM
Payer: MEDICARE

## 2024-02-03 PROBLEM — I10 BENIGN ESSENTIAL HTN: Status: ACTIVE | Noted: 2024-02-03

## 2024-02-03 PROBLEM — I48.92 ATRIAL FLUTTER (HCC): Status: ACTIVE | Noted: 2024-02-03

## 2024-02-03 PROBLEM — Z79.01 ON CONTINUOUS ORAL ANTICOAGULATION: Status: ACTIVE | Noted: 2024-02-03

## 2024-02-03 PROBLEM — I48.19 PERSISTENT ATRIAL FIBRILLATION (HCC): Status: ACTIVE | Noted: 2024-02-03

## 2024-02-03 PROBLEM — I49.01 VF (VENTRICULAR FIBRILLATION) (HCC): Status: ACTIVE | Noted: 2024-02-03

## 2024-02-03 PROBLEM — I47.20 VT (VENTRICULAR TACHYCARDIA) (HCC): Status: ACTIVE | Noted: 2024-02-03

## 2024-02-03 PROBLEM — I50.22 HEART FAILURE WITH MILDLY REDUCED EJECTION FRACTION (HFMREF) (HCC): Status: ACTIVE | Noted: 2024-02-03

## 2024-02-03 LAB
ALBUMIN SERPL-MCNC: 2.9 G/DL (ref 3.4–5)
ALBUMIN SERPL-MCNC: 2.9 G/DL (ref 3.4–5)
ALP SERPL-CCNC: 141 U/L (ref 40–129)
ALT SERPL-CCNC: 21 U/L (ref 10–40)
ANION GAP SERPL CALCULATED.3IONS-SCNC: 10 MMOL/L (ref 3–16)
ANION GAP SERPL CALCULATED.3IONS-SCNC: 8 MMOL/L (ref 3–16)
ANISOCYTOSIS BLD QL SMEAR: ABNORMAL
AST SERPL-CCNC: 24 U/L (ref 15–37)
BACTERIA URNS QL MICRO: ABNORMAL /HPF
BASOPHILS # BLD: 0 K/UL (ref 0–0.2)
BASOPHILS NFR BLD: 0 %
BILIRUB DIRECT SERPL-MCNC: <0.2 MG/DL (ref 0–0.3)
BILIRUB INDIRECT SERPL-MCNC: ABNORMAL MG/DL (ref 0–1)
BILIRUB SERPL-MCNC: 0.3 MG/DL (ref 0–1)
BILIRUB UR QL STRIP.AUTO: NEGATIVE
BUN SERPL-MCNC: 38 MG/DL (ref 7–20)
BUN SERPL-MCNC: 44 MG/DL (ref 7–20)
CALCIUM SERPL-MCNC: 8.8 MG/DL (ref 8.3–10.6)
CALCIUM SERPL-MCNC: 8.9 MG/DL (ref 8.3–10.6)
CHLORIDE SERPL-SCNC: 96 MMOL/L (ref 99–110)
CHLORIDE SERPL-SCNC: 98 MMOL/L (ref 99–110)
CLARITY UR: CLEAR
CO2 SERPL-SCNC: 29 MMOL/L (ref 21–32)
CO2 SERPL-SCNC: 32 MMOL/L (ref 21–32)
COLOR UR: YELLOW
CREAT SERPL-MCNC: 0.8 MG/DL (ref 0.8–1.3)
CREAT SERPL-MCNC: 1 MG/DL (ref 0.8–1.3)
DACRYOCYTES BLD QL SMEAR: ABNORMAL
DEPRECATED RDW RBC AUTO: 19.8 % (ref 12.4–15.4)
EOSINOPHIL # BLD: 0.2 K/UL (ref 0–0.6)
EOSINOPHIL NFR BLD: 3 %
GFR SERPLBLD CREATININE-BSD FMLA CKD-EPI: >60 ML/MIN/{1.73_M2}
GFR SERPLBLD CREATININE-BSD FMLA CKD-EPI: >60 ML/MIN/{1.73_M2}
GLUCOSE BLD-MCNC: 115 MG/DL (ref 70–99)
GLUCOSE BLD-MCNC: 132 MG/DL (ref 70–99)
GLUCOSE BLD-MCNC: 137 MG/DL (ref 70–99)
GLUCOSE SERPL-MCNC: 115 MG/DL (ref 70–99)
GLUCOSE SERPL-MCNC: 120 MG/DL (ref 70–99)
GLUCOSE UR STRIP.AUTO-MCNC: NEGATIVE MG/DL
HCT VFR BLD AUTO: 27.4 % (ref 40.5–52.5)
HGB BLD-MCNC: 8.8 G/DL (ref 13.5–17.5)
HGB UR QL STRIP.AUTO: ABNORMAL
INR PPP: 1.33 (ref 0.84–1.16)
KETONES UR STRIP.AUTO-MCNC: NEGATIVE MG/DL
LACTATE BLDV-SCNC: 1.5 MMOL/L (ref 0.4–2)
LEUKOCYTE ESTERASE UR QL STRIP.AUTO: NEGATIVE
LYMPHOCYTES # BLD: 0.3 K/UL (ref 1–5.1)
LYMPHOCYTES NFR BLD: 4 %
MACROCYTES BLD QL SMEAR: ABNORMAL
MAGNESIUM SERPL-MCNC: 2.2 MG/DL (ref 1.8–2.4)
MAGNESIUM SERPL-MCNC: 2.2 MG/DL (ref 1.8–2.4)
MCH RBC QN AUTO: 28.8 PG (ref 26–34)
MCHC RBC AUTO-ENTMCNC: 32.2 G/DL (ref 31–36)
MCV RBC AUTO: 89.4 FL (ref 80–100)
MONOCYTES # BLD: 0.2 K/UL (ref 0–1.3)
MONOCYTES NFR BLD: 2 %
NEUTROPHILS # BLD: 7.1 K/UL (ref 1.7–7.7)
NEUTROPHILS NFR BLD: 91 %
NITRITE UR QL STRIP.AUTO: NEGATIVE
OVALOCYTES BLD QL SMEAR: ABNORMAL
PATH INTERP BLD-IMP: NORMAL
PERFORMED ON: ABNORMAL
PH UR STRIP.AUTO: 7 [PH] (ref 5–8)
PHOSPHATE SERPL-MCNC: 3.8 MG/DL (ref 2.5–4.9)
PHOSPHATE SERPL-MCNC: 4.1 MG/DL (ref 2.5–4.9)
PLATELET # BLD AUTO: 72 K/UL (ref 135–450)
PLATELET BLD QL SMEAR: ABNORMAL
PMV BLD AUTO: 10 FL (ref 5–10.5)
POLYCHROMASIA BLD QL SMEAR: ABNORMAL
POTASSIUM SERPL-SCNC: 4.7 MMOL/L (ref 3.5–5.1)
POTASSIUM SERPL-SCNC: 4.9 MMOL/L (ref 3.5–5.1)
POTASSIUM SERPL-SCNC: 5.7 MMOL/L (ref 3.5–5.1)
PROT SERPL-MCNC: 5.3 G/DL (ref 6.4–8.2)
PROT UR STRIP.AUTO-MCNC: NEGATIVE MG/DL
PROTHROMBIN TIME: 16.4 SEC (ref 11.5–14.8)
RBC # BLD AUTO: 3.07 M/UL (ref 4.2–5.9)
RBC #/AREA URNS HPF: ABNORMAL /HPF (ref 0–4)
SCHISTOCYTES BLD QL SMEAR: ABNORMAL
SODIUM SERPL-SCNC: 135 MMOL/L (ref 136–145)
SODIUM SERPL-SCNC: 138 MMOL/L (ref 136–145)
SP GR UR STRIP.AUTO: 1.02 (ref 1–1.03)
TRIGL SERPL-MCNC: 109 MG/DL (ref 0–150)
UA DIPSTICK W REFLEX MICRO PNL UR: YES
URN SPEC COLLECT METH UR: ABNORMAL
UROBILINOGEN UR STRIP-ACNC: 0.2 E.U./DL
WBC # BLD AUTO: 7.8 K/UL (ref 4–11)
WBC #/AREA URNS HPF: ABNORMAL /HPF (ref 0–5)

## 2024-02-03 PROCEDURE — 87086 URINE CULTURE/COLONY COUNT: CPT

## 2024-02-03 PROCEDURE — 85025 COMPLETE CBC W/AUTO DIFF WBC: CPT

## 2024-02-03 PROCEDURE — 84132 ASSAY OF SERUM POTASSIUM: CPT

## 2024-02-03 PROCEDURE — 82947 ASSAY GLUCOSE BLOOD QUANT: CPT

## 2024-02-03 PROCEDURE — 87106 FUNGI IDENTIFICATION YEAST: CPT

## 2024-02-03 PROCEDURE — 93005 ELECTROCARDIOGRAM TRACING: CPT | Performed by: STUDENT IN AN ORGANIZED HEALTH CARE EDUCATION/TRAINING PROGRAM

## 2024-02-03 PROCEDURE — 2580000003 HC RX 258: Performed by: NURSE PRACTITIONER

## 2024-02-03 PROCEDURE — 74018 RADEX ABDOMEN 1 VIEW: CPT

## 2024-02-03 PROCEDURE — 2500000003 HC RX 250 WO HCPCS: Performed by: INTERNAL MEDICINE

## 2024-02-03 PROCEDURE — 6370000000 HC RX 637 (ALT 250 FOR IP): Performed by: INTERNAL MEDICINE

## 2024-02-03 PROCEDURE — 36592 COLLECT BLOOD FROM PICC: CPT

## 2024-02-03 PROCEDURE — 6370000000 HC RX 637 (ALT 250 FOR IP): Performed by: NURSE PRACTITIONER

## 2024-02-03 PROCEDURE — 80076 HEPATIC FUNCTION PANEL: CPT

## 2024-02-03 PROCEDURE — 87040 BLOOD CULTURE FOR BACTERIA: CPT

## 2024-02-03 PROCEDURE — 81001 URINALYSIS AUTO W/SCOPE: CPT

## 2024-02-03 PROCEDURE — 87252 VIRUS INOCULATION TISSUE: CPT

## 2024-02-03 PROCEDURE — 2000000000 HC ICU R&B

## 2024-02-03 PROCEDURE — 87205 SMEAR GRAM STAIN: CPT

## 2024-02-03 PROCEDURE — 92610 EVALUATE SWALLOWING FUNCTION: CPT

## 2024-02-03 PROCEDURE — 6360000002 HC RX W HCPCS: Performed by: NURSE PRACTITIONER

## 2024-02-03 PROCEDURE — 94003 VENT MGMT INPAT SUBQ DAY: CPT

## 2024-02-03 PROCEDURE — 94761 N-INVAS EAR/PLS OXIMETRY MLT: CPT

## 2024-02-03 PROCEDURE — 82330 ASSAY OF CALCIUM: CPT

## 2024-02-03 PROCEDURE — 36415 COLL VENOUS BLD VENIPUNCTURE: CPT

## 2024-02-03 PROCEDURE — 99291 CRITICAL CARE FIRST HOUR: CPT | Performed by: INTERNAL MEDICINE

## 2024-02-03 PROCEDURE — 2580000003 HC RX 258: Performed by: INTERNAL MEDICINE

## 2024-02-03 PROCEDURE — 2700000000 HC OXYGEN THERAPY PER DAY

## 2024-02-03 PROCEDURE — 6360000002 HC RX W HCPCS: Performed by: INTERNAL MEDICINE

## 2024-02-03 PROCEDURE — 87103 BLOOD FUNGUS CULTURE: CPT

## 2024-02-03 PROCEDURE — 99233 SBSQ HOSP IP/OBS HIGH 50: CPT

## 2024-02-03 PROCEDURE — 83605 ASSAY OF LACTIC ACID: CPT

## 2024-02-03 PROCEDURE — 83735 ASSAY OF MAGNESIUM: CPT

## 2024-02-03 PROCEDURE — 82803 BLOOD GASES ANY COMBINATION: CPT

## 2024-02-03 PROCEDURE — 71045 X-RAY EXAM CHEST 1 VIEW: CPT

## 2024-02-03 PROCEDURE — 94150 VITAL CAPACITY TEST: CPT

## 2024-02-03 PROCEDURE — 80069 RENAL FUNCTION PANEL: CPT

## 2024-02-03 PROCEDURE — 87253 VIRUS INOCULATE TISSUE ADDL: CPT

## 2024-02-03 PROCEDURE — 87102 FUNGUS ISOLATION CULTURE: CPT

## 2024-02-03 PROCEDURE — 85610 PROTHROMBIN TIME: CPT

## 2024-02-03 PROCEDURE — 6370000000 HC RX 637 (ALT 250 FOR IP): Performed by: STUDENT IN AN ORGANIZED HEALTH CARE EDUCATION/TRAINING PROGRAM

## 2024-02-03 PROCEDURE — 87070 CULTURE OTHR SPECIMN AEROBIC: CPT

## 2024-02-03 PROCEDURE — 94640 AIRWAY INHALATION TREATMENT: CPT

## 2024-02-03 PROCEDURE — 84295 ASSAY OF SERUM SODIUM: CPT

## 2024-02-03 PROCEDURE — C9113 INJ PANTOPRAZOLE SODIUM, VIA: HCPCS | Performed by: NURSE PRACTITIONER

## 2024-02-03 RX ORDER — TRAMADOL HYDROCHLORIDE 50 MG/1
50 TABLET ORAL EVERY 6 HOURS PRN
Status: DISCONTINUED | OUTPATIENT
Start: 2024-02-03 | End: 2024-02-04

## 2024-02-03 RX ORDER — ACETAMINOPHEN 325 MG/1
650 TABLET ORAL EVERY 4 HOURS PRN
Status: DISCONTINUED | OUTPATIENT
Start: 2024-02-03 | End: 2024-02-09

## 2024-02-03 RX ORDER — SODIUM POLYSTYRENE SULFONATE 15 G/60ML
15 SUSPENSION ORAL; RECTAL ONCE
Status: COMPLETED | OUTPATIENT
Start: 2024-02-03 | End: 2024-02-03

## 2024-02-03 RX ADMIN — SULFAMETHOXAZOLE AND TRIMETHOPRIM 50 ML: 200; 40 SUSPENSION ORAL at 05:13

## 2024-02-03 RX ADMIN — FUROSEMIDE 40 MG: 10 INJECTION, SOLUTION INTRAMUSCULAR; INTRAVENOUS at 08:43

## 2024-02-03 RX ADMIN — FENTANYL CITRATE 75 MCG/HR: 0.05 INJECTION, SOLUTION INTRAMUSCULAR; INTRAVENOUS at 01:51

## 2024-02-03 RX ADMIN — IPRATROPIUM BROMIDE AND ALBUTEROL SULFATE 1 DOSE: 2.5; .5 SOLUTION RESPIRATORY (INHALATION) at 11:55

## 2024-02-03 RX ADMIN — IPRATROPIUM BROMIDE AND ALBUTEROL SULFATE 1 DOSE: 2.5; .5 SOLUTION RESPIRATORY (INHALATION) at 21:15

## 2024-02-03 RX ADMIN — METOPROLOL TARTRATE 2.5 MG: 1 INJECTION, SOLUTION INTRAVENOUS at 04:47

## 2024-02-03 RX ADMIN — LEVETIRACETAM 500 MG: 500 TABLET, FILM COATED ORAL at 08:42

## 2024-02-03 RX ADMIN — SENNOSIDES AND DOCUSATE SODIUM 2 TABLET: 50; 8.6 TABLET ORAL at 08:42

## 2024-02-03 RX ADMIN — SODIUM CHLORIDE, PRESERVATIVE FREE 10 ML: 5 INJECTION INTRAVENOUS at 21:00

## 2024-02-03 RX ADMIN — MAGNESIUM HYDROXIDE 10 ML: 400 SUSPENSION ORAL at 04:47

## 2024-02-03 RX ADMIN — VALACYCLOVIR HYDROCHLORIDE 500 MG: 500 TABLET, FILM COATED ORAL at 08:42

## 2024-02-03 RX ADMIN — IPRATROPIUM BROMIDE AND ALBUTEROL SULFATE 1 DOSE: 2.5; .5 SOLUTION RESPIRATORY (INHALATION) at 16:54

## 2024-02-03 RX ADMIN — FUROSEMIDE 40 MG: 10 INJECTION, SOLUTION INTRAMUSCULAR; INTRAVENOUS at 18:14

## 2024-02-03 RX ADMIN — APIXABAN 5 MG: 5 TABLET, FILM COATED ORAL at 08:42

## 2024-02-03 RX ADMIN — SODIUM POLYSTYRENE SULFONATE 15 G: 15 SUSPENSION ORAL; RECTAL at 22:27

## 2024-02-03 RX ADMIN — LEVETIRACETAM 500 MG: 500 TABLET, FILM COATED ORAL at 22:27

## 2024-02-03 RX ADMIN — ALBUTEROL SULFATE 2.5 MG: 2.5 SOLUTION RESPIRATORY (INHALATION) at 06:27

## 2024-02-03 RX ADMIN — VALACYCLOVIR HYDROCHLORIDE 500 MG: 500 TABLET, FILM COATED ORAL at 22:28

## 2024-02-03 RX ADMIN — SODIUM CHLORIDE, PRESERVATIVE FREE 10 ML: 5 INJECTION INTRAVENOUS at 08:43

## 2024-02-03 RX ADMIN — CEFEPIME 2000 MG: 2 INJECTION, POWDER, FOR SOLUTION INTRAVENOUS at 18:14

## 2024-02-03 RX ADMIN — DEXMEDETOMIDINE 1 MCG/KG/HR: 100 INJECTION, SOLUTION INTRAVENOUS at 11:00

## 2024-02-03 RX ADMIN — WATER 20 MG: 1 INJECTION INTRAMUSCULAR; INTRAVENOUS; SUBCUTANEOUS at 08:43

## 2024-02-03 RX ADMIN — TRAMADOL HYDROCHLORIDE 50 MG: 50 TABLET, COATED ORAL at 23:39

## 2024-02-03 RX ADMIN — IPRATROPIUM BROMIDE AND ALBUTEROL SULFATE 1 DOSE: 2.5; .5 SOLUTION RESPIRATORY (INHALATION) at 07:35

## 2024-02-03 RX ADMIN — PANTOPRAZOLE SODIUM 40 MG: 40 INJECTION, POWDER, FOR SOLUTION INTRAVENOUS at 08:42

## 2024-02-03 RX ADMIN — SODIUM CHLORIDE 15 ML: 900 IRRIGANT IRRIGATION at 22:00

## 2024-02-03 RX ADMIN — POLYETHYLENE GLYCOL 3350 17 G: 17 POWDER, FOR SOLUTION ORAL at 08:43

## 2024-02-03 RX ADMIN — APIXABAN 5 MG: 5 TABLET, FILM COATED ORAL at 22:27

## 2024-02-03 RX ADMIN — Medication 100 MG: at 08:42

## 2024-02-03 RX ADMIN — ACETAMINOPHEN 650 MG: 325 TABLET ORAL at 09:50

## 2024-02-03 RX ADMIN — CEFEPIME 2000 MG: 2 INJECTION, POWDER, FOR SOLUTION INTRAVENOUS at 09:52

## 2024-02-03 RX ADMIN — MIDODRINE HYDROCHLORIDE 5 MG: 5 TABLET ORAL at 23:42

## 2024-02-03 RX ADMIN — MIDODRINE HYDROCHLORIDE 5 MG: 5 TABLET ORAL at 08:42

## 2024-02-03 RX ADMIN — SULFAMETHOXAZOLE AND TRIMETHOPRIM 50 ML: 200; 40 SUSPENSION ORAL at 22:29

## 2024-02-03 RX ADMIN — DEXMEDETOMIDINE 1.5 MCG/KG/HR: 100 INJECTION, SOLUTION INTRAVENOUS at 06:15

## 2024-02-03 RX ADMIN — DEXMEDETOMIDINE 1.5 MCG/KG/HR: 100 INJECTION, SOLUTION INTRAVENOUS at 01:48

## 2024-02-03 ASSESSMENT — PULMONARY FUNCTION TESTS
PIF_VALUE: 8
PIF_VALUE: 16
PIF_VALUE: 13
PIF_VALUE: 8
PIF_VALUE: 10
PIF_VALUE: 14
PIF_VALUE: 15
PIF_VALUE: 13
PIF_VALUE: 17
PIF_VALUE: 17
PIF_VALUE: 8
PIF_VALUE: 7
PIF_VALUE: 8
PIF_VALUE: 9
PIF_VALUE: 7
PIF_VALUE: 8
PIF_VALUE: 18
PIF_VALUE: 13
PIF_VALUE: 8
PIF_VALUE: 8
PIF_VALUE: 17
PIF_VALUE: 8
PIF_VALUE: 16
PIF_VALUE: 18
PIF_VALUE: 18
PIF_VALUE: 16
PIF_VALUE: 21
PIF_VALUE: 17
PIF_VALUE: 8
PIF_VALUE: 8
PIF_VALUE: 20
PIF_VALUE: 18
PIF_VALUE: 8
PIF_VALUE: 8
PIF_VALUE: 18
PIF_VALUE: 8

## 2024-02-03 ASSESSMENT — PAIN DESCRIPTION - FREQUENCY
FREQUENCY: INTERMITTENT
FREQUENCY: CONTINUOUS

## 2024-02-03 ASSESSMENT — PAIN SCALES - GENERAL
PAINLEVEL_OUTOF10: 0
PAINLEVEL_OUTOF10: 4
PAINLEVEL_OUTOF10: 0
PAINLEVEL_OUTOF10: 7
PAINLEVEL_OUTOF10: 0

## 2024-02-03 ASSESSMENT — PAIN DESCRIPTION - PAIN TYPE
TYPE: ACUTE PAIN
TYPE: ACUTE PAIN

## 2024-02-03 ASSESSMENT — PAIN DESCRIPTION - LOCATION
LOCATION: CHEST
LOCATION: CHEST

## 2024-02-03 ASSESSMENT — PAIN - FUNCTIONAL ASSESSMENT
PAIN_FUNCTIONAL_ASSESSMENT: PREVENTS OR INTERFERES SOME ACTIVE ACTIVITIES AND ADLS
PAIN_FUNCTIONAL_ASSESSMENT: ACTIVITIES ARE NOT PREVENTED

## 2024-02-03 ASSESSMENT — PAIN DESCRIPTION - DESCRIPTORS
DESCRIPTORS: SORE
DESCRIPTORS: SORE

## 2024-02-03 ASSESSMENT — PAIN DESCRIPTION - ONSET
ONSET: ON-GOING
ONSET: GRADUAL

## 2024-02-03 ASSESSMENT — PAIN DESCRIPTION - ORIENTATION
ORIENTATION: LEFT
ORIENTATION: LEFT

## 2024-02-03 NOTE — PROGRESS NOTES
No significant events to note overnight. Levophed gtt up to 5 mcg/min. Fentanyl weaned from 100 to 50mcg/min, pt becomes asynchronous with vent at any attempts to lower further. Pt opens eyes to voice and able to  hands bilaterally. Denies any pain. High volume UOP. Bath given, linens changed. Pt's wife, Brie at bedside overnight.

## 2024-02-03 NOTE — PROGRESS NOTES
Pt was extubated per written orders and placed on 4lpm. Pt dropped saturation briefly into the mid 80's, but recovered with cough and deep breathing. Pt reports no distress.

## 2024-02-03 NOTE — PROGRESS NOTES
Electrophysiology - PROGRESS NOTE    Admit Date: 1/23/2024     Chief Complaint: CHF, persistent AF    Interval History:   Patient seen and examined and notes reviewed. Patient is being followed for acute on chronic systolic heart failure, persistent atrial fibrillation. Patient presented with progressively worse shortness of breath and hypoxia. Patient was admitted for pneumonia in setting of immunocompromise. Pulmonology was consulted, patient underwent bronchoscopy on 1/25 which was complicated by acute hypoxia and patient was intubated. BAL consistent with budding yeast therefore patient was placed on voriconazole. Limited Echo (1/24/2024) EF 40-45%. Patient was extubated on 1/26 however overnight that night developed severe hypoxia requiring 15 L of supplemental oxygen. Patient with VF/VT arrest on 1/28/2024, ROSC achieved within 10 minutes of ACLS being initiated. Voriconazole and Levaquin stopped due to concern for QT prolongation and torsades. Patient remains intubated, on Precedex, and fentanyl gtt. Patient remains on pressor support with norepinephrine. Patient easily arousalable. Wife at bedside.    In: 2338.7 [I.V.:1058.7; NG/GT:1280]  Out: 5070    Wt Readings from Last 2 Encounters:   02/03/24 59.8 kg (131 lb 13.4 oz)   01/09/24 62.3 kg (137 lb 6.4 oz)       Data:   Scheduled Meds:   Scheduled Meds:   cefepime  2,000 mg IntraVENous Q8H    methylPREDNISolone  20 mg IntraVENous Daily    midodrine  5 mg Oral TID WC    furosemide  40 mg IntraVENous BID    sulfamethoxazole-trimethoprim  400 mg Oral Q8H    thiamine  100 mg Oral Daily    sennosides-docusate sodium  2 tablet Oral Daily    polyethylene glycol  17 g Oral Daily    insulin lispro  0-4 Units SubCUTAneous Q6H    ipratropium 0.5 mg-albuterol 2.5 mg  1 Dose Inhalation 4x Daily RT    metoprolol  2.5 mg IntraVENous Q8H    pantoprazole  40 mg IntraVENous Daily    apixaban  5 mg Oral BID

## 2024-02-03 NOTE — PROGRESS NOTES
be able to maintain a minute ventilation of 11 if that is what he needs.    ID:COVID Positive on 1/28.  On bactrim empirically.    Renal:  No acute issues, tolerating diuresis.    Prophylaxis:  Head of bed maintained at >30 degrees at all times except for procedures and repositioning. Pantoprazole, apixaban       Critical care time spent reviewing labs/films, examining patient, collaborating with other physicians but excluding procedures for life threatening organ failure is 38 minutes.      Dwight Leyva MD

## 2024-02-03 NOTE — PLAN OF CARE
Problem: Safety - Adult  Goal: Free from fall injury  2/3/2024 1025 by Deysi Mosher RN  Outcome: Progressing  2/2/2024 2249 by Faby Serrano RN  Outcome: Progressing     Problem: ABCDS Injury Assessment  Goal: Absence of physical injury  2/3/2024 1025 by Deysi Mosher RN  Outcome: Progressing  2/2/2024 2249 by Faby Serrano RN  Outcome: Progressing     Problem: Chronic Conditions and Co-morbidities  Goal: Patient's chronic conditions and co-morbidity symptoms are monitored and maintained or improved  Outcome: Progressing     Problem: Respiratory - Adult  Goal: Achieves optimal ventilation and oxygenation  2/3/2024 1025 by Deysi Mosher RN  Outcome: Progressing  2/2/2024 2249 by Faby Serrano RN  Outcome: Progressing  Flowsheets (Taken 2/2/2024 2000)  Achieves optimal ventilation and oxygenation:   Assess for changes in respiratory status   Assess for changes in mentation and behavior   Position to facilitate oxygenation and minimize respiratory effort   Assess the need for suctioning and aspirate as needed   Respiratory therapy support as indicated     Problem: Cardiovascular - Adult  Goal: Maintains optimal cardiac output and hemodynamic stability  Outcome: Progressing  Goal: Absence of cardiac dysrhythmias or at baseline  Outcome: Progressing     Problem: Metabolic/Fluid and Electrolytes - Adult  Goal: Electrolytes maintained within normal limits  Outcome: Progressing  Goal: Hemodynamic stability and optimal renal function maintained  Outcome: Progressing     Problem: Neurosensory - Adult  Goal: Achieves stable or improved neurological status  Outcome: Progressing  Goal: Absence of seizures  Outcome: Progressing     Problem: Hematologic - Adult  Goal: Maintains hematologic stability  Outcome: Progressing     Problem: Safety - Medical Restraint  Goal: Remains free of injury from restraints (Restraint for Interference with Medical Device)  Description: INTERVENTIONS:  1. Determine that other, less

## 2024-02-03 NOTE — PROGRESS NOTES
Patient complaining of chest/abdominal pain.  Asymptomatic and VSS.  Dr. Wilhelm notified and new order received.

## 2024-02-03 NOTE — PROGRESS NOTES
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    Marijuana use 2022    Persistent atrial fibrillation ([facility]) 2024    Atrial flutter ([facility]) 2024    Benign essential HTN 2024    On continuous oral anticoagulation 2024    VF (ventricular fibrillation) ([facility]) 2024    VT (ventricular tachycardia) ([facility]) 2024    Heart failure with mildly reduced ejection fraction (HFmrEF) ([facility]) 2024    COVID-19 2024    Immunocompromised ([facility]) 2024    Hypoxia 2024    Acute on chronic systolic congestive heart failure ([facility]) 2024    Acute hypoxemic respiratory failure ([facility]) 2024    Pleural effusion due to CHF (congestive heart failure) ([facility]) 2024    NICM (nonischemic cardiomyopathy) ([facility]) 2024    Multifocal pneumonia 2023    Acute pneumonia 2023    Stem cell donor 10/13/2023    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    Right atrial mass 10/03/2023    Lymphoma malignant, 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    Admission for antineoplastic chemotherapy 2023    Lymphoma, unspecified body region, unspecified lymphoma type ([facility]) 2023    Neoplasm of unspecified behavior of brain ([facility]) 2023    Cognitive impairment 2023    Brain mass 2023    Confusion 2023    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

## 2024-02-03 NOTE — PROGRESS NOTES
[facility] Progress Note    2/3/2024     Zain Lopez    MRN: [id number]    : 1955    Referring MD: Penelope Wilhelm,   [address]  [address]  [place] [address]      SUBJECTIVE:  he is doing better. Good urine outpt yesterday. On CPAP for weaning this morning    ECOG PS: (4) Completely disabled, unable to carry out self-care and confined to bed or chair     KPS: 20% Very sick; hospital admission necessary; active supportive treatment necessary    Isolation: Droplet Plus    Medications    Scheduled Meds:   methylPREDNISolone  20 mg IntraVENous Daily    midodrine  5 mg Oral TID WC    furosemide  40 mg IntraVENous BID    sulfamethoxazole-trimethoprim  400 mg Oral Q8H    thiamine  100 mg Oral Daily    sennosides-docusate sodium  2 tablet Oral Daily    polyethylene glycol  17 g Oral Daily    insulin lispro  0-4 Units SubCUTAneous Q6H    ipratropium 0.5 mg-albuterol 2.5 mg  1 Dose Inhalation 4x Daily RT    metoprolol  2.5 mg IntraVENous Q8H    pantoprazole  40 mg IntraVENous Daily    apixaban  5 mg Oral BID    levETIRAcetam  500 mg Oral BID    therapeutic multivitamin-minerals  1 tablet Oral Daily    [Held by provider] tamsulosin  0.4 mg Oral Daily    valACYclovir  500 mg Oral BID    sodium chloride flush  5-40 mL IntraVENous 2 times per day    Saline Mouthwash  15 mL Swish & Spit 4x Daily AC & HS     Continuous Infusions:   fentaNYL 50 mcg/hr (24 06)    dexmedeTOMIDine (PRECEDEX) 400 mcg in sodium chloride 0.9 % 100 mL infusion 1.5 mcg/kg/hr (24)    dextrose      propofol Stopped (24 2247)    norepinephrine 4 mcg/min (24 0642)    sodium chloride Stopped (24 1224)    sodium chloride       PRN Meds:.potassium chloride, potassium chloride, magnesium sulfate, glucose, dextrose bolus **OR** dextrose bolus, glucagon (rDNA), dextrose, magnesium sulfate, sodium chloride, albuterol, diphenhydrAMINE, sodium chloride flush, sodium chloride, magnesium hydroxide, Saline Mouthwash,  Parents emailed this therapist on Thursday (4/14) with the behavior intervention plan created by the school team. Parent indicated that they disagreed with the plan and had concerns on how behavior management would be carried over; therapist was contacted for consultation and feedback on the plan based on the interventions used for child's treatment. Therapist spoke with both parents on Monday (4/18) to review the plan and discussed: visual consequence chart, reward system, and the function of the aggressive behavior in school (I.e. revenge/inherently reinforcing). This therapist offered suggestions to the plan in writing and sent to the parents and school team; this therapist is scheduled to meet with the Early Childhood /Disciplinarian on Thursday (4/21) at 2PM (Mrs. Ella Stoll)   involving left greater than right hemispheres. Currently, there is residual T2 and FLAIR hyperintense signal seen involving the left greater than right periventricular white matter regions, as well as the posterior corpus callosum. There is a small amount of residual enhancement seen adjacent to the atrium of the left lateral ventricle as described.  - MRI brain (9/29/23): Decreased enhancement centered about the left periatrial region, w/ no significant interval change in confluent FLAIR signal abnormality. These findings are nonspecific, potentially treatment related, although residual lymphoma is difficult to exclude.     - S/p Thiotepa, Busulfan, and Cytoxan & ASCT (11/3/23)  Post-Txp Maintenance: None  Post-Txp F/u: MRI brain w/ & w/out contrast (First week Feb)     Day + 92    2. ID: Multifocal PNA POA, Afebrile but continued hypoxia. COVID19+ but not source of acute respiratory failure, ? PJP  - ID following  - Blood cx (1/23/24): NG; Pan-cx 1/27/24 with sepsis NG  - RVP (1/23/24): Neg; Rapid COVID 1/28 POS  - MRSA nasal swab 1/24/24: Neg  - Strep and Legionella UrAg 1/24/24: Neg  - S/p bronchoscopy 1/25: Cxs NG, CMV neg, cytology neg for fungal or PJP,   - Fungal serologies 1/24: histo, blasto, fungitel, & galactomannan all neg  - Repeat 1/28 - pending, Ur Ag neg  - Procalcitonin 1/27 - 4.27--3.41--1.6--0.91    - Tx Dose Bactrim Day +10 (1/25-1/28; 1/30/24) - restarted with indeterminate PJP PCR    - Cont solumedrol for PJP pneumonia : Planned treatment duration- 21 days.  - 40mg BID (1mg/kg) (1/25/24)  - Reduce the dose to 1mg/kg of Solumedrol - 60mg daily 1/29/24  - Reduce to solumedrol 30mg daily (1/31/24)  - Reduce to solumedrol 20mg daily 2/2/24, continue with 20 mg qd until pt has been extubated.  - Cont Valtrex ppx  - IgG 1/24 219. S/p IVIG 1/26/24    Abx Hx:  Levaquin 1/24-1/28/24  IV Vanco 1/24-1/31/24  Merrem 1/24-1/31/24  IV Tx Dose Bactrim 1/25-1/29/24  Voriconazole 1/25-1/28/24  Remdesivir  No

## 2024-02-03 NOTE — PLAN OF CARE
Problem: Safety - Adult  Goal: Free from fall injury  2/2/2024 2249 by Faby Serrano RN  Outcome: Progressing     Problem: ABCDS Injury Assessment  Goal: Absence of physical injury  2/2/2024 2249 by Faby Serrano RN  Outcome: Progressing     Problem: Respiratory - Adult  Goal: Achieves optimal ventilation and oxygenation  2/2/2024 2249 by Faby Serrano RN  Outcome: Progressing  Flowsheets (Taken 2/2/2024 2000)  Achieves optimal ventilation and oxygenation:   Assess for changes in respiratory status   Assess for changes in mentation and behavior   Position to facilitate oxygenation and minimize respiratory effort   Assess the need for suctioning and aspirate as needed   Respiratory therapy support as indicated

## 2024-02-04 ENCOUNTER — APPOINTMENT (OUTPATIENT)
Dept: GENERAL RADIOLOGY | Age: 69
End: 2024-02-04
Attending: STUDENT IN AN ORGANIZED HEALTH CARE EDUCATION/TRAINING PROGRAM
Payer: MEDICARE

## 2024-02-04 PROBLEM — J96.21 ACUTE ON CHRONIC RESPIRATORY FAILURE WITH HYPOXEMIA (HCC): Status: ACTIVE | Noted: 2024-01-04

## 2024-02-04 LAB
ALBUMIN SERPL-MCNC: 2.8 G/DL (ref 3.4–5)
ALBUMIN SERPL-MCNC: 3 G/DL (ref 3.4–5)
ALP SERPL-CCNC: 137 U/L (ref 40–129)
ALT SERPL-CCNC: 21 U/L (ref 10–40)
ANION GAP SERPL CALCULATED.3IONS-SCNC: 11 MMOL/L (ref 3–16)
ANION GAP SERPL CALCULATED.3IONS-SCNC: 12 MMOL/L (ref 3–16)
ANISOCYTOSIS BLD QL SMEAR: ABNORMAL
AST SERPL-CCNC: 27 U/L (ref 15–37)
BACTERIA UR CULT: NORMAL
BASE EXCESS BLDA CALC-SCNC: 7 MMOL/L (ref -3–3)
BASOPHILS # BLD: 0 K/UL (ref 0–0.2)
BASOPHILS NFR BLD: 0 %
BILIRUB DIRECT SERPL-MCNC: <0.2 MG/DL (ref 0–0.3)
BILIRUB INDIRECT SERPL-MCNC: ABNORMAL MG/DL (ref 0–1)
BILIRUB SERPL-MCNC: 0.4 MG/DL (ref 0–1)
BUN SERPL-MCNC: 47 MG/DL (ref 7–20)
BUN SERPL-MCNC: 47 MG/DL (ref 7–20)
BURR CELLS BLD QL SMEAR: ABNORMAL
CA-I BLD-SCNC: 1.05 MMOL/L (ref 1.12–1.32)
CALCIUM SERPL-MCNC: 9.2 MG/DL (ref 8.3–10.6)
CALCIUM SERPL-MCNC: 9.2 MG/DL (ref 8.3–10.6)
CHLORIDE SERPL-SCNC: 93 MMOL/L (ref 99–110)
CHLORIDE SERPL-SCNC: 96 MMOL/L (ref 99–110)
CO2 BLDA-SCNC: 31 MMOL/L
CO2 SERPL-SCNC: 28 MMOL/L (ref 21–32)
CO2 SERPL-SCNC: 29 MMOL/L (ref 21–32)
CREAT SERPL-MCNC: 1.1 MG/DL (ref 0.8–1.3)
CREAT SERPL-MCNC: 1.1 MG/DL (ref 0.8–1.3)
DACRYOCYTES BLD QL SMEAR: ABNORMAL
DEPRECATED RDW RBC AUTO: 20.9 % (ref 12.4–15.4)
EOSINOPHIL # BLD: 0.2 K/UL (ref 0–0.6)
EOSINOPHIL NFR BLD: 2 %
GFR SERPLBLD CREATININE-BSD FMLA CKD-EPI: >60 ML/MIN/{1.73_M2}
GFR SERPLBLD CREATININE-BSD FMLA CKD-EPI: >60 ML/MIN/{1.73_M2}
GLUCOSE BLD-MCNC: 103 MG/DL (ref 70–99)
GLUCOSE BLD-MCNC: 125 MG/DL (ref 70–99)
GLUCOSE BLD-MCNC: 130 MG/DL (ref 70–99)
GLUCOSE BLD-MCNC: 97 MG/DL (ref 70–99)
GLUCOSE SERPL-MCNC: 125 MG/DL (ref 70–99)
GLUCOSE SERPL-MCNC: 93 MG/DL (ref 70–99)
HCO3 BLDA-SCNC: 30.1 MMOL/L (ref 21–29)
HCT VFR BLD AUTO: 27.4 % (ref 40.5–52.5)
HGB BLD-MCNC: 8.9 G/DL (ref 13.5–17.5)
INR PPP: 1.22 (ref 0.84–1.16)
LACTATE BLD-SCNC: 0.98 MMOL/L (ref 0.4–2)
LOEFFLER MB STN SPEC: NORMAL
LYMPHOCYTES # BLD: 0.5 K/UL (ref 1–5.1)
LYMPHOCYTES NFR BLD: 6 %
MACROCYTES BLD QL SMEAR: ABNORMAL
MAGNESIUM SERPL-MCNC: 2.2 MG/DL (ref 1.8–2.4)
MAGNESIUM SERPL-MCNC: 2.2 MG/DL (ref 1.8–2.4)
MCH RBC QN AUTO: 28.8 PG (ref 26–34)
MCHC RBC AUTO-ENTMCNC: 32.6 G/DL (ref 31–36)
MCV RBC AUTO: 88.3 FL (ref 80–100)
METAMYELOCYTES NFR BLD MANUAL: 1 %
MICROCYTES BLD QL SMEAR: ABNORMAL
MONOCYTES # BLD: 0.5 K/UL (ref 0–1.3)
MONOCYTES NFR BLD: 6 %
NEUTROPHILS # BLD: 7.6 K/UL (ref 1.7–7.7)
NEUTROPHILS NFR BLD: 84 %
NEUTS BAND NFR BLD MANUAL: 1 % (ref 0–7)
OVALOCYTES BLD QL SMEAR: ABNORMAL
PCO2 BLDA: 37.1 MM HG (ref 35–45)
PERFORMED ON: ABNORMAL
PERFORMED ON: NORMAL
PH BLDA: 7.52 [PH] (ref 7.35–7.45)
PHOSPHATE SERPL-MCNC: 4.4 MG/DL (ref 2.5–4.9)
PHOSPHATE SERPL-MCNC: 5.2 MG/DL (ref 2.5–4.9)
PLATELET # BLD AUTO: 78 K/UL (ref 135–450)
PLATELET BLD QL SMEAR: ABNORMAL
PMV BLD AUTO: 10.4 FL (ref 5–10.5)
PO2 BLDA: 77.3 MM HG (ref 75–108)
POC SAMPLE TYPE: ABNORMAL
POIKILOCYTOSIS BLD QL SMEAR: ABNORMAL
POLYCHROMASIA BLD QL SMEAR: ABNORMAL
POTASSIUM BLD-SCNC: 6.1 MMOL/L (ref 3.5–5.1)
POTASSIUM SERPL-SCNC: 4.6 MMOL/L (ref 3.5–5.1)
POTASSIUM SERPL-SCNC: 5.3 MMOL/L (ref 3.5–5.1)
PROT SERPL-MCNC: 5.6 G/DL (ref 6.4–8.2)
PROTHROMBIN TIME: 15.4 SEC (ref 11.5–14.8)
RBC # BLD AUTO: 3.1 M/UL (ref 4.2–5.9)
SAO2 % BLDA: 97 % (ref 93–100)
SCHISTOCYTES BLD QL SMEAR: ABNORMAL
SLIDE REVIEW: ABNORMAL
SODIUM BLD-SCNC: 131 MMOL/L (ref 136–145)
SODIUM SERPL-SCNC: 134 MMOL/L (ref 136–145)
SODIUM SERPL-SCNC: 135 MMOL/L (ref 136–145)
WBC # BLD AUTO: 8.8 K/UL (ref 4–11)

## 2024-02-04 PROCEDURE — 85025 COMPLETE CBC W/AUTO DIFF WBC: CPT

## 2024-02-04 PROCEDURE — 6370000000 HC RX 637 (ALT 250 FOR IP): Performed by: NURSE PRACTITIONER

## 2024-02-04 PROCEDURE — 6370000000 HC RX 637 (ALT 250 FOR IP): Performed by: STUDENT IN AN ORGANIZED HEALTH CARE EDUCATION/TRAINING PROGRAM

## 2024-02-04 PROCEDURE — 99233 SBSQ HOSP IP/OBS HIGH 50: CPT

## 2024-02-04 PROCEDURE — 2700000000 HC OXYGEN THERAPY PER DAY

## 2024-02-04 PROCEDURE — 2580000003 HC RX 258: Performed by: NURSE PRACTITIONER

## 2024-02-04 PROCEDURE — 85610 PROTHROMBIN TIME: CPT

## 2024-02-04 PROCEDURE — 2000000000 HC ICU R&B

## 2024-02-04 PROCEDURE — 6370000000 HC RX 637 (ALT 250 FOR IP): Performed by: INTERNAL MEDICINE

## 2024-02-04 PROCEDURE — C9113 INJ PANTOPRAZOLE SODIUM, VIA: HCPCS | Performed by: NURSE PRACTITIONER

## 2024-02-04 PROCEDURE — 99233 SBSQ HOSP IP/OBS HIGH 50: CPT | Performed by: INTERNAL MEDICINE

## 2024-02-04 PROCEDURE — 71045 X-RAY EXAM CHEST 1 VIEW: CPT

## 2024-02-04 PROCEDURE — 94761 N-INVAS EAR/PLS OXIMETRY MLT: CPT

## 2024-02-04 PROCEDURE — 94640 AIRWAY INHALATION TREATMENT: CPT

## 2024-02-04 PROCEDURE — 2580000003 HC RX 258: Performed by: INTERNAL MEDICINE

## 2024-02-04 PROCEDURE — 80076 HEPATIC FUNCTION PANEL: CPT

## 2024-02-04 PROCEDURE — 6360000002 HC RX W HCPCS: Performed by: STUDENT IN AN ORGANIZED HEALTH CARE EDUCATION/TRAINING PROGRAM

## 2024-02-04 PROCEDURE — 6360000002 HC RX W HCPCS: Performed by: INTERNAL MEDICINE

## 2024-02-04 PROCEDURE — 6360000002 HC RX W HCPCS: Performed by: NURSE PRACTITIONER

## 2024-02-04 PROCEDURE — 2500000003 HC RX 250 WO HCPCS: Performed by: INTERNAL MEDICINE

## 2024-02-04 PROCEDURE — 80069 RENAL FUNCTION PANEL: CPT

## 2024-02-04 PROCEDURE — 83735 ASSAY OF MAGNESIUM: CPT

## 2024-02-04 RX ORDER — DIPHENHYDRAMINE HCL 25 MG
25 TABLET ORAL EVERY 6 HOURS PRN
Status: DISCONTINUED | OUTPATIENT
Start: 2024-02-04 | End: 2024-02-09

## 2024-02-04 RX ORDER — TRAMADOL HYDROCHLORIDE 50 MG/1
100 TABLET ORAL EVERY 6 HOURS PRN
Status: DISCONTINUED | OUTPATIENT
Start: 2024-02-04 | End: 2024-02-04

## 2024-02-04 RX ORDER — LIDOCAINE 4 G/G
1 PATCH TOPICAL DAILY
Status: DISCONTINUED | OUTPATIENT
Start: 2024-02-04 | End: 2024-02-09 | Stop reason: HOSPADM

## 2024-02-04 RX ORDER — MORPHINE SULFATE 4 MG/ML
4 INJECTION INTRAVENOUS
Status: COMPLETED | OUTPATIENT
Start: 2024-02-04 | End: 2024-02-04

## 2024-02-04 RX ORDER — MORPHINE SULFATE 4 MG/ML
4 INJECTION INTRAVENOUS EVERY 4 HOURS PRN
Status: DISCONTINUED | OUTPATIENT
Start: 2024-02-04 | End: 2024-02-09 | Stop reason: HOSPADM

## 2024-02-04 RX ORDER — FUROSEMIDE 10 MG/ML
40 INJECTION INTRAMUSCULAR; INTRAVENOUS DAILY
Status: DISCONTINUED | OUTPATIENT
Start: 2024-02-04 | End: 2024-02-06

## 2024-02-04 RX ORDER — OXYCODONE HYDROCHLORIDE 5 MG/1
5 TABLET ORAL EVERY 4 HOURS PRN
Status: DISCONTINUED | OUTPATIENT
Start: 2024-02-04 | End: 2024-02-06

## 2024-02-04 RX ORDER — OXYCODONE HYDROCHLORIDE 5 MG/1
10 TABLET ORAL EVERY 4 HOURS PRN
Status: DISCONTINUED | OUTPATIENT
Start: 2024-02-04 | End: 2024-02-06

## 2024-02-04 RX ADMIN — POLYETHYLENE GLYCOL 3350 17 G: 17 POWDER, FOR SOLUTION ORAL at 08:36

## 2024-02-04 RX ADMIN — SODIUM CHLORIDE, PRESERVATIVE FREE 10 ML: 5 INJECTION INTRAVENOUS at 08:36

## 2024-02-04 RX ADMIN — IPRATROPIUM BROMIDE AND ALBUTEROL SULFATE 1 DOSE: 2.5; .5 SOLUTION RESPIRATORY (INHALATION) at 08:30

## 2024-02-04 RX ADMIN — SULFAMETHOXAZOLE AND TRIMETHOPRIM 50 ML: 200; 40 SUSPENSION ORAL at 13:54

## 2024-02-04 RX ADMIN — MORPHINE SULFATE 4 MG: 4 INJECTION INTRAVENOUS at 09:45

## 2024-02-04 RX ADMIN — SULFAMETHOXAZOLE AND TRIMETHOPRIM 50 ML: 200; 40 SUSPENSION ORAL at 06:10

## 2024-02-04 RX ADMIN — SODIUM CHLORIDE 3 MCG/MIN: 9 INJECTION, SOLUTION INTRAVENOUS at 06:45

## 2024-02-04 RX ADMIN — LEVETIRACETAM 500 MG: 500 TABLET, FILM COATED ORAL at 21:53

## 2024-02-04 RX ADMIN — VALACYCLOVIR HYDROCHLORIDE 500 MG: 500 TABLET, FILM COATED ORAL at 21:53

## 2024-02-04 RX ADMIN — MIDODRINE HYDROCHLORIDE 5 MG: 5 TABLET ORAL at 08:35

## 2024-02-04 RX ADMIN — OXYCODONE 10 MG: 5 TABLET ORAL at 08:34

## 2024-02-04 RX ADMIN — APIXABAN 5 MG: 5 TABLET, FILM COATED ORAL at 21:53

## 2024-02-04 RX ADMIN — DIPHENHYDRAMINE HYDROCHLORIDE 25 MG: 25 TABLET ORAL at 08:35

## 2024-02-04 RX ADMIN — VALACYCLOVIR HYDROCHLORIDE 500 MG: 500 TABLET, FILM COATED ORAL at 08:35

## 2024-02-04 RX ADMIN — IPRATROPIUM BROMIDE AND ALBUTEROL SULFATE 1 DOSE: 2.5; .5 SOLUTION RESPIRATORY (INHALATION) at 11:51

## 2024-02-04 RX ADMIN — OXYCODONE 5 MG: 5 TABLET ORAL at 16:10

## 2024-02-04 RX ADMIN — MIDODRINE HYDROCHLORIDE 5 MG: 5 TABLET ORAL at 16:10

## 2024-02-04 RX ADMIN — WATER 20 MG: 1 INJECTION INTRAMUSCULAR; INTRAVENOUS; SUBCUTANEOUS at 08:34

## 2024-02-04 RX ADMIN — SENNOSIDES AND DOCUSATE SODIUM 2 TABLET: 50; 8.6 TABLET ORAL at 08:35

## 2024-02-04 RX ADMIN — IPRATROPIUM BROMIDE AND ALBUTEROL SULFATE 1 DOSE: 2.5; .5 SOLUTION RESPIRATORY (INHALATION) at 15:27

## 2024-02-04 RX ADMIN — APIXABAN 5 MG: 5 TABLET, FILM COATED ORAL at 08:35

## 2024-02-04 RX ADMIN — Medication 100 MG: at 08:34

## 2024-02-04 RX ADMIN — SULFAMETHOXAZOLE AND TRIMETHOPRIM 50 ML: 200; 40 SUSPENSION ORAL at 21:56

## 2024-02-04 RX ADMIN — TRAMADOL HYDROCHLORIDE 50 MG: 50 TABLET, COATED ORAL at 02:36

## 2024-02-04 RX ADMIN — CEFEPIME 2000 MG: 2 INJECTION, POWDER, FOR SOLUTION INTRAVENOUS at 10:14

## 2024-02-04 RX ADMIN — FUROSEMIDE 40 MG: 10 INJECTION, SOLUTION INTRAMUSCULAR; INTRAVENOUS at 08:34

## 2024-02-04 RX ADMIN — MIDODRINE HYDROCHLORIDE 5 MG: 5 TABLET ORAL at 12:13

## 2024-02-04 RX ADMIN — SODIUM CHLORIDE 15 ML: 900 IRRIGANT IRRIGATION at 16:11

## 2024-02-04 RX ADMIN — SODIUM CHLORIDE 15 ML: 900 IRRIGANT IRRIGATION at 06:40

## 2024-02-04 RX ADMIN — MORPHINE SULFATE 4 MG: 4 INJECTION INTRAVENOUS at 12:35

## 2024-02-04 RX ADMIN — IPRATROPIUM BROMIDE AND ALBUTEROL SULFATE 1 DOSE: 2.5; .5 SOLUTION RESPIRATORY (INHALATION) at 20:34

## 2024-02-04 RX ADMIN — SODIUM CHLORIDE, PRESERVATIVE FREE 10 ML: 5 INJECTION INTRAVENOUS at 21:52

## 2024-02-04 RX ADMIN — CEFEPIME 2000 MG: 2 INJECTION, POWDER, FOR SOLUTION INTRAVENOUS at 01:34

## 2024-02-04 RX ADMIN — PANTOPRAZOLE SODIUM 40 MG: 40 INJECTION, POWDER, FOR SOLUTION INTRAVENOUS at 08:35

## 2024-02-04 RX ADMIN — LEVETIRACETAM 500 MG: 500 TABLET, FILM COATED ORAL at 08:35

## 2024-02-04 RX ADMIN — CEFEPIME 2000 MG: 2 INJECTION, POWDER, FOR SOLUTION INTRAVENOUS at 21:52

## 2024-02-04 ASSESSMENT — PAIN SCALES - GENERAL
PAINLEVEL_OUTOF10: 10
PAINLEVEL_OUTOF10: 0
PAINLEVEL_OUTOF10: 5
PAINLEVEL_OUTOF10: 10
PAINLEVEL_OUTOF10: 8
PAINLEVEL_OUTOF10: 5
PAINLEVEL_OUTOF10: 4
PAINLEVEL_OUTOF10: 10
PAINLEVEL_OUTOF10: 4
PAINLEVEL_OUTOF10: 10
PAINLEVEL_OUTOF10: 9
PAINLEVEL_OUTOF10: 4

## 2024-02-04 ASSESSMENT — PAIN DESCRIPTION - LOCATION
LOCATION: CHEST;RIB CAGE
LOCATION: CHEST
LOCATION: CHEST;RIB CAGE
LOCATION: CHEST;RIB CAGE
LOCATION: RIB CAGE
LOCATION: CHEST;RIB CAGE
LOCATION: CHEST;RIB CAGE

## 2024-02-04 ASSESSMENT — PAIN DESCRIPTION - PAIN TYPE
TYPE: ACUTE PAIN
TYPE: ACUTE PAIN

## 2024-02-04 ASSESSMENT — PAIN DESCRIPTION - ONSET
ONSET: GRADUAL
ONSET: ON-GOING

## 2024-02-04 ASSESSMENT — PAIN DESCRIPTION - DESCRIPTORS
DESCRIPTORS: ACHING;DISCOMFORT
DESCRIPTORS: ACHING

## 2024-02-04 ASSESSMENT — PAIN DESCRIPTION - ORIENTATION
ORIENTATION: LEFT
ORIENTATION: LEFT

## 2024-02-04 ASSESSMENT — PAIN DESCRIPTION - FREQUENCY
FREQUENCY: INTERMITTENT
FREQUENCY: CONTINUOUS

## 2024-02-04 NOTE — PROGRESS NOTES
Jodie weiss to use by Dr Wilhelm's order. Tramadol also ordered for left rib cage pain & difficulty breathing 2/2 chest discomfort. Spo2 low 80s, O2 upped from 6 liters to currently at 8 liters per highflow NC. Pt received 50 mg tramadol for pain 7/10.

## 2024-02-04 NOTE — PROGRESS NOTES
Electrophysiology - PROGRESS NOTE    Admit Date: 1/23/2024     Chief Complaint: CHF, persistent AF    Interval History:   Patient seen and examined and notes reviewed. Patient is being followed for acute on chronic systolic heart failure, persistent atrial fibrillation. Patient presented with progressively worse shortness of breath and hypoxia. Patient was admitted for pneumonia in setting of immunocompromise. Pulmonology was consulted, patient underwent bronchoscopy on 1/25 which was complicated by acute hypoxia and patient was intubated. BAL consistent with budding yeast therefore patient was placed on voriconazole. Limited Echo (1/24/2024) EF 40-45%. Patient was extubated on 1/26 however overnight that night developed severe hypoxia requiring 15 L of supplemental oxygen. Patient with VF/VT arrest on 1/28/2024, ROSC achieved within 10 minutes of ACLS being initiated. Voriconazole and Levaquin stopped due to concern for QT prolongation and torsades. Patient had 14 beat NSVT on 2/1/2024, patient was intubated and sedated at that time. Patient extubated yesterday. Patient remains on pressor support with norepinephrine. Patient also requiring PRN pain medication for chest discomfort, s/p CPR. Patient also on midodrine 5 mg TID. Patient able to answer questions, sister is at bedside.    In: 647 [I.V.:231.5; NG/GT:120]  Out: 3200    Wt Readings from Last 2 Encounters:   02/04/24 57.9 kg (127 lb 10.3 oz)   01/09/24 62.3 kg (137 lb 6.4 oz)       Data:   Scheduled Meds:   Scheduled Meds:   lidocaine  1 patch TransDERmal Daily    furosemide  40 mg IntraVENous Daily    cefepime  2,000 mg IntraVENous Q8H    methylPREDNISolone  20 mg IntraVENous Daily    midodrine  5 mg Oral TID     sulfamethoxazole-trimethoprim  400 mg Oral Q8H    sennosides-docusate sodium  2 tablet Oral Daily    polyethylene glycol  17 g Oral Daily    insulin lispro  0-4 Units SubCUTAneous Q6H     ipratropium 0.5 mg-albuterol 2.5 mg  1 Dose Inhalation 4x Daily RT    metoprolol  2.5 mg IntraVENous Q8H    pantoprazole  40 mg IntraVENous Daily    apixaban  5 mg Oral BID    levETIRAcetam  500 mg Oral BID    [Held by provider] tamsulosin  0.4 mg Oral Daily    valACYclovir  500 mg Oral BID    sodium chloride flush  5-40 mL IntraVENous 2 times per day    Saline Mouthwash  15 mL Swish & Spit 4x Daily AC & HS     Continuous Infusions:   fentaNYL Stopped (02/03/24 0853)    dexmedeTOMIDine (PRECEDEX) 400 mcg in sodium chloride 0.9 % 100 mL infusion Stopped (02/03/24 1430)    dextrose      propofol Stopped (02/01/24 2247)    norepinephrine 1 mcg/min (02/04/24 1013)    sodium chloride Stopped (01/25/24 1224)    sodium chloride       PRN Meds:.oxyCODONE **OR** oxyCODONE, diphenhydrAMINE, acetaminophen, potassium chloride, potassium chloride, magnesium sulfate, glucose, dextrose bolus **OR** dextrose bolus, glucagon (rDNA), dextrose, magnesium sulfate, sodium chloride, albuterol, diphenhydrAMINE, sodium chloride flush, sodium chloride, magnesium hydroxide, Saline Mouthwash, alteplase (CATHFLO) 2 mg in sterile water 2 mL injection  Continuous Infusions:   fentaNYL Stopped (02/03/24 0853)    dexmedeTOMIDine (PRECEDEX) 400 mcg in sodium chloride 0.9 % 100 mL infusion Stopped (02/03/24 1430)    dextrose      propofol Stopped (02/01/24 2247)    norepinephrine 1 mcg/min (02/04/24 1013)    sodium chloride Stopped (01/25/24 1224)    sodium chloride         Intake/Output Summary (Last 24 hours) at 2/4/2024 1041  Last data filed at 2/4/2024 0636  Gross per 24 hour   Intake 646.96 ml   Output 3200 ml   Net -2553.04 ml       CBC:   Lab Results   Component Value Date/Time    WBC 8.8 02/04/2024 05:02 AM    HGB 8.9 02/04/2024 05:02 AM    PLT 78 02/04/2024 05:02 AM     BMP:  Lab Results   Component Value Date/Time     02/04/2024 05:02 AM    K 4.6 02/04/2024 05:02 AM    K 4.1 12/02/2021 11:15 AM    CL 96 02/04/2024 05:02 AM    CO2 28

## 2024-02-04 NOTE — PROGRESS NOTES
Pulmonary & Critical Care Medicine ICU Progress Note    Admit Date: 2024  PCP: Ronal Wahl, APRN - CNP    CC:  acute hypoxemic and hypercapnic respiratory failure, cardiopulmonary arrest.  CHF, atrial flutter.  Events of Last 24 hours: Patient successfully extubated yesterday and is saturating well on 10 L.  However he is having pain in his chest where he received CPR and is feeling defeated by his current clinical situation.    Vitals:  Tmax:  VITALS:  /62   Pulse 66   Temp 98.6 °F (37 °C) (Bladder)   Resp 23   Ht 1.778 m (5' 10\")   Wt 57.9 kg (127 lb 10.3 oz)   SpO2 96%   BMI 18.32 kg/m²   24HR INTAKE/OUTPUT:    Intake/Output Summary (Last 24 hours) at 2024 1606  Last data filed at 2024 0636  Gross per 24 hour   Intake 646.96 ml   Output 3200 ml   Net -2553.04 ml       CURRENT PULSE OXIMETRY:  SpO2: 96 %  24HR PULSE OXIMETRY RANGE:  SpO2  Av.2 %  Min: 91 %  Max: 100 %      Invasive Lines:       CVC Triple Lumen 24 Right Internal jugular (Active)   Number of days: 8       Peripheral IV 24 Right Forearm (Active)   Number of days: 8       Urinary Catheter 24 Bruner-Temperature (Active)   Number of days: 3       Recent Labs     24  1057   PHART 7.517*   DYH8SUJ 37.1   PO2ART 77.3         EXAM:  General: No distress.  Drowsy but will awaken and answer questions appropriately.  Eyes: PERRL. No sclera icterus. No conjunctival injection.  ENT: ETT in place  Neck: Trachea midline. Normal thyroid.  Resp: + accessory muscle use. No crackles. No wheezing. No rhonchi.   CV: Regular rate. Regular rhythm. No mumur or rub. No edema.   GI: Non-tender. Non-distended. No masses. No organmegaly. Normal bowel sounds.   Skin: Warm and dry. No nodule on exposed extremities. No rash on exposed extremities.  Lymph: No cervical LAD. No supraclavicular LAD.   M/S: No cyanosis. No joint deformity. No clubbing.   Neuro:  Will follow commands. Positive pupils/gag/corneals. Normal  02/04/24  0502   PROTIME 16.6* 16.4* 15.4*   INR 1.35* 1.33* 1.22*       APTT: No results for input(s): \"APTT\" in the last 72 hours.  UA:  Recent Labs     02/03/24  1513   COLORU Yellow   PHUR 7.0   WBCUA None seen   RBCUA 0-2   BACTERIA Rare*   CLARITYU Clear   SPECGRAV 1.020   LEUKOCYTESUR Negative   UROBILINOGEN 0.2   BILIRUBINUR Negative   BLOODU TRACE*   GLUCOSEU Negative         Cultures:  Blood:  Urine:  Sputum:    Films:  CXR reviewed by me and it showed persistent right-sided infiltrates, unchanged from before.  ET tube in appropriate position as is central access.      Assessment/Plan:  The patient is a 68 y.o. male with acute hypoxemic respiratory failure requiring intubation following cardiopulmonary arrest    CV:  Remained on 1 mcg/kg /min of Levophed for my evaluation this morning, but subsequently weaned off later in the day.    Respiratory failure: Successfully extubated yesterday but still requiring high flow oxygen at 10 L and still has increased work of breathing.  He has paradoxical movement of his chest now that he is off positive pressure ventilation.  He also has pain in his chest from where he received CPR and needed morphine to mitigate his discomfort.  Fortunately he appears to be able to tolerate the narcotics without adversely affecting his respiratory drive.  Hopefully has turned the corner in terms of his pneumonia.  He still has infiltrates on chest x-ray that I suspect will take weeks to resolve.    ID:COVID Positive on 1/28.  On bactrim empirically.    Renal:  No acute issues, tolerating diuresis.    Nutrition: Remains mildly cachectic appearing but has Corpak in place now for tube feeds and oral medications.    Prophylaxis:  Head of bed maintained at >30 degrees at all times except for procedures and repositioning. Pantoprazole, apixaban       Dwight Leyva MD

## 2024-02-04 NOTE — PROGRESS NOTES
Pharmacy Note - Renal Dosing and Extended Infusion Beta-Lactam Adjustment    Cefepime ordered for treatment of febrile neutropenia. Per [facility] Extended Infusion Beta-Lactam Policy, Cefepime will be changed to 2000 mg EI q12h as est CrCl now < 60mL/min.     Estimated Creatinine Clearance: Estimated Creatinine Clearance: 51 mL/min (based on SCr of 1.1 mg/dL).  BMI: Body mass index is 18.32 kg/m².    Rationale for Adjustment: Agent is renally eliminated and demonstrates time-dependent effect on bacterial eradication. Extended-infusion dosing strategy aims to enhance microbiologic and clinical efficacy.    Pharmacy will continue to monitor renal function, cultures and sensitivities (where available) and adjust dose as necessary.      Please call with questions--  Thanks--  Tammy Armstrong, PharmD, BCPS, BCGP  [id number] ([facility])   2/4/2024 2:27 PM

## 2024-02-04 NOTE — PROGRESS NOTES
Dr Wilhelm notified regarding unrelieved rib cage pain. Pt received additional 50 mg tramadol, lidocaine patch applied to affected site.

## 2024-02-04 NOTE — PLAN OF CARE
Problem: Safety - Adult  Goal: Free from fall injury  Outcome: Progressing  Flowsheets (Taken 2/3/2024 2000)  Free From Fall Injury: Instruct family/caregiver on patient safety     Problem: ABCDS Injury Assessment  Goal: Absence of physical injury  Outcome: Progressing  Flowsheets (Taken 2/3/2024 2000)  Absence of Physical Injury: Implement safety measures based on patient assessment     Problem: Chronic Conditions and Co-morbidities  Goal: Patient's chronic conditions and co-morbidity symptoms are monitored and maintained or improved  Outcome: Progressing  Flowsheets (Taken 2/3/2024 2000)  Care Plan - Patient's Chronic Conditions and Co-Morbidity Symptoms are Monitored and Maintained or Improved: Monitor and assess patient's chronic conditions and comorbid symptoms for stability, deterioration, or improvement     Problem: Respiratory - Adult  Goal: Achieves optimal ventilation and oxygenation  Outcome: Progressing  Flowsheets (Taken 2/3/2024 2000)  Achieves optimal ventilation and oxygenation: Assess for changes in respiratory status     Problem: Cardiovascular - Adult  Goal: Maintains optimal cardiac output and hemodynamic stability  Outcome: Progressing  Flowsheets (Taken 2/3/2024 2000)  Maintains optimal cardiac output and hemodynamic stability: Monitor blood pressure and heart rate     Problem: Cardiovascular - Adult  Goal: Absence of cardiac dysrhythmias or at baseline  Outcome: Progressing  Flowsheets (Taken 2/3/2024 2000)  Absence of cardiac dysrhythmias or at baseline: Monitor cardiac rate and rhythm     Problem: Metabolic/Fluid and Electrolytes - Adult  Goal: Electrolytes maintained within normal limits  Outcome: Progressing  Flowsheets (Taken 2/3/2024 2000)  Electrolytes maintained within normal limits: Monitor labs and assess patient for signs and symptoms of electrolyte imbalances     Problem: Metabolic/Fluid and Electrolytes - Adult  Goal: Hemodynamic stability and optimal renal function

## 2024-02-04 NOTE — PROGRESS NOTES
[facility] Progress Note    2024     Zain Lopez    MRN: [id number]    : 1955    Referring MD: Penelope Wilhelm DO  [address]  [facility] [address]  [place] [address]      SUBJECTIVE: he is extubated. He is complaining of chest pain from his chest compression. Afebrile. Speech eval today     ECOG PS: (4) Completely disabled, unable to carry out self-care and confined to bed or chair     KPS: 20% Very sick; hospital admission necessary; active supportive treatment necessary    Isolation: Droplet Plus    Medications    Scheduled Meds:   lidocaine  1 patch TransDERmal Daily    cefepime  2,000 mg IntraVENous Q8H    methylPREDNISolone  20 mg IntraVENous Daily    midodrine  5 mg Oral TID WC    furosemide  40 mg IntraVENous BID    sulfamethoxazole-trimethoprim  400 mg Oral Q8H    thiamine  100 mg Oral Daily    sennosides-docusate sodium  2 tablet Oral Daily    polyethylene glycol  17 g Oral Daily    insulin lispro  0-4 Units SubCUTAneous Q6H    ipratropium 0.5 mg-albuterol 2.5 mg  1 Dose Inhalation 4x Daily RT    metoprolol  2.5 mg IntraVENous Q8H    pantoprazole  40 mg IntraVENous Daily    apixaban  5 mg Oral BID    levETIRAcetam  500 mg Oral BID    [Held by provider] tamsulosin  0.4 mg Oral Daily    valACYclovir  500 mg Oral BID    sodium chloride flush  5-40 mL IntraVENous 2 times per day    Saline Mouthwash  15 mL Swish & Spit 4x Daily AC & HS     Continuous Infusions:   fentaNYL Stopped (24 0853)    dexmedeTOMIDine (PRECEDEX) 400 mcg in sodium chloride 0.9 % 100 mL infusion Stopped (24 1430)    dextrose      propofol Stopped (24 2247)    norepinephrine 3 mcg/min (24 0645)    sodium chloride Stopped (24 1224)    sodium chloride       PRN Meds:.traMADol, acetaminophen, traMADol, potassium chloride, potassium chloride, magnesium sulfate, glucose, dextrose bolus **OR** dextrose bolus, glucagon (rDNA), dextrose, magnesium sulfate, sodium chloride, albuterol, diphenhydrAMINE, sodium  from 6/28/2023. Significant interval improvement in the surrounding vasogenic edema and T2/FLAIR hyperintense signal involving left greater than right hemispheres. Currently, there is residual T2 and FLAIR hyperintense signal seen involving the left greater than right periventricular white matter regions, as well as the posterior corpus callosum. There is a small amount of residual enhancement seen adjacent to the atrium of the left lateral ventricle as described.  - MRI brain (9/29/23): Decreased enhancement centered about the left periatrial region, w/ no significant interval change in confluent FLAIR signal abnormality. These findings are nonspecific, potentially treatment related, although residual lymphoma is difficult to exclude.     - S/p Thiotepa, Busulfan, and Cytoxan & ASCT (11/3/23)  Post-Txp Maintenance: None  Post-Txp F/u: MRI brain w/ & w/out contrast (First week Feb)     Day + 93    2. ID: New NF 2/3/24, Multifocal PNA POA, Afebrile but continued hypoxia. COVID19+ but not source of acute respiratory failure, ? PJP  - ID following  - Blood cx (1/23/24): NG; Pan-cx 1/27/24 with sepsis NG  - RVP (1/23/24): Neg; Rapid COVID 1/28 POS  - MRSA nasal swab 1/24/24: Neg  - Strep and Legionella UrAg 1/24/24: Neg  - S/p bronchoscopy 1/25: Cxs NG, CMV neg, cytology neg for fungal or PJP,   - Fungal serologies 1/24: histo, blasto, fungitel, & galactomannan all neg  - Repeat 1/28 - pending, Ur Ag neg  - Procalcitonin 1/27 - 4.27--3.41--1.6--0.91  - Repeat Pan Cx 2/3/24: Pending   - Cont Cefepime Day + 2 (2/3/24)     - Tx Dose Bactrim Day +11 (1/25-1/28; 1/30/24) - restarted with indeterminate PJP PCR    - Cont solumedrol for PJP pneumonia : Planned treatment duration- 21 days.  - 40mg BID (1mg/kg) (1/25/24)  - Reduce the dose to 1mg/kg of Solumedrol - 60mg daily 1/29/24  - Reduce to solumedrol 30mg daily (1/31/24)  - Reduce to solumedrol 20mg daily 2/2/24, continue with 20 mg qd until pt has been extubated.  - Cont

## 2024-02-04 NOTE — PLAN OF CARE
Problem: Safety - Adult  Goal: Free from fall injury  2/4/2024 0937 by Deysi Mosher RN  Outcome: Progressing  2/4/2024 0612 by Yanira Cohen RN  Outcome: Progressing  Flowsheets (Taken 2/3/2024 2000)  Free From Fall Injury: Instruct family/caregiver on patient safety     Problem: ABCDS Injury Assessment  Goal: Absence of physical injury  2/4/2024 0937 by Deysi Mosher RN  Outcome: Progressing  2/4/2024 0612 by Yanira Cohen RN  Outcome: Progressing  Flowsheets (Taken 2/3/2024 2000)  Absence of Physical Injury: Implement safety measures based on patient assessment     Problem: Chronic Conditions and Co-morbidities  Goal: Patient's chronic conditions and co-morbidity symptoms are monitored and maintained or improved  2/4/2024 0937 by Deysi Mosher RN  Outcome: Progressing  2/4/2024 0612 by Yanira Cohen RN  Outcome: Progressing  Flowsheets (Taken 2/3/2024 2000)  Care Plan - Patient's Chronic Conditions and Co-Morbidity Symptoms are Monitored and Maintained or Improved: Monitor and assess patient's chronic conditions and comorbid symptoms for stability, deterioration, or improvement     Problem: Respiratory - Adult  Goal: Achieves optimal ventilation and oxygenation  2/4/2024 0937 by Deysi Mosher RN  Outcome: Progressing  2/4/2024 0612 by Yanira Cohen RN  Outcome: Progressing  Flowsheets (Taken 2/3/2024 2000)  Achieves optimal ventilation and oxygenation: Assess for changes in respiratory status     Problem: Cardiovascular - Adult  Goal: Maintains optimal cardiac output and hemodynamic stability  2/4/2024 0937 by Deysi Mosher RN  Outcome: Progressing  2/4/2024 0612 by Yanira Cohen RN  Outcome: Progressing  Flowsheets (Taken 2/3/2024 2000)  Maintains optimal cardiac output and hemodynamic stability: Monitor blood pressure and heart rate  Goal: Absence of cardiac dysrhythmias or at baseline  2/4/2024 0937 by Deysi Mosher RN  Outcome: Progressing  2/4/2024 0612 by Yanira Cohen RN  Outcome: Progressing  Flowsheets  Inform patient/family regarding the reason for restraint  4. Q2H: Monitor safety, psychosocial status, comfort, nutrition and hydration  2/4/2024 0616 by Yanira Cohen RN  Outcome: Completed     Problem: Pain  Goal: Verbalizes/displays adequate comfort level or baseline comfort level  2/4/2024 0937 by Deysi Mosher RN  Outcome: Progressing  2/4/2024 0612 by Yanira Cohen RN  Outcome: Progressing  Flowsheets (Taken 2/3/2024 2000)  Verbalizes/displays adequate comfort level or baseline comfort level: Encourage patient to monitor pain and request assistance     Problem: Nutrition Deficit:  Goal: Optimize nutritional status  Outcome: Progressing     Problem: Skin/Tissue Integrity  Goal: Absence of new skin breakdown  Description: 1.  Monitor for areas of redness and/or skin breakdown  2.  Assess vascular access sites hourly  3.  Every 4-6 hours minimum:  Change oxygen saturation probe site  4.  Every 4-6 hours:  If on nasal continuous positive airway pressure, respiratory therapy assess nares and determine need for appliance change or resting period.  2/4/2024 0937 by Deysi Mosher, RN  Outcome: Progressing  2/4/2024 0612 by Yanira Cohen RN  Outcome: Progressing

## 2024-02-05 ENCOUNTER — APPOINTMENT (OUTPATIENT)
Dept: GENERAL RADIOLOGY | Age: 69
End: 2024-02-05
Attending: STUDENT IN AN ORGANIZED HEALTH CARE EDUCATION/TRAINING PROGRAM
Payer: MEDICARE

## 2024-02-05 ENCOUNTER — APPOINTMENT (OUTPATIENT)
Dept: CT IMAGING | Age: 69
End: 2024-02-05
Attending: STUDENT IN AN ORGANIZED HEALTH CARE EDUCATION/TRAINING PROGRAM
Payer: MEDICARE

## 2024-02-05 DIAGNOSIS — I42.8 NICM (NONISCHEMIC CARDIOMYOPATHY) (HCC): ICD-10-CM

## 2024-02-05 DIAGNOSIS — I44.2 COMPLETE HEART BLOCK (HCC): ICD-10-CM

## 2024-02-05 DIAGNOSIS — I44.2 THIRD DEGREE AV BLOCK (HCC): ICD-10-CM

## 2024-02-05 DIAGNOSIS — Z95.0 PACEMAKER: Primary | ICD-10-CM

## 2024-02-05 LAB
ALBUMIN SERPL-MCNC: 2.9 G/DL (ref 3.4–5)
ALBUMIN SERPL-MCNC: 3 G/DL (ref 3.4–5)
ALP SERPL-CCNC: 136 U/L (ref 40–129)
ALT SERPL-CCNC: 18 U/L (ref 10–40)
ANION GAP SERPL CALCULATED.3IONS-SCNC: 13 MMOL/L (ref 3–16)
ANION GAP SERPL CALCULATED.3IONS-SCNC: 13 MMOL/L (ref 3–16)
ANISOCYTOSIS BLD QL SMEAR: ABNORMAL
APTT BLD: 31.6 SEC (ref 22.7–35.9)
APTT BLD: 32.1 SEC (ref 22.7–35.9)
AST SERPL-CCNC: 27 U/L (ref 15–37)
BACTERIA BLD CULT ORG #2: NORMAL
BACTERIA BLD CULT: NORMAL
BASOPHILS # BLD: 0 K/UL (ref 0–0.2)
BASOPHILS NFR BLD: 0 %
BILIRUB DIRECT SERPL-MCNC: <0.2 MG/DL (ref 0–0.3)
BILIRUB INDIRECT SERPL-MCNC: ABNORMAL MG/DL (ref 0–1)
BILIRUB SERPL-MCNC: 0.3 MG/DL (ref 0–1)
BUN SERPL-MCNC: 51 MG/DL (ref 7–20)
BUN SERPL-MCNC: 52 MG/DL (ref 7–20)
CALCIUM SERPL-MCNC: 9.1 MG/DL (ref 8.3–10.6)
CALCIUM SERPL-MCNC: 9.2 MG/DL (ref 8.3–10.6)
CHLORIDE SERPL-SCNC: 94 MMOL/L (ref 99–110)
CHLORIDE SERPL-SCNC: 95 MMOL/L (ref 99–110)
CO2 SERPL-SCNC: 27 MMOL/L (ref 21–32)
CO2 SERPL-SCNC: 28 MMOL/L (ref 21–32)
CREAT SERPL-MCNC: 1.1 MG/DL (ref 0.8–1.3)
CREAT SERPL-MCNC: 1.1 MG/DL (ref 0.8–1.3)
DACRYOCYTES BLD QL SMEAR: ABNORMAL
DEPRECATED RDW RBC AUTO: 21.2 % (ref 12.4–15.4)
EKG ATRIAL RATE: 256 BPM
EKG DIAGNOSIS: NORMAL
EKG P AXIS: 59 DEGREES
EKG Q-T INTERVAL: 510 MS
EKG QRS DURATION: 150 MS
EKG QTC CALCULATION (BAZETT): 510 MS
EKG R AXIS: -80 DEGREES
EKG T AXIS: 125 DEGREES
EKG VENTRICULAR RATE: 60 BPM
EOSINOPHIL # BLD: 0.3 K/UL (ref 0–0.6)
EOSINOPHIL NFR BLD: 4 %
FINAL REPORT: NORMAL
FUNGUS BLD CULT: NORMAL
FUNGUS BLD CULT: NORMAL
GFR SERPLBLD CREATININE-BSD FMLA CKD-EPI: >60 ML/MIN/{1.73_M2}
GFR SERPLBLD CREATININE-BSD FMLA CKD-EPI: >60 ML/MIN/{1.73_M2}
GLUCOSE BLD-MCNC: 119 MG/DL (ref 70–99)
GLUCOSE BLD-MCNC: 127 MG/DL (ref 70–99)
GLUCOSE BLD-MCNC: 146 MG/DL (ref 70–99)
GLUCOSE SERPL-MCNC: 149 MG/DL (ref 70–99)
GLUCOSE SERPL-MCNC: 89 MG/DL (ref 70–99)
HCT VFR BLD AUTO: 28.1 % (ref 40.5–52.5)
HGB BLD-MCNC: 9.2 G/DL (ref 13.5–17.5)
INR PPP: 1.36 (ref 0.84–1.16)
LDH SERPL L TO P-CCNC: 416 U/L (ref 100–190)
LYMPHOCYTES # BLD: 0.5 K/UL (ref 1–5.1)
LYMPHOCYTES NFR BLD: 8 %
MACROCYTES BLD QL SMEAR: ABNORMAL
MAGNESIUM SERPL-MCNC: 2.2 MG/DL (ref 1.8–2.4)
MAGNESIUM SERPL-MCNC: 2.2 MG/DL (ref 1.8–2.4)
MCH RBC QN AUTO: 29.2 PG (ref 26–34)
MCHC RBC AUTO-ENTMCNC: 32.6 G/DL (ref 31–36)
MCV RBC AUTO: 89.6 FL (ref 80–100)
MICROCYTES BLD QL SMEAR: ABNORMAL
MONOCYTES # BLD: 0.7 K/UL (ref 0–1.3)
MONOCYTES NFR BLD: 11 %
MYELOCYTES NFR BLD MANUAL: 1 %
NEUTROPHILS # BLD: 5.2 K/UL (ref 1.7–7.7)
NEUTROPHILS NFR BLD: 75 %
NEUTS BAND NFR BLD MANUAL: 1 % (ref 0–7)
NEUTS HYPERSEG BLD QL SMEAR: PRESENT
NRBC BLD-RTO: 1 /100 WBC
OVALOCYTES BLD QL SMEAR: ABNORMAL
PERFORMED ON: ABNORMAL
PHOSPHATE SERPL-MCNC: 3.8 MG/DL (ref 2.5–4.9)
PHOSPHATE SERPL-MCNC: 4.5 MG/DL (ref 2.5–4.9)
PLATELET # BLD AUTO: 94 K/UL (ref 135–450)
PLATELET BLD QL SMEAR: ABNORMAL
PMV BLD AUTO: 10.7 FL (ref 5–10.5)
POIKILOCYTOSIS BLD QL SMEAR: ABNORMAL
POLYCHROMASIA BLD QL SMEAR: ABNORMAL
POTASSIUM SERPL-SCNC: 4.6 MMOL/L (ref 3.5–5.1)
POTASSIUM SERPL-SCNC: 4.6 MMOL/L (ref 3.5–5.1)
PRELIMINARY: NORMAL
PROT SERPL-MCNC: 5.8 G/DL (ref 6.4–8.2)
PROTHROMBIN TIME: 16.7 SEC (ref 11.5–14.8)
RBC # BLD AUTO: 3.14 M/UL (ref 4.2–5.9)
SCHISTOCYTES BLD QL SMEAR: ABNORMAL
SLIDE REVIEW: ABNORMAL
SODIUM SERPL-SCNC: 134 MMOL/L (ref 136–145)
SODIUM SERPL-SCNC: 136 MMOL/L (ref 136–145)
TRIGL SERPL-MCNC: 138 MG/DL (ref 0–150)
WBC # BLD AUTO: 6.7 K/UL (ref 4–11)

## 2024-02-05 PROCEDURE — C9113 INJ PANTOPRAZOLE SODIUM, VIA: HCPCS | Performed by: NURSE PRACTITIONER

## 2024-02-05 PROCEDURE — 94150 VITAL CAPACITY TEST: CPT

## 2024-02-05 PROCEDURE — 85730 THROMBOPLASTIN TIME PARTIAL: CPT

## 2024-02-05 PROCEDURE — 6370000000 HC RX 637 (ALT 250 FOR IP): Performed by: INTERNAL MEDICINE

## 2024-02-05 PROCEDURE — 71250 CT THORAX DX C-: CPT

## 2024-02-05 PROCEDURE — 32551 INSERTION OF CHEST TUBE: CPT

## 2024-02-05 PROCEDURE — 85610 PROTHROMBIN TIME: CPT

## 2024-02-05 PROCEDURE — 94761 N-INVAS EAR/PLS OXIMETRY MLT: CPT

## 2024-02-05 PROCEDURE — 80076 HEPATIC FUNCTION PANEL: CPT

## 2024-02-05 PROCEDURE — 6360000002 HC RX W HCPCS: Performed by: INTERNAL MEDICINE

## 2024-02-05 PROCEDURE — 6370000000 HC RX 637 (ALT 250 FOR IP): Performed by: NURSE PRACTITIONER

## 2024-02-05 PROCEDURE — 93010 ELECTROCARDIOGRAM REPORT: CPT | Performed by: INTERNAL MEDICINE

## 2024-02-05 PROCEDURE — 6360000002 HC RX W HCPCS: Performed by: STUDENT IN AN ORGANIZED HEALTH CARE EDUCATION/TRAINING PROGRAM

## 2024-02-05 PROCEDURE — 99233 SBSQ HOSP IP/OBS HIGH 50: CPT | Performed by: INTERNAL MEDICINE

## 2024-02-05 PROCEDURE — 80069 RENAL FUNCTION PANEL: CPT

## 2024-02-05 PROCEDURE — 84478 ASSAY OF TRIGLYCERIDES: CPT

## 2024-02-05 PROCEDURE — 71045 X-RAY EXAM CHEST 1 VIEW: CPT

## 2024-02-05 PROCEDURE — 6360000002 HC RX W HCPCS: Performed by: NURSE PRACTITIONER

## 2024-02-05 PROCEDURE — 2500000003 HC RX 250 WO HCPCS: Performed by: INTERNAL MEDICINE

## 2024-02-05 PROCEDURE — 85025 COMPLETE CBC W/AUTO DIFF WBC: CPT

## 2024-02-05 PROCEDURE — 2580000003 HC RX 258: Performed by: NURSE PRACTITIONER

## 2024-02-05 PROCEDURE — 2700000000 HC OXYGEN THERAPY PER DAY

## 2024-02-05 PROCEDURE — 6370000000 HC RX 637 (ALT 250 FOR IP): Performed by: STUDENT IN AN ORGANIZED HEALTH CARE EDUCATION/TRAINING PROGRAM

## 2024-02-05 PROCEDURE — 0W9930Z DRAINAGE OF RIGHT PLEURAL CAVITY WITH DRAINAGE DEVICE, PERCUTANEOUS APPROACH: ICD-10-PCS | Performed by: RADIOLOGY

## 2024-02-05 PROCEDURE — 94640 AIRWAY INHALATION TREATMENT: CPT

## 2024-02-05 PROCEDURE — 2500000003 HC RX 250 WO HCPCS

## 2024-02-05 PROCEDURE — 2060000000 HC ICU INTERMEDIATE R&B

## 2024-02-05 PROCEDURE — 2580000003 HC RX 258: Performed by: INTERNAL MEDICINE

## 2024-02-05 PROCEDURE — 83615 LACTATE (LD) (LDH) ENZYME: CPT

## 2024-02-05 PROCEDURE — 83735 ASSAY OF MAGNESIUM: CPT

## 2024-02-05 PROCEDURE — 2000000000 HC ICU R&B

## 2024-02-05 RX ORDER — PROCHLORPERAZINE EDISYLATE 5 MG/ML
10 INJECTION INTRAMUSCULAR; INTRAVENOUS EVERY 6 HOURS PRN
Status: DISCONTINUED | OUTPATIENT
Start: 2024-02-05 | End: 2024-02-09 | Stop reason: HOSPADM

## 2024-02-05 RX ORDER — ONDANSETRON 2 MG/ML
8 INJECTION INTRAMUSCULAR; INTRAVENOUS EVERY 8 HOURS PRN
Status: DISCONTINUED | OUTPATIENT
Start: 2024-02-05 | End: 2024-02-09 | Stop reason: HOSPADM

## 2024-02-05 RX ORDER — LIDOCAINE HYDROCHLORIDE AND EPINEPHRINE 10; 10 MG/ML; UG/ML
20 INJECTION, SOLUTION INFILTRATION; PERINEURAL ONCE
Status: COMPLETED | OUTPATIENT
Start: 2024-02-05 | End: 2024-02-05

## 2024-02-05 RX ORDER — LEVETIRACETAM 500 MG/5ML
500 INJECTION, SOLUTION, CONCENTRATE INTRAVENOUS EVERY 12 HOURS
Status: DISCONTINUED | OUTPATIENT
Start: 2024-02-05 | End: 2024-02-09

## 2024-02-05 RX ORDER — IPRATROPIUM BROMIDE AND ALBUTEROL SULFATE 2.5; .5 MG/3ML; MG/3ML
1 SOLUTION RESPIRATORY (INHALATION)
Status: DISCONTINUED | OUTPATIENT
Start: 2024-02-05 | End: 2024-02-09

## 2024-02-05 RX ADMIN — VALACYCLOVIR HYDROCHLORIDE 500 MG: 500 TABLET, FILM COATED ORAL at 08:30

## 2024-02-05 RX ADMIN — SODIUM CHLORIDE 15 ML: 900 IRRIGANT IRRIGATION at 20:53

## 2024-02-05 RX ADMIN — CEFEPIME 2000 MG: 2 INJECTION, POWDER, FOR SOLUTION INTRAVENOUS at 21:52

## 2024-02-05 RX ADMIN — CEFEPIME 2000 MG: 2 INJECTION, POWDER, FOR SOLUTION INTRAVENOUS at 10:52

## 2024-02-05 RX ADMIN — SULFAMETHOXAZOLE AND TRIMETHOPRIM 50 ML: 200; 40 SUSPENSION ORAL at 13:54

## 2024-02-05 RX ADMIN — SULFAMETHOXAZOLE AND TRIMETHOPRIM 50 ML: 200; 40 SUSPENSION ORAL at 22:11

## 2024-02-05 RX ADMIN — LEVETIRACETAM 500 MG: 100 INJECTION, SOLUTION INTRAVENOUS at 20:47

## 2024-02-05 RX ADMIN — MIDODRINE HYDROCHLORIDE 5 MG: 5 TABLET ORAL at 16:40

## 2024-02-05 RX ADMIN — WATER 20 MG: 1 INJECTION INTRAMUSCULAR; INTRAVENOUS; SUBCUTANEOUS at 07:46

## 2024-02-05 RX ADMIN — APIXABAN 5 MG: 5 TABLET, FILM COATED ORAL at 20:54

## 2024-02-05 RX ADMIN — IPRATROPIUM BROMIDE AND ALBUTEROL SULFATE 1 DOSE: 2.5; .5 SOLUTION RESPIRATORY (INHALATION) at 21:00

## 2024-02-05 RX ADMIN — SODIUM CHLORIDE 15 ML: 900 IRRIGANT IRRIGATION at 12:04

## 2024-02-05 RX ADMIN — POLYETHYLENE GLYCOL 3350 17 G: 17 POWDER, FOR SOLUTION ORAL at 07:46

## 2024-02-05 RX ADMIN — APIXABAN 5 MG: 5 TABLET, FILM COATED ORAL at 08:30

## 2024-02-05 RX ADMIN — SODIUM CHLORIDE 15 ML: 900 IRRIGANT IRRIGATION at 05:44

## 2024-02-05 RX ADMIN — LIDOCAINE HYDROCHLORIDE,EPINEPHRINE BITARTRATE 20 ML: 10; .01 INJECTION, SOLUTION INFILTRATION; PERINEURAL at 03:16

## 2024-02-05 RX ADMIN — SULFAMETHOXAZOLE AND TRIMETHOPRIM 50 ML: 200; 40 SUSPENSION ORAL at 05:42

## 2024-02-05 RX ADMIN — IPRATROPIUM BROMIDE AND ALBUTEROL SULFATE 1 DOSE: 2.5; .5 SOLUTION RESPIRATORY (INHALATION) at 08:07

## 2024-02-05 RX ADMIN — SENNOSIDES AND DOCUSATE SODIUM 2 TABLET: 50; 8.6 TABLET ORAL at 07:45

## 2024-02-05 RX ADMIN — FUROSEMIDE 40 MG: 10 INJECTION, SOLUTION INTRAMUSCULAR; INTRAVENOUS at 07:48

## 2024-02-05 RX ADMIN — SODIUM CHLORIDE, PRESERVATIVE FREE 10 ML: 5 INJECTION INTRAVENOUS at 20:53

## 2024-02-05 RX ADMIN — SODIUM CHLORIDE 15 ML: 900 IRRIGANT IRRIGATION at 16:51

## 2024-02-05 RX ADMIN — SODIUM CHLORIDE, PRESERVATIVE FREE 10 ML: 5 INJECTION INTRAVENOUS at 07:45

## 2024-02-05 RX ADMIN — LEVETIRACETAM 500 MG: 500 TABLET, FILM COATED ORAL at 08:30

## 2024-02-05 RX ADMIN — VALACYCLOVIR HYDROCHLORIDE 500 MG: 500 TABLET, FILM COATED ORAL at 20:54

## 2024-02-05 RX ADMIN — PANTOPRAZOLE SODIUM 40 MG: 40 INJECTION, POWDER, FOR SOLUTION INTRAVENOUS at 07:45

## 2024-02-05 RX ADMIN — MIDODRINE HYDROCHLORIDE 5 MG: 5 TABLET ORAL at 13:09

## 2024-02-05 RX ADMIN — MORPHINE SULFATE 4 MG: 4 INJECTION INTRAVENOUS at 20:41

## 2024-02-05 RX ADMIN — METOPROLOL TARTRATE 2.5 MG: 1 INJECTION, SOLUTION INTRAVENOUS at 21:54

## 2024-02-05 RX ADMIN — ONDANSETRON 8 MG: 2 INJECTION INTRAMUSCULAR; INTRAVENOUS at 20:42

## 2024-02-05 RX ADMIN — MIDODRINE HYDROCHLORIDE 5 MG: 5 TABLET ORAL at 07:46

## 2024-02-05 ASSESSMENT — PAIN DESCRIPTION - PAIN TYPE: TYPE: ACUTE PAIN

## 2024-02-05 ASSESSMENT — PAIN SCALES - GENERAL
PAINLEVEL_OUTOF10: 0
PAINLEVEL_OUTOF10: 0
PAINLEVEL_OUTOF10: 8
PAINLEVEL_OUTOF10: 0

## 2024-02-05 ASSESSMENT — PAIN DESCRIPTION - ORIENTATION: ORIENTATION: ANTERIOR

## 2024-02-05 ASSESSMENT — PAIN DESCRIPTION - ONSET: ONSET: SUDDEN

## 2024-02-05 ASSESSMENT — PAIN - FUNCTIONAL ASSESSMENT: PAIN_FUNCTIONAL_ASSESSMENT: PREVENTS OR INTERFERES SOME ACTIVE ACTIVITIES AND ADLS

## 2024-02-05 ASSESSMENT — PAIN DESCRIPTION - DESCRIPTORS: DESCRIPTORS: SHARP

## 2024-02-05 ASSESSMENT — PAIN DESCRIPTION - FREQUENCY: FREQUENCY: INTERMITTENT

## 2024-02-05 ASSESSMENT — PAIN DESCRIPTION - LOCATION: LOCATION: CHEST

## 2024-02-05 NOTE — PROGRESS NOTES
[facility]   Cardiac Electrophysiology Progress Note     Admit Date: 2024     Reason for follow up:     HPI and Interval History:   Patient seen and examined. Clinical notes reviewed.   Telemetry reviewed.    Denies having chest pain, shortness of breath, dyspnea on exertion, Orthopnea, PND at the time of this visit.    Review of System:  All other systems reviewed except for that noted above. Pertinent negatives and positives are:     General: negative for fever, chills   Ophthalmic ROS: negative for - eye pain or loss of vision  ENT ROS: negative for - headaches, sore throat   Respiratory: negative for - cough, sputum  Cardiovascular: Reviewed in HPI  Gastrointestinal: negative for - abdominal pain, diarrhea, N/V  Hematology: negative for - bleeding, blood clots, bruising or jaundice  Genito-Urinary:  negative for - Dysuria or incontinence  Musculoskeletal: negative for - Joint swelling, muscle pain  Neurological: negative for - confusion, dizziness, headaches   Psychiatric: No anxiety, no depression.  Dermatological: negative for - rash      Physical Examination:  Vitals:    24 1200   BP: 113/62   Pulse: 77   Resp: (!) 37   Temp: 98.8 °F (37.1 °C)   SpO2: 91%        Intake/Output Summary (Last 24 hours) at 2024 1250  Last data filed at 2024 1100  Gross per 24 hour   Intake 327.85 ml   Output 2995 ml   Net -2667.15 ml     In: 327.9 [I.V.:128]  Out: 2995    Wt Readings from Last 3 Encounters:   24 57.9 kg (127 lb 10.3 oz)   24 62.3 kg (137 lb 6.4 oz)   23 76 kg (167 lb 8.8 oz)     Temp  Av.4 °F (36.9 °C)  Min: 97.1 °F (36.2 °C)  Max: 98.8 °F (37.1 °C)  Pulse  Av.6  Min: 60  Max: 77  BP  Min: 84/52  Max: 113/62  SpO2  Av.8 %  Min: 83 %  Max: 100 %    Telemetry: Atrial flutter / V paced rhythm    Constitutional: Alert. Oriented to person, place, and time. No distress.   Head: Normocephalic and atraumatic.   Mouth/Throat: Lips appear moist. 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    Admission for antineoplastic chemotherapy 07/20/2023    Lymphoma, unspecified body region, unspecified lymphoma type (HCC) 07/20/2023    Neoplasm of unspecified behavior of brain (HCC) 07/04/2023    Cognitive impairment 06/29/2023    Brain mass 06/28/2023    Confusion 06/28/2023    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    S/P craniotomy 04/28/2023    CAD in native artery 04/05/2022    Port-A-Cath in place     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)     Pacemaker-medtronic 08/25/2021    Acquired hypothyroidism 08/21/2021    Complete heart block (HCC) 08/20/2021    Third degree AV block (HCC)     PAF (paroxysmal atrial fibrillation) (HCC) 03/22/2021    Anemia 02/28/2021    History of malignant melanoma 07/20/2017    IGT (impaired glucose tolerance)       Active Hospital Problems    Diagnosis Date Noted    Persistent atrial fibrillation (HCC) [I48.19] 02/03/2024    Atrial flutter (HCC) [I48.92] 02/03/2024    Benign essential HTN [I10] 02/03/2024    On continuous oral anticoagulation [Z79.01] 02/03/2024    VF (ventricular fibrillation) ([facility]) [I49.01] 02/03/2024    VT (ventricular tachycardia) ([facility]) [I47.20] 02/03/2024    Heart failure with mildly reduced ejection fraction (HFmrEF) ([facility]) [I50.22] 02/03/2024    COVID-19 [U07.1] 01/28/2024    Immunocompromised (HCC) [D84.9] 01/28/2024    Hypoxia [R09.02] 01/23/2024    Acute on chronic respiratory failure with hypoxemia ([facility]) [J96.21] 01/04/2024       Assessment and Plan:     Complete heart block, status post dual-chamber pacemaker implantation  Persistent atypical atrial flutter/atrial fibrillation  [place],

## 2024-02-05 NOTE — PROGRESS NOTES
Speech Language Pathology  Hold Note    Attempted to see pt for follow up treatment session. Per chart review and discussion with RN pt set to potentially receive additional chest tube this date, therefore NPO. Will hold and re-attempt as able/as schedule allows.     Electronically signed by:  Eva Anderson M.A., CCC-SLP  [id number]  Speech-Language Pathologist  Pg #: [phone]

## 2024-02-05 NOTE — PROGRESS NOTES
Patient seen and assessed for Standard line care needs.  CVC site remains free of visible signs and symptoms of infection. No drainage, edema, erythema, pain, itching or warmth noted at and around the insertion site.  Line care performed by Devora Palm RN.  The need for continued use of the CVC is due to ongoing therapy.  Patient verbalizes understanding of line care education provided by line care RN.  Staff RNs will continue to monitor and assess the CVC site throughout the patient's hospital stay.  Sterile dressing changes will continue to be changed per policy.    Devora Palm RN
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
## 2024-02-05 NOTE — ACP (ADVANCE CARE PLANNING)
Advance Care Planning     General Advance Care Planning (ACP) Conversation    Healthcare Decision Maker:    Primary Decision Maker: Meaghan Lopez - Spouse - [phone]    Secondary Decision Maker: Eddie Lopez - Child - [phone]    Supplemental (Other) Decision Maker: Cosme Lopez - Child - [phone]  Click here to complete Healthcare Decision Makers including selection of the Healthcare Decision Maker Relationship (ie \"Primary\").   Today we documented Decision Maker(s) consistent with ACP documents on file.    JULY Noel   for [facility] ([facility])  Office Phone: [phone]  [facility] Mobile: [phone]

## 2024-02-05 NOTE — PROGRESS NOTES
Pulmonary & Critical Care Medicine ICU Progress Note    Admit Date: 2024  PCP: Ronal Wahl, APRN - CNP    CC:  acute hypoxemic and hypercapnic respiratory failure, cardiopulmonary arrest.  CHF, atrial flutter.  Events of Last 24 hours: Patient was having more chest pain overnight, and oxygen requirements went up transiently.  A chest xray was ordered which revealed a right sided pneumothorax.  Surgery was consulted and a chest tube was placed.  Patient reports the discomfort and dyspnea is a bit less, but not resolved.    Vitals:  Tmax:  VITALS:  /69   Pulse 75   Temp 98.4 °F (36.9 °C) (Bladder)   Resp (!) 33   Ht 1.778 m (5' 10\")   Wt 57.9 kg (127 lb 10.3 oz)   SpO2 94%   BMI 18.32 kg/m²   24HR INTAKE/OUTPUT:    Intake/Output Summary (Last 24 hours) at 2024 1148  Last data filed at 2024 1100  Gross per 24 hour   Intake 327.85 ml   Output 2995 ml   Net -2667.15 ml       CURRENT PULSE OXIMETRY:  SpO2: 94 %  24HR PULSE OXIMETRY RANGE:  SpO2  Av %  Min: 83 %  Max: 100 %      Invasive Lines:       CVC Triple Lumen 24 Right Internal jugular (Active)   Number of days: 8       Peripheral IV 24 Right Forearm (Active)   Number of days: 8       Urinary Catheter 24 Bruner-Temperature (Active)   Number of days: 3       Recent Labs     24  1057   PHART 7.517*   TTE1ZCR 37.1   PO2ART 77.3           EXAM:  General: No distress.  Awake and answer questions appropriately.  Eyes: PERRL. No sclera icterus. No conjunctival injection.  ENT: ETT in place  Neck: Trachea midline. Normal thyroid.  Resp: + accessory muscle use. No crackles. No wheezing. No rhonchi.  Right sided chest tube in place, on suction.  No bubbling, but good tidal variation.  CV: Regular rate. Regular rhythm. No mumur or rub. No edema.   GI: Non-tender. Non-distended. No masses. No organmegaly. Normal bowel sounds.   Skin: Warm and dry. No nodule on exposed extremities. No rash on exposed  diuresis.    Nutrition: Remains mildly cachectic appearing Corpack does terminate in the appropriate location.    Prophylaxis:  Head of bed maintained at >30 degrees at all times except for procedures and repositioning. Pantoprazole, apixaban       Dwight Leyva MD
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
## 2024-02-05 NOTE — PROGRESS NOTES
Comprehensive Nutrition Assessment    RECOMMENDATIONS:  PO Diet: Continue NPO  Nutrition Education: No recommendation at this time   3.   Nutrition Support:  Initiate Standard Formula with Fiber  Jevity 1.5 @ 20 mL/hr  As tolerated, increase by 10 mL/hr q 4 hours until goal of 50 mL/hr  Recommend water bolus 30 ml every 4 hours   Recommend ProSource once daily.   Flush with 30 ml water before/after administration. Do not mix with tube feeding formula.     NUTRITION ASSESSMENT:   Nutritional summary & status: Consult for Tube Feedings Order and Management. Pt extubated on 2/03/24. NG tube placed. Pt no longer on pressors, will change formula to Standard with Fiber. Reassessed nutrition needs based on CBW. TF orders placed.   Admission // PMH: Hypoxia//DLBC w/CNS, Thyroid disease, acute CHF, CAD    MALNUTRITION ASSESSMENT  Context of Malnutrition: Chronic Illness   Malnutrition Status: Severe malnutrition  Findings of the 6 clinical characteristics of malnutrition (Minimum of 2 out of 6 clinical characteristics is required to make the diagnosis of moderate or severe Protein Calorie Malnutrition based on AND/ASPEN Guidelines):  Energy Intake:  75% or less estimated energy requirements for 1 month or longer  Weight Loss:  Greater than 5% over 1 month (15% in 1 month)     Body Fat Loss:  Severe body fat loss Orbital, Buccal region   Muscle Mass Loss:  Severe muscle mass loss Temples (temporalis), Clavicles (pectoralis & deltoids)  Fluid Accumulation:  No significant fluid accumulation     Strength:  Not Performed    NUTRITION DIAGNOSIS   Inadequate oral intake related to catabolic illness, impaired respiratory function as evidenced by nutrition support - enteral nutrition    Nutrition Monitoring and Evaluation:   Food/Nutrient Intake Outcomes:  Enteral Nutrition Intake/Tolerance  Physical Signs/Symptoms Outcomes:  Biochemical Data, Hemodynamic Status, Nutrition Focused Physical Findings, Weight     OBJECTIVE DATA:

## 2024-02-05 NOTE — PROGRESS NOTES
Interval Progress    Anurag pneumocath placed early this AM. Patient with decreasing supplemental oxygen requirement this AM 8L from 10L. Hemodynamics are stable with HR in the 60s and SBP ranging from 100-110mmHg. CXR reveals improvement of lateral pneumothorax but persistent apical component, which will require placement of an additional chest tube.     - Recommend patient undergo IR guided chest tube placement today.   - Eliquis is held in preparation for potential IR chest tube placement.    Pool Hernandez MD, MPH  PGY5 General Surgery  02/05/24  8:32 AM

## 2024-02-05 NOTE — PLAN OF CARE
Problem: Safety - Adult  Goal: Free from fall injury  Outcome: Progressing     Problem: ABCDS Injury Assessment  Goal: Absence of physical injury  Outcome: Progressing     Problem: Chronic Conditions and Co-morbidities  Goal: Patient's chronic conditions and co-morbidity symptoms are monitored and maintained or improved  Outcome: Progressing     Problem: Respiratory - Adult  Goal: Achieves optimal ventilation and oxygenation  Outcome: Progressing     Problem: Cardiovascular - Adult  Goal: Maintains optimal cardiac output and hemodynamic stability  Outcome: Progressing  Goal: Absence of cardiac dysrhythmias or at baseline  Outcome: Progressing     Problem: Metabolic/Fluid and Electrolytes - Adult  Goal: Electrolytes maintained within normal limits  Outcome: Progressing  Goal: Hemodynamic stability and optimal renal function maintained  Outcome: Progressing     Problem: Neurosensory - Adult  Goal: Achieves stable or improved neurological status  Outcome: Progressing  Goal: Absence of seizures  Outcome: Progressing     Problem: Hematologic - Adult  Goal: Maintains hematologic stability  Outcome: Progressing     Problem: Pain  Goal: Verbalizes/displays adequate comfort level or baseline comfort level  Outcome: Progressing     Problem: Nutrition Deficit:  Goal: Optimize nutritional status  Outcome: Progressing     Problem: Skin/Tissue Integrity  Goal: Absence of new skin breakdown  Description: 1.  Monitor for areas of redness and/or skin breakdown  2.  Assess vascular access sites hourly  3.  Every 4-6 hours minimum:  Change oxygen saturation probe site  4.  Every 4-6 hours:  If on nasal continuous positive airway pressure, respiratory therapy assess nares and determine need for appliance change or resting period.  Outcome: Progressing

## 2024-02-05 NOTE — RT PROTOCOL NOTE
RT Inhaler-Nebulizer Bronchodilator Protocol Note    There is a bronchodilator order in the chart from a provider indicating to follow the RT Bronchodilator Protocol and there is an “Initiate RT Inhaler-Nebulizer Bronchodilator Protocol” order as well (see protocol at bottom of note).    CXR Findings:  XR CHEST PORTABLE    Result Date: 2/5/2024  Interval slight decrease in size of right apical pneumothorax with chest tube in place. Right greater than left bilateral airspace disease, unchanged. Electronically signed by Igor Coleman MD    XR CHEST PORTABLE    Result Date: 2/5/2024  Interval placement of right chest tube with slight decrease in right pneumothorax. Electronically signed by Igor Coleman MD    XR CHEST PORTABLE    Result Date: 2/5/2024  1. Moderate right pneumothorax, new since previous study. 2. Right greater than left bilateral airspace disease, unchanged. Critical results reported to nurse taking care of the patient at 12:55 PM Electronically signed by Igor Coleman MD    XR CHEST PORTABLE    Result Date: 2/3/2024  Multifocal airspace disease, with interval improvement on the left.      The findings from the last RT Protocol Assessment were as follows:   History Pulmonary Disease: None or smoker <15 pack years  Respiratory Pattern: Regular pattern and RR 12-20 bpm  Breath Sounds: Slightly diminished and/or crackles  Cough: Strong, spontaneous, non-productive  Indication for Bronchodilator Therapy: On home bronchodilators  Bronchodilator Assessment Score: 2  Will convert to Bid  Aerosolized bronchodilator medication orders have been revised according to the RT Inhaler-Nebulizer Bronchodilator Protocol below.    Respiratory Therapist to perform RT Therapy Protocol Assessment initially then follow the protocol.  Repeat RT Therapy Protocol Assessment PRN for score 0-3 or on second treatment, BID, and PRN for scores above 3.    No Indications - adjust the frequency to every 6 hours PRN wheezing or  bronchospasm, if no treatments needed after 48 hours then discontinue using Per Protocol order mode.     If indication present, adjust the RT bronchodilator orders based on the Bronchodilator Assessment Score as indicated below.  Use Inhaler orders unless patient has one or more of the following: on home nebulizer, not able to hold breath for 10 seconds, is not alert and oriented, cannot activate and use MDI correctly, or respiratory rate 25 breaths per minute or more, then use the equivalent nebulizer order(s) with same Frequency and PRN reasons based on the score.  If a patient is on this medication at home then do not decrease Frequency below that used at home.    0-3 - enter or revise RT bronchodilator order(s) to equivalent RT Bronchodilator order with Frequency of every 4 hours PRN for wheezing or increased work of breathing using Per Protocol order mode.        4-6 - enter or revise RT Bronchodilator order(s) to two equivalent RT bronchodilator orders with one order with BID Frequency and one order with Frequency of every 4 hours PRN wheezing or increased work of breathing using Per Protocol order mode.        7-10 - enter or revise RT Bronchodilator order(s) to two equivalent RT bronchodilator orders with one order with TID Frequency and one order with Frequency of every 4 hours PRN wheezing or increased work of breathing using Per Protocol order mode.       11-13 - enter or revise RT Bronchodilator order(s) to one equivalent RT bronchodilator order with QID Frequency and an Albuterol order with Frequency of every 4 hours PRN wheezing or increased work of breathing using Per Protocol order mode.      Greater than 13 - enter or revise RT Bronchodilator order(s) to one equivalent RT bronchodilator order with every 4 hours Frequency and an Albuterol order with Frequency of every 2 hours PRN wheezing or increased work of breathing using Per Protocol order mode.     RT to enter RT Home Evaluation for COPD & MDI

## 2024-02-05 NOTE — PLAN OF CARE
Asked by RN to evaluate the patient due to increase 02 requirement from 6-L to , As per chart review, Bethany was recently extubated on 2/03 and was intubated due to hypercapnic respiratory faliure. STAT CXR was ordered on chart review.     Subjective: Patient feels he has some shortness of breath, Not confused.     VS:/65   Pulse 60   Temp 98.8 °F (37.1 °C) (Bladder)   Resp 16   Ht 1.778 m (5' 10\")   Wt 57.9 kg (127 lb 10.3 oz)   SpO2 96%   BMI 18.32 kg/m²     Assessment: Acute hypoxic respiratory faliure recent extubated on 2/3 due to Right sided moderate pneumothorax     Plan       STAT CXR Revealed Moderate right sided pneumothorax, Oxygen requirement increased from 6-L-16-L overnight. Discussed with Dr. Leyva, Need for emergent chest tube due to risk of deterioration, Discussed with surgical team.     Addendum 03:47, Successful placement of Right sided chest tube by CT surgery, CXR read showing interval improvement in Pneumothorax     Level of care: Will change level of care to ICU    Jessie Jara MD   Internal Medicine, PGY-3

## 2024-02-05 NOTE — PROGRESS NOTES
CXR reads \"Moderate right pneumothorax, new since previous study.\" NP Jessica Cheadle and ICU team updated.

## 2024-02-05 NOTE — PROGRESS NOTES
Occupational Therapy/Physical Therapy  Hold    OT/PT orders received and chart reviewed. Will hold due to plans for placement of an additional chest tube today. RN in agreement with plan. Will return 2/6 for evaluations pending medical status. Thank you.    Philomena Myers, OTR/L [id number]  [place] [id number]

## 2024-02-05 NOTE — PROGRESS NOTES
[facility] Progress Note    2024     Zain Lopez    MRN: [id number]    : 1955    Referring MD: Penelope Wilhelm,   [address]  [address]  [place] [address]      SUBJECTIVE: Reports slight improvement in breathing. Still has cough. Advised him to work with PT.     ECOG PS: (4) Completely disabled, unable to carry out self-care and confined to bed or chair     KPS: 20% Very sick; hospital admission necessary; active supportive treatment necessary    Isolation: Droplet Plus    Medications    Scheduled Meds:   lidocaine  1 patch TransDERmal Daily    furosemide  40 mg IntraVENous Daily    cefepime  2,000 mg IntraVENous Q12H    methylPREDNISolone  20 mg IntraVENous Daily    midodrine  5 mg Oral TID WC    sulfamethoxazole-trimethoprim  400 mg Oral Q8H    sennosides-docusate sodium  2 tablet Oral Daily    polyethylene glycol  17 g Oral Daily    insulin lispro  0-4 Units SubCUTAneous Q6H    ipratropium 0.5 mg-albuterol 2.5 mg  1 Dose Inhalation 4x Daily RT    metoprolol  2.5 mg IntraVENous Q8H    pantoprazole  40 mg IntraVENous Daily    apixaban  5 mg Oral BID    levETIRAcetam  500 mg Oral BID    [Held by provider] tamsulosin  0.4 mg Oral Daily    valACYclovir  500 mg Oral BID    sodium chloride flush  5-40 mL IntraVENous 2 times per day    Saline Mouthwash  15 mL Swish & Spit 4x Daily AC & HS     Continuous Infusions:   dexmedeTOMIDine (PRECEDEX) 400 mcg in sodium chloride 0.9 % 100 mL infusion Stopped (24 1430)    dextrose      norepinephrine Stopped (24 1145)    sodium chloride Stopped (24 1224)    sodium chloride       PRN Meds:.oxyCODONE **OR** oxyCODONE, diphenhydrAMINE, morphine, acetaminophen, potassium chloride, potassium chloride, magnesium sulfate, glucose, dextrose bolus **OR** dextrose bolus, glucagon (rDNA), dextrose, magnesium sulfate, sodium chloride, albuterol, diphenhydrAMINE, sodium chloride flush, sodium chloride, magnesium hydroxide, Saline Mouthwash, alteplase

## 2024-02-05 NOTE — PLAN OF CARE
Problem: Safety - Adult  Goal: Free from fall injury  Outcome: Progressing     Problem: ABCDS Injury Assessment  Goal: Absence of physical injury  Outcome: Progressing     Problem: Chronic Conditions and Co-morbidities  Goal: Patient's chronic conditions and co-morbidity symptoms are monitored and maintained or improved  Outcome: Progressing  Flowsheets (Taken 2/4/2024 2000)  Care Plan - Patient's Chronic Conditions and Co-Morbidity Symptoms are Monitored and Maintained or Improved: Monitor and assess patient's chronic conditions and comorbid symptoms for stability, deterioration, or improvement

## 2024-02-05 NOTE — PROGRESS NOTES
2313: Pt noted to have desaturated to the 80s with complaints of SOB/chest pain. O2 increased from 8LHFNC to 15LHFNC. MD updated. ICU resident to bedside to assess pt. CXR ordered and obtained.     0000: O2 requirements decreased from 15L HFNC to 10L HFNC. O2 currently 96%.

## 2024-02-06 ENCOUNTER — APPOINTMENT (OUTPATIENT)
Dept: GENERAL RADIOLOGY | Age: 69
End: 2024-02-06
Attending: STUDENT IN AN ORGANIZED HEALTH CARE EDUCATION/TRAINING PROGRAM
Payer: MEDICARE

## 2024-02-06 LAB
ACID FAST STN SPEC QL: NORMAL
ALBUMIN SERPL-MCNC: 3 G/DL (ref 3.4–5)
ALBUMIN SERPL-MCNC: 3.3 G/DL (ref 3.4–5)
ALP SERPL-CCNC: 130 U/L (ref 40–129)
ALT SERPL-CCNC: 18 U/L (ref 10–40)
ANION GAP SERPL CALCULATED.3IONS-SCNC: 12 MMOL/L (ref 3–16)
ANION GAP SERPL CALCULATED.3IONS-SCNC: 12 MMOL/L (ref 3–16)
ANISOCYTOSIS BLD QL SMEAR: ABNORMAL
AST SERPL-CCNC: 24 U/L (ref 15–37)
BACTERIA THROAT AEROBE CULT: NORMAL
BACTERIA URNS QL MICRO: ABNORMAL /HPF
BASOPHILS # BLD: 0.1 K/UL (ref 0–0.2)
BASOPHILS NFR BLD: 1 %
BILIRUB DIRECT SERPL-MCNC: <0.2 MG/DL (ref 0–0.3)
BILIRUB INDIRECT SERPL-MCNC: ABNORMAL MG/DL (ref 0–1)
BILIRUB SERPL-MCNC: 0.3 MG/DL (ref 0–1)
BILIRUB UR QL STRIP.AUTO: NEGATIVE
BUN SERPL-MCNC: 44 MG/DL (ref 7–20)
BUN SERPL-MCNC: 46 MG/DL (ref 7–20)
CALCIUM SERPL-MCNC: 9.2 MG/DL (ref 8.3–10.6)
CALCIUM SERPL-MCNC: 9.6 MG/DL (ref 8.3–10.6)
CHLORIDE SERPL-SCNC: 96 MMOL/L (ref 99–110)
CHLORIDE SERPL-SCNC: 96 MMOL/L (ref 99–110)
CLARITY UR: CLEAR
CO2 SERPL-SCNC: 28 MMOL/L (ref 21–32)
CO2 SERPL-SCNC: 29 MMOL/L (ref 21–32)
COLOR UR: YELLOW
CREAT SERPL-MCNC: 1 MG/DL (ref 0.8–1.3)
CREAT SERPL-MCNC: 1.2 MG/DL (ref 0.8–1.3)
DACRYOCYTES BLD QL SMEAR: ABNORMAL
DEPRECATED RDW RBC AUTO: 21.2 % (ref 12.4–15.4)
EOSINOPHIL # BLD: 0.1 K/UL (ref 0–0.6)
EOSINOPHIL NFR BLD: 1 %
GFR SERPLBLD CREATININE-BSD FMLA CKD-EPI: >60 ML/MIN/{1.73_M2}
GFR SERPLBLD CREATININE-BSD FMLA CKD-EPI: >60 ML/MIN/{1.73_M2}
GLUCOSE BLD-MCNC: 121 MG/DL (ref 70–99)
GLUCOSE BLD-MCNC: 132 MG/DL (ref 70–99)
GLUCOSE BLD-MCNC: 136 MG/DL (ref 70–99)
GLUCOSE BLD-MCNC: 146 MG/DL (ref 70–99)
GLUCOSE SERPL-MCNC: 107 MG/DL (ref 70–99)
GLUCOSE SERPL-MCNC: 121 MG/DL (ref 70–99)
GLUCOSE UR STRIP.AUTO-MCNC: NEGATIVE MG/DL
HCT VFR BLD AUTO: 28.3 % (ref 40.5–52.5)
HGB BLD-MCNC: 9.1 G/DL (ref 13.5–17.5)
HGB UR QL STRIP.AUTO: NEGATIVE
HYALINE CASTS #/AREA URNS LPF: ABNORMAL /LPF (ref 0–2)
HYPOCHROMIA BLD QL SMEAR: ABNORMAL
INR PPP: 1.29 (ref 0.84–1.16)
KETONES UR STRIP.AUTO-MCNC: NEGATIVE MG/DL
LEUKOCYTE ESTERASE UR QL STRIP.AUTO: NEGATIVE
LYMPHOCYTES # BLD: 0.3 K/UL (ref 1–5.1)
LYMPHOCYTES NFR BLD: 5 %
MACROCYTES BLD QL SMEAR: ABNORMAL
MAGNESIUM SERPL-MCNC: 2.1 MG/DL (ref 1.8–2.4)
MAGNESIUM SERPL-MCNC: 2.2 MG/DL (ref 1.8–2.4)
MCH RBC QN AUTO: 28.9 PG (ref 26–34)
MCHC RBC AUTO-ENTMCNC: 32.3 G/DL (ref 31–36)
MCV RBC AUTO: 89.4 FL (ref 80–100)
MICROCYTES BLD QL SMEAR: ABNORMAL
MONOCYTES # BLD: 0.3 K/UL (ref 0–1.3)
MONOCYTES NFR BLD: 5 %
MYCOBACTERIUM SPEC CULT: NORMAL
NEUTROPHILS # BLD: 5.8 K/UL (ref 1.7–7.7)
NEUTROPHILS NFR BLD: 88 %
NITRITE UR QL STRIP.AUTO: NEGATIVE
OVALOCYTES BLD QL SMEAR: ABNORMAL
PERFORMED ON: ABNORMAL
PH UR STRIP.AUTO: 6 [PH] (ref 5–8)
PHOSPHATE SERPL-MCNC: 3.6 MG/DL (ref 2.5–4.9)
PHOSPHATE SERPL-MCNC: 3.9 MG/DL (ref 2.5–4.9)
PLATELET # BLD AUTO: 100 K/UL (ref 135–450)
PLATELET BLD QL SMEAR: ABNORMAL
PMV BLD AUTO: 10 FL (ref 5–10.5)
POIKILOCYTOSIS BLD QL SMEAR: ABNORMAL
POLYCHROMASIA BLD QL SMEAR: ABNORMAL
POTASSIUM SERPL-SCNC: 4.4 MMOL/L (ref 3.5–5.1)
POTASSIUM SERPL-SCNC: 5.3 MMOL/L (ref 3.5–5.1)
PROT SERPL-MCNC: 5.8 G/DL (ref 6.4–8.2)
PROT UR STRIP.AUTO-MCNC: ABNORMAL MG/DL
PROTHROMBIN TIME: 16.1 SEC (ref 11.5–14.8)
RBC # BLD AUTO: 3.17 M/UL (ref 4.2–5.9)
RBC #/AREA URNS HPF: ABNORMAL /HPF (ref 0–4)
SCHISTOCYTES BLD QL SMEAR: ABNORMAL
SLIDE REVIEW: ABNORMAL
SODIUM SERPL-SCNC: 136 MMOL/L (ref 136–145)
SODIUM SERPL-SCNC: 137 MMOL/L (ref 136–145)
SP GR UR STRIP.AUTO: >=1.03 (ref 1–1.03)
SPHEROCYTES BLD QL SMEAR: ABNORMAL
STOMATOCYTES BLD QL SMEAR: ABNORMAL
UA DIPSTICK W REFLEX MICRO PNL UR: YES
URN SPEC COLLECT METH UR: ABNORMAL
UROBILINOGEN UR STRIP-ACNC: 0.2 E.U./DL
WBC # BLD AUTO: 6.6 K/UL (ref 4–11)
WBC #/AREA URNS HPF: ABNORMAL /HPF (ref 0–5)

## 2024-02-06 PROCEDURE — 0W9930Z DRAINAGE OF RIGHT PLEURAL CAVITY WITH DRAINAGE DEVICE, PERCUTANEOUS APPROACH: ICD-10-PCS | Performed by: INTERNAL MEDICINE

## 2024-02-06 PROCEDURE — 6370000000 HC RX 637 (ALT 250 FOR IP): Performed by: NURSE PRACTITIONER

## 2024-02-06 PROCEDURE — 2500000003 HC RX 250 WO HCPCS: Performed by: INTERNAL MEDICINE

## 2024-02-06 PROCEDURE — 6370000000 HC RX 637 (ALT 250 FOR IP): Performed by: INTERNAL MEDICINE

## 2024-02-06 PROCEDURE — 6370000000 HC RX 637 (ALT 250 FOR IP): Performed by: STUDENT IN AN ORGANIZED HEALTH CARE EDUCATION/TRAINING PROGRAM

## 2024-02-06 PROCEDURE — 32551 INSERTION OF CHEST TUBE: CPT | Performed by: INTERNAL MEDICINE

## 2024-02-06 PROCEDURE — 6360000002 HC RX W HCPCS: Performed by: INTERNAL MEDICINE

## 2024-02-06 PROCEDURE — C9113 INJ PANTOPRAZOLE SODIUM, VIA: HCPCS | Performed by: NURSE PRACTITIONER

## 2024-02-06 PROCEDURE — 2700000000 HC OXYGEN THERAPY PER DAY

## 2024-02-06 PROCEDURE — 36592 COLLECT BLOOD FROM PICC: CPT

## 2024-02-06 PROCEDURE — 6360000002 HC RX W HCPCS: Performed by: NURSE PRACTITIONER

## 2024-02-06 PROCEDURE — 71045 X-RAY EXAM CHEST 1 VIEW: CPT

## 2024-02-06 PROCEDURE — 81001 URINALYSIS AUTO W/SCOPE: CPT

## 2024-02-06 PROCEDURE — 94761 N-INVAS EAR/PLS OXIMETRY MLT: CPT

## 2024-02-06 PROCEDURE — 2580000003 HC RX 258: Performed by: NURSE PRACTITIONER

## 2024-02-06 PROCEDURE — 6360000002 HC RX W HCPCS: Performed by: STUDENT IN AN ORGANIZED HEALTH CARE EDUCATION/TRAINING PROGRAM

## 2024-02-06 PROCEDURE — 2060000000 HC ICU INTERMEDIATE R&B

## 2024-02-06 PROCEDURE — 80069 RENAL FUNCTION PANEL: CPT

## 2024-02-06 PROCEDURE — 94640 AIRWAY INHALATION TREATMENT: CPT

## 2024-02-06 PROCEDURE — 83735 ASSAY OF MAGNESIUM: CPT

## 2024-02-06 PROCEDURE — 85610 PROTHROMBIN TIME: CPT

## 2024-02-06 PROCEDURE — 80076 HEPATIC FUNCTION PANEL: CPT

## 2024-02-06 PROCEDURE — 99233 SBSQ HOSP IP/OBS HIGH 50: CPT | Performed by: INTERNAL MEDICINE

## 2024-02-06 PROCEDURE — 92526 ORAL FUNCTION THERAPY: CPT

## 2024-02-06 PROCEDURE — 85025 COMPLETE CBC W/AUTO DIFF WBC: CPT

## 2024-02-06 PROCEDURE — 2580000003 HC RX 258: Performed by: INTERNAL MEDICINE

## 2024-02-06 PROCEDURE — 2000000000 HC ICU R&B

## 2024-02-06 RX ORDER — OXYCODONE HCL 5 MG/5 ML
5 SOLUTION, ORAL ORAL EVERY 4 HOURS PRN
Status: DISCONTINUED | OUTPATIENT
Start: 2024-02-06 | End: 2024-02-09 | Stop reason: HOSPADM

## 2024-02-06 RX ORDER — OXYCODONE HCL 5 MG/5 ML
10 SOLUTION, ORAL ORAL EVERY 4 HOURS PRN
Status: DISCONTINUED | OUTPATIENT
Start: 2024-02-06 | End: 2024-02-09 | Stop reason: HOSPADM

## 2024-02-06 RX ORDER — FUROSEMIDE 10 MG/ML
60 INJECTION INTRAMUSCULAR; INTRAVENOUS DAILY
Status: DISCONTINUED | OUTPATIENT
Start: 2024-02-07 | End: 2024-02-08

## 2024-02-06 RX ORDER — POLYVINYL ALCOHOL 14 MG/ML
1 SOLUTION/ DROPS OPHTHALMIC PRN
Status: DISCONTINUED | OUTPATIENT
Start: 2024-02-06 | End: 2024-02-09 | Stop reason: HOSPADM

## 2024-02-06 RX ADMIN — LEVETIRACETAM 500 MG: 100 INJECTION, SOLUTION INTRAVENOUS at 21:19

## 2024-02-06 RX ADMIN — FUROSEMIDE 40 MG: 10 INJECTION, SOLUTION INTRAMUSCULAR; INTRAVENOUS at 09:51

## 2024-02-06 RX ADMIN — CEFEPIME 2000 MG: 2 INJECTION, POWDER, FOR SOLUTION INTRAVENOUS at 10:06

## 2024-02-06 RX ADMIN — SENNOSIDES AND DOCUSATE SODIUM 2 TABLET: 50; 8.6 TABLET ORAL at 09:28

## 2024-02-06 RX ADMIN — MIDODRINE HYDROCHLORIDE 5 MG: 5 TABLET ORAL at 13:19

## 2024-02-06 RX ADMIN — IPRATROPIUM BROMIDE AND ALBUTEROL SULFATE 1 DOSE: 2.5; .5 SOLUTION RESPIRATORY (INHALATION) at 22:01

## 2024-02-06 RX ADMIN — LEVETIRACETAM 500 MG: 100 INJECTION, SOLUTION INTRAVENOUS at 09:32

## 2024-02-06 RX ADMIN — MIDODRINE HYDROCHLORIDE 5 MG: 5 TABLET ORAL at 16:32

## 2024-02-06 RX ADMIN — POLYVINYL ALCOHOL 1 DROP: 14 SOLUTION/ DROPS OPHTHALMIC at 14:47

## 2024-02-06 RX ADMIN — SULFAMETHOXAZOLE AND TRIMETHOPRIM 50 ML: 200; 40 SUSPENSION ORAL at 05:30

## 2024-02-06 RX ADMIN — METOPROLOL TARTRATE 2.5 MG: 1 INJECTION, SOLUTION INTRAVENOUS at 05:27

## 2024-02-06 RX ADMIN — MORPHINE SULFATE 4 MG: 4 INJECTION INTRAVENOUS at 12:06

## 2024-02-06 RX ADMIN — MORPHINE SULFATE 4 MG: 4 INJECTION INTRAVENOUS at 21:19

## 2024-02-06 RX ADMIN — VALACYCLOVIR HYDROCHLORIDE 500 MG: 500 TABLET, FILM COATED ORAL at 21:18

## 2024-02-06 RX ADMIN — LIDOCAINE HYDROCHLORIDE 30 ML: 10; .005 INJECTION, SOLUTION EPIDURAL; INFILTRATION; INTRACAUDAL; PERINEURAL at 11:00

## 2024-02-06 RX ADMIN — METOPROLOL TARTRATE 2.5 MG: 1 INJECTION, SOLUTION INTRAVENOUS at 21:18

## 2024-02-06 RX ADMIN — APIXABAN 5 MG: 5 TABLET, FILM COATED ORAL at 21:18

## 2024-02-06 RX ADMIN — PROCHLORPERAZINE EDISYLATE 10 MG: 5 INJECTION INTRAMUSCULAR; INTRAVENOUS at 05:38

## 2024-02-06 RX ADMIN — CEFEPIME 2000 MG: 2 INJECTION, POWDER, FOR SOLUTION INTRAVENOUS at 21:39

## 2024-02-06 RX ADMIN — SODIUM CHLORIDE, PRESERVATIVE FREE 10 ML: 5 INJECTION INTRAVENOUS at 09:33

## 2024-02-06 RX ADMIN — WATER 20 MG: 1 INJECTION INTRAMUSCULAR; INTRAVENOUS; SUBCUTANEOUS at 09:33

## 2024-02-06 RX ADMIN — VALACYCLOVIR HYDROCHLORIDE 500 MG: 500 TABLET, FILM COATED ORAL at 09:28

## 2024-02-06 RX ADMIN — MIDODRINE HYDROCHLORIDE 5 MG: 5 TABLET ORAL at 09:28

## 2024-02-06 RX ADMIN — METOPROLOL TARTRATE 2.5 MG: 1 INJECTION, SOLUTION INTRAVENOUS at 13:19

## 2024-02-06 RX ADMIN — POLYETHYLENE GLYCOL 3350 17 G: 17 POWDER, FOR SOLUTION ORAL at 09:34

## 2024-02-06 RX ADMIN — SULFAMETHOXAZOLE AND TRIMETHOPRIM 50 ML: 200; 40 SUSPENSION ORAL at 13:31

## 2024-02-06 RX ADMIN — ONDANSETRON 8 MG: 2 INJECTION INTRAMUSCULAR; INTRAVENOUS at 19:14

## 2024-02-06 RX ADMIN — SODIUM CHLORIDE, PRESERVATIVE FREE 10 ML: 5 INJECTION INTRAVENOUS at 21:00

## 2024-02-06 RX ADMIN — PANTOPRAZOLE SODIUM 40 MG: 40 INJECTION, POWDER, FOR SOLUTION INTRAVENOUS at 09:33

## 2024-02-06 ASSESSMENT — PAIN - FUNCTIONAL ASSESSMENT
PAIN_FUNCTIONAL_ASSESSMENT: PREVENTS OR INTERFERES SOME ACTIVE ACTIVITIES AND ADLS
PAIN_FUNCTIONAL_ASSESSMENT: PREVENTS OR INTERFERES SOME ACTIVE ACTIVITIES AND ADLS
PAIN_FUNCTIONAL_ASSESSMENT: ACTIVITIES ARE NOT PREVENTED

## 2024-02-06 ASSESSMENT — PAIN SCALES - GENERAL
PAINLEVEL_OUTOF10: 7
PAINLEVEL_OUTOF10: 10
PAINLEVEL_OUTOF10: 6
PAINLEVEL_OUTOF10: 4
PAINLEVEL_OUTOF10: 0
PAINLEVEL_OUTOF10: 4

## 2024-02-06 ASSESSMENT — PAIN DESCRIPTION - ONSET
ONSET: ON-GOING
ONSET: ON-GOING
ONSET: GRADUAL

## 2024-02-06 ASSESSMENT — PAIN DESCRIPTION - LOCATION
LOCATION: CHEST;ABDOMEN
LOCATION: CHEST
LOCATION: RIB CAGE;CHEST

## 2024-02-06 ASSESSMENT — PAIN DESCRIPTION - ORIENTATION
ORIENTATION: RIGHT;MID
ORIENTATION: INNER;MID
ORIENTATION: RIGHT

## 2024-02-06 ASSESSMENT — PAIN DESCRIPTION - PAIN TYPE
TYPE: ACUTE PAIN

## 2024-02-06 ASSESSMENT — PAIN DESCRIPTION - DESCRIPTORS
DESCRIPTORS: ACHING
DESCRIPTORS: ACHING;DISCOMFORT
DESCRIPTORS: ACHING

## 2024-02-06 ASSESSMENT — PAIN DESCRIPTION - FREQUENCY
FREQUENCY: INTERMITTENT

## 2024-02-06 NOTE — PLAN OF CARE
Problem: Safety - Adult  Goal: Free from fall injury  2/6/2024 1705 by Opal Mcdonough, RN  Outcome: Progressing   Pt free of falls this shift, all safety precautions in place.     Problem: Respiratory - Adult  Goal: Achieves optimal ventilation and oxygenation  2/6/2024 1705 by Opal Mcdonough RN  Outcome: Progressing     Problem: Hematologic - Adult  Goal: Maintains hematologic stability  2/6/2024 1705 by Opal Mcdonough, RN  Outcome: Progressing   Patient's hemoglobin this AM:   Recent Labs     02/06/24 0312   HGB 9.1*     Patient's platelet count this AM:   Recent Labs     02/06/24 0312   *    Thrombocytopenia Precautions in place.  Patient showing no signs or symptoms of active bleeding.  Transfusion not indicated at this time.  Patient verbalizes understanding of all instructions. Will continue to assess and implement POC. Call light within reach and hourly rounding in place.      Problem: Pain  Goal: Verbalizes/displays adequate comfort level or baseline comfort level  2/6/2024 1705 by Opal Mcdonough, RN  Outcome: Progressing     Problem: Skin/Tissue Integrity  Goal: Absence of new skin breakdown  Description: 1.  Monitor for areas of redness and/or skin breakdown  2.  Assess vascular access sites hourly  3.  Every 4-6 hours minimum:  Change oxygen saturation probe site  4.  Every 4-6 hours:  If on nasal continuous positive airway pressure, respiratory therapy assess nares and determine need for appliance change or resting period.  Outcome: Progressing   Pt with no new signs of skin breakdown, ashanti orders in place, turning pt every two hours or more frequently as needed, keeping pt clean and dry, new mepilex applied for protection.

## 2024-02-06 NOTE — PROGRESS NOTES
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    Marijuana use 2022    Persistent atrial fibrillation (HCC) 2024    Atrial flutter ([facility]) 2024    Benign essential HTN 2024    On continuous oral anticoagulation 2024    VF (ventricular fibrillation) ([facility]ly [facility]) 2024    VT (ventricular tachycardia) ([facility]) 2024    Heart failure with mildly reduced ejection fraction (HFmrEF) ([facility]ly [facility]) 2024    COVID-19 2024    Immunocompromised ([facility]ly [facility]) 2024    Hypoxia 2024    Acute on chronic systolic congestive heart failure ([facility]ly [facility]) 2024    Acute on chronic respiratory failure with hypoxemia ([facility]) 2024    Pleural effusion due to CHF (congestive heart failure) ([facility]ly [facility]) 2024    NICM (nonischemic cardiomyopathy) ([facility]ly [facility]) 2024    Multifocal pneumonia 2023    Acute pneumonia 2023    Stem cell donor 10/13/2023    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    Right atrial mass 10/03/2023    Lymphoma malignant, 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    Admission for antineoplastic chemotherapy 2023    Lymphoma, unspecified body region, unspecified lymphoma type ([facility]) 2023    Neoplasm of unspecified behavior of brain ([facility]) 2023    Cognitive impairment 2023    Brain mass 2023    Confusion 2023    CNS lymphoma ([facility]) 2023    S/P craniotomy 2023    CAD in native artery 2022    Port-A-Cath in place     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

## 2024-02-06 NOTE — PROCEDURES
Diagnosis:  Pneumothorax    Procedure planned:  Bedside chest tube placement for pneumothorax    Time out: per Dr. Leyva    Procedure:  Patient positioned sitting up in the bed.  Patient prepped and draped in sterile fashion.  Anesthetized the second intercostal space, mid axillary, on the right side locally with lidocaine.  Access to pleural space with lidocaine needle and pleura anesthetized as well.  Pleural space accessed by aspirating air via the finder needle.  A flexible wire was placed through the finder needle. A small nick was placed in the skin for placement of the dilator.  Skin dilated over wire and then a 14 [place] Anurag cathter was placed via the Seldinger technique over the wire.  Air escape audible after removal of the obturator.  Tubing hooked to the atrium with air bubbling through the waterseal.  Chest tube sutured in place and covered with gauze and tegaderm.     Patient tolerated the procedure well post procedure xray is pending.  EBL:  minimal      Dwight Leyva MD

## 2024-02-06 NOTE — PROGRESS NOTES
Radiology called with results of pt's CXRY from this AM, notified Dr. Leyva of new results. Plan to place new chest tube per Dr. Leyva. Plan of care ongoing.

## 2024-02-06 NOTE — PROGRESS NOTES
Occupational Therapy/Physical Therapy  Hold     OT/PT orders received and chart reviewed. Will hold again today due to plans for placement of chest tube since it was not placed yesterday. RN advising this plan and in agreement. Will return 2/7 for evaluations pending medical status. Thank you.      Philomena Myers, OTR/L [id number]  [place] [id number]

## 2024-02-06 NOTE — PROGRESS NOTES
[facility] Progress Note    2024     Zain Lopez    MRN: [id number]    : 1955    Referring MD: Penelope Wilhelm DO  [address]  [facility] [address]  [place] [address]      SUBJECTIVE: Feels SOB with recent CT chest showing persistent RUL pneumothorax and pneumomediastinum.     ECOG PS: (4) Completely disabled, unable to carry out self-care and confined to bed or chair     KPS: 30% Severely disabled; hospital admission is indicated although death not imminent    Isolation: Droplet Plus    Medications    Scheduled Meds:   ipratropium 0.5 mg-albuterol 2.5 mg  1 Dose Inhalation BID RT    levETIRAcetam  500 mg IntraVENous Q12H    lidocaine  1 patch TransDERmal Daily    furosemide  40 mg IntraVENous Daily    cefepime  2,000 mg IntraVENous Q12H    methylPREDNISolone  20 mg IntraVENous Daily    midodrine  5 mg Oral TID WC    sulfamethoxazole-trimethoprim  400 mg Oral Q8H    sennosides-docusate sodium  2 tablet Oral Daily    polyethylene glycol  17 g Oral Daily    metoprolol  2.5 mg IntraVENous Q8H    pantoprazole  40 mg IntraVENous Daily    apixaban  5 mg Oral BID    [Held by provider] tamsulosin  0.4 mg Oral Daily    valACYclovir  500 mg Oral BID    sodium chloride flush  5-40 mL IntraVENous 2 times per day    Saline Mouthwash  15 mL Swish & Spit 4x Daily AC & HS     Continuous Infusions:   dextrose      sodium chloride Stopped (24 1224)    sodium chloride       PRN Meds:.ondansetron, prochlorperazine, oxyCODONE **OR** oxyCODONE, diphenhydrAMINE, morphine, acetaminophen, potassium chloride, potassium chloride, magnesium sulfate, glucose, dextrose bolus **OR** dextrose bolus, glucagon (rDNA), dextrose, magnesium sulfate, sodium chloride, albuterol, diphenhydrAMINE, sodium chloride flush, sodium chloride, magnesium hydroxide, Saline Mouthwash, alteplase (CATHFLO) 2 mg in sterile water 2 mL injection    ROS:  As noted above, otherwise remainder of 10-point ROS negative    Physical Exam:     I&O:   Atrial Flutter:  - s/p digoxin  - Cont lopressor 2.5mg IV q8h (hold for hypotension prn), Cards following.  HFrEF:  - BNP 1/24/24 5834  - Echo 1/24/24- EF 40-45%, Indeterminate diastolic function  - Cardiology consulted, appreciate recs. Diurese prn with FL even to -500 over 24 hours.    8. : H/o BPH  BPH:    - Cont Flomax 0.4 mg daily      9. MSK: Generalized weakness:  D/t CNS lymphoma and chemotherapy  - Reconsult PT/OT/SLP once off the vent  Acute Pain: chest pain r/t chest tube & previous chest compressions  - Lidocaine patch daily  - Oxycodone 5-10 or Morphine 4mg IV PRN     10. 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 Neuro: Severe memory impairment and visual changes: Improved, but not back to baseline.  This was d/t CNS mass & vasogenic edema.   - Cont Keppra BID  - Referred to speech therapy -- continues to work, having difficulty with reading and will make appt with eye doctor  - Referral to Dr. Joseph Garcia 1/11/24    12. Psych: Depression  - Cont Zoloft 150 mg daily 12/20/23- held with prolonged QTc    13. Consulting teams:  - Pulm/CC  - Cardiology  - ID consulted - Appreciate recs  - Will need Swallow eval once more stable and PT/OT engagement.       - DVT Prophylaxis: Eliquis. Hold for plt count < 50k.  Contraindications to pharmacologic prophylaxis: None  Contraindications to mechanical prophylaxis: None    - Disposition: Uncertain at this time.    Annalisa Osorio, APRN - CNP   Silverio Collado MD

## 2024-02-06 NOTE — PROGRESS NOTES
Pt's HR in low 60s, messaged on-call cardiology asking if okay to give IV metoprolol 2.5mg. Other VSS. Okay to continue giving per on-call cardiology.

## 2024-02-06 NOTE — PROGRESS NOTES
Pulmonary & Critical Care Medicine ICU Progress Note    Admit Date: 2024  PCP: Ronal Wahl, APRN - CNP    CC:  acute hypoxemic and hypercapnic respiratory failure, cardiopulmonary arrest.  CHF, atrial flutter.  Events of Last 24 hours: Chest tube remains in place, but still no bubbling coming out.  Patient oxygen requirements have actually decreased since yesterday, but he's still complaining of chest pain.    Vitals:  Tmax:  VITALS:  /70   Pulse 64   Temp 97.3 °F (36.3 °C) (Oral)   Resp 23   Ht 1.778 m (5' 10\")   Wt 60.5 kg (133 lb 6.1 oz)   SpO2 98%   BMI 19.14 kg/m²   24HR INTAKE/OUTPUT:    Intake/Output Summary (Last 24 hours) at 2024 0933  Last data filed at 2024 0540  Gross per 24 hour   Intake 1562 ml   Output 1700 ml   Net -138 ml       CURRENT PULSE OXIMETRY:  SpO2: 98 %  24HR PULSE OXIMETRY RANGE:  SpO2  Av.9 %  Min: 80 %  Max: 100 %      Invasive Lines:       CVC Triple Lumen 24 Right Internal jugular (Active)   Number of days: 8       Peripheral IV 24 Right Forearm (Active)   Number of days: 8       Urinary Catheter 24 Bruner-Temperature (Active)   Number of days: 3       Recent Labs     24  1057   PHART 7.517*   PDU3BMW 37.1   PO2ART 77.3           EXAM:  General: No distress.  Awake and answer questions appropriately.  Eyes: PERRL. No sclera icterus. No conjunctival injection.  ENT: ETT in place  Neck: Trachea midline. Normal thyroid.  Resp: + accessory muscle use. No crackles. No wheezing. No rhonchi.  Right sided chest tube in place, on suction.  No bubbling, but good tidal variation.  CV: Regular rate. Regular rhythm. No mumur or rub. No edema.   GI: Non-tender. Non-distended. No masses. No organmegaly. Normal bowel sounds.   Skin: Warm and dry. No nodule on exposed extremities. No rash on exposed extremities.  Lymph: No cervical LAD. No supraclavicular LAD.   M/S: No cyanosis. No joint deformity. No clubbing.   Neuro:  Will follow

## 2024-02-06 NOTE — PLAN OF CARE
Problem: Metabolic/Fluid and Electrolytes - Adult  Goal: Electrolytes maintained within normal limits  Outcome: Progressing  Electrolytes maintained within normal limits:   Monitor labs and assess patient for signs and symptoms of electrolyte imbalances   Administer electrolyte replacement as ordered   Monitor response to electrolyte replacements, including repeat lab results as appropriate     Problem: Pain  Goal: Verbalizes/displays adequate comfort level or baseline comfort level  Outcome: Progressing  Verbalizes/displays adequate comfort level or baseline comfort level:   Encourage patient to monitor pain and request assistance   Assess pain using appropriate pain scale   Administer analgesics based on type and severity of pain and evaluate response   Implement non-pharmacological measures as appropriate and evaluate response     Problem: Hematologic - Adult  Goal: Maintains hematologic stability  Outcome: Progressing   Patient's hemoglobin this AM:   Recent Labs     02/06/24 0312   HGB 9.1*     Patient's platelet count this AM:   Recent Labs     02/06/24 0312   *    Thrombocytopenia Precautions in place.  Patient showing no signs or symptoms of active bleeding.  Transfusion not indicated at this time.  Patient verbalizes understanding of all instructions. Will continue to assess and implement POC. Call light within reach and hourly rounding in place.

## 2024-02-06 NOTE — PROGRESS NOTES
Speech Therapy   attempt    Patient was attempted to be seen by Speech Therapy Department this date to reassess swallowing. Patient was unable to be seen due to being NPO for procedures. Spoke with RN who can page SLP if pt is able to participate in swallow re-assessment later this date.    Jennyfer Edge MA, CCC-SLP [id number]  Speech-Language Pathologist  Pager  [phone]

## 2024-02-07 ENCOUNTER — APPOINTMENT (OUTPATIENT)
Dept: GENERAL RADIOLOGY | Age: 69
End: 2024-02-07
Attending: STUDENT IN AN ORGANIZED HEALTH CARE EDUCATION/TRAINING PROGRAM
Payer: MEDICARE

## 2024-02-07 ENCOUNTER — APPOINTMENT (OUTPATIENT)
Dept: CT IMAGING | Age: 69
End: 2024-02-07
Attending: STUDENT IN AN ORGANIZED HEALTH CARE EDUCATION/TRAINING PROGRAM
Payer: MEDICARE

## 2024-02-07 LAB
ALBUMIN SERPL-MCNC: 3.1 G/DL (ref 3.4–5)
ALBUMIN SERPL-MCNC: 3.3 G/DL (ref 3.4–5)
ALBUMIN SERPL-MCNC: 3.4 G/DL (ref 3.4–5)
ALP SERPL-CCNC: 138 U/L (ref 40–129)
ALT SERPL-CCNC: 23 U/L (ref 10–40)
ANION GAP SERPL CALCULATED.3IONS-SCNC: 12 MMOL/L (ref 3–16)
ANION GAP SERPL CALCULATED.3IONS-SCNC: 14 MMOL/L (ref 3–16)
ANION GAP SERPL CALCULATED.3IONS-SCNC: 17 MMOL/L (ref 3–16)
ANISOCYTOSIS BLD QL SMEAR: ABNORMAL
AST SERPL-CCNC: 36 U/L (ref 15–37)
BACTERIA BLD CULT ORG #2: NORMAL
BACTERIA BLD CULT: NORMAL
BASE EXCESS BLDV CALC-SCNC: 2 MMOL/L (ref -3–3)
BASOPHILS # BLD: 0 K/UL (ref 0–0.2)
BASOPHILS NFR BLD: 0 %
BILIRUB DIRECT SERPL-MCNC: <0.2 MG/DL (ref 0–0.3)
BILIRUB INDIRECT SERPL-MCNC: ABNORMAL MG/DL (ref 0–1)
BILIRUB SERPL-MCNC: 0.3 MG/DL (ref 0–1)
BUN SERPL-MCNC: 47 MG/DL (ref 7–20)
BUN SERPL-MCNC: 47 MG/DL (ref 7–20)
BUN SERPL-MCNC: 54 MG/DL (ref 7–20)
CA-I BLD-SCNC: 1.23 MMOL/L (ref 1.12–1.32)
CALCIUM SERPL-MCNC: 9.6 MG/DL (ref 8.3–10.6)
CALCIUM SERPL-MCNC: 9.8 MG/DL (ref 8.3–10.6)
CALCIUM SERPL-MCNC: 9.8 MG/DL (ref 8.3–10.6)
CHLORIDE SERPL-SCNC: 94 MMOL/L (ref 99–110)
CHLORIDE SERPL-SCNC: 94 MMOL/L (ref 99–110)
CHLORIDE SERPL-SCNC: 95 MMOL/L (ref 99–110)
CO2 BLDV-SCNC: 33 MMOL/L
CO2 SERPL-SCNC: 24 MMOL/L (ref 21–32)
CO2 SERPL-SCNC: 27 MMOL/L (ref 21–32)
CO2 SERPL-SCNC: 28 MMOL/L (ref 21–32)
CREAT SERPL-MCNC: 1.1 MG/DL (ref 0.8–1.3)
CREAT SERPL-MCNC: 1.3 MG/DL (ref 0.8–1.3)
CREAT SERPL-MCNC: 1.7 MG/DL (ref 0.8–1.3)
DEPRECATED RDW RBC AUTO: 20.8 % (ref 12.4–15.4)
DEPRECATED RDW RBC AUTO: 21.6 % (ref 12.4–15.4)
EOSINOPHIL # BLD: 0.2 K/UL (ref 0–0.6)
EOSINOPHIL NFR BLD: 3 %
FINAL REPORT: NORMAL
FUNGUS SPEC CULT: ABNORMAL
GFR SERPLBLD CREATININE-BSD FMLA CKD-EPI: 43 ML/MIN/{1.73_M2}
GFR SERPLBLD CREATININE-BSD FMLA CKD-EPI: 59 ML/MIN/{1.73_M2}
GFR SERPLBLD CREATININE-BSD FMLA CKD-EPI: >60 ML/MIN/{1.73_M2}
GLUCOSE BLD-MCNC: 112 MG/DL (ref 70–99)
GLUCOSE BLD-MCNC: 137 MG/DL (ref 70–99)
GLUCOSE BLD-MCNC: 144 MG/DL (ref 70–99)
GLUCOSE BLD-MCNC: 157 MG/DL (ref 70–99)
GLUCOSE BLD-MCNC: 159 MG/DL (ref 70–99)
GLUCOSE BLD-MCNC: 171 MG/DL (ref 70–99)
GLUCOSE SERPL-MCNC: 120 MG/DL (ref 70–99)
GLUCOSE SERPL-MCNC: 145 MG/DL (ref 70–99)
GLUCOSE SERPL-MCNC: 231 MG/DL (ref 70–99)
HCO3 BLDV-SCNC: 30.3 MMOL/L (ref 23–29)
HCT VFR BLD AUTO: 27.4 % (ref 40.5–52.5)
HCT VFR BLD AUTO: 28.2 % (ref 40.5–52.5)
HGB BLD-MCNC: 8.8 G/DL (ref 13.5–17.5)
HGB BLD-MCNC: 9.1 G/DL (ref 13.5–17.5)
HYPOCHROMIA BLD QL SMEAR: ABNORMAL
INR PPP: 1.2 (ref 0.84–1.16)
LACTATE BLD-SCNC: 3.14 MMOL/L (ref 0.4–2)
LACTATE BLDV-SCNC: 3 MMOL/L (ref 0.4–2)
LDH SERPL L TO P-CCNC: 426 U/L (ref 100–190)
LOEFFLER MB STN SPEC: ABNORMAL
LYMPHOCYTES # BLD: 0.3 K/UL (ref 1–5.1)
LYMPHOCYTES NFR BLD: 4 %
MAGNESIUM SERPL-MCNC: 2.2 MG/DL (ref 1.8–2.4)
MAGNESIUM SERPL-MCNC: 2.4 MG/DL (ref 1.8–2.4)
MCH RBC QN AUTO: 28.8 PG (ref 26–34)
MCH RBC QN AUTO: 29.4 PG (ref 26–34)
MCHC RBC AUTO-ENTMCNC: 32.1 G/DL (ref 31–36)
MCHC RBC AUTO-ENTMCNC: 32.2 G/DL (ref 31–36)
MCV RBC AUTO: 89.7 FL (ref 80–100)
MCV RBC AUTO: 91.4 FL (ref 80–100)
MONOCYTES # BLD: 0.9 K/UL (ref 0–1.3)
MONOCYTES NFR BLD: 11 %
NEUTROPHILS # BLD: 6.8 K/UL (ref 1.7–7.7)
NEUTROPHILS NFR BLD: 81 %
NEUTS BAND NFR BLD MANUAL: 1 % (ref 0–7)
NEUTS HYPERSEG BLD QL SMEAR: PRESENT
ORGANISM: ABNORMAL
PCO2 BLDV: 76.8 MM HG (ref 40–50)
PERFORMED ON: ABNORMAL
PH BLDV: 7.2 [PH] (ref 7.35–7.45)
PHOSPHATE SERPL-MCNC: 3.4 MG/DL (ref 2.5–4.9)
PHOSPHATE SERPL-MCNC: 5.6 MG/DL (ref 2.5–4.9)
PHOSPHATE SERPL-MCNC: 7.7 MG/DL (ref 2.5–4.9)
PLATELET # BLD AUTO: 117 K/UL (ref 135–450)
PLATELET # BLD AUTO: 135 K/UL (ref 135–450)
PMV BLD AUTO: 10.2 FL (ref 5–10.5)
PMV BLD AUTO: 10.7 FL (ref 5–10.5)
PO2 BLDV: 52 MM HG
POC SAMPLE TYPE: ABNORMAL
POTASSIUM BLD-SCNC: 5 MMOL/L (ref 3.5–5.1)
POTASSIUM SERPL-SCNC: 5 MMOL/L (ref 3.5–5.1)
POTASSIUM SERPL-SCNC: 5.2 MMOL/L (ref 3.5–5.1)
POTASSIUM SERPL-SCNC: 5.2 MMOL/L (ref 3.5–5.1)
POTASSIUM SERPL-SCNC: 5.4 MMOL/L (ref 3.5–5.1)
PRELIMINARY: NORMAL
PROT SERPL-MCNC: 6 G/DL (ref 6.4–8.2)
PROTHROMBIN TIME: 15.2 SEC (ref 11.5–14.8)
RBC # BLD AUTO: 3.05 M/UL (ref 4.2–5.9)
RBC # BLD AUTO: 3.08 M/UL (ref 4.2–5.9)
SAO2 % BLDV: 76 %
SLIDE REVIEW: ABNORMAL
SODIUM BLD-SCNC: 135 MMOL/L (ref 136–145)
SODIUM SERPL-SCNC: 134 MMOL/L (ref 136–145)
SODIUM SERPL-SCNC: 135 MMOL/L (ref 136–145)
SODIUM SERPL-SCNC: 136 MMOL/L (ref 136–145)
WBC # BLD AUTO: 10.8 K/UL (ref 4–11)
WBC # BLD AUTO: 8.3 K/UL (ref 4–11)

## 2024-02-07 PROCEDURE — 2700000000 HC OXYGEN THERAPY PER DAY

## 2024-02-07 PROCEDURE — 74018 RADEX ABDOMEN 1 VIEW: CPT

## 2024-02-07 PROCEDURE — 2580000003 HC RX 258: Performed by: NURSE PRACTITIONER

## 2024-02-07 PROCEDURE — 83605 ASSAY OF LACTIC ACID: CPT

## 2024-02-07 PROCEDURE — 83615 LACTATE (LD) (LDH) ENZYME: CPT

## 2024-02-07 PROCEDURE — 2580000003 HC RX 258: Performed by: INTERNAL MEDICINE

## 2024-02-07 PROCEDURE — 84132 ASSAY OF SERUM POTASSIUM: CPT

## 2024-02-07 PROCEDURE — 31500 INSERT EMERGENCY AIRWAY: CPT

## 2024-02-07 PROCEDURE — 6370000000 HC RX 637 (ALT 250 FOR IP): Performed by: NURSE PRACTITIONER

## 2024-02-07 PROCEDURE — 83735 ASSAY OF MAGNESIUM: CPT

## 2024-02-07 PROCEDURE — 94640 AIRWAY INHALATION TREATMENT: CPT

## 2024-02-07 PROCEDURE — 84550 ASSAY OF BLOOD/URIC ACID: CPT

## 2024-02-07 PROCEDURE — 6370000000 HC RX 637 (ALT 250 FOR IP): Performed by: STUDENT IN AN ORGANIZED HEALTH CARE EDUCATION/TRAINING PROGRAM

## 2024-02-07 PROCEDURE — 94761 N-INVAS EAR/PLS OXIMETRY MLT: CPT

## 2024-02-07 PROCEDURE — 6360000002 HC RX W HCPCS: Performed by: NURSE PRACTITIONER

## 2024-02-07 PROCEDURE — 6360000002 HC RX W HCPCS: Performed by: INTERNAL MEDICINE

## 2024-02-07 PROCEDURE — 94003 VENT MGMT INPAT SUBQ DAY: CPT

## 2024-02-07 PROCEDURE — 71045 X-RAY EXAM CHEST 1 VIEW: CPT

## 2024-02-07 PROCEDURE — 82330 ASSAY OF CALCIUM: CPT

## 2024-02-07 PROCEDURE — 2500000003 HC RX 250 WO HCPCS: Performed by: INTERNAL MEDICINE

## 2024-02-07 PROCEDURE — 80076 HEPATIC FUNCTION PANEL: CPT

## 2024-02-07 PROCEDURE — C9113 INJ PANTOPRAZOLE SODIUM, VIA: HCPCS | Performed by: NURSE PRACTITIONER

## 2024-02-07 PROCEDURE — 85025 COMPLETE CBC W/AUTO DIFF WBC: CPT

## 2024-02-07 PROCEDURE — 85027 COMPLETE CBC AUTOMATED: CPT

## 2024-02-07 PROCEDURE — 80069 RENAL FUNCTION PANEL: CPT

## 2024-02-07 PROCEDURE — 74230 X-RAY XM SWLNG FUNCJ C+: CPT

## 2024-02-07 PROCEDURE — 92611 MOTION FLUOROSCOPY/SWALLOW: CPT

## 2024-02-07 PROCEDURE — 82803 BLOOD GASES ANY COMBINATION: CPT

## 2024-02-07 PROCEDURE — 71250 CT THORAX DX C-: CPT

## 2024-02-07 PROCEDURE — 2060000000 HC ICU INTERMEDIATE R&B

## 2024-02-07 PROCEDURE — 2500000003 HC RX 250 WO HCPCS

## 2024-02-07 PROCEDURE — 84295 ASSAY OF SERUM SODIUM: CPT

## 2024-02-07 PROCEDURE — 2000000000 HC ICU R&B

## 2024-02-07 PROCEDURE — 36592 COLLECT BLOOD FROM PICC: CPT

## 2024-02-07 PROCEDURE — 99233 SBSQ HOSP IP/OBS HIGH 50: CPT | Performed by: INTERNAL MEDICINE

## 2024-02-07 PROCEDURE — 85610 PROTHROMBIN TIME: CPT

## 2024-02-07 PROCEDURE — 6370000000 HC RX 637 (ALT 250 FOR IP): Performed by: INTERNAL MEDICINE

## 2024-02-07 PROCEDURE — 92526 ORAL FUNCTION THERAPY: CPT

## 2024-02-07 PROCEDURE — 82947 ASSAY GLUCOSE BLOOD QUANT: CPT

## 2024-02-07 PROCEDURE — 6360000002 HC RX W HCPCS: Performed by: STUDENT IN AN ORGANIZED HEALTH CARE EDUCATION/TRAINING PROGRAM

## 2024-02-07 RX ORDER — SUCCINYLCHOLINE CHLORIDE 20 MG/ML
INJECTION INTRAMUSCULAR; INTRAVENOUS
Status: DISPENSED
Start: 2024-02-07 | End: 2024-02-08

## 2024-02-07 RX ORDER — PROPOFOL 10 MG/ML
INJECTION, EMULSION INTRAVENOUS
Status: DISPENSED
Start: 2024-02-07 | End: 2024-02-08

## 2024-02-07 RX ORDER — ETOMIDATE 2 MG/ML
INJECTION INTRAVENOUS
Status: COMPLETED
Start: 2024-02-07 | End: 2024-02-07

## 2024-02-07 RX ADMIN — METOPROLOL TARTRATE 2.5 MG: 1 INJECTION, SOLUTION INTRAVENOUS at 20:49

## 2024-02-07 RX ADMIN — SULFAMETHOXAZOLE AND TRIMETHOPRIM 50 ML: 200; 40 SUSPENSION ORAL at 00:38

## 2024-02-07 RX ADMIN — MORPHINE SULFATE 4 MG: 4 INJECTION INTRAVENOUS at 17:02

## 2024-02-07 RX ADMIN — ALBUTEROL SULFATE 2.5 MG: 2.5 SOLUTION RESPIRATORY (INHALATION) at 01:06

## 2024-02-07 RX ADMIN — IPRATROPIUM BROMIDE AND ALBUTEROL SULFATE 1 DOSE: 2.5; .5 SOLUTION RESPIRATORY (INHALATION) at 10:26

## 2024-02-07 RX ADMIN — LEVETIRACETAM 500 MG: 100 INJECTION, SOLUTION INTRAVENOUS at 20:49

## 2024-02-07 RX ADMIN — CEFEPIME 2000 MG: 2 INJECTION, POWDER, FOR SOLUTION INTRAVENOUS at 09:22

## 2024-02-07 RX ADMIN — PROCHLORPERAZINE EDISYLATE 10 MG: 5 INJECTION INTRAMUSCULAR; INTRAVENOUS at 12:40

## 2024-02-07 RX ADMIN — WATER 20 MG: 1 INJECTION INTRAMUSCULAR; INTRAVENOUS; SUBCUTANEOUS at 09:30

## 2024-02-07 RX ADMIN — FUROSEMIDE 60 MG: 10 INJECTION, SOLUTION INTRAMUSCULAR; INTRAVENOUS at 09:24

## 2024-02-07 RX ADMIN — SENNOSIDES AND DOCUSATE SODIUM 2 TABLET: 50; 8.6 TABLET ORAL at 09:22

## 2024-02-07 RX ADMIN — MIDODRINE HYDROCHLORIDE 5 MG: 5 TABLET ORAL at 13:13

## 2024-02-07 RX ADMIN — MORPHINE SULFATE 4 MG: 4 INJECTION INTRAVENOUS at 13:08

## 2024-02-07 RX ADMIN — LEVETIRACETAM 500 MG: 100 INJECTION, SOLUTION INTRAVENOUS at 09:26

## 2024-02-07 RX ADMIN — SULFAMETHOXAZOLE AND TRIMETHOPRIM 50 ML: 200; 40 SUSPENSION ORAL at 05:47

## 2024-02-07 RX ADMIN — IPRATROPIUM BROMIDE AND ALBUTEROL SULFATE 1 DOSE: 2.5; .5 SOLUTION RESPIRATORY (INHALATION) at 21:45

## 2024-02-07 RX ADMIN — MIDODRINE HYDROCHLORIDE 5 MG: 5 TABLET ORAL at 16:43

## 2024-02-07 RX ADMIN — SODIUM CHLORIDE, PRESERVATIVE FREE 10 ML: 5 INJECTION INTRAVENOUS at 20:51

## 2024-02-07 RX ADMIN — VALACYCLOVIR HYDROCHLORIDE 500 MG: 500 TABLET, FILM COATED ORAL at 09:22

## 2024-02-07 RX ADMIN — PROCHLORPERAZINE EDISYLATE 10 MG: 5 INJECTION INTRAMUSCULAR; INTRAVENOUS at 20:48

## 2024-02-07 RX ADMIN — ONDANSETRON 8 MG: 2 INJECTION INTRAMUSCULAR; INTRAVENOUS at 17:02

## 2024-02-07 RX ADMIN — ETOMIDATE 20 MG: 2 INJECTION, SOLUTION INTRAVENOUS at 22:24

## 2024-02-07 RX ADMIN — APIXABAN 5 MG: 5 TABLET, FILM COATED ORAL at 09:22

## 2024-02-07 RX ADMIN — ONDANSETRON 8 MG: 2 INJECTION INTRAMUSCULAR; INTRAVENOUS at 09:33

## 2024-02-07 RX ADMIN — PANTOPRAZOLE SODIUM 40 MG: 40 INJECTION, POWDER, FOR SOLUTION INTRAVENOUS at 09:27

## 2024-02-07 RX ADMIN — SODIUM CHLORIDE, PRESERVATIVE FREE 10 ML: 5 INJECTION INTRAVENOUS at 09:32

## 2024-02-07 RX ADMIN — SULFAMETHOXAZOLE AND TRIMETHOPRIM 50 ML: 200; 40 SUSPENSION ORAL at 16:42

## 2024-02-07 RX ADMIN — MIDODRINE HYDROCHLORIDE 5 MG: 5 TABLET ORAL at 09:22

## 2024-02-07 RX ADMIN — METOPROLOL TARTRATE 2.5 MG: 1 INJECTION, SOLUTION INTRAVENOUS at 13:12

## 2024-02-07 RX ADMIN — POLYETHYLENE GLYCOL 3350 17 G: 17 POWDER, FOR SOLUTION ORAL at 11:26

## 2024-02-07 RX ADMIN — METOPROLOL TARTRATE 2.5 MG: 1 INJECTION, SOLUTION INTRAVENOUS at 05:47

## 2024-02-07 ASSESSMENT — PAIN DESCRIPTION - LOCATION
LOCATION: CHEST

## 2024-02-07 ASSESSMENT — PAIN DESCRIPTION - ORIENTATION
ORIENTATION: INNER;MID

## 2024-02-07 ASSESSMENT — PAIN SCALES - WONG BAKER
WONGBAKER_NUMERICALRESPONSE: 0
WONGBAKER_NUMERICALRESPONSE: 0

## 2024-02-07 ASSESSMENT — PAIN DESCRIPTION - DESCRIPTORS
DESCRIPTORS: DISCOMFORT

## 2024-02-07 ASSESSMENT — PAIN SCALES - GENERAL
PAINLEVEL_OUTOF10: 6
PAINLEVEL_OUTOF10: 7
PAINLEVEL_OUTOF10: 0
PAINLEVEL_OUTOF10: 2
PAINLEVEL_OUTOF10: 0
PAINLEVEL_OUTOF10: 0

## 2024-02-07 ASSESSMENT — PAIN DESCRIPTION - PAIN TYPE
TYPE: ACUTE PAIN

## 2024-02-07 ASSESSMENT — PAIN - FUNCTIONAL ASSESSMENT
PAIN_FUNCTIONAL_ASSESSMENT: ACTIVITIES ARE NOT PREVENTED

## 2024-02-07 ASSESSMENT — PAIN DESCRIPTION - ONSET
ONSET: GRADUAL

## 2024-02-07 ASSESSMENT — PULMONARY FUNCTION TESTS
PIF_VALUE: 12
PIF_VALUE: 27
PIF_VALUE: 20
PIF_VALUE: 39

## 2024-02-07 ASSESSMENT — PAIN DESCRIPTION - FREQUENCY
FREQUENCY: INTERMITTENT

## 2024-02-07 NOTE — PLAN OF CARE
Reviewed with Dr. Gray. Cardiology will follow PRN, please call Dr. Gray with any cardiology issues.

## 2024-02-07 NOTE — PLAN OF CARE
Problem: Safety - Adult  Goal: Free from fall injury  2/7/2024 0506 by Roxie Rhodes RN  Outcome: Progressing  Flowsheets (Taken 2/7/2024 0506)  Free From Fall Injury: Instruct family/caregiver on patient safety     Problem: ABCDS Injury Assessment  Goal: Absence of physical injury  2/7/2024 0506 by Roxie Rhodes RN  Outcome: Progressing  Flowsheets (Taken 2/7/2024 0506)  Absence of Physical Injury: Implement safety measures based on patient assessment     Problem: Chronic Conditions and Co-morbidities  Goal: Patient's chronic conditions and co-morbidity symptoms are monitored and maintained or improved  2/7/2024 0506 by Roxie Rhodes RN  Outcome: Progressing  Flowsheets (Taken 2/7/2024 0506)  Care Plan - Patient's Chronic Conditions and Co-Morbidity Symptoms are Monitored and Maintained or Improved:   Monitor and assess patient's chronic conditions and comorbid symptoms for stability, deterioration, or improvement   Collaborate with multidisciplinary team to address chronic and comorbid conditions and prevent exacerbation or deterioration   Update acute care plan with appropriate goals if chronic or comorbid symptoms are exacerbated and prevent overall improvement and discharge     Problem: Respiratory - Adult  Goal: Achieves optimal ventilation and oxygenation  2/7/2024 0506 by Roxie Rhodes RN  Outcome: Progressing  Flowsheets (Taken 2/7/2024 0506)  Achieves optimal ventilation and oxygenation:   Assess for changes in respiratory status   Assess for changes in mentation and behavior   Position to facilitate oxygenation and minimize respiratory effort   Assess and instruct to report shortness of breath or any respiratory difficulty     Problem: Cardiovascular - Adult  Goal: Maintains optimal cardiac output and hemodynamic stability  2/7/2024 0506 by Roxie Rhodes RN  Outcome: Progressing  Flowsheets (Taken 2/7/2024 0506)  Maintains optimal cardiac output and hemodynamic stability:   Monitor blood pressure and  2/7/2024 0506)  Absence of seizures:   Monitor for seizure activity.  If seizure occurs, document type and location of movements and any associated apnea   If seizure occurs, turn head to side and suction secretions as needed   Administer anticonvulsants as ordered   Diagnostic studies as ordered   Support airway/breathing, administer oxygen as needed     Problem: Hematologic - Adult  Goal: Maintains hematologic stability  2/7/2024 0506 by Roxie Rhodes RN  Outcome: Progressing  Flowsheets (Taken 2/7/2024 0506)  Maintains hematologic stability:   Assess for signs and symptoms of bleeding or hemorrhage   Monitor labs for bleeding or clotting disorders   Administer blood products/factors as ordered     Problem: Pain  Goal: Verbalizes/displays adequate comfort level or baseline comfort level  2/7/2024 0506 by Roxie Rhodes RN  Outcome: Progressing  Flowsheets (Taken 2/7/2024 0506)  Verbalizes/displays adequate comfort level or baseline comfort level:   Encourage patient to monitor pain and request assistance   Assess pain using appropriate pain scale   Administer analgesics based on type and severity of pain and evaluate response   Notify Licensed Independent Practitioner if interventions unsuccessful or patient reports new pain   Consider cultural and social influences on pain and pain management   Implement non-pharmacological measures as appropriate and evaluate response     Problem: Nutrition Deficit:  Goal: Optimize nutritional status  2/7/2024 0506 by Roxie Rhodes RN  Outcome: Progressing  Flowsheets (Taken 2/7/2024 0506)  Nutrient intake appropriate for improving, restoring, or maintaining nutritional needs:   Provide specific nutrition education to patient or family as appropriate   Recommend, monitor, and adjust tube feedings and TPN/PPN based on assessed needs     Problem: Skin/Tissue Integrity  Goal: Absence of new skin breakdown  Description: 1.  Monitor for areas of redness and/or skin breakdown  2.

## 2024-02-07 NOTE — PROGRESS NOTES
At 0115 Pt corpak came out during repositioning. NP Jessica Cheadle notified and gave orders to place new Corpak. New Corpak placed at 0200, pt tolerated procedure well. Placement verified by KUB. At 0307 NP gave ok to use Corpak per KUB results. PT is resting with HOB elevated, Corpak in place at 60cm with Jevity running at 50ml/hr.

## 2024-02-07 NOTE — PROGRESS NOTES
Physical Therapy/Occupational Therapy  HOLD / Discharge Note       RN requesting to hold PT/OT evaluations again today d/t pt not being medically appropriate. Pt held for medical reasons 2/5 & 2/6 as well.  Will sign-off from therapy services at this time. Please re-order when pt becomes appropriate for PT/OT[place] [id number]  Jennyfer Giron  MS, OTR/L #[id number]

## 2024-02-07 NOTE — PROGRESS NOTES
Pulmonary & Critical Care Medicine ICU Progress Note    Admit Date: 2024  PCP: Ronal Wahl, APRN - CNP    CC:  acute hypoxemic and hypercapnic respiratory failure, cardiopulmonary arrest.  CHF, atrial flutter.  Events of Last 24 hours: I placed an apical chest tube yesterday and kept the lower chest tube in place.  After the new chest tube, patient had more dyspnea and higher oxygen requirements until later in the day.  He got down to 5L yesterday but was back up 10L this morning.    Vitals:  Tmax:  VITALS:  BP (!) 114/59   Pulse 70   Temp 98.2 °F (36.8 °C) (Axillary)   Resp 29   Ht 1.778 m (5' 10\")   Wt 64 kg (141 lb 1.5 oz)   SpO2 95%   BMI 20.24 kg/m²   24HR INTAKE/OUTPUT:    Intake/Output Summary (Last 24 hours) at 2024 0946  Last data filed at 2024 0602  Gross per 24 hour   Intake 865 ml   Output 2530 ml   Net -1665 ml       CURRENT PULSE OXIMETRY:  SpO2: 95 %  24HR PULSE OXIMETRY RANGE:  SpO2  Av.6 %  Min: 87 %  Max: 99 %      Invasive Lines:       CVC Triple Lumen 24 Right Internal jugular (Active)   Number of days: 8       Peripheral IV 24 Right Forearm (Active)   Number of days: 8       Urinary Catheter 24 Bruner-Temperature (Active)   Number of days: 3       No results for input(s): \"PHART\", \"UVH2OOY\", \"PO2ART\" in the last 72 hours.        EXAM:  General: No distress.  Awake and answer questions appropriately.  Eyes: PERRL. No sclera icterus. No conjunctival injection.  ENT: ETT in place  Neck: Trachea midline. Normal thyroid.  Resp: + accessory muscle use. No crackles. No wheezing. No rhonchi.  2 Right sided chest tubes in place.  Apical on suction.  Intermittent bubbling.  CV: Regular rate. Regular rhythm. No mumur or rub. No edema.   GI: Non-tender. Non-distended. No masses. No organmegaly. Normal bowel sounds.   Skin: Warm and dry. No nodule on exposed extremities. No rash on exposed extremities.  Lymph: No cervical LAD. No supraclavicular LAD.   M/S: No  cyanosis. No joint deformity. No clubbing.   Neuro:  Will follow commands. Positive pupils/gag/corneals. Normal pain response.  Psych: Subdued and appears depressed.       IV:   dextrose      sodium chloride Stopped (01/25/24 1224)    sodium chloride           Scheduled Meds:     furosemide  60 mg IntraVENous Daily    ipratropium 0.5 mg-albuterol 2.5 mg  1 Dose Inhalation BID RT    levETIRAcetam  500 mg IntraVENous Q12H    lidocaine  1 patch TransDERmal Daily    cefepime  2,000 mg IntraVENous Q12H    methylPREDNISolone  20 mg IntraVENous Daily    midodrine  5 mg Oral TID WC    sulfamethoxazole-trimethoprim  400 mg Oral Q8H    sennosides-docusate sodium  2 tablet Oral Daily    polyethylene glycol  17 g Oral Daily    metoprolol  2.5 mg IntraVENous Q8H    pantoprazole  40 mg IntraVENous Daily    apixaban  5 mg Oral BID    [Held by provider] tamsulosin  0.4 mg Oral Daily    valACYclovir  500 mg Oral BID    sodium chloride flush  5-40 mL IntraVENous 2 times per day    Saline Mouthwash  15 mL Swish & Spit 4x Daily AC & HS         Diet: Diet NPO  ADULT TUBE FEEDING; Nasogastric; Standard with Fiber; Continuous; 20; Yes; 10; Q 4 hours; 50; 30; Q 4 hours; Protein; 1 Dose; Daily     Results:  CBC:   Recent Labs     02/05/24 0428 02/06/24 0312 02/07/24 0337   WBC 6.7 6.6 8.3   HGB 9.2* 9.1* 8.8*   HCT 28.1* 28.3* 27.4*   MCV 89.6 89.4 89.7   PLT 94* 100* 117*       BMP:   Recent Labs     02/06/24 0312 02/06/24  1608 02/07/24  0337    136 134*   K 4.4 5.3* 5.0   CL 96* 96* 94*   CO2 29 28 28   PHOS 3.6 3.9 3.4   BUN 46* 44* 47*   CREATININE 1.0 1.2 1.1       LIVER PROFILE:   Recent Labs     02/05/24 0428 02/06/24 0312 02/07/24  0337   AST 27 24 36   ALT 18 18 23   BILIDIR <0.2 <0.2 <0.2   BILITOT 0.3 0.3 0.3   ALKPHOS 136* 130* 138*       PT/INR:   Recent Labs     02/05/24  0428 02/06/24  0312 02/07/24  0337   PROTIME 16.7* 16.1* 15.2*   INR 1.36* 1.29* 1.20*       APTT:   Recent Labs     02/05/24  0428

## 2024-02-07 NOTE — PLAN OF CARE
Problem: Safety - Adult  Goal: Free from fall injury  2/7/2024 1730 by Briseida Rodriguez RN  Flowsheets (Taken 2/7/2024 1730)  Free From Fall Injury: Instruct family/caregiver on patient safety  Note: Pt in bed with bed alarm on, instructed to call for assistance. Verbalized understanding. All fall precautions in place.      Problem: Respiratory - Adult  Goal: Achieves optimal ventilation and oxygenation  2/7/2024 1730 by Briseida Rodriguez RN  Outcome: Progressing  Note: Pt continues to need oxygen, pt continues to feel like he can't breath     Problem: Neurosensory - Adult  Goal: Achieves stable or improved neurological status  2/7/2024 1730 by Briseida Rodriguez RN  Outcome: Progressing  Note: Pt continues to be confused at times and with situation
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
## 2024-02-07 NOTE — PROGRESS NOTES
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    Marijuana use 2022    Persistent atrial fibrillation (HCC) 2024    Atrial flutter (HCC) 2024    Benign essential HTN 2024    On continuous oral anticoagulation 2024    VF (ventricular fibrillation) ([facility]) 2024    VT (ventricular tachycardia) ([facility]) 2024    Heart failure with mildly reduced ejection fraction (HFmrEF) ([facility]) 2024    COVID-19 2024    Immunocompromised ([facility]) 2024    Hypoxia 2024    Acute on chronic systolic congestive heart failure ([facility]) 2024    Acute on chronic respiratory failure with hypoxemia ([facility]) 2024    Pleural effusion due to CHF (congestive heart failure) ([facility]) 2024    NICM (nonischemic cardiomyopathy) ([facility]) 2024    Multifocal pneumonia 2023    Acute pneumonia 2023    Stem cell donor 10/13/2023    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    Right atrial mass 10/03/2023    Lymphoma malignant, 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    Admission for antineoplastic chemotherapy 2023    Lymphoma, unspecified body region, unspecified lymphoma type ([facility]) 2023    Neoplasm of unspecified behavior of brain ([facility]) 2023    Cognitive impairment 2023    Brain mass 2023    Confusion 2023    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  Interval placement of a second right chest tube which is positioned about 6 cm  below the right lung apex.  Stable bilateral lung opacities    Date of onset: 01/23/2024    Current Diet:  Diet NPO  ADULT TUBE FEEDING; Nasogastric; Standard with Fiber; Continuous; 20; Yes; 10; Q 4 hours; 50; 30; Q 4 hours; Protein; 1 Dose; Daily    Treatment Diagnosis: dysphagia    Pain:  Pt reporting pain at chest tube site (8:00 a.m)- rated 10- RN made aware    Prior Dysphagia History:   Previously seen by speech last year for dysphagia + cognitive impairment, with recommendations for regular texture diet/thin liquids 6/29/2023. Pt/family deny any recent difficulty swallowing.    Bedside Swallowing Evaluation Impression 2/7/2024  Suspect pharyngeal dysphagia in setting of recent prolonged intubation (x 9 days), with increased risk of aspiration. Bedside, assessed ice chips, thins via tsp/straw; pt with positive oral acceptance and appropriate oral prep, but seemingly mis-coordinated swallow with reactive cough for thins via straw (and associated drop in SpO2). Recommend NPO, consider temporary alternative means nutrition/hydration, oral hygiene + ice chips.      Treatment:  Dysphagia Goals:  The pt will be seen  3-5 x to address the following goals:  Goal 1: Patient will participate in further assessment of swallow function as appropriate.  2/6-Spoke with Dr. Leyva who just placed chest tube.  Dr. Leyva would like pt to be assessed for ability to consume gastrografin. Wife present at this time. Pt alert, sitting upright and breathing utilizing accessory muscles. RR fluctuating between 21-30 prior to po trial. Pt with weak cough on command. Pt expressing pain, likely d/t recent placement of chest tube. Pt was analyzed with trials with ice chips and small sips of water by straw. Mastication with ice chip was

## 2024-02-07 NOTE — PROGRESS NOTES
4 Eyes shift Assessment     I agree that 2 RN's have performed a thorough Head to Toe Skin Assessment on the patient. ALL assessment sites listed below      Areas assessed   [x]   Head, Face, and Ears   [x]   Shoulders, Back, and Chest  [x]   Arms, Elbows, and Hands   [x]   Coccyx, Sacrum, and Ischium  [x]   Legs, Feet, and Heels      Juan Prevention initiated:  Yes   Wound Care Orders initiated:  NA      WOC nurse consulted for Pressure Injury (Stage 3,4, Unstageable, DTI, NWPT, and Complex wounds) or Juan score 18 or lower:  NA      Nurse 1 eSignature: Electronically signed by Roxie Rhodes RN on 2/7/24 at 7:13 AM EST    **SHARE this note so that the co-signing nurse is able to place an eSignature**    Nurse 2 eSignature: Electronically signed by Briseida Rodriguez RN on 2/7/24 at 7:07 PM EST

## 2024-02-07 NOTE — PROGRESS NOTES
4 Eyes Skin Assessment     NAME:  Zain Lopez  YOB: 1955  MEDICAL RECORD NUMBER:  [id number]    The patient is being assessed for  Shift Handoff    I agree that at least one RN has performed a thorough Head to Toe Skin Assessment on the patient. ALL assessment sites listed below have been assessed.      Areas assessed by both nurses:    Head, Face, Ears, Shoulders, Back, Chest, Arms, Elbows, Hands, Sacrum. Buttock, Coccyx, Ischium, Legs. Feet and Heels, and Under Medical Devices         Does the Patient have a Wound? No noted wound(s) Pt has redness to bottom       Juan Prevention initiated by RN: Yes  Wound Care Orders initiated by RN: Yes    Pressure Injury (Stage 3,4, Unstageable, DTI, NWPT, and Complex wounds) if present, place Wound referral order by RN under : No    New Ostomies, if present place, Ostomy referral order under : No     Nurse 1 eSignature: Electronically signed by Opal Mcdonough RN on 2/6/24 at 7:25 PM EST    **SHARE this note so that the co-signing nurse can place an eSignature**    Nurse 2 eSignature: Electronically signed by Roxie hRodes RN on 2/7/24 at 5:06 AM EST

## 2024-02-07 NOTE — PROGRESS NOTES
[facility] Progress Note    2024     Zain Lopez    MRN: [id number]    : 1955    Referring MD: Penelope Wilhelm,   [address]  [address]  [place] [address]      SUBJECTIVE: Feeling more SOB with O2 requirement going up. Had chest tube placed yesterday for apical pneumothorax.    ECOG PS: (4) Completely disabled, unable to carry out self-care and confined to bed or chair     KPS: 30% Severely disabled; hospital admission is indicated although death not imminent    Isolation: Droplet Plus    Medications    Scheduled Meds:   furosemide  60 mg IntraVENous Daily    ipratropium 0.5 mg-albuterol 2.5 mg  1 Dose Inhalation BID RT    levETIRAcetam  500 mg IntraVENous Q12H    lidocaine  1 patch TransDERmal Daily    cefepime  2,000 mg IntraVENous Q12H    methylPREDNISolone  20 mg IntraVENous Daily    midodrine  5 mg Oral TID WC    sulfamethoxazole-trimethoprim  400 mg Oral Q8H    sennosides-docusate sodium  2 tablet Oral Daily    polyethylene glycol  17 g Oral Daily    metoprolol  2.5 mg IntraVENous Q8H    pantoprazole  40 mg IntraVENous Daily    apixaban  5 mg Oral BID    [Held by provider] tamsulosin  0.4 mg Oral Daily    valACYclovir  500 mg Oral BID    sodium chloride flush  5-40 mL IntraVENous 2 times per day    Saline Mouthwash  15 mL Swish & Spit 4x Daily AC & HS     Continuous Infusions:   dextrose      sodium chloride Stopped (24 1224)    sodium chloride       PRN Meds:.polyvinyl alcohol, oxyCODONE **OR** oxyCODONE, ondansetron, prochlorperazine, diphenhydrAMINE, morphine, acetaminophen, potassium chloride, potassium chloride, magnesium sulfate, glucose, dextrose bolus **OR** dextrose bolus, glucagon (rDNA), dextrose, magnesium sulfate, sodium chloride, albuterol, diphenhydrAMINE, sodium chloride flush, sodium chloride, magnesium hydroxide, Saline Mouthwash, alteplase (CATHFLO) 2 mg in sterile water 2 mL injection    ROS:  As noted above, otherwise remainder of 10-point ROS  hemispheres. Currently, there is residual T2 and FLAIR hyperintense signal seen involving the left greater than right periventricular white matter regions, as well as the posterior corpus callosum. There is a small amount of residual enhancement seen adjacent to the atrium of the left lateral ventricle as described.  - MRI brain (9/29/23): Decreased enhancement centered about the left periatrial region, w/ no significant interval change in confluent FLAIR signal abnormality. These findings are nonspecific, potentially treatment related, although residual lymphoma is difficult to exclude.     - S/p Thiotepa, Busulfan, and Cytoxan & ASCT (11/3/23)  Post-Txp Maintenance: None  Post-Txp F/u: MRI brain w/ & w/out contrast (First week Feb)     Day + 96    2. ID: NF 2/3/24, Multifocal PNA POA, Afebrile but continued hypoxia. COVID19+ but not source of acute respiratory failure, ? PJP  - ID following  - Blood cx (1/23/24): NG; Pan-cx 1/27/24 with sepsis NG  - RVP (1/23/24): Neg; Rapid COVID 1/28 POS  - MRSA nasal swab 1/24/24: Neg  - Strep and Legionella UrAg 1/24/24: Neg  - S/p bronchoscopy 1/25: Cxs NG, CMV neg, cytology neg for fungal or PJP,   - Fungal serologies 1/24: histo, blasto, fungitel, & galactomannan all neg  - Repeat 1/28 - neg, Ur Ag neg  - Procalcitonin 1/27 - 4.27--3.41--1.6--0.91  - Repeat Pan Cx 2/3/24: NG    - Cont Cefepime Day +5 (2/3/24) -> consider switching to Merrem if no improvement. .  - Tx Dose Bactrim Day +14 (1/25-1/28; 1/30/24) - restarted with indeterminate PJP PCR  - Cont solumedrol for PJP pneumonia : Planned treatment duration- 21 days.  - 40mg BID (1mg/kg) (1/25/24)  - Reduce the dose to 1mg/kg of Solumedrol - 60mg daily 1/29/24  - Reduce to solumedrol 30mg daily (1/31/24)  - Reduce to solumedrol 20mg daily 2/2/24, continue with 20 mg qd for now until Fi02 < 40%.  - Cont Valtrex ppx  - IgG 1/24 219. S/p IVIG 1/26/24    Abx Hx:  Levaquin 1/24-1/28/24  IV Vanco 1/24-1/31/24  Merrem

## 2024-02-08 ENCOUNTER — APPOINTMENT (OUTPATIENT)
Dept: GENERAL RADIOLOGY | Age: 69
End: 2024-02-08
Attending: STUDENT IN AN ORGANIZED HEALTH CARE EDUCATION/TRAINING PROGRAM
Payer: MEDICARE

## 2024-02-08 PROBLEM — S27.0XXA PNEUMOTHORAX, TRAUMATIC: Status: ACTIVE | Noted: 2024-02-08

## 2024-02-08 LAB
ALBUMIN SERPL-MCNC: 2.6 G/DL (ref 3.4–5)
ALBUMIN SERPL-MCNC: 2.7 G/DL (ref 3.4–5)
ALBUMIN SERPL-MCNC: 2.7 G/DL (ref 3.4–5)
ALBUMIN SERPL-MCNC: 2.8 G/DL (ref 3.4–5)
ALBUMIN SERPL-MCNC: 3.1 G/DL (ref 3.4–5)
ALBUMIN/GLOB SERPL: 1.1 {RATIO} (ref 1.1–2.2)
ALBUMIN/GLOB SERPL: 1.1 {RATIO} (ref 1.1–2.2)
ALBUMIN/GLOB SERPL: 1.2 {RATIO} (ref 1.1–2.2)
ALP SERPL-CCNC: 125 U/L (ref 40–129)
ALP SERPL-CCNC: 144 U/L (ref 40–129)
ALP SERPL-CCNC: 152 U/L (ref 40–129)
ALP SERPL-CCNC: 170 U/L (ref 40–129)
ALT SERPL-CCNC: 1812 U/L (ref 10–40)
ALT SERPL-CCNC: 2074 U/L (ref 10–40)
ALT SERPL-CCNC: 22 U/L (ref 10–40)
ALT SERPL-CCNC: 94 U/L (ref 10–40)
ANION GAP SERPL CALCULATED.3IONS-SCNC: 13 MMOL/L (ref 3–16)
ANION GAP SERPL CALCULATED.3IONS-SCNC: 13 MMOL/L (ref 3–16)
ANION GAP SERPL CALCULATED.3IONS-SCNC: 16 MMOL/L (ref 3–16)
ANION GAP SERPL CALCULATED.3IONS-SCNC: 17 MMOL/L (ref 3–16)
ANION GAP SERPL CALCULATED.3IONS-SCNC: 17 MMOL/L (ref 3–16)
ANISOCYTOSIS BLD QL SMEAR: ABNORMAL
APTT BLD: 26.8 SEC (ref 22.7–35.9)
APTT BLD: 38.3 SEC (ref 22.7–35.9)
AST SERPL-CCNC: 189 U/L (ref 15–37)
AST SERPL-CCNC: 39 U/L (ref 15–37)
AST SERPL-CCNC: 4371 U/L (ref 15–37)
AST SERPL-CCNC: 5018 U/L (ref 15–37)
BASE EXCESS BLDA CALC-SCNC: -3 MMOL/L (ref -3–3)
BASE EXCESS BLDA CALC-SCNC: -7 MMOL/L (ref -3–3)
BASE EXCESS BLDA CALC-SCNC: -8 MMOL/L (ref -3–3)
BASE EXCESS BLDA CALC-SCNC: -8 MMOL/L (ref -3–3)
BASE EXCESS BLDA CALC-SCNC: 0 MMOL/L (ref -3–3)
BASE EXCESS BLDA CALC-SCNC: 2 MMOL/L (ref -3–3)
BASOPHILS # BLD: 0 K/UL (ref 0–0.2)
BASOPHILS NFR BLD: 0 %
BILIRUB DIRECT SERPL-MCNC: <0.2 MG/DL (ref 0–0.3)
BILIRUB INDIRECT SERPL-MCNC: ABNORMAL MG/DL (ref 0–1)
BILIRUB SERPL-MCNC: 0.3 MG/DL (ref 0–1)
BILIRUB SERPL-MCNC: 0.4 MG/DL (ref 0–1)
BILIRUB SERPL-MCNC: 0.6 MG/DL (ref 0–1)
BILIRUB SERPL-MCNC: 1.1 MG/DL (ref 0–1)
BUN SERPL-MCNC: 37 MG/DL (ref 7–20)
BUN SERPL-MCNC: 48 MG/DL (ref 7–20)
BUN SERPL-MCNC: 54 MG/DL (ref 7–20)
BUN SERPL-MCNC: 59 MG/DL (ref 7–20)
BUN SERPL-MCNC: 60 MG/DL (ref 7–20)
CA-I BLD-SCNC: 1.17 MMOL/L (ref 1.12–1.32)
CA-I BLD-SCNC: 1.18 MMOL/L (ref 1.12–1.32)
CA-I BLD-SCNC: 1.23 MMOL/L (ref 1.12–1.32)
CA-I BLD-SCNC: 1.27 MMOL/L (ref 1.12–1.32)
CA-I BLD-SCNC: 1.32 MMOL/L (ref 1.12–1.32)
CA-I BLD-SCNC: 1.39 MMOL/L (ref 1.12–1.32)
CALCIUM SERPL-MCNC: 10.8 MG/DL (ref 8.3–10.6)
CALCIUM SERPL-MCNC: 9 MG/DL (ref 8.3–10.6)
CALCIUM SERPL-MCNC: 9.3 MG/DL (ref 8.3–10.6)
CALCIUM SERPL-MCNC: 9.4 MG/DL (ref 8.3–10.6)
CALCIUM SERPL-MCNC: 9.7 MG/DL (ref 8.3–10.6)
CHLORIDE SERPL-SCNC: 100 MMOL/L (ref 99–110)
CHLORIDE SERPL-SCNC: 100 MMOL/L (ref 99–110)
CHLORIDE SERPL-SCNC: 95 MMOL/L (ref 99–110)
CHLORIDE SERPL-SCNC: 98 MMOL/L (ref 99–110)
CHLORIDE SERPL-SCNC: 98 MMOL/L (ref 99–110)
CO2 BLDA-SCNC: 21 MMOL/L
CO2 BLDA-SCNC: 21 MMOL/L
CO2 BLDA-SCNC: 23 MMOL/L
CO2 BLDA-SCNC: 26 MMOL/L
CO2 BLDA-SCNC: 28 MMOL/L
CO2 BLDA-SCNC: 28 MMOL/L
CO2 BLDA-SCNC: 29 MMOL/L
CO2 BLDA-SCNC: 30 MMOL/L
CO2 SERPL-SCNC: 20 MMOL/L (ref 21–32)
CO2 SERPL-SCNC: 23 MMOL/L (ref 21–32)
CO2 SERPL-SCNC: 25 MMOL/L (ref 21–32)
CO2 SERPL-SCNC: 25 MMOL/L (ref 21–32)
CO2 SERPL-SCNC: 26 MMOL/L (ref 21–32)
CREAT SERPL-MCNC: 1.7 MG/DL (ref 0.8–1.3)
CREAT SERPL-MCNC: 1.9 MG/DL (ref 0.8–1.3)
CREAT SERPL-MCNC: 2.1 MG/DL (ref 0.8–1.3)
CREAT SERPL-MCNC: 2.2 MG/DL (ref 0.8–1.3)
CREAT SERPL-MCNC: 2.2 MG/DL (ref 0.8–1.3)
DEPRECATED RDW RBC AUTO: 20.8 % (ref 12.4–15.4)
DEPRECATED RDW RBC AUTO: 21.1 % (ref 12.4–15.4)
DEPRECATED RDW RBC AUTO: 21.2 % (ref 12.4–15.4)
EKG ATRIAL RATE: 248 BPM
EKG ATRIAL RATE: 66 BPM
EKG DIAGNOSIS: NORMAL
EKG DIAGNOSIS: NORMAL
EKG P AXIS: 36 DEGREES
EKG Q-T INTERVAL: 428 MS
EKG Q-T INTERVAL: 458 MS
EKG QRS DURATION: 148 MS
EKG QRS DURATION: 156 MS
EKG QTC CALCULATION (BAZETT): 430 MS
EKG QTC CALCULATION (BAZETT): 464 MS
EKG R AXIS: -67 DEGREES
EKG R AXIS: -68 DEGREES
EKG T AXIS: 122 DEGREES
EKG T AXIS: 132 DEGREES
EKG VENTRICULAR RATE: 61 BPM
EKG VENTRICULAR RATE: 62 BPM
EOSINOPHIL # BLD: 0.1 K/UL (ref 0–0.6)
EOSINOPHIL NFR BLD: 1 %
FIBRINOGEN PPP-MCNC: 542 MG/DL (ref 243–550)
GFR SERPLBLD CREATININE-BSD FMLA CKD-EPI: 31 ML/MIN/{1.73_M2}
GFR SERPLBLD CREATININE-BSD FMLA CKD-EPI: 31 ML/MIN/{1.73_M2}
GFR SERPLBLD CREATININE-BSD FMLA CKD-EPI: 33 ML/MIN/{1.73_M2}
GFR SERPLBLD CREATININE-BSD FMLA CKD-EPI: 37 ML/MIN/{1.73_M2}
GFR SERPLBLD CREATININE-BSD FMLA CKD-EPI: 43 ML/MIN/{1.73_M2}
GLUCOSE BLD-MCNC: 118 MG/DL (ref 70–99)
GLUCOSE BLD-MCNC: 49 MG/DL (ref 70–99)
GLUCOSE BLD-MCNC: 51 MG/DL (ref 70–99)
GLUCOSE BLD-MCNC: 66 MG/DL (ref 70–99)
GLUCOSE BLD-MCNC: 69 MG/DL (ref 70–99)
GLUCOSE BLD-MCNC: 69 MG/DL (ref 70–99)
GLUCOSE BLD-MCNC: 71 MG/DL (ref 70–99)
GLUCOSE BLD-MCNC: 74 MG/DL (ref 70–99)
GLUCOSE BLD-MCNC: 90 MG/DL (ref 70–99)
GLUCOSE SERPL-MCNC: 51 MG/DL (ref 70–99)
GLUCOSE SERPL-MCNC: 65 MG/DL (ref 70–99)
GLUCOSE SERPL-MCNC: 74 MG/DL (ref 70–99)
GLUCOSE SERPL-MCNC: 75 MG/DL (ref 70–99)
GLUCOSE SERPL-MCNC: 76 MG/DL (ref 70–99)
HCO3 BLDA-SCNC: 19.5 MMOL/L (ref 21–29)
HCO3 BLDA-SCNC: 19.7 MMOL/L (ref 21–29)
HCO3 BLDA-SCNC: 20.8 MMOL/L (ref 21–29)
HCO3 BLDA-SCNC: 24.6 MMOL/L (ref 21–29)
HCO3 BLDA-SCNC: 26.2 MMOL/L (ref 21–29)
HCO3 BLDA-SCNC: 26.3 MMOL/L (ref 21–29)
HCO3 BLDA-SCNC: 27.4 MMOL/L (ref 21–29)
HCO3 BLDA-SCNC: 27.4 MMOL/L (ref 21–29)
HCT VFR BLD AUTO: 24.7 % (ref 40.5–52.5)
HCT VFR BLD AUTO: 25.1 % (ref 40.5–52.5)
HCT VFR BLD AUTO: 26.9 % (ref 40.5–52.5)
HGB BLD-MCNC: 7.7 G/DL (ref 13.5–17.5)
HGB BLD-MCNC: 7.9 G/DL (ref 13.5–17.5)
HGB BLD-MCNC: 8.6 G/DL (ref 13.5–17.5)
INR PPP: 1.36 (ref 0.84–1.16)
INR PPP: 1.59 (ref 0.84–1.16)
INR PPP: 2.13 (ref 0.84–1.16)
LACTATE BLD-SCNC: 4.3 MMOL/L (ref 0.4–2)
LACTATE BLD-SCNC: 5.46 MMOL/L (ref 0.4–2)
LACTATE BLD-SCNC: 5.75 MMOL/L (ref 0.4–2)
LACTATE BLD-SCNC: 6.06 MMOL/L (ref 0.4–2)
LACTATE BLD-SCNC: 6.41 MMOL/L (ref 0.4–2)
LACTATE BLD-SCNC: 9.11 MMOL/L (ref 0.4–2)
LACTATE BLD-SCNC: 9.21 MMOL/L (ref 0.4–2)
LACTATE BLD-SCNC: 9.6 MMOL/L (ref 0.4–2)
LDH SERPL L TO P-CCNC: 630 U/L (ref 100–190)
LYMPHOCYTES # BLD: 0.4 K/UL (ref 1–5.1)
LYMPHOCYTES NFR BLD: 5 %
MAGNESIUM SERPL-MCNC: 2.1 MG/DL (ref 1.8–2.4)
MAGNESIUM SERPL-MCNC: 2.2 MG/DL (ref 1.8–2.4)
MAGNESIUM SERPL-MCNC: 2.2 MG/DL (ref 1.8–2.4)
MAGNESIUM SERPL-MCNC: 2.4 MG/DL (ref 1.8–2.4)
MCH RBC QN AUTO: 29 PG (ref 26–34)
MCH RBC QN AUTO: 29.2 PG (ref 26–34)
MCH RBC QN AUTO: 29.3 PG (ref 26–34)
MCHC RBC AUTO-ENTMCNC: 31.3 G/DL (ref 31–36)
MCHC RBC AUTO-ENTMCNC: 31.6 G/DL (ref 31–36)
MCHC RBC AUTO-ENTMCNC: 31.9 G/DL (ref 31–36)
MCV RBC AUTO: 91.6 FL (ref 80–100)
MCV RBC AUTO: 91.9 FL (ref 80–100)
MCV RBC AUTO: 93.5 FL (ref 80–100)
METAMYELOCYTES NFR BLD MANUAL: 2 %
MICROCYTES BLD QL SMEAR: ABNORMAL
MONOCYTES # BLD: 0.5 K/UL (ref 0–1.3)
MONOCYTES NFR BLD: 6 %
NEUTROPHILS # BLD: 6.7 K/UL (ref 1.7–7.7)
NEUTROPHILS NFR BLD: 86 %
NRBC BLD-RTO: 2 /100 WBC
NT-PROBNP SERPL-MCNC: ABNORMAL PG/ML (ref 0–124)
OVALOCYTES BLD QL SMEAR: ABNORMAL
PCO2 BLDA: 41.1 MM HG (ref 35–45)
PCO2 BLDA: 45.5 MM HG (ref 35–45)
PCO2 BLDA: 51.3 MM HG (ref 35–45)
PCO2 BLDA: 53.9 MM HG (ref 35–45)
PCO2 BLDA: 54.3 MM HG (ref 35–45)
PCO2 BLDA: 58.6 MM HG (ref 35–45)
PCO2 BLDA: 59.3 MM HG (ref 35–45)
PCO2 BLDA: 66.2 MM HG (ref 35–45)
PERFORMED ON: ABNORMAL
PH BLDA: 7.15 [PH] (ref 7.35–7.45)
PH BLDA: 7.22 [PH] (ref 7.35–7.45)
PH BLDA: 7.23 [PH] (ref 7.35–7.45)
PH BLDA: 7.24 [PH] (ref 7.35–7.45)
PH BLDA: 7.29 [PH] (ref 7.35–7.45)
PH BLDA: 7.29 [PH] (ref 7.35–7.45)
PH BLDA: 7.3 [PH] (ref 7.35–7.45)
PH BLDA: 7.33 [PH] (ref 7.35–7.45)
PHOSPHATE SERPL-MCNC: 5.7 MG/DL (ref 2.5–4.9)
PHOSPHATE SERPL-MCNC: 6.2 MG/DL (ref 2.5–4.9)
PHOSPHATE SERPL-MCNC: 6.6 MG/DL (ref 2.5–4.9)
PHOSPHATE SERPL-MCNC: 8.3 MG/DL (ref 2.5–4.9)
PLATELET # BLD AUTO: 102 K/UL (ref 135–450)
PLATELET # BLD AUTO: 116 K/UL (ref 135–450)
PLATELET # BLD AUTO: 127 K/UL (ref 135–450)
PMV BLD AUTO: 10.3 FL (ref 5–10.5)
PMV BLD AUTO: 10.6 FL (ref 5–10.5)
PMV BLD AUTO: 11 FL (ref 5–10.5)
PO2 BLDA: 107.1 MM HG (ref 75–108)
PO2 BLDA: 109.5 MM HG (ref 75–108)
PO2 BLDA: 109.9 MM HG (ref 75–108)
PO2 BLDA: 123.3 MM HG (ref 75–108)
PO2 BLDA: 147.2 MM HG (ref 75–108)
PO2 BLDA: 164.7 MM HG (ref 75–108)
PO2 BLDA: 89 MM HG (ref 75–108)
PO2 BLDA: 94.4 MM HG (ref 75–108)
POC SAMPLE TYPE: ABNORMAL
POLYCHROMASIA BLD QL SMEAR: ABNORMAL
POTASSIUM BLD-SCNC: 5.3 MMOL/L (ref 3.5–5.1)
POTASSIUM BLD-SCNC: 5.3 MMOL/L (ref 3.5–5.1)
POTASSIUM BLD-SCNC: 5.7 MMOL/L (ref 3.5–5.1)
POTASSIUM BLD-SCNC: 5.8 MMOL/L (ref 3.5–5.1)
POTASSIUM BLD-SCNC: 6.2 MMOL/L (ref 3.5–5.1)
POTASSIUM BLD-SCNC: 6.4 MMOL/L (ref 3.5–5.1)
POTASSIUM BLD-SCNC: 6.5 MMOL/L (ref 3.5–5.1)
POTASSIUM SERPL-SCNC: 5.5 MMOL/L (ref 3.5–5.1)
POTASSIUM SERPL-SCNC: 5.8 MMOL/L (ref 3.5–5.1)
POTASSIUM SERPL-SCNC: 6.3 MMOL/L (ref 3.5–5.1)
POTASSIUM SERPL-SCNC: 6.7 MMOL/L (ref 3.5–5.1)
POTASSIUM SERPL-SCNC: 6.7 MMOL/L (ref 3.5–5.1)
PROCALCITONIN SERPL IA-MCNC: 39.3 NG/ML (ref 0–0.15)
PROT SERPL-MCNC: 4.9 G/DL (ref 6.4–8.2)
PROT SERPL-MCNC: 5 G/DL (ref 6.4–8.2)
PROT SERPL-MCNC: 5.2 G/DL (ref 6.4–8.2)
PROT SERPL-MCNC: 5.9 G/DL (ref 6.4–8.2)
PROTHROMBIN TIME: 16.8 SEC (ref 11.5–14.8)
PROTHROMBIN TIME: 18.9 SEC (ref 11.5–14.8)
PROTHROMBIN TIME: 23.8 SEC (ref 11.5–14.8)
RBC # BLD AUTO: 2.64 M/UL (ref 4.2–5.9)
RBC # BLD AUTO: 2.74 M/UL (ref 4.2–5.9)
RBC # BLD AUTO: 2.92 M/UL (ref 4.2–5.9)
SAO2 % BLDA: 94 % (ref 93–100)
SAO2 % BLDA: 95 % (ref 93–100)
SAO2 % BLDA: 97 % (ref 93–100)
SAO2 % BLDA: 97 % (ref 93–100)
SAO2 % BLDA: 98 % (ref 93–100)
SAO2 % BLDA: 98 % (ref 93–100)
SAO2 % BLDA: 99 % (ref 93–100)
SAO2 % BLDA: 99 % (ref 93–100)
SCHISTOCYTES BLD QL SMEAR: ABNORMAL
SODIUM BLD-SCNC: 137 MMOL/L (ref 136–145)
SODIUM BLD-SCNC: 138 MMOL/L (ref 136–145)
SODIUM BLD-SCNC: 139 MMOL/L (ref 136–145)
SODIUM BLD-SCNC: 140 MMOL/L (ref 136–145)
SODIUM SERPL-SCNC: 135 MMOL/L (ref 136–145)
SODIUM SERPL-SCNC: 136 MMOL/L (ref 136–145)
SODIUM SERPL-SCNC: 136 MMOL/L (ref 136–145)
SODIUM SERPL-SCNC: 138 MMOL/L (ref 136–145)
SODIUM SERPL-SCNC: 141 MMOL/L (ref 136–145)
STOMATOCYTES BLD QL SMEAR: ABNORMAL
URATE SERPL-MCNC: 3.4 MG/DL (ref 3.5–7.2)
WBC # BLD AUTO: 3.8 K/UL (ref 4–11)
WBC # BLD AUTO: 7.2 K/UL (ref 4–11)
WBC # BLD AUTO: 7.6 K/UL (ref 4–11)

## 2024-02-08 PROCEDURE — 6370000000 HC RX 637 (ALT 250 FOR IP): Performed by: STUDENT IN AN ORGANIZED HEALTH CARE EDUCATION/TRAINING PROGRAM

## 2024-02-08 PROCEDURE — 93005 ELECTROCARDIOGRAM TRACING: CPT

## 2024-02-08 PROCEDURE — 2000000000 HC ICU R&B

## 2024-02-08 PROCEDURE — 80053 COMPREHEN METABOLIC PANEL: CPT

## 2024-02-08 PROCEDURE — 2500000003 HC RX 250 WO HCPCS

## 2024-02-08 PROCEDURE — 83615 LACTATE (LD) (LDH) ENZYME: CPT

## 2024-02-08 PROCEDURE — 2580000003 HC RX 258: Performed by: STUDENT IN AN ORGANIZED HEALTH CARE EDUCATION/TRAINING PROGRAM

## 2024-02-08 PROCEDURE — 93010 ELECTROCARDIOGRAM REPORT: CPT | Performed by: INTERNAL MEDICINE

## 2024-02-08 PROCEDURE — 99233 SBSQ HOSP IP/OBS HIGH 50: CPT | Performed by: INTERNAL MEDICINE

## 2024-02-08 PROCEDURE — 99291 CRITICAL CARE FIRST HOUR: CPT | Performed by: INTERNAL MEDICINE

## 2024-02-08 PROCEDURE — C9113 INJ PANTOPRAZOLE SODIUM, VIA: HCPCS | Performed by: NURSE PRACTITIONER

## 2024-02-08 PROCEDURE — 85384 FIBRINOGEN ACTIVITY: CPT

## 2024-02-08 PROCEDURE — 85610 PROTHROMBIN TIME: CPT

## 2024-02-08 PROCEDURE — 6360000002 HC RX W HCPCS: Performed by: INTERNAL MEDICINE

## 2024-02-08 PROCEDURE — 6360000002 HC RX W HCPCS

## 2024-02-08 PROCEDURE — 02HV33Z INSERTION OF INFUSION DEVICE INTO SUPERIOR VENA CAVA, PERCUTANEOUS APPROACH: ICD-10-PCS

## 2024-02-08 PROCEDURE — 6360000002 HC RX W HCPCS: Performed by: STUDENT IN AN ORGANIZED HEALTH CARE EDUCATION/TRAINING PROGRAM

## 2024-02-08 PROCEDURE — 82803 BLOOD GASES ANY COMBINATION: CPT

## 2024-02-08 PROCEDURE — 2580000003 HC RX 258: Performed by: NURSE PRACTITIONER

## 2024-02-08 PROCEDURE — 84295 ASSAY OF SERUM SODIUM: CPT

## 2024-02-08 PROCEDURE — 36556 INSERT NON-TUNNEL CV CATH: CPT

## 2024-02-08 PROCEDURE — 6360000002 HC RX W HCPCS: Performed by: NURSE PRACTITIONER

## 2024-02-08 PROCEDURE — 2500000003 HC RX 250 WO HCPCS: Performed by: INTERNAL MEDICINE

## 2024-02-08 PROCEDURE — 6370000000 HC RX 637 (ALT 250 FOR IP)

## 2024-02-08 PROCEDURE — 2580000003 HC RX 258: Performed by: INTERNAL MEDICINE

## 2024-02-08 PROCEDURE — 2700000000 HC OXYGEN THERAPY PER DAY

## 2024-02-08 PROCEDURE — 93005 ELECTROCARDIOGRAM TRACING: CPT | Performed by: STUDENT IN AN ORGANIZED HEALTH CARE EDUCATION/TRAINING PROGRAM

## 2024-02-08 PROCEDURE — 82330 ASSAY OF CALCIUM: CPT

## 2024-02-08 PROCEDURE — 82947 ASSAY GLUCOSE BLOOD QUANT: CPT

## 2024-02-08 PROCEDURE — 83880 ASSAY OF NATRIURETIC PEPTIDE: CPT

## 2024-02-08 PROCEDURE — 90945 DIALYSIS ONE EVALUATION: CPT

## 2024-02-08 PROCEDURE — 94761 N-INVAS EAR/PLS OXIMETRY MLT: CPT

## 2024-02-08 PROCEDURE — 71045 X-RAY EXAM CHEST 1 VIEW: CPT

## 2024-02-08 PROCEDURE — 94003 VENT MGMT INPAT SUBQ DAY: CPT

## 2024-02-08 PROCEDURE — 85730 THROMBOPLASTIN TIME PARTIAL: CPT

## 2024-02-08 PROCEDURE — 87040 BLOOD CULTURE FOR BACTERIA: CPT

## 2024-02-08 PROCEDURE — 2580000003 HC RX 258

## 2024-02-08 PROCEDURE — 87103 BLOOD FUNGUS CULTURE: CPT

## 2024-02-08 PROCEDURE — 94640 AIRWAY INHALATION TREATMENT: CPT

## 2024-02-08 PROCEDURE — 84145 PROCALCITONIN (PCT): CPT

## 2024-02-08 PROCEDURE — 83605 ASSAY OF LACTIC ACID: CPT

## 2024-02-08 PROCEDURE — 84132 ASSAY OF SERUM POTASSIUM: CPT

## 2024-02-08 PROCEDURE — 2500000003 HC RX 250 WO HCPCS: Performed by: NURSE PRACTITIONER

## 2024-02-08 PROCEDURE — 84100 ASSAY OF PHOSPHORUS: CPT

## 2024-02-08 PROCEDURE — 03HY32Z INSERTION OF MONITORING DEVICE INTO UPPER ARTERY, PERCUTANEOUS APPROACH: ICD-10-PCS

## 2024-02-08 PROCEDURE — 6370000000 HC RX 637 (ALT 250 FOR IP): Performed by: NURSE PRACTITIONER

## 2024-02-08 PROCEDURE — 85027 COMPLETE CBC AUTOMATED: CPT

## 2024-02-08 PROCEDURE — 83735 ASSAY OF MAGNESIUM: CPT

## 2024-02-08 PROCEDURE — 85025 COMPLETE CBC W/AUTO DIFF WBC: CPT

## 2024-02-08 RX ORDER — GLUCAGON 1 MG/ML
1 KIT INJECTION PRN
Status: DISCONTINUED | OUTPATIENT
Start: 2024-02-08 | End: 2024-02-09

## 2024-02-08 RX ORDER — SODIUM CHLORIDE 9 MG/ML
INJECTION, SOLUTION INTRAVENOUS CONTINUOUS
Status: DISCONTINUED | OUTPATIENT
Start: 2024-02-08 | End: 2024-02-08

## 2024-02-08 RX ORDER — FENTANYL CITRATE-0.9 % NACL/PF 10 MCG/ML
25-200 PLASTIC BAG, INJECTION (ML) INTRAVENOUS CONTINUOUS
Status: DISCONTINUED | OUTPATIENT
Start: 2024-02-08 | End: 2024-02-09 | Stop reason: HOSPADM

## 2024-02-08 RX ORDER — DEXTROSE MONOHYDRATE 100 MG/ML
INJECTION, SOLUTION INTRAVENOUS CONTINUOUS PRN
Status: DISCONTINUED | OUTPATIENT
Start: 2024-02-08 | End: 2024-02-09

## 2024-02-08 RX ORDER — CALCIUM CHLORIDE, MAGNESIUM CHLORIDE, DEXTROSE MONOHYDRATE, LACTIC ACID, SODIUM CHLORIDE, SODIUM BICARBONATE AND POTASSIUM CHLORIDE 5.15; 2.03; 22; 5.4; 6.46; 3.09; .157 G/L; G/L; G/L; G/L; G/L; G/L; G/L
INJECTION INTRAVENOUS CONTINUOUS
Status: DISCONTINUED | OUTPATIENT
Start: 2024-02-08 | End: 2024-02-09

## 2024-02-08 RX ORDER — CALCIUM GLUCONATE 10 MG/ML
1000 INJECTION, SOLUTION INTRAVENOUS PRN
Status: DISCONTINUED | OUTPATIENT
Start: 2024-02-08 | End: 2024-02-09

## 2024-02-08 RX ORDER — CALCIUM GLUCONATE 20 MG/ML
2000 INJECTION, SOLUTION INTRAVENOUS PRN
Status: DISCONTINUED | OUTPATIENT
Start: 2024-02-08 | End: 2024-02-09

## 2024-02-08 RX ORDER — FUROSEMIDE 10 MG/ML
60 INJECTION INTRAMUSCULAR; INTRAVENOUS ONCE
Status: DISCONTINUED | OUTPATIENT
Start: 2024-02-08 | End: 2024-02-08

## 2024-02-08 RX ORDER — NOREPINEPHRINE BITARTRATE 1 MG/ML
INJECTION, SOLUTION INTRAVENOUS
Status: DISPENSED
Start: 2024-02-08 | End: 2024-02-08

## 2024-02-08 RX ORDER — DOBUTAMINE HYDROCHLORIDE 200 MG/100ML
3 INJECTION INTRAVENOUS CONTINUOUS
Status: DISCONTINUED | OUTPATIENT
Start: 2024-02-08 | End: 2024-02-09

## 2024-02-08 RX ORDER — HEPARIN SODIUM 1000 [USP'U]/ML
80 INJECTION, SOLUTION INTRAVENOUS; SUBCUTANEOUS PRN
Status: DISCONTINUED | OUTPATIENT
Start: 2024-02-08 | End: 2024-02-09

## 2024-02-08 RX ORDER — HEPARIN SODIUM 10000 [USP'U]/100ML
5-30 INJECTION, SOLUTION INTRAVENOUS CONTINUOUS
Status: DISCONTINUED | OUTPATIENT
Start: 2024-02-08 | End: 2024-02-09

## 2024-02-08 RX ORDER — SODIUM CHLORIDE, SODIUM LACTATE, POTASSIUM CHLORIDE, CALCIUM CHLORIDE 600; 310; 30; 20 MG/100ML; MG/100ML; MG/100ML; MG/100ML
INJECTION, SOLUTION INTRAVENOUS CONTINUOUS
Status: DISCONTINUED | OUTPATIENT
Start: 2024-02-08 | End: 2024-02-08

## 2024-02-08 RX ORDER — SODIUM CHLORIDE, SODIUM LACTATE, POTASSIUM CHLORIDE, AND CALCIUM CHLORIDE .6; .31; .03; .02 G/100ML; G/100ML; G/100ML; G/100ML
1000 INJECTION, SOLUTION INTRAVENOUS ONCE
Status: DISCONTINUED | OUTPATIENT
Start: 2024-02-08 | End: 2024-02-09

## 2024-02-08 RX ORDER — VALACYCLOVIR HYDROCHLORIDE 500 MG/1
500 TABLET, FILM COATED ORAL DAILY
Status: DISCONTINUED | OUTPATIENT
Start: 2024-02-09 | End: 2024-02-08

## 2024-02-08 RX ORDER — HEPARIN SODIUM 1000 [USP'U]/ML
40 INJECTION, SOLUTION INTRAVENOUS; SUBCUTANEOUS PRN
Status: DISCONTINUED | OUTPATIENT
Start: 2024-02-08 | End: 2024-02-09

## 2024-02-08 RX ORDER — CALCIUM GLUCONATE 94 MG/ML
1000 INJECTION, SOLUTION INTRAVENOUS ONCE
Status: COMPLETED | OUTPATIENT
Start: 2024-02-08 | End: 2024-02-08

## 2024-02-08 RX ORDER — CALCIUM GLUCONATE 10 MG/ML
3000 INJECTION, SOLUTION INTRAVENOUS PRN
Status: DISCONTINUED | OUTPATIENT
Start: 2024-02-08 | End: 2024-02-09

## 2024-02-08 RX ORDER — MAGNESIUM SULFATE 1 G/100ML
1000 INJECTION INTRAVENOUS PRN
Status: DISCONTINUED | OUTPATIENT
Start: 2024-02-08 | End: 2024-02-09

## 2024-02-08 RX ORDER — HEPARIN SODIUM 1000 [USP'U]/ML
3200 INJECTION, SOLUTION INTRAVENOUS; SUBCUTANEOUS ONCE
Status: COMPLETED | OUTPATIENT
Start: 2024-02-08 | End: 2024-02-08

## 2024-02-08 RX ORDER — POTASSIUM CHLORIDE 29.8 MG/ML
20 INJECTION INTRAVENOUS PRN
Status: DISCONTINUED | OUTPATIENT
Start: 2024-02-08 | End: 2024-02-09

## 2024-02-08 RX ORDER — PROPOFOL 10 MG/ML
5-50 INJECTION, EMULSION INTRAVENOUS CONTINUOUS
Status: DISCONTINUED | OUTPATIENT
Start: 2024-02-08 | End: 2024-02-09 | Stop reason: HOSPADM

## 2024-02-08 RX ORDER — CALCIUM GLUCONATE 20 MG/ML
4000 INJECTION, SOLUTION INTRAVENOUS PRN
Status: DISCONTINUED | OUTPATIENT
Start: 2024-02-08 | End: 2024-02-09

## 2024-02-08 RX ADMIN — POLYVINYL ALCOHOL 1 DROP: 14 SOLUTION/ DROPS OPHTHALMIC at 22:06

## 2024-02-08 RX ADMIN — SODIUM CHLORIDE: 9 INJECTION, SOLUTION INTRAVENOUS at 07:30

## 2024-02-08 RX ADMIN — SODIUM BICARBONATE: 84 INJECTION, SOLUTION INTRAVENOUS at 09:23

## 2024-02-08 RX ADMIN — CALCIUM CHLORIDE, MAGNESIUM CHLORIDE, DEXTROSE MONOHYDRATE, LACTIC ACID, SODIUM CHLORIDE, SODIUM BICARBONATE AND POTASSIUM CHLORIDE: 5.15; 2.03; 22; 5.4; 6.46; 3.09; .157 INJECTION INTRAVENOUS at 10:47

## 2024-02-08 RX ADMIN — HEPARIN SODIUM 3200 UNITS: 1000 INJECTION INTRAVENOUS; SUBCUTANEOUS at 11:11

## 2024-02-08 RX ADMIN — CALCIUM GLUCONATE 1000 MG: 98 INJECTION, SOLUTION INTRAVENOUS at 08:18

## 2024-02-08 RX ADMIN — PHENYLEPHRINE HYDROCHLORIDE 300 MCG/MIN: 50 INJECTION INTRAVENOUS at 14:41

## 2024-02-08 RX ADMIN — DEXTROSE MONOHYDRATE 125 ML: 100 INJECTION, SOLUTION INTRAVENOUS at 17:49

## 2024-02-08 RX ADMIN — INSULIN HUMAN 10 UNITS: 100 INJECTION, SOLUTION PARENTERAL at 09:15

## 2024-02-08 RX ADMIN — DEXTROSE MONOHYDRATE 125 ML: 100 INJECTION, SOLUTION INTRAVENOUS at 17:09

## 2024-02-08 RX ADMIN — CALCIUM CHLORIDE, MAGNESIUM CHLORIDE, DEXTROSE MONOHYDRATE, LACTIC ACID, SODIUM CHLORIDE, SODIUM BICARBONATE AND POTASSIUM CHLORIDE: 5.15; 2.03; 22; 5.4; 6.46; 3.09; .157 INJECTION INTRAVENOUS at 20:46

## 2024-02-08 RX ADMIN — DOBUTAMINE HYDROCHLORIDE 3 MCG/KG/MIN: 200 INJECTION INTRAVENOUS at 17:50

## 2024-02-08 RX ADMIN — SODIUM CHLORIDE 40 MCG/MIN: 9 INJECTION, SOLUTION INTRAVENOUS at 08:15

## 2024-02-08 RX ADMIN — PHENYLEPHRINE HYDROCHLORIDE 30 MCG/MIN: 50 INJECTION INTRAVENOUS at 02:03

## 2024-02-08 RX ADMIN — Medication 250 ML: at 09:15

## 2024-02-08 RX ADMIN — CALCIUM CHLORIDE, MAGNESIUM CHLORIDE, DEXTROSE MONOHYDRATE, LACTIC ACID, SODIUM CHLORIDE, SODIUM BICARBONATE AND POTASSIUM CHLORIDE: 5.15; 2.03; 22; 5.4; 6.46; 3.09; .157 INJECTION INTRAVENOUS at 19:39

## 2024-02-08 RX ADMIN — CALCIUM CHLORIDE, MAGNESIUM CHLORIDE, DEXTROSE MONOHYDRATE, LACTIC ACID, SODIUM CHLORIDE, SODIUM BICARBONATE AND POTASSIUM CHLORIDE: 5.15; 2.03; 22; 5.4; 6.46; 3.09; .157 INJECTION INTRAVENOUS at 15:50

## 2024-02-08 RX ADMIN — PHENYLEPHRINE HYDROCHLORIDE 300 MCG/MIN: 50 INJECTION INTRAVENOUS at 09:26

## 2024-02-08 RX ADMIN — DEXMEDETOMIDINE 0.2 MCG/KG/HR: 100 INJECTION, SOLUTION INTRAVENOUS at 10:56

## 2024-02-08 RX ADMIN — CALCIUM CHLORIDE, MAGNESIUM CHLORIDE, DEXTROSE MONOHYDRATE, LACTIC ACID, SODIUM CHLORIDE, SODIUM BICARBONATE AND POTASSIUM CHLORIDE: 5.15; 2.03; 22; 5.4; 6.46; 3.09; .157 INJECTION INTRAVENOUS at 23:07

## 2024-02-08 RX ADMIN — IPRATROPIUM BROMIDE AND ALBUTEROL SULFATE 1 DOSE: 2.5; .5 SOLUTION RESPIRATORY (INHALATION) at 21:24

## 2024-02-08 RX ADMIN — SODIUM CHLORIDE, PRESERVATIVE FREE 10 ML: 5 INJECTION INTRAVENOUS at 11:13

## 2024-02-08 RX ADMIN — IPRATROPIUM BROMIDE AND ALBUTEROL SULFATE 1 DOSE: 2.5; .5 SOLUTION RESPIRATORY (INHALATION) at 08:15

## 2024-02-08 RX ADMIN — PROPOFOL 5 MCG/KG/MIN: 10 INJECTION, EMULSION INTRAVENOUS at 08:37

## 2024-02-08 RX ADMIN — PHENYLEPHRINE HYDROCHLORIDE 300 MCG/MIN: 50 INJECTION INTRAVENOUS at 21:01

## 2024-02-08 RX ADMIN — PHENYLEPHRINE HYDROCHLORIDE 300 MCG/MIN: 50 INJECTION INTRAVENOUS at 10:57

## 2024-02-08 RX ADMIN — SODIUM CHLORIDE 20 MCG/MIN: 9 INJECTION, SOLUTION INTRAVENOUS at 11:00

## 2024-02-08 RX ADMIN — VASOPRESSIN 0.03 UNITS/MIN: 20 INJECTION INTRAVENOUS at 13:37

## 2024-02-08 RX ADMIN — IPRATROPIUM BROMIDE AND ALBUTEROL SULFATE 1 DOSE: 2.5; .5 SOLUTION RESPIRATORY (INHALATION) at 00:50

## 2024-02-08 RX ADMIN — SODIUM CHLORIDE: 9 INJECTION, SOLUTION INTRAVENOUS at 11:41

## 2024-02-08 RX ADMIN — SODIUM BICARBONATE: 84 INJECTION, SOLUTION INTRAVENOUS at 08:30

## 2024-02-08 RX ADMIN — HEPARIN SODIUM 18 UNITS/KG/HR: 10000 INJECTION, SOLUTION INTRAVENOUS at 17:55

## 2024-02-08 RX ADMIN — SODIUM CHLORIDE, PRESERVATIVE FREE 10 ML: 5 INJECTION INTRAVENOUS at 21:04

## 2024-02-08 RX ADMIN — MEROPENEM 1000 MG: 1 INJECTION INTRAVENOUS at 07:44

## 2024-02-08 RX ADMIN — LEVETIRACETAM 500 MG: 100 INJECTION, SOLUTION INTRAVENOUS at 22:20

## 2024-02-08 RX ADMIN — LEVETIRACETAM 500 MG: 100 INJECTION, SOLUTION INTRAVENOUS at 11:24

## 2024-02-08 RX ADMIN — SODIUM BICARBONATE: 84 INJECTION, SOLUTION INTRAVENOUS at 19:02

## 2024-02-08 RX ADMIN — MEROPENEM 1000 MG: 1 INJECTION INTRAVENOUS at 21:03

## 2024-02-08 RX ADMIN — WATER 40 MG: 1 INJECTION INTRAMUSCULAR; INTRAVENOUS; SUBCUTANEOUS at 11:25

## 2024-02-08 RX ADMIN — ACYCLOVIR SODIUM 290 MG: 50 INJECTION, SOLUTION INTRAVENOUS at 17:19

## 2024-02-08 RX ADMIN — PANTOPRAZOLE SODIUM 40 MG: 40 INJECTION, POWDER, FOR SOLUTION INTRAVENOUS at 11:25

## 2024-02-08 RX ADMIN — SODIUM CHLORIDE: 9 INJECTION, SOLUTION INTRAVENOUS at 02:06

## 2024-02-08 RX ADMIN — PHENYLEPHRINE HYDROCHLORIDE 300 MCG/MIN: 50 INJECTION INTRAVENOUS at 17:27

## 2024-02-08 RX ADMIN — SODIUM BICARBONATE 50 MEQ: 84 INJECTION, SOLUTION INTRAVENOUS at 18:45

## 2024-02-08 RX ADMIN — VANCOMYCIN HYDROCHLORIDE 1500 MG: 10 INJECTION, POWDER, LYOPHILIZED, FOR SOLUTION INTRAVENOUS at 19:25

## 2024-02-08 ASSESSMENT — PULMONARY FUNCTION TESTS
PIF_VALUE: 29
PIF_VALUE: 24
PIF_VALUE: 37
PIF_VALUE: 33
PIF_VALUE: 29
PIF_VALUE: 36
PIF_VALUE: 12
PIF_VALUE: 45
PIF_VALUE: 44
PIF_VALUE: 21
PIF_VALUE: 45
PIF_VALUE: 31
PIF_VALUE: 30
PIF_VALUE: 28
PIF_VALUE: 46
PIF_VALUE: 45
PIF_VALUE: 31
PIF_VALUE: 44
PIF_VALUE: 46
PIF_VALUE: 38
PIF_VALUE: 19
PIF_VALUE: 45
PIF_VALUE: 33
PIF_VALUE: 45
PIF_VALUE: 20
PIF_VALUE: 20
PIF_VALUE: 28
PIF_VALUE: 44
PIF_VALUE: 45
PIF_VALUE: 44
PIF_VALUE: 33
PIF_VALUE: 25
PIF_VALUE: 20
PIF_VALUE: 45
PIF_VALUE: 46
PIF_VALUE: 29
PIF_VALUE: 39
PIF_VALUE: 28
PIF_VALUE: 27
PIF_VALUE: 31
PIF_VALUE: 29
PIF_VALUE: 22
PIF_VALUE: 36
PIF_VALUE: 29
PIF_VALUE: 38
PIF_VALUE: 34
PIF_VALUE: 38
PIF_VALUE: 28
PIF_VALUE: 31
PIF_VALUE: 46
PIF_VALUE: 45
PIF_VALUE: 31
PIF_VALUE: 22
PIF_VALUE: 39
PIF_VALUE: 35

## 2024-02-08 ASSESSMENT — PAIN SCALES - GENERAL
PAINLEVEL_OUTOF10: 0

## 2024-02-08 NOTE — CONSULTS
from visualized esophagus on the swallow or the portion of the esophagus with contrast on the follow-up CT image.  - There is no sign of gastric/esophageal contents coming from the chest tubes  - Pneumothorax can cause tracking of air into mediastinum  - Low concern for esophageal perforation with the above available imaging and clinical findings  - Discussed that EGD could be considered if continued suspicion remains. Patient is currently in an unstable condition and risk/benefit discussion should be held regarding further procedures  - Also discussed with primary team as well as family that if an esophageal perforation was found, depending on the degree of injury, the patient may not survive required intervention and his overall prognosis from that diagnosis alone is poor.   -Stenting could possibly be an option for a small injury, but may be difficult finding a provider to perform this at [facility]. Larger injuries would require surgery, which this patient would not be a candidate for and would not be available here at [facility].  - Do not recommend further corpak placement. If concern for needing to decompress the stomach, surgery team can attempt NG/OG placement. If GI is involved, a decompressive NG should be placed by them.     The above was discussed with Dr. Lang, Dr. Lackey, MEI Augustine, as well as the patient's sister and wife.    Please call with further questions    Micaela Degroot DO  02/08/24  5:32 PM

## 2024-02-08 NOTE — PROGRESS NOTES
[facility] Progress Note    2024     Zain Lopez    MRN: [id number]    : 1955    Referring MD: Penelope Wilhelm,   [address]  [address]  [place] [address]      SUBJECTIVE: Patient required intubation last night due to mental status changes and increased work of breathing.  Rapid response called this am for hypotension.  Patient is not responding and maxing out on pressors.      ECOG PS: (4) Completely disabled, unable to carry out self-care and confined to bed or chair     KPS: 20% Very sick; hospital admission necessary; active supportive treatment necessary    Isolation: Droplet Plus    Medications    Scheduled Meds:   propofol        succinylcholine        furosemide  60 mg IntraVENous Daily    ipratropium 0.5 mg-albuterol 2.5 mg  1 Dose Inhalation BID RT    levETIRAcetam  500 mg IntraVENous Q12H    lidocaine  1 patch TransDERmal Daily    methylPREDNISolone  20 mg IntraVENous Daily    midodrine  5 mg Oral TID WC    sulfamethoxazole-trimethoprim  400 mg Oral Q8H    sennosides-docusate sodium  2 tablet Oral Daily    polyethylene glycol  17 g Oral Daily    metoprolol  2.5 mg IntraVENous Q8H    pantoprazole  40 mg IntraVENous Daily    apixaban  5 mg Oral BID    [Held by provider] tamsulosin  0.4 mg Oral Daily    valACYclovir  500 mg Oral BID    sodium chloride flush  5-40 mL IntraVENous 2 times per day    Saline Mouthwash  15 mL Swish & Spit 4x Daily AC & HS     Continuous Infusions:   propofol Stopped (24 0657)    fentaNYL Stopped (24 0657)    phenylephrine (VICENTE-SYNEPHRINE) 50 mg in sodium chloride 0.9 % 250 mL infusion 140 mcg/min (24 0708)    dextrose      sodium chloride 20 mL/hr at 24 0206    sodium chloride       PRN Meds:.propofol, succinylcholine, polyvinyl alcohol, oxyCODONE **OR** oxyCODONE, ondansetron, prochlorperazine, diphenhydrAMINE, morphine, acetaminophen, potassium chloride, potassium chloride, magnesium sulfate, glucose, dextrose bolus **OR** dextrose

## 2024-02-08 NOTE — CONSULTS
[facility] NEPHROLOGY    [facility]              [phone]                       Plan :            Assessment :     Acute renal failure  -ATN due to hypotension presumably from septic shock/PNA. Cr increased from 1.7 to 2.2 with hyperkalemia, acidosis, oliguria   -will initiate CRRT and keep balance net even given requirement for vasopressors  -temporary dialysis cathter placed by general surgery  -maintain MAP > 65 mmHg, titrate pressors accordingly  -monitor UOP, trend renal panel  -avoid nephrotoxic agents    Acute hypoxic/hypercapnic respiratory failure  Pneumothorax; pneumomediastinum s/p chest tube  -patient intubated 2/8  -vent and chest tube management per pulm    Metabolic acidosis  -anion gap metabolic acidosis primarily from renal failure/lactic acidosis with component of hypercarbia  -ventilated with , RR 22 but breathing over in low 30s  -ABG after vent changes  -bicarb amps administered   -CRRT will improve acidosis as well     Hyperkalemia  -due to acute renal failure, acidosis  -received calcium gluconate, lasix  -monitor telemetry  -will correct with CRRT    Hypercalcemia  -corrected Ca 11.9  -continue monitoring renal panel        [facility] Nephrology would like to thank Penelope Wilhelm DO   for opportunity to serve this patient      Please call with questions at-   24 Hrs Answering service [phone] or  7 am- 5 pm via Perfect serve or cell phone  Dr.Daniyal Joaquin MD          CC/reason for consult :     JOEL     HPI :     \"Zain Lopez is a 69 y/o male w/ 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 His also has h/o BPH, GERD, HTN, & CHB w/ dual chamber pacemaker.       He was diagnosed w/ relapsed DLBCL w/ CNS  sodium phosphate 6 mmol in sodium chloride 0.9 % 250 mL IVPB **OR** sodium phosphate 12 mmol in sodium chloride 0.9 % 250 mL IVPB **OR** sodium phosphate 18 mmol in sodium chloride 0.9 % 500 mL IVPB **OR** sodium phosphate 24 mmol in sodium chloride 0.9 % 500 mL IVPB, insulin regular, propofol, succinylcholine, polyvinyl alcohol, oxyCODONE **OR** oxyCODONE, ondansetron, prochlorperazine, diphenhydrAMINE, morphine, acetaminophen, glucose, dextrose bolus **OR** dextrose bolus, glucagon (rDNA), dextrose, sodium chloride, albuterol, diphenhydrAMINE, sodium chloride flush, sodium chloride, magnesium hydroxide, Saline Mouthwash, alteplase (CATHFLO) 2 mg in sterile water 2 mL injection       Vitals :     Vitals:    02/08/24 0900   BP: 117/69   Pulse: 62   Resp: (!) 31   Temp: 98.1 °F (36.7 °C)   SpO2: 98%       I & O :       Intake/Output Summary (Last 24 hours) at 2/8/2024 0954  Last data filed at 2/8/2024 0659  Gross per 24 hour   Intake --   Output 1315 ml   Net -1315 ml        Physical Examination :     General appearance: Anxious- no, distressed- no, intubated and sedated  HEENT: Lips- normal, teeth- ok , oral mucosa- moist  Neck : Mass- no, appears symmetrical, JVD- not visible  Respiratory: Respiratory effort-  tachypneic mech breath sounds, wheeze- no, crackles - no  Cardiovascular: Ausculation- No M/R/G, Edema   Abdomen: visible mass- no, distention- no, scar- no, tenderness- no                            hepatosplenomegaly-  no  Musculoskeletal:  clubbing no,cyanosis- no , digital ischemia- no                           muscle strength- grossly normal , tone - grossly normal  Skin: rashes- no , ulcers- no, induration- no, tightening - no     LABS:     Recent Labs     02/07/24  2239 02/08/24  0549 02/08/24  0830   WBC 10.8 7.6 7.2   HGB 9.1* 8.6* 7.9*   HCT 28.2* 26.9* 25.1*    127* 116*     Recent Labs     02/07/24  0337 02/07/24  1730 02/07/24  2239 02/08/24  0549 02/08/24  0830   * 136 135* 136 141

## 2024-02-08 NOTE — PROGRESS NOTES
[facility] -  Clinical Pharmacy Note    Vancomycin - Management by Pharmacy    Consult Date(s): 2/8/24  Consulting Provider(s): Penelope Chan    Assessment / Plan  FN- Vancomycin  Concurrent Antimicrobials: MERREM, ACYCLOVIR  Day of Vanc Therapy / Ordered Duration: 7 days  Current Dosing Method: Intermittent Dosing by Levels  Therapeutic Goal: Trough 10-15 mg/L  Current Dose / Plan:   1500 mg x once  Random level due 2/9 @ 0600  Will continue to monitor clinical condition and make adjustments to regimen as appropriate.    Thank you for consulting pharmacy,  Antonio Jones, PharmD  Main Pharmacy: [id number]        Interval update:  -----    Subjective/Objective:   Zain Loepz is a 68 y.o. male with a PMHx significant for ------ who is admitted with hypoxia.     Pharmacy is consulted to dose vancomycin.    Ht Readings from Last 1 Encounters:   01/23/24 1.778 m (5' 10\")     Wt Readings from Last 1 Encounters:   02/08/24 58 kg (127 lb 13.9 oz)     Current & Prior Antimicrobial Regimen(s):  Merrem  Acyclovir     Vancomycin Level(s) / Doses:    Date Time Dose Type of Level / Level Interpretation                 Note: Serum levels collected for AUC-based dosing may be high if collected in close proximity to the dose administered. This is not necessarily indicative of toxicity.    Cultures & Sensitivities:    Date Site Micro Susceptibility / Result                 Recent Labs     02/08/24  0549 02/08/24  0830 02/08/24  1230 02/08/24  1600 02/08/24  1751   CREATININE 2.2* 2.2* 2.1* 1.9*  --    BUN 60* 59* 54* 48*  --    WBC 7.6 7.2  --   --  3.8*       Estimated Creatinine Clearance: 30 mL/min (A) (based on SCr of 1.9 mg/dL (H)).    Additional Lab Values / Findings of Note:    Recent Labs     02/08/24  0830   PROCAL 39.30*

## 2024-02-08 NOTE — PROGRESS NOTES
02/08/24 0847   Encounter Summary   Encounter Overview/Reason  Crisis   Service Provided For: Family  (Wife and friend in small waiting room.)   Support System Family members   Last Encounter  02/08/24  (topher)   Complexity of Encounter High   Begin Time 0755   End Time  0840   Total Time Calculated 45 min   Crisis   Type Rapid Response   Spiritual/Emotional needs   Type Spiritual Support   Assessment/Intervention/Outcome   Assessment Anxious;Hopeful   Intervention Active listening;Discussed belief system/Hoahaoism practices/cash;Discussed illness injury and it’s impact;Discussed relationship with God;Explored/Affirmed feelings, thoughts, concerns;Prayer (assurance of)/[place];Sustaining Presence/Ministry of presence   Outcome Comfort;Engaged in conversation;Expressed feelings, needs, and concerns;Expressed Gratitude;Receptive      found the PT's wife and friend outside the PT's room, in the small waiting / sitting space as the clinical team responded to the PT's urgent need. Both seemed emotionally stable at the time but with some anxiety. After introductions, wife provided a short clinical update to her understanding. This informed a prayer the was provided. She then further explained that she and the PT are devout [place] Catholics and active in their [employer]. She then expressed, \"I am hoping for a miracle.\"   Dr. AMALIA Wilhelm joined us and provided and update. Wife's response was, \"The miracle has not happened yet. I want every done\".  After the meeting, the  asked if the she would like the Sacrament of the Sick for the PT. She agreed but expressed that she knew of someone to whom she will reach out to for that. Both were thankful.   No other needs at this time. Spiritual Health will continue to follow.  Staff , Bernard Fleming MA, [facility]

## 2024-02-08 NOTE — CONSENT
Discussed need for arterial line with spouse Meaghan Lopez who gave explicit consent for line to be placed.  All potential risks discussed.  All questions and concerns addressed at this time.

## 2024-02-08 NOTE — PROGRESS NOTES
ID Follow-up NOTE    CC:   VT arrest  Antibiotics:  Bactrim, meropenem, valacyclovir    Admit Date: 1/23/2024  Hospital Day: 17    Subjective:     Patient was reintubated late on 2/7, back on mechanical ventilation, on pressors and now CRRT, hyperkalemic and anuric.  Patient also with pneumothorax with pneumomediastinum.  Chest tubes x 2 in place.  Patient's sister at bedside.      Objective:     Patient Vitals for the past 8 hrs:   BP Temp Temp src Pulse Resp SpO2 Weight   02/08/24 1415 -- -- -- 61 22 (!) 79 % --   02/08/24 1400 (!) 104/52 -- -- 64 19 -- --   02/08/24 1345 -- -- -- 60 28 (!) 59 % --   02/08/24 1330 -- -- -- 64 21 (!) 76 % --   02/08/24 1315 -- -- -- 61 28 -- --   02/08/24 1300 100/65 -- -- 60 30 -- --   02/08/24 1245 -- -- -- 60 28 (!) 78 % --   02/08/24 1230 -- -- -- 60 22 -- --   02/08/24 1223 -- -- -- 60 23 91 % --   02/08/24 1215 -- -- -- 60 25 -- --   02/08/24 1200 (!) 95/39 (!) 95.9 °F (35.5 °C) CORE 60 26 100 % --   02/08/24 1145 -- -- -- 72 26 -- --   02/08/24 1130 -- -- -- 62 30 -- --   02/08/24 1115 (!) 101/39 -- -- 61 24 -- --   02/08/24 1100 (!) 108/34 -- -- 60 26 (!) 87 % --   02/08/24 1045 (!) 105/17 -- -- 60 (!) 35 (!) 87 % --   02/08/24 1030 (!) 113/37 -- -- 62 28 -- --   02/08/24 1015 (!) 92/18 -- -- 60 (!) 34 -- --   02/08/24 1000 (!) 99/55 -- -- 60 28 -- --   02/08/24 0945 (!) 109/37 -- -- 60 30 -- --   02/08/24 0900 117/69 98.1 °F (36.7 °C) CORE 62 (!) 31 98 % 58 kg (127 lb 13.9 oz)   02/08/24 0815 -- -- -- 60 28 97 % --       I/O last 3 completed shifts:  In: 512 [NG/GT:512]  Out: 2330 [Urine:2100; Chest Tube:230]  I/O this shift:  In: 2516 [I.V.:2516]  Out: 592 [Urine:240]    EXAM:  GENERAL: Sedated, on mechanical ventilation, CRRT ongoing  HEENT: ET tube in place  NECK:  Supple, no lymphadenopathy  LUNGS: Intubated, on mechanical ventilation, patient with 2 right-sided chest tubes in place.  CARDIAC: RRR, no murmur appreciated, pacemaker in left upper chest wall, no  Oral TID     sulfamethoxazole-trimethoprim  400 mg Oral Q8H    sennosides-docusate sodium  2 tablet Oral Daily    polyethylene glycol  17 g Oral Daily    [Held by provider] metoprolol  2.5 mg IntraVENous Q8H    pantoprazole  40 mg IntraVENous Daily    apixaban  5 mg Oral BID    [Held by provider] tamsulosin  0.4 mg Oral Daily    sodium chloride flush  5-40 mL IntraVENous 2 times per day    Saline Mouthwash  15 mL Swish & Spit 4x Daily AC & HS       Continuous Infusions:   propofol Stopped (02/08/24 0840)    fentaNYL Stopped (02/08/24 0657)    norepinephrine 20 mcg/min (02/08/24 1100)    prismaSol BGK 2/3.5 1,000 mL/hr at 02/08/24 1047    prismaSol BGK 2/3.5 500 mL/hr at 02/08/24 1047    prismaSol BGK 2/3.5 500 mL/hr at 02/08/24 1047    dexmedeTOMIDine (PRECEDEX) 400 mcg in sodium chloride 0.9 % 100 mL infusion 0.3 mcg/kg/hr (02/08/24 1130)    vasopressin 20 Units in sodium chloride 0.9 % 100 mL infusion 0.03 Units/min (02/08/24 1337)    phenylephrine (VICENTE-SYNEPHRINE) 50 mg in sodium chloride 0.9 % 250 mL infusion 300 mcg/min (02/08/24 1441)    dextrose      sodium chloride         PRN Meds:  norepinephrine, sodium bicarbonate, potassium chloride, magnesium sulfate, calcium gluconate **OR** calcium gluconate **OR** calcium gluconate **OR** calcium gluconate, sodium phosphate 6 mmol in sodium chloride 0.9 % 250 mL IVPB **OR** sodium phosphate 12 mmol in sodium chloride 0.9 % 250 mL IVPB **OR** sodium phosphate 18 mmol in sodium chloride 0.9 % 500 mL IVPB **OR** sodium phosphate 24 mmol in sodium chloride 0.9 % 500 mL IVPB, polyvinyl alcohol, oxyCODONE **OR** oxyCODONE, ondansetron, prochlorperazine, diphenhydrAMINE, morphine, acetaminophen, glucose, dextrose bolus **OR** dextrose bolus, glucagon (rDNA), dextrose, sodium chloride, albuterol, diphenhydrAMINE, sodium chloride flush, sodium chloride, magnesium hydroxide, Saline Mouthwash, alteplase (CATHFLO) 2 mg in sterile water 2 mL injection      Assessment:

## 2024-02-08 NOTE — PROGRESS NOTES
Vasopressor dosing equivalent score = 85. For VDE >12 trophic TF only (10 mL/hr).    Comprehensive Nutrition Assessment    RECOMMENDATIONS:  PO Diet: Continue NPO  Nutrition Education: No recommendation at this time   Nutrition Support; monitor for ability to start TF; consult RD for recs as needed.    NUTRITION ASSESSMENT:   Nutritional summary & status: Follow-up. Pt reintubated 2/07, sedated on precedex. Hypotensive, on levo and nolberto. VDE=85. Corpak removed 2/07. Corpak vs OG tube being considered for placement.  On CRRT. Will monitor for ability to begin enteral nutrition. Continue to follow closely.   Admission // PMH: Hypoxia//DLBC w/CNS, Thyroid disease, acute CHF, CAD    MALNUTRITION ASSESSMENT  Context of Malnutrition: Chronic Illness   Malnutrition Status: Severe malnutrition  Findings of the 6 clinical characteristics of malnutrition (Minimum of 2 out of 6 clinical characteristics is required to make the diagnosis of moderate or severe Protein Calorie Malnutrition based on AND/ASPEN Guidelines):  Energy Intake:  75% or less estimated energy requirements for 1 month or longer  Weight Loss:  Greater than 5% over 1 month (15% in 1 month)     Body Fat Loss:  Severe body fat loss Orbital, Buccal region   Muscle Mass Loss:  Severe muscle mass loss Temples (temporalis), Clavicles (pectoralis & deltoids)  Fluid Accumulation:  No significant fluid accumulation     Strength:  Not Performed    NUTRITION DIAGNOSIS   Inadequate oral intake related to impaired respiratory function as evidenced by intubation    Nutrition Monitoring and Evaluation:   Food/Nutrient Intake Outcomes:  Enteral Nutrition Intake/Tolerance  Physical Signs/Symptoms Outcomes:  Biochemical Data, Hemodynamic Status, Nutrition Focused Physical Findings, Weight     OBJECTIVE DATA: Significant to nutrition assessment  Nutrition Related Findings: LBM2/05. LUE +2,+3 nonpitting edema.  Wounds: None  Nutrition Goals: Initiate nutrition support, by next
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
## 2024-02-08 NOTE — PROCEDURES
labeled  Site marked: the operative site was marked  Required items: required blood products, implants, devices, and special equipment available  Patient identity confirmed: arm band  Time out: Immediately prior to procedure a \"time out\" was called to verify the correct patient, procedure, equipment, support staff and site/side marked as required.  Indications: CRRT.    Anesthesia:  Local Anesthetic: lidocaine 1% without epinephrine  Anesthetic total: 5 mL    Sedation:  Patient sedated: no    Preparation: skin prepped with 2% chlorhexidine  Skin prep agent dried: skin prep agent completely dried prior to procedure  Sterile barriers: all five maximum sterile barriers used - cap, mask, sterile gown, sterile gloves, and large sterile sheet  Hand hygiene: hand hygiene performed prior to central venous catheter insertion  Location details: left femoral  Site selection rationale: Lines in other sites  Patient position: flat  Catheter type: triple lumen  Catheter size: 13 Fr.  Pre-procedure: landmarks identified  Ultrasound guidance: yes  Sterile ultrasound techniques: sterile gel and sterile probe covers were used  Number of attempts: 1  Successful placement: yes  Post-procedure: line sutured and dressing applied  Assessment: blood return through all ports and free fluid flow  Patient tolerance: patient tolerated the procedure well with no immediate complications  Comments: EBL 5 ccs           Leonardo Levy,   2/8/2024

## 2024-02-08 NOTE — SIGNIFICANT EVENT
Name: Zain Lopez            Admitted: 1/23/2024     Significant event time: 2/8/24 at 8:22 AM EST    Called to bedside because patient was getting more hypoxic and hypotensive. Had a code called earlier in night with same issue, needed to be intubated. POC lactate was 5.46, K of 6.5. Surgery was there for A-line. Initial SBP was in 60s. MAP of 30.     BP (!) 58/45   Pulse 60   Temp 99.3 °F (37.4 °C) (Bladder)   Resp 28   Ht 1.778 m (5' 10\")   Wt 64 kg (141 lb 1.5 oz)   SpO2 97%   BMI 20.24 kg/m²   General appearance: acute distress due to hypoxia, hypercarbia  Lungs:  Harsh breath sounds, most likely mechanical  Heart: regular rate and rhythm, S1, S2 normal, no murmur, click, rub or gallop  Abdomen: soft, non-tender; bowel sounds normal; no masses,  no organomegaly  Neurologic: Mental status: Was not responsive    A/P   Acute hypercarbic, hypoxic respiratory failure  Hypotensive most likely 2/2 sepsis  Started on pressor support, ABG showed 7.225, pCO2 of 66.2, pO2 of 123.3  Vent settings: Vt of 400, RR of 22, PEEP of 8, FiO2 of 100%, patient breathing at 30  Given 2 pushes of bicarb  Surgery placed A-line  At around 0810, Dr. Leyva took over    Orders: CXR, ABG, Levophed, Mejia, Vasopressin, BMP, MG, CBC, 2 amp bicarbonate    Code: Full Code  Disposition: To remain at [facility]    Total critical care time was 20 minutes, excluding separately reportable procedures. Services included in critical care time were chart data review, documentation time, obtaining information from patient, review of nursing notes, and vital sign assessment. There was a high probability of clinically significant life-threatening deterioration in the patient's condition, which required urgent intervention.    Harsh Grijalva MD  Internal Medicine, PGY-1  2/8/2024  8:22 AM

## 2024-02-08 NOTE — PROGRESS NOTES
4 Eyes Skin Assessment     NAME:  Zain Lopez  YOB: 1955  MEDICAL RECORD NUMBER:  [id number]    The patient is being assessed for  Shift Handoff    I agree that at least one RN has performed a thorough Head to Toe Skin Assessment on the patient. ALL assessment sites listed below have been assessed.      Areas assessed by both nurses:    Head, Face, Ears, Shoulders, Back, Chest, Arms, Elbows, Hands, Sacrum. Buttock, Coccyx, Ischium, Legs. Feet and Heels, and Under Medical Devices         Does the Patient have a Wound? Yes wound(s) were present on assessment. LDA wound assessment was Initiated and completed by RN       Juan Prevention initiated by RN: Yes  Wound Care Orders initiated by RN: Yes    Pressure Injury (Stage 3,4, Unstageable, DTI, NWPT, and Complex wounds) if present, place Wound referral order by RN under : No    New Ostomies, if present place, Ostomy referral order under : No     Nurse 1 eSignature: Electronically signed by Briseida Rodriguez RN on 2/7/24 at 7:06 PM EST    **SHARE this note so that the co-signing nurse can place an eSignature**    Nurse 2 eSignature: {Esignature:[id number]}

## 2024-02-08 NOTE — CONSULTS
Consult received.  Labs and notes were reviewed.  Case was discussed with the staff.  Patient was previously seen by us     Full note to follow.    Thanks  Nephrology  [id number]  [place] [address]  Office: [phone]  Cell: [phone]  Fax: [phone]

## 2024-02-08 NOTE — PROGRESS NOTES
At handoff, Fawn, NP came to notify RN's that pt's SBP was in the 60s. Mejia increased to 165 and 185. Fluid bolus started. BP increased to 71/36. NP called critical care. Norepi drip started. Rapid called at 0800. ABG obtained. Bicarb administered 0810. Calcium gluconate administered 0818. 2nd amp of bicarb adminstered.

## 2024-02-08 NOTE — SIGNIFICANT EVENT
Rapid response called at 2244 2/7 for acute respiratory distress.  Pt was having agonal respirations with gasping breaths.  Saturations at this time low to mid 80's with MAPs in the 50's.  Decision was made to emergently intubate patient.  Pt sedated with 20 etomidate successfully placed on mechanical ventilation.

## 2024-02-08 NOTE — PROGRESS NOTES
Speech pathology  Dc    Chart reviewed, events noted. Pt now intubated. Please re-refer s/p extubation as pt appropriate      WALT GREENE M.S./CCC-SLP #[id number]  Pg. # [phone]

## 2024-02-08 NOTE — PROGRESS NOTES
Pulmonary & Critical Care Medicine ICU Progress Note    Admit Date: 2024  PCP: Ronal Wahl, APRN - CNP    CC:  acute hypoxemic and hypercapnic respiratory failure, cardiopulmonary arrest.  CHF, atrial flutter.  Events of Last 24 hours: I spoke with Gutierrez last night about his CT scan.  The scan didn't show an esophageal leak but did show worsening pneumomediastinum.  Mehrdad also looked like he was anxious and was breathing harder.  They confirmed that if he got worse they would want him re-intubated.  Unfortunately he didn't improve and a rapid was called.  He was hypotensive and acidemic.  He was reintubated soon after midnight.  This morning his blood pressure dropped further and he was noted to be hyperkalemic and not making urine.  Another rapid was called.  He's now on 3 pressors.    Vitals:  Tmax:  VITALS:  /69   Pulse 62   Temp 98.1 °F (36.7 °C) (Core)   Resp (!) 31   Ht 1.778 m (5' 10\")   Wt 58 kg (127 lb 13.9 oz)   SpO2 98%   BMI 18.35 kg/m²   24HR INTAKE/OUTPUT:    Intake/Output Summary (Last 24 hours) at 2024 1016  Last data filed at 2024 0659  Gross per 24 hour   Intake --   Output 1315 ml   Net -1315 ml       CURRENT PULSE OXIMETRY:  SpO2: 98 %  24HR PULSE OXIMETRY RANGE:  SpO2  Av.2 %  Min: 79 %  Max: 99 %      Invasive Lines:       CVC Triple Lumen 24 Right Internal jugular (Active)   Number of days: 8       Peripheral IV 24 Right Forearm (Active)   Number of days: 8       Urinary Catheter 24 Bruner-Temperature (Active)   Number of days: 3       Recent Labs     24  0833 24  0932   PHART 7.335* 7.296*   NYG2BHI 51.3* 53.9*   PO2ART 164.7* 107.1           EXAM:  General: Intubated and off sedation.  Marked increased work of breathing.  Eyes: PERRL. No sclera icterus. No conjunctival injection.  ENT: ETT in place  Neck: Trachea midline. Normal thyroid.  Resp: + accessory muscle use. No crackles. No wheezing. No rhonchi.  2 Right

## 2024-02-08 NOTE — PROGRESS NOTES
Rapid response note:    Patient is 68-year-old male admitted with acute respiratory failure, COVID, new onset of a flutter and has developed pneumothorax requiring right chest tube.  Initially intubated for respiratory failure and successfully extubated.  Currently on Eliquis, midodrine, IV Lasix, IV Lopressor, IV Solu-Medrol and Bactrim plus Valtrex.    Called to see patient who has developed reduced mental status and gasping for breath.  Stat venous blood gas-pH 7.20, pCO2 76.    Discussed with wife and sister at bedside-opted for intubation and full code.    Patient intubated for acute hypercapnic and hypoxic respiratory failure.  The altered mental status is due to hypercapnia.    Assessment and plan  1.  Acute hypoxic and hypercapnic respiratory failure: Reintubated.  Will start on vent management.  Chest x-ray reviewed by me.  CBC, chemistry, ABG.  Pulmonology/critical care will follow.

## 2024-02-08 NOTE — PROGRESS NOTES
On call physician notified of patient's reduced mental status. Pt unable to verbalize response to questions. Pt's neurological assessment changed. Pt's  hand  weak and unable to perform dorsiflexion and plantar flexion. ABG and chest x ray ordered.    Rapid response called at 2209 due to absence of response to RT and shallow, rapid respiratory rate. 2224 20 mg Etomidate. 2225 intubated #8 25 @ lip. 2228 500 L bolus started.    Pt care handed off to ICU nurse.

## 2024-02-08 NOTE — PROGRESS NOTES
Cardiology Consult Service  Daily Progress Note        Admit Date:  1/23/2024  Primary cardiologist: Dr Anderson    Reason for Consultation/Chief Complaint: r/o AHF    Subjective:     Zain Lopez is a 68 y.o. 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      LHC 10/2023: normal coronaries.      Echo 01/2024: nl LV, EF 40-45%, indeterminate diastolic, normal RV and valves (compared to echo 11/2021, normal echo).      Patient came to the emergency room on 1/23 with progressively worse shortness of breath and hypoxia.  He was admitted for multifocal healthcare related pneumonia in the setting of immunocompromise.  Pulmonology was consulted, patient had a bronchoscopy on 1/25 which was complicated by acute hypoxia and patient was intubated.  BAL consistent with budding yeast therefore patient was placed on voriconazole along with Levaquin.  On 1/28 patient developed torsades.  Levaquin and voriconazole were stopped and patient was placed on alternative therapy.  Since then patient had a very complicated course including development of pneumomediastinum and right-sided pneumothorax which was thought to be due to Corpak insertion versus airway injury.  CT chest on 2/7 revealed increasing pneumomediastinum compared to CT chest of 2/5/2024 along with stable moderate right pneumothorax with chest tubes, increasing consolidation in the bases, no contrast extravasation from the esophagus.    Earlier this morning rapid response was called due to increased work of breathing and patient had to be reintubated.  He was severely hypotensive and acidemic.  Patient was placed on multiple pressors including Mejia-Synephrine 300 mics per minute, norepinephrine 20 mics per minute,

## 2024-02-08 NOTE — PROGRESS NOTES
0355 - ICU resident team called re: ART line for patient. Pt on pressers. BP's may not be accurate with last 80/46 MAP 56 with nolberto at 70. Currently at a code stroke and will call them back again later.
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
## 2024-02-09 ENCOUNTER — APPOINTMENT (OUTPATIENT)
Dept: GENERAL RADIOLOGY | Age: 69
End: 2024-02-09
Attending: STUDENT IN AN ORGANIZED HEALTH CARE EDUCATION/TRAINING PROGRAM
Payer: MEDICARE

## 2024-02-09 VITALS
TEMPERATURE: 96.1 F | WEIGHT: 127.87 LBS | HEIGHT: 70 IN | SYSTOLIC BLOOD PRESSURE: 96 MMHG | BODY MASS INDEX: 18.31 KG/M2 | HEART RATE: 60 BPM | OXYGEN SATURATION: 72 % | DIASTOLIC BLOOD PRESSURE: 67 MMHG | RESPIRATION RATE: 32 BRPM

## 2024-02-09 LAB
ALBUMIN SERPL-MCNC: 2.7 G/DL (ref 3.4–5)
ALP SERPL-CCNC: 195 U/L (ref 40–129)
ALT SERPL-CCNC: 3440 U/L (ref 10–40)
ANION GAP SERPL CALCULATED.3IONS-SCNC: 21 MMOL/L (ref 3–16)
APTT BLD: 63.4 SEC (ref 22.7–35.9)
APTT BLD: 72.7 SEC (ref 22.7–35.9)
AST SERPL-CCNC: >7000 U/L (ref 15–37)
BACTERIA BLD CULT ORG #2: NORMAL
BACTERIA BLD CULT: NORMAL
BASE EXCESS BLDA CALC-SCNC: -11 MMOL/L (ref -3–3)
BASE EXCESS BLDA CALC-SCNC: -12 MMOL/L (ref -3–3)
BASE EXCESS BLDA CALC-SCNC: -13 MMOL/L (ref -3–3)
BASE EXCESS BLDA CALC-SCNC: -9 MMOL/L (ref -3–3)
BASOPHILS # BLD: 0 K/UL (ref 0–0.2)
BASOPHILS NFR BLD: 0.5 %
BILIRUB DIRECT SERPL-MCNC: 1.1 MG/DL (ref 0–0.3)
BILIRUB INDIRECT SERPL-MCNC: 0.5 MG/DL (ref 0–1)
BILIRUB SERPL-MCNC: 1.6 MG/DL (ref 0–1)
BUN SERPL-MCNC: 27 MG/DL (ref 7–20)
CA-I BLD-SCNC: 1.16 MMOL/L (ref 1.12–1.32)
CA-I BLD-SCNC: 1.17 MMOL/L (ref 1.12–1.32)
CA-I BLD-SCNC: 1.17 MMOL/L (ref 1.12–1.32)
CA-I BLD-SCNC: 1.19 MMOL/L (ref 1.12–1.32)
CA-I BLD-SCNC: 1.2 MMOL/L (ref 1.12–1.32)
CA-I BLD-SCNC: 1.21 MMOL/L (ref 1.12–1.32)
CA-I BLD-SCNC: 1.22 MMOL/L (ref 1.12–1.32)
CALCIUM SERPL-MCNC: 9 MG/DL (ref 8.3–10.6)
CHLORIDE SERPL-SCNC: 94 MMOL/L (ref 99–110)
CO2 BLDA-SCNC: 15 MMOL/L
CO2 BLDA-SCNC: 16 MMOL/L
CO2 BLDA-SCNC: 16 MMOL/L
CO2 BLDA-SCNC: 17 MMOL/L
CO2 BLDA-SCNC: 18 MMOL/L
CO2 BLDA-SCNC: 19 MMOL/L
CO2 SERPL-SCNC: 17 MMOL/L (ref 21–32)
CREAT SERPL-MCNC: 1.4 MG/DL (ref 0.8–1.3)
DEPRECATED RDW RBC AUTO: 21.3 % (ref 12.4–15.4)
EOSINOPHIL # BLD: 0 K/UL (ref 0–0.6)
EOSINOPHIL NFR BLD: 0 %
GFR SERPLBLD CREATININE-BSD FMLA CKD-EPI: 54 ML/MIN/{1.73_M2}
GLUCOSE BLD-MCNC: 106 MG/DL (ref 70–99)
GLUCOSE BLD-MCNC: 57 MG/DL (ref 70–99)
GLUCOSE BLD-MCNC: 58 MG/DL (ref 70–99)
GLUCOSE BLD-MCNC: 67 MG/DL (ref 70–99)
GLUCOSE BLD-MCNC: 67 MG/DL (ref 70–99)
GLUCOSE BLD-MCNC: 77 MG/DL (ref 70–99)
GLUCOSE SERPL-MCNC: 61 MG/DL (ref 70–99)
HCO3 BLDA-SCNC: 14.3 MMOL/L (ref 21–29)
HCO3 BLDA-SCNC: 14.8 MMOL/L (ref 21–29)
HCO3 BLDA-SCNC: 15.3 MMOL/L (ref 21–29)
HCO3 BLDA-SCNC: 15.5 MMOL/L (ref 21–29)
HCO3 BLDA-SCNC: 16.5 MMOL/L (ref 21–29)
HCO3 BLDA-SCNC: 17.5 MMOL/L (ref 21–29)
HCT VFR BLD AUTO: 23.1 % (ref 40.5–52.5)
HGB BLD-MCNC: 7.2 G/DL (ref 13.5–17.5)
INR PPP: 3.88 (ref 0.84–1.16)
LACTATE BLD-SCNC: 12.17 MMOL/L (ref 0.4–2)
LACTATE BLD-SCNC: 12.27 MMOL/L (ref 0.4–2)
LACTATE BLD-SCNC: 12.51 MMOL/L (ref 0.4–2)
LACTATE BLD-SCNC: 12.92 MMOL/L (ref 0.4–2)
LACTATE BLD-SCNC: 13.57 MMOL/L (ref 0.4–2)
LACTATE BLD-SCNC: 9.17 MMOL/L (ref 0.4–2)
LDH SERPL L TO P-CCNC: >2500 U/L (ref 100–190)
LYMPHOCYTES # BLD: 0.2 K/UL (ref 1–5.1)
LYMPHOCYTES NFR BLD: 7.3 %
MAGNESIUM SERPL-MCNC: 2.2 MG/DL (ref 1.8–2.4)
MCH RBC QN AUTO: 29.3 PG (ref 26–34)
MCHC RBC AUTO-ENTMCNC: 31.2 G/DL (ref 31–36)
MCV RBC AUTO: 94.1 FL (ref 80–100)
MONOCYTES # BLD: 0.5 K/UL (ref 0–1.3)
MONOCYTES NFR BLD: 16.6 %
NEUTROPHILS # BLD: 2.4 K/UL (ref 1.7–7.7)
NEUTROPHILS NFR BLD: 75.6 %
PCO2 BLDA: 34.6 MM HG (ref 35–45)
PCO2 BLDA: 36.4 MM HG (ref 35–45)
PCO2 BLDA: 36.5 MM HG (ref 35–45)
PCO2 BLDA: 37.4 MM HG (ref 35–45)
PCO2 BLDA: 38.8 MM HG (ref 35–45)
PCO2 BLDA: 39 MM HG (ref 35–45)
PERFORMED ON: ABNORMAL
PH BLDA: 7.21 [PH] (ref 7.35–7.45)
PH BLDA: 7.22 [PH] (ref 7.35–7.45)
PH BLDA: 7.22 [PH] (ref 7.35–7.45)
PH BLDA: 7.23 [PH] (ref 7.35–7.45)
PH BLDA: 7.23 [PH] (ref 7.35–7.45)
PH BLDA: 7.29 [PH] (ref 7.35–7.45)
PH VENOUS: 7.21 (ref 7.35–7.45)
PHOSPHATE SERPL-MCNC: 5.9 MG/DL (ref 2.5–4.9)
PLATELET # BLD AUTO: 91 K/UL (ref 135–450)
PMV BLD AUTO: 11.3 FL (ref 5–10.5)
PO2 BLDA: 100.8 MM HG (ref 75–108)
PO2 BLDA: 102.2 MM HG (ref 75–108)
PO2 BLDA: 153.6 MM HG (ref 75–108)
PO2 BLDA: 88.6 MM HG (ref 75–108)
PO2 BLDA: 91 MM HG (ref 75–108)
PO2 BLDA: 93.5 MM HG (ref 75–108)
POC SAMPLE TYPE: ABNORMAL
POTASSIUM BLD-SCNC: 5.4 MMOL/L (ref 3.5–5.1)
POTASSIUM BLD-SCNC: 5.5 MMOL/L (ref 3.5–5.1)
POTASSIUM BLD-SCNC: 5.8 MMOL/L (ref 3.5–5.1)
POTASSIUM BLD-SCNC: 5.8 MMOL/L (ref 3.5–5.1)
POTASSIUM SERPL-SCNC: 6 MMOL/L (ref 3.5–5.1)
POTASSIUM SERPL-SCNC: 6 MMOL/L (ref 3.5–5.1)
PROCALCITONIN SERPL IA-MCNC: 16.93 NG/ML (ref 0–0.15)
PROT SERPL-MCNC: 5.2 G/DL (ref 6.4–8.2)
PROTHROMBIN TIME: 37.8 SEC (ref 11.5–14.8)
RBC # BLD AUTO: 2.46 M/UL (ref 4.2–5.9)
SAO2 % BLDA: 95 % (ref 93–100)
SAO2 % BLDA: 95 % (ref 93–100)
SAO2 % BLDA: 96 % (ref 93–100)
SAO2 % BLDA: 99 % (ref 93–100)
SODIUM BLD-SCNC: 134 MMOL/L (ref 136–145)
SODIUM BLD-SCNC: 134 MMOL/L (ref 136–145)
SODIUM BLD-SCNC: 135 MMOL/L (ref 136–145)
SODIUM BLD-SCNC: 136 MMOL/L (ref 136–145)
SODIUM BLD-SCNC: 137 MMOL/L (ref 136–145)
SODIUM BLD-SCNC: 138 MMOL/L (ref 136–145)
SODIUM SERPL-SCNC: 132 MMOL/L (ref 136–145)
VANCOMYCIN SERPL-MCNC: 11.3 UG/ML
WBC # BLD AUTO: 3.1 K/UL (ref 4–11)

## 2024-02-09 PROCEDURE — 94003 VENT MGMT INPAT SUBQ DAY: CPT

## 2024-02-09 PROCEDURE — 2500000003 HC RX 250 WO HCPCS: Performed by: INTERNAL MEDICINE

## 2024-02-09 PROCEDURE — 90945 DIALYSIS ONE EVALUATION: CPT

## 2024-02-09 PROCEDURE — 6370000000 HC RX 637 (ALT 250 FOR IP): Performed by: STUDENT IN AN ORGANIZED HEALTH CARE EDUCATION/TRAINING PROGRAM

## 2024-02-09 PROCEDURE — 6360000002 HC RX W HCPCS: Performed by: NURSE PRACTITIONER

## 2024-02-09 PROCEDURE — 83615 LACTATE (LD) (LDH) ENZYME: CPT

## 2024-02-09 PROCEDURE — 2580000003 HC RX 258: Performed by: NURSE PRACTITIONER

## 2024-02-09 PROCEDURE — 80076 HEPATIC FUNCTION PANEL: CPT

## 2024-02-09 PROCEDURE — 36415 COLL VENOUS BLD VENIPUNCTURE: CPT

## 2024-02-09 PROCEDURE — 83735 ASSAY OF MAGNESIUM: CPT

## 2024-02-09 PROCEDURE — 94761 N-INVAS EAR/PLS OXIMETRY MLT: CPT

## 2024-02-09 PROCEDURE — 2580000003 HC RX 258: Performed by: INTERNAL MEDICINE

## 2024-02-09 PROCEDURE — 6360000002 HC RX W HCPCS

## 2024-02-09 PROCEDURE — 85025 COMPLETE CBC W/AUTO DIFF WBC: CPT

## 2024-02-09 PROCEDURE — 84145 PROCALCITONIN (PCT): CPT

## 2024-02-09 PROCEDURE — 71045 X-RAY EXAM CHEST 1 VIEW: CPT

## 2024-02-09 PROCEDURE — 84295 ASSAY OF SERUM SODIUM: CPT

## 2024-02-09 PROCEDURE — 84132 ASSAY OF SERUM POTASSIUM: CPT

## 2024-02-09 PROCEDURE — 2500000003 HC RX 250 WO HCPCS

## 2024-02-09 PROCEDURE — 2580000003 HC RX 258

## 2024-02-09 PROCEDURE — 82947 ASSAY GLUCOSE BLOOD QUANT: CPT

## 2024-02-09 PROCEDURE — 82330 ASSAY OF CALCIUM: CPT

## 2024-02-09 PROCEDURE — 2700000000 HC OXYGEN THERAPY PER DAY

## 2024-02-09 PROCEDURE — 85730 THROMBOPLASTIN TIME PARTIAL: CPT

## 2024-02-09 PROCEDURE — 99291 CRITICAL CARE FIRST HOUR: CPT | Performed by: INTERNAL MEDICINE

## 2024-02-09 PROCEDURE — 82803 BLOOD GASES ANY COMBINATION: CPT

## 2024-02-09 PROCEDURE — 80069 RENAL FUNCTION PANEL: CPT

## 2024-02-09 PROCEDURE — 6360000002 HC RX W HCPCS: Performed by: INTERNAL MEDICINE

## 2024-02-09 PROCEDURE — 2580000003 HC RX 258: Performed by: STUDENT IN AN ORGANIZED HEALTH CARE EDUCATION/TRAINING PROGRAM

## 2024-02-09 PROCEDURE — 80202 ASSAY OF VANCOMYCIN: CPT

## 2024-02-09 PROCEDURE — C9113 INJ PANTOPRAZOLE SODIUM, VIA: HCPCS | Performed by: NURSE PRACTITIONER

## 2024-02-09 PROCEDURE — 85610 PROTHROMBIN TIME: CPT

## 2024-02-09 PROCEDURE — 83605 ASSAY OF LACTIC ACID: CPT

## 2024-02-09 RX ORDER — ATROPINE SULFATE 10 MG/ML
2 SOLUTION/ DROPS OPHTHALMIC EVERY 4 HOURS PRN
Status: DISCONTINUED | OUTPATIENT
Start: 2024-02-09 | End: 2024-02-09 | Stop reason: HOSPADM

## 2024-02-09 RX ORDER — LORAZEPAM 2 MG/ML
1 INJECTION INTRAMUSCULAR EVERY 6 HOURS PRN
Status: DISCONTINUED | OUTPATIENT
Start: 2024-02-09 | End: 2024-02-09 | Stop reason: HOSPADM

## 2024-02-09 RX ADMIN — PHENYLEPHRINE HYDROCHLORIDE 300 MCG/MIN: 50 INJECTION INTRAVENOUS at 06:08

## 2024-02-09 RX ADMIN — SODIUM CHLORIDE, PRESERVATIVE FREE 10 ML: 5 INJECTION INTRAVENOUS at 09:12

## 2024-02-09 RX ADMIN — CALCIUM CHLORIDE, MAGNESIUM CHLORIDE, DEXTROSE MONOHYDRATE, LACTIC ACID, SODIUM CHLORIDE, SODIUM BICARBONATE AND POTASSIUM CHLORIDE: 5.15; 2.03; 22; 5.4; 6.46; 3.09; .157 INJECTION INTRAVENOUS at 02:26

## 2024-02-09 RX ADMIN — CALCIUM CHLORIDE, MAGNESIUM CHLORIDE, DEXTROSE MONOHYDRATE, LACTIC ACID, SODIUM CHLORIDE, SODIUM BICARBONATE AND POTASSIUM CHLORIDE: 5.15; 2.03; 22; 5.4; 6.46; 3.09; .157 INJECTION INTRAVENOUS at 06:55

## 2024-02-09 RX ADMIN — INSULIN HUMAN 5 UNITS: 100 INJECTION, SOLUTION PARENTERAL at 04:06

## 2024-02-09 RX ADMIN — SODIUM CHLORIDE 14 MCG/MIN: 9 INJECTION, SOLUTION INTRAVENOUS at 08:25

## 2024-02-09 RX ADMIN — IPRATROPIUM BROMIDE AND ALBUTEROL SULFATE 1 DOSE: 2.5; .5 SOLUTION RESPIRATORY (INHALATION) at 07:43

## 2024-02-09 RX ADMIN — WATER 40 MG: 1 INJECTION INTRAMUSCULAR; INTRAVENOUS; SUBCUTANEOUS at 08:50

## 2024-02-09 RX ADMIN — DEXTROSE MONOHYDRATE 100 ML: 100 INJECTION, SOLUTION INTRAVENOUS at 04:04

## 2024-02-09 RX ADMIN — PANTOPRAZOLE SODIUM 40 MG: 40 INJECTION, POWDER, FOR SOLUTION INTRAVENOUS at 09:11

## 2024-02-09 RX ADMIN — PHENYLEPHRINE HYDROCHLORIDE 300 MCG/MIN: 50 INJECTION INTRAVENOUS at 00:04

## 2024-02-09 RX ADMIN — PHENYLEPHRINE HYDROCHLORIDE 300 MCG/MIN: 50 INJECTION INTRAVENOUS at 09:20

## 2024-02-09 RX ADMIN — CALCIUM CHLORIDE, MAGNESIUM CHLORIDE, DEXTROSE MONOHYDRATE, LACTIC ACID, SODIUM CHLORIDE, SODIUM BICARBONATE AND POTASSIUM CHLORIDE: 5.15; 2.03; 22; 5.4; 6.46; 3.09; .157 INJECTION INTRAVENOUS at 05:50

## 2024-02-09 RX ADMIN — SODIUM BICARBONATE: 84 INJECTION, SOLUTION INTRAVENOUS at 05:25

## 2024-02-09 RX ADMIN — CALCIUM CHLORIDE, MAGNESIUM CHLORIDE, DEXTROSE MONOHYDRATE, LACTIC ACID, SODIUM CHLORIDE, SODIUM BICARBONATE AND POTASSIUM CHLORIDE: 5.15; 2.03; 22; 5.4; 6.46; 3.09; .157 INJECTION INTRAVENOUS at 06:56

## 2024-02-09 RX ADMIN — DEXTROSE MONOHYDRATE 125 ML: 100 INJECTION, SOLUTION INTRAVENOUS at 04:05

## 2024-02-09 RX ADMIN — VASOPRESSIN 0.03 UNITS/MIN: 20 INJECTION INTRAVENOUS at 02:23

## 2024-02-09 RX ADMIN — LEVETIRACETAM 500 MG: 100 INJECTION, SOLUTION INTRAVENOUS at 08:52

## 2024-02-09 RX ADMIN — HEPARIN SODIUM 2320 UNITS: 1000 INJECTION INTRAVENOUS; SUBCUTANEOUS at 02:49

## 2024-02-09 RX ADMIN — DEXTROSE MONOHYDRATE 125 ML: 100 INJECTION, SOLUTION INTRAVENOUS at 08:43

## 2024-02-09 RX ADMIN — PHENYLEPHRINE HYDROCHLORIDE 300 MCG/MIN: 50 INJECTION INTRAVENOUS at 03:11

## 2024-02-09 ASSESSMENT — PULMONARY FUNCTION TESTS
PIF_VALUE: 45
PIF_VALUE: 45
PIF_VALUE: 46
PIF_VALUE: 45
PIF_VALUE: 45
PIF_VALUE: 46
PIF_VALUE: 45
PIF_VALUE: 45
PIF_VALUE: 44

## 2024-02-09 ASSESSMENT — PAIN SCALES - GENERAL
PAINLEVEL_OUTOF10: 0

## 2024-02-09 NOTE — CONSULTS
[facility] - Clinical Pharmacy Note    Pharmacy was consulted by Dr. Wilhelm to dose vancomycin.    Vancomycin therapy has been discontinued. Pharmacy will sign off at this time. Please consider consulting pharmacy again if vancomycin is re-started.    Thank you for allowing us to participate in the care of this patient.    Michael Seay, PharmD, [facility]  [phone]  02/09/24 10:09 AM

## 2024-02-09 NOTE — PROGRESS NOTES
0955:  Family decided to withdrawal care.  Dr. Leyva and Dr. Collado discussed goals of care with family.  All in agreement.  Restraints removed. Pt extubated.  CRRT discontinued, IV drips discontinued.      1036:  Time of death pronounced by Dr. Collado.    home notified.

## 2024-02-09 NOTE — CARE COORDINATION
7:50am: Chart review completed. SW continues to follow and will be available to assist with needs.     SW will follow.    JULY Noel   for [facility] ([facility])  Office Phone: [phone]  [employer] Mobile: [phone]      
8:15am: Spoke with pt's wife briefly in the sitting area with andre present. Offered Social Work Support at this time. Pt's wife denied needing anything at this time. She is aware SW remains available if she would like to speak with SW for support.    Megan CHIN, JULY   for [facility] ([facility])  Office Phone: [phone]  [employer] Mobile: [phone]      
JULY   for [facility] ([facility])  Office Phone: [phone]  [employer] Mobile: [phone]

## 2024-02-09 NOTE — PROGRESS NOTES
4 Eyes Skin Assessment     NAME:  Zain Lopez  YOB: 1955  MEDICAL RECORD NUMBER:  [id number]    The patient is being assessed for  Shift Handoff    I agree that at least one RN has performed a thorough Head to Toe Skin Assessment on the patient. ALL assessment sites listed below have been assessed.      Areas assessed by both nurses:    Head, Face, Ears, Shoulders, Back, Chest, and Sacrum. Buttock, Coccyx, Ischium        Does the Patient have a Wound? No noted wound(s)       Juan Prevention initiated by RN: Yes  Wound Care Orders initiated by RN: No    Pressure Injury (Stage 3,4, Unstageable, DTI, NWPT, and Complex wounds) if present, place Wound referral order by RN under : No    New Ostomies, if present place, Ostomy referral order under : No     Nurse 1 eSignature: Electronically signed by Sunita Durham RN on 2/8/24 at 8:23 PM EST    **SHARE this note so that the co-signing nurse can place an eSignature**    Nurse 2 eSignature: {Esignature:[id number]}

## 2024-02-09 NOTE — PROGRESS NOTES
Pt with cool extremities due to pressors and CRRT. Temperatures low and difficult getting an accurate pulse ox. Dr. Leyva and NP Yenifer aware. Bear hugger and CRRT fluid warmer in place. Orders for PRN ABGs to assess oxygen received and implemented.

## 2024-02-09 NOTE — PROGRESS NOTES
[facility]    [facility]              [phone]                       Plan :     He is on pressors and is in shock   Urine out put is minimal   Overnight events noted  Surgical , cardiology consult reviewed     Lactate is worsening with shocked liver and end organ injuries  LFTS are elevated ( ischemic )  K remains elevated   ABG with PH of 7.21  On ABX for sepsis   Continue pressors to keep MAP > 65 mmhg    Continue CRRT with even balance. Dialysate rate increased 2/3.5 with 1000/2000/500 ml/hour.    Family withdrew care          Assessment :     Acute renal failure  -ATN due to hypotension presumably from septic shock/PNA. Cr increased from 1.7 to 2.2 with hyperkalemia, acidosis, oliguria   -will initiate CRRT and keep balance net even given requirement for vasopressors  -temporary dialysis cathter placed by general surgery  -maintain MAP > 65 mmHg, titrate pressors accordingly  -monitor UOP, trend renal panel  -avoid nephrotoxic agents    Acute hypoxic/hypercapnic respiratory failure  Pneumothorax; pneumomediastinum s/p chest tube  -patient intubated 2/8  -vent and chest tube management per pulm    Metabolic acidosis  -anion gap metabolic acidosis primarily from renal failure/lactic acidosis with component of hypercarbia  -ventilated with , RR 22 but breathing over in low 30s  -ABG after vent changes  -bicarb amps administered   -CRRT will improve acidosis as well     Hyperkalemia  -due to acute renal failure, acidosis  -received calcium gluconate, lasix  -monitor telemetry  -will correct with CRRT    Hypercalcemia  -corrected Ca 11.9  -continue monitoring renal panel        [facility] would like to thank Penelope Wilhelm DO   for opportunity to serve this patient      Please call with questions at-   24 Hrs Answering service [phone] or  7 am- 5 pm via [employer] or cell phone  Dr.Muhammad Jaleel Garcia MD          CC/reason for consult :     JOEL     HPI :  (02/09/24 0608)    dextrose      sodium chloride       PRN Meds:.potassium chloride, magnesium sulfate, calcium gluconate **OR** calcium gluconate **OR** calcium gluconate **OR** calcium gluconate, sodium phosphate 6 mmol in sodium chloride 0.9 % 250 mL IVPB **OR** sodium phosphate 12 mmol in sodium chloride 0.9 % 250 mL IVPB **OR** sodium phosphate 18 mmol in sodium chloride 0.9 % 500 mL IVPB **OR** sodium phosphate 24 mmol in sodium chloride 0.9 % 500 mL IVPB, dextrose bolus **OR** dextrose bolus, glucagon (rDNA), dextrose, heparin (porcine), heparin (porcine), polyvinyl alcohol, oxyCODONE **OR** oxyCODONE, ondansetron, prochlorperazine, diphenhydrAMINE, morphine, acetaminophen, glucose, dextrose, sodium chloride, albuterol, diphenhydrAMINE, sodium chloride flush, sodium chloride, magnesium hydroxide, Saline Mouthwash, alteplase (CATHFLO) 2 mg in sterile water 2 mL injection       Vitals :     Vitals:    02/09/24 0753   BP:    Pulse: 60   Resp: 13   Temp:    SpO2:        I & O :       Intake/Output Summary (Last 24 hours) at 2/9/2024 0834  Last data filed at 2/9/2024 0800  Gross per 24 hour   Intake 7085.78 ml   Output 3660 ml   Net 3425.78 ml          Physical Examination :     General appearance: Anxious- no, distressed- no, intubated and sedated  HEENT: Lips- normal, teeth- ok , oral mucosa- moist  Neck : Mass- no, appears symmetrical, JVD- not visible  Respiratory: on ventilator   Cardiovascular: Ausculation- No M/R/G, Edema   Abdomen: visible mass- no, distention- no, scar- no, tenderness- no                            hepatosplenomegaly-  no       LABS:     Recent Labs     02/08/24  0830 02/08/24  1751 02/09/24  0310   WBC 7.2 3.8* 3.1*   HGB 7.9* 7.7* 7.2*   HCT 25.1* 24.7* 23.1*   * 102* 91*       Recent Labs     02/08/24  1600 02/08/24 2052 02/09/24 0310    135* 132*   K 6.3*  6.3* 5.8* 6.0*  6.0*    98* 94*   CO2 23 20* 17*   BUN 48* 37* 27*   CREATININE 1.9* 1.7* 1.4*   GLUCOSE

## 2024-02-09 NOTE — PROGRESS NOTES
Radha Chavarria, and Rainer aware of labs. Vent changes and CRRT changes made.    Dr. AMALIA Wilhelm with family at bedside and decision made to make patient a limited code.

## 2024-02-09 NOTE — PROGRESS NOTES
Pt's glucose 51 on 1600 labs. Dextrose 10 bolus administered per PRN orders. On recheck, glucose dropped to 49. A 2nd dextrose bolus was administered per PRN orders. Glucose recovered to 90. Patient remains on q6h Accuchecks.

## 2024-02-09 NOTE — PROGRESS NOTES
Assumed care of patient after rapid response. Surgery at bedside to place Vascath for CRRT. L femoral vascath placed. Ok to use Vascath per MD Levy.

## 2024-02-09 NOTE — DEATH NOTES
Death Pronouncement Note  Patient's Name: Zain Lopez   Patient's YOB: 1955  MRN Number: [id number]    Admitting Provider: Penelope Wilhelm DO  Attending Provider: Penelope Wilhelm DO    Patient was examined and the following were absent: Pulses, Blood Pressure, and Respiratory effort. Also had absent pupillary reflex.     I declared the patient dead on 2/9/2024 at 10:36 am.    Preliminary Cause of Death: Septic shock (Primary) and Acute hypoxic respiratory failure (secondary) cause.      Patient's family notified.  care on board.     Electronically signed by Silverio Collado MD on 2/9/24 at 1:00 PM EST

## 2024-02-09 NOTE — PROGRESS NOTES
Louie Leyva and AMALIA Wilhelm notified of patient's most recent ABG. 1 amp of bicarb administered and patient started on bicarb drip. Pt started on dobutamine drip per cards and heparin drip per heme/onc.

## 2024-02-09 NOTE — PROGRESS NOTES
Pt appearing more uncomfortable and moving arms and legs. Dr. Leyva notified and orders placed for Precedex. Restraint orders in place.

## 2024-02-09 NOTE — PROGRESS NOTES
S/p Vent x 10 days (1/24/24 - 2/3/24), Re-intubated 2/7/24 with hypoxic & hypercapneic respiratory failure  Right pneumothorax:  - Chest tube replaced 2/6, unchanged on 2/8/24  Pneumomediastinum:   - Consult CT surgery - Dr. Zain Lang  - Swallow studies and CT chest reviewed personally, with general surgery staff, and radiology. There is no sign of extravasation of contrast from visualized esophagus on the swallow or the portion of the esophagus with contrast on the follow-up CT image.  - There is no sign of gastric/esophageal contents coming from the chest tubes  - Pneumothorax can cause tracking of air into mediastinum  - Low concern for esophageal perforation with the above available imaging and clinical findings  - Discussed that EGD could be considered if continued suspicion remains. Patient is currently in an unstable condition and risk/benefit discussion should be held regarding further procedures  - Also discussed with primary team as well as family that if an esophageal perforation was found, depending on the degree of injury, the patient may not survive required intervention and his overall prognosis from that diagnosis alone is poor.   - MBS followed by chest CT 2/7/24:    Significantly increased pneumomediastinum, moderate right pneumothorax, trace left pneumothorax, mildly worsened bibasilar consolidations, no contrast extravasation      4. Heme: Anemia & Thrombocytopenia d/t recent chemo and medications  - Transfuse for Hgb < 7 and Platelets < 50K (anticoagulation)  - No transfusion today  Coagulopathy:   - Right Venous US 11/17/23: + DVT right IJ, brachial, axillary, cephalic veins  - Cont Eliquis (11/21/23)  - Monitor INR daily     5. Metabolic / CKDIII: JOEL & AG Met Acidosis 2/2 septic shock. HypoNa, HyperK, HyperPhos despite CRRT,   - LR bolus--change to NS with hyperkalemia- change to NS + 50 mEq NaHCO3 @ 125 mL/hr- stop with fluid overload and BP improving.  Contact nephrology to assist with  management- per Dr. Garcia, we will manage fluids with CRRT so does not need exogenous fluids at this time  Hyperkalemia/JOEL  - Nephrology consult- Radha consulted  - NaCO3 + Ca Gluconate  - Insulin + Dextrose  - Cont CRRT Day +2 (2/8/24)  - Cont bicarb gtt at 100mL/hr     6. GI / Nutrition: H/o constipation, nausea, and GERD  Nutrition: Intubated on multiple vasopressors  - Hold currently while on pressor support  - Pt lost NG tube 2/7. General surgery instructed to hold off on replacing NG tube - needs to be placed under fluoro or endo  - NPO for now  - Cont PPI ppx     7. Cardiac: H/o CHB w/ left SC dual chamber pacemaker and HTN. Ymywzjex-Wwdu-WKAG 1/28/24. Septic shock now on multiple vasopressors  H/o Torsades/V.Fib arrest 1/28/24:  - Initially torsades-> Vfib-> compressions, shock and epi-> ROSC  Torsades de pointes / PMVT.  Likely due to Levaquin and voriconazole infusions. QT prolongation masked by V pacing.   - Echo (9/21/23): LVEF 50%. No regional wall motion abnormalities are seen. Normal diastolic function.  - Had NSVT. Will keep K > 4 and Mg > 2. Cardiology following - Will need to manage with   CHB:  - Dual chamber pacemaker in place  Prolonged QTc:  - EKG (1/23/24): QTC: 486 - can subtract 50 d/t having ventricular paced pacemaker in place  New Onset Atrial Flutter:  - s/p digoxin  HFrEF:  - Echo 1/24/24- EF 40-45%, Indeterminate diastolic function  - Cardiology consulted, appreciate recs. Diurese prn with FL- will by managed by nephrology.    8. : H/o BPH  BPH:    - Cont Flomax 0.4 mg daily      9. MSK: Generalized weakness:  D/t CNS lymphoma and chemotherapy  Acute Pain: chest pain r/t chest tube & previous chest compressions  - Cont sedation while intubated: precedex (currently 0.4mcg/kg/hr), fentanyl (currently off), propofol (currently off)     10. 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 Neuro:

## 2024-02-09 NOTE — PROGRESS NOTES
Pulmonary & Critical Care Medicine ICU Progress Note    Admit Date: 2024  PCP: Ronal Wahl, APRN - CNP    CC:  acute hypoxemic and hypercapnic respiratory failure, cardiopulmonary arrest.  CHF, atrial flutter.  Events of Last 24 hours: Mehrdad continued to worsen through the day and overnight.  His lactate is up to 13 and ph is 7.2 despite CRRT and the addition of continuous bicarbonate infusion.  He is on low dose precedex but is completely obtunded.    Vitals:  Tmax:  VITALS:  BP (!) 101/59   Pulse 60   Temp (!) 96.1 °F (35.6 °C) (Bladder)   Resp 13   Ht 1.778 m (5' 10\")   Wt 58 kg (127 lb 13.9 oz)   SpO2 (!) 75%   BMI 18.35 kg/m²   24HR INTAKE/OUTPUT:    Intake/Output Summary (Last 24 hours) at 2024 0920  Last data filed at 2024 0900  Gross per 24 hour   Intake 7085.78 ml   Output 3769 ml   Net 3316.78 ml       CURRENT PULSE OXIMETRY:  SpO2:  (UTO)  24HR PULSE OXIMETRY RANGE:  SpO2  Av.3 %  Min: 59 %  Max: 100 %      Invasive Lines:       CVC Triple Lumen 24 Right Internal jugular (Active)   Number of days: 8       Peripheral IV 24 Right Forearm (Active)   Number of days: 8       Urinary Catheter 24 Bruner-Temperature (Active)   Number of days: 3       Recent Labs     24  0659 24  0813   PHART 7.219* 7.211*   DBP3YMM 37.4 38.8   PO2ART 100.8 102.2           EXAM:  General: Intubated obtunded  Eyes: Pupils widely dilated and unresponsive to light or threat. mild sclera icterus. No conjunctival injection.  ENT: ETT in place  Neck: Trachea midline. Normal thyroid.  Resp: + accessory muscle use. No crackles. No wheezing. No rhonchi.  2 Right sided chest tubes in place.  Apical on suction.  no bubbling.  CV: Regular rate. Regular rhythm. No mumur no rub. trace edema.   GI: Non-tender. Non-distended. No masses. No organmegaly. Normal bowel sounds.   Skin: Warm and dry. No nodule on exposed extremities. No rash on exposed extremities.  Lymph: No cervical LAD. No

## 2024-02-09 NOTE — PROGRESS NOTES
2230 - Unable to obtain accurate peripheral O2 sats via finger, ear, forehead probe d/t vascular/hemodynamic compromise. Utilizing EPOC Q2-3 hours to obtain accurate O2 sat and serial lab monitoring.      1945: pH 7.241 2115: pH 7.288 0022: pH 7.88             HCO3 19.5           HCO3 19.7           HCO3 17.5             LA 9.6            La 9.1            9.17             O2 sat 97%           O2 sats 97.7           99.1%     0315 pH 7.234 0328 (recheck) 7.226    HCO3 16.5   14.3    LA 12.51   12.27    O2 95.7%   95%      0514 pH 7.216 0700 pH 7.219          HCO3 14.8  HCO3 15.3          LA 12.17   LA 12.92          O2 95.2%   O2 96.4%          K+ 5.4   K+ 5.4    0344 - MD ntfy'd re: increase LA 12.27 and steadily decreasing temp. And K 5.99.     0358 - Per MD give 100ml bolus d10 in addition to the 125ml bolus already being given for low blood sugar, and 5units insulin. No orders for lactic.

## 2024-02-12 LAB
BACTERIA BLD CULT ORG #2: NORMAL
BACTERIA BLD CULT: NORMAL
FUNGUS BLD CULT: NORMAL
FUNGUS SPEC CULT: NORMAL
LOEFFLER MB STN SPEC: NORMAL

## 2024-02-13 LAB
ACID FAST STN SPEC QL: NORMAL
MYCOBACTERIUM SPEC CULT: NORMAL

## 2024-02-16 NOTE — DISCHARGE SUMMARY
Discharge Note    2024     Zain Lopez    MRN: [id number]    : 1955    Referring MD: Penelope Wilhelm DO  [address] [facility] [address]  [place] [address]    Death Pronouncement Note  Patient's Name: Zain Lopez   Patient's YOB: 1955  MRN Number: [id number]    Admitting Provider: Penelope Wilhelm DO  Attending Provider: Penelope Wilhelm DO     Patient was examined by Dr. Silverio Collado MD and the following were absent: Pulses, Blood Pressure, and Respiratory effort. Also had absent pupillary reflex.      I declared the patient dead on 2024 at 10:36 am.     Preliminary Cause of Death: Septic shock (Primary) and Acute hypoxic respiratory failure (secondary) cause.    SUBJECTIVE: Pt actively dying, non responsive and having multi organ dysfunction.     ECOG PS: (4) Completely disabled, unable to carry out self-care and confined to bed or chair     KPS: 20% Very sick; hospital admission necessary; active supportive treatment necessary    Isolation: Droplet Plus    Medications    Scheduled Meds:      Continuous Infusions:      PRN Meds:.    ROS:  As noted above, otherwise remainder of 10-point ROS negative    Physical Exam:     I&O:  No intake or output data in the 24 hours ending 24 1404    Vital Signs:  BP 96/67   Pulse 60   Temp (!) 96.1 °F (35.6 °C) (Bladder)   Resp (!) 32   Ht 1.778 m (5' 10\")   Wt 58 kg (127 lb 13.9 oz)   SpO2 (!) 72%   BMI 18.35 kg/m²     Weight:    Wt Readings from Last 3 Encounters:   24 58 kg (127 lb 13.9 oz)   24 62.3 kg (137 lb 6.4 oz)   23 76 kg (167 lb 8.8 oz)       Physical Exam Performed by Dr. Collado  General: Intubated and sedated  HEENT: normocephalic, PERRL, no scleral erythema or icterus, Oral mucosa moist and intact, throat clear.  ETT tube  NECK: supple  BACK: Straight   SKIN: warm dry and intact without lesions rashes or masses  CHEST: Bilateral crackles  CV: Normal S1 S2, RRR, no MRG  ABD: NT ND normoactive

## 2024-02-19 LAB
FUNGUS BLD CULT: NORMAL
FUNGUS SPEC CULT: NORMAL
LOEFFLER MB STN SPEC: NORMAL

## 2024-02-20 LAB
ACID FAST STN SPEC QL: NORMAL
MYCOBACTERIUM SPEC CULT: NORMAL

## 2024-02-25 LAB
FUNGUS SPEC CULT: ABNORMAL
LOEFFLER MB STN SPEC: ABNORMAL
ORGANISM: ABNORMAL

## 2024-02-26 LAB
FUNGUS BLD CULT: NORMAL
FUNGUS SPEC CULT: NORMAL
LOEFFLER MB STN SPEC: NORMAL

## 2024-02-27 LAB
ACID FAST STN SPEC QL: NORMAL
MYCOBACTERIUM SPEC CULT: NORMAL

## 2024-03-03 LAB
FUNGUS SPEC CULT: ABNORMAL
LOEFFLER MB STN SPEC: ABNORMAL
ORGANISM: ABNORMAL

## 2024-03-04 LAB
FUNGUS BLD CULT: NORMAL
FUNGUS SPEC CULT: NORMAL
LOEFFLER MB STN SPEC: NORMAL

## 2024-03-05 LAB
ACID FAST STN SPEC QL: NORMAL
MYCOBACTERIUM SPEC CULT: NORMAL

## 2024-03-05 NOTE — PROGRESS NOTES
Operative Note      Patient: Dada Duque  YOB: 1965  MRN: [id number]    Date of Procedure: 3/4/2024    SURGEON:  Juan Rae MD    ANESTHESIA:  General endotracheal anesthesia.    PREOPERATIVE DIAGNOSIS:   C6-C7 cervical stenosis with herniated disk and radiculopathy.    POSTOPERATIVE DIAGNOSIS:  C6-C7 cervical stenosis with herniated disk and radiculopathy.    PROCEDURE:  1. C6-C7 anterior cervical diskectomy and fusion.  2. Insertion of interbody cage for spinal fusion at C6-C7.  3. Insertion of anterior cervical plate and screws at C6-C7      utilizing [employer]onic Zevo plate and screws.  4. [place] of local bone for spinal fusion.  5. Use of allograft bone for spinal fusion to include DBM  6. Intraoperative use of C-arm fluoroscopy.    ESTIMATED BLOOD LOSS:  50 mL.    FLUIDS:  Per anesthesia record.    COMPLICATIONS:  None.    INDICATIONS:  This is a pleasant 58-year-old male with a  longstanding history of significant neck pain radiating to his  right upper extremity.  He had done extensive conservative  management to include physical therapy, medications, and pain  management all with minimal long-standing benefit.  Due to his  continued symptoms and failure of conservative management, it was  discussed with him the option of performing a C6-C7 anterior  cervical diskectomy and fusion.  After MRI did show significant stenosis and disc herniation consistent with his symptoms at C6-C7.    Risks and benefits were discussed including bleeding, infection,  injury to nerves, vessels, anesthetic risk, need for possible  further future surgery as well as the possibility for continued  pain, continued symptoms, possible nonunion.  He did understand  all these risks, did wish to proceed.  Informed consent was  obtained.    PROCEDURE:  Patient was taken to the operating room and placed  supine on the operating table.  He was intubated and placed  under general anesthesia by anesthesiologist.  He was  Patient had 8 beats of VTACH, VSS, asymptomatic. Cardiology paged to be informed.

## 2024-03-07 LAB — FUNGUS BLD CULT: NORMAL

## 2024-03-11 LAB
FUNGUS BLD CULT: NORMAL
FUNGUS BLD CULT: NORMAL

## 2024-03-12 LAB
ACID FAST STN SPEC QL: NORMAL
MYCOBACTERIUM SPEC CULT: NORMAL

## 2024-05-31 NOTE — CARE COORDINATION
[facility] Outpatient Therapy  [address], [address] [facility], [address] [place] [address] Avenue  Phone: [phone]   Fax: [phone]    Physical Therapy Missed Visit Note     Date:  10/2/2023    Patient Name:  Deidre Maldonado      :  1955    MRN: [id number]      Cancelled visits to date: 1  No-shows to date: 1    For today's appointment patient:  [x]  Cancelled  []  Rescheduled appointment  []  No-show     Reason given by patient:  []  Patient ill  []  Conflicting appointment  []  No transportation    []  Conflict with work  [x]  No reason given  []  Other:     Comments:      Electronically signed by:  José Miguel Núñez PT, DPT
Yes

## (undated) DEVICE — COVER LT HNDL CAM BLU DISP W/ SURG CTRL

## (undated) DEVICE — GOWN SIRUS NONREIN XL W/TWL: Brand: MEDLINE INDUSTRIES, INC.

## (undated) DEVICE — SUTURE PROL 2-0 L48IN NONABSORBABLE BLU SH L26MM 1/2 CIR 8533H

## (undated) DEVICE — SUTURE MCRYL + SZ 4-0 L18IN ABSRB UD L19MM PS-2 3/8 CIR MCP496G

## (undated) DEVICE — COAGULATOR SUCT 10FR LAIN FTSWCH ACTIVATION DISP VALLEYLAB

## (undated) DEVICE — SET ADMIN PRIMING 7ML L30IN 7.35LB 20 GTT 2ND RLER CLMP

## (undated) DEVICE — GLOVE SURG SZ 7 L12IN FNGR THK79MIL GRN LTX FREE

## (undated) DEVICE — ENDOSCOPIC KIT 2 12 FT OP4 DE2 GWN SYR

## (undated) DEVICE — GLOVE SURG SZ 75 L12IN FNGR THK94MIL TRNSLUC YEL LTX

## (undated) DEVICE — REDUCER: Brand: ENDOWRIST

## (undated) DEVICE — TOWEL,STOP FLAG GOLD N-W: Brand: MEDLINE

## (undated) DEVICE — NEEDLE ASPIR 19GA HISTOLOGY FLX VIZISHOT 2

## (undated) DEVICE — Device

## (undated) DEVICE — CHLORAPREP 26ML ORANGE

## (undated) DEVICE — SUTURE VCRL SZ 3-0 L27IN ABSRB UD L26MM SH 1/2 CIR J416H

## (undated) DEVICE — DECANTER FLD 9IN ST BG FOR ASEP TRNSF OF FLD

## (undated) DEVICE — SYRINGE MED 10ML TRNSLUC BRL PLUNG BLK MRK POLYPR CTRL

## (undated) DEVICE — GAUZE,SPONGE,4"X4",8PLY,STRL,LF,10/TRAY: Brand: MEDLINE

## (undated) DEVICE — SUTURE ETHLN SZ 3-0 L18IN NONABSORBABLE BLK PS-2 L19MM 3/8 1669H

## (undated) DEVICE — SHEET,DRAPE,53X77,STERILE: Brand: MEDLINE

## (undated) DEVICE — ADHESIVE SKIN CLSR 0.7ML TOP DERMBND ADV

## (undated) DEVICE — TUBING, SUCTION, 3/16" X 6', STRAIGHT: Brand: MEDLINE

## (undated) DEVICE — DRAPE C ARM W46XL120IN XLN

## (undated) DEVICE — APPLICATOR MEDICATED 10.5 CC SOLUTION HI LT ORNG CHLORAPREP

## (undated) DEVICE — ELECTRODE PT RET AD L9FT HI MOIST COND ADH HYDRGEL CORDED

## (undated) DEVICE — LINER,SEMI-RIGID,3000CC,50EA/CS: Brand: MEDLINE

## (undated) DEVICE — TELFA 1" X 3": Brand: TELFA

## (undated) DEVICE — GLOVE SURG SZ 65 L12IN FNGR THK94MIL STD WHT LTX FREE

## (undated) DEVICE — SEAL

## (undated) DEVICE — SYRINGE MED 50ML LUERSLIP TIP

## (undated) DEVICE — TOOL MR8-9BA50 MR8 9CM BALL 5MM: Brand: MIDAS REX MR8

## (undated) DEVICE — SOLUTION IV 1000ML 0.9% SOD CHL

## (undated) DEVICE — ENDOSCOPIC KIT COMPLIANCE ENDOKIT

## (undated) DEVICE — ARM DRAPE

## (undated) DEVICE — SUTURE NRLN SZ 4-0 L18IN NONABSORBABLE BLK L13MM TF 1/2 CIR C584D

## (undated) DEVICE — SYRINGE MED 10ML SLIP TIP BLNT FILL AND LUERLOCK DISP

## (undated) DEVICE — PROVE COVER: Brand: UNBRANDED

## (undated) DEVICE — COTTON BALLS, DOUBLE STRUNG: Brand: DEROYAL

## (undated) DEVICE — SUTURE VCRL SZ 2-0 L27IN ABSRB VLT L26MM SH 1/2 CIR J317H

## (undated) DEVICE — SOLUTION IV 500ML 0.9% SOD CHL PH 5 INJ USP VIAFLX PLAS

## (undated) DEVICE — STANDARD HYPODERMIC NEEDLE,POLYPROPYLENE HUB: Brand: MONOJECT

## (undated) DEVICE — MINOR SET UP PK

## (undated) DEVICE — GLOVE SURG SZ 65 THK91MIL LTX FREE SYN POLYISOPRENE

## (undated) DEVICE — SPONGE,LAP,18"X18",DLX,XR,ST,5/PK,40/PK: Brand: MEDLINE

## (undated) DEVICE — BLADE CLIPPER SURG SENSICLIP

## (undated) DEVICE — AGENT HEMSTAT W2XL14IN OXIDIZED REGENERATED CELOS ABSRB FOR

## (undated) DEVICE — TOWEL,OR,DSP,ST,BLUE,STD,6/PK,12PK/CS: Brand: MEDLINE

## (undated) DEVICE — CODMAN® SURGICAL PATTIES 1/4" X 1/4" (0.64CM X 0.64CM): Brand: CODMAN®

## (undated) DEVICE — TRAP,MUCUS SPECIMEN, 80CC: Brand: MEDLINE

## (undated) DEVICE — SPONGE 300501 MEROCEL 100PK 1.3 X 1.3CM: Brand: MEROCEL®

## (undated) DEVICE — SURGICAL PROCEDURE PACK IV U-BAR

## (undated) DEVICE — SINGLE USE BIOPSY VALVE MAJ-210: Brand: SINGLE USE BIOPSY VALVE (STERILE)

## (undated) DEVICE — CODMAN® SURGICAL PATTIES 3/4" X 3/4" (1.91CM X 1.91CM): Brand: CODMAN®

## (undated) DEVICE — NEURO SPONGES: Brand: DEROYAL

## (undated) DEVICE — SINGLE USE SUCTION VALVE MAJ-209: Brand: SINGLE USE SUCTION VALVE (STERILE)

## (undated) DEVICE — PRESSURE MONITORING SET: Brand: TRUWAVE, VAMP PLUS

## (undated) DEVICE — PAD,NON-ADHERENT,3X8,STERILE,LF,1/PK: Brand: MEDLINE

## (undated) DEVICE — SYRINGE WITH HYPODERMIC SAFETY NEEDLE: Brand: MAGELLAN

## (undated) DEVICE — TIP COVER ACCESSORY

## (undated) DEVICE — COLUMN DRAPE

## (undated) DEVICE — TUBING, SUCTION, 3/16" X 12', STRAIGHT: Brand: MEDLINE

## (undated) DEVICE — CANNULA SEAL

## (undated) DEVICE — 1 ML INSULIN SYRINGE LUER-LOCK WITH TIP CAP: Brand: MONOJECT

## (undated) DEVICE — MARKER REFLECTIVE REFLECTIVE TWST ON SPHERZ 5PK

## (undated) DEVICE — Z CONVERTED USE 2271043 CONTAINER SPEC COLL 4OZ SCR ON LID PEEL PCH

## (undated) DEVICE — DRAPE MICSCP W54XL150IN W/ 4 BINOC GLS LENS LEICA

## (undated) DEVICE — 3M™ IOBAN™ 2 ANTIMICROBIAL INCISE DRAPE 6650EZ: Brand: IOBAN™ 2

## (undated) DEVICE — SEALANT SURG 13 YR DURA AUTOSPRAY ADHERUS NUS106] SURGICAL ONE]

## (undated) DEVICE — STERILE LATEX POWDER-FREE SURGICAL GLOVESWITH NITRILE COATING: Brand: PROTEXIS

## (undated) DEVICE — SOLUTION IV 1000ML LAC RINGERS PH 6.5 INJ USP VIAFLX PLAS

## (undated) DEVICE — SUTURE STRATAFIX SPRL MCRYL + SZ 3 0 L8IN ABSRB UD L26MM SH SXMP1B427

## (undated) DEVICE — NEEDLE ASPIR 21GA L700MM US GUID TREAT DST END FOR EFFICIENT

## (undated) DEVICE — TIP SUCT STD FLNG RIG RIB 5IN1 CONN NVENT W/ SECUR GRP HNDL

## (undated) DEVICE — SYRINGE MED 3ML CLR PLAS STD N CTRL LUERLOCK TIP DISP

## (undated) DEVICE — GLOVE,SURG,SENSICARE,ALOE,LF,PF,7: Brand: MEDLINE

## (undated) DEVICE — BOWL MED M 16OZ PLAS CAP GRAD

## (undated) DEVICE — ADAPTER LAB INTMED LUER 17GA

## (undated) DEVICE — SOLUTION IV 250ML 0.9% SOD CHL PH 5 INJ USP VIAFLX PLAS

## (undated) DEVICE — UNDERGLOVE SURG SZ 8 BLU LTX FREE SYN POLYISOPRENE POLYMER

## (undated) DEVICE — BLADELESS OBTURATOR: Brand: WECK VISTA

## (undated) DEVICE — SUTURE ABSORBABLE BRAIDED 2-0 CT-1 27 IN UD VICRYL J259H

## (undated) DEVICE — GOWN,SIRUS,NON REINFRCD,LARGE,SET IN SL: Brand: MEDLINE

## (undated) DEVICE — ANES EXTENSION SET 90IN-LF: Brand: MEDLINE INDUSTRIES, INC.

## (undated) DEVICE — TELFA NON-ADHERENT ABSORBENT DRESSING: Brand: TELFA

## (undated) DEVICE — 3M™ TEGADERM™ TRANSPARENT FILM DRESSING FRAME STYLE, 1624W, 2-3/8 IN X 2-3/4 IN (6 CM X 7 CM), 100/CT 4CT/CASE: Brand: 3M™ TEGADERM™

## (undated) DEVICE — CRANI: Brand: MEDLINE INDUSTRIES, INC.

## (undated) DEVICE — HOOK RETRCT 12MM S STL BLNT E STAY LONE STAR

## (undated) DEVICE — KIT CATHETER 20GA L12CM ART CUST

## (undated) DEVICE — SUTURE MCRYL SZ 4-0 L18IN ABSRB UD L19MM PS-2 3/8 CIR PRIM Y496G

## (undated) DEVICE — 3M™ TEGADERM™ TRANSPARENT FILM DRESSING FRAME STYLE WITH BORDER, 1616, 4 IN X 4-3/4 IN (10 CM X 12 CM), 50/CT 4CT/CASE: Brand: 3M™ TEGADERM™

## (undated) DEVICE — YANKAUER,BULB TIP,W/O VENT,RIGID,STERILE: Brand: MEDLINE

## (undated) DEVICE — SUTURE VCRL SZ 3-0 L18IN ABSRB UD L26MM SH 1/2 CIR J864D

## (undated) DEVICE — TOWEL,OR,DSP,ST,BLUE,STD,4/PK,20PK/CS: Brand: MEDLINE

## (undated) DEVICE — CATHETER IV 20GA L1.25IN PNK FEP SFTY STR HUB RADPQ DISP

## (undated) DEVICE — SUTURE VCRL SZ 2-0 L18IN ABSRB UD CT-1 L36MM 1/2 CIR J839D

## (undated) DEVICE — SYSTEM SMK EVAC LAP TBNG FILTER HSNG BENT STYL PNK SEE CLR

## (undated) DEVICE — INTENDED FOR TISSUE SEPARATION, AND OTHER PROCEDURES THAT REQUIRE A SHARP SURGICAL BLADE TO PUNCTURE OR CUT.: Brand: BARD-PARKER ® STAINLESS STEEL BLADES

## (undated) DEVICE — SET GRAV VENT NVENT CK VLV 3 NDL FREE PRT 10 GTT

## (undated) DEVICE — TOOL MR8-F2/7TA23 MR8 F2/7CM TAPER 2.3MM: Brand: MIDAS REX MR8

## (undated) DEVICE — SSC BONE WAX: Brand: SSC BONE WAX

## (undated) DEVICE — DRESSING GERM DIA1IN CNTR H DIA7MM BLU CHG ANTIMIC PROTCT

## (undated) DEVICE — BANDAGE,GAUZE,BULKEE II,4.5"X4.1YD,STRL: Brand: MEDLINE

## (undated) DEVICE — SPONGE,NEURO,0.5"X3",XR,STRL,LF,10/PK: Brand: MEDLINE

## (undated) DEVICE — BLADE ES ELASTOMERIC COAT INSUL DURABLE BEND UPTO 90DEG

## (undated) DEVICE — SUTURE MCRYL SZ 4-0 L27IN ABSRB UD L19MM PS-2 1/2 CIR PRIM Y426H

## (undated) DEVICE — SOLUTION IRRIG 2000ML STRL H2O UROMATIC PLAS CONT USP

## (undated) DEVICE — SUTURE VCRL + SZ 0 L27IN ABSRB VLT L26MM UR-6 5/8 CIR VCP603H

## (undated) DEVICE — NEEDLE BX OD1.8MM L150MM CUT WIND L10MM MTL RUL SYR SYR

## (undated) DEVICE — SUTURE VCRL + SZ 3-0 L18IN ABSRB UD SH 1/2 CIR TAPERCUT NDL VCP864D

## (undated) DEVICE — SUTURE VCRL + SZ 2-0 L27IN ABSRB WHT SH 1/2 CIR TAPERCUT VCP417H

## (undated) DEVICE — ELECTRODE,RADIOTRANSLUCENT,FOAM,3PK: Brand: MEDLINE

## (undated) DEVICE — Z DISCONTINUED USE 2749457 TUBING SAMP AD W12.5XH8.4IN D9.1IN NSL ORAL SMRT CAPNOLINE

## (undated) DEVICE — SINGLE-USE BIOPSY FORCEPS: Brand: RADIAL JAW 4

## (undated) DEVICE — TROCAR: Brand: KII FIOS FIRST ENTRY

## (undated) DEVICE — SOLUTION IRRG STRL H2O 500 ML BTL 16/CA